# Patient Record
Sex: FEMALE | Race: BLACK OR AFRICAN AMERICAN | NOT HISPANIC OR LATINO | Employment: FULL TIME | URBAN - METROPOLITAN AREA
[De-identification: names, ages, dates, MRNs, and addresses within clinical notes are randomized per-mention and may not be internally consistent; named-entity substitution may affect disease eponyms.]

---

## 2017-08-24 ENCOUNTER — LAB VISIT (OUTPATIENT)
Dept: LAB | Facility: HOSPITAL | Age: 32
End: 2017-08-24
Attending: FAMILY MEDICINE
Payer: COMMERCIAL

## 2017-08-24 ENCOUNTER — OFFICE VISIT (OUTPATIENT)
Dept: INTERNAL MEDICINE | Facility: CLINIC | Age: 32
End: 2017-08-24
Payer: COMMERCIAL

## 2017-08-24 VITALS
OXYGEN SATURATION: 98 % | WEIGHT: 233.69 LBS | HEART RATE: 80 BPM | DIASTOLIC BLOOD PRESSURE: 68 MMHG | SYSTOLIC BLOOD PRESSURE: 110 MMHG | TEMPERATURE: 98 F | BODY MASS INDEX: 36.68 KG/M2 | HEIGHT: 67 IN

## 2017-08-24 DIAGNOSIS — M32.9 SYSTEMIC LUPUS ERYTHEMATOSUS, UNSPECIFIED SLE TYPE, UNSPECIFIED ORGAN INVOLVEMENT STATUS: ICD-10-CM

## 2017-08-24 DIAGNOSIS — Z00.00 ANNUAL PHYSICAL EXAM: ICD-10-CM

## 2017-08-24 DIAGNOSIS — Z00.00 ANNUAL PHYSICAL EXAM: Primary | ICD-10-CM

## 2017-08-24 LAB
ALBUMIN SERPL BCP-MCNC: 3.3 G/DL
ALP SERPL-CCNC: 52 U/L
ALT SERPL W/O P-5'-P-CCNC: 16 U/L
ANION GAP SERPL CALC-SCNC: 5 MMOL/L
AST SERPL-CCNC: 24 U/L
BASOPHILS # BLD AUTO: 0.04 K/UL
BASOPHILS NFR BLD: 0.6 %
BILIRUB SERPL-MCNC: 0.2 MG/DL
BILIRUB UR QL STRIP: NEGATIVE
BUN SERPL-MCNC: 10 MG/DL
CALCIUM SERPL-MCNC: 9.1 MG/DL
CHLORIDE SERPL-SCNC: 103 MMOL/L
CHOLEST/HDLC SERPL: 4.6 {RATIO}
CLARITY UR REFRACT.AUTO: CLEAR
CO2 SERPL-SCNC: 31 MMOL/L
COLOR UR AUTO: YELLOW
CREAT SERPL-MCNC: 0.8 MG/DL
DIFFERENTIAL METHOD: ABNORMAL
EOSINOPHIL # BLD AUTO: 0.5 K/UL
EOSINOPHIL NFR BLD: 7.2 %
ERYTHROCYTE [DISTWIDTH] IN BLOOD BY AUTOMATED COUNT: 12.6 %
ERYTHROCYTE [SEDIMENTATION RATE] IN BLOOD BY WESTERGREN METHOD: 22 MM/HR
EST. GFR  (AFRICAN AMERICAN): >60 ML/MIN/1.73 M^2
EST. GFR  (NON AFRICAN AMERICAN): >60 ML/MIN/1.73 M^2
GLUCOSE SERPL-MCNC: 71 MG/DL
GLUCOSE UR QL STRIP: NEGATIVE
HCT VFR BLD AUTO: 33.6 %
HDL/CHOLESTEROL RATIO: 21.9 %
HDLC SERPL-MCNC: 169 MG/DL
HDLC SERPL-MCNC: 37 MG/DL
HGB BLD-MCNC: 11.1 G/DL
HGB UR QL STRIP: NEGATIVE
KETONES UR QL STRIP: NEGATIVE
LDLC SERPL CALC-MCNC: 86.4 MG/DL
LEUKOCYTE ESTERASE UR QL STRIP: NEGATIVE
LYMPHOCYTES # BLD AUTO: 1.7 K/UL
LYMPHOCYTES NFR BLD: 25 %
MCH RBC QN AUTO: 30.2 PG
MCHC RBC AUTO-ENTMCNC: 33 G/DL
MCV RBC AUTO: 91 FL
MONOCYTES # BLD AUTO: 0.5 K/UL
MONOCYTES NFR BLD: 6.6 %
NEUTROPHILS # BLD AUTO: 4.1 K/UL
NEUTROPHILS NFR BLD: 60.5 %
NITRITE UR QL STRIP: NEGATIVE
NONHDLC SERPL-MCNC: 132 MG/DL
PH UR STRIP: 7 [PH] (ref 5–8)
PLATELET # BLD AUTO: 483 K/UL
PMV BLD AUTO: 9 FL
POTASSIUM SERPL-SCNC: 4.1 MMOL/L
PROT SERPL-MCNC: 7.6 G/DL
PROT UR QL STRIP: NEGATIVE
RBC # BLD AUTO: 3.68 M/UL
SODIUM SERPL-SCNC: 139 MMOL/L
SP GR UR STRIP: 1.02 (ref 1–1.03)
TRIGL SERPL-MCNC: 228 MG/DL
URN SPEC COLLECT METH UR: NORMAL
UROBILINOGEN UR STRIP-ACNC: 4 EU/DL
WBC # BLD AUTO: 6.77 K/UL

## 2017-08-24 PROCEDURE — 85025 COMPLETE CBC W/AUTO DIFF WBC: CPT

## 2017-08-24 PROCEDURE — 3008F BODY MASS INDEX DOCD: CPT | Mod: S$GLB,,, | Performed by: FAMILY MEDICINE

## 2017-08-24 PROCEDURE — 80061 LIPID PANEL: CPT

## 2017-08-24 PROCEDURE — 85651 RBC SED RATE NONAUTOMATED: CPT

## 2017-08-24 PROCEDURE — 80053 COMPREHEN METABOLIC PANEL: CPT

## 2017-08-24 PROCEDURE — 99385 PREV VISIT NEW AGE 18-39: CPT | Mod: S$GLB,,, | Performed by: FAMILY MEDICINE

## 2017-08-24 PROCEDURE — 99999 PR PBB SHADOW E&M-NEW PATIENT-LVL III: CPT | Mod: PBBFAC,,, | Performed by: FAMILY MEDICINE

## 2017-08-24 PROCEDURE — 81003 URINALYSIS AUTO W/O SCOPE: CPT

## 2017-08-24 PROCEDURE — 36415 COLL VENOUS BLD VENIPUNCTURE: CPT

## 2017-08-24 RX ORDER — HYDROXYCHLOROQUINE SULFATE 200 MG/1
200 TABLET, FILM COATED ORAL 2 TIMES DAILY
COMMUNITY
End: 2019-10-02 | Stop reason: SDUPTHER

## 2017-08-24 NOTE — PROGRESS NOTES
Allison España  08/24/2017  [id number]    Sfoia Martinez MD  Patient Care Team:  Sofia Martinez MD as PCP - General (Family Medicine)  Has the patient seen any provider outside of the [facility] network since the last visit? (yes). If yes, HIPPA forms completed and records requested.        Visit Type:New Patient, [facility] Care, Chronic illness     Chief Complaint:  Chief Complaint   Patient presents with    [facility] Care       History of Present Illness:  31 year old here for new patient appt.    She reports she had been Dx with Lupus, last year. She reports she started with butterfly rash. She reports that she had some numbness in leg parethesia.  She also had joint aches in her hands. She now has facial numbness. She does report Raynauds in hands, with vasospasm at times.     She is currently on Plaquenil 200 mg twice a day.  She reports she is takes prednisone once a day. She is not aware of dose. This was given by Rheum in [place].     She reports she has not had a pap in years.   She reports family history with Aunt and PAternal grandma with breast cancer.    She is concerned with the facial/cutaneous sensations that she has.  She would like second opinion on treatment options for her lupus.    She does not report any organ issues with heart or kidneys that she knows of. Occasional palpitations, but no trigger.            History:  Past Medical History:   Diagnosis Date    Lupus (systemic lupus erythematosus)      History reviewed. No pertinent surgical history.  Family History   Problem Relation Age of Onset    Liver cancer Maternal Aunt     Breast cancer Maternal Aunt     Breast cancer Paternal Grandmother      Social History     Social History    Marital status:      Spouse name: N/A    Number of children: N/A    Years of education: N/A     Occupational History    [employer]      Social History Main Topics    Smoking status: Never Smoker    Smokeless tobacco: Never Used    Alcohol use No     Drug use: No    Sexual activity: Yes     Partners: Male     Other Topics Concern    Not on file     Social History Narrative    No narrative on file     Patient Active Problem List   Diagnosis    Lupus (systemic lupus erythematosus)     Review of patient's allergies indicates:  Allergies not on file    The following were reviewed at this visit: active problem list, medication list, allergies, family history, social history, and health maintenance.    Medications:  No current outpatient prescriptions on file prior to visit.     No current facility-administered medications on file prior to visit.        Medications have been reviewed and reconciled with patient at this visit.  Barriers to medications present (no)    Adverse reactions to current medications (no)    Over the counter medications reviewed (Yes ), and if needed added to active Medication list at this visit.     Exam:  Wt Readings from Last 3 Encounters:   08/24/17 106 kg (233 lb 11.2 oz)     Temp Readings from Last 3 Encounters:   08/24/17 98.2 °F (36.8 °C) (Tympanic)     BP Readings from Last 3 Encounters:   08/24/17 110/68     Pulse Readings from Last 3 Encounters:   08/24/17 80     Body mass index is 36.68 kg/m².      Review of Systems   Constitutional: Negative for chills, fever and weight loss.   HENT: Negative for congestion and sore throat.    Eyes: Negative for blurred vision, double vision, photophobia and pain.   Respiratory: Negative for cough and shortness of breath.    Cardiovascular: Positive for palpitations. Negative for chest pain and leg swelling.   Gastrointestinal: Negative for abdominal pain, constipation, diarrhea, heartburn and vomiting.   Genitourinary: Negative.    Musculoskeletal: Positive for joint pain. Negative for falls.   Skin: Positive for rash.   Neurological: Positive for tingling and sensory change. Negative for speech change, focal weakness, seizures and headaches.   Endo/Heme/Allergies: Negative for environmental  allergies. Does not bruise/bleed easily.   Psychiatric/Behavioral: Negative for depression and substance abuse.       Physical Exam   Constitutional: She is oriented to person, place, and time. She appears well-developed and well-nourished.   HENT:   Head: Normocephalic and atraumatic.   Mouth/Throat: Oropharynx is clear and moist. No oropharyngeal exudate.   Eyes: EOM are normal. Pupils are equal, round, and reactive to light.   Neck: Normal range of motion. Neck supple. No thyromegaly present.   Cardiovascular: Normal rate, regular rhythm, normal heart sounds and intact distal pulses.    No murmur heard.  Pulmonary/Chest: Effort normal and breath sounds normal. No respiratory distress. She has no wheezes.   Abdominal: Soft. Bowel sounds are normal. She exhibits no distension. There is no tenderness.   Musculoskeletal: Normal range of motion. She exhibits no deformity.   Lymphadenopathy:     She has no cervical adenopathy.   Neurological: She is alert and oriented to person, place, and time.   Skin: Rash noted.   Hypopigmented rash to face, bilateral.  Reports paresthesia feeling to mouth, cheeks.     Nursing note and vitals reviewed.      Laboratory Reviewed ({N/A)  No results found for: WBC, HGB, HCT, PLT, CHOL, TRIG, HDL, LDLDIRECT, ALT, AST, NA, K, CL, CREATININE, BUN, CO2, TSH, PSA, INR, GLUF, HGBA1C, MICROALBUR    Assessment:  The primary encounter diagnosis was Annual physical exam. A diagnosis of Systemic lupus erythematosus, unspecified SLE type, unspecified organ involvement status was also pertinent to this visit.     Plan     Annual physical exam  -     Comprehensive metabolic panel; Future; Expected date: 08/24/2017  -     Lipid panel; Future; Expected date: 08/24/2017    Systemic lupus erythematosus, unspecified SLE type, unspecified organ involvement status  -     URINALYSIS  -     CBC auto differential; Future; Expected date: 08/24/2017  -     Sedimentation rate, manual; Future; Expected date:  08/24/2017  -     Ambulatory Referral to Rheumatology    She will return for pap, and well woman exam.     I will forward notes to Rheum. I will ask to see if they can see her sooner for her symptoms.  Basic Labs ordered.      Care Plan/Goals: Reviewed  (N/A)  Goals     None          Follow up: No Follow-up on file.    After visit summary was printed and given to patient upon discharge today.  Patient goals and care plan are included in After Visit Summary.

## 2017-08-29 ENCOUNTER — TELEPHONE (OUTPATIENT)
Dept: RHEUMATOLOGY | Facility: CLINIC | Age: 32
End: 2017-08-29

## 2017-08-29 NOTE — TELEPHONE ENCOUNTER
----- Message from Prashanth Whalen MD sent at 8/28/2017  3:06 PM CDT -----  Bryan,   Please have this patient in waiting list and see whether any of our providers could see her .   Thanks      ----- Message -----  From: Sofia Martinez MD  Sent: 8/25/2017  12:08 PM  To: Prashanth Whalen MD

## 2017-08-30 ENCOUNTER — OFFICE VISIT (OUTPATIENT)
Dept: OPHTHALMOLOGY | Facility: CLINIC | Age: 32
End: 2017-08-30
Payer: COMMERCIAL

## 2017-08-30 ENCOUNTER — OFFICE VISIT (OUTPATIENT)
Dept: RHEUMATOLOGY | Facility: CLINIC | Age: 32
End: 2017-08-30
Payer: COMMERCIAL

## 2017-08-30 ENCOUNTER — TELEPHONE (OUTPATIENT)
Dept: RHEUMATOLOGY | Facility: CLINIC | Age: 32
End: 2017-08-30

## 2017-08-30 ENCOUNTER — LAB VISIT (OUTPATIENT)
Dept: LAB | Facility: HOSPITAL | Age: 32
End: 2017-08-30
Attending: INTERNAL MEDICINE
Payer: COMMERCIAL

## 2017-08-30 ENCOUNTER — OFFICE VISIT (OUTPATIENT)
Dept: INTERNAL MEDICINE | Facility: CLINIC | Age: 32
End: 2017-08-30
Payer: COMMERCIAL

## 2017-08-30 VITALS
BODY MASS INDEX: 36.68 KG/M2 | HEIGHT: 67 IN | OXYGEN SATURATION: 98 % | DIASTOLIC BLOOD PRESSURE: 76 MMHG | WEIGHT: 233.69 LBS | HEART RATE: 82 BPM | SYSTOLIC BLOOD PRESSURE: 110 MMHG | TEMPERATURE: 97 F

## 2017-08-30 VITALS
WEIGHT: 236.75 LBS | DIASTOLIC BLOOD PRESSURE: 73 MMHG | BODY MASS INDEX: 37.16 KG/M2 | SYSTOLIC BLOOD PRESSURE: 125 MMHG | HEART RATE: 92 BPM

## 2017-08-30 DIAGNOSIS — Z79.899 LONG-TERM USE OF PLAQUENIL: Primary | ICD-10-CM

## 2017-08-30 DIAGNOSIS — H52.03 HYPEROPIA, BILATERAL: ICD-10-CM

## 2017-08-30 DIAGNOSIS — M32.19 OTHER SYSTEMIC LUPUS ERYTHEMATOSUS WITH OTHER ORGAN INVOLVEMENT: ICD-10-CM

## 2017-08-30 DIAGNOSIS — Z01.419 WELL WOMAN EXAM WITH ROUTINE GYNECOLOGICAL EXAM: Primary | ICD-10-CM

## 2017-08-30 DIAGNOSIS — M32.19 OTHER SYSTEMIC LUPUS ERYTHEMATOSUS WITH OTHER ORGAN INVOLVEMENT: Primary | ICD-10-CM

## 2017-08-30 DIAGNOSIS — Z79.899 LONG-TERM USE OF PLAQUENIL: ICD-10-CM

## 2017-08-30 LAB
ALBUMIN SERPL BCP-MCNC: 3.2 G/DL
ALP SERPL-CCNC: 54 U/L
ALT SERPL W/O P-5'-P-CCNC: 13 U/L
ANION GAP SERPL CALC-SCNC: 10 MMOL/L
AST SERPL-CCNC: 23 U/L
BASOPHILS # BLD AUTO: 0.03 K/UL
BASOPHILS NFR BLD: 0.4 %
BILIRUB SERPL-MCNC: 0.2 MG/DL
BUN SERPL-MCNC: 9 MG/DL
C3 SERPL-MCNC: 134 MG/DL
C4 SERPL-MCNC: 28 MG/DL
CALCIUM SERPL-MCNC: 9.3 MG/DL
CHLORIDE SERPL-SCNC: 103 MMOL/L
CK SERPL-CCNC: 250 U/L
CO2 SERPL-SCNC: 24 MMOL/L
CREAT SERPL-MCNC: 0.7 MG/DL
CRP SERPL-MCNC: 11.5 MG/L
DIFFERENTIAL METHOD: ABNORMAL
EOSINOPHIL # BLD AUTO: 0.5 K/UL
EOSINOPHIL NFR BLD: 5.6 %
ERYTHROCYTE [DISTWIDTH] IN BLOOD BY AUTOMATED COUNT: 12.8 %
ERYTHROCYTE [SEDIMENTATION RATE] IN BLOOD BY WESTERGREN METHOD: 15 MM/HR
EST. GFR  (AFRICAN AMERICAN): >60 ML/MIN/1.73 M^2
EST. GFR  (NON AFRICAN AMERICAN): >60 ML/MIN/1.73 M^2
GLUCOSE SERPL-MCNC: 77 MG/DL
HCT VFR BLD AUTO: 33.3 %
HGB BLD-MCNC: 11.2 G/DL
LYMPHOCYTES # BLD AUTO: 1.4 K/UL
LYMPHOCYTES NFR BLD: 17.1 %
MCH RBC QN AUTO: 29.9 PG
MCHC RBC AUTO-ENTMCNC: 33.6 G/DL
MCV RBC AUTO: 89 FL
MONOCYTES # BLD AUTO: 0.6 K/UL
MONOCYTES NFR BLD: 7.1 %
NEUTROPHILS # BLD AUTO: 5.6 K/UL
NEUTROPHILS NFR BLD: 69.6 %
PLATELET # BLD AUTO: 495 K/UL
PMV BLD AUTO: 8.9 FL
POTASSIUM SERPL-SCNC: 3.7 MMOL/L
PROT SERPL-MCNC: 7.7 G/DL
RBC # BLD AUTO: 3.74 M/UL
SODIUM SERPL-SCNC: 137 MMOL/L
TSH SERPL DL<=0.005 MIU/L-ACNC: 1.24 UIU/ML
WBC # BLD AUTO: 8.07 K/UL

## 2017-08-30 PROCEDURE — 85651 RBC SED RATE NONAUTOMATED: CPT | Mod: PO

## 2017-08-30 PROCEDURE — 92015 DETERMINE REFRACTIVE STATE: CPT | Mod: S$GLB,,, | Performed by: OPTOMETRIST

## 2017-08-30 PROCEDURE — 86160 COMPLEMENT ANTIGEN: CPT

## 2017-08-30 PROCEDURE — 80053 COMPREHEN METABOLIC PANEL: CPT

## 2017-08-30 PROCEDURE — 85025 COMPLETE CBC W/AUTO DIFF WBC: CPT

## 2017-08-30 PROCEDURE — 86146 BETA-2 GLYCOPROTEIN ANTIBODY: CPT

## 2017-08-30 PROCEDURE — 84443 ASSAY THYROID STIM HORMONE: CPT

## 2017-08-30 PROCEDURE — 99395 PREV VISIT EST AGE 18-39: CPT | Mod: S$GLB,,, | Performed by: FAMILY MEDICINE

## 2017-08-30 PROCEDURE — 86038 ANTINUCLEAR ANTIBODIES: CPT

## 2017-08-30 PROCEDURE — 99205 OFFICE O/P NEW HI 60 MIN: CPT | Mod: S$GLB,,, | Performed by: INTERNAL MEDICINE

## 2017-08-30 PROCEDURE — 99999 PR PBB SHADOW E&M-EST. PATIENT-LVL IV: CPT | Mod: PBBFAC,,, | Performed by: FAMILY MEDICINE

## 2017-08-30 PROCEDURE — 82550 ASSAY OF CK (CPK): CPT

## 2017-08-30 PROCEDURE — 86160 COMPLEMENT ANTIGEN: CPT | Mod: 59

## 2017-08-30 PROCEDURE — 82085 ASSAY OF ALDOLASE: CPT

## 2017-08-30 PROCEDURE — 92004 COMPRE OPH EXAM NEW PT 1/>: CPT | Mod: S$GLB,,, | Performed by: OPTOMETRIST

## 2017-08-30 PROCEDURE — 85613 RUSSELL VIPER VENOM DILUTED: CPT

## 2017-08-30 PROCEDURE — 3008F BODY MASS INDEX DOCD: CPT | Mod: S$GLB,,, | Performed by: INTERNAL MEDICINE

## 2017-08-30 PROCEDURE — 99999 PR PBB SHADOW E&M-EST. PATIENT-LVL III: CPT | Mod: PBBFAC,,, | Performed by: INTERNAL MEDICINE

## 2017-08-30 PROCEDURE — 86140 C-REACTIVE PROTEIN: CPT

## 2017-08-30 PROCEDURE — 88175 CYTOPATH C/V AUTO FLUID REDO: CPT

## 2017-08-30 PROCEDURE — 86235 NUCLEAR ANTIGEN ANTIBODY: CPT | Mod: 59

## 2017-08-30 PROCEDURE — 87624 HPV HI-RISK TYP POOLED RSLT: CPT

## 2017-08-30 PROCEDURE — 36415 COLL VENOUS BLD VENIPUNCTURE: CPT | Mod: PO

## 2017-08-30 PROCEDURE — 86039 ANTINUCLEAR ANTIBODIES (ANA): CPT

## 2017-08-30 PROCEDURE — 86147 CARDIOLIPIN ANTIBODY EA IG: CPT | Mod: 59

## 2017-08-30 RX ORDER — NABUMETONE 500 MG/1
500 TABLET, FILM COATED ORAL 2 TIMES DAILY
COMMUNITY
End: 2019-06-06

## 2017-08-30 NOTE — LETTER
August 30, 2017      Prashanth Whalen MD  [address]  [place] [address]           [facility]  [address] [place] [address]  [place] [address]  Phone: [phone]  Fax: [phone]          Patient: Allison España   MR Number: [id number]   YOB: 1985   Date of Visit: 8/30/2017       Dear Dr. Prashanth Whalen:    Thank you for referring Allison España to me for evaluation. Attached you will find relevant portions of my assessment and plan of care.    If you have questions, please do not hesitate to call me. I look forward to following Allison España along with you.    Sincerely,    Clifton Carrasco, OD    Enclosure  CC:  No Recipients    If you would like to receive this communication electronically, please contact [email or web address] or [phone] to request more information on [facility] Link access.    For providers and/or their staff who would like to refer a patient to [facility], please contact us through our one-stop-shop provider referral line, Jordana Nair, at [phone].    If you feel you have received this communication in error or would no longer like to receive these types of communications, please e-mail [email or web address]

## 2017-08-30 NOTE — LETTER
August 30, 2017      Sofia Martinez MD  [address]  [place] [address]           [facility]  [address]  [place] [address]  Phone: [phone]  Fax: [phone]          Patient: Allison España   MR Number: [id number]   YOB: 1985   Date of Visit: 8/30/2017       Dear Dr. Sofia Martinez:    Thank you for referring Allison España to me for evaluation. Attached you will find relevant portions of my assessment and plan of care.    If you have questions, please do not hesitate to call me. I look forward to following Allison España along with you.    Sincerely,    Prashanth Whalen MD    Enclosure  CC:  No Recipients    If you would like to receive this communication electronically, please contact [email or web address] or [phone] to request more information on [employer] Link access.    For providers and/or their staff who would like to refer a patient to [facility], please contact us through our one-stop-shop provider referral line, [facility] , at [phone].    If you feel you have received this communication in error or would no longer like to receive these types of communications, please e-mail [email or web address]

## 2017-08-30 NOTE — PATIENT INSTRUCTIONS
The Range of Pap Test Results  When your Pap test is sent to the lab, the lab studies your cell samples and reports any abnormal cell changes. Your health care provider can discuss these changes with you. In some cases, an abnormal Pap test is due to an infection. More serious cell changes range from dysplasia to cancer. Talk to your health care provider about your Pap test.    Normal results  Cervical cells, even normal ones, are always changing. As they mature, normal squamous cells move from deeper layers within the cervix. Over time, these cells flatten and cover the surface of the cervix. Within the cervical canal, the cells are different. These glandular cells are taller and not as flat as the cells on the surface of the cervix. When a Pap test sample shows healthy cells of both types, the results are negative. Keep having Pap tests as often as directed.  Abnormal results  A positive Pap test result means some cells in the sample showed abnormal changes. These results are grouped by the type of cell change and the location, or extent, of the changes. Depending on the results, you may need further testing.  · Inflammation: Noncancerous changes are present. They may be due to normal cell repair. Or, they may be caused by an infection, such as HPV or yeast. Further testing may be needed. (Also called reactive cellular changes.)  · Atypical squamous cells: Test results are unclear. Cells on the surface of the cervix show changes, but their significance is not yet known. Testing for HPV and other sexually transmitted infections (STIs) may be needed. Treatment may be required. (Reported as ASC-US or ASC-H.)  · Atypical glandular cells: Cells lining the cervical canal show abnormal changes. Further testing is likely. You may also have treatment to destroy or remove problem cells. (Reported as AGC.)  · Mild dysplasia: Cells show distinct changes. More testing or HPV typing may be done. You may also have treatment to  destroy or remove problem cells. (Reported as low-grade MAURICE or JAMILA 1.)  · Moderate to severe dysplasia: Cells show precancerous changes. Or, noninvasive cancer (carcinoma in situ) may be present. Treatment to destroy or remove problem cells is likely. (Reported as high-grade MAURICE or JAMILA 2 or JAMILA 3.)  · Cancer: Different types of cancer may be detected by your Pap test. More tests to assess the cancer's extent are likely. The type of treatment will depend on the test results and other factors, such as age and health history. (Reported as squamous cell carcinoma, endocervical adenocarcinoma in situ, or adenocarcinoma.)  Date Last Reviewed: 5/12/2015  © 8921-4174 [employer]. [address] [place] [address], [place] [address]. All rights reserved. This information is not intended as a substitute for professional medical care. Always follow your healthcare professional's instructions.

## 2017-08-30 NOTE — PROGRESS NOTES
RHEUMATOLOGY CLINIC INITIAL VISIT    Reason for referral:-  Referred by Dr. Martinez for evaluation of lupus.     Chief complaints:-  To get treatment for lupus.    HPI:-  Allison Kimbrough a 31 y.o. pleasant female comes in for an initial visit with above chief complaints.  She was doing well until January 2018 when she started having sudden onset facial rash associated with diffuse pain, joint swelling, fatigue and malaise.  She sought medical care with different medical providers.  At last she was seen by a rheumatologist in [place] who diagnosed her with lupus after blood tests came back positive for lupus and started her on Plaquenil and prednisone.  She notices some improvement in her facial rash since the medications but denies any improvement in her other symptoms.  She reports Raynaud's phenomenon on exposure to cold weather.  She denies any gangrene of the fingertip ulcers.  She has pain over small joints of the hands associated with stiffness.  She also has morning stiffness all over her body.  She denies any history of blood clots or complications during pregnancy.  No sicca syndrome.  No history of seizures.  She has history of severe headaches without any associated aura.    Review of Systems   Constitutional: Positive for malaise/fatigue. Negative for chills and fever.   HENT: Negative for nosebleeds and sore throat.    Eyes: Negative for blurred vision, photophobia and redness.   Respiratory: Negative for cough, sputum production and shortness of breath.    Cardiovascular: Negative for chest pain and leg swelling.   Gastrointestinal: Negative for abdominal pain, constipation and diarrhea.   Genitourinary: Negative for dysuria, frequency and urgency.   Musculoskeletal: Positive for back pain, joint pain, myalgias and neck pain. Negative for falls.   Skin: Positive for itching and rash.   Neurological: Positive for tingling and sensory change. Negative  for dizziness, tremors, seizures, loss of consciousness, weakness and headaches.        Numbness over entire face.    Endo/Heme/Allergies: Negative for environmental allergies. Does not bruise/bleed easily.   Psychiatric/Behavioral: Negative for hallucinations and memory loss. The patient has insomnia.        Past Medical History:   Diagnosis Date    Lupus (systemic lupus erythematosus)        History reviewed. No pertinent surgical history.     Social History   Substance Use Topics    Smoking status: Never Smoker    Smokeless tobacco: Never Used    Alcohol use No       Family History   Problem Relation Age of Onset    Liver cancer Maternal Aunt     Breast cancer Maternal Aunt     Breast cancer Paternal Grandmother        Review of patient's allergies indicates:  No Known Allergies        Physical examination:-    Vitals:    08/30/17 1407   BP: 125/73   Pulse: 92   Weight: 107.4 kg (236 lb 12.4 oz)   PainSc:   6   PainLoc: Generalized       Physical Exam   Constitutional: She is oriented to person, place, and time and well-developed, well-nourished, and in no distress. No distress.   HENT:   Head: Normocephalic.   Mouth/Throat: Oropharynx is clear and moist.   Eyes: Conjunctivae and EOM are normal. Pupils are equal, round, and reactive to light.   Neck: Normal range of motion. Neck supple.   Cardiovascular: Normal rate and intact distal pulses.    Pulmonary/Chest: Effort normal. No respiratory distress.   Abdominal: Soft. There is no tenderness.   Musculoskeletal:   Mild synovitis present over bilateral second, third and fourth PIP's. Wrists tender. Few tender points, but no  effusion over large joints.   Neurological: She is alert and oriented to person, place, and time. No cranial nerve deficit.   Skin: Skin is warm. Rash noted. No erythema.   Depigmentation present over face extending above forehead involving entire face and malar area, sparing preauricular regions.    Psychiatric: Mood and affect normal.    Nursing note and vitals reviewed.      Labs:-  No autoimmune labs available.     Medication List with Changes/Refills   Current Medications    HYDROXYCHLOROQUINE (PLAQUENIL) 200 MG TABLET    Take 200 mg by mouth 2 (two) times daily.    NABUMETONE (RELAFEN) 500 MG TABLET    Take 500 mg by mouth 2 (two) times daily.    PREDNISONE ORAL    Take 5 mg by mouth once daily.        Assessment/Plans:-  Lupus (systemic lupus erythematosus)  SLE diagnosed early this year after she developed facial rash, numbness over face, arthritis, photosensitivity, fatigue . Rheumatologist in [place] started her on plaquenil and prednisone. Mild synovitis over PIP joints associated with tender points present. Check lupus serologies. Continue plaquenil and prednisone in the mean time. Consult with dermatologist for the rash. I will call her once lupus serologies return to make further therapy plans.   - C3 complement; Future  - MELVA; Future  - C4 complement; Future  - CBC auto differential; Future  - Comprehensive metabolic panel; Future  - Sedimentation rate, manual; Future  - C-reactive protein; Future  - DRVVT; Future  - Cardiolipin antibody; Future  - Beta-2 glycoprotein antibodies; Future  - Protein / creatinine ratio, urine; Future  - Urinalysis; Future  - Ambulatory Referral to Dermatology  - Aldolase; Future  - CK; Standing  - TSH; Future    Long-term use of Plaquenil  Referred to ophthalmologist for retinal examination.   - Ambulatory referral to Ophthalmology    # RTC in 3 months.     Thank you   for allowing me to participate in the care ofAllison España.    Time spent: 60 minutes in face to face discussion concerning diagnosis, prognosis, review of lab and test results, benefits of treatment as well as management of disease, counseling of patient and coordination of care between various health care providers . Greater than half the time spent was used for coordination of care and counseling of patient.    Disclaimer: This  note was prepared using voice recognition system and is likely to have sound alike errors and is not proof read.  Please call me with any questions.

## 2017-08-30 NOTE — TELEPHONE ENCOUNTER
She has appt with me today this am. I am going to confirm she can make it. I will let you know.  Thank you.

## 2017-08-30 NOTE — PROGRESS NOTES
HPI     Started Plaquenil and steroids in January 2017  Headaches started intermittently since diagnosed with Lupus  No vision problems    Last edited by Clifton Carrasco, OD on 8/30/2017  3:42 PM. (History)            Assessment /Plan     For exam results, see Encounter Report.    Long-term use of Plaquenil    Other systemic lupus erythematosus with other organ involvement    Hyperopia, bilateral      No ocular evidence of Plaquenil toxicity.    Will schedule mOCT and 10-2 during her next visit with Dr VILLELA @ [facility].    RTC 1 year

## 2017-08-30 NOTE — TELEPHONE ENCOUNTER
She has confirmed that she will make this appt. 200. We spoke with her on the phone. [facility].

## 2017-08-30 NOTE — PROGRESS NOTES
Allison España  08/30/2017  [id number]    Sofia Martinez MD  Patient Care Team:  Sofia Martinez MD as PCP - General (Family Medicine)  Has the patient seen any provider outside of the [facility] network since the last visit? (no). If yes, HIPPA forms completed and records requested.        Visit Type:a scheduled routine follow-up visit    Chief Complaint:  No chief complaint on file.      History of Present Illness:  31 year old here for annual well woman.  No history of abnl pap.  .  No GYn complaints.  She has breast cancer in her family with her maternal aunt.    Contacted Rheum. She is on Waiting list at [facility] for appt.  Lupus with cutaneous sx.  Plan to refer externally so she does not have to wait for appt.          History:  Past Medical History:   Diagnosis Date    Lupus (systemic lupus erythematosus)      No past surgical history on file.  Family History   Problem Relation Age of Onset    Liver cancer Maternal Aunt     Breast cancer Maternal Aunt     Breast cancer Paternal Grandmother      Social History     Social History    Marital status:      Spouse name: N/A    Number of children: N/A    Years of education: N/A     Occupational History    [employer]      Social History Main Topics    Smoking status: Never Smoker    Smokeless tobacco: Never Used    Alcohol use No    Drug use: No    Sexual activity: Yes     Partners: Male     Other Topics Concern    Not on file     Social History Narrative    No narrative on file     Patient Active Problem List   Diagnosis    Lupus (systemic lupus erythematosus)     Review of patient's allergies indicates:  No Known Allergies    The following were reviewed at this visit: active problem list, medication list, allergies, family history, social history, and health maintenance.    Medications:  Current Outpatient Prescriptions on File Prior to Visit   Medication Sig Dispense Refill    hydroxychloroquine (PLAQUENIL) 200 mg tablet Take 200 mg by  mouth 2 (two) times daily.      PREDNISONE ORAL Take by mouth once daily.       No current facility-administered medications on file prior to visit.        Medications have been reviewed and reconciled with patient at this visit.  Barriers to medications present (no)    Adverse reactions to current medications (no)    Over the counter medications reviewed (Yes ), and if needed added to active Medication list at this visit.     Exam:  Wt Readings from Last 3 Encounters:   08/24/17 106 kg (233 lb 11.2 oz)     Temp Readings from Last 3 Encounters:   08/24/17 98.2 °F (36.8 °C) (Tympanic)     BP Readings from Last 3 Encounters:   08/24/17 110/68     Pulse Readings from Last 3 Encounters:   08/24/17 80     There is no height or weight on file to calculate BMI.      Review of Systems   Constitutional: Negative.    HENT: Negative.    Eyes: Negative.    Respiratory: Negative.    Cardiovascular: Negative.    Genitourinary: Negative for dysuria and urgency.   Musculoskeletal: Positive for joint pain. Negative for myalgias.   Skin: Positive for rash.   Neurological: Negative.    Endo/Heme/Allergies: Negative for polydipsia. Does not bruise/bleed easily.   Psychiatric/Behavioral: Negative.  Negative for depression and substance abuse.       Physical Exam   Constitutional: She appears well-developed and well-nourished.   Eyes: EOM are normal. Pupils are equal, round, and reactive to light.   Neck: Normal range of motion. Neck supple. No thyromegaly present.   Pulmonary/Chest: Effort normal and breath sounds normal. No respiratory distress. She has no wheezes. Right breast exhibits no inverted nipple, no mass, no nipple discharge, no skin change and no tenderness. Left breast exhibits no inverted nipple, no mass, no nipple discharge, no skin change and no tenderness.   Abdominal: Soft. Bowel sounds are normal.   Genitourinary: Vagina normal and uterus normal. Cervix exhibits no motion tenderness. Right adnexum displays no mass  and no tenderness. Left adnexum displays no mass and no tenderness. No vaginal discharge found.   Lymphadenopathy:     She has no cervical adenopathy.   Nursing note and vitals reviewed.      Laboratory Reviewed ({N/A)  Lab Results   Component Value Date    WBC 6.77 08/24/2017    HGB 11.1 (L) 08/24/2017    HCT 33.6 (L) 08/24/2017     (H) 08/24/2017    CHOL 169 08/24/2017    TRIG 228 (H) 08/24/2017    HDL 37 (L) 08/24/2017    ALT 16 08/24/2017    AST 24 08/24/2017     08/24/2017    K 4.1 08/24/2017     08/24/2017    CREATININE 0.8 08/24/2017    BUN 10 08/24/2017    CO2 31 (H) 08/24/2017       Assessment:  The encounter diagnosis was Well woman exam with routine gynecological exam.     Plan     Well woman exam with routine gynecological exam  -     Liquid-based pap smear, screening  -     HPV High Risk Genotypes, PCR        Care Plan/Goals: Reviewed  (N/A)  Goals     None          Follow up: No Follow-up on file.    After visit summary was printed and given to patient upon discharge today.  Patient goals and care plan are included in After Visit Summary.

## 2017-08-30 NOTE — ASSESSMENT & PLAN NOTE
SLE diagnosed early this year after she developed facial rash, numbness over face, arthritis, photosensitivity, fatigue . Rheumatologist in [place] started her on plaquenil and prednisone. Mild synovitis over PIP joints associated with tender points present. Check lupus serologies. Continue plaquenil and prednisone in the mean time. Consult with dermatologist for the rash. I will call her once lupus serologies return to make further therapy plans.

## 2017-08-31 LAB
ANA SER QL IF: POSITIVE
ANA TITR SER IF: NORMAL {TITER}

## 2017-09-01 LAB
ALDOLASE SERPL-CCNC: 8.9 U/L
CARDIOLIPIN IGG SER IA-ACNC: <9.4 GPL
CARDIOLIPIN IGM SER IA-ACNC: <9.4 MPL
LA PPP-IMP: NEGATIVE

## 2017-09-02 LAB
ANTI SM ANTIBODY: 12.86 EU
ANTI SM/RNP ANTIBODY: 300.12 EU
ANTI-SM INTERPRETATION: NEGATIVE
ANTI-SM/RNP INTERPRETATION: POSITIVE
ANTI-SSA ANTIBODY: 1.63 EU
ANTI-SSA INTERPRETATION: NEGATIVE
ANTI-SSB ANTIBODY: 1.88 EU
ANTI-SSB INTERPRETATION: NEGATIVE
B2 GLYCOPROT1 IGA SER QL: <9 SAU
B2 GLYCOPROT1 IGG SER QL: <9 SGU
B2 GLYCOPROT1 IGM SER QL: <9 SMU
DSDNA AB SER-ACNC: ABNORMAL [IU]/ML

## 2017-09-07 LAB
HPV HR 12 DNA CVX QL NAA+PROBE: NEGATIVE
HPV16 DNA SPEC QL NAA+PROBE: NEGATIVE
HPV18 DNA SPEC QL NAA+PROBE: NEGATIVE

## 2017-09-08 ENCOUNTER — PATIENT MESSAGE (OUTPATIENT)
Dept: RHEUMATOLOGY | Facility: CLINIC | Age: 32
End: 2017-09-08

## 2017-09-08 ENCOUNTER — TELEPHONE (OUTPATIENT)
Dept: RHEUMATOLOGY | Facility: CLINIC | Age: 32
End: 2017-09-08

## 2017-09-08 DIAGNOSIS — M32.19 OTHER SYSTEMIC LUPUS ERYTHEMATOSUS WITH OTHER ORGAN INVOLVEMENT: Primary | ICD-10-CM

## 2017-09-08 RX ORDER — AZATHIOPRINE 50 MG/1
50 TABLET ORAL DAILY
Qty: 30 TABLET | Refills: 1 | Status: SHIPPED | OUTPATIENT
Start: 2017-09-08 | End: 2017-12-01 | Stop reason: SDUPTHER

## 2017-09-08 NOTE — TELEPHONE ENCOUNTER
Normal Complements like C3 and C4 along with normal ESR suggests that the lupus is not highly active. Elevated CRP suggests inflammation from obesity which will get better as she loses weight. Thanks.

## 2017-09-08 NOTE — TELEPHONE ENCOUNTER
Start Imuran since plaquenil is not controlling the symptoms. In 2 weeks, if she fails to start feeling better, will increase the dose after repeating labs. Thanks.

## 2017-09-08 NOTE — TELEPHONE ENCOUNTER
Spoke with pt and she would like a further explanation regarding her labs. States she saw the message stating no evidence of high lupus activity and would like to know which labs reflect that. Pt is off work today and available anytime. Please Advise.

## 2017-09-08 NOTE — TELEPHONE ENCOUNTER
Spoke with pt and advised her of below and to call in 2 weeks with how she is doing. Pt verbalized understanding.

## 2017-09-08 NOTE — TELEPHONE ENCOUNTER
----- Message from Gail Barrios sent at 9/8/2017 12:15 PM CDT -----  Pt is requesting a call from nurse in regards to lab results.        Please call back at [phone]

## 2017-09-11 ENCOUNTER — TELEPHONE (OUTPATIENT)
Dept: INTERNAL MEDICINE | Facility: CLINIC | Age: 32
End: 2017-09-11

## 2017-09-11 RX ORDER — TRAZODONE HYDROCHLORIDE 50 MG/1
50 TABLET ORAL NIGHTLY
Qty: 30 TABLET | Refills: 11 | Status: SHIPPED | OUTPATIENT
Start: 2017-09-11 | End: 2019-01-08 | Stop reason: SDUPTHER

## 2017-09-11 NOTE — TELEPHONE ENCOUNTER
Pt c/o having trouble falling asleep, and staying asleep, she says that she has been trying otc sleep aids with no relief. She says that when she takes them she wakes up and start snacking  [facility] ([place])

## 2017-09-11 NOTE — TELEPHONE ENCOUNTER
I spoke with patient on phone.  We can try Trazadone, see if this helps with sleep.  Other alternative is Ambien, but there is risk of habituation.

## 2017-09-17 ENCOUNTER — HOSPITAL ENCOUNTER (EMERGENCY)
Facility: HOSPITAL | Age: 32
Discharge: HOME | End: 2017-09-17
Attending: EMERGENCY MEDICINE
Payer: COMMERCIAL

## 2017-09-17 VITALS
SYSTOLIC BLOOD PRESSURE: 144 MMHG | WEIGHT: 239 LBS | OXYGEN SATURATION: 100 % | TEMPERATURE: 98 F | DIASTOLIC BLOOD PRESSURE: 95 MMHG | HEIGHT: 67 IN | RESPIRATION RATE: 20 BRPM | BODY MASS INDEX: 37.51 KG/M2 | HEART RATE: 90 BPM

## 2017-09-17 DIAGNOSIS — R52 BODY ACHES: ICD-10-CM

## 2017-09-17 DIAGNOSIS — M32.9 EXACERBATION OF SYSTEMIC LUPUS: ICD-10-CM

## 2017-09-17 DIAGNOSIS — R51.9 ACUTE NONINTRACTABLE HEADACHE, UNSPECIFIED HEADACHE TYPE: Primary | ICD-10-CM

## 2017-09-17 LAB
FLUAV AG SPEC QL IA: NEGATIVE
FLUBV AG SPEC QL IA: NEGATIVE
SPECIMEN SOURCE: NORMAL

## 2017-09-17 PROCEDURE — 63600175 PHARM REV CODE 636 W HCPCS: Performed by: PHYSICIAN ASSISTANT

## 2017-09-17 PROCEDURE — 87400 INFLUENZA A/B EACH AG IA: CPT

## 2017-09-17 PROCEDURE — 99283 EMERGENCY DEPT VISIT LOW MDM: CPT | Mod: 25

## 2017-09-17 PROCEDURE — 96372 THER/PROPH/DIAG INJ SC/IM: CPT

## 2017-09-17 RX ORDER — METOCLOPRAMIDE HYDROCHLORIDE 5 MG/ML
10 INJECTION INTRAMUSCULAR; INTRAVENOUS ONCE
Status: COMPLETED | OUTPATIENT
Start: 2017-09-17 | End: 2017-09-17

## 2017-09-17 RX ORDER — KETOROLAC TROMETHAMINE 30 MG/ML
15 INJECTION, SOLUTION INTRAMUSCULAR; INTRAVENOUS
Status: COMPLETED | OUTPATIENT
Start: 2017-09-17 | End: 2017-09-17

## 2017-09-17 RX ADMIN — METOCLOPRAMIDE 10 MG: 5 INJECTION, SOLUTION INTRAMUSCULAR; INTRAVENOUS at 06:09

## 2017-09-17 RX ADMIN — KETOROLAC TROMETHAMINE 15 MG: 30 INJECTION, SOLUTION INTRAMUSCULAR at 06:09

## 2017-09-17 NOTE — ED PROVIDER NOTES
"SCRIBE #1 NOTE: I, Karis Pompa, am scribing for, and in the presence of, LIDIA Reagan. I have scribed the entire note.      History      Chief Complaint   Patient presents with    Generalized Body Aches     Pt states, "I am hurting all over and my right cheek is red and swelling." PT also c/o headache.       Review of patient's allergies indicates:  No Known Allergies     HPI   HPI    9/17/2017, 6:32 PM   History obtained from the patient      History of Present Illness: Stefanie España is a 31 y.o. female patient with PMHx of Lupus who presents to the Emergency Department for HA and body aches which onset gradually yesterday. Symptoms are constant and moderate in severity. No mitigating or exacerbating factors reported. Associated sxs include generalized body aches, dental pain, and photophobia. Patient denies any N/V/D, abd pain, cough, rhinorrhea, congestion, weakness/numbness, CP, SOB, and all other sxs at this time. No prior Tx. No further complaints or concerns at this time.         Arrival mode: Personal vehicle    PCP: Sofia Martinez MD       Past Medical History:  Past Medical History:   Diagnosis Date    Lupus (systemic lupus erythematosus)        Past Surgical History:  Past Surgical History:   Procedure Laterality Date    TUBAL LIGATION  2005         Family History:  Family History   Problem Relation Age of Onset    Liver cancer Maternal Aunt     Breast cancer Maternal Aunt     Breast cancer Paternal Grandmother        Social History:  Social History     Social History Main Topics    Smoking status: Never Smoker    Smokeless tobacco: Never Used    Alcohol use No    Drug use: No    Sexual activity: Yes     Partners: Male       ROS   Review of Systems   Constitutional: Negative for fever.        (+) Generalized body aches   HENT: Positive for dental problem. Negative for congestion, rhinorrhea and sore throat.    Eyes: Positive for photophobia.   Respiratory: Negative for shortness " of breath.    Cardiovascular: Negative for chest pain.   Gastrointestinal: Negative for abdominal pain, diarrhea, nausea and vomiting.   Genitourinary: Negative for dysuria.   Musculoskeletal: Negative for back pain.   Skin: Negative for rash.   Neurological: Positive for headaches. Negative for dizziness, weakness and numbness.   Hematological: Does not bruise/bleed easily.   All other systems reviewed and are negative.    Physical Exam      Initial Vitals [09/17/17 1803]   BP Pulse Resp Temp SpO2   (!) 144/95 90 20 98.4 °F (36.9 °C) 100 %      MAP       111.33          Physical Exam  Nursing Notes and Vital Signs Reviewed.  Constitutional: Patient is in no acute distress. Well-developed and well-nourished.  Head: Atraumatic. Normocephalic.  Eyes: PERRL. EOM intact. Conjunctivae are not pale. No scleral icterus.  ENT: Mucous membranes are moist. Oropharynx is clear and symmetric.   Mouth/Throat: No evident facial swelling. Patient handles secretions normally. Negative for gingival edema. No palpable fluctuance. No evidence of periodontal or periapical abscess. No trismus.    Neck: Supple. Full ROM. No lymphadenopathy.  Cardiovascular: Regular rate. Regular rhythm. No murmurs, rubs, or gallops. Distal pulses are 2+ and symmetric.  Pulmonary/Chest: No respiratory distress. Clear to auscultation bilaterally. No wheezing, rales, or rhonchi.  Abdominal: Soft and non-distended.  There is no tenderness.  No rebound, guarding, or rigidity. Good bowel sounds.  Genitourinary: No CVA tenderness  Musculoskeletal: Moves all extremities. No obvious deformities. No edema. No calf tenderness.  Skin: Warm and dry. MALAR rash to bilateral cheeks.  Neurological:  Alert, awake, and appropriate.  Normal speech.  No acute focal neurological deficits are appreciated.  Psychiatric: Normal affect. Good eye contact. Appropriate in content.    ED Course    Procedures  ED Vital Signs:  Vitals:    09/17/17 1803   BP: (!) 144/95   Pulse: 90  "  Resp: 20   Temp: 98.4 °F (36.9 °C)   TempSrc: Oral   SpO2: 100%   Weight: 108.4 kg (239 lb)   Height: 5' 7" (1.702 m)       Abnormal Lab Results:  Labs Reviewed   INFLUENZA A AND B ANTIGEN        All Lab Results:  Results for orders placed or performed during the hospital encounter of 09/17/17   Influenza antigen Nasal Swab   Result Value Ref Range    Influenza A Ag, EIA Negative Negative    Influenza B Ag, EIA Negative Negative    Flu A & B Source Nasal Swab             The Emergency Provider reviewed the vital signs and test results, which are outlined above.    ED Discussion     7:32 PM: Reassessed pt at this time. Pt states she is still feeling body aches but sxs have improved. Discussed with pt all pertinent ED information and results. Discussed pt dx and plan of tx. Gave pt all f/u and return to the ED instructions. All questions and concerns were addressed at this time. Pt expresses understanding of information and instructions, and is comfortable with plan to discharge. Pt is stable for discharge.    Patient's headache is either consistent with previous headache and/or lacks features concerning for emergent or life threatening condition.  I do not suspect SAH, meningitis, increased IC pressure, infectious, toxic, vascular, CNS, or other EMC.  I have discussed this at length with patient and/or family/caretaker.      ED Medication(s):  Medications   metoclopramide HCl injection 10 mg (10 mg Intramuscular Given 9/17/17 1847)   ketorolac injection 15 mg (15 mg Intramuscular Given 9/17/17 1847)       New Prescriptions    No medications on file       Follow-up Information     Call  Sofia Martinez MD.    Specialty:  Family Medicine  Contact information:  [address] [facility] [address] [phone]                   Increase Prednisone dose double for 3 days.  Follow p with PCP    Medical Decision Making    Medical Decision Making:   Clinical Tests:   Lab Tests: Ordered and Reviewed       "     Scribe Attestation:   Scribe #1: I performed the above scribed service and the documentation accurately describes the services I performed. I attest to the accuracy of the note.    Attending:   Physician Attestation Statement for Scribe #1: I, LIDIA Reagan, personally performed the services described in this documentation, as scribed by Karis Pompa, in my presence, and it is both accurate and complete.          Clinical Impression       ICD-10-CM ICD-9-CM   1. Acute nonintractable headache, unspecified headache type R51 784.0   2. Body aches R52 780.96   3. Exacerbation of systemic lupus M32.9 710.0       Disposition:   Disposition: Discharged  Condition: Stable         Kemar Nuñez PA-C  09/17/17 1938

## 2017-09-18 NOTE — DISCHARGE INSTRUCTIONS
Naproxen 400-500 mg every 12 hours and/or Tylenol 650-1000 mg every 4-6 hours for fever and pain relief.  No more than 4000mg of Tylenol every 24 hours.  Do not take Ibuprofen and Naproxen at the same time      Take an extra dose of your steroids today and the next 2 days or until your Primary Care Recommends to stop

## 2017-10-09 ENCOUNTER — OFFICE VISIT (OUTPATIENT)
Dept: INTERNAL MEDICINE | Facility: CLINIC | Age: 32
End: 2017-10-09
Payer: COMMERCIAL

## 2017-10-09 VITALS
OXYGEN SATURATION: 99 % | WEIGHT: 235 LBS | DIASTOLIC BLOOD PRESSURE: 74 MMHG | BODY MASS INDEX: 36.88 KG/M2 | HEIGHT: 67 IN | HEART RATE: 76 BPM | TEMPERATURE: 98 F | SYSTOLIC BLOOD PRESSURE: 116 MMHG

## 2017-10-09 DIAGNOSIS — H65.02 ACUTE SEROUS OTITIS MEDIA OF LEFT EAR, RECURRENCE NOT SPECIFIED: ICD-10-CM

## 2017-10-09 DIAGNOSIS — H92.09 OTALGIA, UNSPECIFIED LATERALITY: Primary | ICD-10-CM

## 2017-10-09 PROCEDURE — 99999 PR PBB SHADOW E&M-EST. PATIENT-LVL III: CPT | Mod: PBBFAC,,, | Performed by: FAMILY MEDICINE

## 2017-10-09 PROCEDURE — 99213 OFFICE O/P EST LOW 20 MIN: CPT | Mod: S$GLB,,, | Performed by: FAMILY MEDICINE

## 2017-10-09 RX ORDER — MELOXICAM 15 MG/1
15 TABLET ORAL DAILY
COMMUNITY
End: 2018-06-20 | Stop reason: ALTCHOICE

## 2017-10-09 RX ORDER — PREDNISONE 10 MG/1
TABLET ORAL
COMMUNITY
Start: 2017-09-06 | End: 2017-12-08 | Stop reason: SDUPTHER

## 2017-10-09 RX ORDER — HYDROXYCHLOROQUINE SULFATE 200 MG/1
200 TABLET, FILM COATED ORAL
COMMUNITY
End: 2017-10-09 | Stop reason: SDUPTHER

## 2017-10-09 RX ORDER — CEFDINIR 300 MG/1
300 CAPSULE ORAL 2 TIMES DAILY
Qty: 20 CAPSULE | Refills: 0 | Status: SHIPPED | OUTPATIENT
Start: 2017-10-09 | End: 2017-10-19

## 2017-10-09 RX ORDER — PREDNISONE 50 MG/1
50 TABLET ORAL
COMMUNITY
End: 2017-10-09

## 2017-10-09 RX ORDER — BUTALBITAL, ACETAMINOPHEN AND CAFFEINE 300; 40; 50 MG/1; MG/1; MG/1
CAPSULE ORAL
COMMUNITY
Start: 2017-09-28 | End: 2018-01-25

## 2017-10-09 NOTE — PROGRESS NOTES
Stefanie España  10/09/2017  [id number]    Sofia Martinez MD  Patient Care Team:  Sofia Martinez MD as PCP - General (Family Medicine)  Has the patient seen any provider outside of the [facility] network since the last visit? (yes). If yes, HIPPA forms completed and records requested.        Visit Type:an urgent visit for a new problem    Chief Complaint:  Chief Complaint   Patient presents with    Cough     Mucus, Week And A Half    Otalgia     Left 3-4 Days, Took Excedrin       History of Present Illness:  31 year old here for acute ear pain. Left ear. Denies any drainage. She does have associated cough. She does have pain in the ear.  She reports cough is sometimes productive. She has taken execedrin OTC.     She has history of SLE, current followed by Rheumatology at [facility]. She is on Plaquenil, imuran. She is on low dose prednisone.  She has had her annual eye exam.    She is due for her seasonal flu vaccine.    Pap and Labs are up to date        History:  Past Medical History:   Diagnosis Date    Lupus (systemic lupus erythematosus)      Past Surgical History:   Procedure Laterality Date    TUBAL LIGATION  2005     Family History   Problem Relation Age of Onset    Liver cancer Maternal Aunt     Breast cancer Maternal Aunt     Breast cancer Paternal Grandmother      Social History     Social History    Marital status:      Spouse name: N/A    Number of children: N/A    Years of education: N/A     Occupational History    [employer]      Social History Main Topics    Smoking status: Never Smoker    Smokeless tobacco: Never Used    Alcohol use No    Drug use: No    Sexual activity: Yes     Partners: Male     Other Topics Concern    Not on file     Social History Narrative    No narrative on file     Patient Active Problem List   Diagnosis    Lupus (systemic lupus erythematosus)    Long-term use of Plaquenil     Review of patient's allergies indicates:  No Known Allergies    The  following were reviewed at this visit: active problem list, medication list, allergies, family history, social history, and health maintenance.    Medications:  Current Outpatient Prescriptions on File Prior to Visit   Medication Sig Dispense Refill    azathioprine (IMURAN) 50 mg Tab Take 1 tablet (50 mg total) by mouth once daily. 30 tablet 1    hydroxychloroquine (PLAQUENIL) 200 mg tablet Take 200 mg by mouth 2 (two) times daily.      trazodone (DESYREL) 50 MG tablet Take 1 tablet (50 mg total) by mouth every evening. 30 tablet 11    [DISCONTINUED] PREDNISONE ORAL Take 5 mg by mouth once daily.       nabumetone (RELAFEN) 500 MG tablet Take 500 mg by mouth 2 (two) times daily.       No current facility-administered medications on file prior to visit.        Medications have been reviewed and reconciled with patient at this visit.  Barriers to medications present (no)    Adverse reactions to current medications (no)    Over the counter medications reviewed (Yes ), and if needed added to active Medication list at this visit.     Exam:  Wt Readings from Last 3 Encounters:   10/09/17 106.6 kg (235 lb)   09/17/17 108.4 kg (239 lb)   08/30/17 107.4 kg (236 lb 12.4 oz)     Temp Readings from Last 3 Encounters:   10/09/17 97.8 °F (36.6 °C) (Tympanic)   09/17/17 98.4 °F (36.9 °C) (Oral)   08/30/17 96.8 °F (36 °C) (Tympanic)     BP Readings from Last 3 Encounters:   10/09/17 116/74   09/17/17 (!) 144/95   08/30/17 125/73     Pulse Readings from Last 3 Encounters:   10/09/17 76   09/17/17 90   08/30/17 92     Body mass index is 36.8 kg/m².      Review of Systems   Constitutional: Negative.  Negative for chills.   HENT: Positive for ear pain. Negative for congestion, sinus pain and sore throat.    Eyes: Negative for blurred vision and double vision.   Respiratory: Positive for cough and sputum production. Negative for shortness of breath and wheezing.    Cardiovascular: Negative for chest pain, palpitations and leg  swelling.   Gastrointestinal: Negative for abdominal pain, constipation, diarrhea, heartburn, nausea and vomiting.   Genitourinary: Negative.    Musculoskeletal: Positive for joint pain.   Skin: Positive for rash.   Neurological: Negative.    Endo/Heme/Allergies: Negative.  Negative for polydipsia. Does not bruise/bleed easily.   Psychiatric/Behavioral: Negative for depression and substance abuse.       Physical Exam   Constitutional: She is oriented to person, place, and time. She appears well-developed and well-nourished. No distress.   HENT:   Head: Normocephalic and atraumatic.   Nose: Nose normal.   Mouth/Throat: Oropharynx is clear and moist. No oropharyngeal exudate.   Cerumen left ear     Eyes: Conjunctivae and EOM are normal. Pupils are equal, round, and reactive to light. Right eye exhibits no discharge. Left eye exhibits no discharge.   Neck: Normal range of motion. Neck supple. No thyromegaly present.   Cardiovascular: Normal rate, regular rhythm, normal heart sounds and intact distal pulses.    No murmur heard.  Pulmonary/Chest: Effort normal and breath sounds normal. No respiratory distress. She has no wheezes.   Abdominal: Soft. Bowel sounds are normal. She exhibits no distension and no mass. There is no tenderness.   Musculoskeletal: Normal range of motion. She exhibits no edema.   Lymphadenopathy:     She has no cervical adenopathy.   Neurological: She is alert and oriented to person, place, and time. No cranial nerve deficit.   Skin: Capillary refill takes less than 2 seconds. Rash noted. She is not diaphoretic.   Psychiatric: She has a normal mood and affect. Her behavior is normal. Judgment and thought content normal.   Nursing note and vitals reviewed.      Laboratory Reviewed ({Yes)  Lab Results   Component Value Date    WBC 8.07 08/30/2017    HGB 11.2 (L) 08/30/2017    HCT 33.3 (L) 08/30/2017     (H) 08/30/2017    CHOL 169 08/24/2017    TRIG 228 (H) 08/24/2017    HDL 37 (L) 08/24/2017     ALT 13 08/30/2017    AST 23 08/30/2017     08/30/2017    K 3.7 08/30/2017     08/30/2017    CREATININE 0.7 08/30/2017    BUN 9 08/30/2017    CO2 24 08/30/2017    TSH 1.243 08/30/2017       Assessment:  The encounter diagnosis was Otalgia, unspecified laterality.     Plan       Otalgia, unspecified laterality      Care Plan/Goals: Reviewed  (Yes)  Goals     None          Follow up: No Follow-up on file.    After visit summary was printed and given to patient upon discharge today.  Patient goals and care plan are included in After Visit Summary.

## 2017-10-09 NOTE — PATIENT INSTRUCTIONS
Common Middle Ear Problems    Your middle ear may have been injured or infected recently. Over time, certain growths or bone disease can also harm the middle ear. Left untreated, middle ear problems often lead to lifelong hearing loss. There are two types of hearing loss: conductive and sensorineural. One or both kinds can occur. Injury, infection, certain growths, or bone disease can cause your symptoms. A ruptured eardrum or a long-lasting (chronic) ear infection may be painful and decrease hearing.  Symptoms  · Hearing loss in one or both ears  · Fluid, often smelly, draining from the ear  · Pain, pressure, or discomfort in the ear  · Ringing in the ear  Conductive and sensorineural hearing loss  Sound waves may be disrupted before they reach the inner ear. If this happens, conductive hearing loss may occur. The ear canal can be blocked by wax, infection, a tumor, or a foreign object. The eardrum can be injured or infected. Abnormal bone growth, infection, or tumors in the middle ear can block sound waves.  Sound waves may not be processed correctly in the inner ear. If this happens, sensorineural hearing loss may occur. Permanent hearing loss is most commonly associated with sensorineural problems.  The tests and evaluations used to diagnose what type of hearing problem you have will depend on your symptoms.   Date Last Reviewed: 10/1/2016  © 6855-0297 [employer]. [address] [place] [address], [place] [address]. All rights reserved. This information is not intended as a substitute for professional medical care. Always follow your healthcare professional's instructions.

## 2017-10-25 ENCOUNTER — PATIENT MESSAGE (OUTPATIENT)
Dept: DERMATOLOGY | Facility: CLINIC | Age: 32
End: 2017-10-25

## 2017-10-26 ENCOUNTER — INITIAL CONSULT (OUTPATIENT)
Dept: DERMATOLOGY | Facility: CLINIC | Age: 32
End: 2017-10-26
Payer: COMMERCIAL

## 2017-10-26 DIAGNOSIS — R21 MALAR RASH: Primary | ICD-10-CM

## 2017-10-26 DIAGNOSIS — L81.9 DYSCHROMIA: ICD-10-CM

## 2017-10-26 PROCEDURE — 99999 PR PBB SHADOW E&M-EST. PATIENT-LVL II: CPT | Mod: PBBFAC,,, | Performed by: DERMATOLOGY

## 2017-10-26 PROCEDURE — 99202 OFFICE O/P NEW SF 15 MIN: CPT | Mod: S$GLB,,, | Performed by: DERMATOLOGY

## 2017-10-26 RX ORDER — TRIAMCINOLONE ACETONIDE 0.25 MG/G
OINTMENT TOPICAL 2 TIMES DAILY
Qty: 80 G | Refills: 1 | Status: SHIPPED | OUTPATIENT
Start: 2017-10-26 | End: 2019-03-25 | Stop reason: SDUPTHER

## 2017-10-26 RX ORDER — TACROLIMUS 1 MG/G
OINTMENT TOPICAL 2 TIMES DAILY
Qty: 60 G | Refills: 3 | Status: SHIPPED | OUTPATIENT
Start: 2017-10-26 | End: 2021-01-15 | Stop reason: SDUPTHER

## 2017-10-26 NOTE — LETTER
October 26, 2017      Prashanth Whalen MD  [address] [place] [address]           [facility] Dermat[facility]  [address] [place] [address]  Phone: [phone]  Fax: [phone]          Patient: Stefanie España   MR Number: [id number]   YOB: 1985   Date of Visit: 10/26/2017       Dear Dr. Prashanth Whalen:    Thank you for referring Stefanie España to me for evaluation. Attached you will find relevant portions of my assessment and plan of care.    If you have questions, please do not hesitate to call me. I look forward to following Stefanie España along with you.    Sincerely,    Shawnee Stacy MD    Enclosure  CC:  No Recipients    If you would like to receive this communication electronically, please contact [email or web address] or [phone] to request more information on "[employer] Link access.    For providers and/or their staff who would like to refer a patient to [facility], please contact us through our one-stop-shop provider referral line, [facility], at [phone].    If you feel you have received this communication in error or would no longer like to receive these types of communications, please e-mail [email or web address]

## 2017-10-26 NOTE — PATIENT INSTRUCTIONS
Start broad spectrum sunscreen with SPF 50 and zinc oxide and/or titanium dioxide.  Use sunscreen daily.          Lupus (Systemic Lupus Erythematosus, SLE)  Lupus is a chronic (long-term) disease. It causes inflammation in the body. It mainly affects the joints, skin, and muscles. Lupus can affect almost any part of the body, and other common sites affected by lupus include the kidneys, blood cells, lungs, brain, nerves, intestines, eyes, mouth, and heart. Lupus is an autoimmune disease. This means that immune cells in the body begin attacking normal body cells. The cause of this is not known.  Common symptoms include:  · A butterfly-shaped rash across the bridge of the nose and cheeks or a disk-shaped rash on the face, neck, or chest  · Sun sensitivity (a short time in the sun may lead to severe sunburn or rash)  · Stiff, painful, or swollen joints (arthritis)  · Fatigue or depression  · Fever  Your healthcare provider may prescribe medicines such as oral steroids or medicines to suppress the immune system. Some people benefit from anti-malarial medicines as well. People with lupus are more likely to have heart disease. So, it is vital to manage other risk factors for heart disease. These include high blood pressure, smoking, and unhealthy cholesterol.   There is no cure for lupus. With good care, though, most people with the condition lead normal, active lives.   Home care  · If you were prescribed a medicine, take it as directed.  · Unless another pain medicine was prescribed, take an over-the-counter pain medicine such as acetaminophen or ibuprofen for pain. Do not take ibuprofen or other NSAID (non-steroidal anti-inflammatory) medicine if you were prescribed prednisone.  · Avoid sun exposure. Cover up with clothing. Wear sunglasses. Use sun screen (at least SPF 15).  · Get enough rest and reduce stress to help your immune system.  · Get some physical activity every day. This will help you feel your best.  · If  you have high blood pressure, consider buying an automatic blood pressure machine (available at most pharmacies). Use this to monitor your blood pressure and report to your doctor.  · Limit alcohol intake. Eat a healthy, balanced diet low in fat and cholesterol.  · If you smoke, quit. Smoking increases the risk of lupus-related complications.  Follow-up care  Follow up with your healthcare provider or as advised by our staff.  For more information contact the Lupus Foundation at [phone]  www.lupus.org  When to seek medical advice  Call your healthcare provider for any of the following:  · Increasing weakness, fainting  · Chest pain or shortness of breath or pain with breathing  · Severe headache with fever  · Seizures  · Leg swelling, redness or tenderness (sign of blood clot)  · Unusual bruising or bleeding anywhere on your body  · Blood in your stool (black or red color)  · Abdominal pain, repeated vomiting  · Blood or development of significant bubbles in the urine  · Swelling in the legs and arms  · Development of ulcers in the mouth  · New rash  · Rashes, discoloration or ulcerations on the finger tips or toes  · You become pregnant or are planning to become pregnant  Date Last Reviewed: 3/1/2017  © 7315-4172 [employer]. [address] [place] [address], [place] [address]. All rights reserved. This information is not intended as a substitute for professional medical care. Always follow your healthcare professional's instructions.

## 2017-10-26 NOTE — PROGRESS NOTES
Subjective:       Patient ID:  Stefanie España is a 31 y.o. female who presents for   Chief Complaint   Patient presents with    Rash     c/o rash throughout face x 10 months,,,painful,itchy and dry,,tx with vaseline and ming butter     Hx of SLe (currently on plaquenil, imuran, prednisone 5 mg).  She is followed by Dr. Whalen.     History of Present Illness: The patient presents with chief complaint of rash.  Location: face  Duration: 10 months  Signs/Symptoms: painful, burning, pruritus    Prior treatments: vaseline and coca butter      Denies hx of thrombosis, miscarriage, aphthous ulcers, + photosensitivity.  She does not currently wear sunscreen.     + Raynaud's        Review of Systems   Constitutional: Negative for fever and chills.   Gastrointestinal: Negative for nausea and vomiting.   Skin: Negative for daily sunscreen use, activity-related sunscreen use and recent sunburn.   Hematologic/Lymphatic: Does not bruise/bleed easily.        Objective:    Physical Exam   Constitutional: She appears well-developed and well-nourished. No distress.   Neurological: She is alert and oriented to person, place, and time. She is not disoriented.   Psychiatric: She has a normal mood and affect.   Skin:   Areas Examined (abnormalities noted in diagram):   Head / Face Inspection Performed  Neck Inspection Performed  Chest / Axilla Inspection Performed  Abdomen Inspection Performed  Back Inspection Performed  RUE Inspected  LUE Inspection Performed  Nails and Digits Inspection Performed                  Assessment / Plan:        Malar rash  Dyschromia  -     triamcinolone acetonide 0.025% (KENALOG) 0.025 % Oint; Apply topically 2 (two) times daily.  Dispense: 80 g; Refill: 1  -     tacrolimus (PROTOPIC) 0.1 % ointment; Apply topically 2 (two) times daily.  Dispense: 60 g; Refill: 3  -     Malar rash with post-inflammatory hypopigmentation.  Pt with recent intense sun exposure.  Discussed importance of daily sun  protection and sun avoidance.  Will start above meds.  Recommend pt also purchase broad spectrum sunscreen with spf 50 or greater. The patient acknowledged understanding.                Return in about 3 months (around 1/26/2018).

## 2017-12-01 ENCOUNTER — OFFICE VISIT (OUTPATIENT)
Dept: RHEUMATOLOGY | Facility: CLINIC | Age: 32
End: 2017-12-01
Payer: COMMERCIAL

## 2017-12-01 ENCOUNTER — LAB VISIT (OUTPATIENT)
Dept: LAB | Facility: HOSPITAL | Age: 32
End: 2017-12-01
Attending: INTERNAL MEDICINE
Payer: COMMERCIAL

## 2017-12-01 VITALS
HEART RATE: 83 BPM | DIASTOLIC BLOOD PRESSURE: 81 MMHG | WEIGHT: 233 LBS | BODY MASS INDEX: 36.49 KG/M2 | SYSTOLIC BLOOD PRESSURE: 124 MMHG

## 2017-12-01 DIAGNOSIS — M32.19 OTHER SYSTEMIC LUPUS ERYTHEMATOSUS WITH OTHER ORGAN INVOLVEMENT: ICD-10-CM

## 2017-12-01 DIAGNOSIS — Z79.899 LONG-TERM CURRENT USE OF HIGH RISK MEDICATION OTHER THAN ANTICOAGULANT: ICD-10-CM

## 2017-12-01 DIAGNOSIS — M32.19 OTHER SYSTEMIC LUPUS ERYTHEMATOSUS WITH OTHER ORGAN INVOLVEMENT: Primary | ICD-10-CM

## 2017-12-01 LAB — ERYTHROCYTE [SEDIMENTATION RATE] IN BLOOD BY WESTERGREN METHOD: 27 MM/HR

## 2017-12-01 PROCEDURE — 86160 COMPLEMENT ANTIGEN: CPT | Mod: 59

## 2017-12-01 PROCEDURE — 36415 COLL VENOUS BLD VENIPUNCTURE: CPT | Mod: PO

## 2017-12-01 PROCEDURE — 99214 OFFICE O/P EST MOD 30 MIN: CPT | Mod: S$GLB,,, | Performed by: INTERNAL MEDICINE

## 2017-12-01 PROCEDURE — 86235 NUCLEAR ANTIGEN ANTIBODY: CPT

## 2017-12-01 PROCEDURE — 99999 PR PBB SHADOW E&M-EST. PATIENT-LVL III: CPT | Mod: PBBFAC,,, | Performed by: INTERNAL MEDICINE

## 2017-12-01 PROCEDURE — 82550 ASSAY OF CK (CPK): CPT

## 2017-12-01 PROCEDURE — 86140 C-REACTIVE PROTEIN: CPT

## 2017-12-01 PROCEDURE — 85025 COMPLETE CBC W/AUTO DIFF WBC: CPT

## 2017-12-01 PROCEDURE — 86146 BETA-2 GLYCOPROTEIN ANTIBODY: CPT

## 2017-12-01 PROCEDURE — 86160 COMPLEMENT ANTIGEN: CPT

## 2017-12-01 PROCEDURE — 85613 RUSSELL VIPER VENOM DILUTED: CPT

## 2017-12-01 PROCEDURE — 86147 CARDIOLIPIN ANTIBODY EA IG: CPT | Mod: 59

## 2017-12-01 PROCEDURE — 85651 RBC SED RATE NONAUTOMATED: CPT | Mod: PO

## 2017-12-01 PROCEDURE — 80053 COMPREHEN METABOLIC PANEL: CPT

## 2017-12-01 RX ORDER — AZATHIOPRINE 50 MG/1
50 TABLET ORAL 2 TIMES DAILY
Qty: 60 TABLET | Refills: 3 | Status: SHIPPED | OUTPATIENT
Start: 2017-12-01 | End: 2019-10-02 | Stop reason: SDUPTHER

## 2017-12-01 NOTE — ASSESSMENT & PLAN NOTE
SLE with inflammatory arthritis , photosensitive malar rash, fatigue, positive MELVA, positive RNP antibody with normal complements, normal antiphospholipid antibodies . She is on plaquenil and Imuran with low dose prednisone. Mild active synovitis over small joints of hands today. Increase dose of Imuran, continue plaquenil and low dose prednisone. Consider Benlysta next visit.

## 2017-12-01 NOTE — PATIENT INSTRUCTIONS
Belimumab solution for injection  What is this medicine?  BELIMUMAB (be HATCH ue mab) is a medicine used to treat a certain type of systemic lupus erythematosus (SLE). This medicine is used with standard therapy for SLE. It is not a cure.  How should I use this medicine?  This medicine is for infusion into a vein. It is given by a health care professional in a hospital or clinic setting.  A special MedGuide will be given to you by the pharmacist with each prescription and refill. Be sure to read this information carefully each time.  Talk to your pediatrician regarding the use of this medicine in children. Special care may be needed.  What side effects may I notice from receiving this medicine?  Side effects that you should report to your doctor or health care professional as soon as possible:  · allergic reactions like skin rash, itching or hives, swelling of the face, lips, or tongue  · depressed mood  · signs of infection - fever or chills, cough, sore throat, pain or difficulty passing urine  · suicidal thoughts or other mood changes  · unusually slow heartbeat  Side effects that usually do not require medical attention (Report these to your doctor or health care professional if they continue or are bothersome.):  · diarrhea  · headache  · muscle aches  · nausea, vomiting  · trouble sleeping  What may interact with this medicine?  Do not take this medicine with any of the following medications:  · live virus vaccines  This medicine may also interact with the following medications:  · cyclophosphamide  · ofatumumab  · rituximab  What if I miss a dose?  It is important not to miss your dose. Call your doctor or health care professional if you are unable to keep an appointment.  Where should I keep my medicine?  This drug is given in a hospital or clinic and will not be stored at home.  What should I tell my health care provider before I take this medicine?  They need to know if you have any of these  conditions:  · heart disease  · immune system problems  · infection (especially a virus infection such as chickenpox, cold sores, or herpes)  · mental illness  · suicidal thoughts, plans, or attempt; a previous suicide attempt by you or a family member  · an unusual or allergic reaction to belimumab, other medicines, foods, dyes, or preservatives  · pregnant or trying to get pregnant  · breast-feeding  What should I watch for while using this medicine?  Tell your doctor or healthcare professional if your symptoms do not start to get better or if they get worse.  Your condition will be monitored carefully while you are receiving this medicine.  NOTE:This sheet is a summary. It may not cover all possible information. If you have questions about this medicine, talk to your doctor, pharmacist, or health care provider. Copyright© 2017 [employer]

## 2017-12-01 NOTE — PROGRESS NOTES
RHEUMATOLOGY CLINIC FOLLOW UP VISIT  Chief complaints:-  My hands hurt.     HPI:-  Stefanie Kimbrough a 31 y.o. pleasant female comes in for a follow up visit with above chief complaints.   Location- Bilateral hands and muscles.  Severity- Mild to moderate.  Timing-All day long.   Modifying factors- No worsening or relieving factors.   Quality- Sharp  Context- SLE  Associated signs & symptoms- Joint swelling, stiffness, skin rash, weakness, photosensitivity.      Review of Systems   Constitutional: Negative for chills and fever.   HENT: Negative for congestion and sore throat.    Eyes: Negative for blurred vision and redness.   Respiratory: Negative for cough and shortness of breath.    Cardiovascular: Negative for chest pain and leg swelling.   Gastrointestinal: Negative for abdominal pain.   Genitourinary: Negative for dysuria.   Musculoskeletal: Positive for back pain, joint pain, myalgias and neck pain. Negative for falls.   Skin: Positive for rash.   Neurological: Negative for headaches.   Endo/Heme/Allergies: Does not bruise/bleed easily.   Psychiatric/Behavioral: Negative for memory loss. The patient does not have insomnia.        Past Medical History:   Diagnosis Date    Lupus (systemic lupus erythematosus)        Past Surgical History:   Procedure Laterality Date    TUBAL LIGATION  2005        Social History   Substance Use Topics    Smoking status: Never Smoker    Smokeless tobacco: Never Used    Alcohol use No       Family History   Problem Relation Age of Onset    Liver cancer Maternal Aunt     Breast cancer Maternal Aunt     Breast cancer Paternal Grandmother        Review of patient's allergies indicates:   Allergen Reactions    Elavil [amitriptyline]            Physical examination:-    Vitals:    12/01/17 1540   BP: 124/81   Pulse: 83   Weight: 105.7 kg (233 lb 0.4 oz)   PainSc:   5   PainLoc: Generalized       Physical Exam    Constitutional: She is oriented to person, place, and time and well-developed, well-nourished, and in no distress. No distress.   HENT:   Head: Normocephalic.   Mouth/Throat: Oropharynx is clear and moist.   Eyes: Conjunctivae and EOM are normal. Pupils are equal, round, and reactive to light.   Neck: Normal range of motion. Neck supple.   Cardiovascular: Normal rate and intact distal pulses.    Pulmonary/Chest: Effort normal. No respiratory distress.   Abdominal: Soft. There is no tenderness.   Musculoskeletal:   Mild synovitis present over bilateral second, third and fourth PIP's. Wrists tender. Few tender points, but no  effusion over large joints.   Neurological: She is alert and oriented to person, place, and time. No cranial nerve deficit.   Skin: Skin is warm. Rash noted. No erythema.   Depigmentation present over face extending above forehead involving entire face and malar area, sparing preauricular regions- Significant improvement since last visit.    Psychiatric: Mood and affect normal.   Nursing note and vitals reviewed.      Labs:-  Results for MARILIA SALEEM (MRN [id number]) as of 12/1/2017 15:37   Ref. Range 8/30/2017 14:50   MELVA HEP-2 Titer Unknown Positive >=1:2560...   Anti-SSA Antibody Latest Ref Range: 0.00 - 19.99 EU 1.63   Anti-SSA Interpretation Latest Ref Range: Negative  Negative   Anti-SSB Antibody Latest Ref Range: 0.00 - 19.99 EU 1.88   Anti-SSB Interpretation Latest Ref Range: Negative  Negative   ds DNA Ab Latest Ref Range: Negative 1:10  Negative 1:10   Anti Sm Antibody Latest Ref Range: 0.00 - 19.99 EU 12.86   Anti-Sm Interpretation Latest Ref Range: Negative  Negative   Anti Sm/RNP Antibody Latest Ref Range: 0.00 - 19.99 .12 (H)   Anti-Sm/RNP Interpretation Latest Ref Range: Negative  Positive (A)   Complement (C-3) Latest Ref Range: 50 - 180 mg/dL 134   Complement (C-4) Latest Ref Range: 11 - 44 mg/dL 28       Medication List with Changes/Refills   Current Medications     BUTALBITAL-ACETAMINOPHEN-CAFF -40 MG CAP        HYDROXYCHLOROQUINE (PLAQUENIL) 200 MG TABLET    Take 200 mg by mouth 2 (two) times daily.    MELOXICAM (MOBIC) 15 MG TABLET    Take 15 mg by mouth.    NABUMETONE (RELAFEN) 500 MG TABLET    Take 500 mg by mouth 2 (two) times daily.    PREDNISONE (DELTASONE) 10 MG TABLET        TACROLIMUS (PROTOPIC) 0.1 % OINTMENT    Apply topically 2 (two) times daily.    TRAZODONE (DESYREL) 50 MG TABLET    Take 1 tablet (50 mg total) by mouth every evening.    TRIAMCINOLONE ACETONIDE 0.025% (KENALOG) 0.025 % OINT    Apply topically 2 (two) times daily.   Changed and/or Refilled Medications    Modified Medication Previous Medication    AZATHIOPRINE (IMURAN) 50 MG TAB azathioprine (IMURAN) 50 mg Tab       Take 1 tablet (50 mg total) by mouth 2 (two) times daily.    Take 1 tablet (50 mg total) by mouth once daily.       Assessment/Plans:-  # Lupus (systemic lupus erythematosus)  SLE with inflammatory arthritis , photosensitive malar rash, fatigue, positive MELVA, positive RNP antibody, elevated CK with normal complements, normal antiphospholipid antibodies . She is on plaquenil and Imuran with low dose prednisone. Mild active synovitis over small joints of hands today. Increase dose of Imuran, continue plaquenil and low dose prednisone. Consider Benlysta next visit.  - C3 complement; Future  - C4 complement; Future  - CBC auto differential; Future  - Comprehensive metabolic panel; Future  - Sedimentation rate, manual; Future  - C-reactive protein; Future  - DRVVT; Future  - Cardiolipin antibody; Future  - Beta-2 glycoprotein antibodies; Future  - Urinalysis; Future  - Protein / creatinine ratio, urine; Future  - MyoMarker Panel 3; Future  - CK; Future  - azaTHIOprine (IMURAN) 50 mg Tab; Take 1 tablet (50 mg total) by mouth 2 (two) times daily.  Dispense: 60 tablet; Refill: 3    # Long-term current use of high risk medication other than anticoagulant  Hold Imuran if any infection.  Check safety labs today.  - CBC auto differential; Future  - Comprehensive metabolic panel; Future  - azaTHIOprine (IMURAN) 50 mg Tab; Take 1 tablet (50 mg total) by mouth 2 (two) times daily.  Dispense: 60 tablet; Refill: 3     # Return in about 3 months (around 3/1/2018).    Disclaimer: This note was prepared using voice recognition system and is likely to have sound alike errors and is not proof read.  Please call me with any questions.

## 2017-12-02 LAB
ALBUMIN SERPL BCP-MCNC: 3.5 G/DL
ALP SERPL-CCNC: 56 U/L
ALT SERPL W/O P-5'-P-CCNC: 12 U/L
ANION GAP SERPL CALC-SCNC: 10 MMOL/L
AST SERPL-CCNC: 24 U/L
BASOPHILS # BLD AUTO: 0.06 K/UL
BASOPHILS NFR BLD: 0.9 %
BILIRUB SERPL-MCNC: 0.3 MG/DL
BUN SERPL-MCNC: 8 MG/DL
C3 SERPL-MCNC: 155 MG/DL
C4 SERPL-MCNC: 36 MG/DL
CALCIUM SERPL-MCNC: 9.8 MG/DL
CHLORIDE SERPL-SCNC: 101 MMOL/L
CK SERPL-CCNC: 185 U/L
CO2 SERPL-SCNC: 25 MMOL/L
CREAT SERPL-MCNC: 0.7 MG/DL
CRP SERPL-MCNC: 11.6 MG/L
DIFFERENTIAL METHOD: ABNORMAL
EOSINOPHIL # BLD AUTO: 0.6 K/UL
EOSINOPHIL NFR BLD: 8.5 %
ERYTHROCYTE [DISTWIDTH] IN BLOOD BY AUTOMATED COUNT: 11.7 %
EST. GFR  (AFRICAN AMERICAN): >60 ML/MIN/1.73 M^2
EST. GFR  (NON AFRICAN AMERICAN): >60 ML/MIN/1.73 M^2
GLUCOSE SERPL-MCNC: 57 MG/DL
HCT VFR BLD AUTO: 38.8 %
HGB BLD-MCNC: 12.5 G/DL
IMM GRANULOCYTES # BLD AUTO: 0.02 K/UL
IMM GRANULOCYTES NFR BLD AUTO: 0.3 %
LYMPHOCYTES # BLD AUTO: 1.6 K/UL
LYMPHOCYTES NFR BLD: 24.2 %
MCH RBC QN AUTO: 30 PG
MCHC RBC AUTO-ENTMCNC: 32.2 G/DL
MCV RBC AUTO: 93 FL
MONOCYTES # BLD AUTO: 0.5 K/UL
MONOCYTES NFR BLD: 7 %
NEUTROPHILS # BLD AUTO: 3.8 K/UL
NEUTROPHILS NFR BLD: 59.1 %
NRBC BLD-RTO: 0 /100 WBC
PLATELET # BLD AUTO: 573 K/UL
PMV BLD AUTO: 9.4 FL
POTASSIUM SERPL-SCNC: 4.6 MMOL/L
PROT SERPL-MCNC: 8.2 G/DL
RBC # BLD AUTO: 4.17 M/UL
SODIUM SERPL-SCNC: 136 MMOL/L
WBC # BLD AUTO: 6.44 K/UL

## 2017-12-05 LAB
B2 GLYCOPROT1 IGA SER QL: <9 SAU
B2 GLYCOPROT1 IGG SER QL: <9 SGU
B2 GLYCOPROT1 IGM SER QL: <9 SMU
CARDIOLIPIN IGG SER IA-ACNC: <9.4 GPL
CARDIOLIPIN IGM SER IA-ACNC: <9.4 MPL
LA PPP-IMP: NEGATIVE

## 2017-12-08 ENCOUNTER — PATIENT MESSAGE (OUTPATIENT)
Dept: RHEUMATOLOGY | Facility: CLINIC | Age: 32
End: 2017-12-08

## 2017-12-08 RX ORDER — PREDNISONE 10 MG/1
10 TABLET ORAL DAILY
Qty: 10 TABLET | Refills: 0 | Status: SHIPPED | OUTPATIENT
Start: 2017-12-08 | End: 2017-12-18

## 2017-12-11 ENCOUNTER — PATIENT MESSAGE (OUTPATIENT)
Dept: RHEUMATOLOGY | Facility: CLINIC | Age: 32
End: 2017-12-11

## 2017-12-11 ENCOUNTER — TELEPHONE (OUTPATIENT)
Dept: RHEUMATOLOGY | Facility: CLINIC | Age: 32
End: 2017-12-11

## 2017-12-11 NOTE — TELEPHONE ENCOUNTER
Thanks. Sent an epic message regarding the medication.     Jamie Ms. Watts,   If the pain is from your lupus, it should respond to prednisone and once inflammation is adequately treated , there shouldn't be any significant pain. If you still have pain I would recommend to take over the counter arthritis strength tylenol / Advil for a short term .   Regards

## 2017-12-11 NOTE — TELEPHONE ENCOUNTER
----- Message from Prashanth Whalen MD sent at 12/8/2017  2:02 PM CST -----  Regarding: RE:  Start taking prednisone sent to pharmacy and call if no improvement.   Dr. VILLELA  ----- Message -----  From: Daisy Pavon LPN  Sent: 12/8/2017   1:58 PM  To: Prashanth Whalen MD    [facility]ia [employer].....    My wrists are in pain i cant  anything i cant use my hands im in so pain should i go to the Er

## 2017-12-22 LAB
ANTI-PM/SCL AB: <20 UNITS
ANTI-SS-A 52 KD AB, IGG: <20 UNITS
EJ AB SER QL: NEGATIVE
ENA JO1 AB SER IA-ACNC: <20 UNITS
ENA SM+RNP AB SER IA-ACNC: 143 UNITS
FIBRILLARIN (U3 RNP): NEGATIVE
KU AB SER QL: NEGATIVE
MDA-5 (P140): <20 UNITS
MI2 AB SER QL: NEGATIVE
NXP-2 (P140): <20 UNITS
OJ AB SER QL: NEGATIVE
PL12 AB SER QL: NEGATIVE
PL7 AB SER QL: NEGATIVE
SRP AB SERPL QL: NEGATIVE
TIF1 GAMMA (P155/140): <20 UNITS
U2 SNRNP: ABNORMAL

## 2018-01-10 ENCOUNTER — OFFICE VISIT (OUTPATIENT)
Dept: INTERNAL MEDICINE | Facility: CLINIC | Age: 33
End: 2018-01-10
Payer: COMMERCIAL

## 2018-01-10 ENCOUNTER — LAB VISIT (OUTPATIENT)
Dept: LAB | Facility: HOSPITAL | Age: 33
End: 2018-01-10
Attending: FAMILY MEDICINE
Payer: COMMERCIAL

## 2018-01-10 VITALS
SYSTOLIC BLOOD PRESSURE: 118 MMHG | WEIGHT: 233.69 LBS | BODY MASS INDEX: 36.68 KG/M2 | OXYGEN SATURATION: 98 % | DIASTOLIC BLOOD PRESSURE: 78 MMHG | TEMPERATURE: 98 F | HEART RATE: 91 BPM | HEIGHT: 67 IN

## 2018-01-10 DIAGNOSIS — Z11.3 ROUTINE SCREENING FOR STI (SEXUALLY TRANSMITTED INFECTION): ICD-10-CM

## 2018-01-10 DIAGNOSIS — Z11.3 ROUTINE SCREENING FOR STI (SEXUALLY TRANSMITTED INFECTION): Primary | ICD-10-CM

## 2018-01-10 PROCEDURE — 87491 CHLMYD TRACH DNA AMP PROBE: CPT

## 2018-01-10 PROCEDURE — 87529 HSV DNA AMP PROBE: CPT | Mod: 59

## 2018-01-10 PROCEDURE — 99213 OFFICE O/P EST LOW 20 MIN: CPT | Mod: S$GLB,,, | Performed by: FAMILY MEDICINE

## 2018-01-10 PROCEDURE — 99999 PR PBB SHADOW E&M-EST. PATIENT-LVL III: CPT | Mod: PBBFAC,,, | Performed by: FAMILY MEDICINE

## 2018-01-10 PROCEDURE — 86703 HIV-1/HIV-2 1 RESULT ANTBDY: CPT

## 2018-01-10 PROCEDURE — 86592 SYPHILIS TEST NON-TREP QUAL: CPT

## 2018-01-10 RX ORDER — CLINDAMYCIN HYDROCHLORIDE 150 MG/1
450 CAPSULE ORAL
COMMUNITY
Start: 2018-01-04 | End: 2018-01-14

## 2018-01-10 RX ORDER — FLUCONAZOLE 150 MG/1
150 TABLET ORAL DAILY
Qty: 1 TABLET | Refills: 0 | Status: SHIPPED | OUTPATIENT
Start: 2018-01-10 | End: 2018-01-11

## 2018-01-10 RX ORDER — MUPIROCIN CALCIUM 20 MG/G
CREAM TOPICAL
COMMUNITY
Start: 2018-01-04 | End: 2018-01-14

## 2018-01-10 NOTE — PATIENT INSTRUCTIONS
Lupus (Systemic Lupus Erythematosus, SLE)  Lupus is a chronic (long-term) disease. It causes inflammation in the body. It mainly affects the joints, skin, and muscles. Lupus can affect almost any part of the body, and other common sites affected by lupus include the kidneys, blood cells, lungs, brain, nerves, intestines, eyes, mouth, and heart. Lupus is an autoimmune disease. This means that immune cells in the body begin attacking normal body cells. The cause of this is not known.  Common symptoms include:  · A butterfly-shaped rash across the bridge of the nose and cheeks or a disk-shaped rash on the face, neck, or chest  · Sun sensitivity (a short time in the sun may lead to severe sunburn or rash)  · Stiff, painful, or swollen joints (arthritis)  · Fatigue or depression  · Fever  Your healthcare provider may prescribe medicines such as oral steroids or medicines to suppress the immune system. Some people benefit from anti-malarial medicines as well. People with lupus are more likely to have heart disease. So, it is vital to manage other risk factors for heart disease. These include high blood pressure, smoking, and unhealthy cholesterol.   There is no cure for lupus. With good care, though, most people with the condition lead normal, active lives.   Home care  · If you were prescribed a medicine, take it as directed.  · Unless another pain medicine was prescribed, take an over-the-counter pain medicine such as acetaminophen or ibuprofen for pain. Do not take ibuprofen or other NSAID (non-steroidal anti-inflammatory) medicine if you were prescribed prednisone.  · Avoid sun exposure. Cover up with clothing. Wear sunglasses. Use sun screen (at least SPF 15).  · Get enough rest and reduce stress to help your immune system.  · Get some physical activity every day. This will help you feel your best.  · If you have high blood pressure, consider buying an automatic blood pressure machine (available at most  pharmacies). Use this to monitor your blood pressure and report to your doctor.  · Limit alcohol intake. Eat a healthy, balanced diet low in fat and cholesterol.  · If you smoke, quit. Smoking increases the risk of lupus-related complications.  Follow-up care  Follow up with your healthcare provider or as advised by our staff.  For more information contact the Lupus Foundation at [phone]  www.lupus.org  When to seek medical advice  Call your healthcare provider for any of the following:  · Increasing weakness, fainting  · Chest pain or shortness of breath or pain with breathing  · Severe headache with fever  · Seizures  · Leg swelling, redness or tenderness (sign of blood clot)  · Unusual bruising or bleeding anywhere on your body  · Blood in your stool (black or red color)  · Abdominal pain, repeated vomiting  · Blood or development of significant bubbles in the urine  · Swelling in the legs and arms  · Development of ulcers in the mouth  · New rash  · Rashes, discoloration or ulcerations on the finger tips or toes  · You become pregnant or are planning to become pregnant  Date Last Reviewed: 3/1/2017  © 4594-4677 [employer]. [address] [place] [address]. All rights reserved. This information is not intended as a substitute for professional medical care. Always follow your healthcare professional's instructions.

## 2018-01-10 NOTE — PROGRESS NOTES
Stefanie España  01/10/2018  [id number]    Sofia Martinez MD  Patient Care Team:  Sofia Martinez MD as PCP - General (Family Medicine)  Has the patient seen any provider outside of the [facility] network since the last visit? (no). If yes, HIPPA forms completed and records requested.        Visit Type:STI screen    Chief Complaint:  Chief Complaint   Patient presents with    STD Screen       History of Present Illness:  32 year old here for STI screen. Pap is up to date this past fall. Recently  from boyfriend.     She does not have flu vaccine on file    She has history of Lupus. Followed by Rheum. On Plaquenil and Imuran.     She is on clinda and bactrim from recent skin infection.  This was on her chin.      History:  Past Medical History:   Diagnosis Date    Lupus (systemic lupus erythematosus)      Past Surgical History:   Procedure Laterality Date    TUBAL LIGATION  2005     Family History   Problem Relation Age of Onset    Liver cancer Maternal Aunt     Breast cancer Maternal Aunt     Breast cancer Paternal Grandmother      Social History     Social History    Marital status:      Spouse name: N/A    Number of children: N/A    Years of education: N/A     Occupational History    [employer]      Social History Main Topics    Smoking status: Never Smoker    Smokeless tobacco: Never Used    Alcohol use No    Drug use: No    Sexual activity: Yes     Partners: Male     Other Topics Concern    Not on file     Social History Narrative    No narrative on file     Patient Active Problem List   Diagnosis    Lupus (systemic lupus erythematosus)    Long-term use of Plaquenil    Long-term current use of high risk medication other than anticoagulant     Review of patient's allergies indicates:   Allergen Reactions    Elavil [amitriptyline]        The following were reviewed at this visit: active problem list, medication list, allergies, family history, social history, and health  maintenance.    Medications:  Current Outpatient Prescriptions on File Prior to Visit   Medication Sig Dispense Refill    azaTHIOprine (IMURAN) 50 mg Tab Take 1 tablet (50 mg total) by mouth 2 (two) times daily. 60 tablet 3    hydroxychloroquine (PLAQUENIL) 200 mg tablet Take 200 mg by mouth 2 (two) times daily.      meloxicam (MOBIC) 15 MG tablet Take 15 mg by mouth once daily.       tacrolimus (PROTOPIC) 0.1 % ointment Apply topically 2 (two) times daily. 60 g 3    trazodone (DESYREL) 50 MG tablet Take 1 tablet (50 mg total) by mouth every evening. 30 tablet 11    butalbital-acetaminophen-caff -40 mg Cap       nabumetone (RELAFEN) 500 MG tablet Take 500 mg by mouth 2 (two) times daily.      triamcinolone acetonide 0.025% (KENALOG) 0.025 % Oint Apply topically 2 (two) times daily. 80 g 1     No current facility-administered medications on file prior to visit.        Medications have been reviewed and reconciled with patient at this visit.  Barriers to medications present (no)    Adverse reactions to current medications (no)    Over the counter medications reviewed (Yes ), and if needed added to active Medication list at this visit.     Exam:  Wt Readings from Last 3 Encounters:   01/10/18 106 kg (233 lb 11.2 oz)   12/01/17 105.7 kg (233 lb 0.4 oz)   10/09/17 106.6 kg (235 lb)     Temp Readings from Last 3 Encounters:   01/10/18 97.8 °F (36.6 °C) (Tympanic)   10/09/17 97.8 °F (36.6 °C) (Tympanic)   09/17/17 98.4 °F (36.9 °C) (Oral)     BP Readings from Last 3 Encounters:   01/10/18 118/78   12/01/17 124/81   10/09/17 116/74     Pulse Readings from Last 3 Encounters:   01/10/18 91   12/01/17 83   10/09/17 76     Body mass index is 36.59 kg/m².    Review of Systems   Constitutional: Negative.  Negative for chills and fever.   HENT: Negative.  Negative for congestion, sinus pain and sore throat.    Eyes: Negative for blurred vision and double vision.   Respiratory: Negative for cough, sputum production,  shortness of breath and wheezing.    Cardiovascular: Negative for chest pain, palpitations and leg swelling.   Gastrointestinal: Negative for abdominal pain, constipation, diarrhea, heartburn, nausea and vomiting.   Genitourinary: Negative.    Musculoskeletal: Negative.    Skin: Negative.  Negative for rash.   Neurological: Negative.    Endo/Heme/Allergies: Negative.  Negative for polydipsia. Does not bruise/bleed easily.   Psychiatric/Behavioral: Negative for depression and substance abuse.         Physical Exam   Constitutional: She is oriented to person, place, and time. She appears well-developed and well-nourished. No distress.   HENT:   Head: Normocephalic and atraumatic.   Right Ear: External ear normal.   Left Ear: External ear normal.   Nose: Nose normal.   Mouth/Throat: Oropharynx is clear and moist. No oropharyngeal exudate.   Eyes: Conjunctivae and EOM are normal. Pupils are equal, round, and reactive to light. Right eye exhibits no discharge. Left eye exhibits no discharge.   Neck: Normal range of motion. Neck supple. No thyromegaly present.   Cardiovascular: Normal rate, regular rhythm, normal heart sounds and intact distal pulses.    No murmur heard.  Pulmonary/Chest: Effort normal and breath sounds normal. No respiratory distress. She has no wheezes.   Abdominal: Soft. Bowel sounds are normal. She exhibits no distension and no mass. There is no tenderness.   Musculoskeletal: Normal range of motion. She exhibits no edema.   Lymphadenopathy:     She has no cervical adenopathy.   Neurological: She is alert and oriented to person, place, and time. No cranial nerve deficit.   Skin: Capillary refill takes less than 2 seconds. She is not diaphoretic.   Psychiatric: She has a normal mood and affect. Her behavior is normal. Judgment and thought content normal.   Nursing note and vitals reviewed.      Laboratory Reviewed ({Yes)  Lab Results   Component Value Date    WBC 6.44 12/01/2017    HGB 12.5 12/01/2017     HCT 38.8 12/01/2017     (H) 12/01/2017    CHOL 169 08/24/2017    TRIG 228 (H) 08/24/2017    HDL 37 (L) 08/24/2017    ALT 12 12/01/2017    AST 24 12/01/2017     12/01/2017    K 4.6 12/01/2017     12/01/2017    CREATININE 0.7 12/01/2017    BUN 8 12/01/2017    CO2 25 12/01/2017    TSH 1.243 08/30/2017       Assessment:  The encounter diagnosis was Routine screening for STI (sexually transmitted infection).     Plan     Routine screening for STI (sexually transmitted infection)  -     HIV-1 and HIV-2 antibodies; Future; Expected date: 01/10/2018  -     RPR; Future; Expected date: 01/10/2018  -     HERPES SIMPLEX VIRUS (HSV) TYPE 1 & 2 DNA BY PCR; Future; Expected date: 01/10/2018  -     C. trachomatis/N. gonorrhoeae by AMP DNA Urine    Diflucan 150 mg for yeast since on the CLindamycin.  Stop Clinda due to resolved infection.      Care Plan/Goals: Reviewed  (N/A)  Goals     None          Follow up: No Follow-up on file.    After visit summary was printed and given to patient upon discharge today.  Patient goals and care plan are included in After Visit Summary.

## 2018-01-11 ENCOUNTER — TELEPHONE (OUTPATIENT)
Dept: INTERNAL MEDICINE | Facility: CLINIC | Age: 33
End: 2018-01-11

## 2018-01-11 LAB
HIV 1+2 AB+HIV1 P24 AG SERPL QL IA: NEGATIVE
RPR SER QL: NORMAL

## 2018-01-11 NOTE — TELEPHONE ENCOUNTER
Spoke with pt, calling for results, pt says that she saw her HIV results, I notified pt that once all results are in I will contact her.  Pt voiced understanding

## 2018-01-11 NOTE — TELEPHONE ENCOUNTER
----- Message from Manuela Sanchez sent at 1/11/2018 10:07 AM CST -----  Contact: pt  The pt request a return call, no additional info given, pt can be reached at [phone]///[id number]

## 2018-01-12 LAB
C TRACH DNA SPEC QL NAA+PROBE: NOT DETECTED
N GONORRHOEA DNA SPEC QL NAA+PROBE: NOT DETECTED

## 2018-01-13 LAB
HSV-1 DNA BY PCR: NEGATIVE
HSV-2 DNA BY PCR: NEGATIVE

## 2018-01-15 ENCOUNTER — TELEPHONE (OUTPATIENT)
Dept: INTERNAL MEDICINE | Facility: CLINIC | Age: 33
End: 2018-01-15

## 2018-01-15 RX ORDER — METRONIDAZOLE 7.5 MG/G
1 GEL VAGINAL DAILY
Qty: 70 G | Refills: 0 | Status: SHIPPED | OUTPATIENT
Start: 2018-01-15 | End: 2018-01-20

## 2018-01-15 NOTE — TELEPHONE ENCOUNTER
Spoke with pt, results given for HSV, voiced understanding  Pt C/O vaginal odor, little vaginal discharge, white, no vaginal itching, x 1-2 weeks, took diflucan that was prescribed. Pt said that she goggled her sxs and thinks it maybe bacteria vaginosis. Pt did not try anything OTC  Please advise

## 2018-01-25 ENCOUNTER — OFFICE VISIT (OUTPATIENT)
Dept: DERMATOLOGY | Facility: CLINIC | Age: 33
End: 2018-01-25
Payer: COMMERCIAL

## 2018-01-25 DIAGNOSIS — L21.9 SEBORRHEIC DERMATITIS: Primary | ICD-10-CM

## 2018-01-25 DIAGNOSIS — L93.2 CUTANEOUS LUPUS ERYTHEMATOSUS: ICD-10-CM

## 2018-01-25 PROCEDURE — 99999 PR PBB SHADOW E&M-EST. PATIENT-LVL II: CPT | Mod: PBBFAC,,, | Performed by: DERMATOLOGY

## 2018-01-25 PROCEDURE — 99213 OFFICE O/P EST LOW 20 MIN: CPT | Mod: S$GLB,,, | Performed by: DERMATOLOGY

## 2018-01-25 RX ORDER — FLUOCINOLONE ACETONIDE 0.11 MG/ML
OIL TOPICAL
Qty: 1 BOTTLE | Refills: 5 | Status: SHIPPED | OUTPATIENT
Start: 2018-01-25 | End: 2022-08-23

## 2018-01-25 RX ORDER — PREDNISONE 10 MG/1
TABLET ORAL
COMMUNITY
Start: 2018-01-22 | End: 2018-06-20 | Stop reason: DRUGHIGH

## 2018-01-25 RX ORDER — KETOCONAZOLE 20 MG/ML
SHAMPOO, SUSPENSION TOPICAL
Qty: 120 ML | Refills: 5 | Status: SHIPPED | OUTPATIENT
Start: 2018-01-25 | End: 2022-02-14 | Stop reason: ALTCHOICE

## 2018-01-25 NOTE — PROGRESS NOTES
Subjective:       Patient ID:  Stefanie España is a 32 y.o. female who presents for   Chief Complaint   Patient presents with    Rash     Rash has improved, using meds as prescribed      Hx of SLE (currently on plaquenil 200 mg bid, imuran 50 mg qD, prednisone 5 mg), last seen on 10/26/17 for malar rash.  She is followed by Dr. Whalen.  She is currently on TAC 0.025% oint and protopic 0.1% ointment bid.  + improvement in skin.          Review of Systems   Constitutional: Negative for fever and chills.   Gastrointestinal: Negative for nausea and vomiting.   Skin: Negative for daily sunscreen use, activity-related sunscreen use and recent sunburn.   Hematologic/Lymphatic: Does not bruise/bleed easily.        Objective:    Physical Exam   Constitutional: She appears well-developed and well-nourished. No distress.   Neurological: She is alert and oriented to person, place, and time. She is not disoriented.   Psychiatric: She has a normal mood and affect.   Skin:   Areas Examined (abnormalities noted in diagram):   Head / Face Inspection Performed  Neck Inspection Performed  Chest / Axilla Inspection Performed  Abdomen Inspection Performed  Back Inspection Performed  RUE Inspected  LUE Inspection Performed  Nails and Digits Inspection Performed               Assessment / Plan:        Seborrheic dermatitis  -     fluocinolone (DERMA-SMOOTHE) 0.01 % external oil; Apply oil to scalp twice a day  Dispense: 1 Bottle; Refill: 5  -     ketoconazole (NIZORAL) 2 % shampoo; Wash hair with medicated shampoo at least 1x/week - let sit on scalp at least 5 minutes prior to rinsing  Dispense: 120 mL; Refill: 5  -     C/ possible alopecia related to lupus.  Will start above meds, if progress, consider ILK.     Cutaneous lupus erythematosus  -     fluocinolone (DERMA-SMOOTHE) 0.01 % external oil; Apply oil to scalp twice a day  Dispense: 1 Bottle; Refill: 5  -     ketoconazole (NIZORAL) 2 % shampoo; Wash hair with medicated  shampoo at least 1x/week - let sit on scalp at least 5 minutes prior to rinsing  Dispense: 120 mL; Refill: 5  -     Recommend d/c protopic and TAC ointment.  Recommend sun avoidance and broad spectrum sun protection daily. The patient acknowledged understanding.                Follow-up in about 6 months (around 7/25/2018).

## 2018-01-25 NOTE — PATIENT INSTRUCTIONS
[email or web address]    Start broad spectrum sunscreen with SPF 50 and zinc oxide and/or titanium dioxide.  Use sunscreen daily.

## 2018-02-12 ENCOUNTER — HOSPITAL ENCOUNTER (EMERGENCY)
Facility: HOSPITAL | Age: 33
Discharge: HOME | End: 2018-02-13
Attending: EMERGENCY MEDICINE
Payer: COMMERCIAL

## 2018-02-12 DIAGNOSIS — M32.9 SYSTEMIC LUPUS ERYTHEMATOSUS, UNSPECIFIED SLE TYPE, UNSPECIFIED ORGAN INVOLVEMENT STATUS: Primary | ICD-10-CM

## 2018-02-12 DIAGNOSIS — R03.0 ELEVATED BLOOD PRESSURE READING: ICD-10-CM

## 2018-02-12 DIAGNOSIS — R52 GENERALIZED PAIN: ICD-10-CM

## 2018-02-12 PROCEDURE — 99283 EMERGENCY DEPT VISIT LOW MDM: CPT | Mod: 25

## 2018-02-12 PROCEDURE — 96372 THER/PROPH/DIAG INJ SC/IM: CPT

## 2018-02-13 VITALS
HEART RATE: 69 BPM | TEMPERATURE: 98 F | DIASTOLIC BLOOD PRESSURE: 71 MMHG | HEIGHT: 68 IN | BODY MASS INDEX: 35.31 KG/M2 | WEIGHT: 233 LBS | OXYGEN SATURATION: 100 % | SYSTOLIC BLOOD PRESSURE: 132 MMHG | RESPIRATION RATE: 18 BRPM

## 2018-02-13 PROCEDURE — 63600175 PHARM REV CODE 636 W HCPCS: Performed by: NURSE PRACTITIONER

## 2018-02-13 RX ORDER — BETAMETHASONE SODIUM PHOSPHATE AND BETAMETHASONE ACETATE 3; 3 MG/ML; MG/ML
12 INJECTION, SUSPENSION INTRA-ARTICULAR; INTRALESIONAL; INTRAMUSCULAR; SOFT TISSUE
Status: COMPLETED | OUTPATIENT
Start: 2018-02-13 | End: 2018-02-13

## 2018-02-13 RX ADMIN — BETAMETHASONE SODIUM PHOSPHATE AND BETAMETHASONE ACETATE 12 MG: 3; 3 INJECTION, SUSPENSION INTRA-ARTICULAR; INTRALESIONAL; INTRAMUSCULAR at 12:02

## 2018-02-13 NOTE — ED PROVIDER NOTES
"     HISTORY     Chief Complaint   Patient presents with    Joint Pain     Pt states "I have lupus and I think I am having a flare up, all my joints seem inflamed and I think a steroid shot will help my pain until I can get in to see my doctor."     Review of patient's allergies indicates:   Allergen Reactions    Elavil [amitriptyline]         HPI   32 year old female that presents to the ER with generalized pain. Patient states this is a flare up her Lupus. Patient has no other complaints. Patient is on a low dose prednisone daily but sometimes needs a steroid shot.       The history is provided by the patient.        PCP: Sofia Martinez MD     Past Medical History:  Past Medical History:   Diagnosis Date    Lupus (systemic lupus erythematosus)         Past Surgical History:  Past Surgical History:   Procedure Laterality Date    TUBAL LIGATION  2005        Family History:  Family History   Problem Relation Age of Onset    Liver cancer Maternal Aunt     Breast cancer Maternal Aunt     Breast cancer Paternal Grandmother         Social History:  Social History     Social History Main Topics    Smoking status: Never Smoker    Smokeless tobacco: Never Used    Alcohol use No    Drug use: No    Sexual activity: Yes     Partners: Male         ROS   Review of Systems   Musculoskeletal: Positive for myalgias.       PHYSICAL EXAM     Initial Vitals [02/12/18 2322]   BP Pulse Resp Temp SpO2   138/75 72 18 98.2 °F (36.8 °C) 100 %      MAP       96           Physical Exam    Constitutional: She appears well-developed and well-nourished. No distress.   HENT:   Head: Normocephalic and atraumatic.   Eyes: Conjunctivae are normal. Pupils are equal, round, and reactive to light.   Neck: Normal range of motion. Neck supple.   Cardiovascular: Normal rate, regular rhythm and normal heart sounds.   Pulmonary/Chest: Breath sounds normal.   Abdominal: Soft. Bowel sounds are normal. She exhibits no distension. There is no " "tenderness. There is no rebound.   Musculoskeletal: Normal range of motion. She exhibits no edema.   Neurological: She is alert and oriented to person, place, and time. She has normal strength.   Skin: Skin is warm and dry.   Psychiatric: She has a normal mood and affect.          ED COURSE   Procedures  ED ONGOING VITALS:  Vitals:    02/12/18 2322   BP: 138/75   Pulse: 72   Resp: 18   Temp: 98.2 °F (36.8 °C)   TempSrc: Oral   SpO2: 100%   Weight: 105.7 kg (233 lb)   Height: 5' 8" (1.727 m)         ABNORMAL LAB VALUES:  Labs Reviewed - No data to display      ALL LAB VALUES:        RADIOLOGY STUDIES:  Imaging Results    None                   The above vital signs and test results have been reviewed by the emergency provider.     ED Medications:  Medications   betamethasone acetate-betamethasone sodium phosphate injection 12 mg (12 mg Intramuscular Given 2/13/18 0041)       Discharge Medications:  New Prescriptions    No medications on file      Follow-up Information     Sofia Martinez MD. Schedule an appointment as soon as possible for a visit in 2 days.    Specialty:  Family Medicine  Contact information:  [address] [facility] [address] [phone]             [facility]    Specialty:  Emergency Medicine  Why:  As needed, If symptoms worsen  Contact information:  [address] [facility] [address] [phone]                I discussed with patient and/or family/caretaker that evaluation in the ED does not suggest any emergent or life threatening medical conditions requiring immediate intervention beyond what was provided in the ED, and I believe patient is safe for discharge. Regardless, an unremarkable evaluation in the ED does not preclude the development or presence of a serious or life threatening condition. As such, patient was instructed to return immediately for any worsening or change in current symptoms.    Pre-hypertension/Hypertension: " The pt has been informed that they may have pre-hypertension or hypertension based on a blood pressure reading in the ED. I recommend that the pt call the PCP listed on their discharge instructions or a physician of their choice this week to arrange f/u for further evaluation of possible pre-hypertension or hypertension.       MEDICAL DECISION MAKING                 CLINICAL IMPRESSION       ICD-10-CM ICD-9-CM   1. Systemic lupus erythematosus, unspecified SLE type, unspecified organ involvement status M32.9 710.0   2. Generalized pain R52 780.96   3. Elevated blood pressure reading R03.0 796.2               Shorty Azevedo NP  02/13/18 0055

## 2018-03-19 ENCOUNTER — LAB VISIT (OUTPATIENT)
Dept: LAB | Facility: HOSPITAL | Age: 33
End: 2018-03-19
Attending: INTERNAL MEDICINE
Payer: COMMERCIAL

## 2018-03-19 ENCOUNTER — TELEPHONE (OUTPATIENT)
Dept: INTERNAL MEDICINE | Facility: CLINIC | Age: 33
End: 2018-03-19

## 2018-03-19 ENCOUNTER — OFFICE VISIT (OUTPATIENT)
Dept: RHEUMATOLOGY | Facility: CLINIC | Age: 33
End: 2018-03-19
Payer: COMMERCIAL

## 2018-03-19 ENCOUNTER — TELEPHONE (OUTPATIENT)
Dept: RHEUMATOLOGY | Facility: CLINIC | Age: 33
End: 2018-03-19

## 2018-03-19 VITALS
DIASTOLIC BLOOD PRESSURE: 69 MMHG | HEART RATE: 85 BPM | WEIGHT: 238.13 LBS | BODY MASS INDEX: 36.2 KG/M2 | SYSTOLIC BLOOD PRESSURE: 114 MMHG

## 2018-03-19 DIAGNOSIS — M32.9 SYSTEMIC LUPUS ERYTHEMATOSUS ARTHRITIS: ICD-10-CM

## 2018-03-19 DIAGNOSIS — Z79.899 LONG-TERM CURRENT USE OF HIGH RISK MEDICATION OTHER THAN ANTICOAGULANT: ICD-10-CM

## 2018-03-19 DIAGNOSIS — M32.9 SYSTEMIC LUPUS ERYTHEMATOSUS ARTHRITIS: Primary | ICD-10-CM

## 2018-03-19 DIAGNOSIS — M32.19 OTHER SYSTEMIC LUPUS ERYTHEMATOSUS WITH OTHER ORGAN INVOLVEMENT: ICD-10-CM

## 2018-03-19 LAB
ALBUMIN SERPL BCP-MCNC: 3.7 G/DL
ALP SERPL-CCNC: 52 U/L
ALT SERPL W/O P-5'-P-CCNC: 11 U/L
ANION GAP SERPL CALC-SCNC: 10 MMOL/L
AST SERPL-CCNC: 17 U/L
BASOPHILS # BLD AUTO: 0.03 K/UL
BASOPHILS NFR BLD: 0.5 %
BILIRUB SERPL-MCNC: 0.3 MG/DL
BUN SERPL-MCNC: 6 MG/DL
C3 SERPL-MCNC: 140 MG/DL
C4 SERPL-MCNC: 35 MG/DL
CALCIUM SERPL-MCNC: 9.6 MG/DL
CHLORIDE SERPL-SCNC: 101 MMOL/L
CO2 SERPL-SCNC: 26 MMOL/L
CREAT SERPL-MCNC: 0.7 MG/DL
CRP SERPL-MCNC: 8.7 MG/L
DIFFERENTIAL METHOD: ABNORMAL
EOSINOPHIL # BLD AUTO: 0.3 K/UL
EOSINOPHIL NFR BLD: 4.3 %
ERYTHROCYTE [DISTWIDTH] IN BLOOD BY AUTOMATED COUNT: 12.3 %
ERYTHROCYTE [SEDIMENTATION RATE] IN BLOOD BY WESTERGREN METHOD: 27 MM/HR
EST. GFR  (AFRICAN AMERICAN): >60 ML/MIN/1.73 M^2
EST. GFR  (NON AFRICAN AMERICAN): >60 ML/MIN/1.73 M^2
GLUCOSE SERPL-MCNC: 80 MG/DL
HCT VFR BLD AUTO: 36.1 %
HGB BLD-MCNC: 11.8 G/DL
LYMPHOCYTES # BLD AUTO: 1.7 K/UL
LYMPHOCYTES NFR BLD: 26 %
MCH RBC QN AUTO: 30.3 PG
MCHC RBC AUTO-ENTMCNC: 32.7 G/DL
MCV RBC AUTO: 93 FL
MONOCYTES # BLD AUTO: 0.4 K/UL
MONOCYTES NFR BLD: 5.7 %
NEUTROPHILS # BLD AUTO: 4.1 K/UL
NEUTROPHILS NFR BLD: 63.5 %
PLATELET # BLD AUTO: 435 K/UL
PMV BLD AUTO: 8.4 FL
POTASSIUM SERPL-SCNC: 3.9 MMOL/L
PROT SERPL-MCNC: 7.8 G/DL
RBC # BLD AUTO: 3.9 M/UL
SODIUM SERPL-SCNC: 137 MMOL/L
WBC # BLD AUTO: 6.51 K/UL

## 2018-03-19 PROCEDURE — 86225 DNA ANTIBODY NATIVE: CPT

## 2018-03-19 PROCEDURE — 85025 COMPLETE CBC W/AUTO DIFF WBC: CPT | Mod: PO

## 2018-03-19 PROCEDURE — 86160 COMPLEMENT ANTIGEN: CPT

## 2018-03-19 PROCEDURE — 86140 C-REACTIVE PROTEIN: CPT

## 2018-03-19 PROCEDURE — 99999 PR PBB SHADOW E&M-EST. PATIENT-LVL III: CPT | Mod: PBBFAC,,, | Performed by: INTERNAL MEDICINE

## 2018-03-19 PROCEDURE — 80053 COMPREHEN METABOLIC PANEL: CPT | Mod: PO

## 2018-03-19 PROCEDURE — 99214 OFFICE O/P EST MOD 30 MIN: CPT | Mod: S$GLB,,, | Performed by: INTERNAL MEDICINE

## 2018-03-19 PROCEDURE — 85651 RBC SED RATE NONAUTOMATED: CPT | Mod: PO

## 2018-03-19 PROCEDURE — 86160 COMPLEMENT ANTIGEN: CPT | Mod: 59

## 2018-03-19 PROCEDURE — 36415 COLL VENOUS BLD VENIPUNCTURE: CPT | Mod: PO

## 2018-03-19 RX ORDER — ACETAMINOPHEN 325 MG/1
650 TABLET ORAL
Status: CANCELLED | OUTPATIENT
Start: 2018-03-19

## 2018-03-19 RX ORDER — HEPARIN 100 UNIT/ML
500 SYRINGE INTRAVENOUS
Status: CANCELLED | OUTPATIENT
Start: 2018-03-19

## 2018-03-19 RX ORDER — SODIUM CHLORIDE 0.9 % (FLUSH) 0.9 %
10 SYRINGE (ML) INJECTION
Status: CANCELLED | OUTPATIENT
Start: 2018-03-19

## 2018-03-19 RX ORDER — FLUCONAZOLE 150 MG/1
150 TABLET ORAL DAILY
Qty: 1 TABLET | Refills: 0 | Status: SHIPPED | OUTPATIENT
Start: 2018-03-19 | End: 2018-03-20

## 2018-03-19 NOTE — PROGRESS NOTES
RHEUMATOLOGY CLINIC FOLLOW UP VISIT  Chief complaints:-  My hands hurt.     HPI:-  Stefanie Kimbrough a 32 y.o. pleasant female comes in for a follow up visit with above chief complaints.   Location- Bilateral hands and muscles.  Severity- Mild to moderate.  Timing-All day long.   Modifying factors- No worsening or relieving factors.   Quality- Sharp  Context- SLE  Associated signs & symptoms- Joint swelling, stiffness, skin rash, weakness, photosensitivity.      Review of Systems   Constitutional: Negative for chills and fever.   HENT: Negative for congestion and sore throat.    Eyes: Negative for blurred vision and redness.   Respiratory: Negative for cough and shortness of breath.    Cardiovascular: Negative for chest pain and leg swelling.   Gastrointestinal: Negative for abdominal pain.   Genitourinary: Negative for dysuria.   Musculoskeletal: Positive for back pain, joint pain, myalgias and neck pain. Negative for falls.   Skin: Positive for rash.   Neurological: Negative for headaches.   Endo/Heme/Allergies: Does not bruise/bleed easily.   Psychiatric/Behavioral: Negative for memory loss. The patient does not have insomnia.        Past Medical History:   Diagnosis Date    Lupus (systemic lupus erythematosus)        Past Surgical History:   Procedure Laterality Date    TUBAL LIGATION  2005        Social History   Substance Use Topics    Smoking status: Never Smoker    Smokeless tobacco: Never Used    Alcohol use No       Family History   Problem Relation Age of Onset    Liver cancer Maternal Aunt     Breast cancer Maternal Aunt     Breast cancer Paternal Grandmother        Review of patient's allergies indicates:   Allergen Reactions    Elavil [amitriptyline]            Physical examination:-    Vitals:    03/19/18 1502   BP: 114/69   Pulse: 85   Weight: 108 kg (238 lb 1.6 oz)   PainSc:   6   PainLoc: Hand       Physical Exam   Constitutional:  She is oriented to person, place, and time and well-developed, well-nourished, and in no distress. No distress.   HENT:   Head: Normocephalic.   Mouth/Throat: Oropharynx is clear and moist.   Eyes: Conjunctivae and EOM are normal. Pupils are equal, round, and reactive to light.   Neck: Normal range of motion. Neck supple.   Cardiovascular: Normal rate and intact distal pulses.    Pulmonary/Chest: Effort normal. No respiratory distress.   Abdominal: Soft. There is no tenderness.   Musculoskeletal:   Mild synovitis present over bilateral second, third and fourth PIP's. Wrists tender. Few tender points, but no  effusion over large joints.   Neurological: She is alert and oriented to person, place, and time. No cranial nerve deficit.   Skin: Skin is warm. Rash noted. No erythema.   Depigmentation present over face extending above forehead involving entire face and malar area, sparing preauricular regions- Significant improvement since last visit.    Psychiatric: Mood and affect normal.   Nursing note and vitals reviewed.      Labs:-  Results for MARILIA SALEEM (MRN [id number]) as of 12/1/2017 15:37   Ref. Range 8/30/2017 14:50   MELVA HEP-2 Titer Unknown Positive >=1:2560...   Anti-SSA Antibody Latest Ref Range: 0.00 - 19.99 EU 1.63   Anti-SSA Interpretation Latest Ref Range: Negative  Negative   Anti-SSB Antibody Latest Ref Range: 0.00 - 19.99 EU 1.88   Anti-SSB Interpretation Latest Ref Range: Negative  Negative   ds DNA Ab Latest Ref Range: Negative 1:10  Negative 1:10   Anti Sm Antibody Latest Ref Range: 0.00 - 19.99 EU 12.86   Anti-Sm Interpretation Latest Ref Range: Negative  Negative   Anti Sm/RNP Antibody Latest Ref Range: 0.00 - 19.99 .12 (H)   Anti-Sm/RNP Interpretation Latest Ref Range: Negative  Positive (A)   Complement (C-3) Latest Ref Range: 50 - 180 mg/dL 134   Complement (C-4) Latest Ref Range: 11 - 44 mg/dL 28       Medication List with Changes/Refills   New Medications    FLUCONAZOLE  (DIFLUCAN) 150 MG TAB    Take 1 tablet (150 mg total) by mouth once daily.   Current Medications    AZATHIOPRINE (IMURAN) 50 MG TAB    Take 1 tablet (50 mg total) by mouth 2 (two) times daily.    FLUOCINOLONE (DERMA-SMOOTHE) 0.01 % EXTERNAL OIL    Apply oil to scalp twice a day    HYDROXYCHLOROQUINE (PLAQUENIL) 200 MG TABLET    Take 200 mg by mouth 2 (two) times daily.    KETOCONAZOLE (NIZORAL) 2 % SHAMPOO    Wash hair with medicated shampoo at least 1x/week - let sit on scalp at least 5 minutes prior to rinsing    MELOXICAM (MOBIC) 15 MG TABLET    Take 15 mg by mouth once daily.     NABUMETONE (RELAFEN) 500 MG TABLET    Take 500 mg by mouth 2 (two) times daily.    PREDNISONE (DELTASONE) 10 MG TABLET    TAKE 1 TABLET BY MOUTH EVERY DAY    TACROLIMUS (PROTOPIC) 0.1 % OINTMENT    Apply topically 2 (two) times daily.    TRAZODONE (DESYREL) 50 MG TABLET    Take 1 tablet (50 mg total) by mouth every evening.    TRIAMCINOLONE ACETONIDE 0.025% (KENALOG) 0.025 % OINT    Apply topically 2 (two) times daily.       Assessment/Plans:-  # Lupus (systemic lupus erythematosus)  SLE with inflammatory arthritis , photosensitive malar rash, fatigue, positive MELVA, positive RNP antibody, elevated CK with normal complements, normal antiphospholipid antibodies . She is on plaquenil and Imuran with low dose prednisone. Mild active synovitis over small joints of hands today.  Start Benlysta for active lupus arthritis.Continue imuran , plaquenil and prednisone.   - C3 complement; Future  - C4 complement; Future  - CBC auto differential; Future  - Comprehensive metabolic panel; Future  - Sedimentation rate, manual; Future  - C-reactive protein; Future  - Urinalysis; Future  - Protein / creatinine ratio, urine; Future      # Long-term current use of high risk medication other than anticoagulant  Hold Imuran if any infection. Check safety labs today.  - CBC auto differential; Future  - Comprehensive metabolic panel; Future    INFUSION  PLANS:-  1. Therapy plans entered and signed for both initial and maintenance dosing.   2. Labs today.   3. No need for labs with every benlysta.  4. Switch tylenol and diphenhydramine to prn after initial dosing.        # Follow-up in about 3 months (around 6/19/2018).    Disclaimer: This note was prepared using voice recognition system and is likely to have sound alike errors and is not proof read.  Please call me with any questions.

## 2018-03-19 NOTE — TELEPHONE ENCOUNTER
----- Message from Girish Miller sent at 3/19/2018  2:07 PM CDT -----  Contact: Pt  Pt may be a few minutes late due to traffic, she's coming out of [place]. I called Ext[facility] no one answered.

## 2018-03-19 NOTE — TELEPHONE ENCOUNTER
----- Message from Juma Garcia sent at 3/19/2018  3:34 PM CDT -----  Contact: Stefanie  Call Stefanie at [phone] (home)   Pt did not state the reason for the call

## 2018-03-20 LAB — DSDNA AB SER-ACNC: NORMAL [IU]/ML

## 2018-03-27 ENCOUNTER — INFUSION (OUTPATIENT)
Dept: RHEUMATOLOGY | Facility: HOSPITAL | Age: 33
End: 2018-03-27
Attending: INTERNAL MEDICINE
Payer: COMMERCIAL

## 2018-03-27 VITALS
RESPIRATION RATE: 20 BRPM | BODY MASS INDEX: 35.53 KG/M2 | WEIGHT: 233.69 LBS | SYSTOLIC BLOOD PRESSURE: 94 MMHG | HEART RATE: 73 BPM | DIASTOLIC BLOOD PRESSURE: 64 MMHG | TEMPERATURE: 98 F

## 2018-03-27 DIAGNOSIS — M32.9 SYSTEMIC LUPUS ERYTHEMATOSUS ARTHRITIS: Primary | ICD-10-CM

## 2018-03-27 PROCEDURE — 96375 TX/PRO/DX INJ NEW DRUG ADDON: CPT | Mod: PO

## 2018-03-27 PROCEDURE — 96413 CHEMO IV INFUSION 1 HR: CPT | Mod: PO

## 2018-03-27 PROCEDURE — A4216 STERILE WATER/SALINE, 10 ML: HCPCS | Mod: PO | Performed by: INTERNAL MEDICINE

## 2018-03-27 PROCEDURE — 63600175 PHARM REV CODE 636 W HCPCS: Mod: PO | Performed by: INTERNAL MEDICINE

## 2018-03-27 PROCEDURE — 25000003 PHARM REV CODE 250: Mod: PO | Performed by: INTERNAL MEDICINE

## 2018-03-27 RX ORDER — ACETAMINOPHEN 325 MG/1
650 TABLET ORAL
Status: COMPLETED | OUTPATIENT
Start: 2018-03-27 | End: 2018-03-27

## 2018-03-27 RX ORDER — DIPHENHYDRAMINE HYDROCHLORIDE 50 MG/ML
25 INJECTION INTRAMUSCULAR; INTRAVENOUS
Status: COMPLETED | OUTPATIENT
Start: 2018-03-27 | End: 2018-03-27

## 2018-03-27 RX ORDER — SODIUM CHLORIDE 0.9 % (FLUSH) 0.9 %
10 SYRINGE (ML) INJECTION
Status: DISCONTINUED | OUTPATIENT
Start: 2018-03-27 | End: 2018-03-27 | Stop reason: HOSPADM

## 2018-03-27 RX ORDER — SODIUM CHLORIDE 0.9 % (FLUSH) 0.9 %
10 SYRINGE (ML) INJECTION
Status: CANCELLED | OUTPATIENT
Start: 2018-03-27

## 2018-03-27 RX ORDER — DIPHENHYDRAMINE HYDROCHLORIDE 50 MG/ML
25 INJECTION INTRAMUSCULAR; INTRAVENOUS
Status: CANCELLED
Start: 2018-03-27

## 2018-03-27 RX ORDER — HEPARIN 100 UNIT/ML
500 SYRINGE INTRAVENOUS
Status: CANCELLED | OUTPATIENT
Start: 2018-03-27

## 2018-03-27 RX ORDER — ACETAMINOPHEN 325 MG/1
650 TABLET ORAL
Status: CANCELLED | OUTPATIENT
Start: 2018-03-27

## 2018-03-27 RX ADMIN — DIPHENHYDRAMINE HYDROCHLORIDE 25 MG: 50 INJECTION INTRAMUSCULAR; INTRAVENOUS at 02:03

## 2018-03-27 RX ADMIN — BELIMUMAB 1060 MG: 400 INJECTION, POWDER, LYOPHILIZED, FOR SOLUTION INTRAVENOUS at 03:03

## 2018-03-27 RX ADMIN — ACETAMINOPHEN 650 MG: 325 TABLET ORAL at 02:03

## 2018-03-27 RX ADMIN — SODIUM CHLORIDE, PRESERVATIVE FREE 10 ML: 5 INJECTION INTRAVENOUS at 02:03

## 2018-03-27 RX ADMIN — METHYLPREDNISOLONE SODIUM SUCCINATE 100 MG: 125 INJECTION, POWDER, FOR SOLUTION INTRAMUSCULAR; INTRAVENOUS at 02:03

## 2018-03-27 NOTE — PROGRESS NOTES
Infusion# 1  S/S infection noted or voiced? None noted/denied  Recent labs? yes    Premeds? Tylenol 650 mg po, Benadryl 25 mg IVP over 3 min, Solumedrol 100 mg IVP over 2 min.    Benlysta 10 mg/Kg= 1060 mg administered IV at a 60 minute rate per orders; see MAR and vitals for more  details. Tolerated well without adverse events, discharged and ambulatory out of clinic.    Note: reinforced provider teaching to hold imuran if she has an infection. Pt states she held all her meds today because of benlysta infusion. Instructed pt to take her meds as directed even on the days she receives benlysta, Pt verbalized understanding.

## 2018-04-11 ENCOUNTER — INFUSION (OUTPATIENT)
Dept: RHEUMATOLOGY | Facility: HOSPITAL | Age: 33
End: 2018-04-11
Attending: INTERNAL MEDICINE
Payer: COMMERCIAL

## 2018-04-11 VITALS
TEMPERATURE: 98 F | SYSTOLIC BLOOD PRESSURE: 110 MMHG | WEIGHT: 233.69 LBS | BODY MASS INDEX: 35.53 KG/M2 | DIASTOLIC BLOOD PRESSURE: 76 MMHG | OXYGEN SATURATION: 99 % | RESPIRATION RATE: 18 BRPM | HEART RATE: 76 BPM

## 2018-04-11 DIAGNOSIS — M32.9 SYSTEMIC LUPUS ERYTHEMATOSUS ARTHRITIS: Primary | ICD-10-CM

## 2018-04-11 PROCEDURE — 96375 TX/PRO/DX INJ NEW DRUG ADDON: CPT | Mod: PO

## 2018-04-11 PROCEDURE — A4216 STERILE WATER/SALINE, 10 ML: HCPCS | Mod: PO | Performed by: INTERNAL MEDICINE

## 2018-04-11 PROCEDURE — 96413 CHEMO IV INFUSION 1 HR: CPT | Mod: PO

## 2018-04-11 PROCEDURE — 63600175 PHARM REV CODE 636 W HCPCS: Mod: PO | Performed by: INTERNAL MEDICINE

## 2018-04-11 PROCEDURE — 25000003 PHARM REV CODE 250: Mod: PO | Performed by: INTERNAL MEDICINE

## 2018-04-11 RX ORDER — SODIUM CHLORIDE 0.9 % (FLUSH) 0.9 %
10 SYRINGE (ML) INJECTION
Status: DISCONTINUED | OUTPATIENT
Start: 2018-04-11 | End: 2018-04-11 | Stop reason: HOSPADM

## 2018-04-11 RX ORDER — SODIUM CHLORIDE 0.9 % (FLUSH) 0.9 %
10 SYRINGE (ML) INJECTION
Status: CANCELLED | OUTPATIENT
Start: 2018-04-11

## 2018-04-11 RX ORDER — ACETAMINOPHEN 325 MG/1
650 TABLET ORAL
Status: COMPLETED | OUTPATIENT
Start: 2018-04-11 | End: 2018-04-11

## 2018-04-11 RX ORDER — DIPHENHYDRAMINE HYDROCHLORIDE 50 MG/ML
25 INJECTION INTRAMUSCULAR; INTRAVENOUS
Status: CANCELLED
Start: 2018-04-11

## 2018-04-11 RX ORDER — ACETAMINOPHEN 325 MG/1
650 TABLET ORAL
Status: CANCELLED | OUTPATIENT
Start: 2018-04-11

## 2018-04-11 RX ORDER — HEPARIN 100 UNIT/ML
500 SYRINGE INTRAVENOUS
Status: CANCELLED | OUTPATIENT
Start: 2018-04-11

## 2018-04-11 RX ORDER — DIPHENHYDRAMINE HYDROCHLORIDE 50 MG/ML
25 INJECTION INTRAMUSCULAR; INTRAVENOUS
Status: COMPLETED | OUTPATIENT
Start: 2018-04-11 | End: 2018-04-11

## 2018-04-11 RX ADMIN — SODIUM CHLORIDE, PRESERVATIVE FREE 10 ML: 5 INJECTION INTRAVENOUS at 02:04

## 2018-04-11 RX ADMIN — BELIMUMAB 1060 MG: 400 INJECTION, POWDER, LYOPHILIZED, FOR SOLUTION INTRAVENOUS at 02:04

## 2018-04-11 RX ADMIN — ACETAMINOPHEN 650 MG: 325 TABLET ORAL at 02:04

## 2018-04-11 RX ADMIN — METHYLPREDNISOLONE SODIUM SUCCINATE 100 MG: 125 INJECTION, POWDER, FOR SOLUTION INTRAMUSCULAR; INTRAVENOUS at 02:04

## 2018-04-11 RX ADMIN — DIPHENHYDRAMINE HYDROCHLORIDE 25 MG: 50 INJECTION INTRAMUSCULAR; INTRAVENOUS at 02:04

## 2018-04-11 NOTE — PLAN OF CARE
Problem: Infection, Risk/Actual (Adult)  Intervention: Prevent Infection/Maximize Resistance  Instructed pt on infection prevention measures. Encouraged frequent hand washing and use of antibacteria hand sanitizers as needed. Instructed to notify Rheumatology Provider/ Infusion Dept for any antibiotic use, illness, vaccinations or surgeries/invasive procedures before, on, or after infusion date. Verbalizes understanding.

## 2018-04-11 NOTE — PROGRESS NOTES
Infusion # 2 (Week 2 loading dose) - Benlysta 10 mg/kg q 4 weeks after loading period  Today's weight- 106.0 kg    Any:  -recent illness, infection, or antibiotic use in past week- denies  -open wounds or mouth sores- denies  -invasive procedures or surgeries in past 4 weeks or in upcoming 4 weeks- denies  -vaccinations in past week- denies  -chance you may be pregnant- denies      Recent labs? 3/19/18  Last Rheumatology provider visit- Seen by Dr. SHORE on 3/19/18     Premeds? 650 mg Tylenol PO, 100 mg Solumedrol IVP over 3 minutes, and 25 mg Benadryl IVP over 3 minutes     Benlysta 1,060 mg administered IV at a 60 minute rate per orders; see MAR and vitals for more  details. Tolerated well without adverse events, discharged and ambulatory out of clinic.

## 2018-04-11 NOTE — PATIENT INSTRUCTIONS
Benlysta (belimumab) solution for injection  What is this medicine?  BELIMUMAB (be HATCH ue mab) is a medicine used to treat a certain type of systemic lupus erythematosus (SLE). This medicine is used with standard therapy for SLE. It is not a cure.  How should I use this medicine?  This medicine is for infusion into a vein. It is given by a health care professional in a hospital or clinic setting.  A special MedGuide will be given to you by the pharmacist with each prescription and refill. Be sure to read this information carefully each time.  Talk to your pediatrician regarding the use of this medicine in children. Special care may be needed.  What side effects may I notice from receiving this medicine?  Side effects that you should report to your doctor or health care professional as soon as possible:  · allergic reactions like skin rash, itching or hives, swelling of the face, lips, or tongue  · depressed mood  · signs of infection - fever or chills, cough, sore throat, pain or difficulty passing urine  · suicidal thoughts or other mood changes  · unusually slow heartbeat  Side effects that usually do not require medical attention (Report these to your doctor or health care professional if they continue or are bothersome.):  · diarrhea  · headache  · muscle aches  · nausea, vomiting  · trouble sleeping  What may interact with this medicine?  Do not take this medicine with any of the following medications:  · live virus vaccines  This medicine may also interact with the following medications:  · cyclophosphamide  · ofatumumab  · rituximab  What if I miss a dose?  It is important not to miss your dose. Call your doctor or health care professional if you are unable to keep an appointment.  Where should I keep my medicine?  This drug is given in a hospital or clinic and will not be stored at home.  What should I tell my health care provider before I take this medicine?  They need to know if you have any of these  conditions:  · heart disease  · immune system problems  · infection (especially a virus infection such as chickenpox, cold sores, or herpes)  · mental illness  · suicidal thoughts, plans, or attempt; a previous suicide attempt by you or a family member  · an unusual or allergic reaction to belimumab, other medicines, foods, dyes, or preservatives  · pregnant or trying to get pregnant  · breast-feeding  What should I watch for while using this medicine?  Tell your doctor or healthcare professional if your symptoms do not start to get better or if they get worse.  Your condition will be monitored carefully while you are receiving this medicine.  NOTE:This sheet is a summary. It may not cover all possible information. If you have questions about this medicine, talk to your doctor, pharmacist, or health care provider. Copyright© 2017 [employer]

## 2018-04-25 ENCOUNTER — INFUSION (OUTPATIENT)
Dept: RHEUMATOLOGY | Facility: HOSPITAL | Age: 33
End: 2018-04-25
Attending: INTERNAL MEDICINE
Payer: COMMERCIAL

## 2018-04-25 VITALS
WEIGHT: 235.69 LBS | SYSTOLIC BLOOD PRESSURE: 106 MMHG | RESPIRATION RATE: 18 BRPM | BODY MASS INDEX: 35.83 KG/M2 | HEART RATE: 84 BPM | TEMPERATURE: 98 F | DIASTOLIC BLOOD PRESSURE: 72 MMHG

## 2018-04-25 DIAGNOSIS — M32.9 SYSTEMIC LUPUS ERYTHEMATOSUS ARTHRITIS: Primary | ICD-10-CM

## 2018-04-25 PROCEDURE — 25000003 PHARM REV CODE 250: Mod: PO | Performed by: INTERNAL MEDICINE

## 2018-04-25 PROCEDURE — A4216 STERILE WATER/SALINE, 10 ML: HCPCS | Mod: PO | Performed by: INTERNAL MEDICINE

## 2018-04-25 PROCEDURE — 63600175 PHARM REV CODE 636 W HCPCS: Mod: JG,PO | Performed by: INTERNAL MEDICINE

## 2018-04-25 PROCEDURE — 96413 CHEMO IV INFUSION 1 HR: CPT | Mod: PO

## 2018-04-25 PROCEDURE — 96375 TX/PRO/DX INJ NEW DRUG ADDON: CPT | Mod: PO

## 2018-04-25 RX ORDER — HEPARIN 100 UNIT/ML
500 SYRINGE INTRAVENOUS
Status: CANCELLED | OUTPATIENT
Start: 2018-04-25

## 2018-04-25 RX ORDER — ACETAMINOPHEN 325 MG/1
650 TABLET ORAL
Status: CANCELLED | OUTPATIENT
Start: 2018-04-25

## 2018-04-25 RX ORDER — ACETAMINOPHEN 325 MG/1
650 TABLET ORAL
Status: COMPLETED | OUTPATIENT
Start: 2018-04-25 | End: 2018-04-25

## 2018-04-25 RX ORDER — DIPHENHYDRAMINE HYDROCHLORIDE 50 MG/ML
25 INJECTION INTRAMUSCULAR; INTRAVENOUS
Status: CANCELLED
Start: 2018-04-25

## 2018-04-25 RX ORDER — SODIUM CHLORIDE 0.9 % (FLUSH) 0.9 %
10 SYRINGE (ML) INJECTION
Status: CANCELLED | OUTPATIENT
Start: 2018-04-25

## 2018-04-25 RX ORDER — SODIUM CHLORIDE 0.9 % (FLUSH) 0.9 %
10 SYRINGE (ML) INJECTION
Status: DISCONTINUED | OUTPATIENT
Start: 2018-04-25 | End: 2018-04-25 | Stop reason: HOSPADM

## 2018-04-25 RX ORDER — DIPHENHYDRAMINE HYDROCHLORIDE 50 MG/ML
25 INJECTION INTRAMUSCULAR; INTRAVENOUS
Status: COMPLETED | OUTPATIENT
Start: 2018-04-25 | End: 2018-04-25

## 2018-04-25 RX ADMIN — ACETAMINOPHEN 650 MG: 325 TABLET ORAL at 02:04

## 2018-04-25 RX ADMIN — SODIUM CHLORIDE, PRESERVATIVE FREE 10 ML: 5 INJECTION INTRAVENOUS at 02:04

## 2018-04-25 RX ADMIN — BELIMUMAB 1069 MG: 400 INJECTION, POWDER, LYOPHILIZED, FOR SOLUTION INTRAVENOUS at 02:04

## 2018-04-25 RX ADMIN — DIPHENHYDRAMINE HYDROCHLORIDE 25 MG: 50 INJECTION INTRAMUSCULAR; INTRAVENOUS at 02:04

## 2018-04-25 RX ADMIN — METHYLPREDNISOLONE SODIUM SUCCINATE 100 MG: 125 INJECTION, POWDER, FOR SOLUTION INTRAMUSCULAR; INTRAVENOUS at 02:04

## 2018-04-25 NOTE — PROGRESS NOTES
Infusion# 3  S/S infection noted or voiced? None noted/denied  Recent labs? yes  Recent vaccines? denied    Premeds? Tylenol 650 mg po, Benadryl 25 mg IVP over 2 min, Solumedrol 100 mg IVP over 2 min.    Benlysta 10 mg/Kg= 1069 mg administered IV at a 60 minute rate per orders; see MAR & Flow Sheet for more  details. Tolerated well without adverse events

## 2018-04-25 NOTE — PATIENT INSTRUCTIONS
Belimumab solution for injection  What is this medicine?  BELIMUMAB (be HATCH ue mab) is a medicine used to treat a certain type of systemic lupus erythematosus (SLE). This medicine is used with standard therapy for SLE. It is not a cure.  How should I use this medicine?  This medicine is for infusion into a vein. It is given by a health care professional in a hospital or clinic setting.  A special MedGuide will be given to you by the pharmacist with each prescription and refill. Be sure to read this information carefully each time.  Talk to your pediatrician regarding the use of this medicine in children. Special care may be needed.  What side effects may I notice from receiving this medicine?  Side effects that you should report to your doctor or health care professional as soon as possible:  · allergic reactions like skin rash, itching or hives, swelling of the face, lips, or tongue  · depressed mood  · signs of infection - fever or chills, cough, sore throat, pain or difficulty passing urine  · suicidal thoughts or other mood changes  · unusually slow heartbeat  Side effects that usually do not require medical attention (Report these to your doctor or health care professional if they continue or are bothersome.):  · diarrhea  · headache  · muscle aches  · nausea, vomiting  · trouble sleeping  What may interact with this medicine?  Do not take this medicine with any of the following medications:  · live virus vaccines  This medicine may also interact with the following medications:  · cyclophosphamide  · ofatumumab  · rituximab  What if I miss a dose?  It is important not to miss your dose. Call your doctor or health care professional if you are unable to keep an appointment.  Where should I keep my medicine?  This drug is given in a hospital or clinic and will not be stored at home.  What should I tell my health care provider before I take this medicine?  They need to know if you have any of these  conditions:  · heart disease  · immune system problems  · infection (especially a virus infection such as chickenpox, cold sores, or herpes)  · mental illness  · suicidal thoughts, plans, or attempt; a previous suicide attempt by you or a family member  · an unusual or allergic reaction to belimumab, other medicines, foods, dyes, or preservatives  · pregnant or trying to get pregnant  · breast-feeding  What should I watch for while using this medicine?  Tell your doctor or healthcare professional if your symptoms do not start to get better or if they get worse.  Your condition will be monitored carefully while you are receiving this medicine.  NOTE:This sheet is a summary. It may not cover all possible information. If you have questions about this medicine, talk to your doctor, pharmacist, or health care provider. Copyright© 2017 [employer]        Preventing the Spread of Infection  Certain infections can spread from person to person. This is why your friend or family member may be put in a special room while in the hospital. Restrictions may be placed on who can go in and out of that room and what protection must be worn. Read this sheet to better understand why this is done.     Practice good hand hygiene to help stop the spread of germs.     Practice good hand hygiene to help stop the spread of germs.   How infection spreads  Infection is caused by germs. An infected person carries germs that he or she can give to others. Even a person who doesnt feel sick can still carry and spread germs. Germs can cause infection by traveling through the air or through direct contact or when each of you touch the same surface in a room such as a doorknob, sink faucet, tabletop or chair.   How you can become infected  To infect you, germs first have to get inside your body. You can get infected by touching a contaminated person or object. You can also get infected by breathing contaminated air. This is why good hand hygiene  and other infection control recommendations, like wearing a mask, are so important. Hand hygiene refers to handwashing with soap and water or the use of alcohol-based gels or foams that do not need using water.  Preventing infection  To stop infection from spreading, healthcare workers may do one or more of the following:  · Place an infected patient in a private room, or in a room with others who have exactly the same infection. (This depends on what kind of infection the patient has.)  · Wear a mask, gloves, gown, or other items.  · Wear a respirator (air filter) for some infections.  What you can do  Heres how to help stop the spread of infection:  · You may be asked to wear a mask, gloves, gown, or respirator when you visit. Follow any instructions carefully.  · Practice good hand hygiene, especially after using the bathroom and before and after touching the patient or the patient surroundings.  · Keep your hands away from your face.  · Cough or sneeze only into a tissue.  · Do not use the patients bathroom.  · Do not visit a patient if you feel sick. Do not visit if you have been exposed to an illness such as the flu, chickenpox, or measles.  Date Last Reviewed: 12/1/2016  © 6510-1390 [employer]. [address] [place] [address], [place] [address]. All rights reserved. This information is not intended as a substitute for professional medical care. Always follow your healthcare professional's instructions.

## 2018-05-07 ENCOUNTER — OFFICE VISIT (OUTPATIENT)
Dept: INTERNAL MEDICINE | Facility: CLINIC | Age: 33
End: 2018-05-07
Payer: COMMERCIAL

## 2018-05-07 VITALS
HEART RATE: 90 BPM | WEIGHT: 236.31 LBS | TEMPERATURE: 97 F | SYSTOLIC BLOOD PRESSURE: 120 MMHG | OXYGEN SATURATION: 98 % | HEIGHT: 68 IN | DIASTOLIC BLOOD PRESSURE: 80 MMHG | BODY MASS INDEX: 35.81 KG/M2

## 2018-05-07 DIAGNOSIS — R21 RASH AND NONSPECIFIC SKIN ERUPTION: Primary | ICD-10-CM

## 2018-05-07 PROCEDURE — 3008F BODY MASS INDEX DOCD: CPT | Mod: CPTII,S$GLB,, | Performed by: FAMILY MEDICINE

## 2018-05-07 PROCEDURE — 99213 OFFICE O/P EST LOW 20 MIN: CPT | Mod: S$GLB,,, | Performed by: FAMILY MEDICINE

## 2018-05-07 PROCEDURE — 99999 PR PBB SHADOW E&M-EST. PATIENT-LVL III: CPT | Mod: PBBFAC,,, | Performed by: FAMILY MEDICINE

## 2018-05-07 RX ORDER — HYDROXYZINE HYDROCHLORIDE 25 MG/1
25 TABLET, FILM COATED ORAL 4 TIMES DAILY PRN
Qty: 30 TABLET | Refills: 0 | Status: SHIPPED | OUTPATIENT
Start: 2018-05-07 | End: 2018-10-18

## 2018-05-07 RX ORDER — PERMETHRIN 50 MG/G
CREAM TOPICAL ONCE
Qty: 60 G | Refills: 1 | Status: SHIPPED | OUTPATIENT
Start: 2018-05-07 | End: 2018-05-07

## 2018-05-07 NOTE — PATIENT INSTRUCTIONS
Lupus (Systemic Lupus Erythematosus, SLE)  Lupus is a chronic (long-term) disease. It causes inflammation in the body. It mainly affects the joints, skin, and muscles. Lupus can affect almost any part of the body, and other common sites affected by lupus include the kidneys, blood cells, lungs, brain, nerves, intestines, eyes, mouth, and heart. Lupus is an autoimmune disease. This means that immune cells in the body begin attacking normal body cells. The cause of this is not known.  Common symptoms include:  · A butterfly-shaped rash across the bridge of the nose and cheeks or a disk-shaped rash on the face, neck, or chest  · Sun sensitivity (a short time in the sun may lead to severe sunburn or rash)  · Stiff, painful, or swollen joints (arthritis)  · Fatigue or depression  · Fever  Your healthcare provider may prescribe medicines such as oral steroids or medicines to suppress the immune system. Some people benefit from anti-malarial medicines as well. People with lupus are more likely to have heart disease. So, it is vital to manage other risk factors for heart disease. These include high blood pressure, smoking, and unhealthy cholesterol.   There is no cure for lupus. With good care, though, most people with the condition lead normal, active lives.   Home care  · If you were prescribed a medicine, take it as directed.  · Unless another pain medicine was prescribed, take an over-the-counter pain medicine such as acetaminophen or ibuprofen for pain. Do not take ibuprofen or other NSAID (non-steroidal anti-inflammatory) medicine if you were prescribed prednisone.  · Avoid sun exposure. Cover up with clothing. Wear sunglasses. Use sun screen (at least SPF 15).  · Get enough rest and reduce stress to help your immune system.  · Get some physical activity every day. This will help you feel your best.  · If you have high blood pressure, consider buying an automatic blood pressure machine (available at most  pharmacies). Use this to monitor your blood pressure and report to your doctor.  · Limit alcohol intake. Eat a healthy, balanced diet low in fat and cholesterol.  · If you smoke, quit. Smoking increases the risk of lupus-related complications.  Follow-up care  Follow up with your healthcare provider or as advised by our staff.  For more information contact the Lupus Foundation at 122-087-2040  www.lupus.org  When to seek medical advice  Call your healthcare provider for any of the following:  · Increasing weakness, fainting  · Chest pain or shortness of breath or pain with breathing  · Severe headache with fever  · Seizures  · Leg swelling, redness or tenderness (sign of blood clot)  · Unusual bruising or bleeding anywhere on your body  · Blood in your stool (black or red color)  · Abdominal pain, repeated vomiting  · Blood or development of significant bubbles in the urine  · Swelling in the legs and arms  · Development of ulcers in the mouth  · New rash  · Rashes, discoloration or ulcerations on the finger tips or toes  · You become pregnant or are planning to become pregnant  Date Last Reviewed: 3/1/2017  © 8357-5933 [employer]. [address] [place] [address]. All rights reserved. This information is not intended as a substitute for professional medical care. Always follow your healthcare professional's instructions.

## 2018-05-07 NOTE — PROGRESS NOTES
Stefanie España  05/07/2018  [id number]    Sofia Martinez MD  Patient Care Team:  Sofia Martinez MD as PCP - General (Family Medicine)  Has the patient seen any provider outside of the [facility] network since the last visit? (no). If yes, HIPPA forms completed and records requested.        Visit Type:an urgent visit for a new problem    Chief Complaint:  Chief Complaint   Patient presents with    Rash     3 or 4 days ago       History of Present Illness:  HPI     Rash    Additional comments: 3 or 4 days ago       Last edited by Amina Sanchez MA on 5/7/2018  1:52 PM. (History)      Rash 3-4 days.   No new contacts. No new foods.   She is on her infusion for her Lupus. She is on steriods for her Lupus as well.   Rash is itchy, small bumps.       History:  Past Medical History:   Diagnosis Date    Lupus (systemic lupus erythematosus)      Past Surgical History:   Procedure Laterality Date    TUBAL LIGATION  2005     Family History   Problem Relation Age of Onset    Liver cancer Maternal Aunt     Breast cancer Maternal Aunt     Breast cancer Paternal Grandmother      Social History     Social History    Marital status:      Spouse name: N/A    Number of children: N/A    Years of education: N/A     Occupational History    [employer]      Social History Main Topics    Smoking status: Never Smoker    Smokeless tobacco: Never Used    Alcohol use No    Drug use: No    Sexual activity: Yes     Partners: Male     Other Topics Concern    Are You Pregnant Or Think You May Be? No    Breast-Feeding No     Social History Narrative    No narrative on file     Patient Active Problem List   Diagnosis    Lupus (systemic lupus erythematosus)    Long-term use of Plaquenil    Long-term current use of high risk medication other than anticoagulant    Systemic lupus erythematosus arthritis     Review of patient's allergies indicates:   Allergen Reactions    Elavil [amitriptyline]        The following  were reviewed at this visit: active problem list, medication list, allergies, family history, social history, and health maintenance.    Medications:  Current Outpatient Prescriptions on File Prior to Visit   Medication Sig Dispense Refill    azaTHIOprine (IMURAN) 50 mg Tab Take 1 tablet (50 mg total) by mouth 2 (two) times daily. 60 tablet 3    fluocinolone (DERMA-SMOOTHE) 0.01 % external oil Apply oil to scalp twice a day 1 Bottle 5    hydroxychloroquine (PLAQUENIL) 200 mg tablet Take 200 mg by mouth 2 (two) times daily.      ketoconazole (NIZORAL) 2 % shampoo Wash hair with medicated shampoo at least 1x/week - let sit on scalp at least 5 minutes prior to rinsing 120 mL 5    meloxicam (MOBIC) 15 MG tablet Take 15 mg by mouth once daily.       nabumetone (RELAFEN) 500 MG tablet Take 500 mg by mouth 2 (two) times daily.      predniSONE (DELTASONE) 10 MG tablet TAKE 1 TABLET BY MOUTH EVERY DAY      tacrolimus (PROTOPIC) 0.1 % ointment Apply topically 2 (two) times daily. 60 g 3    trazodone (DESYREL) 50 MG tablet Take 1 tablet (50 mg total) by mouth every evening. 30 tablet 11    triamcinolone acetonide 0.025% (KENALOG) 0.025 % Oint Apply topically 2 (two) times daily. 80 g 1     No current facility-administered medications on file prior to visit.        Medications have been reviewed and reconciled with patient at this visit.  Barriers to medications present (no)    Adverse reactions to current medications (no)    Over the counter medications reviewed (Yes ), and if needed added to active Medication list at this visit.     Exam:  Wt Readings from Last 3 Encounters:   05/07/18 107.2 kg (236 lb 5.3 oz)   04/25/18 106.9 kg (235 lb 10.8 oz)   04/11/18 106 kg (233 lb 11 oz)     Temp Readings from Last 3 Encounters:   05/07/18 97.1 °F (36.2 °C) (Tympanic)   04/25/18 97.5 °F (36.4 °C)   04/11/18 97.5 °F (36.4 °C)     BP Readings from Last 3 Encounters:   05/07/18 120/80   04/25/18 106/72   04/11/18 110/76      Pulse Readings from Last 3 Encounters:   05/07/18 90   04/25/18 84   04/11/18 76     Body mass index is 35.93 kg/m².    Review of Systems   Constitutional: Negative.  Negative for chills and fever.   HENT: Negative.  Negative for congestion, sinus pain and sore throat.    Eyes: Negative for blurred vision and double vision.   Respiratory: Negative for cough, sputum production, shortness of breath and wheezing.    Cardiovascular: Negative for chest pain, palpitations and leg swelling.   Gastrointestinal: Negative for abdominal pain, constipation, diarrhea, heartburn, nausea and vomiting.   Genitourinary: Negative.    Musculoskeletal: Negative.    Skin: Positive for rash.   Neurological: Negative.    Endo/Heme/Allergies: Negative.  Negative for polydipsia. Does not bruise/bleed easily.   Psychiatric/Behavioral: Negative for depression and substance abuse.         Physical Exam   Constitutional: She is oriented to person, place, and time. She appears well-developed and well-nourished. No distress.   HENT:   Head: Normocephalic and atraumatic.   Right Ear: External ear normal.   Left Ear: External ear normal.   Nose: Nose normal.   Mouth/Throat: Oropharynx is clear and moist. No oropharyngeal exudate.   Eyes: Conjunctivae and EOM are normal. Pupils are equal, round, and reactive to light. Right eye exhibits no discharge. Left eye exhibits no discharge.   Neck: Normal range of motion. Neck supple. No thyromegaly present.   Cardiovascular: Normal rate, regular rhythm, normal heart sounds and intact distal pulses.    No murmur heard.  Pulmonary/Chest: Effort normal and breath sounds normal. No respiratory distress. She has no wheezes.   Abdominal: Soft. Bowel sounds are normal. She exhibits no distension and no mass. There is no tenderness.   Musculoskeletal: Normal range of motion. She exhibits no edema.   Lymphadenopathy:     She has no cervical adenopathy.   Neurological: She is alert and oriented to person, place,  and time. No cranial nerve deficit.   Skin: Capillary refill takes less than 2 seconds. Rash noted. She is not diaphoretic.   Psychiatric: She has a normal mood and affect. Her behavior is normal. Judgment and thought content normal.   Nursing note and vitals reviewed.      Laboratory Reviewed ({Yes)  Lab Results   Component Value Date    WBC 6.51 03/19/2018    HGB 11.8 (L) 03/19/2018    HCT 36.1 (L) 03/19/2018     (H) 03/19/2018    CHOL 169 08/24/2017    TRIG 228 (H) 08/24/2017    HDL 37 (L) 08/24/2017    ALT 11 03/19/2018    AST 17 03/19/2018     03/19/2018    K 3.9 03/19/2018     03/19/2018    CREATININE 0.7 03/19/2018    BUN 6 03/19/2018    CO2 26 03/19/2018    TSH 1.243 08/30/2017       Assessment:  The encounter diagnosis was Rash and nonspecific skin eruption.     Plan     Rash and nonspecific skin eruption  -     permethrin (ELIMITE) 5 % cream; Apply topically once.  Dispense: 60 g; Refill: 1  -     hydrOXYzine HCl (ATARAX) 25 MG tablet; Take 1 tablet (25 mg total) by mouth 4 (four) times daily as needed for Itching.  Dispense: 30 tablet; Refill: 0        Care Plan/Goals: Reviewed  (N/A)  Goals     None          Follow up: No Follow-up on file.    After visit summary was printed and given to patient upon discharge today.  Patient goals and care plan are included in After Visit Summary.

## 2018-05-07 NOTE — LETTER
May 7, 2018      [facility]  [id number] [place] [id number]  Phone: [phone]  Fax: [phone]       Patient: Stefanie España   YOB: 1985  Date of Visit: 05/07/2018    To Whom It May Concern:    ISIDORO España  was at [facility] on 05/07/2018. She may return to work/school on 05/08/2018 with no restrictions. If you have any questions or concerns, or if I can be of further assistance, please do not hesitate to contact me.    Sincerely,    Sofia Martinez MD

## 2018-05-11 ENCOUNTER — PATIENT MESSAGE (OUTPATIENT)
Dept: DERMATOLOGY | Facility: CLINIC | Age: 33
End: 2018-05-11

## 2018-05-23 ENCOUNTER — INFUSION (OUTPATIENT)
Dept: RHEUMATOLOGY | Facility: HOSPITAL | Age: 33
End: 2018-05-23
Attending: INTERNAL MEDICINE
Payer: COMMERCIAL

## 2018-05-23 VITALS
TEMPERATURE: 98 F | HEART RATE: 80 BPM | SYSTOLIC BLOOD PRESSURE: 111 MMHG | WEIGHT: 238.13 LBS | BODY MASS INDEX: 36.2 KG/M2 | DIASTOLIC BLOOD PRESSURE: 75 MMHG

## 2018-05-23 DIAGNOSIS — M32.9 SYSTEMIC LUPUS ERYTHEMATOSUS ARTHRITIS: Primary | ICD-10-CM

## 2018-05-23 PROCEDURE — 63600175 PHARM REV CODE 636 W HCPCS: Mod: JW,JG,PO | Performed by: INTERNAL MEDICINE

## 2018-05-23 PROCEDURE — 25000003 PHARM REV CODE 250: Mod: PO | Performed by: INTERNAL MEDICINE

## 2018-05-23 PROCEDURE — 96375 TX/PRO/DX INJ NEW DRUG ADDON: CPT | Mod: PO

## 2018-05-23 PROCEDURE — 63600175 PHARM REV CODE 636 W HCPCS: Mod: PO | Performed by: INTERNAL MEDICINE

## 2018-05-23 PROCEDURE — 96413 CHEMO IV INFUSION 1 HR: CPT | Mod: PO

## 2018-05-23 PROCEDURE — A4216 STERILE WATER/SALINE, 10 ML: HCPCS | Mod: PO | Performed by: INTERNAL MEDICINE

## 2018-05-23 RX ORDER — SODIUM CHLORIDE 0.9 % (FLUSH) 0.9 %
10 SYRINGE (ML) INJECTION
Status: DISCONTINUED | OUTPATIENT
Start: 2018-05-23 | End: 2018-05-23 | Stop reason: HOSPADM

## 2018-05-23 RX ORDER — SODIUM CHLORIDE 0.9 % (FLUSH) 0.9 %
10 SYRINGE (ML) INJECTION
Status: CANCELLED | OUTPATIENT
Start: 2018-05-23

## 2018-05-23 RX ORDER — HEPARIN 100 UNIT/ML
500 SYRINGE INTRAVENOUS
Status: CANCELLED | OUTPATIENT
Start: 2018-05-23

## 2018-05-23 RX ORDER — ACETAMINOPHEN 325 MG/1
650 TABLET ORAL
Status: CANCELLED | OUTPATIENT
Start: 2018-05-23

## 2018-05-23 RX ADMIN — SODIUM CHLORIDE, PRESERVATIVE FREE 10 ML: 5 INJECTION INTRAVENOUS at 02:05

## 2018-05-23 RX ADMIN — METHYLPREDNISOLONE SODIUM SUCCINATE 100 MG: 125 INJECTION, POWDER, FOR SOLUTION INTRAMUSCULAR; INTRAVENOUS at 02:05

## 2018-05-23 RX ADMIN — BELIMUMAB 1080 MG: 400 INJECTION, POWDER, LYOPHILIZED, FOR SOLUTION INTRAVENOUS at 02:05

## 2018-05-23 NOTE — PROGRESS NOTES
Infusion# 4  S/S infection noted or voiced? None noted/denied  Recent labs? na  Recent vaccines? denied    Premeds? Solumedrol 100 mg IVP over 2 min.    Benlysta 10 mg/Kg= 1080 mg administered IV at a 60 minute rate per orders; see MAR & Flow Sheet for more  details. Tolerated well without adverse events

## 2018-06-19 NOTE — PROGRESS NOTES
Stefanie España  06/20/2018  [id number]    Sofia Martinez MD  Patient Care Team:  Sofia Martinez MD as PCP - General (Family Medicine)  Has the patient seen any provider outside of the [facility] network since the last visit? (no). If yes, HIPPA forms completed and records requested.        Visit Type:a scheduled routine follow-up visit    Chief Complaint:  Chief Complaint   Patient presents with    Follow-up     std screening       History of Present Illness:  32 year old here for desire to be screen for STI. She had this last done in Jan.   It was negative then. HSV negative.     She is followed by Rheum.  She has SLE with inflammatory arthritis, photosensitive malar rash, fatigue, positive MELVA, positive RNP antibody, elevated CK with normal complements, normal antiphospholipid antibodies . She is on plaquenil and Imuran with low dose prednisone. Mild active synovitis over small joints of hands today.  Started Benlysta for active lupus arthritis.Continued on imuran , plaquenil and prednisone.    She is concerned with her weight gain and inability to loose.  Rheum tx and prednisone has some contribution to this.   Glucose has been fine.   She reports the steroids make here hungry.      History:  Past Medical History:   Diagnosis Date    Lupus (systemic lupus erythematosus)      Past Surgical History:   Procedure Laterality Date    TUBAL LIGATION  2005     Family History   Problem Relation Age of Onset    Liver cancer Maternal Aunt     Breast cancer Maternal Aunt     Breast cancer Paternal Grandmother      Social History     Social History    Marital status:      Spouse name: N/A    Number of children: N/A    Years of education: N/A     Occupational History    [employer]      Social History Main Topics    Smoking status: Never Smoker    Smokeless tobacco: Never Used    Alcohol use No    Drug use: No    Sexual activity: Yes     Partners: Male     Other Topics Concern    Are You Pregnant Or  Think You May Be? No    Breast-Feeding No     Social History Narrative    No narrative on file     Patient Active Problem List   Diagnosis    Lupus (systemic lupus erythematosus)    Long-term use of Plaquenil    Long-term current use of high risk medication other than anticoagulant    Systemic lupus erythematosus arthritis     Review of patient's allergies indicates:   Allergen Reactions    Elavil [amitriptyline]        The following were reviewed at this visit: active problem list, medication list, allergies, family history, social history, and health maintenance.    Medications:  Current Outpatient Prescriptions on File Prior to Visit   Medication Sig Dispense Refill    azaTHIOprine (IMURAN) 50 mg Tab Take 1 tablet (50 mg total) by mouth 2 (two) times daily. 60 tablet 3    fluocinolone (DERMA-SMOOTHE) 0.01 % external oil Apply oil to scalp twice a day 1 Bottle 5    hydroxychloroquine (PLAQUENIL) 200 mg tablet Take 200 mg by mouth 2 (two) times daily.      hydrOXYzine HCl (ATARAX) 25 MG tablet Take 1 tablet (25 mg total) by mouth 4 (four) times daily as needed for Itching. 30 tablet 0    ketoconazole (NIZORAL) 2 % shampoo Wash hair with medicated shampoo at least 1x/week - let sit on scalp at least 5 minutes prior to rinsing 120 mL 5    nabumetone (RELAFEN) 500 MG tablet Take 500 mg by mouth 2 (two) times daily.      predniSONE (DELTASONE) 10 MG tablet TAKE 1 TABLET BY MOUTH EVERY DAY      tacrolimus (PROTOPIC) 0.1 % ointment Apply topically 2 (two) times daily. 60 g 3    trazodone (DESYREL) 50 MG tablet Take 1 tablet (50 mg total) by mouth every evening. 30 tablet 11    meloxicam (MOBIC) 15 MG tablet Take 15 mg by mouth once daily.       triamcinolone acetonide 0.025% (KENALOG) 0.025 % Oint Apply topically 2 (two) times daily. 80 g 1     No current facility-administered medications on file prior to visit.        Medications have been reviewed and reconciled with patient at this visit.  Barriers  to medications present (no)    Adverse reactions to current medications (no)    Over the counter medications reviewed (Yes ), and if needed added to active Medication list at this visit.     Exam:  Wt Readings from Last 3 Encounters:   06/20/18 109.2 kg (240 lb 11.9 oz)   05/23/18 108 kg (238 lb 1.6 oz)   05/07/18 107.2 kg (236 lb 5.3 oz)     Temp Readings from Last 3 Encounters:   06/20/18 96.7 °F (35.9 °C) (Tympanic)   05/23/18 98.2 °F (36.8 °C)   05/07/18 97.1 °F (36.2 °C) (Tympanic)     BP Readings from Last 3 Encounters:   06/20/18 122/80   05/23/18 111/75   05/07/18 120/80     Pulse Readings from Last 3 Encounters:   06/20/18 83   05/23/18 80   05/07/18 90     Body mass index is 36.6 kg/m².    Review of Systems   Constitutional: Negative.  Negative for chills and fever.   HENT: Negative.  Negative for congestion, sinus pain and sore throat.    Eyes: Negative for blurred vision and double vision.   Respiratory: Negative for cough, sputum production, shortness of breath and wheezing.    Cardiovascular: Negative for chest pain, palpitations and leg swelling.   Gastrointestinal: Negative for abdominal pain, constipation, diarrhea, heartburn, nausea and vomiting.   Genitourinary: Negative.    Musculoskeletal: Negative.    Skin: Positive for itching and rash.   Neurological: Negative.    Endo/Heme/Allergies: Negative.  Negative for polydipsia. Does not bruise/bleed easily.   Psychiatric/Behavioral: Negative for depression and substance abuse.         Physical Exam   Constitutional: She is oriented to person, place, and time. She appears well-developed and well-nourished. No distress.   HENT:   Head: Normocephalic and atraumatic.   Right Ear: External ear normal.   Left Ear: External ear normal.   Nose: Nose normal.   Mouth/Throat: Oropharynx is clear and moist. No oropharyngeal exudate.   Eyes: Conjunctivae and EOM are normal. Pupils are equal, round, and reactive to light. Right eye exhibits no discharge. Left eye  exhibits no discharge.   Neck: Normal range of motion. Neck supple. No thyromegaly present.   Cardiovascular: Normal rate, regular rhythm, normal heart sounds and intact distal pulses.    No murmur heard.  Pulmonary/Chest: Effort normal and breath sounds normal. No respiratory distress. She has no wheezes.   Abdominal: Soft. Bowel sounds are normal. She exhibits no distension and no mass. There is no tenderness.   Musculoskeletal: Normal range of motion. She exhibits no edema.   Lymphadenopathy:     She has no cervical adenopathy.   Neurological: She is alert and oriented to person, place, and time. No cranial nerve deficit.   Skin: Capillary refill takes less than 2 seconds. She is not diaphoretic.   Psychiatric: She has a normal mood and affect. Her behavior is normal. Judgment and thought content normal.   Nursing note and vitals reviewed.      Laboratory Reviewed ({Yes)  Lab Results   Component Value Date    WBC 6.51 03/19/2018    HGB 11.8 (L) 03/19/2018    HCT 36.1 (L) 03/19/2018     (H) 03/19/2018    CHOL 169 08/24/2017    TRIG 228 (H) 08/24/2017    HDL 37 (L) 08/24/2017    ALT 11 03/19/2018    AST 17 03/19/2018     03/19/2018    K 3.9 03/19/2018     03/19/2018    CREATININE 0.7 03/19/2018    BUN 6 03/19/2018    CO2 26 03/19/2018    TSH 1.243 08/30/2017       Assessment:  The primary encounter diagnosis was Routine screening for STI (sexually transmitted infection). Diagnoses of Weight gain, Other systemic lupus erythematosus with other organ involvement, Long-term current use of high risk medication other than anticoagulant, and Obesity (BMI 35.0-39.9 without comorbidity) were also pertinent to this visit.     Plan     Routine screening for STI (sexually transmitted infection)  -     C. trachomatis/N. gonorrhoeae by AMP DNA Urine  -     HIV-1 and HIV-2 antibodies; Future; Expected date: 06/20/2018    Weight gain  -     phentermine (ADIPEX-P) 37.5 mg tablet; Take 1 tablet (37.5 mg total) by  mouth before breakfast.  Dispense: 30 tablet; Refill: 0    Other systemic lupus erythematosus with other organ involvement   Seeing Rheum today    Long-term current use of high risk medication other than anticoagulant    Seeing Rheum today    Obesity (BMI 35.0-39.9 without comorbidity)  -     phentermine (ADIPEX-P) 37.5 mg tablet; Take 1 tablet (37.5 mg total) by mouth before breakfast.  Dispense: 30 tablet; Refill: 0        Care Plan/Goals: Reviewed  (Yes)  Goals     None          Follow up: Follow-up in about 3 months (around 9/20/2018).    After visit summary was printed and given to patient upon discharge today.  Patient goals and care plan are included in After Visit Summary.

## 2018-06-20 ENCOUNTER — OFFICE VISIT (OUTPATIENT)
Dept: INTERNAL MEDICINE | Facility: CLINIC | Age: 33
End: 2018-06-20
Payer: COMMERCIAL

## 2018-06-20 ENCOUNTER — OFFICE VISIT (OUTPATIENT)
Dept: RHEUMATOLOGY | Facility: CLINIC | Age: 33
End: 2018-06-20
Payer: COMMERCIAL

## 2018-06-20 ENCOUNTER — INFUSION (OUTPATIENT)
Dept: RHEUMATOLOGY | Facility: HOSPITAL | Age: 33
End: 2018-06-20
Attending: FAMILY MEDICINE
Payer: COMMERCIAL

## 2018-06-20 VITALS
HEART RATE: 83 BPM | DIASTOLIC BLOOD PRESSURE: 80 MMHG | WEIGHT: 240.75 LBS | BODY MASS INDEX: 36.49 KG/M2 | HEIGHT: 68 IN | SYSTOLIC BLOOD PRESSURE: 122 MMHG | OXYGEN SATURATION: 98 % | TEMPERATURE: 97 F

## 2018-06-20 VITALS
TEMPERATURE: 97 F | DIASTOLIC BLOOD PRESSURE: 91 MMHG | WEIGHT: 242.5 LBS | HEART RATE: 81 BPM | HEIGHT: 68 IN | BODY MASS INDEX: 36.87 KG/M2 | SYSTOLIC BLOOD PRESSURE: 104 MMHG | BODY MASS INDEX: 36.79 KG/M2 | SYSTOLIC BLOOD PRESSURE: 138 MMHG | DIASTOLIC BLOOD PRESSURE: 73 MMHG | WEIGHT: 242.75 LBS | HEART RATE: 78 BPM

## 2018-06-20 DIAGNOSIS — M32.9 SYSTEMIC LUPUS ERYTHEMATOSUS ARTHRITIS: Primary | ICD-10-CM

## 2018-06-20 DIAGNOSIS — Z79.899 LONG-TERM CURRENT USE OF HIGH RISK MEDICATION OTHER THAN ANTICOAGULANT: ICD-10-CM

## 2018-06-20 DIAGNOSIS — Z11.3 ROUTINE SCREENING FOR STI (SEXUALLY TRANSMITTED INFECTION): Primary | ICD-10-CM

## 2018-06-20 DIAGNOSIS — M32.19 OTHER SYSTEMIC LUPUS ERYTHEMATOSUS WITH OTHER ORGAN INVOLVEMENT: ICD-10-CM

## 2018-06-20 DIAGNOSIS — Z79.899 LONG-TERM USE OF PLAQUENIL: ICD-10-CM

## 2018-06-20 DIAGNOSIS — E66.9 OBESITY (BMI 35.0-39.9 WITHOUT COMORBIDITY): ICD-10-CM

## 2018-06-20 DIAGNOSIS — R63.5 WEIGHT GAIN: ICD-10-CM

## 2018-06-20 LAB
C TRACH DNA SPEC QL NAA+PROBE: NOT DETECTED
N GONORRHOEA DNA SPEC QL NAA+PROBE: NOT DETECTED

## 2018-06-20 PROCEDURE — 99999 PR PBB SHADOW E&M-EST. PATIENT-LVL III: CPT | Mod: PBBFAC,,, | Performed by: FAMILY MEDICINE

## 2018-06-20 PROCEDURE — 63600175 PHARM REV CODE 636 W HCPCS: Mod: JG,PO | Performed by: INTERNAL MEDICINE

## 2018-06-20 PROCEDURE — 25000003 PHARM REV CODE 250: Mod: PO | Performed by: INTERNAL MEDICINE

## 2018-06-20 PROCEDURE — 96413 CHEMO IV INFUSION 1 HR: CPT | Mod: PO

## 2018-06-20 PROCEDURE — A4216 STERILE WATER/SALINE, 10 ML: HCPCS | Mod: PO | Performed by: INTERNAL MEDICINE

## 2018-06-20 PROCEDURE — 3008F BODY MASS INDEX DOCD: CPT | Mod: CPTII,S$GLB,, | Performed by: INTERNAL MEDICINE

## 2018-06-20 PROCEDURE — 99214 OFFICE O/P EST MOD 30 MIN: CPT | Mod: S$GLB,,, | Performed by: FAMILY MEDICINE

## 2018-06-20 PROCEDURE — 3008F BODY MASS INDEX DOCD: CPT | Mod: CPTII,S$GLB,, | Performed by: FAMILY MEDICINE

## 2018-06-20 PROCEDURE — 63600175 PHARM REV CODE 636 W HCPCS: Mod: PO | Performed by: INTERNAL MEDICINE

## 2018-06-20 PROCEDURE — 87491 CHLMYD TRACH DNA AMP PROBE: CPT

## 2018-06-20 PROCEDURE — 99214 OFFICE O/P EST MOD 30 MIN: CPT | Mod: S$GLB,,, | Performed by: INTERNAL MEDICINE

## 2018-06-20 PROCEDURE — 96375 TX/PRO/DX INJ NEW DRUG ADDON: CPT | Mod: PO

## 2018-06-20 PROCEDURE — 99999 PR PBB SHADOW E&M-EST. PATIENT-LVL III: CPT | Mod: PBBFAC,,, | Performed by: INTERNAL MEDICINE

## 2018-06-20 RX ORDER — PREDNISONE 5 MG/1
5 TABLET ORAL DAILY
Qty: 30 TABLET | Refills: 0 | Status: SHIPPED | OUTPATIENT
Start: 2018-06-20 | End: 2019-06-06

## 2018-06-20 RX ORDER — SODIUM CHLORIDE 0.9 % (FLUSH) 0.9 %
10 SYRINGE (ML) INJECTION
Status: DISCONTINUED | OUTPATIENT
Start: 2018-06-20 | End: 2018-06-20 | Stop reason: HOSPADM

## 2018-06-20 RX ORDER — ACETAMINOPHEN 325 MG/1
650 TABLET ORAL
Status: CANCELLED | OUTPATIENT
Start: 2018-06-20

## 2018-06-20 RX ORDER — HEPARIN 100 UNIT/ML
500 SYRINGE INTRAVENOUS
Status: CANCELLED | OUTPATIENT
Start: 2018-06-20

## 2018-06-20 RX ORDER — PHENTERMINE HYDROCHLORIDE 37.5 MG/1
37.5 TABLET ORAL
Qty: 30 TABLET | Refills: 0 | Status: SHIPPED | OUTPATIENT
Start: 2018-06-20 | End: 2018-07-20

## 2018-06-20 RX ORDER — SODIUM CHLORIDE 0.9 % (FLUSH) 0.9 %
10 SYRINGE (ML) INJECTION
Status: CANCELLED | OUTPATIENT
Start: 2018-06-20

## 2018-06-20 RX ORDER — PREDNISONE 5 MG/1
5 TABLET ORAL DAILY
Qty: 30 TABLET | Refills: 0 | Status: SHIPPED | OUTPATIENT
Start: 2018-06-20 | End: 2018-06-20 | Stop reason: SDUPTHER

## 2018-06-20 RX ADMIN — METHYLPREDNISOLONE SODIUM SUCCINATE 100 MG: 125 INJECTION, POWDER, FOR SOLUTION INTRAMUSCULAR; INTRAVENOUS at 01:06

## 2018-06-20 RX ADMIN — BELIMUMAB 1100 MG: 400 INJECTION, POWDER, LYOPHILIZED, FOR SOLUTION INTRAVENOUS at 02:06

## 2018-06-20 RX ADMIN — SODIUM CHLORIDE, PRESERVATIVE FREE 10 ML: 5 INJECTION INTRAVENOUS at 02:06

## 2018-06-20 NOTE — PROGRESS NOTES
"                                                     RHEUMATOLOGY CLINIC FOLLOW UP VISIT  Chief complaints:-  My hands hurt , but improving since being on infusion. Rash resolved.     HPI:-  Stefanie Kimbrough a 32 y.o. pleasant female comes in for a follow up visit with above chief complaints. She was recently started on benlysta for her SLE arthritis which was active on plaquenil, prednisone and imuran.  She reports doing better but still her hands hurt.  The rash has resolved.  No adverse effects from the infusion.  Review of Systems   Constitutional: Negative for chills and fever.   HENT: Negative for congestion and sore throat.    Eyes: Negative for blurred vision and redness.   Respiratory: Negative for cough and shortness of breath.    Cardiovascular: Negative for chest pain and leg swelling.   Gastrointestinal: Negative for abdominal pain.   Genitourinary: Negative for dysuria.   Musculoskeletal: Positive for back pain, joint pain, myalgias and neck pain. Negative for falls.   Skin: Positive for rash.   Neurological: Negative for headaches.   Endo/Heme/Allergies: Does not bruise/bleed easily.   Psychiatric/Behavioral: Negative for memory loss. The patient does not have insomnia.        Past Medical History:   Diagnosis Date    Lupus (systemic lupus erythematosus)        Past Surgical History:   Procedure Laterality Date    TUBAL LIGATION  2005        Social History   Substance Use Topics    Smoking status: Never Smoker    Smokeless tobacco: Never Used    Alcohol use No       Family History   Problem Relation Age of Onset    Liver cancer Maternal Aunt     Breast cancer Maternal Aunt     Breast cancer Paternal Grandmother        Review of patient's allergies indicates:   Allergen Reactions    Elavil [amitriptyline]            Physical examination:-    Vitals:    06/20/18 1547   BP: (!) 138/91   Pulse: 81   Weight: 110.1 kg (242 lb 11.6 oz)   Height: 5' 8" (1.727 m)   PainSc:   6       Physical Exam "   Constitutional: She is oriented to person, place, and time and well-developed, well-nourished, and in no distress. No distress.   HENT:   Head: Normocephalic.   Mouth/Throat: Oropharynx is clear and moist.   Eyes: Conjunctivae and EOM are normal. Pupils are equal, round, and reactive to light.   Neck: Normal range of motion. Neck supple.   Cardiovascular: Normal rate and intact distal pulses.    Pulmonary/Chest: Effort normal. No respiratory distress.   Abdominal: Soft. There is no tenderness.   Musculoskeletal:   Mild synovitis present over bilateral second, third and fourth PIP's. Wrists tender. Few tender points, but no  effusion over large joints.   Neurological: She is alert and oriented to person, place, and time. No cranial nerve deficit.   Skin: Skin is warm. No rash noted. No erythema.   Depigmentation present over face extending above forehead involving entire face and malar area, sparing preauricular regions- Significant improvement since last visit.    Psychiatric: Mood and affect normal.   Nursing note and vitals reviewed.      Labs:-  Results for MARILIA SALEEM (MRN [id number]) as of 12/1/2017 15:37   Ref. Range 8/30/2017 14:50   MELVA HEP-2 Titer Unknown Positive >=1:2560...   Anti-SSA Antibody Latest Ref Range: 0.00 - 19.99 EU 1.63   Anti-SSA Interpretation Latest Ref Range: Negative  Negative   Anti-SSB Antibody Latest Ref Range: 0.00 - 19.99 EU 1.88   Anti-SSB Interpretation Latest Ref Range: Negative  Negative   ds DNA Ab Latest Ref Range: Negative 1:10  Negative 1:10   Anti Sm Antibody Latest Ref Range: 0.00 - 19.99 EU 12.86   Anti-Sm Interpretation Latest Ref Range: Negative  Negative   Anti Sm/RNP Antibody Latest Ref Range: 0.00 - 19.99 .12 (H)   Anti-Sm/RNP Interpretation Latest Ref Range: Negative  Positive (A)   Complement (C-3) Latest Ref Range: 50 - 180 mg/dL 134   Complement (C-4) Latest Ref Range: 11 - 44 mg/dL 28       Medication List with Changes/Refills   New Medications     PREDNISONE (DELTASONE) 5 MG TABLET    Take 1 tablet (5 mg total) by mouth once daily.   Current Medications    AZATHIOPRINE (IMURAN) 50 MG TAB    Take 1 tablet (50 mg total) by mouth 2 (two) times daily.    FLUOCINOLONE (DERMA-SMOOTHE) 0.01 % EXTERNAL OIL    Apply oil to scalp twice a day    HYDROXYCHLOROQUINE (PLAQUENIL) 200 MG TABLET    Take 200 mg by mouth 2 (two) times daily.    HYDROXYZINE HCL (ATARAX) 25 MG TABLET    Take 1 tablet (25 mg total) by mouth 4 (four) times daily as needed for Itching.    KETOCONAZOLE (NIZORAL) 2 % SHAMPOO    Wash hair with medicated shampoo at least 1x/week - let sit on scalp at least 5 minutes prior to rinsing    NABUMETONE (RELAFEN) 500 MG TABLET    Take 500 mg by mouth 2 (two) times daily.    PHENTERMINE (ADIPEX-P) 37.5 MG TABLET    Take 1 tablet (37.5 mg total) by mouth before breakfast.    TACROLIMUS (PROTOPIC) 0.1 % OINTMENT    Apply topically 2 (two) times daily.    TRAZODONE (DESYREL) 50 MG TABLET    Take 1 tablet (50 mg total) by mouth every evening.    TRIAMCINOLONE ACETONIDE 0.025% (KENALOG) 0.025 % OINT    Apply topically 2 (two) times daily.   Discontinued Medications    MELOXICAM (MOBIC) 15 MG TABLET    Take 15 mg by mouth once daily.     PREDNISONE (DELTASONE) 10 MG TABLET    TAKE 1 TABLET BY MOUTH EVERY DAY     Assessment/Plans:-  # Systemic lupus erythematosus arthritis  SLE with inflammatory arthritis , photosensitive malar rash, fatigue, positive MELVA, positive RNP antibody, elevated CK with normal complements, normal antiphospholipid antibodies . She is on Benlysta, plaquenil and Imuran with low dose prednisone.  Significant improvement in lupus activity since starting Benlysta.  Try to taper of prednisone.  Continue monthly Benlysta infusion along with Imuran and Plaquenil. Check lupus activity markers today.   - predniSONE (DELTASONE) 5 MG tablet; Take 1 tablet (5 mg total) by mouth once daily.  Dispense: 30 tablet; Refill: 0    # Lupus (systemic lupus  erythematosus)  SLE with inflammatory arthritis , photosensitive malar rash, fatigue, positive MELVA, positive RNP antibody, elevated CK with normal complements, normal antiphospholipid antibodies . She is on Benlysta, plaquenil and Imuran with low dose prednisone.  Significant improvement in lupus activity since starting Benlysta.  Try to taper of prednisone.  Continue monthly Benlysta infusion along with Imuran and Plaquenil. Check lupus activity markers today.     # Long-term current use of high risk medication -Imuran  Hold Imuran if any infection. Check safety labs today.    # Long-term use of Plaquenil  Continue yearly retinal exam.      # Follow-up in about 3 months (around 9/20/2018).    Disclaimer: This note was prepared using voice recognition system and is likely to have sound alike errors and is not proof read.  Please call me with any questions.

## 2018-06-20 NOTE — PATIENT INSTRUCTIONS
Healthy Snacking  A common myth about snacking is that its not good for you. This might be true if youre helping yourself to candy, chips, or other junk foods throughout the day. But healthy snacking is possible -- its what you eat and how much you eat that matters.    How to make snacks work for you  The key to healthy snacking is to make smart choices about what youre eating. Snacks should come from one or more of the following food groups: grains, fruits, vegetables, dairy, and protein. They should also be high in nutrients, and low in fat, sugar, and salt. When snacking, its also important to watch how much youre eating. A snack should be treated as a very small meal. The point is to eat just enough to take the edge off your hunger, not to eat until youre full.  Pack snacks ahead of time  The bodys fuel runs out within several hours after eating a meal. If you dont eat, youll feel your energy level drop. You may also notice that youre less focused and alert. Try packing snacks to bring with you to work, school, or wherever your busy schedule takes you. Otherwise, youll end up relying on vending machines or convenience stores. Many of the snack options there are high in fat and low in nutrients.  Pick your snacks wisely  Low-fat choices to choose:  · Whole-wheat bagel with a smear of low-fat cream cheese  · Fat-free or low-fat muffin  · Pretzels  · Rice cakes  · Low-fat granola bars  · Whole-grain crackers and manjit crackers  · Low-fat and unsalted microwave popcorn  High-fat choices to avoid:  · [place] and donuts  · Snack cakes, cupcakes  · Chips and crackers  · Chocolate bars  · Cream-filled cookies  · Ready-made popcorn  Snacks for anytime, anywhere  In addition to some of the snacks mentioned above, other good packable snacks include:  · Single-serving boxes of dry and unsweetened cereal  · Fresh fruit (oranges, pears, grapes, and apples all travel well)  · Dried fruit  · Vegetable sticks or  baby carrots  · Mini-boxes of raisins  · Unsweetened juice boxes  · Unsalted nuts and seeds  Snack supplies for home and work  · Raw vegetables  · Fat-free or low-fat yogurt  · Unsweetened applesauce or canned fruit  · Low-fat cheese slices or string cheese  · Fat-free or low-fat cottage cheese  · Whole-wheat tortillas  · Hard-boiled eggs  · Peanut butter  · Slices of lean turkey or chicken  Date Last Reviewed: 6/8/2015  © 8765-1684 [employer]. [address] [place] [id number]. All rights reserved. This information is not intended as a substitute for professional medical care. Always follow your healthcare professional's instructions.

## 2018-06-20 NOTE — ASSESSMENT & PLAN NOTE
SLE with inflammatory arthritis , photosensitive malar rash, fatigue, positive MELVA, positive RNP antibody, elevated CK with normal complements, normal antiphospholipid antibodies . She is on Benlysta, plaquenil and Imuran with low dose prednisone.  Significant improvement in lupus activity since starting Benlysta.  Try to taper of prednisone.  Continue monthly Benlysta infusion along with Imuran and Plaquenil. Check lupus activity markers today.

## 2018-06-20 NOTE — PROGRESS NOTES
Infusion# 5  S/S infection noted or voiced? None noted/denied  Recent labs? yes  Recent vaccines? denied    Premeds? Solumedrol 100 mg IVP over 2min.    Benlysta 10 mg/Kg= 1100 mg administered IV at a 60 minute rate per orders; see MAR & Flow Sheet for more  details. Tolerated well without adverse events

## 2018-06-28 ENCOUNTER — TELEPHONE (OUTPATIENT)
Dept: INTERNAL MEDICINE | Facility: CLINIC | Age: 33
End: 2018-06-28

## 2018-06-28 NOTE — TELEPHONE ENCOUNTER
----- Message from Manuela Sanchez sent at 6/28/2018  3:41 PM CDT -----  Contact: pt  The pt request a return call, no additional info given, can be reached at [phone]///[email or web address]

## 2018-07-18 ENCOUNTER — INFUSION (OUTPATIENT)
Dept: RHEUMATOLOGY | Facility: HOSPITAL | Age: 33
End: 2018-07-18
Attending: INTERNAL MEDICINE
Payer: COMMERCIAL

## 2018-07-18 VITALS
RESPIRATION RATE: 18 BRPM | OXYGEN SATURATION: 99 % | BODY MASS INDEX: 37.21 KG/M2 | DIASTOLIC BLOOD PRESSURE: 83 MMHG | WEIGHT: 244.69 LBS | SYSTOLIC BLOOD PRESSURE: 119 MMHG | TEMPERATURE: 96 F | HEART RATE: 84 BPM

## 2018-07-18 DIAGNOSIS — M32.9 SYSTEMIC LUPUS ERYTHEMATOSUS ARTHRITIS: Primary | ICD-10-CM

## 2018-07-18 PROCEDURE — 96375 TX/PRO/DX INJ NEW DRUG ADDON: CPT | Mod: PO

## 2018-07-18 PROCEDURE — 63600175 PHARM REV CODE 636 W HCPCS: Mod: PO | Performed by: INTERNAL MEDICINE

## 2018-07-18 PROCEDURE — 25000003 PHARM REV CODE 250: Mod: PO | Performed by: INTERNAL MEDICINE

## 2018-07-18 PROCEDURE — 96413 CHEMO IV INFUSION 1 HR: CPT | Mod: PO

## 2018-07-18 PROCEDURE — A4216 STERILE WATER/SALINE, 10 ML: HCPCS | Mod: PO | Performed by: INTERNAL MEDICINE

## 2018-07-18 PROCEDURE — 63600175 PHARM REV CODE 636 W HCPCS: Mod: JG,PO | Performed by: INTERNAL MEDICINE

## 2018-07-18 RX ORDER — SODIUM CHLORIDE 0.9 % (FLUSH) 0.9 %
10 SYRINGE (ML) INJECTION
Status: CANCELLED | OUTPATIENT
Start: 2018-07-18

## 2018-07-18 RX ORDER — SODIUM CHLORIDE 0.9 % (FLUSH) 0.9 %
10 SYRINGE (ML) INJECTION
Status: DISCONTINUED | OUTPATIENT
Start: 2018-07-18 | End: 2018-07-18 | Stop reason: HOSPADM

## 2018-07-18 RX ORDER — HEPARIN 100 UNIT/ML
500 SYRINGE INTRAVENOUS
Status: CANCELLED | OUTPATIENT
Start: 2018-07-18

## 2018-07-18 RX ORDER — ACETAMINOPHEN 325 MG/1
650 TABLET ORAL
Status: CANCELLED | OUTPATIENT
Start: 2018-07-18

## 2018-07-18 RX ADMIN — BELIMUMAB 1110 MG: 400 INJECTION, POWDER, LYOPHILIZED, FOR SOLUTION INTRAVENOUS at 02:07

## 2018-07-18 RX ADMIN — SODIUM CHLORIDE, PRESERVATIVE FREE 10 ML: 5 INJECTION INTRAVENOUS at 02:07

## 2018-07-18 RX ADMIN — METHYLPREDNISOLONE SODIUM SUCCINATE 100 MG: 125 INJECTION, POWDER, FOR SOLUTION INTRAMUSCULAR; INTRAVENOUS at 02:07

## 2018-07-18 NOTE — PROGRESS NOTES
Infusion # 6 - Benlysta 10 mg/mg q 4 weeks  Today's weight- 111.0 kg    Any:  -recent illness, infection, or antibiotic use in past week- denies  -open wounds or mouth sores- denies  -invasive procedures or surgeries in past 4 weeks or in upcoming 4 weeks- denies  -vaccinations in past week- denies  -chance you may be pregnant- denies      Recent labs? 6/20/18  Last Rheumatology provider visit- Seen by Dr. SHORE on 6/20/18     Premeds? 100 mg Solumedrol IVP over 3 minutes     Benlysta 1,110 mg administered IV at a 60 minute rate per orders; see MAR and vitals for more  details. Tolerated well without adverse events, discharged and ambulatory out of clinic.

## 2018-08-15 ENCOUNTER — INFUSION (OUTPATIENT)
Dept: RHEUMATOLOGY | Facility: HOSPITAL | Age: 33
End: 2018-08-15
Attending: INTERNAL MEDICINE
Payer: COMMERCIAL

## 2018-08-15 VITALS
WEIGHT: 245.56 LBS | SYSTOLIC BLOOD PRESSURE: 135 MMHG | HEART RATE: 82 BPM | DIASTOLIC BLOOD PRESSURE: 85 MMHG | TEMPERATURE: 97 F | BODY MASS INDEX: 37.34 KG/M2 | RESPIRATION RATE: 20 BRPM

## 2018-08-15 DIAGNOSIS — M32.9 SYSTEMIC LUPUS ERYTHEMATOSUS ARTHRITIS: Primary | ICD-10-CM

## 2018-08-15 PROCEDURE — 96375 TX/PRO/DX INJ NEW DRUG ADDON: CPT | Mod: PO

## 2018-08-15 PROCEDURE — 25000003 PHARM REV CODE 250: Mod: PO | Performed by: INTERNAL MEDICINE

## 2018-08-15 PROCEDURE — 96413 CHEMO IV INFUSION 1 HR: CPT | Mod: PO

## 2018-08-15 PROCEDURE — 63600175 PHARM REV CODE 636 W HCPCS: Mod: JG,PO | Performed by: INTERNAL MEDICINE

## 2018-08-15 PROCEDURE — A4216 STERILE WATER/SALINE, 10 ML: HCPCS | Mod: PO | Performed by: INTERNAL MEDICINE

## 2018-08-15 PROCEDURE — 63600175 PHARM REV CODE 636 W HCPCS: Mod: PO | Performed by: INTERNAL MEDICINE

## 2018-08-15 RX ORDER — HEPARIN 100 UNIT/ML
500 SYRINGE INTRAVENOUS
Status: CANCELLED | OUTPATIENT
Start: 2018-08-15

## 2018-08-15 RX ORDER — HYDROXYCHLOROQUINE SULFATE 200 MG/1
200 TABLET, FILM COATED ORAL
COMMUNITY
Start: 2017-04-10 | End: 2018-09-26 | Stop reason: ALTCHOICE

## 2018-08-15 RX ORDER — AZATHIOPRINE 50 MG/1
100 TABLET ORAL
COMMUNITY
Start: 2017-04-10 | End: 2018-09-26 | Stop reason: SDUPTHER

## 2018-08-15 RX ORDER — SODIUM CHLORIDE 0.9 % (FLUSH) 0.9 %
10 SYRINGE (ML) INJECTION
Status: DISCONTINUED | OUTPATIENT
Start: 2018-08-15 | End: 2018-08-15 | Stop reason: HOSPADM

## 2018-08-15 RX ORDER — ACETAMINOPHEN 500 MG
TABLET ORAL
COMMUNITY
Start: 2018-07-30 | End: 2018-10-18

## 2018-08-15 RX ORDER — SODIUM CHLORIDE 0.9 % (FLUSH) 0.9 %
10 SYRINGE (ML) INJECTION
Status: CANCELLED | OUTPATIENT
Start: 2018-08-15

## 2018-08-15 RX ORDER — ACETAMINOPHEN 325 MG/1
650 TABLET ORAL
Status: CANCELLED | OUTPATIENT
Start: 2018-08-15

## 2018-08-15 RX ORDER — PREDNISONE 20 MG/1
20 TABLET ORAL
COMMUNITY
Start: 2018-07-30 | End: 2018-09-26 | Stop reason: ALTCHOICE

## 2018-08-15 RX ADMIN — METHYLPREDNISOLONE SODIUM SUCCINATE 100 MG: 125 INJECTION, POWDER, FOR SOLUTION INTRAMUSCULAR; INTRAVENOUS at 02:08

## 2018-08-15 RX ADMIN — BELIMUMAB 1114 MG: 400 INJECTION, POWDER, LYOPHILIZED, FOR SOLUTION INTRAVENOUS at 02:08

## 2018-08-15 RX ADMIN — SODIUM CHLORIDE, PRESERVATIVE FREE 10 ML: 5 INJECTION INTRAVENOUS at 02:08

## 2018-08-15 NOTE — PROGRESS NOTES
Infusion# 6  S/S infection noted or voiced? None noted/denied  Recent labs? yes  Recent vaccines? denied    Premeds? Solumedrol 100 mg IVP over 2 min.    Benlysta 10 mg/Kg= 1114 mg administered IV at a 60 minute rate per orders; see MAR & Flow Sheet for more  details. Tolerated well without adverse events

## 2018-09-12 ENCOUNTER — INFUSION (OUTPATIENT)
Dept: RHEUMATOLOGY | Facility: HOSPITAL | Age: 33
End: 2018-09-12
Attending: INTERNAL MEDICINE
Payer: COMMERCIAL

## 2018-09-12 VITALS
RESPIRATION RATE: 18 BRPM | SYSTOLIC BLOOD PRESSURE: 136 MMHG | TEMPERATURE: 97 F | DIASTOLIC BLOOD PRESSURE: 90 MMHG | HEART RATE: 77 BPM | BODY MASS INDEX: 36.24 KG/M2 | WEIGHT: 238.31 LBS

## 2018-09-12 DIAGNOSIS — M32.9 SYSTEMIC LUPUS ERYTHEMATOSUS ARTHRITIS: Primary | ICD-10-CM

## 2018-09-12 PROCEDURE — 96375 TX/PRO/DX INJ NEW DRUG ADDON: CPT | Mod: PO

## 2018-09-12 PROCEDURE — 25000003 PHARM REV CODE 250: Mod: PO | Performed by: INTERNAL MEDICINE

## 2018-09-12 PROCEDURE — 96413 CHEMO IV INFUSION 1 HR: CPT | Mod: PO

## 2018-09-12 PROCEDURE — 63600175 PHARM REV CODE 636 W HCPCS: Mod: JG,PO | Performed by: INTERNAL MEDICINE

## 2018-09-12 PROCEDURE — A4216 STERILE WATER/SALINE, 10 ML: HCPCS | Mod: PO | Performed by: INTERNAL MEDICINE

## 2018-09-12 PROCEDURE — 63600175 PHARM REV CODE 636 W HCPCS: Mod: PO | Performed by: INTERNAL MEDICINE

## 2018-09-12 RX ORDER — ACETAMINOPHEN 325 MG/1
650 TABLET ORAL
Status: CANCELLED | OUTPATIENT
Start: 2018-09-12

## 2018-09-12 RX ORDER — HEPARIN 100 UNIT/ML
500 SYRINGE INTRAVENOUS
Status: CANCELLED | OUTPATIENT
Start: 2018-09-12

## 2018-09-12 RX ORDER — SODIUM CHLORIDE 0.9 % (FLUSH) 0.9 %
10 SYRINGE (ML) INJECTION
Status: CANCELLED | OUTPATIENT
Start: 2018-09-12

## 2018-09-12 RX ORDER — SODIUM CHLORIDE 0.9 % (FLUSH) 0.9 %
10 SYRINGE (ML) INJECTION
Status: DISCONTINUED | OUTPATIENT
Start: 2018-09-12 | End: 2018-09-12 | Stop reason: HOSPADM

## 2018-09-12 RX ADMIN — BELIMUMAB 1081 MG: 400 INJECTION, POWDER, LYOPHILIZED, FOR SOLUTION INTRAVENOUS at 02:09

## 2018-09-12 RX ADMIN — SODIUM CHLORIDE, PRESERVATIVE FREE 10 ML: 5 INJECTION INTRAVENOUS at 01:09

## 2018-09-12 RX ADMIN — METHYLPREDNISOLONE SODIUM SUCCINATE 100 MG: 125 INJECTION, POWDER, FOR SOLUTION INTRAMUSCULAR; INTRAVENOUS at 01:09

## 2018-09-12 NOTE — PATIENT INSTRUCTIONS
Belimumab solution for injection  What is this medicine?  BELIMUMAB (be HATCH ue mab) is a medicine used to treat a certain type of systemic lupus erythematosus (SLE). This medicine is used with standard therapy for SLE. It is not a cure.  How should I use this medicine?  This medicine is for infusion into a vein. It is given by a health care professional in a hospital or clinic setting.  A special MedGuide will be given to you by the pharmacist with each prescription and refill. Be sure to read this information carefully each time.  Talk to your pediatrician regarding the use of this medicine in children. Special care may be needed.  What side effects may I notice from receiving this medicine?  Side effects that you should report to your doctor or health care professional as soon as possible:  · allergic reactions like skin rash, itching or hives, swelling of the face, lips, or tongue  · depressed mood  · signs of infection - fever or chills, cough, sore throat, pain or difficulty passing urine  · suicidal thoughts or other mood changes  · unusually slow heartbeat  Side effects that usually do not require medical attention (Report these to your doctor or health care professional if they continue or are bothersome.):  · diarrhea  · headache  · muscle aches  · nausea, vomiting  · trouble sleeping  What may interact with this medicine?  Do not take this medicine with any of the following medications:  · live virus vaccines  This medicine may also interact with the following medications:  · cyclophosphamide  · ofatumumab  · rituximab  What if I miss a dose?  It is important not to miss your dose. Call your doctor or health care professional if you are unable to keep an appointment.  Where should I keep my medicine?  This drug is given in a hospital or clinic and will not be stored at home.  What should I tell my health care provider before I take this medicine?  They need to know if you have any of these  conditions:  · heart disease  · immune system problems  · infection (especially a virus infection such as chickenpox, cold sores, or herpes)  · mental illness  · suicidal thoughts, plans, or attempt; a previous suicide attempt by you or a family member  · an unusual or allergic reaction to belimumab, other medicines, foods, dyes, or preservatives  · pregnant or trying to get pregnant  · breast-feeding  What should I watch for while using this medicine?  Tell your doctor or healthcare professional if your symptoms do not start to get better or if they get worse.  Your condition will be monitored carefully while you are receiving this medicine.  NOTE:This sheet is a summary. It may not cover all possible information. If you have questions about this medicine, talk to your doctor, pharmacist, or health care provider. Copyright© 2017 [employer]      WAYS TO HELP PREVENT INFECTION         WASH YOUR HANDS OFTEN DURING THE DAY, ESPECIALLY BEFORE YOU EAT, AFTER USING THE BATHROOM, AND AFTER TOUCHING ANIMALS     STAY AWAY FROM PEOPLE WHO HAVE ILLNESSES YOU CAN CATCH; SUCH AS COLDS, FLU, CHICKEN POX     TRY TO AVOID CROWDS     STAY AWAY FROM CHILDREN WHO RECENTLY HAVE RECEIVED LIVE VIRUS VACCINES     MAINTAIN GOOD MOUTH CARE     DO NOT SQUEEZE OR SCRATCH PIMPLES     CLEAN CUTS & SCRAPES RIGHT AWAY AND DAILY UNTIL HEALED WITH WARM WATER, SOAP & AN ANTISEPTIC     AVOID CONTACT WITH LITTER BOXES, BIRD CAGES, & FISH TANKS     AVOID STANDING WATER, IE., BIRD BATHS, FLOWER POTS/VASES, OR HUMIDIFIERS     WEAR GLOVES WHEN GARDENING OR CLEANING UP AFTER OTHERS, ESPECIALLY BABIES & SMALL CHILDREN    DO NOT EAT RAW FISH, SEAFOOD, MEAT, OR EGGS

## 2018-09-12 NOTE — PROGRESS NOTES
Infusion# 8  S/S infection noted or voiced? denies  Recent labs? yes  Recent vaccines? denies    Premeds? Solumedrol 100 mg IVP over 2 minutes    Benlysta 10 mg/kg=  1081 mg administered IV at a 60 minute rate per orders; see MAR & Flow Sheet for more  details. Tolerated well without adverse events

## 2018-09-26 ENCOUNTER — OFFICE VISIT (OUTPATIENT)
Dept: RHEUMATOLOGY | Facility: CLINIC | Age: 33
End: 2018-09-26
Payer: COMMERCIAL

## 2018-09-26 VITALS
HEIGHT: 68 IN | HEART RATE: 88 BPM | BODY MASS INDEX: 36.12 KG/M2 | WEIGHT: 238.31 LBS | DIASTOLIC BLOOD PRESSURE: 68 MMHG | SYSTOLIC BLOOD PRESSURE: 108 MMHG

## 2018-09-26 DIAGNOSIS — Z79.899 LONG-TERM USE OF PLAQUENIL: ICD-10-CM

## 2018-09-26 DIAGNOSIS — Z79.899 LONG-TERM CURRENT USE OF HIGH RISK MEDICATION OTHER THAN ANTICOAGULANT: ICD-10-CM

## 2018-09-26 DIAGNOSIS — M32.9 SYSTEMIC LUPUS ERYTHEMATOSUS ARTHRITIS: Primary | ICD-10-CM

## 2018-09-26 DIAGNOSIS — M32.19 OTHER SYSTEMIC LUPUS ERYTHEMATOSUS WITH OTHER ORGAN INVOLVEMENT: ICD-10-CM

## 2018-09-26 DIAGNOSIS — D84.9 IMMUNOSUPPRESSED STATUS: ICD-10-CM

## 2018-09-26 PROCEDURE — 99214 OFFICE O/P EST MOD 30 MIN: CPT | Mod: S$GLB,,, | Performed by: INTERNAL MEDICINE

## 2018-09-26 PROCEDURE — 3008F BODY MASS INDEX DOCD: CPT | Mod: CPTII,S$GLB,, | Performed by: INTERNAL MEDICINE

## 2018-09-26 PROCEDURE — 99999 PR PBB SHADOW E&M-EST. PATIENT-LVL III: CPT | Mod: PBBFAC,,, | Performed by: INTERNAL MEDICINE

## 2018-09-26 NOTE — LETTER
September 26, 2018      [facility]  [address] [facility] [place] [address]  Phone: [phone]  Fax: [phone]       Patient: Stefanie España   YOB: 1985  Date of Visit: 09/26/2018    To Whom It May Concern:    Susan España  was at [facility] on 09/26/2018. She may return to work/school on 09/27/2018. If you have any questions or concerns, or if I can be of further assistance, please do not hesitate to contact me.    Sincerely,    Kenna Hitchcock MA

## 2018-09-26 NOTE — PROGRESS NOTES
RHEUMATOLOGY CLINIC FOLLOW UP VISIT  Chief complaints:-  My hands hurt , but improving since being on infusion. Rash resolved.     HPI:-  Stefanie Kimbrough a 32 y.o. pleasant female comes in for a follow up visit with above chief complaints. She is on imuran, benlysta , p[laquenil and prednisone 5 mg for her SLE . Her manifestations includes arthritis, myalgias, elevated aldolase, fatigue and puffy fingers bilateral.  She reports doing better but still her hands hurt.  The rash has resolved.  No adverse effects from the infusion.  Review of Systems   Constitutional: Negative for chills and fever.   HENT: Negative for congestion and sore throat.    Eyes: Negative for blurred vision and redness.   Respiratory: Negative for cough and shortness of breath.    Cardiovascular: Negative for chest pain and leg swelling.   Gastrointestinal: Negative for abdominal pain.   Genitourinary: Negative for dysuria.   Musculoskeletal: Positive for back pain, joint pain and neck pain. Negative for falls and myalgias.   Skin: Positive for rash.   Neurological: Negative for headaches.   Endo/Heme/Allergies: Does not bruise/bleed easily.   Psychiatric/Behavioral: Negative for memory loss. The patient does not have insomnia.        Past Medical History:   Diagnosis Date    Lupus (systemic lupus erythematosus)        Past Surgical History:   Procedure Laterality Date    TUBAL LIGATION  2005        Social History     Tobacco Use    Smoking status: Never Smoker    Smokeless tobacco: Never Used   Substance Use Topics    Alcohol use: No    Drug use: No       Family History   Problem Relation Age of Onset    Liver cancer Maternal Aunt     Breast cancer Maternal Aunt     Breast cancer Paternal Grandmother        Review of patient's allergies indicates:   Allergen Reactions    Elavil [amitriptyline]            Physical examination:-    Vitals:    09/26/18 1540   BP: 108/68   Pulse:  "88   Weight: 108.1 kg (238 lb 5.1 oz)   Height: 5' 8" (1.727 m)   PainSc:   6   PainLoc: Wrist       Physical Exam   Constitutional: She is oriented to person, place, and time and well-developed, well-nourished, and in no distress. No distress.   HENT:   Head: Normocephalic.   Mouth/Throat: Oropharynx is clear and moist.   Eyes: Conjunctivae and EOM are normal. Pupils are equal, round, and reactive to light.   Neck: Normal range of motion. Neck supple.   Cardiovascular: Normal rate and intact distal pulses.   Pulmonary/Chest: Effort normal. No respiratory distress.   Abdominal: Soft. There is no tenderness.   Musculoskeletal:   Mild synovitis present over bilateral second, third and fourth PIP's. Wrists tender. Few tender points, but no  effusion over large joints.   Neurological: She is alert and oriented to person, place, and time. No cranial nerve deficit.   Skin: Skin is warm. No rash noted. No erythema.   Depigmentation present over face extending above forehead involving entire face and malar area, sparing preauricular regions- Significant improvement since last visit.    Psychiatric: Mood and affect normal.   Nursing note and vitals reviewed.      Labs:-  Results for MARILIA SALEEM (MRN [id number]) as of 12/1/2017 15:37   Ref. Range 8/30/2017 14:50   MELVA HEP-2 Titer Unknown Positive >=1:2560...   Anti-SSA Antibody Latest Ref Range: 0.00 - 19.99 EU 1.63   Anti-SSA Interpretation Latest Ref Range: Negative  Negative   Anti-SSB Antibody Latest Ref Range: 0.00 - 19.99 EU 1.88   Anti-SSB Interpretation Latest Ref Range: Negative  Negative   ds DNA Ab Latest Ref Range: Negative 1:10  Negative 1:10   Anti Sm Antibody Latest Ref Range: 0.00 - 19.99 EU 12.86   Anti-Sm Interpretation Latest Ref Range: Negative  Negative   Anti Sm/RNP Antibody Latest Ref Range: 0.00 - 19.99 .12 (H)   Anti-Sm/RNP Interpretation Latest Ref Range: Negative  Positive (A)   Complement (C-3) Latest Ref Range: 50 - 180 mg/dL 134 "   Complement (C-4) Latest Ref Range: 11 - 44 mg/dL 28          Medication List           Accurate as of 9/26/18  4:41 PM. If you have any questions, ask your nurse or doctor.               CHANGE how you take these medications    PLAQUENIL 200 mg tablet  Generic drug:  hydroxychloroquine  What changed:  Another medication with the same name was removed. Continue taking this medication, and follow the directions you see here.  Changed by:  Prashanth Whalen MD     predniSONE 5 MG tablet  Commonly known as:  DELTASONE  Take 1 tablet (5 mg total) by mouth once daily.  What changed:  Another medication with the same name was removed. Continue taking this medication, and follow the directions you see here.  Changed by:  Prashanth Whalen MD        CONTINUE taking these medications    azaTHIOprine 50 mg Tab  Commonly known as:  IMURAN  Take 1 tablet (50 mg total) by mouth 2 (two) times daily.     fluocinolone 0.01 % external oil  Commonly known as:  DERMA-SMOOTHE  Apply oil to scalp twice a day     hydrOXYzine HCl 25 MG tablet  Commonly known as:  ATARAX  Take 1 tablet (25 mg total) by mouth 4 (four) times daily as needed for Itching.     ketoconazole 2 % shampoo  Commonly known as:  NIZORAL  Wash hair with medicated shampoo at least 1x/week - let sit on scalp at least 5 minutes prior to rinsing     nabumetone 500 MG tablet  Commonly known as:  RELAFEN     tacrolimus 0.1 % ointment  Commonly known as:  PROTOPIC  Apply topically 2 (two) times daily.     traZODone 50 MG tablet  Commonly known as:  DESYREL  Take 1 tablet (50 mg total) by mouth every evening.     triamcinolone acetonide 0.025% 0.025 % Oint  Commonly known as:  KENALOG  Apply topically 2 (two) times daily.     TYLENOL EXTRA STRENGTH 500 MG tablet  Generic drug:  acetaminophen          Assessment/Plans:-  # Systemic lupus erythematosus arthritis  SLE with inflammatory arthritis , photosensitive malar rash, fatigue, positive MELVA, positive RNP antibody,  elevated CK with normal complements, normal antiphospholipid antibodies . She is on Benlysta, plaquenil and Imuran with low dose prednisone.  Significant improvement in lupus activity since starting Benlysta.  Try to taper of prednisone.  Continue monthly Benlysta infusion along with Imuran and Plaquenil.     # Lupus (systemic lupus erythematosus)  SLE with inflammatory arthritis , photosensitive malar rash, fatigue, positive MELVA, positive RNP antibody, elevated CK with normal complements, normal antiphospholipid antibodies . She is on Benlysta, plaquenil and Imuran with low dose prednisone.  Significant improvement in lupus activity since starting Benlysta.  Try to taper of prednisone.  Continue monthly Benlysta infusion along with Imuran and Plaquenil. Check lupus activity markers today.     # Long-term current use of high risk medication -Imuran  Hold Imuran if any infection. Check safety labs today.    # Long-term use of Plaquenil  Continue yearly retinal exam.      # Follow-up in about 3 months (around 12/26/2018).    Disclaimer: This note was prepared using voice recognition system and is likely to have sound alike errors and is not proof read.  Please call me with any questions.

## 2018-10-09 ENCOUNTER — TELEPHONE (OUTPATIENT)
Dept: RHEUMATOLOGY | Facility: CLINIC | Age: 33
End: 2018-10-09

## 2018-10-09 NOTE — TELEPHONE ENCOUNTER
Called patient regarding appointment on 12.5.18 at  needing to be rescheduled due to Dr. Whalen being out of clinic that day. Rescheduled appointment to 12.10.18 at 4 pm. Pt verbalized understanding.

## 2018-10-10 ENCOUNTER — TELEPHONE (OUTPATIENT)
Dept: RHEUMATOLOGY | Facility: HOSPITAL | Age: 33
End: 2018-10-10

## 2018-10-10 NOTE — TELEPHONE ENCOUNTER
Received call from Ms. España stating that she thinks she is coming down with something and would like to reschedule appointment to next Thursday since she has off that day. Appointment rescheduled per request.

## 2018-10-18 ENCOUNTER — INFUSION (OUTPATIENT)
Dept: RHEUMATOLOGY | Facility: HOSPITAL | Age: 33
End: 2018-10-18
Attending: INTERNAL MEDICINE
Payer: COMMERCIAL

## 2018-10-18 VITALS
OXYGEN SATURATION: 100 % | RESPIRATION RATE: 18 BRPM | BODY MASS INDEX: 36.2 KG/M2 | WEIGHT: 238.13 LBS | TEMPERATURE: 98 F | DIASTOLIC BLOOD PRESSURE: 62 MMHG | SYSTOLIC BLOOD PRESSURE: 107 MMHG | HEART RATE: 84 BPM

## 2018-10-18 DIAGNOSIS — M32.9 SYSTEMIC LUPUS ERYTHEMATOSUS ARTHRITIS: Primary | ICD-10-CM

## 2018-10-18 PROCEDURE — 96413 CHEMO IV INFUSION 1 HR: CPT | Mod: PO

## 2018-10-18 PROCEDURE — 63600175 PHARM REV CODE 636 W HCPCS: Mod: JG,PO | Performed by: INTERNAL MEDICINE

## 2018-10-18 PROCEDURE — A4216 STERILE WATER/SALINE, 10 ML: HCPCS | Mod: PO | Performed by: INTERNAL MEDICINE

## 2018-10-18 PROCEDURE — 25000003 PHARM REV CODE 250: Mod: PO | Performed by: INTERNAL MEDICINE

## 2018-10-18 RX ORDER — HEPARIN 100 UNIT/ML
500 SYRINGE INTRAVENOUS
Status: CANCELLED | OUTPATIENT
Start: 2018-10-18

## 2018-10-18 RX ORDER — SODIUM CHLORIDE 0.9 % (FLUSH) 0.9 %
10 SYRINGE (ML) INJECTION
Status: CANCELLED | OUTPATIENT
Start: 2018-10-18

## 2018-10-18 RX ORDER — ACETAMINOPHEN 325 MG/1
650 TABLET ORAL
Status: CANCELLED | OUTPATIENT
Start: 2018-10-18

## 2018-10-18 RX ORDER — SODIUM CHLORIDE 0.9 % (FLUSH) 0.9 %
10 SYRINGE (ML) INJECTION
Status: DISCONTINUED | OUTPATIENT
Start: 2018-10-18 | End: 2018-10-18 | Stop reason: HOSPADM

## 2018-10-18 RX ORDER — HEPARIN 100 UNIT/ML
500 SYRINGE INTRAVENOUS
Status: DISCONTINUED | OUTPATIENT
Start: 2018-10-18 | End: 2018-10-18 | Stop reason: HOSPADM

## 2018-10-18 RX ADMIN — BELIMUMAB 1080 MG: 400 INJECTION, POWDER, LYOPHILIZED, FOR SOLUTION INTRAVENOUS at 02:10

## 2018-10-18 RX ADMIN — SODIUM CHLORIDE, PRESERVATIVE FREE 10 ML: 5 INJECTION INTRAVENOUS at 01:10

## 2018-10-18 NOTE — PLAN OF CARE
Problem: Infection, Risk/Actual (Adult)  Intervention: Manage Suspected/Actual Infection  Instructed pt on infection prevention measures. Encouraged frequent hand washing and use of antibacteria hand sanitizers as needed. Instructed to notify Rheumatology Provider/ Infusion Dept for any antibiotic use, illness, vaccinations or surgeries/invasive procedures before, on, or after infusion date. Verbalizes understanding.

## 2018-10-18 NOTE — PROGRESS NOTES
Infusion # 9 - Benlysta 10 mg/kg q 4 weeks  Today's weight-108.0 kg    Any:  -recent illness, infection, or antibiotic use in past week- denies  -open wounds or mouth sores- denies  -invasive procedures or surgeries in past 4 weeks or in upcoming 4 weeks- denies  -vaccinations in past week- denies  -chance you may be pregnant- denies      Recent labs? 9/12/18  Last Rheumatology provider visit- Seen by  on 9/26/18     Premeds? None      Benlysta 1,008 mg administered IV at a 60 minute rate per orders; see MAR and vitals for more  details. Tolerated well without adverse events, discharged and ambulatory out of clinic.

## 2018-10-23 ENCOUNTER — TELEPHONE (OUTPATIENT)
Dept: RHEUMATOLOGY | Facility: CLINIC | Age: 33
End: 2018-10-23

## 2018-10-23 DIAGNOSIS — Z23 NEED FOR INFLUENZA VACCINATION: ICD-10-CM

## 2018-10-23 DIAGNOSIS — M32.19 OTHER SYSTEMIC LUPUS ERYTHEMATOSUS WITH OTHER ORGAN INVOLVEMENT: Primary | ICD-10-CM

## 2018-10-23 DIAGNOSIS — D84.9 IMMUNOSUPPRESSED STATUS: ICD-10-CM

## 2018-10-23 NOTE — TELEPHONE ENCOUNTER
Patient is scheduled for flu shot tomorrow. She is on Imuran 50mg BID & Benlysta infusion. Should she get HD or LD flu shot?

## 2018-10-23 NOTE — TELEPHONE ENCOUNTER
Hold imuran starting tomorrow X 2 weeks  HD flu shot  Ok for benlysta as scheduled   Orders entered

## 2018-10-24 ENCOUNTER — LAB VISIT (OUTPATIENT)
Dept: LAB | Facility: HOSPITAL | Age: 33
End: 2018-10-24
Attending: FAMILY MEDICINE
Payer: COMMERCIAL

## 2018-10-24 ENCOUNTER — OFFICE VISIT (OUTPATIENT)
Dept: INTERNAL MEDICINE | Facility: CLINIC | Age: 33
End: 2018-10-24
Payer: COMMERCIAL

## 2018-10-24 VITALS
DIASTOLIC BLOOD PRESSURE: 80 MMHG | HEIGHT: 68 IN | TEMPERATURE: 97 F | SYSTOLIC BLOOD PRESSURE: 126 MMHG | OXYGEN SATURATION: 98 % | HEART RATE: 79 BPM | WEIGHT: 237.63 LBS | BODY MASS INDEX: 36.02 KG/M2

## 2018-10-24 DIAGNOSIS — L93.0 LUPUS ERYTHEMATOSUS, UNSPECIFIED FORM: ICD-10-CM

## 2018-10-24 DIAGNOSIS — Z11.3 ROUTINE SCREENING FOR STI (SEXUALLY TRANSMITTED INFECTION): ICD-10-CM

## 2018-10-24 DIAGNOSIS — Z23 NEED FOR INFLUENZA VACCINATION: ICD-10-CM

## 2018-10-24 DIAGNOSIS — Z11.3 ROUTINE SCREENING FOR STI (SEXUALLY TRANSMITTED INFECTION): Primary | ICD-10-CM

## 2018-10-24 PROCEDURE — 99999 PR PBB SHADOW E&M-EST. PATIENT-LVL III: CPT | Mod: PBBFAC,,, | Performed by: FAMILY MEDICINE

## 2018-10-24 PROCEDURE — 3008F BODY MASS INDEX DOCD: CPT | Mod: CPTII,S$GLB,, | Performed by: FAMILY MEDICINE

## 2018-10-24 PROCEDURE — 86703 HIV-1/HIV-2 1 RESULT ANTBDY: CPT

## 2018-10-24 PROCEDURE — 99213 OFFICE O/P EST LOW 20 MIN: CPT | Mod: S$GLB,,, | Performed by: FAMILY MEDICINE

## 2018-10-24 PROCEDURE — 36415 COLL VENOUS BLD VENIPUNCTURE: CPT

## 2018-10-24 PROCEDURE — 87491 CHLMYD TRACH DNA AMP PROBE: CPT

## 2018-10-24 NOTE — PROGRESS NOTES
Stefanie España  10/24/2018  [id number]    Sofia Martinez MD  Patient Care Team:  Sofia Martinez MD as PCP - General (Family Medicine)  Tangela Espinoza LPN as Care Coordinator (Internal Medicine)  Has the patient seen any provider outside of the [facility] network since the last visit? (no). If yes, HIPPA forms completed and records requested.        Visit Type:a scheduled routine follow-up visit    Chief Complaint:  Chief Complaint   Patient presents with    Follow-up     std check       History of Present Illness:  32 year old here for STI screen.    HM reviewed  PAP up to date  She is due for flu vaccine    She saw Rheum in Sept. She is on imuran, benlysta , p[laquenil and prednisone 5 mg for her SLE . Her manifestations includes arthritis, myalgias, elevated aldolase, fatigue and puffy fingers bilateral.  She reports doing better but still her hands hurt. The rash has resolved.  No adverse effects from the infusion of Benlysta.    Rheum asked her get the flu vaccine.    History:  Past Medical History:   Diagnosis Date    Lupus (systemic lupus erythematosus)      Past Surgical History:   Procedure Laterality Date    TUBAL LIGATION  2005     Family History   Problem Relation Age of Onset    Liver cancer Maternal Aunt     Breast cancer Maternal Aunt     Breast cancer Paternal Grandmother      Social History     Socioeconomic History    Marital status: Legally      Spouse name: Not on file    Number of children: Not on file    Years of education: Not on file    Highest education level: Not on file   Social Needs    Financial resource strain: Not on file    Food insecurity - worry: Not on file    Food insecurity - inability: Not on file    Transportation needs - medical: Not on file    Transportation needs - non-medical: Not on file   Occupational History    Occupation: [employer]   Tobacco Use    Smoking status: Never Smoker    Smokeless tobacco: Never Used   Substance and Sexual  Activity    Alcohol use: No    Drug use: No    Sexual activity: Yes     Partners: Male   Other Topics Concern    Are you pregnant or think you may be? No    Breast-feeding No   Social History Narrative    Not on file     Patient Active Problem List   Diagnosis    Lupus (systemic lupus erythematosus)    Long-term use of Plaquenil    Long-term current use of high risk medication -Imuran    Systemic lupus erythematosus arthritis    Immunosuppressed status     Review of patient's allergies indicates:   Allergen Reactions    Elavil [amitriptyline]        The following were reviewed at this visit: active problem list, medication list, allergies, family history, social history, and health maintenance.    Medications:  Current Outpatient Medications on File Prior to Visit   Medication Sig Dispense Refill    azaTHIOprine (IMURAN) 50 mg Tab Take 1 tablet (50 mg total) by mouth 2 (two) times daily. 60 tablet 3    fluocinolone (DERMA-SMOOTHE) 0.01 % external oil Apply oil to scalp twice a day 1 Bottle 5    hydroxychloroquine (PLAQUENIL) 200 mg tablet Take 200 mg by mouth 2 (two) times daily.      ketoconazole (NIZORAL) 2 % shampoo Wash hair with medicated shampoo at least 1x/week - let sit on scalp at least 5 minutes prior to rinsing 120 mL 5    nabumetone (RELAFEN) 500 MG tablet Take 500 mg by mouth 2 (two) times daily.      predniSONE (DELTASONE) 5 MG tablet Take 1 tablet (5 mg total) by mouth once daily. 30 tablet 0    tacrolimus (PROTOPIC) 0.1 % ointment Apply topically 2 (two) times daily. 60 g 3    trazodone (DESYREL) 50 MG tablet Take 1 tablet (50 mg total) by mouth every evening. 30 tablet 11    triamcinolone acetonide 0.025% (KENALOG) 0.025 % Oint Apply topically 2 (two) times daily. 80 g 1     No current facility-administered medications on file prior to visit.        Medications have been reviewed and reconciled with patient at this visit.  Barriers to medications present (no)    Adverse  reactions to current medications (no)    Over the counter medications reviewed (Yes ), and if needed added to active Medication list at this visit.     Exam:  Wt Readings from Last 3 Encounters:   10/24/18 107.8 kg (237 lb 10.5 oz)   10/18/18 108 kg (238 lb 1.6 oz)   09/26/18 108.1 kg (238 lb 5.1 oz)     Temp Readings from Last 3 Encounters:   10/24/18 97.2 °F (36.2 °C) (Tympanic)   10/18/18 97.6 °F (36.4 °C)   09/12/18 96.9 °F (36.1 °C)     BP Readings from Last 3 Encounters:   10/24/18 126/80   10/18/18 107/62   09/26/18 108/68     Pulse Readings from Last 3 Encounters:   10/24/18 79   10/18/18 84   09/26/18 88     Body mass index is 36.14 kg/m².      Review of Systems   Constitutional: Negative.  Negative for chills and fever.   HENT: Negative.  Negative for congestion, sinus pain and sore throat.    Eyes: Negative for blurred vision and double vision.   Respiratory: Negative for cough, sputum production, shortness of breath and wheezing.    Cardiovascular: Negative for chest pain, palpitations and leg swelling.   Gastrointestinal: Negative for abdominal pain, constipation, diarrhea, heartburn, nausea and vomiting.   Genitourinary: Negative.    Musculoskeletal: Negative.    Skin: Negative.  Negative for rash.   Neurological: Negative.    Endo/Heme/Allergies: Negative.  Negative for polydipsia. Does not bruise/bleed easily.   Psychiatric/Behavioral: Negative for depression and substance abuse.     Physical Exam   Constitutional: She is oriented to person, place, and time. She appears well-developed and well-nourished. No distress.   HENT:   Head: Normocephalic and atraumatic.   Right Ear: External ear normal.   Left Ear: External ear normal.   Nose: Nose normal.   Mouth/Throat: Oropharynx is clear and moist. No oropharyngeal exudate.   Eyes: Conjunctivae and EOM are normal. Pupils are equal, round, and reactive to light. Right eye exhibits no discharge. Left eye exhibits no discharge.   Neck: Normal range of  motion. Neck supple. No thyromegaly present.   Cardiovascular: Normal rate, regular rhythm, normal heart sounds and intact distal pulses.   No murmur heard.  Pulmonary/Chest: Effort normal and breath sounds normal. No respiratory distress. She has no wheezes.   Abdominal: Soft. Bowel sounds are normal. She exhibits no distension and no mass. There is no tenderness.   Musculoskeletal: Normal range of motion. She exhibits no edema.   Lymphadenopathy:     She has no cervical adenopathy.   Neurological: She is alert and oriented to person, place, and time. No cranial nerve deficit.   Skin: Capillary refill takes less than 2 seconds. She is not diaphoretic.   Psychiatric: She has a normal mood and affect. Her behavior is normal. Judgment and thought content normal.   Nursing note and vitals reviewed.      Laboratory Reviewed ({N/A)  Lab Results   Component Value Date    WBC 5.74 09/12/2018    HGB 12.2 09/12/2018    HCT 36.7 (L) 09/12/2018     (H) 09/12/2018    CHOL 169 08/24/2017    TRIG 228 (H) 08/24/2017    HDL 37 (L) 08/24/2017    ALT 11 09/12/2018    AST 16 09/12/2018     09/12/2018    K 3.6 09/12/2018     09/12/2018    CREATININE 0.7 09/12/2018    BUN 7 09/12/2018    CO2 28 09/12/2018    TSH 1.243 08/30/2017       Stefanie was seen today for follow-up.    Diagnoses and all orders for this visit:    Routine screening for STI (sexually transmitted infection)  -     HIV-1 and HIV-2 antibodies; Future  -     C. trachomatis/N. gonorrhoeae by AMP DNA    Need for influenza vaccination                Care Plan/Goals: Reviewed  (Yes)  Goals     None          Follow up: No Follow-up on file.    After visit summary was printed and given to patient upon discharge today.  Patient goals and care plan are included in After Visit Summary.

## 2018-10-25 LAB
C TRACH DNA SPEC QL NAA+PROBE: NOT DETECTED
HIV 1+2 AB+HIV1 P24 AG SERPL QL IA: NEGATIVE
N GONORRHOEA DNA SPEC QL NAA+PROBE: NOT DETECTED

## 2018-10-26 NOTE — PROGRESS NOTES
"We offered patient the flu vaccine in offce, she was schedule by Rheum to have it done.  She reports feeling bad after. "LUPUS Flare" is what ER notes at [place] reported. I wanted to forward to you.  "

## 2018-10-29 ENCOUNTER — TELEPHONE (OUTPATIENT)
Dept: RHEUMATOLOGY | Facility: CLINIC | Age: 33
End: 2018-10-29

## 2018-10-29 NOTE — TELEPHONE ENCOUNTER
"----- Message from Prashanth Whalen MD sent at 10/29/2018  3:56 PM CDT -----  Please check with patient. Thanks.   ----- Message -----  From: Esperanza Amaro PA-C  Sent: 10/29/2018   3:01 PM  To: MD felicita Galindo    See below from dr seamus Keenan   ----- Message -----  From: Sofia Martinez MD  Sent: 10/26/2018   2:13 PM  To: Esperanza Amaro PA-C    We offered patient the flu vaccine in [facility], she was schedule by Rheum to have it done.  She reports feeling bad after. "LUPUS Flare" is what ER notes at [place] reported.   I wanted to forward to you.        "

## 2018-10-29 NOTE — TELEPHONE ENCOUNTER
Called patient regarding below. Pt states that she had a reaction to the flu vaccine, her arm was very swollen and itchy and she went to the ED then call was dropped. Called patient back, no answer, left message to call clinic back.

## 2018-11-15 ENCOUNTER — INFUSION (OUTPATIENT)
Dept: RHEUMATOLOGY | Facility: HOSPITAL | Age: 33
End: 2018-11-15
Attending: INTERNAL MEDICINE
Payer: COMMERCIAL

## 2018-11-15 VITALS
DIASTOLIC BLOOD PRESSURE: 92 MMHG | BODY MASS INDEX: 36.07 KG/M2 | TEMPERATURE: 97 F | SYSTOLIC BLOOD PRESSURE: 137 MMHG | WEIGHT: 237.19 LBS | HEART RATE: 77 BPM | RESPIRATION RATE: 20 BRPM

## 2018-11-15 DIAGNOSIS — M32.9 SYSTEMIC LUPUS ERYTHEMATOSUS ARTHRITIS: Primary | ICD-10-CM

## 2018-11-15 PROCEDURE — 25000003 PHARM REV CODE 250: Mod: PO | Performed by: INTERNAL MEDICINE

## 2018-11-15 PROCEDURE — A4216 STERILE WATER/SALINE, 10 ML: HCPCS | Mod: PO | Performed by: INTERNAL MEDICINE

## 2018-11-15 PROCEDURE — 96413 CHEMO IV INFUSION 1 HR: CPT | Mod: PO

## 2018-11-15 PROCEDURE — 63600175 PHARM REV CODE 636 W HCPCS: Mod: JW,JG,PO | Performed by: INTERNAL MEDICINE

## 2018-11-15 RX ORDER — SODIUM CHLORIDE 0.9 % (FLUSH) 0.9 %
10 SYRINGE (ML) INJECTION
Status: CANCELLED | OUTPATIENT
Start: 2018-11-15

## 2018-11-15 RX ORDER — HEPARIN 100 UNIT/ML
500 SYRINGE INTRAVENOUS
Status: CANCELLED | OUTPATIENT
Start: 2018-11-15

## 2018-11-15 RX ORDER — ACETAMINOPHEN 325 MG/1
650 TABLET ORAL
Status: CANCELLED | OUTPATIENT
Start: 2018-11-15

## 2018-11-15 RX ORDER — SODIUM CHLORIDE 0.9 % (FLUSH) 0.9 %
10 SYRINGE (ML) INJECTION
Status: DISCONTINUED | OUTPATIENT
Start: 2018-11-15 | End: 2018-11-15 | Stop reason: HOSPADM

## 2018-11-15 RX ADMIN — SODIUM CHLORIDE, PRESERVATIVE FREE 10 ML: 5 INJECTION INTRAVENOUS at 10:11

## 2018-11-15 RX ADMIN — BELIMUMAB 1076 MG: 400 INJECTION, POWDER, LYOPHILIZED, FOR SOLUTION INTRAVENOUS at 10:11

## 2018-11-15 NOTE — PROGRESS NOTES
Infusion# 10  S/S infection noted or voiced? None noted/denied  Recent labs? yes  Recent vaccines? denied    Premeds? none    Benlysta 0 mg/Kg= 1076 mg administered IV at a 60 minute rate per orders; see MAR & Flow Sheet for more  details. Tolerated well without adverse events

## 2018-11-28 ENCOUNTER — PATIENT MESSAGE (OUTPATIENT)
Dept: INTERNAL MEDICINE | Facility: CLINIC | Age: 33
End: 2018-11-28

## 2018-11-28 RX ORDER — METRONIDAZOLE 500 MG/1
500 TABLET ORAL EVERY 12 HOURS
Qty: 14 TABLET | Refills: 0 | Status: SHIPPED | OUTPATIENT
Start: 2018-11-28 | End: 2018-12-05

## 2018-12-03 ENCOUNTER — TELEPHONE (OUTPATIENT)
Dept: RHEUMATOLOGY | Facility: CLINIC | Age: 33
End: 2018-12-03

## 2018-12-14 ENCOUNTER — INFUSION (OUTPATIENT)
Dept: RHEUMATOLOGY | Facility: HOSPITAL | Age: 33
End: 2018-12-14
Attending: INTERNAL MEDICINE
Payer: COMMERCIAL

## 2018-12-14 VITALS
BODY MASS INDEX: 36.14 KG/M2 | DIASTOLIC BLOOD PRESSURE: 70 MMHG | TEMPERATURE: 97 F | RESPIRATION RATE: 18 BRPM | WEIGHT: 237.63 LBS | OXYGEN SATURATION: 100 % | HEART RATE: 86 BPM | SYSTOLIC BLOOD PRESSURE: 113 MMHG

## 2018-12-14 DIAGNOSIS — M32.9 SYSTEMIC LUPUS ERYTHEMATOSUS ARTHRITIS: Primary | ICD-10-CM

## 2018-12-14 PROCEDURE — 63600175 PHARM REV CODE 636 W HCPCS: Mod: JG,PO | Performed by: INTERNAL MEDICINE

## 2018-12-14 PROCEDURE — A4216 STERILE WATER/SALINE, 10 ML: HCPCS | Mod: PO | Performed by: INTERNAL MEDICINE

## 2018-12-14 PROCEDURE — 25000003 PHARM REV CODE 250: Mod: PO | Performed by: INTERNAL MEDICINE

## 2018-12-14 PROCEDURE — 96413 CHEMO IV INFUSION 1 HR: CPT | Mod: PO

## 2018-12-14 RX ORDER — ACETAMINOPHEN 325 MG/1
650 TABLET ORAL
Status: CANCELLED | OUTPATIENT
Start: 2018-12-14

## 2018-12-14 RX ORDER — HEPARIN 100 UNIT/ML
500 SYRINGE INTRAVENOUS
Status: CANCELLED | OUTPATIENT
Start: 2018-12-14

## 2018-12-14 RX ORDER — SODIUM CHLORIDE 0.9 % (FLUSH) 0.9 %
10 SYRINGE (ML) INJECTION
Status: CANCELLED | OUTPATIENT
Start: 2018-12-14

## 2018-12-14 RX ORDER — SODIUM CHLORIDE 0.9 % (FLUSH) 0.9 %
10 SYRINGE (ML) INJECTION
Status: DISCONTINUED | OUTPATIENT
Start: 2018-12-14 | End: 2018-12-14 | Stop reason: HOSPADM

## 2018-12-14 RX ADMIN — BELIMUMAB 1078 MG: 400 INJECTION, POWDER, LYOPHILIZED, FOR SOLUTION INTRAVENOUS at 10:12

## 2018-12-14 RX ADMIN — Medication 10 ML: at 10:12

## 2018-12-14 NOTE — PROGRESS NOTES
Infusion # >10 - Benlysta 10 mgkg q 4 weeks   Today's weight-107.8 kg    Any:  -recent illness, infection, or antibiotic use in past week- denies  -open wounds or mouth sores- denies  -invasive procedures or surgeries in past 4 weeks or in upcoming 4 weeks- denies  -vaccinations in past week- denies  -chance you may be pregnant- denies      Recent labs? 12/14/18  Last Rheumatology provider visit- Seen by Dr. SHORE on 9/26/18     Premeds? none     Benlyta 1,078 mg administered IV at a 60 minute rate per orders; see MAR and vitals for more  details. Tolerated well without adverse events, discharged and ambulatory out of clinic.

## 2018-12-14 NOTE — PLAN OF CARE
Problem: Adult Inpatient Plan of Care  Goal: Absence of Hospital-Acquired Illness or Injury    Intervention: Prevent Infection  Instructed pt on infection prevention measures. Encouraged frequent hand washing and use of antibacteria hand sanitizers as needed. Instructed to notify Rheumatology Provider/ Infusion Dept for any antibiotic use, illness, vaccinations or surgeries/invasive procedures before, on, or after infusion date. Verbalizes understanding.

## 2019-01-08 ENCOUNTER — INFUSION (OUTPATIENT)
Dept: RHEUMATOLOGY | Facility: HOSPITAL | Age: 34
End: 2019-01-08
Attending: INTERNAL MEDICINE
Payer: COMMERCIAL

## 2019-01-08 ENCOUNTER — OFFICE VISIT (OUTPATIENT)
Dept: RHEUMATOLOGY | Facility: CLINIC | Age: 34
End: 2019-01-08
Payer: COMMERCIAL

## 2019-01-08 VITALS
DIASTOLIC BLOOD PRESSURE: 77 MMHG | BODY MASS INDEX: 35.93 KG/M2 | SYSTOLIC BLOOD PRESSURE: 126 MMHG | WEIGHT: 236.31 LBS | HEART RATE: 79 BPM

## 2019-01-08 VITALS
BODY MASS INDEX: 35.81 KG/M2 | HEART RATE: 88 BPM | HEIGHT: 68 IN | WEIGHT: 236.31 LBS | SYSTOLIC BLOOD PRESSURE: 137 MMHG | DIASTOLIC BLOOD PRESSURE: 90 MMHG

## 2019-01-08 DIAGNOSIS — D84.9 IMMUNOSUPPRESSED STATUS: ICD-10-CM

## 2019-01-08 DIAGNOSIS — M32.9 SYSTEMIC LUPUS ERYTHEMATOSUS ARTHRITIS: Primary | ICD-10-CM

## 2019-01-08 DIAGNOSIS — Z79.899 LONG-TERM USE OF PLAQUENIL: ICD-10-CM

## 2019-01-08 DIAGNOSIS — M32.19 OTHER SYSTEMIC LUPUS ERYTHEMATOSUS WITH OTHER ORGAN INVOLVEMENT: ICD-10-CM

## 2019-01-08 DIAGNOSIS — Z79.899 LONG-TERM CURRENT USE OF HIGH RISK MEDICATION OTHER THAN ANTICOAGULANT: ICD-10-CM

## 2019-01-08 PROCEDURE — 90670 PCV13 VACCINE IM: CPT | Mod: S$GLB,,, | Performed by: INTERNAL MEDICINE

## 2019-01-08 PROCEDURE — 3008F BODY MASS INDEX DOCD: CPT | Mod: CPTII,S$GLB,, | Performed by: INTERNAL MEDICINE

## 2019-01-08 PROCEDURE — 90471 IMMUNIZATION ADMIN: CPT | Mod: S$GLB,,, | Performed by: INTERNAL MEDICINE

## 2019-01-08 PROCEDURE — 90471 PNEUMOCOCCAL CONJUGATE VACCINE 13-VALENT LESS THAN 5YO & GREATER THAN: ICD-10-PCS | Mod: S$GLB,,, | Performed by: INTERNAL MEDICINE

## 2019-01-08 PROCEDURE — 25000003 PHARM REV CODE 250: Mod: PO | Performed by: INTERNAL MEDICINE

## 2019-01-08 PROCEDURE — 99215 OFFICE O/P EST HI 40 MIN: CPT | Mod: 25,S$GLB,, | Performed by: INTERNAL MEDICINE

## 2019-01-08 PROCEDURE — 96413 CHEMO IV INFUSION 1 HR: CPT | Mod: PO

## 2019-01-08 PROCEDURE — 3008F PR BODY MASS INDEX (BMI) DOCUMENTED: ICD-10-PCS | Mod: CPTII,S$GLB,, | Performed by: INTERNAL MEDICINE

## 2019-01-08 PROCEDURE — 99999 PR PBB SHADOW E&M-EST. PATIENT-LVL III: CPT | Mod: PBBFAC,,, | Performed by: INTERNAL MEDICINE

## 2019-01-08 PROCEDURE — A4216 STERILE WATER/SALINE, 10 ML: HCPCS | Mod: PO | Performed by: INTERNAL MEDICINE

## 2019-01-08 PROCEDURE — 99999 PR PBB SHADOW E&M-EST. PATIENT-LVL III: ICD-10-PCS | Mod: PBBFAC,,, | Performed by: INTERNAL MEDICINE

## 2019-01-08 PROCEDURE — 63600175 PHARM REV CODE 636 W HCPCS: Mod: JG,PO | Performed by: INTERNAL MEDICINE

## 2019-01-08 PROCEDURE — 90670 PNEUMOCOCCAL CONJUGATE VACCINE 13-VALENT LESS THAN 5YO & GREATER THAN: ICD-10-PCS | Mod: S$GLB,,, | Performed by: INTERNAL MEDICINE

## 2019-01-08 PROCEDURE — 99215 PR OFFICE/OUTPT VISIT, EST, LEVL V, 40-54 MIN: ICD-10-PCS | Mod: 25,S$GLB,, | Performed by: INTERNAL MEDICINE

## 2019-01-08 RX ORDER — SODIUM CHLORIDE 0.9 % (FLUSH) 0.9 %
10 SYRINGE (ML) INJECTION
Status: DISCONTINUED | OUTPATIENT
Start: 2019-01-08 | End: 2019-01-08 | Stop reason: HOSPADM

## 2019-01-08 RX ORDER — SODIUM CHLORIDE 0.9 % (FLUSH) 0.9 %
10 SYRINGE (ML) INJECTION
Status: CANCELLED | OUTPATIENT
Start: 2019-01-08

## 2019-01-08 RX ORDER — HEPARIN 100 UNIT/ML
500 SYRINGE INTRAVENOUS
Status: CANCELLED | OUTPATIENT
Start: 2019-01-08

## 2019-01-08 RX ORDER — ACETAMINOPHEN 325 MG/1
650 TABLET ORAL
Status: CANCELLED | OUTPATIENT
Start: 2019-01-08

## 2019-01-08 RX ORDER — TRAZODONE HYDROCHLORIDE 50 MG/1
50 TABLET ORAL NIGHTLY
Qty: 30 TABLET | Refills: 11 | Status: SHIPPED | OUTPATIENT
Start: 2019-01-08 | End: 2019-10-24 | Stop reason: SDUPTHER

## 2019-01-08 RX ADMIN — BELIMUMAB 1072 MG: 400 INJECTION, POWDER, LYOPHILIZED, FOR SOLUTION INTRAVENOUS at 03:01

## 2019-01-08 RX ADMIN — Medication 10 ML: at 03:01

## 2019-01-08 NOTE — PROGRESS NOTES
Administered 0.5 cc pneumococcal conjugate Vaccination to right Deltoid. Pt tolerated well. No acute reaction noted to site. Pt instructed on S/S to report. Advised patient to wait in lobby 15 minutes after receiving injection to monitor for any reactions.. Pt verbalized understanding.     Lot: [id number]  Exp: 07/20

## 2019-01-08 NOTE — PROGRESS NOTES
Infusion# >10  S/S infection noted or voiced? None noted/denied  Recent labs? yes  Recent vaccines? denied    Premeds? none    Benlysta 10 mg/Kg= 1072 mg administered IV at a 60 minute rate per orders; see MAR & Flow Sheet for more  details. Tolerated well without adverse events

## 2019-01-08 NOTE — PROGRESS NOTES
RHEUMATOLOGY CLINIC FOLLOW UP VISIT  Chief complaints:-  To follow-up for lupus.    HPI:-  Stefanie Kimbrough a 33 y.o. pleasant female comes in for a follow up visit with above chief complaints. She is on imuran, benlysta , p[laquenil and prednisone 5 mg for her SLE . Her manifestations includes arthritis, myalgias, elevated aldolase, fatigue and puffy fingers bilateral.  She reports doing better today.  The rash has resolved.  She still has intermittent hand pain with swelling for which she takes prednisone.  No adverse effects from the infusion.  Review of Systems   Constitutional: Negative for chills and fever.   HENT: Negative for congestion and sore throat.    Eyes: Negative for blurred vision and redness.   Respiratory: Negative for cough and shortness of breath.    Cardiovascular: Negative for chest pain and leg swelling.   Gastrointestinal: Negative for abdominal pain.   Genitourinary: Negative for dysuria.   Musculoskeletal: Positive for joint pain. Negative for back pain, falls, myalgias and neck pain.   Skin: Negative for itching and rash.   Neurological: Negative for headaches.   Endo/Heme/Allergies: Does not bruise/bleed easily.   Psychiatric/Behavioral: Negative for memory loss. The patient does not have insomnia.        Past Medical History:   Diagnosis Date    Lupus (systemic lupus erythematosus)        Past Surgical History:   Procedure Laterality Date    TUBAL LIGATION  2005        Social History     Tobacco Use    Smoking status: Never Smoker    Smokeless tobacco: Never Used   Substance Use Topics    Alcohol use: No    Drug use: No       Family History   Problem Relation Age of Onset    Liver cancer Maternal Aunt     Breast cancer Maternal Aunt     Breast cancer Paternal Grandmother        Review of patient's allergies indicates:   Allergen Reactions    Elavil [amitriptyline]            Physical examination:-    Vitals:    01/08/19  "1423   BP: (!) 137/90   Pulse: 88   Weight: 107.2 kg (236 lb 5.3 oz)   Height: 5' 8" (1.727 m)   PainSc:   4   PainLoc: Knee       Physical Exam   Constitutional: She is oriented to person, place, and time and well-developed, well-nourished, and in no distress. No distress.   HENT:   Head: Normocephalic.   Mouth/Throat: Oropharynx is clear and moist.   Eyes: Conjunctivae and EOM are normal. Pupils are equal, round, and reactive to light.   Neck: Normal range of motion. Neck supple.   Cardiovascular: Normal rate and intact distal pulses.   Pulmonary/Chest: Effort normal. No respiratory distress.   Abdominal: Soft. There is no tenderness.   Musculoskeletal:   Mild synovitis present over bilateral second, third and fourth PIP's. Wrists tender. Few tender points, but no  effusion over large joints.   Neurological: She is alert and oriented to person, place, and time. No cranial nerve deficit.   Skin: Skin is warm. No rash noted. No erythema.   Hypopigmentation present over face extending above forehead involving entire face and malar area, sparing preauricular regions- Significant improvement since last visit.    Psychiatric: Mood and affect normal.   Nursing note and vitals reviewed.      Labs:-  Results for MARILIA SALEEM (MRN [id number]) as of 12/1/2017 15:37   Ref. Range 8/30/2017 14:50   MELVA HEP-2 Titer Unknown Positive >=1:2560...   Anti-SSA Antibody Latest Ref Range: 0.00 - 19.99 EU 1.63   Anti-SSA Interpretation Latest Ref Range: Negative  Negative   Anti-SSB Antibody Latest Ref Range: 0.00 - 19.99 EU 1.88   Anti-SSB Interpretation Latest Ref Range: Negative  Negative   ds DNA Ab Latest Ref Range: Negative 1:10  Negative 1:10   Anti Sm Antibody Latest Ref Range: 0.00 - 19.99 EU 12.86   Anti-Sm Interpretation Latest Ref Range: Negative  Negative   Anti Sm/RNP Antibody Latest Ref Range: 0.00 - 19.99 .12 (H)   Anti-Sm/RNP Interpretation Latest Ref Range: Negative  Positive (A)   Complement (C-3) Latest " Ref Range: 50 - 180 mg/dL 134   Complement (C-4) Latest Ref Range: 11 - 44 mg/dL 28          Medication List           Accurate as of 1/8/19  3:57 PM. If you have any questions, ask your nurse or doctor.               CONTINUE taking these medications    azaTHIOprine 50 mg Tab  Commonly known as:  IMURAN  Take 1 tablet (50 mg total) by mouth 2 (two) times daily.     fluocinolone 0.01 % external oil  Commonly known as:  DERMA-SMOOTHE  Apply oil to scalp twice a day     ketoconazole 2 % shampoo  Commonly known as:  NIZORAL  Wash hair with medicated shampoo at least 1x/week - let sit on scalp at least 5 minutes prior to rinsing     nabumetone 500 MG tablet  Commonly known as:  RELAFEN     PLAQUENIL 200 mg tablet  Generic drug:  hydroxychloroquine     predniSONE 5 MG tablet  Commonly known as:  DELTASONE  Take 1 tablet (5 mg total) by mouth once daily.     tacrolimus 0.1 % ointment  Commonly known as:  PROTOPIC  Apply topically 2 (two) times daily.     traZODone 50 MG tablet  Commonly known as:  DESYREL  Take 1 tablet (50 mg total) by mouth every evening.     triamcinolone acetonide 0.025% 0.025 % Oint  Commonly known as:  KENALOG  Apply topically 2 (two) times daily.           Where to Get Your Medications      These medications were sent to [facility] [id number] [place] [address] AT [facility] OF [facility] [address] [address]    Phone:  [phone]   · traZODone 50 MG tablet       Assessment/Plans:-  # Systemic lupus erythematosus arthritis  SLE with inflammatory arthritis , photosensitive malar rash, fatigue, positive MELVA, positive RNP antibody, elevated CK with normal complements, normal antiphospholipid antibodies . She is on Benlysta, plaquenil and Imuran with low dose prednisone.  Significant improvement in lupus activity since starting Benlysta.  Try to taper of prednisone.  Continue monthly Benlysta infusion along with Imuran and Plaquenil.     # Lupus (systemic lupus  erythematosus)  SLE with inflammatory arthritis , photosensitive malar rash, fatigue, positive MELVA, positive RNP antibody, elevated CK with normal complements, normal antiphospholipid antibodies . She is on Benlysta, plaquenil and Imuran with low dose prednisone.  Significant improvement in lupus activity since starting Benlysta.  Try to taper of prednisone.  Continue monthly Benlysta infusion along with Imuran and Plaquenil. Check lupus activity markers today.     # Long-term current use of high risk medication -Imuran  Hold Imuran if any infection. Check safety labs today.    # Long-term use of Plaquenil  Continue yearly retinal exam.     # Immunosuppressed status:-  - (In Office Administered) Pneumococcal Conjugate Vaccine (13 Valent) (IM)    # Follow-up in about 3 months (around 4/8/2019).    Disclaimer: This note was prepared using voice recognition system and is likely to have sound alike errors and is not proof read.  Please call me with any questions.

## 2019-01-14 ENCOUNTER — PATIENT MESSAGE (OUTPATIENT)
Dept: INTERNAL MEDICINE | Facility: CLINIC | Age: 34
End: 2019-01-14

## 2019-01-14 RX ORDER — FLUCONAZOLE 150 MG/1
150 TABLET ORAL DAILY
Qty: 1 TABLET | Refills: 0 | Status: SHIPPED | OUTPATIENT
Start: 2019-01-14 | End: 2019-01-15

## 2019-02-08 ENCOUNTER — CLINICAL SUPPORT (OUTPATIENT)
Dept: CARDIOLOGY | Facility: CLINIC | Age: 34
End: 2019-02-08
Attending: INTERNAL MEDICINE
Payer: COMMERCIAL

## 2019-02-08 ENCOUNTER — HOSPITAL ENCOUNTER (OUTPATIENT)
Dept: RADIOLOGY | Facility: HOSPITAL | Age: 34
Discharge: HOME | End: 2019-02-08
Attending: INTERNAL MEDICINE
Payer: COMMERCIAL

## 2019-02-08 ENCOUNTER — INFUSION (OUTPATIENT)
Dept: RHEUMATOLOGY | Facility: HOSPITAL | Age: 34
End: 2019-02-08
Attending: INTERNAL MEDICINE
Payer: COMMERCIAL

## 2019-02-08 ENCOUNTER — PATIENT MESSAGE (OUTPATIENT)
Dept: RHEUMATOLOGY | Facility: CLINIC | Age: 34
End: 2019-02-08

## 2019-02-08 VITALS
DIASTOLIC BLOOD PRESSURE: 74 MMHG | WEIGHT: 237.63 LBS | SYSTOLIC BLOOD PRESSURE: 113 MMHG | OXYGEN SATURATION: 99 % | RESPIRATION RATE: 18 BRPM | HEART RATE: 73 BPM | BODY MASS INDEX: 36.14 KG/M2 | TEMPERATURE: 98 F

## 2019-02-08 DIAGNOSIS — M32.9 SYSTEMIC LUPUS ERYTHEMATOSUS ARTHRITIS: ICD-10-CM

## 2019-02-08 DIAGNOSIS — M32.9 SYSTEMIC LUPUS ERYTHEMATOSUS ARTHRITIS: Primary | ICD-10-CM

## 2019-02-08 LAB
DIASTOLIC DYSFUNCTION: NO
ESTIMATED PA SYSTOLIC PRESSURE: 26.23
RETIRED EF AND QEF - SEE NOTES: 60 (ref 55–65)

## 2019-02-08 PROCEDURE — 71250 CT THORAX DX C-: CPT | Mod: 26,,, | Performed by: RADIOLOGY

## 2019-02-08 PROCEDURE — 71250 CT CHEST WITHOUT CONTRAST: ICD-10-PCS | Mod: 26,,, | Performed by: RADIOLOGY

## 2019-02-08 PROCEDURE — 71250 CT THORAX DX C-: CPT | Mod: TC

## 2019-02-08 PROCEDURE — 93306 TTE W/DOPPLER COMPLETE: CPT | Mod: S$GLB,,, | Performed by: INTERNAL MEDICINE

## 2019-02-08 PROCEDURE — 63600175 PHARM REV CODE 636 W HCPCS: Mod: JG | Performed by: INTERNAL MEDICINE

## 2019-02-08 PROCEDURE — 93306 2D ECHO WITH COLOR FLOW DOPPLER: ICD-10-PCS | Mod: S$GLB,,, | Performed by: INTERNAL MEDICINE

## 2019-02-08 PROCEDURE — 96413 CHEMO IV INFUSION 1 HR: CPT

## 2019-02-08 PROCEDURE — 25000003 PHARM REV CODE 250: Performed by: INTERNAL MEDICINE

## 2019-02-08 PROCEDURE — A4216 STERILE WATER/SALINE, 10 ML: HCPCS | Performed by: INTERNAL MEDICINE

## 2019-02-08 RX ORDER — HEPARIN 100 UNIT/ML
500 SYRINGE INTRAVENOUS
Status: CANCELLED | OUTPATIENT
Start: 2019-02-08

## 2019-02-08 RX ORDER — SODIUM CHLORIDE 0.9 % (FLUSH) 0.9 %
10 SYRINGE (ML) INJECTION
Status: DISCONTINUED | OUTPATIENT
Start: 2019-02-08 | End: 2019-02-08 | Stop reason: HOSPADM

## 2019-02-08 RX ORDER — ACETAMINOPHEN 325 MG/1
650 TABLET ORAL
Status: CANCELLED | OUTPATIENT
Start: 2019-02-08

## 2019-02-08 RX ORDER — SODIUM CHLORIDE 0.9 % (FLUSH) 0.9 %
10 SYRINGE (ML) INJECTION
Status: CANCELLED | OUTPATIENT
Start: 2019-02-08

## 2019-02-08 RX ADMIN — BELIMUMAB 1078 MG: 400 INJECTION, POWDER, LYOPHILIZED, FOR SOLUTION INTRAVENOUS at 10:02

## 2019-02-08 RX ADMIN — Medication 10 ML: at 10:02

## 2019-02-08 NOTE — NURSING
Infusion # >10 - Benlysta 10 mg/kg q 4 weeks  Today's weight-107.8 kg    Any:  -recent illness, infection, or antibiotic use in past week- denies  -open wounds or mouth sores- denies  -invasive procedures or surgeries in past 4 weeks or in upcoming 4 weeks- denies  -vaccinations in past week- denies  -chance you may be pregnant- denies      Recent labs? 12/14/19  Last Rheumatology provider visit- Seen by Dr. SHORE on 1/8/19     Premeds? none     Benlysta 1,078 mg administered IV at a 60 minute rate per orders; see MAR and vitals for more  details. Tolerated well without adverse events, discharged and ambulatory out of clinic.

## 2019-02-12 ENCOUNTER — TELEPHONE (OUTPATIENT)
Dept: RHEUMATOLOGY | Facility: CLINIC | Age: 34
End: 2019-02-12

## 2019-02-12 NOTE — TELEPHONE ENCOUNTER
----- Message from Prashanth Whalen MD sent at 2/12/2019  7:34 AM CST -----  Regarding: Notification of Unviewed Test Results  Contact: [phone]  Results not seen by patient in MyChart.  Normal CT chest.

## 2019-03-08 ENCOUNTER — INFUSION (OUTPATIENT)
Dept: RHEUMATOLOGY | Facility: HOSPITAL | Age: 34
End: 2019-03-08
Attending: INTERNAL MEDICINE
Payer: COMMERCIAL

## 2019-03-08 VITALS
HEART RATE: 75 BPM | WEIGHT: 239.63 LBS | SYSTOLIC BLOOD PRESSURE: 103 MMHG | BODY MASS INDEX: 36.44 KG/M2 | RESPIRATION RATE: 18 BRPM | DIASTOLIC BLOOD PRESSURE: 72 MMHG | TEMPERATURE: 97 F

## 2019-03-08 DIAGNOSIS — M32.9 SYSTEMIC LUPUS ERYTHEMATOSUS ARTHRITIS: Primary | ICD-10-CM

## 2019-03-08 PROCEDURE — 25000003 PHARM REV CODE 250: Performed by: INTERNAL MEDICINE

## 2019-03-08 PROCEDURE — A4216 STERILE WATER/SALINE, 10 ML: HCPCS | Performed by: INTERNAL MEDICINE

## 2019-03-08 PROCEDURE — 96413 CHEMO IV INFUSION 1 HR: CPT

## 2019-03-08 PROCEDURE — 63600175 PHARM REV CODE 636 W HCPCS: Mod: JG | Performed by: INTERNAL MEDICINE

## 2019-03-08 PROCEDURE — 63600175 PHARM REV CODE 636 W HCPCS: Mod: JW,JG | Performed by: INTERNAL MEDICINE

## 2019-03-08 RX ORDER — ACETAMINOPHEN 325 MG/1
650 TABLET ORAL
Status: CANCELLED | OUTPATIENT
Start: 2019-03-08

## 2019-03-08 RX ORDER — SODIUM CHLORIDE 0.9 % (FLUSH) 0.9 %
10 SYRINGE (ML) INJECTION
Status: CANCELLED | OUTPATIENT
Start: 2019-03-08

## 2019-03-08 RX ORDER — SODIUM CHLORIDE 0.9 % (FLUSH) 0.9 %
10 SYRINGE (ML) INJECTION
Status: DISCONTINUED | OUTPATIENT
Start: 2019-03-08 | End: 2019-03-08 | Stop reason: HOSPADM

## 2019-03-08 RX ORDER — HEPARIN 100 UNIT/ML
500 SYRINGE INTRAVENOUS
Status: CANCELLED | OUTPATIENT
Start: 2019-03-08

## 2019-03-08 RX ADMIN — BELIMUMAB 1087 MG: 400 INJECTION, POWDER, LYOPHILIZED, FOR SOLUTION INTRAVENOUS at 09:03

## 2019-03-08 RX ADMIN — Medication 10 ML: at 09:03

## 2019-03-08 NOTE — NURSING
Infusion # >10 - Benlysta 10 mg/kg q 4 weeks  Today's weight-108.7 kg    Any:  -recent illness, infection, or antibiotic use in past week- denies  -open wounds or mouth sores- denies  -invasive procedures or surgeries in past 4 weeks or in upcoming 4 weeks- denies  -vaccinations in past week- denies  -chance you may be pregnant- denies      Recent labs? 12/14/19  Last Rheumatology provider visit- Seen by Dr. SHORE on 1/8/19     Premeds? none     Benlysta 1,087 mg administered IV at a 60 minute rate per orders; see MAR and vitals for more  details. Tolerated well without adverse events, discharged and ambulatory out of clinic.

## 2019-03-12 RX ORDER — METRONIDAZOLE 7.5 MG/G
GEL VAGINAL
Qty: 70 G | Refills: 0 | Status: SHIPPED | OUTPATIENT
Start: 2019-03-12 | End: 2019-06-06

## 2019-03-25 ENCOUNTER — OFFICE VISIT (OUTPATIENT)
Dept: INTERNAL MEDICINE | Facility: CLINIC | Age: 34
End: 2019-03-25
Payer: COMMERCIAL

## 2019-03-25 ENCOUNTER — LAB VISIT (OUTPATIENT)
Dept: LAB | Facility: HOSPITAL | Age: 34
End: 2019-03-25
Attending: FAMILY MEDICINE
Payer: COMMERCIAL

## 2019-03-25 VITALS
HEIGHT: 68 IN | DIASTOLIC BLOOD PRESSURE: 82 MMHG | TEMPERATURE: 97 F | SYSTOLIC BLOOD PRESSURE: 137 MMHG | OXYGEN SATURATION: 98 % | WEIGHT: 241.88 LBS | BODY MASS INDEX: 36.66 KG/M2 | HEART RATE: 101 BPM

## 2019-03-25 DIAGNOSIS — M32.9 SYSTEMIC LUPUS ERYTHEMATOSUS ARTHRITIS: ICD-10-CM

## 2019-03-25 DIAGNOSIS — D84.9 IMMUNOSUPPRESSED STATUS: ICD-10-CM

## 2019-03-25 DIAGNOSIS — L81.9 DYSCHROMIA: ICD-10-CM

## 2019-03-25 DIAGNOSIS — Z11.3 ROUTINE SCREENING FOR STI (SEXUALLY TRANSMITTED INFECTION): ICD-10-CM

## 2019-03-25 DIAGNOSIS — R21 RASH: ICD-10-CM

## 2019-03-25 DIAGNOSIS — Z11.3 ROUTINE SCREENING FOR STI (SEXUALLY TRANSMITTED INFECTION): Primary | ICD-10-CM

## 2019-03-25 PROCEDURE — 86703 HIV-1/HIV-2 1 RESULT ANTBDY: CPT

## 2019-03-25 PROCEDURE — 99999 PR PBB SHADOW E&M-EST. PATIENT-LVL III: CPT | Mod: PBBFAC,,, | Performed by: FAMILY MEDICINE

## 2019-03-25 PROCEDURE — 99214 PR OFFICE/OUTPT VISIT, EST, LEVL IV, 30-39 MIN: ICD-10-PCS | Mod: S$GLB,,, | Performed by: FAMILY MEDICINE

## 2019-03-25 PROCEDURE — 86694 HERPES SIMPLEX NES ANTBDY: CPT

## 2019-03-25 PROCEDURE — 3008F PR BODY MASS INDEX (BMI) DOCUMENTED: ICD-10-PCS | Mod: CPTII,S$GLB,, | Performed by: FAMILY MEDICINE

## 2019-03-25 PROCEDURE — 99999 PR PBB SHADOW E&M-EST. PATIENT-LVL III: ICD-10-PCS | Mod: PBBFAC,,, | Performed by: FAMILY MEDICINE

## 2019-03-25 PROCEDURE — 87491 CHLMYD TRACH DNA AMP PROBE: CPT

## 2019-03-25 PROCEDURE — 3008F BODY MASS INDEX DOCD: CPT | Mod: CPTII,S$GLB,, | Performed by: FAMILY MEDICINE

## 2019-03-25 PROCEDURE — 86592 SYPHILIS TEST NON-TREP QUAL: CPT

## 2019-03-25 PROCEDURE — 99214 OFFICE O/P EST MOD 30 MIN: CPT | Mod: S$GLB,,, | Performed by: FAMILY MEDICINE

## 2019-03-25 RX ORDER — TRIAMCINOLONE ACETONIDE 0.25 MG/G
OINTMENT TOPICAL 2 TIMES DAILY
Qty: 80 G | Refills: 1 | Status: SHIPPED | OUTPATIENT
Start: 2019-03-25 | End: 2019-04-04

## 2019-03-25 NOTE — PROGRESS NOTES
Stefanie España  03/25/2019  [id number]    Sofia Martinez MD  Patient Care Team:  Sofia Martinez MD as PCP - General (Family Medicine)  Tangela Espinoza LPN as Care Coordinator (Internal Medicine)  Has the patient seen any provider outside of the [facility] network since the last visit? (no). If yes, HIPPA forms completed and records requested.        Visit Type:an urgent visit for a new problem    Chief Complaint:  No chief complaint on file.      History of Present Illness:    Patient here for STI check.  She likes to have this done 2-3 times a year.  Sexually active. No vaginal discharge or complaints.    She has SLE- followed by [facility] Rheum.  Recent visit with labs.  No change in meds. Labs reviewed again with patient.  She does have pain in hands with swelling that comes and goes.  Discussed non narcotic use for pain. Rheum advised NSAID.  I discussed risks of using any controlled substances for her pain. She agreed to stick with the NSAID. Continue follow up with rheum for disease management and surveillance.    She has dry skin and itching, on abdomen.  Not sure what is cause.      History:  Past Medical History:   Diagnosis Date    Lupus (systemic lupus erythematosus)      Past Surgical History:   Procedure Laterality Date    TUBAL LIGATION  2005     Family History   Problem Relation Age of Onset    Liver cancer Maternal Aunt     Breast cancer Maternal Aunt     Breast cancer Paternal Grandmother      Social History     Socioeconomic History    Marital status: Legally      Spouse name: Not on file    Number of children: Not on file    Years of education: Not on file    Highest education level: Not on file   Occupational History    Occupation: [employer]   Social Needs    Financial resource strain: Not on file    Food insecurity:     Worry: Not on file     Inability: Not on file    Transportation needs:     Medical: Not on file     Non-medical: Not on file   Tobacco Use    Smoking  status: Never Smoker    Smokeless tobacco: Never Used   Substance and Sexual Activity    Alcohol use: No    Drug use: No    Sexual activity: Yes     Partners: Male   Lifestyle    Physical activity:     Days per week: Not on file     Minutes per session: Not on file    Stress: Not on file   Relationships    Social connections:     Talks on phone: Not on file     Gets together: Not on file     Attends Voodoo service: Not on file     Active member of club or organization: Not on file     Attends meetings of clubs or organizations: Not on file     Relationship status: Not on file    Intimate partner violence:     Fear of current or ex partner: Not on file     Emotionally abused: Not on file     Physically abused: Not on file     Forced sexual activity: Not on file   Other Topics Concern    Are you pregnant or think you may be? No    Breast-feeding No   Social History Narrative    Not on file     Patient Active Problem List   Diagnosis    Long-term use of Plaquenil    Long-term current use of high risk medication -Imuran    Systemic lupus erythematosus arthritis    Immunosuppressed status    Rash     Review of patient's allergies indicates:   Allergen Reactions    Elavil [amitriptyline]        The following were reviewed at this visit: active problem list, medication list, allergies, family history, social history, and health maintenance.    Medications:  Current Outpatient Medications on File Prior to Visit   Medication Sig Dispense Refill    hydroxychloroquine (PLAQUENIL) 200 mg tablet Take 200 mg by mouth 2 (two) times daily.      predniSONE (DELTASONE) 5 MG tablet Take 1 tablet (5 mg total) by mouth once daily. 30 tablet 0    azaTHIOprine (IMURAN) 50 mg Tab Take 1 tablet (50 mg total) by mouth 2 (two) times daily. 60 tablet 3    fluocinolone (DERMA-SMOOTHE) 0.01 % external oil Apply oil to scalp twice a day 1 Bottle 5    ketoconazole (NIZORAL) 2 % shampoo Wash hair with medicated shampoo at  least 1x/week - let sit on scalp at least 5 minutes prior to rinsing 120 mL 5    metroNIDAZOLE (METROGEL) 0.75 % vaginal gel INSERT 1 APPLICATORFUL IN THE VAGINA ONCE DAILY 70 g 0    nabumetone (RELAFEN) 500 MG tablet Take 500 mg by mouth 2 (two) times daily.      tacrolimus (PROTOPIC) 0.1 % ointment Apply topically 2 (two) times daily. 60 g 3    traZODone (DESYREL) 50 MG tablet Take 1 tablet (50 mg total) by mouth every evening. 30 tablet 11    [DISCONTINUED] triamcinolone acetonide 0.025% (KENALOG) 0.025 % Oint Apply topically 2 (two) times daily. 80 g 1     No current facility-administered medications on file prior to visit.        Medications have been reviewed and reconciled with patient at this visit.  Barriers to medications present (no)    Adverse reactions to current medications (no)    Over the counter medications reviewed (Yes ), and if needed added to active Medication list at this visit.     Exam:  Wt Readings from Last 3 Encounters:   03/25/19 109.7 kg (241 lb 13.5 oz)   03/08/19 108.7 kg (239 lb 10.2 oz)   02/08/19 107.8 kg (237 lb 10.5 oz)     Temp Readings from Last 3 Encounters:   03/25/19 97.3 °F (36.3 °C) (Tympanic)   03/08/19 97.2 °F (36.2 °C)   02/08/19 97.7 °F (36.5 °C)     BP Readings from Last 3 Encounters:   03/25/19 137/82   03/08/19 103/72   02/08/19 113/74     Pulse Readings from Last 3 Encounters:   03/25/19 101   03/08/19 75   02/08/19 73     Body mass index is 36.77 kg/m².      Review of Systems   Constitutional: Negative.  Negative for chills and fever.   HENT: Negative.  Negative for congestion, sinus pain and sore throat.    Eyes: Negative for blurred vision and double vision.   Respiratory: Negative for cough, sputum production, shortness of breath and wheezing.    Cardiovascular: Negative for chest pain, palpitations and leg swelling.   Gastrointestinal: Negative for abdominal pain, constipation, diarrhea, heartburn, nausea and vomiting.   Genitourinary: Negative.     Musculoskeletal: Positive for joint pain.   Skin: Positive for itching. Negative for rash.   Neurological: Negative.    Endo/Heme/Allergies: Negative.  Negative for polydipsia. Does not bruise/bleed easily.   Psychiatric/Behavioral: Negative for depression and substance abuse.     Physical Exam   Constitutional: She is oriented to person, place, and time. She appears well-developed and well-nourished. No distress.   HENT:   Head: Normocephalic and atraumatic.   Right Ear: External ear normal.   Left Ear: External ear normal.   Nose: Nose normal.   Mouth/Throat: Oropharynx is clear and moist. No oropharyngeal exudate.   Eyes: Pupils are equal, round, and reactive to light. Conjunctivae and EOM are normal. Right eye exhibits no discharge. Left eye exhibits no discharge.   Neck: Normal range of motion. Neck supple. No thyromegaly present.   Cardiovascular: Normal rate, regular rhythm, normal heart sounds and intact distal pulses.   No murmur heard.  Pulmonary/Chest: Effort normal and breath sounds normal. No respiratory distress. She has no wheezes.   Abdominal: Soft. Bowel sounds are normal. She exhibits no distension and no mass. There is no tenderness.   Musculoskeletal: Normal range of motion. She exhibits tenderness. She exhibits no edema.   Lymphadenopathy:     She has no cervical adenopathy.   Neurological: She is alert and oriented to person, place, and time. No cranial nerve deficit.   Skin: Capillary refill takes less than 2 seconds. She is not diaphoretic.   Psychiatric: She has a normal mood and affect. Her behavior is normal. Judgment and thought content normal.   Nursing note and vitals reviewed.      Laboratory Reviewed ({N/A)  Lab Results   Component Value Date    WBC 5.18 12/14/2018    HGB 12.5 12/14/2018    HCT 38.2 12/14/2018     (H) 12/14/2018    CHOL 169 08/24/2017    TRIG 228 (H) 08/24/2017    HDL 37 (L) 08/24/2017    ALT 11 12/14/2018    AST 15 12/14/2018     12/14/2018    K 3.8  12/14/2018     12/14/2018    CREATININE 0.7 12/14/2018    BUN 8 12/14/2018    CO2 27 12/14/2018    TSH 1.243 08/30/2017       Diagnoses and all orders for this visit:    Routine screening for STI (sexually transmitted infection)  -     HIV 1/2 Ag/Ab (4th Gen); Future  -     RPR; Future  -     C. trachomatis/N. gonorrhoeae by AMP DNA  -     HERPES SIMPLEX 1 & 2 IGM; Future    Dyschromia  -     triamcinolone acetonide 0.025% (KENALOG) 0.025 % Oint; Apply topically 2 (two) times daily. for 10 days    Immunosuppressed status  Systemic lupus erythematosus arthritis   Follow up with Rheum    Rash  -     triamcinolone acetonide 0.025% (KENALOG) 0.025 % Oint; Apply topically 2 (two) times daily. for 10 days                Care Plan/Goals: Reviewed  (N/A)  Goals     None          Follow up: No follow-ups on file.    After visit summary was printed and given to patient upon discharge today.  Patient goals and care plan are included in After Visit Summary.

## 2019-03-26 LAB
C TRACH DNA SPEC QL NAA+PROBE: NOT DETECTED
HIV 1+2 AB+HIV1 P24 AG SERPL QL IA: NEGATIVE
N GONORRHOEA DNA SPEC QL NAA+PROBE: NOT DETECTED
RPR SER QL: NORMAL

## 2019-04-11 ENCOUNTER — LAB VISIT (OUTPATIENT)
Dept: LAB | Facility: HOSPITAL | Age: 34
End: 2019-04-11
Attending: INTERNAL MEDICINE
Payer: COMMERCIAL

## 2019-04-11 ENCOUNTER — OFFICE VISIT (OUTPATIENT)
Dept: RHEUMATOLOGY | Facility: CLINIC | Age: 34
End: 2019-04-11
Payer: COMMERCIAL

## 2019-04-11 ENCOUNTER — INITIAL CONSULT (OUTPATIENT)
Dept: DERMATOLOGY | Facility: CLINIC | Age: 34
End: 2019-04-11
Payer: COMMERCIAL

## 2019-04-11 ENCOUNTER — INFUSION (OUTPATIENT)
Dept: RHEUMATOLOGY | Facility: HOSPITAL | Age: 34
End: 2019-04-11
Attending: INTERNAL MEDICINE
Payer: COMMERCIAL

## 2019-04-11 VITALS
RESPIRATION RATE: 16 BRPM | SYSTOLIC BLOOD PRESSURE: 117 MMHG | OXYGEN SATURATION: 98 % | TEMPERATURE: 97 F | HEART RATE: 81 BPM | DIASTOLIC BLOOD PRESSURE: 76 MMHG

## 2019-04-11 VITALS
WEIGHT: 241.19 LBS | BODY MASS INDEX: 36.55 KG/M2 | SYSTOLIC BLOOD PRESSURE: 141 MMHG | HEIGHT: 68 IN | HEART RATE: 82 BPM | DIASTOLIC BLOOD PRESSURE: 91 MMHG

## 2019-04-11 DIAGNOSIS — L21.9 SEBORRHEIC DERMATITIS: Primary | ICD-10-CM

## 2019-04-11 DIAGNOSIS — Z79.899 LONG-TERM CURRENT USE OF HIGH RISK MEDICATION OTHER THAN ANTICOAGULANT: ICD-10-CM

## 2019-04-11 DIAGNOSIS — R21 RASH: ICD-10-CM

## 2019-04-11 DIAGNOSIS — M32.9 SYSTEMIC LUPUS ERYTHEMATOSUS ARTHRITIS: ICD-10-CM

## 2019-04-11 DIAGNOSIS — D84.9 IMMUNOSUPPRESSED STATUS: ICD-10-CM

## 2019-04-11 DIAGNOSIS — M32.19 OTHER SYSTEMIC LUPUS ERYTHEMATOSUS WITH OTHER ORGAN INVOLVEMENT: ICD-10-CM

## 2019-04-11 DIAGNOSIS — Z79.899 LONG-TERM USE OF PLAQUENIL: ICD-10-CM

## 2019-04-11 DIAGNOSIS — E66.9 OBESITY, CLASS II, BMI 35-39.9: ICD-10-CM

## 2019-04-11 DIAGNOSIS — L30.9 DERMATITIS: ICD-10-CM

## 2019-04-11 DIAGNOSIS — M32.9 SYSTEMIC LUPUS ERYTHEMATOSUS ARTHRITIS: Primary | ICD-10-CM

## 2019-04-11 PROBLEM — E66.812 OBESITY, CLASS II, BMI 35-39.9: Status: ACTIVE | Noted: 2019-04-11

## 2019-04-11 LAB
ALBUMIN SERPL BCP-MCNC: 3.6 G/DL (ref 3.5–5.2)
ALP SERPL-CCNC: 56 U/L (ref 55–135)
ALT SERPL W/O P-5'-P-CCNC: 16 U/L (ref 10–44)
ANION GAP SERPL CALC-SCNC: 8 MMOL/L (ref 8–16)
AST SERPL-CCNC: 17 U/L (ref 10–40)
BASOPHILS # BLD AUTO: 0.03 K/UL (ref 0–0.2)
BASOPHILS NFR BLD: 0.6 % (ref 0–1.9)
BILIRUB SERPL-MCNC: 0.4 MG/DL (ref 0.1–1)
BUN SERPL-MCNC: 9 MG/DL (ref 6–20)
C3 SERPL-MCNC: 148 MG/DL (ref 50–180)
C4 SERPL-MCNC: 36 MG/DL (ref 11–44)
CALCIUM SERPL-MCNC: 9.7 MG/DL (ref 8.7–10.5)
CHLORIDE SERPL-SCNC: 105 MMOL/L (ref 95–110)
CO2 SERPL-SCNC: 25 MMOL/L (ref 23–29)
CREAT SERPL-MCNC: 0.7 MG/DL (ref 0.5–1.4)
CRP SERPL-MCNC: 13.1 MG/L (ref 0–8.2)
DIFFERENTIAL METHOD: ABNORMAL
EOSINOPHIL # BLD AUTO: 0.5 K/UL (ref 0–0.5)
EOSINOPHIL NFR BLD: 9.1 % (ref 0–8)
ERYTHROCYTE [DISTWIDTH] IN BLOOD BY AUTOMATED COUNT: 11.9 % (ref 11.5–14.5)
ERYTHROCYTE [SEDIMENTATION RATE] IN BLOOD BY WESTERGREN METHOD: 33 MM/HR (ref 0–36)
EST. GFR  (AFRICAN AMERICAN): >60 ML/MIN/1.73 M^2
EST. GFR  (NON AFRICAN AMERICAN): >60 ML/MIN/1.73 M^2
GLUCOSE SERPL-MCNC: 84 MG/DL (ref 70–110)
HCT VFR BLD AUTO: 38.2 % (ref 37–48.5)
HGB BLD-MCNC: 12.4 G/DL (ref 12–16)
IMM GRANULOCYTES # BLD AUTO: 0.01 K/UL (ref 0–0.04)
IMM GRANULOCYTES NFR BLD AUTO: 0.2 % (ref 0–0.5)
LYMPHOCYTES # BLD AUTO: 1 K/UL (ref 1–4.8)
LYMPHOCYTES NFR BLD: 18.6 % (ref 18–48)
MCH RBC QN AUTO: 29.5 PG (ref 27–31)
MCHC RBC AUTO-ENTMCNC: 32.5 G/DL (ref 32–36)
MCV RBC AUTO: 91 FL (ref 82–98)
MONOCYTES # BLD AUTO: 0.6 K/UL (ref 0.3–1)
MONOCYTES NFR BLD: 10.6 % (ref 4–15)
NEUTROPHILS # BLD AUTO: 3.3 K/UL (ref 1.8–7.7)
NEUTROPHILS NFR BLD: 61.1 % (ref 38–73)
NRBC BLD-RTO: 0 /100 WBC
PLATELET # BLD AUTO: 516 K/UL (ref 150–350)
PMV BLD AUTO: 8.4 FL (ref 9.2–12.9)
POTASSIUM SERPL-SCNC: 4.2 MMOL/L (ref 3.5–5.1)
PROT SERPL-MCNC: 7.9 G/DL (ref 6–8.4)
RBC # BLD AUTO: 4.2 M/UL (ref 4–5.4)
SODIUM SERPL-SCNC: 138 MMOL/L (ref 136–145)
WBC # BLD AUTO: 5.39 K/UL (ref 3.9–12.7)

## 2019-04-11 PROCEDURE — 3008F PR BODY MASS INDEX (BMI) DOCUMENTED: ICD-10-PCS | Mod: CPTII,S$GLB,, | Performed by: INTERNAL MEDICINE

## 2019-04-11 PROCEDURE — 86140 C-REACTIVE PROTEIN: CPT

## 2019-04-11 PROCEDURE — 99215 OFFICE O/P EST HI 40 MIN: CPT | Mod: S$GLB,,, | Performed by: INTERNAL MEDICINE

## 2019-04-11 PROCEDURE — 86225 DNA ANTIBODY NATIVE: CPT

## 2019-04-11 PROCEDURE — 99213 PR OFFICE/OUTPT VISIT, EST, LEVL III, 20-29 MIN: ICD-10-PCS | Mod: S$GLB,,, | Performed by: STUDENT IN AN ORGANIZED HEALTH CARE EDUCATION/TRAINING PROGRAM

## 2019-04-11 PROCEDURE — 80053 COMPREHEN METABOLIC PANEL: CPT

## 2019-04-11 PROCEDURE — 99999 PR PBB SHADOW E&M-EST. PATIENT-LVL III: CPT | Mod: PBBFAC,,, | Performed by: INTERNAL MEDICINE

## 2019-04-11 PROCEDURE — 85652 RBC SED RATE AUTOMATED: CPT

## 2019-04-11 PROCEDURE — 63600175 PHARM REV CODE 636 W HCPCS: Mod: JG | Performed by: INTERNAL MEDICINE

## 2019-04-11 PROCEDURE — 86160 COMPLEMENT ANTIGEN: CPT

## 2019-04-11 PROCEDURE — 99215 PR OFFICE/OUTPT VISIT, EST, LEVL V, 40-54 MIN: ICD-10-PCS | Mod: S$GLB,,, | Performed by: INTERNAL MEDICINE

## 2019-04-11 PROCEDURE — 36415 COLL VENOUS BLD VENIPUNCTURE: CPT

## 2019-04-11 PROCEDURE — 85025 COMPLETE CBC W/AUTO DIFF WBC: CPT

## 2019-04-11 PROCEDURE — 99213 OFFICE O/P EST LOW 20 MIN: CPT | Mod: S$GLB,,, | Performed by: STUDENT IN AN ORGANIZED HEALTH CARE EDUCATION/TRAINING PROGRAM

## 2019-04-11 PROCEDURE — 99999 PR PBB SHADOW E&M-EST. PATIENT-LVL III: ICD-10-PCS | Mod: PBBFAC,,, | Performed by: INTERNAL MEDICINE

## 2019-04-11 PROCEDURE — 86160 COMPLEMENT ANTIGEN: CPT | Mod: 59

## 2019-04-11 PROCEDURE — 3008F BODY MASS INDEX DOCD: CPT | Mod: CPTII,S$GLB,, | Performed by: INTERNAL MEDICINE

## 2019-04-11 PROCEDURE — 25000003 PHARM REV CODE 250: Performed by: INTERNAL MEDICINE

## 2019-04-11 PROCEDURE — 99999 PR PBB SHADOW E&M-EST. PATIENT-LVL II: CPT | Mod: PBBFAC,,, | Performed by: STUDENT IN AN ORGANIZED HEALTH CARE EDUCATION/TRAINING PROGRAM

## 2019-04-11 PROCEDURE — 96413 CHEMO IV INFUSION 1 HR: CPT

## 2019-04-11 PROCEDURE — 99999 PR PBB SHADOW E&M-EST. PATIENT-LVL II: ICD-10-PCS | Mod: PBBFAC,,, | Performed by: STUDENT IN AN ORGANIZED HEALTH CARE EDUCATION/TRAINING PROGRAM

## 2019-04-11 RX ORDER — HEPARIN 100 UNIT/ML
500 SYRINGE INTRAVENOUS
Status: CANCELLED | OUTPATIENT
Start: 2019-04-25

## 2019-04-11 RX ORDER — TRIAMCINOLONE ACETONIDE 1 MG/G
OINTMENT TOPICAL
Qty: 454 G | Refills: 0 | Status: SHIPPED | OUTPATIENT
Start: 2019-04-11 | End: 2022-10-20

## 2019-04-11 RX ORDER — SODIUM CHLORIDE 0.9 % (FLUSH) 0.9 %
10 SYRINGE (ML) INJECTION
Status: DISCONTINUED | OUTPATIENT
Start: 2019-04-11 | End: 2019-04-11 | Stop reason: HOSPADM

## 2019-04-11 RX ORDER — ACETAMINOPHEN 325 MG/1
650 TABLET ORAL
Status: CANCELLED | OUTPATIENT
Start: 2019-04-25

## 2019-04-11 RX ORDER — KETOCONAZOLE 20 MG/ML
SHAMPOO, SUSPENSION TOPICAL
Qty: 120 ML | Refills: 2 | Status: SHIPPED | OUTPATIENT
Start: 2019-04-12 | End: 2019-09-04 | Stop reason: SDUPTHER

## 2019-04-11 RX ORDER — CLOBETASOL PROPIONATE 0.46 MG/ML
SOLUTION TOPICAL 2 TIMES DAILY
Qty: 50 ML | Refills: 1 | Status: SHIPPED | OUTPATIENT
Start: 2019-04-11 | End: 2022-12-12 | Stop reason: SDUPTHER

## 2019-04-11 RX ORDER — SODIUM CHLORIDE 0.9 % (FLUSH) 0.9 %
10 SYRINGE (ML) INJECTION
Status: CANCELLED | OUTPATIENT
Start: 2019-04-25

## 2019-04-11 RX ADMIN — BELIMUMAB 1094 MG: 400 INJECTION, POWDER, LYOPHILIZED, FOR SOLUTION INTRAVENOUS at 11:04

## 2019-04-11 NOTE — PROGRESS NOTES
Subjective:       Patient ID:  Stefanie España is a 33 y.o. female who presents for   Chief Complaint   Patient presents with    Dry Skin     c/o dry skin to face breast and legs x years tx otc lotion , steriod cream      History of Present Illness: The patient presents with chief complaint of dry skin and flaking of the scalp. She has a history of SLE currently on imuran, benlysta , plaquenil and prednisone 5 mg. She was last seen on 1/25/18 for seb derm of the scalp. She reports since then, having scattered dry, itchy patches of skin on the trunk as well as continued flaking and itching of the scalp and the face. Not currently using anything except OTC lotions and mild steroid cream (which does help with the itching). Denies any other concerning skin rash or skin lesions.               Review of Systems   Musculoskeletal: Positive for myalgias, joint swelling and arthralgias.   Skin: Positive for sun sensitivity.        Objective:    Physical Exam   Constitutional: She appears well-developed and well-nourished. No distress.   Neurological: She is alert and oriented to person, place, and time. She is not disoriented.   Psychiatric: She has a normal mood and affect.   Skin:   Areas Examined (abnormalities noted in diagram):   Scalp / Hair Palpated and Inspected  Head / Face Inspection Performed  Neck Inspection Performed  Chest / Axilla Inspection Performed  Abdomen Inspection Performed  Back Inspection Performed  RUE Inspected  LUE Inspection Performed                   Diagram Legend     Erythematous scaling macule/papule c/w actinic keratosis       Vascular papule c/w angioma      Pigmented verrucoid papule/plaque c/w seborrheic keratosis      Yellow umbilicated papule c/w sebaceous hyperplasia      Irregularly shaped tan macule c/w lentigo     1-2 mm smooth white papules consistent with Milia      Movable subcutaneous cyst with punctum c/w epidermal inclusion cyst      Subcutaneous movable cyst c/w pilar  cyst      Firm pink to brown papule c/w dermatofibroma      Pedunculated fleshy papule(s) c/w skin tag(s)      Evenly pigmented macule c/w junctional nevus     Mildly variegated pigmented, slightly irregular-bordered macule c/w mildly atypical nevus      Flesh colored to evenly pigmented papule c/w intradermal nevus       Pink pearly papule/plaque c/w basal cell carcinoma      Erythematous hyperkeratotic cursted plaque c/w SCC      Surgical scar with no sign of skin cancer recurrence      Open and closed comedones      Inflammatory papules and pustules      Verrucoid papule consistent consistent with wart     Erythematous eczematous patches and plaques     Dystrophic onycholytic nail with subungual debris c/w onychomycosis     Umbilicated papule    Erythematous-base heme-crusted tan verrucoid plaque consistent with inflamed seborrheic keratosis     Erythematous Silvery Scaling Plaque c/w Psoriasis     See annotation      Assessment / Plan:        Seborrheic dermatitis  -     ketoconazole (NIZORAL) 2 % shampoo; Apply topically 3 (three) times a week.  Dispense: 120 mL; Refill: 2  -     clobetasol (TEMOVATE) 0.05 % external solution; Apply topically 2 (two) times daily.  Dispense: 50 mL; Refill: 1    Dermatitis  -     triamcinolone acetonide 0.1% (KENALOG) 0.1 % ointment; AAA bid  Dispense: 454 g; Refill: 0  -      Counseled on gentle skin care regimen, need for sensitive soaps and frequent use of moisturizers.              Follow up in about 3 months (around 7/11/2019).

## 2019-04-11 NOTE — LETTER
April 11, 2019      Prashanth Whalen MD  [address] [place] [address]           [facility]  [address] [place] [address]  Phone: [phone]  Fax: [phone]          Patient: Stefanie España   MR Number: [id number]   YOB: 1985   Date of Visit: 4/11/2019       Dear Dr. Prashanth Whalen:    Thank you for referring Stefanie España to me for evaluation. Attached you will find relevant portions of my assessment and plan of care.    If you have questions, please do not hesitate to call me. I look forward to following Stefanie España along with you.    Sincerely,    Cristhian Kevin MD    Enclosure  CC:  No Recipients    If you would like to receive this communication electronically, please contact [email or web address] or [phone] to request more information on [employer] Link access.    For providers and/or their staff who would like to refer a patient to [facility], please contact us through our one-stop-shop provider referral line, [facility], at [phone].    If you feel you have received this communication in error or would no longer like to receive these types of communications, please e-mail [email or web address]

## 2019-04-11 NOTE — NURSING
Infusion# >10  S/S infection noted or voiced? None  Recent labs? 4/11/2019  Recent vaccines? Denies    Premeds? None    Benlysta 10 mg/kg = 1,094 mg administered IV at a 60 minute rate per orders; see MAR & Flow Sheet for more  details. Tolerated well without adverse events

## 2019-04-11 NOTE — PLAN OF CARE
Problem: Adult Inpatient Plan of Care  Goal: Plan of Care Review  Outcome: Ongoing (interventions implemented as appropriate)  Pt said she is tired, she came straight here after her sleep study.

## 2019-04-11 NOTE — PROGRESS NOTES
RHEUMATOLOGY CLINIC FOLLOW UP VISIT  Chief complaints:-  To follow-up for lupus.    HPI:-  Stefanie Kimbrough a 33 y.o. pleasant female comes in for a follow up visit with above chief complaints. She is on imuran, benlysta , p[laquenil and prednisone 5 mg for her SLE . Her manifestations includes arthritis, myalgias, elevated aldolase, fatigue and puffy fingers bilateral.  She reports doing better today.  The rash has resolved.  She still has intermittent hand pain with swelling for which she takes prednisone.  No adverse effects from the infusion.  She also complains of new rash over her breasts and in the skin folds for the past couple of weeks.  Review of Systems   Constitutional: Negative for chills and fever.   HENT: Negative for congestion and sore throat.    Eyes: Negative for blurred vision and redness.   Respiratory: Negative for cough and shortness of breath.    Cardiovascular: Negative for chest pain and leg swelling.   Gastrointestinal: Negative for abdominal pain.   Genitourinary: Negative for dysuria.   Musculoskeletal: Positive for joint pain. Negative for back pain, falls, myalgias and neck pain.   Skin: Positive for rash. Negative for itching.   Neurological: Negative for headaches.   Endo/Heme/Allergies: Does not bruise/bleed easily.   Psychiatric/Behavioral: Negative for memory loss. The patient does not have insomnia.        Past Medical History:   Diagnosis Date    Lupus (systemic lupus erythematosus)        Past Surgical History:   Procedure Laterality Date    TUBAL LIGATION  2005        Social History     Tobacco Use    Smoking status: Never Smoker    Smokeless tobacco: Never Used   Substance Use Topics    Alcohol use: No    Drug use: No       Family History   Problem Relation Age of Onset    Liver cancer Maternal Aunt     Breast cancer Maternal Aunt     Breast cancer Paternal Grandmother        Review of patient's allergies  "indicates:   Allergen Reactions    Elavil [amitriptyline]            Physical examination:-    Vitals:    04/11/19 0913   BP: (!) 141/91   Pulse: 82   Weight: 109.4 kg (241 lb 2.9 oz)   Height: 5' 8" (1.727 m)   PainSc:   6       Physical Exam   Constitutional: She is oriented to person, place, and time and well-developed, well-nourished, and in no distress. No distress.   HENT:   Head: Normocephalic.   Mouth/Throat: Oropharynx is clear and moist.   Eyes: Pupils are equal, round, and reactive to light. Conjunctivae and EOM are normal.   Neck: Normal range of motion. Neck supple.   Cardiovascular: Normal rate and intact distal pulses.   Pulmonary/Chest: Effort normal. No respiratory distress.   Abdominal: Soft. There is no tenderness.   Musculoskeletal:   Mild synovitis present over bilateral second, third and fourth PIP's. Wrists tender. Few tender points, but no  effusion over large joints.   Neurological: She is alert and oriented to person, place, and time. No cranial nerve deficit.   Skin: Skin is warm.   Hypopigmentation present over face extending above forehead involving entire face and malar area, sparing preauricular regions- Significant improvement since last visit.    Psychiatric: Mood and affect normal.   Nursing note and vitals reviewed.      Labs:-  Results for MARILIA SALEEM (MRN [id number]) as of 12/1/2017 15:37   Ref. Range 8/30/2017 14:50   MELVA HEP-2 Titer Unknown Positive >=1:2560...   Anti-SSA Antibody Latest Ref Range: 0.00 - 19.99 EU 1.63   Anti-SSA Interpretation Latest Ref Range: Negative  Negative   Anti-SSB Antibody Latest Ref Range: 0.00 - 19.99 EU 1.88   Anti-SSB Interpretation Latest Ref Range: Negative  Negative   ds DNA Ab Latest Ref Range: Negative 1:10  Negative 1:10   Anti Sm Antibody Latest Ref Range: 0.00 - 19.99 EU 12.86   Anti-Sm Interpretation Latest Ref Range: Negative  Negative   Anti Sm/RNP Antibody Latest Ref Range: 0.00 - 19.99 .12 (H)   Anti-Sm/RNP " Interpretation Latest Ref Range: Negative  Positive (A)   Complement (C-3) Latest Ref Range: 50 - 180 mg/dL 134   Complement (C-4) Latest Ref Range: 11 - 44 mg/dL 28       Medication List with Changes/Refills   New Medications    CLOBETASOL (TEMOVATE) 0.05 % EXTERNAL SOLUTION    Apply topically 2 (two) times daily.    KETOCONAZOLE (NIZORAL) 2 % SHAMPOO    Apply topically 3 (three) times a week.    TRIAMCINOLONE ACETONIDE 0.1% (KENALOG) 0.1 % OINTMENT    AAA bid   Current Medications    AZATHIOPRINE (IMURAN) 50 MG TAB    Take 1 tablet (50 mg total) by mouth 2 (two) times daily.    FLUOCINOLONE (DERMA-SMOOTHE) 0.01 % EXTERNAL OIL    Apply oil to scalp twice a day    HYDROXYCHLOROQUINE (PLAQUENIL) 200 MG TABLET    Take 200 mg by mouth 2 (two) times daily.    KETOCONAZOLE (NIZORAL) 2 % SHAMPOO    Wash hair with medicated shampoo at least 1x/week - let sit on scalp at least 5 minutes prior to rinsing    METRONIDAZOLE (METROGEL) 0.75 % VAGINAL GEL    INSERT 1 APPLICATORFUL IN THE VAGINA ONCE DAILY    NABUMETONE (RELAFEN) 500 MG TABLET    Take 500 mg by mouth 2 (two) times daily.    PREDNISONE (DELTASONE) 5 MG TABLET    Take 1 tablet (5 mg total) by mouth once daily.    TACROLIMUS (PROTOPIC) 0.1 % OINTMENT    Apply topically 2 (two) times daily.    TRAZODONE (DESYREL) 50 MG TABLET    Take 1 tablet (50 mg total) by mouth every evening.    TRIAMCINOLONE ACETONIDE 0.025% (KENALOG) 0.025 % OINT    Apply topically 2 (two) times daily. for 10 days     Assessment/Plans:-  # Systemic lupus erythematosus arthritis  SLE with inflammatory arthritis , photosensitive malar rash, fatigue, positive MELVA, positive RNP antibody, elevated CK with normal complements, normal antiphospholipid antibodies . She is on Benlysta, plaquenil and Imuran with low dose prednisone.  Significant improvement in lupus activity since starting Benlysta.  Try to taper of prednisone.  Continue monthly Benlysta infusion along with Imuran and Plaquenil.     #  Lupus (systemic lupus erythematosus)  SLE with inflammatory arthritis , photosensitive malar rash, fatigue, positive MELVA, positive RNP antibody, elevated CK with normal complements, normal antiphospholipid antibodies . She is on Benlysta, plaquenil and Imuran with low dose prednisone.  Significant improvement in lupus activity since starting Benlysta.  Try to taper of prednisone.  Continue monthly Benlysta infusion along with Imuran and Plaquenil. Check lupus activity markers today.     # Long-term current use of high risk medication -Imuran  Hold Imuran if any infection. Check safety labs today.    # Long-term use of Plaquenil  Continue yearly retinal exam.     # Immunosuppressed status:-  Compromised immune system secondary to autoimmune disease and use of immunosuppressive drugs. Monitor carefully for infections. Advised to get immediate medical care if any infection. Also advised strict adherence to age appropriate vaccinations and cancer screenings with PCP.     # Obesity, Class II, BMI 35-39.9  Advised low carbohydrate diet. Avoid sugar.     # Rash  Referred to dermatology.  - Ambulatory Referral to Dermatology     # Follow up in about 3 months (around 7/11/2019).    Disclaimer: This note was prepared using voice recognition system and is likely to have sound alike errors and is not proof read.  Please call me with any questions.

## 2019-04-12 LAB — DSDNA AB SER-ACNC: NORMAL [IU]/ML

## 2019-04-23 ENCOUNTER — PATIENT MESSAGE (OUTPATIENT)
Dept: INTERNAL MEDICINE | Facility: CLINIC | Age: 34
End: 2019-04-23

## 2019-04-24 LAB — HSV AB, IGM BY EIA: NEGATIVE

## 2019-05-10 ENCOUNTER — INFUSION (OUTPATIENT)
Dept: RHEUMATOLOGY | Facility: HOSPITAL | Age: 34
End: 2019-05-10
Attending: INTERNAL MEDICINE
Payer: COMMERCIAL

## 2019-05-10 VITALS
RESPIRATION RATE: 18 BRPM | WEIGHT: 239.63 LBS | SYSTOLIC BLOOD PRESSURE: 124 MMHG | BODY MASS INDEX: 36.44 KG/M2 | HEART RATE: 83 BPM | OXYGEN SATURATION: 100 % | TEMPERATURE: 98 F | DIASTOLIC BLOOD PRESSURE: 71 MMHG

## 2019-05-10 DIAGNOSIS — M32.9 SYSTEMIC LUPUS ERYTHEMATOSUS ARTHRITIS: Primary | ICD-10-CM

## 2019-05-10 PROCEDURE — A4216 STERILE WATER/SALINE, 10 ML: HCPCS | Performed by: INTERNAL MEDICINE

## 2019-05-10 PROCEDURE — 63600175 PHARM REV CODE 636 W HCPCS: Mod: JG | Performed by: INTERNAL MEDICINE

## 2019-05-10 PROCEDURE — 96413 CHEMO IV INFUSION 1 HR: CPT

## 2019-05-10 PROCEDURE — 25000003 PHARM REV CODE 250: Performed by: INTERNAL MEDICINE

## 2019-05-10 RX ORDER — ACETAMINOPHEN 325 MG/1
650 TABLET ORAL
Status: CANCELLED | OUTPATIENT
Start: 2019-05-24

## 2019-05-10 RX ORDER — HEPARIN 100 UNIT/ML
500 SYRINGE INTRAVENOUS
Status: CANCELLED | OUTPATIENT
Start: 2019-05-24

## 2019-05-10 RX ORDER — SODIUM CHLORIDE 0.9 % (FLUSH) 0.9 %
10 SYRINGE (ML) INJECTION
Status: CANCELLED | OUTPATIENT
Start: 2019-05-24

## 2019-05-10 RX ORDER — SODIUM CHLORIDE 0.9 % (FLUSH) 0.9 %
10 SYRINGE (ML) INJECTION
Status: DISCONTINUED | OUTPATIENT
Start: 2019-05-10 | End: 2019-05-10 | Stop reason: HOSPADM

## 2019-05-10 RX ADMIN — BELIMUMAB 1087 MG: 400 INJECTION, POWDER, LYOPHILIZED, FOR SOLUTION INTRAVENOUS at 09:05

## 2019-05-10 RX ADMIN — Medication 10 ML: at 09:05

## 2019-05-10 NOTE — NURSING
Infusion # >10 - Benlysta 10 mg/kg q 4 weeks  Today's weight-108.7 kg  Last dose- 4/11/19    Any:  -recent illness, infection, or antibiotic use in past week- denies  -open wounds or mouth sores- denies  -invasive procedures or surgeries in past 4 weeks or in upcoming 4 weeks- denies  -vaccinations in past week- denies  -any new symptoms/change in symptoms-yes; generalized body aches starting 2 days ago  -chance you may be pregnant- denies      Recent labs? 4/11/19  Last Rheumatology provider visit- Seen by Dr. SHORE on 4/11/19     Premeds? none     Benlysta 1,087 mg administered IV at a 60 minute rate per orders; see MAR and vitals for more  details. Tolerated well without adverse events, discharged and ambulatory out of clinic.

## 2019-06-04 ENCOUNTER — TELEPHONE (OUTPATIENT)
Dept: PAIN MEDICINE | Facility: CLINIC | Age: 34
End: 2019-06-04

## 2019-06-04 NOTE — TELEPHONE ENCOUNTER
Hello, this is stff I've received your request I'm returning your call from the  clinic at [facility], [place] by [facility]. I just wanted to let you know that I'm working on getting an answer for you by . ( Please add all other notes here if needed.)    Called to confirm appointment this week with Dr Gonsalves, no answer, left voicemail message

## 2019-06-06 ENCOUNTER — OFFICE VISIT (OUTPATIENT)
Dept: PAIN MEDICINE | Facility: CLINIC | Age: 34
End: 2019-06-06
Attending: ANESTHESIOLOGY
Payer: COMMERCIAL

## 2019-06-06 VITALS
HEART RATE: 72 BPM | HEIGHT: 68 IN | TEMPERATURE: 98 F | WEIGHT: 238 LBS | DIASTOLIC BLOOD PRESSURE: 69 MMHG | RESPIRATION RATE: 18 BRPM | BODY MASS INDEX: 36.07 KG/M2 | SYSTOLIC BLOOD PRESSURE: 107 MMHG

## 2019-06-06 DIAGNOSIS — M32.9 SYSTEMIC LUPUS ERYTHEMATOSUS ARTHRITIS: ICD-10-CM

## 2019-06-06 DIAGNOSIS — G89.4 CHRONIC PAIN DISORDER: Primary | ICD-10-CM

## 2019-06-06 DIAGNOSIS — M25.531 BILATERAL WRIST PAIN: ICD-10-CM

## 2019-06-06 DIAGNOSIS — M25.532 BILATERAL WRIST PAIN: ICD-10-CM

## 2019-06-06 DIAGNOSIS — M79.10 MYALGIA: ICD-10-CM

## 2019-06-06 DIAGNOSIS — M62.838 CERVICAL PARASPINAL MUSCLE SPASM: ICD-10-CM

## 2019-06-06 PROCEDURE — 99999 PR PBB SHADOW E&M-EST. PATIENT-LVL IV: ICD-10-PCS | Mod: PBBFAC,,, | Performed by: ANESTHESIOLOGY

## 2019-06-06 PROCEDURE — 99203 OFFICE O/P NEW LOW 30 MIN: CPT | Mod: S$GLB,,, | Performed by: ANESTHESIOLOGY

## 2019-06-06 PROCEDURE — 99999 PR PBB SHADOW E&M-EST. PATIENT-LVL IV: CPT | Mod: PBBFAC,,, | Performed by: ANESTHESIOLOGY

## 2019-06-06 PROCEDURE — 99203 PR OFFICE/OUTPT VISIT, NEW, LEVL III, 30-44 MIN: ICD-10-PCS | Mod: S$GLB,,, | Performed by: ANESTHESIOLOGY

## 2019-06-06 RX ORDER — CELECOXIB 200 MG/1
200 CAPSULE ORAL 2 TIMES DAILY PRN
Qty: 60 CAPSULE | Refills: 5 | Status: SHIPPED | OUTPATIENT
Start: 2019-06-06 | End: 2019-12-03

## 2019-06-06 RX ORDER — DULOXETIN HYDROCHLORIDE 30 MG/1
30 CAPSULE, DELAYED RELEASE ORAL DAILY
Qty: 30 CAPSULE | Refills: 5 | Status: SHIPPED | OUTPATIENT
Start: 2019-06-06 | End: 2019-10-02 | Stop reason: SDUPTHER

## 2019-06-06 NOTE — PATIENT INSTRUCTIONS
Recommend starting Celebrex 200 mg up to twice daily as needed for pain.  Start with 1 capsule daily for 1 week.  After that, you may increase this to 1 capsules twice daily if needed.  Stay at most comfortable dose.  Take medication with meals.  If you experience any upset stomach, nausea, or vomiting, stop taking this medication. Do not take this with any other medications in the NSAID family, such as ibuprofen, aspirin, and naproxen. If you are started on steroids, please hold this medication while taking the steroids.    Recommend using Tylenol 1000 mg every 8 hours as needed for pain.  Do not take this with any other medications containing acetaminophen.  Do not exceed 3000 mg of acetaminophen in 24 hours.    We are going to start Cymbalta for your joint pain.  This medication is once daily.  If it makes you sleepy, take this at night.  If it wakes you up, take this in the morning.  Side effects are generally mild and may include upset stomach, diarrhea, and dizziness. Serious side effects may include depression and suicidal thoughts.  If either of these occur, please stop this medication immediately and let me know.    I am referring you to our Orthopedic hand doctor for more options for your wrist pain.  We also discussed occupation therapy for your hands.  They will be able to order this for you with their hand therapists.

## 2019-06-06 NOTE — PROGRESS NOTES
Subjective:     Patient ID: Stefanie España is a 33 y.o. female.    Chief Complaint: Pain    Consulted by: self referral     Disclaimer: This note was generated using voice recognition software.  There may be a typographical errors that were missed during proofreading.      HPI:    Stefanie España is a 33 y.o. female who presents today with multiple arthralgias. The worst of her joint pains are her wrists, followed closely by knees and hands. Today we decided to focus mostly on the upper extremity pain. This pain is described in detail below.    She notes bilateral wrist and hand pain, with associated swelling. She has had this pain for about 4 years. Pain is described as an aching and sharp pain. She denies numbness and tingling in her hands.     Aggravating factors: activity and normal use make symptoms worse. But she sometimes has random pains that she has difficulty identifying things that worsen pain. Cold worsens.    Mitigating factors: heat (hot bath), medication    Previously seeing: none    Physical Therapy: none    Non-pharmacologic Treatment:     · Ice/Heat: heat  · TENS: never tried  · Massage: never tried  · Chiropractic care: never tried  · Acupuncture: never tried  · Other: Never tried Yoga. Dietary changes (gave up meat and seems to help somewhat)         Pain Medications:         · Currently taking: ibuprofen 800-1,000mg BID PRN, Tylenol 1500 mg    · Has tried in the past:    · Opioids: percocet, helpful  · NSAIDS: aleve, helpful  · Tylenol: partially effective  · Muscle relaxants: never tried  · TCAs: tried elavil for pain, made her pass out  · SNRIs: never tried  · Anticonvulsants: never tried  · topical creams: never tried  · Other: takes trazadone for sleep    Blood thinners: none    Interventional Therapies: none    Relevant Surgeries: none    Affecting sleep? Yes, takes trazadone occasionally    Affecting daily activities? Yes, makes it difficult to work    Depressive symptoms? When in  pain has depressed mood but denies otherwise          · SI/HI? No    Work status: works full time in [employer]    Prescription Monitoring Program database:  Not applicable    Last 3 PDI Scores 6/6/2019   Pain Disability Index (PDI) 44       Opioid Risk Score     None          GENERAL:  No weight loss, malaise or fevers.  HEENT:   No recent changes in vision or hearing  NECK:  Negative for lumps, no difficulty with swallowing.  RESPIRATORY:  Negative for cough, wheezing or shortness of breath, patient denies any recent URI.  CARDIOVASCULAR:  Negative for chest pain, leg swelling or palpitations.  GI:  Negative for abdominal discomfort, blood in stools or black stools or change in bowel habits.  MUSCULOSKELETAL:  See HPI.  SKIN:  Negative for lesions, rash, and itching.  PSYCH:  denies mood disorder or recent psychosocial stressors.    HEMATOLOGY/LYMPHOLOGY:  Negative for prolonged bleeding, bruising easily or swollen nodes.    ENDO: No history of diabetes or thyroid dysfunction  NEURO:  +chronic numbness tingling in her face and left leg. No history of headaches, syncope, paralysis, seizures or tremors.  All other reviewed and negative other than HPI.          Past Medical History:   Diagnosis Date    Lupus (systemic lupus erythematosus)        Past Surgical History:   Procedure Laterality Date    TUBAL LIGATION  2005       Review of patient's allergies indicates:   Allergen Reactions    Elavil [amitriptyline]        Current Outpatient Medications   Medication Sig Dispense Refill    azaTHIOprine (IMURAN) 50 mg Tab Take 1 tablet (50 mg total) by mouth 2 (two) times daily. 60 tablet 3    clobetasol (TEMOVATE) 0.05 % external solution Apply topically 2 (two) times daily. 50 mL 1    hydroxychloroquine (PLAQUENIL) 200 mg tablet Take 200 mg by mouth 2 (two) times daily.      ketoconazole (NIZORAL) 2 % shampoo Wash hair with medicated shampoo at least 1x/week - let sit on scalp at least 5 minutes prior to  rinsing 120 mL 5    ketoconazole (NIZORAL) 2 % shampoo Apply topically 3 (three) times a week. 120 mL 2    tacrolimus (PROTOPIC) 0.1 % ointment Apply topically 2 (two) times daily. 60 g 3    traZODone (DESYREL) 50 MG tablet Take 1 tablet (50 mg total) by mouth every evening. 30 tablet 11    triamcinolone acetonide 0.1% (KENALOG) 0.1 % ointment AAA bid 454 g 0    celecoxib (CELEBREX) 200 MG capsule Take 1 capsule (200 mg total) by mouth 2 (two) times daily as needed for Pain. 60 capsule 5    DULoxetine (CYMBALTA) 30 MG capsule Take 1 capsule (30 mg total) by mouth once daily. 30 capsule 5    fluocinolone (DERMA-SMOOTHE) 0.01 % external oil Apply oil to scalp twice a day 1 Bottle 5     No current facility-administered medications for this visit.        Family History   Problem Relation Age of Onset    Liver cancer Maternal Aunt     Breast cancer Maternal Aunt     Breast cancer Paternal Grandmother        Social History     Socioeconomic History    Marital status: Legally      Spouse name: Not on file    Number of children: Not on file    Years of education: Not on file    Highest education level: Not on file   Occupational History    Occupation: [employer]   Social Needs    Financial resource strain: Not on file    Food insecurity:     Worry: Not on file     Inability: Not on file    Transportation needs:     Medical: Not on file     Non-medical: Not on file   Tobacco Use    Smoking status: Never Smoker    Smokeless tobacco: Never Used   Substance and Sexual Activity    Alcohol use: No    Drug use: No    Sexual activity: Yes     Partners: Male   Lifestyle    Physical activity:     Days per week: Not on file     Minutes per session: Not on file    Stress: Not on file   Relationships    Social connections:     Talks on phone: Not on file     Gets together: Not on file     Attends Holiness service: Not on file     Active member of club or organization: Not on file     Attends meetings of  "clubs or organizations: Not on file     Relationship status: Not on file   Other Topics Concern    Are you pregnant or think you may be? No    Breast-feeding No   Social History Narrative    Not on file       Objective:     Vitals:    06/06/19 1308   BP: 107/69   Pulse: 72   Resp: 18   Temp: 97.7 °F (36.5 °C)   Weight: 108 kg (238 lb)   Height: 5' 8" (1.727 m)   PainSc:   7       GEN:  Well developed, well nourished.  No acute distress.  No pain behavior.  HEENT:  No trauma.  Mucous membranes moist.  Nares patent bilaterally.  PSYCH: Normal affect. Thought content appropriate.  CHEST:  Breathing symmetric.  No audible wheezing.  ABD: Soft, non-distended.  SKIN:  Warm, pink, dry.  No rash on exposed areas.    EXT:  No cyanosis, clubbing, or edema.  No color change or changes in nail or hair growth.  negative Tinel's over right carpal tunnel. negative Tinel's over right cubital tunnel.  negative Tinel's over left carpal tunnel. negative Tinel's over left cubital tunnel.  5/5 motor strength throughout upper extremities except for as limited by pain in wrist extension and she does seem to have some weakness in hand intrinsics bilaterally 4/5.   Sensory: Negative sensory deficit in the upper extremities.  NEURO/MUSCULOSKELETAL:  Fully alert, oriented, and appropriate. Speech normal rae. No cranial nerve deficits.   Gait: normal.     Reflexes: 2+ and symmetric throughout.  absent Neely's bilaterally.  C-Spine:  good ROM but with pain in all directions, worst in extension. + pain with axial/facet loading bilaterally.  Negative  Spurling's bilaterally for arm pain.    +diffuse widespread myofascial tenderness over bilateral lower cervical paraspinals, levator scap, upper trapezius, thoracic paraspinals, and supraspinatus.   WRISTS: Bilateral wrists with subtle dorsal swelling but no palpable ganglion cyst. There is tenderness to palpation at dorsal wrists. There is no tenderness along DeQuervain's tendons or volar " aspect of wrists. There is limited range of motion in extension secondary to pain. No evidence of definite wrist synovitis. No evidence of hand synovitis.      Imaging:      None to review    Assessment:     Encounter Diagnoses   Name Primary?    Chronic pain disorder Yes    Cervical paraspinal muscle spasm     Myalgia     Bilateral wrist pain     Systemic lupus erythematosus arthritis        Plan:     Stefanie was seen today for back pain, leg pain, hand pain and foot pain.    Diagnoses and all orders for this visit:    Chronic pain disorder  -     DULoxetine (CYMBALTA) 30 MG capsule; Take 1 capsule (30 mg total) by mouth once daily.  -     celecoxib (CELEBREX) 200 MG capsule; Take 1 capsule (200 mg total) by mouth 2 (two) times daily as needed for Pain.    Cervical paraspinal muscle spasm  -     DULoxetine (CYMBALTA) 30 MG capsule; Take 1 capsule (30 mg total) by mouth once daily.  -     celecoxib (CELEBREX) 200 MG capsule; Take 1 capsule (200 mg total) by mouth 2 (two) times daily as needed for Pain.    Myalgia  -     DULoxetine (CYMBALTA) 30 MG capsule; Take 1 capsule (30 mg total) by mouth once daily.  -     celecoxib (CELEBREX) 200 MG capsule; Take 1 capsule (200 mg total) by mouth 2 (two) times daily as needed for Pain.    Bilateral wrist pain  -     Ambulatory consult to Orthopedics  -     DULoxetine (CYMBALTA) 30 MG capsule; Take 1 capsule (30 mg total) by mouth once daily.  -     celecoxib (CELEBREX) 200 MG capsule; Take 1 capsule (200 mg total) by mouth 2 (two) times daily as needed for Pain.    Systemic lupus erythematosus arthritis  -     DULoxetine (CYMBALTA) 30 MG capsule; Take 1 capsule (30 mg total) by mouth once daily.  -     celecoxib (CELEBREX) 200 MG capsule; Take 1 capsule (200 mg total) by mouth 2 (two) times daily as needed for Pain.         Widespread joint pain and wrist pain is consistent with the above. Clinical features of chronic pain syndrome associated with lupus.     We discussed  the assessment and recommendations.  All available images were reviewed. We discussed the disease process, prognosis, treatment plan, and risks and benefits. The patient is aware of the risks and benefits of the medications being prescribed, common side effects, and proper usage. The following is the plan we agreed on:     1. Will trial Cymbalta 30mg for management of chronic pain and joint pains. Discussed all side effects and discussed increasing dosage in the future if tolerated well.   2. Will refer to Ortho hand for evaluation of bilateral wrist pain. At that time hand or occupational therapy can be considered. We will defer referral to therapy at this time.  3. We discussed safe NSAID dosage as she is currently taking supratherapeutic doses. Ibuprofen dosage max 800mg. No history of renal dysfunction. Given only partial effectiveness of ibuprofen, will discontinue and start Celebrex 200mg 1-2x/day. Recommended starting at 200mg once daily and moving to BID only if she finds once daily not effective.   4. In addition to celebrex above, discussed safe acetaminophen dosing to be 1,000mg TID PRN, max 3g daily. She can take this in addition to celebrex if needed.   5. If suboptimal improvement will consider neck imaging at followup.  6. In the future we can consider trigger point injections (likely without steroids given chronic steroid use for treatment of lupus).   7. RTC in 6 weeks or sooner if needed.    Thank you for allowing me to participate in the care of this patient.   Please do not hesitate to call me at [phone] with any questions or concerns.    CHICA Malcolm MD  [facility]    I have seen the patient with the resident physician.  I have performed my own history and physical exam and we have come up with the above plan.  The patient is in agreement with our plan.    Kelli Gonsalves MD  06/06/2019     The above plan and management options were discussed at length with patient. Patient is in  agreement with the above and verbalized understanding. It will be communicated with the referring physician via electronic record, fax, or mail.

## 2019-06-07 ENCOUNTER — TELEPHONE (OUTPATIENT)
Dept: RHEUMATOLOGY | Facility: HOSPITAL | Age: 34
End: 2019-06-07

## 2019-06-12 ENCOUNTER — INFUSION (OUTPATIENT)
Dept: RHEUMATOLOGY | Facility: HOSPITAL | Age: 34
End: 2019-06-12
Attending: INTERNAL MEDICINE
Payer: COMMERCIAL

## 2019-06-12 VITALS
WEIGHT: 239.19 LBS | TEMPERATURE: 97 F | HEART RATE: 77 BPM | BODY MASS INDEX: 36.37 KG/M2 | SYSTOLIC BLOOD PRESSURE: 128 MMHG | DIASTOLIC BLOOD PRESSURE: 86 MMHG | RESPIRATION RATE: 16 BRPM

## 2019-06-12 DIAGNOSIS — M32.9 SYSTEMIC LUPUS ERYTHEMATOSUS ARTHRITIS: Primary | ICD-10-CM

## 2019-06-12 PROCEDURE — 96413 CHEMO IV INFUSION 1 HR: CPT

## 2019-06-12 PROCEDURE — A4216 STERILE WATER/SALINE, 10 ML: HCPCS | Performed by: INTERNAL MEDICINE

## 2019-06-12 PROCEDURE — 63600175 PHARM REV CODE 636 W HCPCS: Mod: JG | Performed by: INTERNAL MEDICINE

## 2019-06-12 PROCEDURE — 25000003 PHARM REV CODE 250: Performed by: INTERNAL MEDICINE

## 2019-06-12 RX ORDER — ACETAMINOPHEN 325 MG/1
650 TABLET ORAL
Status: CANCELLED | OUTPATIENT
Start: 2019-06-19

## 2019-06-12 RX ORDER — HEPARIN 100 UNIT/ML
500 SYRINGE INTRAVENOUS
Status: CANCELLED | OUTPATIENT
Start: 2019-06-19

## 2019-06-12 RX ORDER — SODIUM CHLORIDE 0.9 % (FLUSH) 0.9 %
10 SYRINGE (ML) INJECTION
Status: CANCELLED | OUTPATIENT
Start: 2019-06-19

## 2019-06-12 RX ORDER — SODIUM CHLORIDE 0.9 % (FLUSH) 0.9 %
10 SYRINGE (ML) INJECTION
Status: DISCONTINUED | OUTPATIENT
Start: 2019-06-12 | End: 2019-06-12 | Stop reason: HOSPADM

## 2019-06-12 RX ADMIN — BELIMUMAB 1085 MG: 400 INJECTION, POWDER, LYOPHILIZED, FOR SOLUTION INTRAVENOUS at 11:06

## 2019-06-12 RX ADMIN — Medication 10 ML: at 11:06

## 2019-06-12 NOTE — PLAN OF CARE
Problem: Adult Inpatient Plan of Care  Goal: Plan of Care Review  Outcome: Ongoing (interventions implemented as appropriate)  Pt said she is feeling better than last week.

## 2019-06-26 ENCOUNTER — OFFICE VISIT (OUTPATIENT)
Dept: INTERNAL MEDICINE | Facility: CLINIC | Age: 34
End: 2019-06-26
Payer: COMMERCIAL

## 2019-06-26 ENCOUNTER — LAB VISIT (OUTPATIENT)
Dept: LAB | Facility: HOSPITAL | Age: 34
End: 2019-06-26
Attending: PHYSICIAN ASSISTANT
Payer: COMMERCIAL

## 2019-06-26 VITALS
SYSTOLIC BLOOD PRESSURE: 120 MMHG | WEIGHT: 239.63 LBS | HEART RATE: 76 BPM | BODY MASS INDEX: 36.32 KG/M2 | HEIGHT: 68 IN | TEMPERATURE: 98 F | DIASTOLIC BLOOD PRESSURE: 80 MMHG

## 2019-06-26 DIAGNOSIS — R10.2 PELVIC PAIN: Primary | ICD-10-CM

## 2019-06-26 DIAGNOSIS — Z20.2 POSSIBLE EXPOSURE TO STD: ICD-10-CM

## 2019-06-26 LAB
B-HCG UR QL: NEGATIVE
BACTERIA #/AREA URNS HPF: ABNORMAL /HPF
BILIRUB UR QL STRIP: NEGATIVE
CLARITY UR: CLEAR
COLOR UR: YELLOW
GLUCOSE UR QL STRIP: NEGATIVE
HGB UR QL STRIP: NEGATIVE
KETONES UR QL STRIP: NEGATIVE
LEUKOCYTE ESTERASE UR QL STRIP: ABNORMAL
MICROSCOPIC COMMENT: ABNORMAL
NITRITE UR QL STRIP: NEGATIVE
PH UR STRIP: 6 [PH] (ref 5–8)
PROT UR QL STRIP: NEGATIVE
RBC #/AREA URNS HPF: 1 /HPF (ref 0–4)
SP GR UR STRIP: 1.01 (ref 1–1.03)
SQUAMOUS #/AREA URNS HPF: 19 /HPF
URN SPEC COLLECT METH UR: ABNORMAL
WBC #/AREA URNS HPF: 6 /HPF (ref 0–5)

## 2019-06-26 PROCEDURE — 86592 SYPHILIS TEST NON-TREP QUAL: CPT

## 2019-06-26 PROCEDURE — 87086 URINE CULTURE/COLONY COUNT: CPT

## 2019-06-26 PROCEDURE — 3008F PR BODY MASS INDEX (BMI) DOCUMENTED: ICD-10-PCS | Mod: CPTII,S$GLB,, | Performed by: PHYSICIAN ASSISTANT

## 2019-06-26 PROCEDURE — 99999 PR PBB SHADOW E&M-EST. PATIENT-LVL III: CPT | Mod: PBBFAC,,, | Performed by: PHYSICIAN ASSISTANT

## 2019-06-26 PROCEDURE — 99214 OFFICE O/P EST MOD 30 MIN: CPT | Mod: S$GLB,,, | Performed by: PHYSICIAN ASSISTANT

## 2019-06-26 PROCEDURE — 99999 PR PBB SHADOW E&M-EST. PATIENT-LVL III: ICD-10-PCS | Mod: PBBFAC,,, | Performed by: PHYSICIAN ASSISTANT

## 2019-06-26 PROCEDURE — 87491 CHLMYD TRACH DNA AMP PROBE: CPT

## 2019-06-26 PROCEDURE — 36415 COLL VENOUS BLD VENIPUNCTURE: CPT

## 2019-06-26 PROCEDURE — 86703 HIV-1/HIV-2 1 RESULT ANTBDY: CPT

## 2019-06-26 PROCEDURE — 81000 URINALYSIS NONAUTO W/SCOPE: CPT

## 2019-06-26 PROCEDURE — 81025 URINE PREGNANCY TEST: CPT

## 2019-06-26 PROCEDURE — 80074 ACUTE HEPATITIS PANEL: CPT

## 2019-06-26 PROCEDURE — 3008F BODY MASS INDEX DOCD: CPT | Mod: CPTII,S$GLB,, | Performed by: PHYSICIAN ASSISTANT

## 2019-06-26 PROCEDURE — 99214 PR OFFICE/OUTPT VISIT, EST, LEVL IV, 30-39 MIN: ICD-10-PCS | Mod: S$GLB,,, | Performed by: PHYSICIAN ASSISTANT

## 2019-06-26 NOTE — PROGRESS NOTES
Subjective:       Patient ID: Stefanie España is a 33 y.o. female.    Chief Complaint: Abdominal Pain (ANGELA Martinez); Back Pain; and STD CHECK    Abdominal Pain   This is a new problem. The current episode started in the past 7 days. The onset quality is undetermined. The problem occurs constantly. The problem has been unchanged. The pain is located in the suprapubic region, periumbilical region, right flank and left flank. The pain is moderate. The quality of the pain is aching and burning. The abdominal pain does not radiate. Pertinent negatives include no dysuria, fever, frequency, melena, nausea or vomiting. Nothing aggravates the pain. The pain is relieved by nothing. She has tried nothing for the symptoms.     Past Medical History:   Diagnosis Date    Lupus (systemic lupus erythematosus)        Review of Systems   Constitutional: Negative for chills and fever.   Respiratory: Negative for chest tightness and shortness of breath.    Cardiovascular: Negative for chest pain.   Gastrointestinal: Positive for abdominal pain. Negative for melena, nausea and vomiting.   Genitourinary: Positive for pelvic pain and vaginal discharge. Negative for dysuria, frequency, vaginal bleeding and vaginal pain.       Objective:      Physical Exam   Constitutional: She appears well-developed and well-nourished. No distress.   HENT:   Head: Normocephalic and atraumatic.   Neck: Neck supple.   Cardiovascular: Normal rate and regular rhythm. Exam reveals no gallop and no friction rub.   No murmur heard.  Pulmonary/Chest: Effort normal and breath sounds normal. No stridor. No respiratory distress. She has no wheezes. She has no rales. She exhibits no tenderness.   Abdominal: Soft. Normal appearance and bowel sounds are normal. There is no hepatosplenomegaly. There is tenderness in the suprapubic area. There is CVA tenderness. No hernia.   Lymphadenopathy:     She has no cervical adenopathy.   Skin: She is not diaphoretic.   Nursing  note and vitals reviewed.      Assessment:       1. Pelvic pain    2. Possible exposure to STD        Plan:       Pelvic pain  -     C. trachomatis/N. gonorrhoeae by AMP DNA  -     Urinalysis  -     Urine culture  -     Pregnancy, urine rapid    Possible exposure to STD  -     HIV 1/2 Ag/Ab (4th Gen); Future; Expected date: 06/26/2019  -     Hepatitis panel, acute; Future; Expected date: 06/26/2019  -     RPR; Future; Expected date: 06/26/2019    Other orders  -     Urinalysis Microscopic

## 2019-06-27 ENCOUNTER — TELEPHONE (OUTPATIENT)
Dept: INTERNAL MEDICINE | Facility: CLINIC | Age: 34
End: 2019-06-27

## 2019-06-27 ENCOUNTER — PATIENT MESSAGE (OUTPATIENT)
Dept: INTERNAL MEDICINE | Facility: CLINIC | Age: 34
End: 2019-06-27

## 2019-06-27 LAB
BACTERIA UR CULT: NORMAL
BACTERIA UR CULT: NORMAL
C TRACH DNA SPEC QL NAA+PROBE: NOT DETECTED
HAV IGM SERPL QL IA: NEGATIVE
HBV CORE IGM SERPL QL IA: NEGATIVE
HBV SURFACE AG SERPL QL IA: NEGATIVE
HCV AB SERPL QL IA: NEGATIVE
HIV 1+2 AB+HIV1 P24 AG SERPL QL IA: NEGATIVE
N GONORRHOEA DNA SPEC QL NAA+PROBE: NOT DETECTED
RPR SER QL: NORMAL

## 2019-06-27 NOTE — TELEPHONE ENCOUNTER
----- Message from Girish Miller sent at 6/27/2019  3:09 PM CDT -----  Contact: Pt  Please give pt a call at .[phone] (home) she doesn't understand her lab results

## 2019-06-28 ENCOUNTER — TELEPHONE (OUTPATIENT)
Dept: INTERNAL MEDICINE | Facility: CLINIC | Age: 34
End: 2019-06-28

## 2019-06-28 RX ORDER — CIPROFLOXACIN 500 MG/1
500 TABLET ORAL 2 TIMES DAILY
Qty: 20 TABLET | Refills: 0 | Status: SHIPPED | OUTPATIENT
Start: 2019-06-28 | End: 2019-07-08

## 2019-07-11 ENCOUNTER — INFUSION (OUTPATIENT)
Dept: RHEUMATOLOGY | Facility: HOSPITAL | Age: 34
End: 2019-07-11
Attending: INTERNAL MEDICINE
Payer: COMMERCIAL

## 2019-07-11 ENCOUNTER — OFFICE VISIT (OUTPATIENT)
Dept: RHEUMATOLOGY | Facility: CLINIC | Age: 34
End: 2019-07-11
Payer: COMMERCIAL

## 2019-07-11 ENCOUNTER — LAB VISIT (OUTPATIENT)
Dept: LAB | Facility: HOSPITAL | Age: 34
End: 2019-07-11
Payer: COMMERCIAL

## 2019-07-11 ENCOUNTER — LAB VISIT (OUTPATIENT)
Dept: LAB | Facility: HOSPITAL | Age: 34
End: 2019-07-11
Attending: INTERNAL MEDICINE
Payer: COMMERCIAL

## 2019-07-11 VITALS
BODY MASS INDEX: 36.27 KG/M2 | DIASTOLIC BLOOD PRESSURE: 85 MMHG | HEART RATE: 73 BPM | SYSTOLIC BLOOD PRESSURE: 119 MMHG | WEIGHT: 238.56 LBS

## 2019-07-11 VITALS
DIASTOLIC BLOOD PRESSURE: 74 MMHG | BODY MASS INDEX: 36.16 KG/M2 | HEART RATE: 75 BPM | SYSTOLIC BLOOD PRESSURE: 113 MMHG | WEIGHT: 238.56 LBS | HEIGHT: 68 IN

## 2019-07-11 DIAGNOSIS — M32.19 OTHER SYSTEMIC LUPUS ERYTHEMATOSUS WITH OTHER ORGAN INVOLVEMENT: ICD-10-CM

## 2019-07-11 DIAGNOSIS — M32.9 SYSTEMIC LUPUS ERYTHEMATOSUS ARTHRITIS: ICD-10-CM

## 2019-07-11 DIAGNOSIS — E66.9 OBESITY, CLASS II, BMI 35-39.9: ICD-10-CM

## 2019-07-11 DIAGNOSIS — Z79.899 LONG-TERM CURRENT USE OF HIGH RISK MEDICATION OTHER THAN ANTICOAGULANT: Primary | ICD-10-CM

## 2019-07-11 DIAGNOSIS — Z79.899 LONG-TERM USE OF PLAQUENIL: ICD-10-CM

## 2019-07-11 DIAGNOSIS — Z79.899 LONG-TERM CURRENT USE OF HIGH RISK MEDICATION OTHER THAN ANTICOAGULANT: ICD-10-CM

## 2019-07-11 DIAGNOSIS — D84.9 IMMUNOSUPPRESSED STATUS: ICD-10-CM

## 2019-07-11 DIAGNOSIS — M35.1 MCTD (MIXED CONNECTIVE TISSUE DISEASE): ICD-10-CM

## 2019-07-11 DIAGNOSIS — M32.9 SYSTEMIC LUPUS ERYTHEMATOSUS ARTHRITIS: Primary | ICD-10-CM

## 2019-07-11 LAB
ALBUMIN SERPL BCP-MCNC: 3.1 G/DL (ref 3.5–5.2)
ALP SERPL-CCNC: 53 U/L (ref 55–135)
ALT SERPL W/O P-5'-P-CCNC: 11 U/L (ref 10–44)
ANION GAP SERPL CALC-SCNC: 7 MMOL/L (ref 8–16)
AST SERPL-CCNC: 13 U/L (ref 10–40)
BASOPHILS # BLD AUTO: 0.03 K/UL (ref 0–0.2)
BASOPHILS NFR BLD: 0.6 % (ref 0–1.9)
BILIRUB SERPL-MCNC: 0.3 MG/DL (ref 0.1–1)
BILIRUB UR QL STRIP: NEGATIVE
BUN SERPL-MCNC: 7 MG/DL (ref 6–20)
C3 SERPL-MCNC: 149 MG/DL (ref 50–180)
C4 SERPL-MCNC: 38 MG/DL (ref 11–44)
CALCIUM SERPL-MCNC: 9.3 MG/DL (ref 8.7–10.5)
CHLORIDE SERPL-SCNC: 104 MMOL/L (ref 95–110)
CK SERPL-CCNC: 72 U/L (ref 20–180)
CLARITY UR: CLEAR
CO2 SERPL-SCNC: 28 MMOL/L (ref 23–29)
COLOR UR: YELLOW
CREAT SERPL-MCNC: 0.7 MG/DL (ref 0.5–1.4)
CREAT UR-MCNC: 254 MG/DL (ref 15–325)
CRP SERPL-MCNC: 46.1 MG/L (ref 0–8.2)
DIFFERENTIAL METHOD: ABNORMAL
EOSINOPHIL # BLD AUTO: 0.5 K/UL (ref 0–0.5)
EOSINOPHIL NFR BLD: 10.5 % (ref 0–8)
ERYTHROCYTE [DISTWIDTH] IN BLOOD BY AUTOMATED COUNT: 11.7 % (ref 11.5–14.5)
ERYTHROCYTE [SEDIMENTATION RATE] IN BLOOD BY WESTERGREN METHOD: 42 MM/HR (ref 0–36)
EST. GFR  (AFRICAN AMERICAN): >60 ML/MIN/1.73 M^2
EST. GFR  (NON AFRICAN AMERICAN): >60 ML/MIN/1.73 M^2
GLUCOSE SERPL-MCNC: 82 MG/DL (ref 70–110)
GLUCOSE UR QL STRIP: NEGATIVE
HCT VFR BLD AUTO: 33.7 % (ref 37–48.5)
HGB BLD-MCNC: 11 G/DL (ref 12–16)
HGB UR QL STRIP: NEGATIVE
IMM GRANULOCYTES # BLD AUTO: 0.02 K/UL (ref 0–0.04)
IMM GRANULOCYTES NFR BLD AUTO: 0.4 % (ref 0–0.5)
KETONES UR QL STRIP: NEGATIVE
LEUKOCYTE ESTERASE UR QL STRIP: NEGATIVE
LYMPHOCYTES # BLD AUTO: 1 K/UL (ref 1–4.8)
LYMPHOCYTES NFR BLD: 19.7 % (ref 18–48)
MCH RBC QN AUTO: 29.3 PG (ref 27–31)
MCHC RBC AUTO-ENTMCNC: 32.6 G/DL (ref 32–36)
MCV RBC AUTO: 90 FL (ref 82–98)
MONOCYTES # BLD AUTO: 1 K/UL (ref 0.3–1)
MONOCYTES NFR BLD: 19.9 % (ref 4–15)
NEUTROPHILS # BLD AUTO: 2.5 K/UL (ref 1.8–7.7)
NEUTROPHILS NFR BLD: 49.3 % (ref 38–73)
NITRITE UR QL STRIP: NEGATIVE
NRBC BLD-RTO: 0 /100 WBC
PH UR STRIP: 6 [PH] (ref 5–8)
PLATELET # BLD AUTO: 504 K/UL (ref 150–350)
PMV BLD AUTO: 8.3 FL (ref 9.2–12.9)
POTASSIUM SERPL-SCNC: 3.9 MMOL/L (ref 3.5–5.1)
PROT SERPL-MCNC: 6.9 G/DL (ref 6–8.4)
PROT UR QL STRIP: NEGATIVE
PROT UR-MCNC: 15 MG/DL (ref 0–15)
PROT/CREAT UR: 0.06 MG/G{CREAT} (ref 0–0.2)
RBC # BLD AUTO: 3.75 M/UL (ref 4–5.4)
SODIUM SERPL-SCNC: 139 MMOL/L (ref 136–145)
SP GR UR STRIP: 1.02 (ref 1–1.03)
URN SPEC COLLECT METH UR: NORMAL
WBC # BLD AUTO: 5.03 K/UL (ref 3.9–12.7)

## 2019-07-11 PROCEDURE — 86160 COMPLEMENT ANTIGEN: CPT

## 2019-07-11 PROCEDURE — 3008F PR BODY MASS INDEX (BMI) DOCUMENTED: ICD-10-PCS | Mod: CPTII,S$GLB,, | Performed by: INTERNAL MEDICINE

## 2019-07-11 PROCEDURE — 25000003 PHARM REV CODE 250: Performed by: INTERNAL MEDICINE

## 2019-07-11 PROCEDURE — 85025 COMPLETE CBC W/AUTO DIFF WBC: CPT

## 2019-07-11 PROCEDURE — 81003 URINALYSIS AUTO W/O SCOPE: CPT

## 2019-07-11 PROCEDURE — 82550 ASSAY OF CK (CPK): CPT

## 2019-07-11 PROCEDURE — 85652 RBC SED RATE AUTOMATED: CPT

## 2019-07-11 PROCEDURE — 99215 PR OFFICE/OUTPT VISIT, EST, LEVL V, 40-54 MIN: ICD-10-PCS | Mod: S$GLB,,, | Performed by: INTERNAL MEDICINE

## 2019-07-11 PROCEDURE — 36415 COLL VENOUS BLD VENIPUNCTURE: CPT

## 2019-07-11 PROCEDURE — 96413 CHEMO IV INFUSION 1 HR: CPT

## 2019-07-11 PROCEDURE — 86225 DNA ANTIBODY NATIVE: CPT

## 2019-07-11 PROCEDURE — 99999 PR PBB SHADOW E&M-EST. PATIENT-LVL III: CPT | Mod: PBBFAC,,, | Performed by: INTERNAL MEDICINE

## 2019-07-11 PROCEDURE — A4216 STERILE WATER/SALINE, 10 ML: HCPCS | Performed by: INTERNAL MEDICINE

## 2019-07-11 PROCEDURE — 99999 PR PBB SHADOW E&M-EST. PATIENT-LVL III: ICD-10-PCS | Mod: PBBFAC,,, | Performed by: INTERNAL MEDICINE

## 2019-07-11 PROCEDURE — 3008F BODY MASS INDEX DOCD: CPT | Mod: CPTII,S$GLB,, | Performed by: INTERNAL MEDICINE

## 2019-07-11 PROCEDURE — 86140 C-REACTIVE PROTEIN: CPT

## 2019-07-11 PROCEDURE — 63600175 PHARM REV CODE 636 W HCPCS: Performed by: INTERNAL MEDICINE

## 2019-07-11 PROCEDURE — 86160 COMPLEMENT ANTIGEN: CPT | Mod: 59

## 2019-07-11 PROCEDURE — 96367 TX/PROPH/DG ADDL SEQ IV INF: CPT

## 2019-07-11 PROCEDURE — 80053 COMPREHEN METABOLIC PANEL: CPT

## 2019-07-11 PROCEDURE — 99215 OFFICE O/P EST HI 40 MIN: CPT | Mod: S$GLB,,, | Performed by: INTERNAL MEDICINE

## 2019-07-11 PROCEDURE — 84156 ASSAY OF PROTEIN URINE: CPT

## 2019-07-11 RX ORDER — HEPARIN 100 UNIT/ML
500 SYRINGE INTRAVENOUS
Status: CANCELLED | OUTPATIENT
Start: 2019-07-18

## 2019-07-11 RX ORDER — SODIUM CHLORIDE 0.9 % (FLUSH) 0.9 %
10 SYRINGE (ML) INJECTION
Status: CANCELLED | OUTPATIENT
Start: 2019-07-18

## 2019-07-11 RX ORDER — ACETAMINOPHEN 325 MG/1
650 TABLET ORAL
Status: CANCELLED | OUTPATIENT
Start: 2019-07-18

## 2019-07-11 RX ORDER — SODIUM CHLORIDE 0.9 % (FLUSH) 0.9 %
10 SYRINGE (ML) INJECTION
Status: DISCONTINUED | OUTPATIENT
Start: 2019-07-11 | End: 2019-07-11 | Stop reason: HOSPADM

## 2019-07-11 RX ADMIN — BELIMUMAB 1082 MG: 400 INJECTION, POWDER, LYOPHILIZED, FOR SOLUTION INTRAVENOUS at 10:07

## 2019-07-11 RX ADMIN — SODIUM CHLORIDE: 9 INJECTION, SOLUTION INTRAVENOUS at 10:07

## 2019-07-11 RX ADMIN — Medication 10 ML: at 09:07

## 2019-07-11 NOTE — ASSESSMENT & PLAN NOTE
Compromised immune system secondary to autoimmune disease and use of immunosuppressive drugs. Monitor carefully for infections. Advised to get immediate medical care if any infection. Also advised strict adherence to age appropriate vaccinations and cancer screenings with PCP.

## 2019-07-11 NOTE — Clinical Note
IV benlysta today. Iv solumedrol2 50 mg today. Add CK to her labs done today. Labs in 3 months - usual lupus with CK. F/U IN 3 MONTHS.

## 2019-07-11 NOTE — ASSESSMENT & PLAN NOTE
Mild flare-up of lupus arthritis and fatigued without any flare-up of rash.  Normal CK from today.  Will give Solu-Medrol along with Benlysta for active arthritis.  Looks like Benlysta is controlling her arthritis for 3 weeks and it flares the week before the infusion.  Consider switching to subcu Benlysta in the near future.

## 2019-07-11 NOTE — NURSING
Infusion# >10 Benlysta q 4 weeks    Recent labs? 7/11/19    S/S infection noted or voiced? Denied/none noted   Recent or planned surgeries/invasive procedures? Denied  Recent vaccines? Denied    Premeds? None    Benlysta 1082 mg administered IV at a 60 minute rate per orders; see MAR & Flow Sheet for more  details. Tolerated well without adverse events.    Dr. SHORE added Solumedrol 250mg IV.

## 2019-07-11 NOTE — PROGRESS NOTES
RHEUMATOLOGY CLINIC FOLLOW UP VISIT  Chief complaints:-  To follow up for lupus.    HPI:-  Stefanie Kimbrough a 33 y.o. pleasant female comes in for a follow up visit.  She reports feeling fine 3 weeks after Benlysta infusion.  In the last week her arthritis returns associated with severe fatigue.  She denies any recurrence of the rash.  No falls or fractures since last visit.  Takes medications regularly.  Have some stress at home.  Significant stress at work due to the recent public holiday-she works at [employer].    Review of Systems   Constitutional: Negative for chills and fever.   HENT: Negative for congestion and sore throat.    Eyes: Negative for blurred vision and redness.   Respiratory: Negative for cough and shortness of breath.    Cardiovascular: Negative for chest pain and leg swelling.   Gastrointestinal: Negative for abdominal pain.   Genitourinary: Negative for dysuria.   Musculoskeletal: Positive for joint pain. Negative for back pain, falls, myalgias and neck pain.   Skin: Negative for rash.   Neurological: Negative for headaches.   Endo/Heme/Allergies: Does not bruise/bleed easily.   Psychiatric/Behavioral: Negative for memory loss. The patient does not have insomnia.        Past Medical History:   Diagnosis Date    Lupus (systemic lupus erythematosus)        Past Surgical History:   Procedure Laterality Date    TUBAL LIGATION  2005        Social History     Tobacco Use    Smoking status: Never Smoker    Smokeless tobacco: Never Used   Substance Use Topics    Alcohol use: No    Drug use: No       Family History   Problem Relation Age of Onset    Liver cancer Maternal Aunt     Breast cancer Maternal Aunt     Breast cancer Paternal Grandmother        Review of patient's allergies indicates:   Allergen Reactions    Elavil [amitriptyline]        Vitals:    07/11/19 0917   BP: 113/74   Pulse: 75   Weight: 108.2 kg (238 lb 8.6 oz)  "  Height: 5' 8" (1.727 m)   PainSc:   6       Physical Exam   Constitutional: She is oriented to person, place, and time and well-developed, well-nourished, and in no distress. No distress.   HENT:   Head: Normocephalic.   Mouth/Throat: Oropharynx is clear and moist.   Eyes: Pupils are equal, round, and reactive to light. Conjunctivae and EOM are normal.   Neck: Normal range of motion. Neck supple.   Cardiovascular: Normal rate and intact distal pulses.   Pulmonary/Chest: Effort normal. No respiratory distress.   Abdominal: Soft. There is no tenderness.   Musculoskeletal:   Mild synovitis over bilateral 3rd finger both PIP and MCP joints No synovitis over other small joints of hands.  positive MTP squeeze test.  No effusion over large joints.   Neurological: She is alert and oriented to person, place, and time. No cranial nerve deficit.   Skin: Skin is warm. No rash noted. No erythema.   Psychiatric: Mood and affect normal.   Nursing note and vitals reviewed.      Medication List with Changes/Refills   Current Medications    AZATHIOPRINE (IMURAN) 50 MG TAB    Take 1 tablet (50 mg total) by mouth 2 (two) times daily.    CELECOXIB (CELEBREX) 200 MG CAPSULE    Take 1 capsule (200 mg total) by mouth 2 (two) times daily as needed for Pain.    CLOBETASOL (TEMOVATE) 0.05 % EXTERNAL SOLUTION    Apply topically 2 (two) times daily.    DULOXETINE (CYMBALTA) 30 MG CAPSULE    Take 1 capsule (30 mg total) by mouth once daily.    FLUOCINOLONE (DERMA-SMOOTHE) 0.01 % EXTERNAL OIL    Apply oil to scalp twice a day    HYDROXYCHLOROQUINE (PLAQUENIL) 200 MG TABLET    Take 200 mg by mouth 2 (two) times daily.    KETOCONAZOLE (NIZORAL) 2 % SHAMPOO    Wash hair with medicated shampoo at least 1x/week - let sit on scalp at least 5 minutes prior to rinsing    KETOCONAZOLE (NIZORAL) 2 % SHAMPOO    Apply topically 3 (three) times a week.    TACROLIMUS (PROTOPIC) 0.1 % OINTMENT    Apply topically 2 (two) times daily.    TRAZODONE (DESYREL) 50 " MG TABLET    Take 1 tablet (50 mg total) by mouth every evening.    TRIAMCINOLONE ACETONIDE 0.1% (KENALOG) 0.1 % OINTMENT    AAA bid       Assessment/Plans:-  1. Long-term current use of high risk medication -Imuran    2. Systemic lupus erythematosus arthritis    3. Immunosuppressed status    4. Long-term use of Plaquenil    5. MCTD (mixed connective tissue disease)    6. Obesity, Class II, BMI 35-39.9      Problem List Items Addressed This Visit        Immunology/Multi System    Systemic lupus erythematosus arthritis    Current Assessment & Plan     Mild flare-up of lupus arthritis and fatigued without any flare-up of rash.  Normal CK from today.  Will give Solu-Medrol along with Benlysta for active arthritis.  Looks like Benlysta is controlling her arthritis for 3 weeks and it flares the week before the infusion.  Consider switching to subcu Benlysta in the near future.         Relevant Orders    CK    Immunosuppressed status    Current Assessment & Plan     Compromised immune system secondary to autoimmune disease and use of immunosuppressive drugs. Monitor carefully for infections. Advised to get immediate medical care if any infection. Also advised strict adherence to age appropriate vaccinations and cancer screenings with PCP.          MCTD (mixed connective tissue disease)    Current Assessment & Plan     RNP antibody positivity with arthritis and myositis.  Myositis well controlled.  Arthritis intermittently active.  Normal CT scan of the lungs.  Normal echo.  Monitor.            Endocrine    Obesity, Class II, BMI 35-39.9    Current Assessment & Plan     Following low sugar diet.  Advised to follow a low carb and low sugar diet.            Other    Long-term use of Plaquenil    Current Assessment & Plan     Continue Plaquenil ophthalmology retinal screen.         Long-term current use of high risk medication -Imuran - Primary    Current Assessment & Plan     Hold Imuran if any infection.  Safety labs from  today shows normal results.             # Follow up in about 3 months (around 10/11/2019).      Disclaimer: This note was prepared using voice recognition system and is likely to have sound alike errors and is not proof read.  Please call me with any questions.

## 2019-07-11 NOTE — ASSESSMENT & PLAN NOTE
RNP antibody positivity with arthritis and myositis.  Myositis well controlled.  Arthritis intermittently active.  Normal CT scan of the lungs.  Normal echo.  Monitor.

## 2019-07-12 LAB — DSDNA AB SER-ACNC: NORMAL [IU]/ML

## 2019-08-03 ENCOUNTER — PATIENT MESSAGE (OUTPATIENT)
Dept: RHEUMATOLOGY | Facility: CLINIC | Age: 34
End: 2019-08-03

## 2019-08-05 ENCOUNTER — PATIENT MESSAGE (OUTPATIENT)
Dept: RHEUMATOLOGY | Facility: CLINIC | Age: 34
End: 2019-08-05

## 2019-08-07 ENCOUNTER — INFUSION (OUTPATIENT)
Dept: RHEUMATOLOGY | Facility: HOSPITAL | Age: 34
End: 2019-08-07
Attending: INTERNAL MEDICINE
Payer: COMMERCIAL

## 2019-08-07 VITALS
SYSTOLIC BLOOD PRESSURE: 133 MMHG | RESPIRATION RATE: 16 BRPM | OXYGEN SATURATION: 100 % | HEART RATE: 88 BPM | DIASTOLIC BLOOD PRESSURE: 91 MMHG | WEIGHT: 238.31 LBS | TEMPERATURE: 97 F | BODY MASS INDEX: 36.24 KG/M2

## 2019-08-07 DIAGNOSIS — M32.9 SYSTEMIC LUPUS ERYTHEMATOSUS ARTHRITIS: Primary | ICD-10-CM

## 2019-08-07 PROCEDURE — 25000003 PHARM REV CODE 250: Performed by: INTERNAL MEDICINE

## 2019-08-07 PROCEDURE — 96413 CHEMO IV INFUSION 1 HR: CPT

## 2019-08-07 PROCEDURE — 63600175 PHARM REV CODE 636 W HCPCS: Performed by: INTERNAL MEDICINE

## 2019-08-07 PROCEDURE — A4216 STERILE WATER/SALINE, 10 ML: HCPCS | Performed by: INTERNAL MEDICINE

## 2019-08-07 PROCEDURE — 96375 TX/PRO/DX INJ NEW DRUG ADDON: CPT

## 2019-08-07 RX ORDER — ACETAMINOPHEN 325 MG/1
650 TABLET ORAL
Status: CANCELLED | OUTPATIENT
Start: 2019-08-14

## 2019-08-07 RX ORDER — SODIUM CHLORIDE 0.9 % (FLUSH) 0.9 %
10 SYRINGE (ML) INJECTION
Status: DISCONTINUED | OUTPATIENT
Start: 2019-08-07 | End: 2019-08-07 | Stop reason: HOSPADM

## 2019-08-07 RX ORDER — METHYLPREDNISOLONE SOD SUCC 125 MG
100 VIAL (EA) INJECTION
Status: COMPLETED | OUTPATIENT
Start: 2019-08-07 | End: 2019-08-07

## 2019-08-07 RX ORDER — SODIUM CHLORIDE 0.9 % (FLUSH) 0.9 %
10 SYRINGE (ML) INJECTION
Status: CANCELLED | OUTPATIENT
Start: 2019-08-14

## 2019-08-07 RX ORDER — HEPARIN 100 UNIT/ML
500 SYRINGE INTRAVENOUS
Status: CANCELLED | OUTPATIENT
Start: 2019-08-14

## 2019-08-07 RX ADMIN — BELIMUMAB 1081 MG: 400 INJECTION, POWDER, LYOPHILIZED, FOR SOLUTION INTRAVENOUS at 10:08

## 2019-08-07 RX ADMIN — METHYLPREDNISOLONE SODIUM SUCCINATE 100 MG: 125 INJECTION, POWDER, FOR SOLUTION INTRAMUSCULAR; INTRAVENOUS at 10:08

## 2019-08-07 RX ADMIN — Medication 10 ML: at 10:08

## 2019-08-07 NOTE — NURSING
Infusion# >10 Benlysta q 4 weeks    Recent labs? 7/11/19    S/S infection noted or voiced? Denied/none noted  Recent or planned surgeries/invasive procedures? Denied  Recent vaccines? Denied    Premeds? Solumedrol 100 mg IVP over 3 minutes    Benlysta 10mg/kg X 108.1kg = 1081 mg administered IV at a 60 minute rate per orders; see MAR & Flow Sheet for more  details. Tolerated well without adverse events.

## 2019-08-30 ENCOUNTER — PATIENT MESSAGE (OUTPATIENT)
Dept: INTERNAL MEDICINE | Facility: CLINIC | Age: 34
End: 2019-08-30

## 2019-08-30 RX ORDER — METRONIDAZOLE 500 MG/1
500 TABLET ORAL EVERY 12 HOURS
Qty: 14 TABLET | Refills: 0 | Status: SHIPPED | OUTPATIENT
Start: 2019-08-30 | End: 2019-09-06

## 2019-09-02 ENCOUNTER — PATIENT MESSAGE (OUTPATIENT)
Dept: INTERNAL MEDICINE | Facility: CLINIC | Age: 34
End: 2019-09-02

## 2019-09-03 ENCOUNTER — PATIENT MESSAGE (OUTPATIENT)
Dept: INTERNAL MEDICINE | Facility: CLINIC | Age: 34
End: 2019-09-03

## 2019-09-03 RX ORDER — CLINDAMYCIN PHOSPHATE 20 MG/G
CREAM VAGINAL NIGHTLY
Qty: 40 G | Refills: 1 | Status: SHIPPED | OUTPATIENT
Start: 2019-09-03 | End: 2019-10-02

## 2019-09-04 ENCOUNTER — INFUSION (OUTPATIENT)
Dept: RHEUMATOLOGY | Facility: HOSPITAL | Age: 34
End: 2019-09-04
Attending: INTERNAL MEDICINE
Payer: COMMERCIAL

## 2019-09-04 VITALS
HEART RATE: 76 BPM | SYSTOLIC BLOOD PRESSURE: 126 MMHG | BODY MASS INDEX: 35.53 KG/M2 | DIASTOLIC BLOOD PRESSURE: 85 MMHG | WEIGHT: 233.69 LBS

## 2019-09-04 DIAGNOSIS — M32.9 SYSTEMIC LUPUS ERYTHEMATOSUS ARTHRITIS: Primary | ICD-10-CM

## 2019-09-04 PROCEDURE — 63600175 PHARM REV CODE 636 W HCPCS: Mod: JG | Performed by: INTERNAL MEDICINE

## 2019-09-04 PROCEDURE — 25000003 PHARM REV CODE 250: Performed by: INTERNAL MEDICINE

## 2019-09-04 PROCEDURE — A4216 STERILE WATER/SALINE, 10 ML: HCPCS | Performed by: INTERNAL MEDICINE

## 2019-09-04 PROCEDURE — 96413 CHEMO IV INFUSION 1 HR: CPT

## 2019-09-04 RX ORDER — SODIUM CHLORIDE 0.9 % (FLUSH) 0.9 %
10 SYRINGE (ML) INJECTION
Status: DISCONTINUED | OUTPATIENT
Start: 2019-09-04 | End: 2019-09-04 | Stop reason: HOSPADM

## 2019-09-04 RX ORDER — PREDNISONE 20 MG/1
20 TABLET ORAL
COMMUNITY
Start: 2019-08-18 | End: 2019-10-02

## 2019-09-04 RX ORDER — HEPARIN 100 UNIT/ML
500 SYRINGE INTRAVENOUS
Status: CANCELLED | OUTPATIENT
Start: 2019-09-11

## 2019-09-04 RX ORDER — DIPHENHYDRAMINE HYDROCHLORIDE 25 MG/1
CAPSULE ORAL
COMMUNITY
Start: 2019-08-21 | End: 2019-10-02

## 2019-09-04 RX ORDER — SODIUM CHLORIDE 0.9 % (FLUSH) 0.9 %
10 SYRINGE (ML) INJECTION
Status: CANCELLED | OUTPATIENT
Start: 2019-09-11

## 2019-09-04 RX ORDER — OXYCODONE AND ACETAMINOPHEN 5; 325 MG/1; MG/1
TABLET ORAL
COMMUNITY
Start: 2019-07-03 | End: 2019-10-02

## 2019-09-04 RX ORDER — CETIRIZINE HYDROCHLORIDE 10 MG/1
10 TABLET ORAL
COMMUNITY
Start: 2019-08-18 | End: 2019-10-02

## 2019-09-04 RX ORDER — DIPHENHYDRAMINE HCL 25 MG
25 CAPSULE ORAL
COMMUNITY
Start: 2019-08-20 | End: 2020-06-02

## 2019-09-04 RX ORDER — METHYLPREDNISOLONE SOD SUCC 125 MG
100 VIAL (EA) INJECTION
Status: DISCONTINUED | OUTPATIENT
Start: 2019-09-04 | End: 2019-09-04 | Stop reason: HOSPADM

## 2019-09-04 RX ORDER — ACETAMINOPHEN 325 MG/1
650 TABLET ORAL
Status: CANCELLED | OUTPATIENT
Start: 2019-09-11

## 2019-09-04 RX ADMIN — Medication 10 ML: at 11:09

## 2019-09-04 RX ADMIN — BELIMUMAB 1060 MG: 400 INJECTION, POWDER, LYOPHILIZED, FOR SOLUTION INTRAVENOUS at 11:09

## 2019-09-04 NOTE — NURSING
Infusion# >10    Recent labs? reviewed    S/S infection noted or voiced? denied  Recent or planned surgeries/invasive procedures? denied  Recent vaccines? denied    Premeds? none    Benlysta 10 mg/Kg= 1060 mg administered IV at a 60 minute rate per orders; see MAR & Flow Sheet for more  details. Tolerated well without adverse events

## 2019-10-02 ENCOUNTER — TELEPHONE (OUTPATIENT)
Dept: PHARMACY | Facility: CLINIC | Age: 34
End: 2019-10-02

## 2019-10-02 ENCOUNTER — LAB VISIT (OUTPATIENT)
Dept: LAB | Facility: HOSPITAL | Age: 34
End: 2019-10-02
Attending: INTERNAL MEDICINE
Payer: COMMERCIAL

## 2019-10-02 ENCOUNTER — OFFICE VISIT (OUTPATIENT)
Dept: RHEUMATOLOGY | Facility: CLINIC | Age: 34
End: 2019-10-02
Payer: COMMERCIAL

## 2019-10-02 ENCOUNTER — INFUSION (OUTPATIENT)
Dept: RHEUMATOLOGY | Facility: HOSPITAL | Age: 34
End: 2019-10-02
Attending: INTERNAL MEDICINE
Payer: COMMERCIAL

## 2019-10-02 VITALS
WEIGHT: 233.94 LBS | BODY MASS INDEX: 35.57 KG/M2 | HEART RATE: 79 BPM | DIASTOLIC BLOOD PRESSURE: 77 MMHG | SYSTOLIC BLOOD PRESSURE: 119 MMHG

## 2019-10-02 VITALS
HEART RATE: 78 BPM | BODY MASS INDEX: 35.45 KG/M2 | SYSTOLIC BLOOD PRESSURE: 112 MMHG | DIASTOLIC BLOOD PRESSURE: 79 MMHG | WEIGHT: 233.94 LBS | HEIGHT: 68 IN

## 2019-10-02 DIAGNOSIS — R76.8 POSITIVE SM/RNP ANTIBODY: ICD-10-CM

## 2019-10-02 DIAGNOSIS — Z79.899 LONG-TERM CURRENT USE OF HIGH RISK MEDICATION OTHER THAN ANTICOAGULANT: ICD-10-CM

## 2019-10-02 DIAGNOSIS — M32.19 OTHER SYSTEMIC LUPUS ERYTHEMATOSUS WITH OTHER ORGAN INVOLVEMENT: ICD-10-CM

## 2019-10-02 DIAGNOSIS — M35.1 MCTD (MIXED CONNECTIVE TISSUE DISEASE): ICD-10-CM

## 2019-10-02 DIAGNOSIS — I73.00 SECONDARY RAYNAUD'S PHENOMENON: ICD-10-CM

## 2019-10-02 DIAGNOSIS — M32.9 EXACERBATION OF SYSTEMIC LUPUS: ICD-10-CM

## 2019-10-02 DIAGNOSIS — M32.9 SYSTEMIC LUPUS ERYTHEMATOSUS ARTHRITIS: ICD-10-CM

## 2019-10-02 DIAGNOSIS — M32.9 EXACERBATION OF SYSTEMIC LUPUS: Primary | ICD-10-CM

## 2019-10-02 DIAGNOSIS — F33.0 MILD EPISODE OF RECURRENT MAJOR DEPRESSIVE DISORDER: ICD-10-CM

## 2019-10-02 DIAGNOSIS — M32.9 SYSTEMIC LUPUS ERYTHEMATOSUS ARTHRITIS: Primary | ICD-10-CM

## 2019-10-02 LAB
ALBUMIN SERPL BCP-MCNC: 3.6 G/DL (ref 3.5–5.2)
ALP SERPL-CCNC: 57 U/L (ref 55–135)
ALT SERPL W/O P-5'-P-CCNC: 11 U/L (ref 10–44)
ANION GAP SERPL CALC-SCNC: 9 MMOL/L (ref 8–16)
AST SERPL-CCNC: 17 U/L (ref 10–40)
BASOPHILS # BLD AUTO: 0.03 K/UL (ref 0–0.2)
BASOPHILS NFR BLD: 0.6 % (ref 0–1.9)
BILIRUB SERPL-MCNC: 0.4 MG/DL (ref 0.1–1)
BNP SERPL-MCNC: 23 PG/ML (ref 0–99)
BUN SERPL-MCNC: 7 MG/DL (ref 6–20)
CALCIUM SERPL-MCNC: 9.7 MG/DL (ref 8.7–10.5)
CHLORIDE SERPL-SCNC: 104 MMOL/L (ref 95–110)
CK SERPL-CCNC: 62 U/L (ref 20–180)
CO2 SERPL-SCNC: 26 MMOL/L (ref 23–29)
CREAT SERPL-MCNC: 0.6 MG/DL (ref 0.5–1.4)
CRP SERPL-MCNC: 15.8 MG/L (ref 0–8.2)
DIFFERENTIAL METHOD: ABNORMAL
EOSINOPHIL # BLD AUTO: 0.4 K/UL (ref 0–0.5)
EOSINOPHIL NFR BLD: 8.6 % (ref 0–8)
ERYTHROCYTE [DISTWIDTH] IN BLOOD BY AUTOMATED COUNT: 12.1 % (ref 11.5–14.5)
ERYTHROCYTE [SEDIMENTATION RATE] IN BLOOD BY WESTERGREN METHOD: 58 MM/HR (ref 0–36)
EST. GFR  (AFRICAN AMERICAN): >60 ML/MIN/1.73 M^2
EST. GFR  (NON AFRICAN AMERICAN): >60 ML/MIN/1.73 M^2
GLUCOSE SERPL-MCNC: 80 MG/DL (ref 70–110)
HCT VFR BLD AUTO: 37.4 % (ref 37–48.5)
HGB BLD-MCNC: 12.4 G/DL (ref 12–16)
IMM GRANULOCYTES # BLD AUTO: 0.01 K/UL (ref 0–0.04)
IMM GRANULOCYTES NFR BLD AUTO: 0.2 % (ref 0–0.5)
LYMPHOCYTES # BLD AUTO: 0.8 K/UL (ref 1–4.8)
LYMPHOCYTES NFR BLD: 16.8 % (ref 18–48)
MCH RBC QN AUTO: 29.6 PG (ref 27–31)
MCHC RBC AUTO-ENTMCNC: 33.2 G/DL (ref 32–36)
MCV RBC AUTO: 89 FL (ref 82–98)
MONOCYTES # BLD AUTO: 0.4 K/UL (ref 0.3–1)
MONOCYTES NFR BLD: 8 % (ref 4–15)
NEUTROPHILS # BLD AUTO: 3.1 K/UL (ref 1.8–7.7)
NEUTROPHILS NFR BLD: 66 % (ref 38–73)
NRBC BLD-RTO: 0 /100 WBC
PLATELET # BLD AUTO: 560 K/UL (ref 150–350)
PMV BLD AUTO: 8.6 FL (ref 9.2–12.9)
POTASSIUM SERPL-SCNC: 4 MMOL/L (ref 3.5–5.1)
PROT SERPL-MCNC: 8.1 G/DL (ref 6–8.4)
RBC # BLD AUTO: 4.19 M/UL (ref 4–5.4)
SODIUM SERPL-SCNC: 139 MMOL/L (ref 136–145)
WBC # BLD AUTO: 4.76 K/UL (ref 3.9–12.7)

## 2019-10-02 PROCEDURE — 3008F PR BODY MASS INDEX (BMI) DOCUMENTED: ICD-10-PCS | Mod: CPTII,S$GLB,, | Performed by: INTERNAL MEDICINE

## 2019-10-02 PROCEDURE — 3008F BODY MASS INDEX DOCD: CPT | Mod: CPTII,S$GLB,, | Performed by: INTERNAL MEDICINE

## 2019-10-02 PROCEDURE — 63600175 PHARM REV CODE 636 W HCPCS: Performed by: INTERNAL MEDICINE

## 2019-10-02 PROCEDURE — 86160 COMPLEMENT ANTIGEN: CPT

## 2019-10-02 PROCEDURE — 82085 ASSAY OF ALDOLASE: CPT

## 2019-10-02 PROCEDURE — 25000003 PHARM REV CODE 250: Performed by: INTERNAL MEDICINE

## 2019-10-02 PROCEDURE — 99999 PR PBB SHADOW E&M-EST. PATIENT-LVL III: CPT | Mod: PBBFAC,,, | Performed by: INTERNAL MEDICINE

## 2019-10-02 PROCEDURE — 99215 OFFICE O/P EST HI 40 MIN: CPT | Mod: S$GLB,,, | Performed by: INTERNAL MEDICINE

## 2019-10-02 PROCEDURE — 96375 TX/PRO/DX INJ NEW DRUG ADDON: CPT

## 2019-10-02 PROCEDURE — 86140 C-REACTIVE PROTEIN: CPT

## 2019-10-02 PROCEDURE — 99215 PR OFFICE/OUTPT VISIT, EST, LEVL V, 40-54 MIN: ICD-10-PCS | Mod: S$GLB,,, | Performed by: INTERNAL MEDICINE

## 2019-10-02 PROCEDURE — A4216 STERILE WATER/SALINE, 10 ML: HCPCS | Performed by: INTERNAL MEDICINE

## 2019-10-02 PROCEDURE — 82550 ASSAY OF CK (CPK): CPT

## 2019-10-02 PROCEDURE — 36415 COLL VENOUS BLD VENIPUNCTURE: CPT

## 2019-10-02 PROCEDURE — 80053 COMPREHEN METABOLIC PANEL: CPT

## 2019-10-02 PROCEDURE — 83880 ASSAY OF NATRIURETIC PEPTIDE: CPT

## 2019-10-02 PROCEDURE — 86160 COMPLEMENT ANTIGEN: CPT | Mod: 59

## 2019-10-02 PROCEDURE — 0034U TPMT NUDT15 GENES: CPT

## 2019-10-02 PROCEDURE — 83520 IMMUNOASSAY QUANT NOS NONAB: CPT

## 2019-10-02 PROCEDURE — 86235 NUCLEAR ANTIGEN ANTIBODY: CPT

## 2019-10-02 PROCEDURE — 96413 CHEMO IV INFUSION 1 HR: CPT

## 2019-10-02 PROCEDURE — 99999 PR PBB SHADOW E&M-EST. PATIENT-LVL III: ICD-10-PCS | Mod: PBBFAC,,, | Performed by: INTERNAL MEDICINE

## 2019-10-02 PROCEDURE — 85025 COMPLETE CBC W/AUTO DIFF WBC: CPT

## 2019-10-02 PROCEDURE — 85652 RBC SED RATE AUTOMATED: CPT

## 2019-10-02 RX ORDER — HYDROXYCHLOROQUINE SULFATE 200 MG/1
400 TABLET, FILM COATED ORAL DAILY
Qty: 60 TABLET | Refills: 11 | Status: SHIPPED | OUTPATIENT
Start: 2019-10-02 | End: 2020-04-02 | Stop reason: SDUPTHER

## 2019-10-02 RX ORDER — SODIUM CHLORIDE 0.9 % (FLUSH) 0.9 %
10 SYRINGE (ML) INJECTION
Status: CANCELLED | OUTPATIENT
Start: 2019-10-09

## 2019-10-02 RX ORDER — METHYLPREDNISOLONE SOD SUCC 125 MG
100 VIAL (EA) INJECTION
Status: COMPLETED | OUTPATIENT
Start: 2019-10-02 | End: 2019-10-02

## 2019-10-02 RX ORDER — DULOXETIN HYDROCHLORIDE 30 MG/1
30 CAPSULE, DELAYED RELEASE ORAL DAILY
Qty: 30 CAPSULE | Refills: 12 | Status: SHIPPED | OUTPATIENT
Start: 2019-10-02 | End: 2020-01-30 | Stop reason: ALTCHOICE

## 2019-10-02 RX ORDER — AZATHIOPRINE 50 MG/1
50 TABLET ORAL 2 TIMES DAILY
Qty: 60 TABLET | Refills: 3 | Status: SHIPPED | OUTPATIENT
Start: 2019-10-02 | End: 2019-11-19

## 2019-10-02 RX ORDER — ACETAMINOPHEN 325 MG/1
650 TABLET ORAL
Status: CANCELLED | OUTPATIENT
Start: 2019-10-09

## 2019-10-02 RX ORDER — HEPARIN 100 UNIT/ML
500 SYRINGE INTRAVENOUS
Status: CANCELLED | OUTPATIENT
Start: 2019-10-09

## 2019-10-02 RX ORDER — SODIUM CHLORIDE 0.9 % (FLUSH) 0.9 %
10 SYRINGE (ML) INJECTION
Status: DISCONTINUED | OUTPATIENT
Start: 2019-10-02 | End: 2019-10-02 | Stop reason: HOSPADM

## 2019-10-02 RX ADMIN — METHYLPREDNISOLONE SODIUM SUCCINATE 100 MG: 125 INJECTION, POWDER, FOR SOLUTION INTRAMUSCULAR; INTRAVENOUS at 10:10

## 2019-10-02 RX ADMIN — BELIMUMAB 1061 MG: 400 INJECTION, POWDER, LYOPHILIZED, FOR SOLUTION INTRAVENOUS at 10:10

## 2019-10-02 RX ADMIN — Medication 10 ML: at 10:10

## 2019-10-02 NOTE — TELEPHONE ENCOUNTER
LVM for callback to inform patient that [facility] received prescription for Azathioprine and benefits investigation is required.  [facility] will be back in touch once insurance determination is received.

## 2019-10-02 NOTE — Clinical Note
Benlysta with 100 mg solumedrol today. CBC, CMP, ESR, CRP, C3, C4, UA, U P/C ratio AND Other labs ordered during the visit TODAY .

## 2019-10-02 NOTE — NURSING
Infusion# >10    Recent labs? 10/2/2019    S/S infection noted or voiced? Denies  Recent or planned surgeries/invasive procedures? Denies  Recent vaccines? Denies    Premeds? Solumedrol 100 mg IVP over 2 minutes today only    Benlysta 1061 mg administered IV at a 60 minute rate per orders; see MAR & Flow Sheet for more  details. Tolerated well without adverse events

## 2019-10-02 NOTE — PLAN OF CARE
Instructed pt on infection prevention measures. Encouraged frequent hand washing and use of antibacteria hand sanitizers as needed. Instructed to notify Rheumatology Provider/ Infusion Dept for any antibiotic use, illness, vaccinations or surgeries/invasive procedures before, on, or after infusion date. Pt verbalizes understanding.

## 2019-10-03 LAB
ALDOLASE SERPL-CCNC: 5.1 U/L (ref 1.2–7.6)
C3 SERPL-MCNC: 158 MG/DL (ref 50–180)
C4 SERPL-MCNC: 40 MG/DL (ref 11–44)

## 2019-10-03 NOTE — PROGRESS NOTES
RHEUMATOLOGY CLINIC FOLLOW UP VISIT  Chief complaints:-  To follow up for lupus.    HPI:-  Stefanie Kimbrough a 33 y.o. pleasant female comes in for a follow up visit.  She complains of multiple problems at home and at work due to which she has not been on any of her lupus medications for the past couple of months.  Her symptoms have flared in the last 4 weeks with severe swelling and stiffness of hands and feet and the skin rash over face and hands.  She has also been having worsening muscle weakness and fatigue.  Positive photosensitive malar rash.  No sicca syndrome.  Intermittent Raynaud's.  No ulcers.  No gangrene.  No chest pain, shortness of breath or headache.  No seizures.  No migraine.    Review of Systems   Constitutional: Positive for malaise/fatigue. Negative for chills and fever.   HENT: Negative for congestion and sore throat.    Eyes: Negative for blurred vision and redness.   Respiratory: Negative for cough and shortness of breath.    Cardiovascular: Negative for chest pain and leg swelling.   Gastrointestinal: Negative for abdominal pain.   Genitourinary: Negative for dysuria.   Musculoskeletal: Positive for back pain, joint pain, myalgias and neck pain. Negative for falls.   Skin: Negative for rash.   Neurological: Negative for headaches.   Endo/Heme/Allergies: Does not bruise/bleed easily.   Psychiatric/Behavioral: Negative for memory loss. The patient does not have insomnia.        Past Medical History:   Diagnosis Date    Lupus (systemic lupus erythematosus)        Past Surgical History:   Procedure Laterality Date    TUBAL LIGATION  2005        Social History     Tobacco Use    Smoking status: Never Smoker    Smokeless tobacco: Never Used   Substance Use Topics    Alcohol use: No    Drug use: No       Family History   Problem Relation Age of Onset    Liver cancer Maternal Aunt     Breast cancer Maternal Aunt     Breast cancer  "Paternal Grandmother        Review of patient's allergies indicates:   Allergen Reactions    Elavil [amitriptyline]        Vitals:    10/02/19 0829   BP: 112/79   Pulse: 78   Weight: 106.1 kg (233 lb 14.5 oz)   Height: 5' 8" (1.727 m)   PainSc:   8       Physical Exam   Constitutional: She is oriented to person, place, and time and well-developed, well-nourished, and in no distress. No distress.   HENT:   Head: Normocephalic.   Mouth/Throat: Oropharynx is clear and moist.   Eyes: Pupils are equal, round, and reactive to light. Conjunctivae and EOM are normal.   Neck: Normal range of motion. Neck supple.   Cardiovascular: Normal rate and intact distal pulses.   Pulmonary/Chest: Effort normal. No respiratory distress.   Abdominal: Soft. There is no tenderness.   Musculoskeletal:   Mild synovitis over multiple PIP and MCP joints.  Tenderness around ankles and wrists.  No fingertip scars or ulcers.  No telangiectasias.  No sclerodactyly but some minimal puffiness present.  Mechanics hand like deformity in the palmar side.  No Gottron's papules.  Muscle strength 4+ by 5 proximal and distal including neck flexors.   Neurological: She is alert and oriented to person, place, and time. No cranial nerve deficit.   Skin: Skin is warm. Rash noted. There is erythema.   Psychiatric: Mood and affect normal.   Nursing note and vitals reviewed.      Medication List with Changes/Refills   Current Medications    CELECOXIB (CELEBREX) 200 MG CAPSULE    Take 1 capsule (200 mg total) by mouth 2 (two) times daily as needed for Pain.    CLOBETASOL (TEMOVATE) 0.05 % EXTERNAL SOLUTION    Apply topically 2 (two) times daily.    DIPHENHYDRAMINE (BENADRYL) 25 MG CAPSULE    Take 25 mg by mouth.    FLUOCINOLONE (DERMA-SMOOTHE) 0.01 % EXTERNAL OIL    Apply oil to scalp twice a day    KETOCONAZOLE (NIZORAL) 2 % SHAMPOO    Wash hair with medicated shampoo at least 1x/week - let sit on scalp at least 5 minutes prior to rinsing    TACROLIMUS " (PROTOPIC) 0.1 % OINTMENT    Apply topically 2 (two) times daily.    TRAZODONE (DESYREL) 50 MG TABLET    Take 1 tablet (50 mg total) by mouth every evening.    TRIAMCINOLONE ACETONIDE 0.1% (KENALOG) 0.1 % OINTMENT    AAA bid   Changed and/or Refilled Medications    Modified Medication Previous Medication    AZATHIOPRINE (IMURAN) 50 MG TAB azaTHIOprine (IMURAN) 50 mg Tab       Take 1 tablet (50 mg total) by mouth 2 (two) times daily.    Take 1 tablet (50 mg total) by mouth 2 (two) times daily.    DULOXETINE (CYMBALTA) 30 MG CAPSULE DULoxetine (CYMBALTA) 30 MG capsule       Take 1 capsule (30 mg total) by mouth once daily.    Take 1 capsule (30 mg total) by mouth once daily.    HYDROXYCHLOROQUINE (PLAQUENIL) 200 MG TABLET hydroxychloroquine (PLAQUENIL) 200 mg tablet       Take 2 tablets (400 mg total) by mouth once daily.    Take 200 mg by mouth 2 (two) times daily.   Discontinued Medications    BANOPHEN 25 MG CAPSULE        CETIRIZINE (ZYRTEC) 10 MG TABLET    Take 10 mg by mouth.    CLINDAMYCIN (CLINDESSE) 2 % VAGINAL CREAM    Place vaginally every evening. For 3 days    OXYCODONE-ACETAMINOPHEN (PERCOCET) 5-325 MG PER TABLET    Take 1/2 to 1 q 4-6 hrs. Prn pain    PREDNISONE (DELTASONE) 20 MG TABLET    Take 20 mg by mouth.       Assessment/Plans:-  1. Exacerbation of systemic lupus    2. Other systemic lupus erythematosus with other organ involvement    3. Long-term current use of high risk medication other than anticoagulant    4. Positive sm/RNP antibody    5. Secondary Raynaud's phenomenon    6. MCTD (mixed connective tissue disease)    7. Systemic lupus erythematosus arthritis    8. Mild episode of recurrent major depressive disorder      Problem List Items Addressed This Visit        Psychiatric    Mild episode of recurrent major depressive disorder    Current Assessment & Plan     Advised to restart Cymbalta.  Referral sent to psychiatrist.         Relevant Medications    DULoxetine (CYMBALTA) 30 MG capsule        Cardiac/Vascular    Secondary Raynaud's phenomenon    Current Assessment & Plan     Active Raynaud's.  With some digital puffiness.  No ulcer or fingertips scars.  Consider calcium channel blocker in the next visit if no improvement with conservative measures.            Immunology/Multi System    Exacerbation of systemic lupus - Primary    Current Assessment & Plan     Severe exacerbation of systemic lupus erythematosus with active arthritis and malar rash.  Due to multiple personal recent she has been off medications both Imuran and Plaquenil for the past couple of months.  Restart medications.  Discussed in detail about the importance of the medication adherence for systemic lupus.  Voiced understanding.  Referral sent to psychiatrist to establish care and get treated further for depression.  She has also been off of her Cymbalta for the past couple of months.  Refill provided today.         Relevant Medications    hydroxychloroquine (PLAQUENIL) 200 mg tablet    azaTHIOprine (IMURAN) 50 mg Tab    Other Relevant Orders    CK    Aldolase (Completed)    Anti-scleroderma antibody    RNA polymerase III Ab, IgG    B-TYPE NATRIURETIC PEPTIDE (Completed)    Thiopurine Methyltrans (TPMT) Genotyping    Systemic lupus erythematosus arthritis    Current Assessment & Plan     Active polyarticular symmetric small joint inflammatory arthritis.  Mild synovitis.  Probably controlled because of Benlysta.  Continue Benlysta.  Restart Imuran and Plaquenil.         MCTD (mixed connective tissue disease)    Current Assessment & Plan     RNP antibody positivity with arthritis and myositis.  Flare-up due to medication on adherence.  Normal CT scan of lungs.  Normal echo.  Monitor.         Relevant Orders    CK    Aldolase (Completed)    Anti-scleroderma antibody    RNA polymerase III Ab, IgG    B-TYPE NATRIURETIC PEPTIDE (Completed)    Thiopurine Methyltrans (TPMT) Genotyping    Positive sm/RNP antibody    Current Assessment & Plan      No interstitial lung disease.  Myositis positive.  No pulmonary artery hypertension.            Other    Long-term current use of high risk medication -Imuran    Current Assessment & Plan     Check safety labs today.  Refill provided on Imuran.  Medication adherence and lab test adherence advised.         Relevant Medications    azaTHIOprine (IMURAN) 50 mg Tab        # Follow up in about 2 months (around 12/2/2019).      Disclaimer: This note was prepared using voice recognition system and is likely to have sound alike errors and is not proof read.  Please call me with any questions.

## 2019-10-03 NOTE — ASSESSMENT & PLAN NOTE
Check safety labs today.  Refill provided on Imuran.  Medication adherence and lab test adherence advised.

## 2019-10-03 NOTE — ASSESSMENT & PLAN NOTE
Active polyarticular symmetric small joint inflammatory arthritis.  Mild synovitis.  Probably controlled because of Benlysta.  Continue Benlysta.  Restart Imuran and Plaquenil.

## 2019-10-03 NOTE — ASSESSMENT & PLAN NOTE
Active Raynaud's.  With some digital puffiness.  No ulcer or fingertips scars.  Consider calcium channel blocker in the next visit if no improvement with conservative measures.

## 2019-10-03 NOTE — ASSESSMENT & PLAN NOTE
RNP antibody positivity with arthritis and myositis.  Flare-up due to medication on adherence.  Normal CT scan of lungs.  Normal echo.  Monitor.

## 2019-10-03 NOTE — ASSESSMENT & PLAN NOTE
Severe exacerbation of systemic lupus erythematosus with active arthritis and malar rash.  Due to multiple personal recent she has been off medications both Imuran and Plaquenil for the past couple of months.  Restart medications.  Discussed in detail about the importance of the medication adherence for systemic lupus.  Voiced understanding.  Referral sent to psychiatrist to establish care and get treated further for depression.  She has also been off of her Cymbalta for the past couple of months.  Refill provided today.

## 2019-10-04 LAB — ENA SCL70 AB SER-ACNC: 4 UNITS

## 2019-10-07 LAB
NUDT15 GENOTYPE: NORMAL
NUDT15 PHENOTYPE: NORMAL
TPMT ADDITIONAL INFORMATION: NORMAL
TPMT DISCLAIMER: NORMAL
TPMT GENOTYPE RESULT: NORMAL
TPMT INTERPRETATION: NORMAL
TPMT METHOD: NORMAL
TPMT PHENOTYPE: NORMAL
TPMT REVIEWED BY: NORMAL

## 2019-10-09 LAB — RNA POLYMERASE III ANTIBODIES, IGG, SERUM: 14.1 U

## 2019-10-11 NOTE — TELEPHONE ENCOUNTER
DOCUMENTATION ONLY:  Azathioprine 50 mg #60/30 does not require a prior authorization through the patient's insurance.    Co-pay: $0.00    Patient Assistance IS NOT required. Sending to clinical pharmacist for  and shipment. Brigid MARTINEZ

## 2019-10-16 ENCOUNTER — PATIENT MESSAGE (OUTPATIENT)
Dept: PHARMACY | Facility: CLINIC | Age: 34
End: 2019-10-16

## 2019-10-16 NOTE — TELEPHONE ENCOUNTER
MARGARETM and sent [employer] message for pt on 10/16 for Imuran Initial Consult. $0 copay in 004. Will continue to follow up.

## 2019-10-23 NOTE — TELEPHONE ENCOUNTER
Initial consult and shipment Imuran from [facility]. Patient declined full consult, she is well experienced to medication. Confirmed shipment from [facility] 10/24 for her to receive 10/25. Address verified. [facility] process for refills and follow ups reviewed. $0 copay (004). No questions or concerns today.

## 2019-10-24 DIAGNOSIS — M32.9 SYSTEMIC LUPUS ERYTHEMATOSUS ARTHRITIS: ICD-10-CM

## 2019-10-24 RX ORDER — TRAZODONE HYDROCHLORIDE 50 MG/1
50 TABLET ORAL NIGHTLY
Qty: 30 TABLET | Refills: 11 | Status: SHIPPED | OUTPATIENT
Start: 2019-10-24 | End: 2020-01-30 | Stop reason: SDUPTHER

## 2019-10-30 ENCOUNTER — INFUSION (OUTPATIENT)
Dept: RHEUMATOLOGY | Facility: HOSPITAL | Age: 34
End: 2019-10-30
Attending: INTERNAL MEDICINE
Payer: COMMERCIAL

## 2019-10-30 VITALS
DIASTOLIC BLOOD PRESSURE: 66 MMHG | OXYGEN SATURATION: 99 % | WEIGHT: 233.69 LBS | HEART RATE: 72 BPM | SYSTOLIC BLOOD PRESSURE: 99 MMHG | TEMPERATURE: 99 F | RESPIRATION RATE: 18 BRPM | BODY MASS INDEX: 35.53 KG/M2

## 2019-10-30 DIAGNOSIS — M32.9 SYSTEMIC LUPUS ERYTHEMATOSUS ARTHRITIS: Primary | ICD-10-CM

## 2019-10-30 DIAGNOSIS — R76.8 POSITIVE SM/RNP ANTIBODY: ICD-10-CM

## 2019-10-30 DIAGNOSIS — M32.9 EXACERBATION OF SYSTEMIC LUPUS: ICD-10-CM

## 2019-10-30 DIAGNOSIS — M35.1 MCTD (MIXED CONNECTIVE TISSUE DISEASE): ICD-10-CM

## 2019-10-30 DIAGNOSIS — J40 BRONCHITIS: ICD-10-CM

## 2019-10-30 PROCEDURE — 96375 TX/PRO/DX INJ NEW DRUG ADDON: CPT

## 2019-10-30 PROCEDURE — 63600175 PHARM REV CODE 636 W HCPCS: Performed by: INTERNAL MEDICINE

## 2019-10-30 PROCEDURE — 96413 CHEMO IV INFUSION 1 HR: CPT

## 2019-10-30 PROCEDURE — 25000003 PHARM REV CODE 250: Performed by: INTERNAL MEDICINE

## 2019-10-30 PROCEDURE — A4216 STERILE WATER/SALINE, 10 ML: HCPCS | Performed by: INTERNAL MEDICINE

## 2019-10-30 RX ORDER — ACETAMINOPHEN 325 MG/1
650 TABLET ORAL
Status: CANCELLED | OUTPATIENT
Start: 2019-11-06

## 2019-10-30 RX ORDER — HEPARIN 100 UNIT/ML
500 SYRINGE INTRAVENOUS
Status: CANCELLED | OUTPATIENT
Start: 2019-11-06

## 2019-10-30 RX ORDER — SODIUM CHLORIDE 0.9 % (FLUSH) 0.9 %
10 SYRINGE (ML) INJECTION
Status: DISCONTINUED | OUTPATIENT
Start: 2019-10-30 | End: 2019-10-30 | Stop reason: HOSPADM

## 2019-10-30 RX ORDER — AZITHROMYCIN 250 MG/1
TABLET, FILM COATED ORAL
Qty: 6 TABLET | Refills: 0 | Status: SHIPPED | OUTPATIENT
Start: 2019-10-30 | End: 2019-11-04

## 2019-10-30 RX ORDER — METHYLPREDNISOLONE SOD SUCC 125 MG
100 VIAL (EA) INJECTION
Status: COMPLETED | OUTPATIENT
Start: 2019-10-30 | End: 2019-10-30

## 2019-10-30 RX ORDER — SODIUM CHLORIDE 0.9 % (FLUSH) 0.9 %
10 SYRINGE (ML) INJECTION
Status: CANCELLED | OUTPATIENT
Start: 2019-11-06

## 2019-10-30 RX ADMIN — METHYLPREDNISOLONE SODIUM SUCCINATE 100 MG: 125 INJECTION, POWDER, FOR SOLUTION INTRAMUSCULAR; INTRAVENOUS at 11:10

## 2019-10-30 RX ADMIN — BELIMUMAB 1060 MG: 400 INJECTION, POWDER, LYOPHILIZED, FOR SOLUTION INTRAVENOUS at 11:10

## 2019-10-30 RX ADMIN — Medication 10 ML: at 11:10

## 2019-10-30 NOTE — NURSING
Infusion # >10 - Benlsyta 10 mg/kg q 4 weeks  Today's weight- 106.0 kg  Last dose- 10/2/19    Any:  -recent illness, infection, or antibiotic use in past week- Seen at [facility] on 10/29/19 for cough and sore throat and dx'ed with viral URI. Denies sore throat today. Dr. SHORE made aware and gave ok to infuse Ms. España if she was afebrile and if heart rate was in normal range. Ms. España was afebrile and heart rate was 87 bpm.  -open wounds or mouth sores- denies  -invasive procedures or surgeries in past 4 weeks or in upcoming 4 weeks- denies  -vaccinations in past week- denies  -any new symptoms/change in symptoms-denies  -chance you may be pregnant- denies      Recent labs? 10/2/19  Last Rheumatology provider visit- Seen by Dr. SHORE  on 10/2/19     Premeds-100 mg Solumedrol IVP over 3 mintues     Benlsyta 1,060 mg administered IV at a 60 minute rate per orders; see MAR and vitals for more  details. Tolerated well without adverse events, discharged and ambulatory out of clinic.

## 2019-11-14 ENCOUNTER — PATIENT MESSAGE (OUTPATIENT)
Dept: INTERNAL MEDICINE | Facility: CLINIC | Age: 34
End: 2019-11-14

## 2019-11-14 RX ORDER — FLUCONAZOLE 150 MG/1
150 TABLET ORAL DAILY
Qty: 1 TABLET | Refills: 1 | Status: SHIPPED | OUTPATIENT
Start: 2019-11-14 | End: 2019-11-15

## 2019-11-15 ENCOUNTER — TELEPHONE (OUTPATIENT)
Dept: PHARMACY | Facility: CLINIC | Age: 34
End: 2019-11-15

## 2019-11-19 ENCOUNTER — OFFICE VISIT (OUTPATIENT)
Dept: RHEUMATOLOGY | Facility: CLINIC | Age: 34
End: 2019-11-19
Payer: COMMERCIAL

## 2019-11-19 ENCOUNTER — LAB VISIT (OUTPATIENT)
Dept: LAB | Facility: HOSPITAL | Age: 34
End: 2019-11-19
Attending: INTERNAL MEDICINE
Payer: COMMERCIAL

## 2019-11-19 VITALS
HEART RATE: 84 BPM | HEIGHT: 68 IN | BODY MASS INDEX: 35.55 KG/M2 | SYSTOLIC BLOOD PRESSURE: 140 MMHG | WEIGHT: 234.56 LBS | DIASTOLIC BLOOD PRESSURE: 68 MMHG

## 2019-11-19 DIAGNOSIS — Z79.899 LONG-TERM USE OF PLAQUENIL: ICD-10-CM

## 2019-11-19 DIAGNOSIS — M32.9 SYSTEMIC LUPUS ERYTHEMATOSUS ARTHRITIS: ICD-10-CM

## 2019-11-19 DIAGNOSIS — M32.19 OTHER SYSTEMIC LUPUS ERYTHEMATOSUS WITH OTHER ORGAN INVOLVEMENT: ICD-10-CM

## 2019-11-19 DIAGNOSIS — M35.1 MCTD (MIXED CONNECTIVE TISSUE DISEASE): ICD-10-CM

## 2019-11-19 DIAGNOSIS — Z79.899 LONG-TERM CURRENT USE OF HIGH RISK MEDICATION OTHER THAN ANTICOAGULANT: ICD-10-CM

## 2019-11-19 DIAGNOSIS — E66.9 OBESITY, CLASS II, BMI 35-39.9: ICD-10-CM

## 2019-11-19 DIAGNOSIS — M32.9 SYSTEMIC LUPUS ERYTHEMATOSUS ARTHRITIS: Primary | ICD-10-CM

## 2019-11-19 DIAGNOSIS — I73.00 SECONDARY RAYNAUD'S PHENOMENON: ICD-10-CM

## 2019-11-19 DIAGNOSIS — R76.8 POSITIVE SM/RNP ANTIBODY: ICD-10-CM

## 2019-11-19 DIAGNOSIS — D84.9 IMMUNOSUPPRESSED STATUS: ICD-10-CM

## 2019-11-19 LAB
ALBUMIN SERPL BCP-MCNC: 3.7 G/DL (ref 3.5–5.2)
ALP SERPL-CCNC: 52 U/L (ref 55–135)
ALT SERPL W/O P-5'-P-CCNC: 13 U/L (ref 10–44)
ANION GAP SERPL CALC-SCNC: 11 MMOL/L (ref 8–16)
AST SERPL-CCNC: 15 U/L (ref 10–40)
BASOPHILS # BLD AUTO: 0.03 K/UL (ref 0–0.2)
BASOPHILS NFR BLD: 0.7 % (ref 0–1.9)
BILIRUB SERPL-MCNC: 0.3 MG/DL (ref 0.1–1)
BUN SERPL-MCNC: 9 MG/DL (ref 6–20)
C3 SERPL-MCNC: 163 MG/DL (ref 50–180)
C4 SERPL-MCNC: 40 MG/DL (ref 11–44)
CALCIUM SERPL-MCNC: 9.7 MG/DL (ref 8.7–10.5)
CHLORIDE SERPL-SCNC: 102 MMOL/L (ref 95–110)
CK SERPL-CCNC: 54 U/L (ref 20–180)
CO2 SERPL-SCNC: 24 MMOL/L (ref 23–29)
CREAT SERPL-MCNC: 0.8 MG/DL (ref 0.5–1.4)
CRP SERPL-MCNC: 16.1 MG/L (ref 0–8.2)
DIFFERENTIAL METHOD: ABNORMAL
EOSINOPHIL # BLD AUTO: 0.6 K/UL (ref 0–0.5)
EOSINOPHIL NFR BLD: 13.6 % (ref 0–8)
ERYTHROCYTE [DISTWIDTH] IN BLOOD BY AUTOMATED COUNT: 12.3 % (ref 11.5–14.5)
ERYTHROCYTE [SEDIMENTATION RATE] IN BLOOD BY WESTERGREN METHOD: 51 MM/HR (ref 0–36)
EST. GFR  (AFRICAN AMERICAN): >60 ML/MIN/1.73 M^2
EST. GFR  (NON AFRICAN AMERICAN): >60 ML/MIN/1.73 M^2
GLUCOSE SERPL-MCNC: 112 MG/DL (ref 70–110)
HCT VFR BLD AUTO: 39.7 % (ref 37–48.5)
HGB BLD-MCNC: 12.8 G/DL (ref 12–16)
IMM GRANULOCYTES # BLD AUTO: 0.02 K/UL (ref 0–0.04)
IMM GRANULOCYTES NFR BLD AUTO: 0.5 % (ref 0–0.5)
LYMPHOCYTES # BLD AUTO: 0.9 K/UL (ref 1–4.8)
LYMPHOCYTES NFR BLD: 21.6 % (ref 18–48)
MCH RBC QN AUTO: 29.6 PG (ref 27–31)
MCHC RBC AUTO-ENTMCNC: 32.2 G/DL (ref 32–36)
MCV RBC AUTO: 92 FL (ref 82–98)
MONOCYTES # BLD AUTO: 0.4 K/UL (ref 0.3–1)
MONOCYTES NFR BLD: 9.4 % (ref 4–15)
NEUTROPHILS # BLD AUTO: 2.4 K/UL (ref 1.8–7.7)
NEUTROPHILS NFR BLD: 54.7 % (ref 38–73)
NRBC BLD-RTO: 0 /100 WBC
PLATELET # BLD AUTO: 555 K/UL (ref 150–350)
PMV BLD AUTO: 8.4 FL (ref 9.2–12.9)
POTASSIUM SERPL-SCNC: 4.2 MMOL/L (ref 3.5–5.1)
PROT SERPL-MCNC: 7.9 G/DL (ref 6–8.4)
RBC # BLD AUTO: 4.32 M/UL (ref 4–5.4)
SODIUM SERPL-SCNC: 137 MMOL/L (ref 136–145)
WBC # BLD AUTO: 4.35 K/UL (ref 3.9–12.7)

## 2019-11-19 PROCEDURE — 99999 PR PBB SHADOW E&M-EST. PATIENT-LVL III: CPT | Mod: PBBFAC,,, | Performed by: INTERNAL MEDICINE

## 2019-11-19 PROCEDURE — 3008F PR BODY MASS INDEX (BMI) DOCUMENTED: ICD-10-PCS | Mod: CPTII,S$GLB,, | Performed by: INTERNAL MEDICINE

## 2019-11-19 PROCEDURE — 82550 ASSAY OF CK (CPK): CPT

## 2019-11-19 PROCEDURE — 36415 COLL VENOUS BLD VENIPUNCTURE: CPT

## 2019-11-19 PROCEDURE — 85652 RBC SED RATE AUTOMATED: CPT

## 2019-11-19 PROCEDURE — 3008F BODY MASS INDEX DOCD: CPT | Mod: CPTII,S$GLB,, | Performed by: INTERNAL MEDICINE

## 2019-11-19 PROCEDURE — 99215 OFFICE O/P EST HI 40 MIN: CPT | Mod: S$GLB,,, | Performed by: INTERNAL MEDICINE

## 2019-11-19 PROCEDURE — 99999 PR PBB SHADOW E&M-EST. PATIENT-LVL III: ICD-10-PCS | Mod: PBBFAC,,, | Performed by: INTERNAL MEDICINE

## 2019-11-19 PROCEDURE — 85025 COMPLETE CBC W/AUTO DIFF WBC: CPT

## 2019-11-19 PROCEDURE — 86160 COMPLEMENT ANTIGEN: CPT

## 2019-11-19 PROCEDURE — 99215 PR OFFICE/OUTPT VISIT, EST, LEVL V, 40-54 MIN: ICD-10-PCS | Mod: S$GLB,,, | Performed by: INTERNAL MEDICINE

## 2019-11-19 PROCEDURE — 86140 C-REACTIVE PROTEIN: CPT

## 2019-11-19 PROCEDURE — 86160 COMPLEMENT ANTIGEN: CPT | Mod: 59

## 2019-11-19 PROCEDURE — 86225 DNA ANTIBODY NATIVE: CPT

## 2019-11-19 PROCEDURE — 80053 COMPREHEN METABOLIC PANEL: CPT

## 2019-11-19 RX ORDER — FOLIC ACID 1 MG/1
1 TABLET ORAL DAILY
Qty: 90 TABLET | Refills: 0 | Status: SHIPPED | OUTPATIENT
Start: 2019-11-19 | End: 2020-02-20

## 2019-11-19 RX ORDER — PREDNISONE 5 MG/1
5 TABLET ORAL DAILY PRN
Qty: 90 TABLET | Refills: 0 | Status: SHIPPED | OUTPATIENT
Start: 2019-11-19 | End: 2020-01-30 | Stop reason: SDUPTHER

## 2019-11-19 RX ORDER — METHOTREXATE 2.5 MG/1
TABLET ORAL
Qty: 32 TABLET | Refills: 1 | Status: SHIPPED | OUTPATIENT
Start: 2019-11-19 | End: 2020-01-30 | Stop reason: SDUPTHER

## 2019-11-19 NOTE — PROGRESS NOTES
RHEUMATOLOGY CLINIC FOLLOW UP VISIT  Chief complaints:-  My hands hurt.  HPI:-  Stefanie Kimbrough a 33 y.o. pleasant female comes in for a follow up visit with above chief complaints. She is on imuran, benlysta , plaquenil  for her SLE . Her manifestations includes arthritis, myalgias, elevated aldolase, fatigue and puffy fingers bilateral.  She reports doing better in terms of her fatigue and weakness but her arthritis is still active.  She still has prolonged morning stiffness.  Her symptoms are intermittent but has been persistent in the last couple of weeks.  She does report significant improvement after the infusion.  She denies any adverse effects from Plaquenil or immune and other than mild gastric upset when she started taking it.  The rash never returned.  Review of Systems   Constitutional: Negative for chills and fever.   HENT: Negative for congestion and sore throat.    Eyes: Negative for blurred vision and redness.   Respiratory: Negative for cough and shortness of breath.    Cardiovascular: Negative for chest pain and leg swelling.   Gastrointestinal: Negative for abdominal pain.   Genitourinary: Negative for dysuria.   Musculoskeletal: Positive for back pain, joint pain and neck pain. Negative for falls and myalgias.   Skin: Positive for rash.   Neurological: Negative for headaches.   Endo/Heme/Allergies: Does not bruise/bleed easily.   Psychiatric/Behavioral: Negative for memory loss. The patient does not have insomnia.        Past Medical History:   Diagnosis Date    Lupus (systemic lupus erythematosus)        Past Surgical History:   Procedure Laterality Date    TUBAL LIGATION  2005        Social History     Tobacco Use    Smoking status: Never Smoker    Smokeless tobacco: Never Used   Substance Use Topics    Alcohol use: No    Drug use: No       Family History   Problem Relation Age of Onset    Liver cancer Maternal Aunt     Breast  "cancer Maternal Aunt     Breast cancer Paternal Grandmother        Review of patient's allergies indicates:   Allergen Reactions    Elavil [amitriptyline]            Physical examination:-    Vitals:    11/19/19 1222   BP: (!) 140/68   Pulse: 84   Weight: 106.4 kg (234 lb 9.1 oz)   Height: 5' 8" (1.727 m)   PainSc:   6       Physical Exam   Constitutional: She is oriented to person, place, and time and well-developed, well-nourished, and in no distress. No distress.   HENT:   Head: Normocephalic.   Mouth/Throat: Oropharynx is clear and moist.   Eyes: Pupils are equal, round, and reactive to light. Conjunctivae and EOM are normal.   Neck: Normal range of motion. Neck supple.   Cardiovascular: Normal rate and intact distal pulses.   Pulmonary/Chest: Effort normal. No respiratory distress.   Abdominal: Soft. There is no tenderness.   Musculoskeletal:   Mild synovitis present over bilateral second, third and fourth PIP's. Wrists tender. Few tender points, but no  effusion over large joints except significant tenderness of bilateral knee joints.   Neurological: She is alert and oriented to person, place, and time. No cranial nerve deficit.   Skin: Skin is warm. No rash noted. No erythema.   Depigmentation present over face extending above forehead involving entire face and malar area, sparing preauricular regions- Significant improvement since last visit.    Psychiatric: Mood and affect normal.   Nursing note and vitals reviewed.      Labs:-  Results for MARILIA SALEEM (MRN [id number]) as of 12/1/2017 15:37   Ref. Range 8/30/2017 14:50   MELVA HEP-2 Titer Unknown Positive >=1:2560...   Anti-SSA Antibody Latest Ref Range: 0.00 - 19.99 EU 1.63   Anti-SSA Interpretation Latest Ref Range: Negative  Negative   Anti-SSB Antibody Latest Ref Range: 0.00 - 19.99 EU 1.88   Anti-SSB Interpretation Latest Ref Range: Negative  Negative   ds DNA Ab Latest Ref Range: Negative 1:10  Negative 1:10   Anti Sm Antibody Latest Ref Range: " 0.00 - 19.99 EU 12.86   Anti-Sm Interpretation Latest Ref Range: Negative  Negative   Anti Sm/RNP Antibody Latest Ref Range: 0.00 - 19.99 .12 (H)   Anti-Sm/RNP Interpretation Latest Ref Range: Negative  Positive (A)   Complement (C-3) Latest Ref Range: 50 - 180 mg/dL 134   Complement (C-4) Latest Ref Range: 11 - 44 mg/dL 28       Medication List with Changes/Refills   New Medications    FOLIC ACID (FOLVITE) 1 MG TABLET    Take 1 tablet (1 mg total) by mouth once daily.    METHOTREXATE 2.5 MG TAB    Take 10 mg once a week first week, then 15 mg next week and then 20 mg weekly thereafter    PREDNISONE (DELTASONE) 5 MG TABLET    Take 1 tablet (5 mg total) by mouth daily as needed.   Current Medications    CELECOXIB (CELEBREX) 200 MG CAPSULE    Take 1 capsule (200 mg total) by mouth 2 (two) times daily as needed for Pain.    CLOBETASOL (TEMOVATE) 0.05 % EXTERNAL SOLUTION    Apply topically 2 (two) times daily.    DIPHENHYDRAMINE (BENADRYL) 25 MG CAPSULE    Take 25 mg by mouth.    DULOXETINE (CYMBALTA) 30 MG CAPSULE    Take 1 capsule (30 mg total) by mouth once daily.    FLUOCINOLONE (DERMA-SMOOTHE) 0.01 % EXTERNAL OIL    Apply oil to scalp twice a day    HYDROXYCHLOROQUINE (PLAQUENIL) 200 MG TABLET    Take 2 tablets (400 mg total) by mouth once daily.    KETOCONAZOLE (NIZORAL) 2 % SHAMPOO    Wash hair with medicated shampoo at least 1x/week - let sit on scalp at least 5 minutes prior to rinsing    TACROLIMUS (PROTOPIC) 0.1 % OINTMENT    Apply topically 2 (two) times daily.    TRAZODONE (DESYREL) 50 MG TABLET    Take 1 tablet (50 mg total) by mouth every evening.    TRIAMCINOLONE ACETONIDE 0.1% (KENALOG) 0.1 % OINTMENT    AAA bid   Discontinued Medications    AZATHIOPRINE (IMURAN) 50 MG TAB    Take 1 tablet (50 mg total) by mouth 2 (two) times daily.     Assessment/Plans:-  1. Systemic lupus erythematosus arthritis    2. MCTD (mixed connective tissue disease)    3. Positive sm/RNP antibody    4. Secondary Raynaud's  phenomenon    5. Long-term current use of high risk medication other than anticoagulant    6. Immunosuppressed status    7. Long-term use of Plaquenil    8. Obesity, Class II, BMI 35-39.9      Problem List Items Addressed This Visit        Cardiac/Vascular    Secondary Raynaud's phenomenon    Current Assessment & Plan     Well controlled.  No digital source or ulcers.  No old healed scars.  Consider calcium channel blocker in future if needed            Immunology/Multi System    Systemic lupus erythematosus arthritis - Primary    Current Assessment & Plan     Active systemic lupus erythematosus with polyarthritis but well controlled myositis.  Continue Plaquenil.  Switch Imuran to methotrexate.  Continue monthly Benlysta infusion.  Low-dose p.r.n. 5 mg daily prednisone okay to use.         Relevant Medications    predniSONE (DELTASONE) 5 MG tablet    methotrexate 2.5 MG Tab    folic acid (FOLVITE) 1 MG tablet    Immunosuppressed status    Current Assessment & Plan     Compromised immune system secondary to autoimmune disease and use of immunosuppressive drugs. Monitor carefully for infections. Advised to get immediate medical care if any infection. Also advised strict adherence to age appropriate vaccinations and cancer screenings with PCP.          MCTD (mixed connective tissue disease)    Current Assessment & Plan     RNP antibody positivity with polyarthritis and myositis.  Myositis well controlled on present therapy but arthritis is still active.  No interstitial lung disease.  Normal echo.  Switch Imuran to methotrexate.  Continue Benlysta for polyarthritis.  Monitor muscle enzymes.         Relevant Medications    predniSONE (DELTASONE) 5 MG tablet    methotrexate 2.5 MG Tab    folic acid (FOLVITE) 1 MG tablet    Positive sm/RNP antibody    Current Assessment & Plan     No interstitial lung disease.  No pulmonary hypertension.  Low-grade myositis well controlled.            Endocrine    Obesity, Class II, BMI  35-39.9    Current Assessment & Plan     Advised to continue low carb diet            Other    Long-term use of Plaquenil    Current Assessment & Plan     Continue follow-up with ophthalmologist for yearly Plaquenil screening         Long-term current use of high risk medication -methotrexate    Current Assessment & Plan     Hold methotrexate if any infection.  Safety labs in 4 weeks and then repeat every 12 weeks.               # Follow up in about 3 months (around 2/26/2020).  Disclaimer: This note was prepared using voice recognition system and is likely to have sound alike errors .  Please call me with any questions

## 2019-11-19 NOTE — ASSESSMENT & PLAN NOTE
Well controlled.  No digital source or ulcers.  No old healed scars.  Consider calcium channel blocker in future if needed

## 2019-11-19 NOTE — ASSESSMENT & PLAN NOTE
Active systemic lupus erythematosus with polyarthritis but well controlled myositis.  Continue Plaquenil.  Switch Imuran to methotrexate.  Continue monthly Benlysta infusion.  Low-dose p.r.n. 5 mg daily prednisone okay to use.

## 2019-11-19 NOTE — ASSESSMENT & PLAN NOTE
RNP antibody positivity with polyarthritis and myositis.  Myositis well controlled on present therapy but arthritis is still active.  No interstitial lung disease.  Normal echo.  Switch Imuran to methotrexate.  Continue Benlysta for polyarthritis.  Monitor muscle enzymes.

## 2019-11-20 LAB — DSDNA AB SER-ACNC: NORMAL [IU]/ML

## 2019-11-21 DIAGNOSIS — Z79.899 LONG-TERM CURRENT USE OF HIGH RISK MEDICATION OTHER THAN ANTICOAGULANT: ICD-10-CM

## 2019-11-21 DIAGNOSIS — M32.19 OTHER SYSTEMIC LUPUS ERYTHEMATOSUS WITH OTHER ORGAN INVOLVEMENT: ICD-10-CM

## 2019-11-21 RX ORDER — AZATHIOPRINE 50 MG/1
50 TABLET ORAL 2 TIMES DAILY
Qty: 60 TABLET | Refills: 3 | Status: SHIPPED | OUTPATIENT
Start: 2019-11-21 | End: 2019-11-21

## 2019-11-26 ENCOUNTER — INFUSION (OUTPATIENT)
Dept: RHEUMATOLOGY | Facility: HOSPITAL | Age: 34
End: 2019-11-26
Payer: COMMERCIAL

## 2019-11-26 VITALS
HEART RATE: 77 BPM | BODY MASS INDEX: 35.5 KG/M2 | RESPIRATION RATE: 18 BRPM | TEMPERATURE: 97 F | WEIGHT: 233.44 LBS | SYSTOLIC BLOOD PRESSURE: 114 MMHG | DIASTOLIC BLOOD PRESSURE: 76 MMHG | OXYGEN SATURATION: 98 %

## 2019-11-26 DIAGNOSIS — M32.9 SYSTEMIC LUPUS ERYTHEMATOSUS ARTHRITIS: Primary | ICD-10-CM

## 2019-11-26 DIAGNOSIS — R76.8 POSITIVE SM/RNP ANTIBODY: ICD-10-CM

## 2019-11-26 DIAGNOSIS — M35.1 MCTD (MIXED CONNECTIVE TISSUE DISEASE): ICD-10-CM

## 2019-11-26 DIAGNOSIS — M32.9 EXACERBATION OF SYSTEMIC LUPUS: ICD-10-CM

## 2019-11-26 PROCEDURE — 63600175 PHARM REV CODE 636 W HCPCS: Performed by: INTERNAL MEDICINE

## 2019-11-26 PROCEDURE — 25000003 PHARM REV CODE 250: Performed by: INTERNAL MEDICINE

## 2019-11-26 PROCEDURE — A4216 STERILE WATER/SALINE, 10 ML: HCPCS | Performed by: INTERNAL MEDICINE

## 2019-11-26 PROCEDURE — 96413 CHEMO IV INFUSION 1 HR: CPT

## 2019-11-26 RX ORDER — HEPARIN 100 UNIT/ML
500 SYRINGE INTRAVENOUS
Status: CANCELLED | OUTPATIENT
Start: 2019-12-03

## 2019-11-26 RX ORDER — SODIUM CHLORIDE 0.9 % (FLUSH) 0.9 %
10 SYRINGE (ML) INJECTION
Status: CANCELLED | OUTPATIENT
Start: 2019-12-03

## 2019-11-26 RX ORDER — SODIUM CHLORIDE 0.9 % (FLUSH) 0.9 %
10 SYRINGE (ML) INJECTION
Status: DISCONTINUED | OUTPATIENT
Start: 2019-11-26 | End: 2019-11-26 | Stop reason: HOSPADM

## 2019-11-26 RX ORDER — ACETAMINOPHEN 325 MG/1
650 TABLET ORAL
Status: CANCELLED | OUTPATIENT
Start: 2019-12-03

## 2019-11-26 RX ADMIN — Medication 10 ML: at 10:11

## 2019-11-26 RX ADMIN — BELIMUMAB 1059 MG: 400 INJECTION, POWDER, LYOPHILIZED, FOR SOLUTION INTRAVENOUS at 10:11

## 2019-11-26 NOTE — NURSING
Infusion # >10 - Benlysta 10 mg/kg q 4 weeks  Today's weight-105.9 kg  Last dose- 10/31/19    Any:  -recent illness, infection, or antibiotic use in past week- denies  -open wounds or mouth sores- denies  -invasive procedures or surgeries in past 4 weeks or in upcoming 4 weeks- denies  -vaccinations in past week- denies  -any new symptoms/change in symptoms-denies  -chance you may be pregnant- denies      Recent labs? 11/19/19  Last Rheumatology provider visit- Seen by Dr. SHORE on 11/19/19     Premeds-none     Benlsyta 1,059 mg administered IV at a 60 minute rate per orders; see MAR and vitals for more  details. Tolerated well without adverse events, discharged and ambulatory out of clinic.

## 2019-11-30 NOTE — TELEPHONE ENCOUNTER
Patient called to confirm if provider no longer wants her on Imuran.  No answer - lvm for call back.     Steffen Odonnell, Pharm.D.  Pharmacy Resident, PGY-1   [facility]

## 2019-12-27 ENCOUNTER — INFUSION (OUTPATIENT)
Dept: RHEUMATOLOGY | Facility: HOSPITAL | Age: 34
End: 2019-12-27
Attending: INTERNAL MEDICINE
Payer: COMMERCIAL

## 2019-12-27 VITALS
OXYGEN SATURATION: 100 % | RESPIRATION RATE: 18 BRPM | SYSTOLIC BLOOD PRESSURE: 120 MMHG | BODY MASS INDEX: 35.87 KG/M2 | HEART RATE: 75 BPM | WEIGHT: 235.88 LBS | DIASTOLIC BLOOD PRESSURE: 70 MMHG | TEMPERATURE: 97 F

## 2019-12-27 DIAGNOSIS — M32.9 SYSTEMIC LUPUS ERYTHEMATOSUS ARTHRITIS: Primary | ICD-10-CM

## 2019-12-27 DIAGNOSIS — M35.1 MCTD (MIXED CONNECTIVE TISSUE DISEASE): ICD-10-CM

## 2019-12-27 DIAGNOSIS — M32.9 EXACERBATION OF SYSTEMIC LUPUS: ICD-10-CM

## 2019-12-27 DIAGNOSIS — R76.8 POSITIVE SM/RNP ANTIBODY: ICD-10-CM

## 2019-12-27 PROCEDURE — 63600175 PHARM REV CODE 636 W HCPCS: Performed by: INTERNAL MEDICINE

## 2019-12-27 PROCEDURE — 96413 CHEMO IV INFUSION 1 HR: CPT

## 2019-12-27 RX ORDER — SODIUM CHLORIDE 0.9 % (FLUSH) 0.9 %
10 SYRINGE (ML) INJECTION
Status: DISCONTINUED | OUTPATIENT
Start: 2019-12-27 | End: 2019-12-27 | Stop reason: HOSPADM

## 2019-12-27 RX ORDER — SODIUM CHLORIDE 0.9 % (FLUSH) 0.9 %
10 SYRINGE (ML) INJECTION
Status: CANCELLED | OUTPATIENT
Start: 2020-01-03

## 2019-12-27 RX ORDER — ACETAMINOPHEN 325 MG/1
650 TABLET ORAL
Status: CANCELLED | OUTPATIENT
Start: 2020-01-03

## 2019-12-27 RX ORDER — HEPARIN 100 UNIT/ML
500 SYRINGE INTRAVENOUS
Status: CANCELLED | OUTPATIENT
Start: 2020-01-03

## 2019-12-27 RX ADMIN — BELIMUMAB 1070 MG: 120 INJECTION, POWDER, LYOPHILIZED, FOR SOLUTION INTRAVENOUS at 11:12

## 2019-12-27 NOTE — NURSING
Infusion # >10 - Benlysta 10 mg/kg q 4 weeks  Today's weight-107 kg  Last dose- 11/26/19   Any:  -recent illness, infection, or antibiotic use in past week- denies  -open wounds or mouth sores- denies  -invasive procedures or surgeries in past 4 weeks or in upcoming 4 weeks- denies  -vaccinations in past week- denies  -any new symptoms/change in symptoms-denies  -chance you may be pregnant- denies        Recent labs? 12/27/19  Last Rheumatology provider visit- Seen by Dr. SHORE on 11/19/19     Premeds-none     Benlsyta 1,070 mg administered IV at a 60 minute rate per orders; see MAR and vitals for more  details. Tolerated well without adverse events, discharged and ambulatory out of clinic.

## 2020-01-13 ENCOUNTER — TELEPHONE (OUTPATIENT)
Dept: RHEUMATOLOGY | Facility: CLINIC | Age: 35
End: 2020-01-13

## 2020-01-13 NOTE — TELEPHONE ENCOUNTER
----- Message from Tramaine Torres sent at 1/13/2020  1:23 PM CST -----  Contact: pt   .Type:  Patient Returning Call    Who Called: pt   Who Left Message for Patient: nurse   Does the patient know what this is regarding?: no   Would the patient rather a call back or a response via [employer]? Callback   Best Call Back Number: [phone]    Additional Information:

## 2020-01-30 ENCOUNTER — PATIENT MESSAGE (OUTPATIENT)
Dept: RHEUMATOLOGY | Facility: CLINIC | Age: 35
End: 2020-01-30

## 2020-01-30 ENCOUNTER — INFUSION (OUTPATIENT)
Dept: INFUSION THERAPY | Facility: HOSPITAL | Age: 35
End: 2020-01-30
Attending: FAMILY MEDICINE
Payer: COMMERCIAL

## 2020-01-30 ENCOUNTER — OFFICE VISIT (OUTPATIENT)
Dept: RHEUMATOLOGY | Facility: CLINIC | Age: 35
End: 2020-01-30
Payer: COMMERCIAL

## 2020-01-30 VITALS
WEIGHT: 240.75 LBS | HEIGHT: 68 IN | SYSTOLIC BLOOD PRESSURE: 129 MMHG | HEART RATE: 93 BPM | BODY MASS INDEX: 36.49 KG/M2 | DIASTOLIC BLOOD PRESSURE: 84 MMHG

## 2020-01-30 VITALS
BODY MASS INDEX: 36.6 KG/M2 | WEIGHT: 240.75 LBS | SYSTOLIC BLOOD PRESSURE: 111 MMHG | HEART RATE: 82 BPM | DIASTOLIC BLOOD PRESSURE: 74 MMHG

## 2020-01-30 DIAGNOSIS — M35.1 MCTD (MIXED CONNECTIVE TISSUE DISEASE): ICD-10-CM

## 2020-01-30 DIAGNOSIS — I73.00 SECONDARY RAYNAUD'S PHENOMENON: ICD-10-CM

## 2020-01-30 DIAGNOSIS — Z79.899 LONG-TERM USE OF PLAQUENIL: ICD-10-CM

## 2020-01-30 DIAGNOSIS — M32.9 SYSTEMIC LUPUS ERYTHEMATOSUS ARTHRITIS: Primary | ICD-10-CM

## 2020-01-30 DIAGNOSIS — R76.8 POSITIVE SM/RNP ANTIBODY: ICD-10-CM

## 2020-01-30 DIAGNOSIS — Z79.899 LONG-TERM CURRENT USE OF HIGH RISK MEDICATION OTHER THAN ANTICOAGULANT: ICD-10-CM

## 2020-01-30 DIAGNOSIS — M25.561 CHRONIC PAIN OF BOTH KNEES: ICD-10-CM

## 2020-01-30 DIAGNOSIS — G89.29 CHRONIC PAIN OF BOTH KNEES: ICD-10-CM

## 2020-01-30 DIAGNOSIS — D84.9 IMMUNOSUPPRESSED STATUS: ICD-10-CM

## 2020-01-30 DIAGNOSIS — M25.562 CHRONIC PAIN OF BOTH KNEES: ICD-10-CM

## 2020-01-30 DIAGNOSIS — M32.9 EXACERBATION OF SYSTEMIC LUPUS: ICD-10-CM

## 2020-01-30 DIAGNOSIS — M32.9 SYSTEMIC LUPUS ERYTHEMATOSUS ARTHRITIS: ICD-10-CM

## 2020-01-30 PROCEDURE — 25000003 PHARM REV CODE 250: Performed by: INTERNAL MEDICINE

## 2020-01-30 PROCEDURE — A4216 STERILE WATER/SALINE, 10 ML: HCPCS | Performed by: INTERNAL MEDICINE

## 2020-01-30 PROCEDURE — 63600175 PHARM REV CODE 636 W HCPCS: Performed by: INTERNAL MEDICINE

## 2020-01-30 PROCEDURE — 99999 PR PBB SHADOW E&M-EST. PATIENT-LVL III: CPT | Mod: PBBFAC,,, | Performed by: INTERNAL MEDICINE

## 2020-01-30 PROCEDURE — 99215 PR OFFICE/OUTPT VISIT, EST, LEVL V, 40-54 MIN: ICD-10-PCS | Mod: S$GLB,,, | Performed by: INTERNAL MEDICINE

## 2020-01-30 PROCEDURE — 99999 PR PBB SHADOW E&M-EST. PATIENT-LVL III: ICD-10-PCS | Mod: PBBFAC,,, | Performed by: INTERNAL MEDICINE

## 2020-01-30 PROCEDURE — 96413 CHEMO IV INFUSION 1 HR: CPT

## 2020-01-30 PROCEDURE — 3008F BODY MASS INDEX DOCD: CPT | Mod: CPTII,S$GLB,, | Performed by: INTERNAL MEDICINE

## 2020-01-30 PROCEDURE — 96375 TX/PRO/DX INJ NEW DRUG ADDON: CPT

## 2020-01-30 PROCEDURE — 99215 OFFICE O/P EST HI 40 MIN: CPT | Mod: S$GLB,,, | Performed by: INTERNAL MEDICINE

## 2020-01-30 PROCEDURE — 3008F PR BODY MASS INDEX (BMI) DOCUMENTED: ICD-10-PCS | Mod: CPTII,S$GLB,, | Performed by: INTERNAL MEDICINE

## 2020-01-30 RX ORDER — TRAZODONE HYDROCHLORIDE 50 MG/1
50 TABLET ORAL NIGHTLY
Qty: 30 TABLET | Refills: 11 | Status: SHIPPED | OUTPATIENT
Start: 2020-01-30 | End: 2020-12-02

## 2020-01-30 RX ORDER — PREDNISONE 10 MG/1
TABLET ORAL
Qty: 30 TABLET | Refills: 0 | Status: SHIPPED | OUTPATIENT
Start: 2020-01-30 | End: 2020-04-15 | Stop reason: SDUPTHER

## 2020-01-30 RX ORDER — SODIUM CHLORIDE 0.9 % (FLUSH) 0.9 %
10 SYRINGE (ML) INJECTION
Status: DISCONTINUED | OUTPATIENT
Start: 2020-01-30 | End: 2020-01-30 | Stop reason: HOSPADM

## 2020-01-30 RX ORDER — PREDNISONE 5 MG/1
5 TABLET ORAL DAILY PRN
Qty: 90 TABLET | Refills: 3 | Status: SHIPPED | OUTPATIENT
Start: 2020-01-30 | End: 2020-03-18 | Stop reason: SDUPTHER

## 2020-01-30 RX ORDER — METHYLPREDNISOLONE SOD SUCC 125 MG
100 VIAL (EA) INJECTION
Status: COMPLETED | OUTPATIENT
Start: 2020-01-30 | End: 2020-01-30

## 2020-01-30 RX ORDER — HEPARIN 100 UNIT/ML
500 SYRINGE INTRAVENOUS
Status: CANCELLED | OUTPATIENT
Start: 2020-02-20

## 2020-01-30 RX ORDER — METHOTREXATE 2.5 MG/1
TABLET ORAL
Qty: 32 TABLET | Refills: 1 | Status: SHIPPED | OUTPATIENT
Start: 2020-01-30 | End: 2020-03-03 | Stop reason: SDUPTHER

## 2020-01-30 RX ORDER — ACETAMINOPHEN 325 MG/1
650 TABLET ORAL
Status: CANCELLED | OUTPATIENT
Start: 2020-02-20

## 2020-01-30 RX ORDER — SODIUM CHLORIDE 0.9 % (FLUSH) 0.9 %
10 SYRINGE (ML) INJECTION
Status: CANCELLED | OUTPATIENT
Start: 2020-02-20

## 2020-01-30 RX ADMIN — METHYLPREDNISOLONE SODIUM SUCCINATE 100 MG: 125 INJECTION, POWDER, FOR SOLUTION INTRAMUSCULAR; INTRAVENOUS at 10:01

## 2020-01-30 RX ADMIN — SODIUM CHLORIDE: 9 INJECTION, SOLUTION INTRAVENOUS at 10:01

## 2020-01-30 RX ADMIN — BELIMUMAB 1092 MG: 400 INJECTION, POWDER, LYOPHILIZED, FOR SOLUTION INTRAVENOUS at 10:01

## 2020-01-30 RX ADMIN — Medication 10 ML: at 10:01

## 2020-01-30 ASSESSMENT — SYSTEMIC LUPUS ERYTHEMATOSUS DISEASE ACTIVITY INDEX (SLEDAI): TOTAL_SCORE: 4

## 2020-01-30 NOTE — ASSESSMENT & PLAN NOTE
RNP antibody positivity with polyarthritis and myositis.  Well controlled myositis with mild arthritis still present.  No interstitial lung disease.  Normal echo.  Continue methotrexate.  Continue Benlysta her polyarthritis.  Monitor muscle weakness.

## 2020-01-30 NOTE — NURSING
Infusion# >10 Benlysta 10 mg/kg q 4 weeks    Recent labs? 12/27/19    S/S infection noted or voiced? Denied  Recent or planned surgeries/invasive procedures? Denied  Recent vaccines? Denied    Premeds? Solumedrol 100 mg IVP over 3 minutes.     Benlysta 1092 mg administered IV at a 60 minute rate per orders; see MAR & Flow Sheet for more  details. Tolerated well without adverse event.    Noted:  Patient saw  today - Dr. SHORE ok with patient getting Solumedrol as a premed today.

## 2020-01-30 NOTE — PLAN OF CARE
Problem: Adult Inpatient Plan of Care  Goal: Plan of Care Review  Outcome: Ongoing, Progressing  Goal: Absence of Hospital-Acquired Illness or Injury  Outcome: Ongoing, Progressing  Goal: Optimal Comfort and Wellbeing  Outcome: Ongoing, Progressing     Problem: Infection  Goal: Infection Symptom Resolution  Outcome: Ongoing, Progressing   Patient reports, generalized pain 7/10 and feeling weak and tired.

## 2020-01-30 NOTE — ASSESSMENT & PLAN NOTE
Mild activity still present on Benlysta, methotrexate and intermittent prednisone.  Imuran and Plaquenil discontinued because of loss of efficacy.  Tolerating methotrexate well.  Continue Benlysta with methotrexate.

## 2020-01-30 NOTE — PROGRESS NOTES
RHEUMATOLOGY CLINIC FOLLOW UP VISIT  Chief complaints:-  To follow-up for lupus  HPI:-  Stefanie Kimbrough a 34 y.o. pleasant female comes in for a follow up visit with above chief complaints. She is on methotrexate, benlysta , plaquenil  for her SLE . Her manifestations includes arthritis, myalgias, elevated aldolase, fatigue and puffy fingers bilateral.  She reports doing better in terms of her fatigue and weakness but her arthritis is still intermittently active.  She still has mild to moderate morning stiffness.  She does report significant improvement after the infusion.  She denies any adverse effects methotrexate.  Plaquenil discontinued because of lack of efficacy .  Review of Systems   Constitutional: Negative for chills and fever.   HENT: Negative for congestion and sore throat.    Eyes: Negative for blurred vision and redness.   Respiratory: Negative for cough and shortness of breath.    Cardiovascular: Negative for chest pain and leg swelling.   Gastrointestinal: Negative for abdominal pain.   Genitourinary: Negative for dysuria.   Musculoskeletal: Positive for back pain, joint pain and neck pain. Negative for falls and myalgias.   Skin: Negative for rash.   Neurological: Negative for headaches.   Endo/Heme/Allergies: Does not bruise/bleed easily.   Psychiatric/Behavioral: Negative for memory loss. The patient does not have insomnia.        Past Medical History:   Diagnosis Date    Lupus (systemic lupus erythematosus)        Past Surgical History:   Procedure Laterality Date    TUBAL LIGATION  2005        Social History     Tobacco Use    Smoking status: Never Smoker    Smokeless tobacco: Never Used   Substance Use Topics    Alcohol use: No    Drug use: No       Family History   Problem Relation Age of Onset    Liver cancer Maternal Aunt     Breast cancer Maternal Aunt     Breast cancer Paternal Grandmother        Review of patient's  "allergies indicates:   Allergen Reactions    Elavil [amitriptyline]            Physical examination:-    Vitals:    01/30/20 0925   BP: 129/84   Pulse: 93   Weight: 109.2 kg (240 lb 11.9 oz)   Height: 5' 8" (1.727 m)   PainSc:   8   PainLoc: Generalized       Physical Exam   Constitutional: She is oriented to person, place, and time and well-developed, well-nourished, and in no distress. No distress.   HENT:   Head: Normocephalic.   Mouth/Throat: Oropharynx is clear and moist.   Eyes: Pupils are equal, round, and reactive to light. Conjunctivae and EOM are normal.   Neck: Normal range of motion. Neck supple.   Cardiovascular: Normal rate and intact distal pulses.   Pulmonary/Chest: Effort normal. No respiratory distress.   Abdominal: Soft. There is no tenderness.   Musculoskeletal:   Mild synovitis present over bilateral second, third and fourth PIP's. Wrists tender. Few tender points, but no  effusion over large joints except significant tenderness of bilateral knee joints.   Neurological: She is alert and oriented to person, place, and time. No cranial nerve deficit.   Skin: Skin is warm. No rash noted. No erythema.   Depigmentation present over face extending above forehead involving entire face and malar area, sparing preauricular regions- Significant improvement since last visit.    Psychiatric: Mood and affect normal.   Nursing note and vitals reviewed.      Labs:-  Results for MARILIA SALEEM (MRN [id number]) as of 12/1/2017 15:37   Ref. Range 8/30/2017 14:50   MELVA HEP-2 Titer Unknown Positive >=1:2560...   Anti-SSA Antibody Latest Ref Range: 0.00 - 19.99 EU 1.63   Anti-SSA Interpretation Latest Ref Range: Negative  Negative   Anti-SSB Antibody Latest Ref Range: 0.00 - 19.99 EU 1.88   Anti-SSB Interpretation Latest Ref Range: Negative  Negative   ds DNA Ab Latest Ref Range: Negative 1:10  Negative 1:10   Anti Sm Antibody Latest Ref Range: 0.00 - 19.99 EU 12.86   Anti-Sm Interpretation Latest Ref Range: " Negative  Negative   Anti Sm/RNP Antibody Latest Ref Range: 0.00 - 19.99 .12 (H)   Anti-Sm/RNP Interpretation Latest Ref Range: Negative  Positive (A)   Complement (C-3) Latest Ref Range: 50 - 180 mg/dL 134   Complement (C-4) Latest Ref Range: 11 - 44 mg/dL 28       Medication List with Changes/Refills   New Medications    PREDNISONE (DELTASONE) 10 MG TABLET    Take 10 mg along with 5 mg if any joint flares   Current Medications    CLOBETASOL (TEMOVATE) 0.05 % EXTERNAL SOLUTION    Apply topically 2 (two) times daily.    DIPHENHYDRAMINE (BENADRYL) 25 MG CAPSULE    Take 25 mg by mouth.    FLUOCINOLONE (DERMA-SMOOTHE) 0.01 % EXTERNAL OIL    Apply oil to scalp twice a day    FOLIC ACID (FOLVITE) 1 MG TABLET    Take 1 tablet (1 mg total) by mouth once daily.    HYDROXYCHLOROQUINE (PLAQUENIL) 200 MG TABLET    Take 2 tablets (400 mg total) by mouth once daily.    KETOCONAZOLE (NIZORAL) 2 % SHAMPOO    Wash hair with medicated shampoo at least 1x/week - let sit on scalp at least 5 minutes prior to rinsing    TACROLIMUS (PROTOPIC) 0.1 % OINTMENT    Apply topically 2 (two) times daily.    TRIAMCINOLONE ACETONIDE 0.1% (KENALOG) 0.1 % OINTMENT    AAA bid   Changed and/or Refilled Medications    Modified Medication Previous Medication    METHOTREXATE 2.5 MG TAB methotrexate 2.5 MG Tab       Take 10 mg once a week first week, then 15 mg next week and then 20 mg weekly thereafter    Take 10 mg once a week first week, then 15 mg next week and then 20 mg weekly thereafter    PREDNISONE (DELTASONE) 5 MG TABLET predniSONE (DELTASONE) 5 MG tablet       Take 1 tablet (5 mg total) by mouth daily as needed (pain).    Take 1 tablet (5 mg total) by mouth daily as needed.    TRAZODONE (DESYREL) 50 MG TABLET traZODone (DESYREL) 50 MG tablet       Take 1 tablet (50 mg total) by mouth every evening.    Take 1 tablet (50 mg total) by mouth every evening.   Discontinued Medications    DULOXETINE (CYMBALTA) 30 MG CAPSULE    Take 1 capsule (30  mg total) by mouth once daily.     Assessment/Plans:-  1. Systemic lupus erythematosus arthritis    2. MCTD (mixed connective tissue disease)    3. Secondary Raynaud's phenomenon    4. Long-term current use of high risk medication other than anticoagulant    5. Immunosuppressed status    6. Long-term use of Plaquenil    7. Chronic pain of both knees      Problem List Items Addressed This Visit        Cardiac/Vascular    Secondary Raynaud's phenomenon    Current Assessment & Plan     Well controlled Raynaud's on conservative therapy.  Consider calcium channel blocker if needed.            Immunology/Multi System    Systemic lupus erythematosus arthritis - Primary    Current Assessment & Plan     Mild activity still present on Benlysta, methotrexate and intermittent prednisone.  Imuran and Plaquenil discontinued because of loss of efficacy.  Tolerating methotrexate well.  Continue Benlysta with methotrexate.         Relevant Medications    predniSONE (DELTASONE) 5 MG tablet    predniSONE (DELTASONE) 10 MG tablet    Other Relevant Orders    Ambulatory Referral to Physical/Occupational Therapy    Immunosuppressed status    Current Assessment & Plan     Compromised immune system secondary to autoimmune disease and use of immunosuppressive drugs. Monitor carefully for infections. Advised to get immediate medical care if any infection. Also advised strict adherence to age appropriate vaccinations and cancer screenings with PCP.          MCTD (mixed connective tissue disease)    Current Assessment & Plan     RNP antibody positivity with polyarthritis and myositis.  Well controlled myositis with mild arthritis still present.  No interstitial lung disease.  Normal echo.  Continue methotrexate.  Continue Benlysta her polyarthritis.  Monitor muscle weakness.         Relevant Medications    predniSONE (DELTASONE) 5 MG tablet    predniSONE (DELTASONE) 10 MG tablet       Orthopedic    Chronic pain of both knees    Current  Assessment & Plan     Osteoarthritis due to obesity.  Referral sent to physical therapy for muscle strengthening         Relevant Orders    Ambulatory Referral to Physical/Occupational Therapy       Other    Long-term current use of high risk medication -methotrexate    Current Assessment & Plan     Hold methotrexate if any infection safety labs today.         RESOLVED: Long-term use of Plaquenil          # Follow up in about 3 months (around 4/30/2020).  Disclaimer: This note was prepared using voice recognition system and is likely to have sound alike errors .  Please call me with any questions

## 2020-02-05 NOTE — PROGRESS NOTES
[facility]  Physical Therapy Initial Evaluation    Name: Stefanie España  Clinic Number: [id number]    Therapy Diagnosis: No diagnosis found.  Physician: Prashanth Whalen,*    Physician Orders: PT Eval and Treat   Medical Diagnosis from Referral:   M32.9 (ICD-10-CM) - Systemic lupus erythematosus arthritis   M25.561,M25.562,G89.29 (ICD-10-CM) - Chronic pain of both knees       Evaluation Date: 2/6/2020  Authorization Period Expiration: 1/29/21  Plan of Care Expiration: 3/6/20  Visit # / Visits authorized: 1/1    Time In: 9:20  Time Out: 10:00  Total Billable Time: Eval only    Precautions: Standard    Subjective   Date of onset: 4 years ago  History of current condition - Stefanie reports: pain in knees along with diagnosis of lupus 3-4 years ago.  Pt. Reports that pain in constant.  Pt. Reports limited with squatting, getting onto floor, standing too long, walking a long time, bending.  Pt. Reports itching sensation when she stands too long.       Pain:  Current 6/10, worst 10/10, best 2/10   Location: bilateral knee   Description:  aching, popping  Aggravating Factors: ascending and descending steps  Easing Factors: hot bath      Prior Therapy: None  Social History:  lives with their family  Occupation: works at [employer]  Prior Level of Function: Independent  Current Level of Function: limited with cooking, cleaning    Imaging: none noted for knees    Medical History:   Past Medical History:   Diagnosis Date    Lupus (systemic lupus erythematosus)        Surgical History:   Stefanie España  has a past surgical history that includes Tubal ligation (2005).    Medications:   Stefanie has a current medication list which includes the following prescription(s): clobetasol, diphenhydramine, fluocinolone, folic acid, hydroxychloroquine, ketoconazole, methotrexate, prednisone, prednisone, tacrolimus, trazodone, and triamcinolone acetonide 0.1%.    Allergies:   Review of patient's  allergies indicates:   Allergen Reactions    Elavil [amitriptyline]         Pts goals: to not be in pain anymore, to be able to dance for her wedding in November    Objective       CMS Impairment/Limitation/Restriction for FOTO Endocrine, Metabolic, and Immunity Survey    Therapist reviewed FOTO scores for Stefanie España on 2/6/2020.   FOTO documents entered into EPIC - see Media section.    Limitation Score: 40%  Category: Mobility    Current : CK = at least 40% but < 60% impaired, limited or restricted  Goal: CJ = at least 20% but < 40% impaired, limited or restricted  Discharge:        Gait: WNL    Squat: Double leg: weakness in B LEs, crepitus in B knees   Single leg- NT    Balance:  B 30 sec.    Reflex/Sensation:  Decreased L L1 and L L2    Knee AROM:     (L) (R)     Flexion   123 116     Extension  0 0            L R  Strength:  Hip flexors  3/5 3/5  Quadriceps  5/5 4/5      Hamstrings  5/5 5/5     Anterior Tibialis 4/5 4/5     Peroneals  5/5 4/5     Gastrocnemius 3 reps  10 reps heel raises     Adductors  1+/5 1+/5     External rotators NT/5 NT/5     Gluteus Medius 4/5 4/5     Gluteus Dalton 1+/5 1+/5     Post. Tibialis  1+/5 1+/5    Joint Mobility:     Muscle Length:  Hamstrings: B HS 90/90 0 deg.     1/2 Kneeling Soleus: NT     Clifton Test: B hip flexor and B quad tightness    Tenderness to palpation: along lumbar spine and L sciatic nerve    Special Test:       Apley Compress +R        Girth Measurement:  NT      Lower Limb Tension Test:  + L sciatic nerve       TREATMENT   Treatment Time In: 9:20  Treatment Time Out: 10:00  Total Treatment time separate from Evaluation: 4 minutes    Stefanie received therapeutic exercises to develop ROM and flexibility for 4 minutes including: L sciatic nerve x 2 min., R femoral nerve glide x 2 min.        Home Exercises and Patient Education Provided    Education provided:   -Education on condition, HEP, and activity    Written Home Exercises Provided:  yes.  Exercises were reviewed and Stefanie was able to demonstrate them prior to the end of the session.  Stefanie demonstrated good  understanding of the education provided.         Assessment   Stefanie is a 34 y.o. female referred to outpatient Physical Therapy with a medical diagnosis of    M32.9 (ICD-10-CM) - Systemic lupus erythematosus arthritis   M25.561,M25.562,G89.29 (ICD-10-CM) - Chronic pain of both knees   Pt. presents with decreased B knee flexion, mild to significant weakness in B LEs, B quad tension, and B hip flexor tension, + R sciatic nerve bias.    Pt prognosis is Good.   Pt will benefit from skilled outpatient Physical Therapy to address the deficits stated above and in the chart below, provide pt/family education, and to maximize pt's level of independence.     Plan of care discussed with patient: Yes  Pt's spiritual, cultural and educational needs considered and patient is agreeable to the plan of care and goals as stated below:     Anticipated Barriers for therapy: none    Medical Necessity is demonstrated by the following  History  Co-morbidities and personal factors that may impact the plan of care Co-morbidities:   SLE    Personal Factors:   no deficits     low   Examination  Body Structures and Functions, activity limitations and participation restrictions that may impact the plan of care Body Regions:   back  lower extremities    Body Systems:    gross symmetry  ROM  strength  gross coordinated movement  balance  gait  transfers  transitions  motor control  motor learning    Participation Restrictions:   Standing, walking, bending, squatting    Activity limitations:   Learning and applying knowledge  no deficits    General Tasks and Commands  no deficits    Communication  no deficits    Mobility  walking    Self care  no deficits    Domestic Life  shopping  cooking  doing house work (cleaning house, washing dishes, laundry)  assisting others    Interactions/Relationships  no deficits    Life  Areas  no deficits    Community and Social Life  community life  recreation and leisure         moderate   Clinical Presentation stable and uncomplicated low   Decision Making/ Complexity Score: low     Goals:  Short Term Goals: In 4 weeks:  1.I with HEP  2.Patient to demo increased B quad flexibility to improve B knee flexion to 130 degrees or greater.  3.Patient to demo increase B hip strength to 3+/5 or greater grossly.  4.Patient to have decreased pain to less than 5/10 consistently.    Long Term Goals: In 8 weeks  1. Patient to perform daily activities including stairs without limitation.  2. Patient to demonstrate increase B quad and B hip flexor flexibility to achieve full knee extension to amb with  Normal amb pattern.  3. Patient to demonstrate increased LE strength to 5/5 to have good dynamic standing balance for community and social activities.  4. Patient to have decreased pain to less than 2/10 to perform squatting and bending for household chores.      Plan   Plan of care Certification: 2/6/2020 to 3/6/20.    Outpatient Physical Therapy 2 times weekly for 8 weeks to include the following interventions: Gait Training, Manual Therapy, Moist Heat/ Ice, Neuromuscular Re-ed, Patient Education, Self Care, Therapeutic Activites and Therapeutic Exercise, e-stim.    Drea Sanchez, PT    Thank you for this referral.    These services are reasonable and necessary for the conditions set forth above while under my care.

## 2020-02-06 ENCOUNTER — CLINICAL SUPPORT (OUTPATIENT)
Dept: REHABILITATION | Facility: HOSPITAL | Age: 35
End: 2020-02-06
Payer: COMMERCIAL

## 2020-02-06 DIAGNOSIS — R29.898 BILATERAL LEG WEAKNESS: ICD-10-CM

## 2020-02-06 PROCEDURE — 97161 PT EVAL LOW COMPLEX 20 MIN: CPT

## 2020-02-12 NOTE — PROGRESS NOTES
Physical Therapy Daily Treatment Note     Name: Stefanie Duarte Taco  Clinic Number: [id number]    Therapy Diagnosis:   Encounter Diagnosis   Name Primary?    Bilateral leg weakness Yes     Physician: Prashanth Whalen,*    Visit Date: 2/13/2020    Physician Orders: PT Eval and Treat  Medical Diagnosis:   M32.9 (ICD-10-CM) - Systemic lupus erythematosus arthritis   M25.561,M25.562,G89.29 (ICD-10-CM) - Chronic pain of both knees       Evaluation Date: 2/6/20  Authorization Period Expiration: 1/29/21  Plan of Care Certification Period: 3/6/20  Visit #/Visits authorized: 2    Time In: 5:00  Time Out: 6:00  Total Billable Time: 24 minutes    Precautions: Standard    Subjective     Pt reports: having pain continued in low back and radiating down R post. LE and Bilat ant. Legs.  She was compliant with home exercise program.  Response to previous treatment:none noted yet  Functional change: none noted yet    Pain: not reported today    Objective     Stefanie received therapeutic exercises to develop strength for 15 minutes including: recumbent bike x 5 min., bridging x 2 min., hip add x 2 min., B SLR x 4 min., lumbar flex with swiss ball x 2 min.       Stefanie received the following manual therapy techniques x 9 min.: Joint mobilizations including: B Facet gapping x 4 min., PA mob to L4 and L5 and B L3-5 facets x 5 min.      Stefanie received hot pack for 15 minutes to lumbar, B knees.        Home Exercises Provided and Patient Education Provided     Education provided:   - HEP, condition, activity    Written Home Exercises Provided: yes.  Exercises were reviewed and Stefanie was able to demonstrate them prior to the end of the session.  Stefanie demonstrated good  understanding of the education provided.     See EMR under Patient Instructions for exercises provided 2/13/2020.    Assessment     Pt. Has pain along lower lumbar spine and lower lumbar facets.  Pt. Educated on spinal mobility exercises to increase mobility of  vertebrae.  Pt. Has pain with R prox. Tibial fibula mob and R knee flexion.  Began quad, hip extensor, and hip adductor strengthening secondary weakness noted with eval and to improve B knee stability.  Pt. Had mild pain initially with bridging but resolved.  Pt. Able to feel tension in lumbar paraspinals with lumbar flexion exercise.  Cont. Skilled PT to increase core, lumbar and LE strength and increase lumbar and LE flexibility to decrease pain.    Stefanie is progressing well towards her goals.   Pt prognosis is Good.     Pt will continue to benefit from skilled outpatient physical therapy to address the deficits listed in the problem list box on initial evaluation, provide pt/family education and to maximize pt's level of independence in the home and community environment.     Pt's spiritual, cultural and educational needs considered and pt agreeable to plan of care and goals.     Anticipated barriers to physical therapy: none    Goals: Goals:  Short Term Goals: In 4 weeks:  1.I with HEP  2.Patient to demo increased B quad flexibility to improve B knee flexion to 130 degrees or greater.  3.Patient to demo increase B hip strength to 3+/5 or greater grossly.  4.Patient to have decreased pain to less than 5/10 consistently.     Long Term Goals: In 8 weeks  1. Patient to perform daily activities including stairs without limitation.  2. Patient to demonstrate increase B quad and B hip flexor flexibility to achieve full knee extension to amb with  Normal amb pattern.  3. Patient to demonstrate increased LE strength to 5/5 to have good dynamic standing balance for community and social activities.  4. Patient to have decreased pain to less than 2/10 to perform squatting and bending for household chores.       Plan     Plan of care Certification: 2/6/2020 to 3/6/20.     Outpatient Physical Therapy 2 times weekly for 8 weeks to include the following interventions: Gait Training, Manual Therapy, Moist Heat/ Ice,  Neuromuscular Re-ed, Patient Education, Self Care, Therapeutic Activites and Therapeutic Exercise, e-stim.     Drea Sanchez, PT     Thank you for this referral.     These services are reasonable and necessary for the conditions set forth above while under my care.    Drea Sanchez, PT

## 2020-02-13 ENCOUNTER — CLINICAL SUPPORT (OUTPATIENT)
Dept: REHABILITATION | Facility: HOSPITAL | Age: 35
End: 2020-02-13
Payer: COMMERCIAL

## 2020-02-13 DIAGNOSIS — R29.898 BILATERAL LEG WEAKNESS: Primary | ICD-10-CM

## 2020-02-13 PROCEDURE — 97110 THERAPEUTIC EXERCISES: CPT

## 2020-02-13 PROCEDURE — 97140 MANUAL THERAPY 1/> REGIONS: CPT

## 2020-02-20 ENCOUNTER — CLINICAL SUPPORT (OUTPATIENT)
Dept: REHABILITATION | Facility: HOSPITAL | Age: 35
End: 2020-02-20
Payer: COMMERCIAL

## 2020-02-20 DIAGNOSIS — M35.1 MCTD (MIXED CONNECTIVE TISSUE DISEASE): ICD-10-CM

## 2020-02-20 DIAGNOSIS — M32.9 SYSTEMIC LUPUS ERYTHEMATOSUS ARTHRITIS: ICD-10-CM

## 2020-02-20 DIAGNOSIS — R29.898 BILATERAL LEG WEAKNESS: Primary | ICD-10-CM

## 2020-02-20 PROCEDURE — 97110 THERAPEUTIC EXERCISES: CPT

## 2020-02-20 PROCEDURE — 97140 MANUAL THERAPY 1/> REGIONS: CPT

## 2020-02-20 RX ORDER — FOLIC ACID 1 MG/1
TABLET ORAL
Qty: 90 TABLET | Refills: 0 | Status: SHIPPED | OUTPATIENT
Start: 2020-02-20 | End: 2020-03-03 | Stop reason: SDUPTHER

## 2020-02-20 NOTE — PROGRESS NOTES
Physical Therapy Daily Treatment Note     Name: Stefanie España  Clinic Number: [id number]    Therapy Diagnosis:   Encounter Diagnosis   Name Primary?    Bilateral leg weakness Yes     Physician: Prashanth Whalen,*    Visit Date: 2/20/2020    Physician Orders: PT Eval and Treat  Medical Diagnosis:   M32.9 (ICD-10-CM) - Systemic lupus erythematosus arthritis   M25.561,M25.562,G89.29 (ICD-10-CM) - Chronic pain of both knees       Evaluation Date: 2/6/20  Authorization Period Expiration: 1/29/21  Plan of Care Certification Period: 3/6/20  Visit #/Visits authorized: 3    Time In: 8:30  Time Out:9:10  Total Billable Time:  minutes    Precautions: Standard    Subjective     Pt reports: soreness in back after last visit.  Pt. Reports some stiffness in knees after last visit as well.  Pt. Reports wanting to work on squatting.  She was compliant with home exercise program.  Response to previous treatment:none noted yet  Functional change: none noted yet    Pain: 5-6/10,nagging low back    Objective     Stefanie received therapeutic exercises to develop strength for 12 minutes including: recumbent bike x 5 min., PPT x 2 min., LTR x 2 min., shuttle x 3 min.     Stefanie received the following manual therapy techniques x 14 min.: Joint mobilizations including: B Facet gapping x 4 min., B tibiofemoral distraction mobilization x 4 min., B knee ext mob x 4 min., B tibia int. And ext. Rot x 2 min.    Stefanie received hot pack for 15 minutes to lumbar, B knees.        Home Exercises Provided and Patient Education Provided     Education provided:   - HEP, condition, activity    Written Home Exercises Provided: yes.  Exercises were reviewed and Stefanie was able to demonstrate them prior to the end of the session.  Setfanie demonstrated good  understanding of the education provided.     See EMR under Patient Instructions for exercises provided 2/13/2020.    Assessment   Pt. Has discomfort with R knee flexion and R prox. Tibia fibula   And pt. Had crepitus at end range of R knee extension. Pt. Had pain with L knee extension at end range.  Pt. Had tenderness throughout lumbar spine and B lumbar facets.  Pt. Had mild relief with facet gapping.  Pt. Educated on core exercises to begin to help increase lumbar stability.  Pt. Began shuttle for gravity eliminated LE strengthening to decrease pain.  Cont. POC.      Stefanie is progressing well towards her goals.   Pt prognosis is Good.     Pt will continue to benefit from skilled outpatient physical therapy to address the deficits listed in the problem list box on initial evaluation, provide pt/family education and to maximize pt's level of independence in the home and community environment.     Pt's spiritual, cultural and educational needs considered and pt agreeable to plan of care and goals.     Anticipated barriers to physical therapy: none    Goals: Goals:  Short Term Goals: In 4 weeks:  1.I with HEP  2.Patient to demo increased B quad flexibility to improve B knee flexion to 130 degrees or greater.  3.Patient to demo increase B hip strength to 3+/5 or greater grossly.  4.Patient to have decreased pain to less than 5/10 consistently.     Long Term Goals: In 8 weeks  1. Patient to perform daily activities including stairs without limitation.  2. Patient to demonstrate increase B quad and B hip flexor flexibility to achieve full knee extension to amb with  Normal amb pattern.  3. Patient to demonstrate increased LE strength to 5/5 to have good dynamic standing balance for community and social activities.  4. Patient to have decreased pain to less than 2/10 to perform squatting and bending for household chores.       Plan     Plan of care Certification: 2/6/2020 to 3/6/20.     Outpatient Physical Therapy 2 times weekly for 8 weeks to include the following interventions: Gait Training, Manual Therapy, Moist Heat/ Ice, Neuromuscular Re-ed, Patient Education, Self Care, Therapeutic Activites and  Therapeutic Exercise, e-stim.       Thank you for this referral.     These services are reasonable and necessary for the conditions set forth above while under my care.    Drea Sanchez, PT

## 2020-02-26 ENCOUNTER — LAB VISIT (OUTPATIENT)
Dept: LAB | Facility: HOSPITAL | Age: 35
End: 2020-02-26
Payer: COMMERCIAL

## 2020-02-26 ENCOUNTER — INFUSION (OUTPATIENT)
Dept: INFUSION THERAPY | Facility: HOSPITAL | Age: 35
End: 2020-02-26
Attending: INTERNAL MEDICINE
Payer: COMMERCIAL

## 2020-02-26 ENCOUNTER — LAB VISIT (OUTPATIENT)
Dept: LAB | Facility: HOSPITAL | Age: 35
End: 2020-02-26
Attending: INTERNAL MEDICINE
Payer: COMMERCIAL

## 2020-02-26 VITALS
DIASTOLIC BLOOD PRESSURE: 74 MMHG | WEIGHT: 242.75 LBS | SYSTOLIC BLOOD PRESSURE: 105 MMHG | TEMPERATURE: 98 F | HEART RATE: 80 BPM | RESPIRATION RATE: 16 BRPM | BODY MASS INDEX: 36.91 KG/M2 | OXYGEN SATURATION: 99 %

## 2020-02-26 DIAGNOSIS — M32.19 OTHER SYSTEMIC LUPUS ERYTHEMATOSUS WITH OTHER ORGAN INVOLVEMENT: ICD-10-CM

## 2020-02-26 DIAGNOSIS — M32.9 SYSTEMIC LUPUS ERYTHEMATOSUS ARTHRITIS: ICD-10-CM

## 2020-02-26 DIAGNOSIS — M35.1 MCTD (MIXED CONNECTIVE TISSUE DISEASE): ICD-10-CM

## 2020-02-26 DIAGNOSIS — Z79.899 LONG-TERM CURRENT USE OF HIGH RISK MEDICATION OTHER THAN ANTICOAGULANT: ICD-10-CM

## 2020-02-26 DIAGNOSIS — M32.9 EXACERBATION OF SYSTEMIC LUPUS: ICD-10-CM

## 2020-02-26 DIAGNOSIS — M32.9 SYSTEMIC LUPUS ERYTHEMATOSUS ARTHRITIS: Primary | ICD-10-CM

## 2020-02-26 DIAGNOSIS — R76.8 POSITIVE SM/RNP ANTIBODY: ICD-10-CM

## 2020-02-26 LAB
ALBUMIN SERPL BCP-MCNC: 3.4 G/DL (ref 3.5–5.2)
ALP SERPL-CCNC: 54 U/L (ref 55–135)
ALT SERPL W/O P-5'-P-CCNC: 17 U/L (ref 10–44)
ANION GAP SERPL CALC-SCNC: 9 MMOL/L (ref 8–16)
AST SERPL-CCNC: 19 U/L (ref 10–40)
BASOPHILS # BLD AUTO: 0.03 K/UL (ref 0–0.2)
BASOPHILS NFR BLD: 0.6 % (ref 0–1.9)
BILIRUB SERPL-MCNC: 0.5 MG/DL (ref 0.1–1)
BILIRUB UR QL STRIP: NEGATIVE
BUN SERPL-MCNC: 8 MG/DL (ref 6–20)
C3 SERPL-MCNC: 156 MG/DL (ref 50–180)
C4 SERPL-MCNC: 40 MG/DL (ref 11–44)
CALCIUM SERPL-MCNC: 9.2 MG/DL (ref 8.7–10.5)
CHLORIDE SERPL-SCNC: 104 MMOL/L (ref 95–110)
CK SERPL-CCNC: 52 U/L (ref 20–180)
CLARITY UR: ABNORMAL
CO2 SERPL-SCNC: 25 MMOL/L (ref 23–29)
COLOR UR: YELLOW
CREAT SERPL-MCNC: 0.7 MG/DL (ref 0.5–1.4)
CREAT UR-MCNC: 143 MG/DL (ref 15–325)
CRP SERPL-MCNC: 18.9 MG/L (ref 0–8.2)
DIFFERENTIAL METHOD: ABNORMAL
EOSINOPHIL # BLD AUTO: 0.5 K/UL (ref 0–0.5)
EOSINOPHIL NFR BLD: 10 % (ref 0–8)
ERYTHROCYTE [DISTWIDTH] IN BLOOD BY AUTOMATED COUNT: 11.6 % (ref 11.5–14.5)
ERYTHROCYTE [SEDIMENTATION RATE] IN BLOOD BY WESTERGREN METHOD: 50 MM/HR (ref 0–36)
EST. GFR  (AFRICAN AMERICAN): >60 ML/MIN/1.73 M^2
EST. GFR  (NON AFRICAN AMERICAN): >60 ML/MIN/1.73 M^2
GLUCOSE SERPL-MCNC: 106 MG/DL (ref 70–110)
GLUCOSE UR QL STRIP: NEGATIVE
HCT VFR BLD AUTO: 37.7 % (ref 37–48.5)
HGB BLD-MCNC: 12.2 G/DL (ref 12–16)
HGB UR QL STRIP: NEGATIVE
IMM GRANULOCYTES # BLD AUTO: 0.01 K/UL (ref 0–0.04)
IMM GRANULOCYTES NFR BLD AUTO: 0.2 % (ref 0–0.5)
KETONES UR QL STRIP: NEGATIVE
LEUKOCYTE ESTERASE UR QL STRIP: NEGATIVE
LYMPHOCYTES # BLD AUTO: 0.7 K/UL (ref 1–4.8)
LYMPHOCYTES NFR BLD: 15.5 % (ref 18–48)
MCH RBC QN AUTO: 29.8 PG (ref 27–31)
MCHC RBC AUTO-ENTMCNC: 32.4 G/DL (ref 32–36)
MCV RBC AUTO: 92 FL (ref 82–98)
MONOCYTES # BLD AUTO: 0.5 K/UL (ref 0.3–1)
MONOCYTES NFR BLD: 9.5 % (ref 4–15)
NEUTROPHILS # BLD AUTO: 3 K/UL (ref 1.8–7.7)
NEUTROPHILS NFR BLD: 64.4 % (ref 38–73)
NITRITE UR QL STRIP: NEGATIVE
NRBC BLD-RTO: 0 /100 WBC
PH UR STRIP: 6 [PH] (ref 5–8)
PLATELET # BLD AUTO: 515 K/UL (ref 150–350)
PMV BLD AUTO: 8.2 FL (ref 9.2–12.9)
POTASSIUM SERPL-SCNC: 3.8 MMOL/L (ref 3.5–5.1)
PROT SERPL-MCNC: 7.2 G/DL (ref 6–8.4)
PROT UR QL STRIP: NEGATIVE
PROT UR-MCNC: 9 MG/DL (ref 0–15)
PROT/CREAT UR: 0.06 MG/G{CREAT} (ref 0–0.2)
RBC # BLD AUTO: 4.09 M/UL (ref 4–5.4)
SODIUM SERPL-SCNC: 138 MMOL/L (ref 136–145)
SP GR UR STRIP: 1.02 (ref 1–1.03)
URN SPEC COLLECT METH UR: ABNORMAL
WBC # BLD AUTO: 4.72 K/UL (ref 3.9–12.7)

## 2020-02-26 PROCEDURE — 86160 COMPLEMENT ANTIGEN: CPT

## 2020-02-26 PROCEDURE — 36415 COLL VENOUS BLD VENIPUNCTURE: CPT

## 2020-02-26 PROCEDURE — A4216 STERILE WATER/SALINE, 10 ML: HCPCS | Performed by: INTERNAL MEDICINE

## 2020-02-26 PROCEDURE — 25000003 PHARM REV CODE 250: Performed by: INTERNAL MEDICINE

## 2020-02-26 PROCEDURE — 63600175 PHARM REV CODE 636 W HCPCS: Mod: JW,JG | Performed by: INTERNAL MEDICINE

## 2020-02-26 PROCEDURE — 85652 RBC SED RATE AUTOMATED: CPT

## 2020-02-26 PROCEDURE — 81003 URINALYSIS AUTO W/O SCOPE: CPT

## 2020-02-26 PROCEDURE — 86225 DNA ANTIBODY NATIVE: CPT

## 2020-02-26 PROCEDURE — 96413 CHEMO IV INFUSION 1 HR: CPT

## 2020-02-26 PROCEDURE — 86140 C-REACTIVE PROTEIN: CPT

## 2020-02-26 PROCEDURE — 80053 COMPREHEN METABOLIC PANEL: CPT

## 2020-02-26 PROCEDURE — 85025 COMPLETE CBC W/AUTO DIFF WBC: CPT

## 2020-02-26 PROCEDURE — 82550 ASSAY OF CK (CPK): CPT

## 2020-02-26 PROCEDURE — 86160 COMPLEMENT ANTIGEN: CPT | Mod: 59

## 2020-02-26 PROCEDURE — 84156 ASSAY OF PROTEIN URINE: CPT

## 2020-02-26 RX ORDER — SODIUM CHLORIDE 0.9 % (FLUSH) 0.9 %
10 SYRINGE (ML) INJECTION
Status: DISCONTINUED | OUTPATIENT
Start: 2020-02-26 | End: 2020-02-26 | Stop reason: HOSPADM

## 2020-02-26 RX ORDER — SODIUM CHLORIDE 0.9 % (FLUSH) 0.9 %
10 SYRINGE (ML) INJECTION
Status: CANCELLED | OUTPATIENT
Start: 2020-03-25

## 2020-02-26 RX ORDER — ACETAMINOPHEN 325 MG/1
650 TABLET ORAL
Status: DISCONTINUED | OUTPATIENT
Start: 2020-02-26 | End: 2020-02-26 | Stop reason: HOSPADM

## 2020-02-26 RX ORDER — HEPARIN 100 UNIT/ML
500 SYRINGE INTRAVENOUS
Status: CANCELLED | OUTPATIENT
Start: 2020-03-25

## 2020-02-26 RX ORDER — DIPHENHYDRAMINE HYDROCHLORIDE 50 MG/ML
25 INJECTION INTRAMUSCULAR; INTRAVENOUS
Status: DISCONTINUED | OUTPATIENT
Start: 2020-02-26 | End: 2020-02-26 | Stop reason: HOSPADM

## 2020-02-26 RX ORDER — DIPHENHYDRAMINE HYDROCHLORIDE 50 MG/ML
25 INJECTION INTRAMUSCULAR; INTRAVENOUS
Status: CANCELLED
Start: 2020-03-25

## 2020-02-26 RX ORDER — ACETAMINOPHEN 325 MG/1
650 TABLET ORAL
Status: CANCELLED | OUTPATIENT
Start: 2020-03-25

## 2020-02-26 RX ADMIN — BELIMUMAB 1101 MG: 400 INJECTION, POWDER, LYOPHILIZED, FOR SOLUTION INTRAVENOUS at 11:02

## 2020-02-26 RX ADMIN — Medication 10 ML: at 11:02

## 2020-02-26 NOTE — PLAN OF CARE
Problem: Adult Inpatient Plan of Care  Goal: Plan of Care Review  Outcome: Ongoing, Progressing  Goal: Absence of Hospital-Acquired Illness or Injury  Outcome: Ongoing, Progressing  Goal: Optimal Comfort and Wellbeing  Outcome: Ongoing, Progressing     Problem: Infection  Goal: Infection Symptom Resolution  Outcome: Ongoing, Progressing   Patient reports, being tired today.

## 2020-02-26 NOTE — DISCHARGE INSTRUCTIONS
[facility]  [address] [facility]  [address]  [phone] phone     Hours of Operation: Monday- Friday 8:00am- 5:00pm  After hours phone  [phone]    [address] Dr[address] [place]      Please call with any concerns regarding your appointment today.

## 2020-02-26 NOTE — NURSING
Infusion# >10 Benlysta 10 mg/kg q 4 weeks    Recent labs? Reviewed     S/S infection noted or voiced? Denied  Recent or planned surgeries/invasive procedures? Denied  Recent vaccines? Denied    Premeds? Patient refused the solumedrol today - she concerned about weight gain    Benlysta 10mg/kg X 110.1kg = 1,101 mg administered IV at a 60 minute rate per orders; see MAR & Flow Sheet for more  details. Tolerated well without adverse events

## 2020-02-27 ENCOUNTER — PATIENT MESSAGE (OUTPATIENT)
Dept: RHEUMATOLOGY | Facility: CLINIC | Age: 35
End: 2020-02-27

## 2020-02-28 LAB — DSDNA AB SER-ACNC: NORMAL [IU]/ML

## 2020-02-29 ENCOUNTER — PATIENT MESSAGE (OUTPATIENT)
Dept: RHEUMATOLOGY | Facility: CLINIC | Age: 35
End: 2020-02-29

## 2020-03-03 ENCOUNTER — TELEPHONE (OUTPATIENT)
Dept: REHABILITATION | Facility: HOSPITAL | Age: 35
End: 2020-03-03

## 2020-03-03 DIAGNOSIS — M35.1 MCTD (MIXED CONNECTIVE TISSUE DISEASE): ICD-10-CM

## 2020-03-03 DIAGNOSIS — M32.9 SYSTEMIC LUPUS ERYTHEMATOSUS ARTHRITIS: ICD-10-CM

## 2020-03-03 RX ORDER — FOLIC ACID 1 MG/1
1 TABLET ORAL DAILY
Qty: 90 TABLET | Refills: 0 | Status: SHIPPED | OUTPATIENT
Start: 2020-03-03 | End: 2020-10-14 | Stop reason: ALTCHOICE

## 2020-03-03 RX ORDER — METHOTREXATE 2.5 MG/1
20 TABLET ORAL
Qty: 32 TABLET | Refills: 3 | Status: SHIPPED | OUTPATIENT
Start: 2020-03-03 | End: 2020-06-02

## 2020-03-03 NOTE — TELEPHONE ENCOUNTER
Pt. Called and left message to remind her of appointment today.  Pt. Educated that she has missed a few appointments and if continues missing will be discharged.

## 2020-03-12 ENCOUNTER — PATIENT MESSAGE (OUTPATIENT)
Dept: RHEUMATOLOGY | Facility: CLINIC | Age: 35
End: 2020-03-12

## 2020-03-17 ENCOUNTER — PATIENT MESSAGE (OUTPATIENT)
Dept: INTERNAL MEDICINE | Facility: CLINIC | Age: 35
End: 2020-03-17

## 2020-03-17 DIAGNOSIS — N76.0 ACUTE VAGINITIS: Primary | ICD-10-CM

## 2020-03-17 RX ORDER — FLUCONAZOLE 150 MG/1
150 TABLET ORAL DAILY
Qty: 1 TABLET | Refills: 1 | Status: SHIPPED | OUTPATIENT
Start: 2020-03-17 | End: 2020-03-18

## 2020-03-18 DIAGNOSIS — M32.9 SYSTEMIC LUPUS ERYTHEMATOSUS ARTHRITIS: ICD-10-CM

## 2020-03-18 DIAGNOSIS — M35.1 MCTD (MIXED CONNECTIVE TISSUE DISEASE): ICD-10-CM

## 2020-03-18 RX ORDER — PREDNISONE 5 MG/1
5 TABLET ORAL DAILY PRN
Qty: 90 TABLET | Refills: 3 | Status: SHIPPED | OUTPATIENT
Start: 2020-03-18 | End: 2020-12-02

## 2020-03-25 ENCOUNTER — DOCUMENTATION ONLY (OUTPATIENT)
Dept: REHABILITATION | Facility: HOSPITAL | Age: 35
End: 2020-03-25

## 2020-03-25 NOTE — PROGRESS NOTES
Outpatient Therapy Discharge Summary     Name: Stefanie Duarte Taco  Clinic Number: [id number]    Therapy Diagnosis: B LE weakness  Physician: Prashanth Whalen,*  Physician Orders: PT Eval and Treat  Medical Diagnosis:   M32.9 (ICD-10-CM) - Systemic lupus erythematosus arthritis   M25.561,M25.562,G89.29 (ICD-10-CM) - Chronic pain of both knees       Evaluation Date: 2/6/20      Date of Last visit: 2/20/20  Total Visits Received: 3  Cancelled Visits: 7  No Show Visits: 1    Assessment    Goals:  Short Term Goals: In 4 weeks:  1.I with HEP  2.Patient to demo increased B quad flexibility to improve B knee flexion to 130 degrees or greater.  3.Patient to demo increase B hip strength to 3+/5 or greater grossly.  4.Patient to have decreased pain to less than 5/10 consistently.     Long Term Goals: In 8 weeks  1. Patient to perform daily activities including stairs without limitation.  2. Patient to demonstrate increase B quad and B hip flexor flexibility to achieve full knee extension to amb with  Normal amb pattern.  3. Patient to demonstrate increased LE strength to 5/5 to have good dynamic standing balance for community and social activities.  4. Patient to have decreased pain to less than 2/10 to perform squatting and bending for household chores.    Discharge reason: Patient has not attended therapy since 2/20/20.  Pt.commute from  [employer].    Plan   This patient is discharged from Physical Therapy

## 2020-03-30 ENCOUNTER — PATIENT MESSAGE (OUTPATIENT)
Dept: INTERNAL MEDICINE | Facility: CLINIC | Age: 35
End: 2020-03-30

## 2020-03-30 ENCOUNTER — OFFICE VISIT (OUTPATIENT)
Dept: INTERNAL MEDICINE | Facility: CLINIC | Age: 35
End: 2020-03-30
Payer: COMMERCIAL

## 2020-03-30 DIAGNOSIS — D84.9 IMMUNOSUPPRESSED STATUS: ICD-10-CM

## 2020-03-30 DIAGNOSIS — Z79.899 LONG-TERM CURRENT USE OF HIGH RISK MEDICATION OTHER THAN ANTICOAGULANT: Primary | ICD-10-CM

## 2020-03-30 DIAGNOSIS — R05.9 COUGH: ICD-10-CM

## 2020-03-30 PROCEDURE — 99213 OFFICE O/P EST LOW 20 MIN: CPT | Mod: 95,,, | Performed by: FAMILY MEDICINE

## 2020-03-30 PROCEDURE — 99213 PR OFFICE/OUTPT VISIT, EST, LEVL III, 20-29 MIN: ICD-10-PCS | Mod: 95,,, | Performed by: FAMILY MEDICINE

## 2020-03-30 RX ORDER — PROMETHAZINE HYDROCHLORIDE AND DEXTROMETHORPHAN HYDROBROMIDE 6.25; 15 MG/5ML; MG/5ML
5 SYRUP ORAL EVERY 8 HOURS PRN
Qty: 118 ML | Refills: 0 | Status: SHIPPED | OUTPATIENT
Start: 2020-03-30 | End: 2020-04-09

## 2020-03-30 NOTE — PROGRESS NOTES
Stefanie España  03/30/2020  [id number]    Sofia Martinez MD  Patient Care Team:  Sofia Martinez MD as PCP - General (Family Medicine)  Tangela Espinoza LPN as Care Coordinator (Internal Medicine)  Has the patient seen any provider outside of the [facility] network since the last visit? (no). If yes, HIPPA forms completed and records requested.    The patient location is: Car (not driving)  The chief complaint leading to consultation is: Cough  Visit type: Virtual visit with synchronous audio and video  Total time spent with patient: 3'00-3:07  Each patient to whom he or she provides medical services by telemedicine is:  (1) informed of the relationship between the physician and patient and the respective role of any other health care provider with respect to management of the patient; and (2) notified that he or she may decline to receive medical services by telemedicine and may withdraw from such care at any time.        Visit Type:an urgent visit for a new problem    Chief Complaint:  Cough      History of Present Illness:  Cough and Runny nose. 2 days. Has been out of work 5days for other reasons, but works at [employer] .  She denies fever.  No shortness breath.    Congestion is clear.  No shortness of breath.  She feels that its like a chest cold 2 days.    She has history of Lupus, she is on immunosuppressants.   She denies SOB or Fever.   She is oN methotrexate.      History:  Past Medical History:   Diagnosis Date    Lupus (systemic lupus erythematosus)      Past Surgical History:   Procedure Laterality Date    TUBAL LIGATION  2005     Family History   Problem Relation Age of Onset    Liver cancer Maternal Aunt     Breast cancer Maternal Aunt     Breast cancer Paternal Grandmother      Social History     Socioeconomic History    Marital status: Legally      Spouse name: Not on file    Number of children: Not on file    Years of education: Not on file    Highest education level: Not  on file   Occupational History    Occupation: [employer]   Social Needs    Financial resource strain: Not on file    Food insecurity:     Worry: Not on file     Inability: Not on file    Transportation needs:     Medical: Not on file     Non-medical: Not on file   Tobacco Use    Smoking status: Never Smoker    Smokeless tobacco: Never Used   Substance and Sexual Activity    Alcohol use: No    Drug use: No    Sexual activity: Yes     Partners: Male   Lifestyle    Physical activity:     Days per week: Not on file     Minutes per session: Not on file    Stress: Not on file   Relationships    Social connections:     Talks on phone: Not on file     Gets together: Not on file     Attends Hoahaoism service: Not on file     Active member of club or organization: Not on file     Attends meetings of clubs or organizations: Not on file     Relationship status: Not on file   Other Topics Concern    Are you pregnant or think you may be? No    Breast-feeding No   Social History Narrative    Not on file     Patient Active Problem List   Diagnosis    Exacerbation of systemic lupus    Long-term current use of high risk medication -methotrexate    Systemic lupus erythematosus arthritis    Immunosuppressed status    Rash    Obesity, Class II, BMI 35-39.9    MCTD (mixed connective tissue disease)    Positive sm/RNP antibody    Secondary Raynaud's phenomenon    Mild episode of recurrent major depressive disorder    Chronic pain of both knees    Bilateral leg weakness     Review of patient's allergies indicates:   Allergen Reactions    Elavil [amitriptyline]        The following were reviewed at this visit: active problem list, medication list, allergies, family history, social history, and health maintenance.    Medications:  Current Outpatient Medications on File Prior to Visit   Medication Sig Dispense Refill    clobetasol (TEMOVATE) 0.05 % external solution Apply topically 2 (two) times daily. 50 mL 1     diphenhydrAMINE (BENADRYL) 25 mg capsule Take 25 mg by mouth.      fluocinolone (DERMA-SMOOTHE) 0.01 % external oil Apply oil to scalp twice a day 1 Bottle 5    folic acid (FOLVITE) 1 MG tablet Take 1 tablet (1 mg total) by mouth once daily. 90 tablet 0    hydroxychloroquine (PLAQUENIL) 200 mg tablet Take 2 tablets (400 mg total) by mouth once daily. 60 tablet 11    ketoconazole (NIZORAL) 2 % shampoo Wash hair with medicated shampoo at least 1x/week - let sit on scalp at least 5 minutes prior to rinsing 120 mL 5    methotrexate 2.5 MG Tab Take 8 tablets (20 mg total) by mouth every 7 days. 32 tablet 3    methotrexate sodium (METHOTREXATE, ANTI-RHEUMATIC, ORAL)       predniSONE (DELTASONE) 10 MG tablet Take 10 mg along with 5 mg if any joint flares 30 tablet 0    predniSONE (DELTASONE) 5 MG tablet Take 1 tablet (5 mg total) by mouth daily as needed (pain). 90 tablet 3    tacrolimus (PROTOPIC) 0.1 % ointment Apply topically 2 (two) times daily. 60 g 3    traZODone (DESYREL) 50 MG tablet Take 1 tablet (50 mg total) by mouth every evening. 30 tablet 11    triamcinolone acetonide 0.1% (KENALOG) 0.1 % ointment AAA bid 454 g 0     No current facility-administered medications on file prior to visit.        Medications have been reviewed and reconciled with patient at this visit.  Barriers to medications present (no)    Adverse reactions to current medications (no)    Over the counter medications reviewed (No ), and if needed added to active Medication list at this visit.     Exam:  Wt Readings from Last 3 Encounters:   02/26/20 110.1 kg (242 lb 11.6 oz)   01/30/20 109.2 kg (240 lb 11.9 oz)   01/30/20 109.2 kg (240 lb 11.9 oz)     Temp Readings from Last 3 Encounters:   02/26/20 97.6 °F (36.4 °C)   12/27/19 97.4 °F (36.3 °C)   11/26/19 97 °F (36.1 °C)     BP Readings from Last 3 Encounters:   02/26/20 105/74   01/30/20 111/74   01/30/20 129/84     Pulse Readings from Last 3 Encounters:   02/26/20 80   01/30/20 82    01/30/20 93     There is no height or weight on file to calculate BMI.      Review of Systems   Constitutional: Negative for chills and fever.   HENT: Positive for congestion.    Respiratory: Positive for cough. Negative for shortness of breath.      Physical Exam   Constitutional: She is oriented to person, place, and time. She appears well-developed and well-nourished. No distress.   Pulmonary/Chest: Effort normal. No respiratory distress.   Neurological: She is alert and oriented to person, place, and time.   Psychiatric: She has a normal mood and affect. Her behavior is normal. Judgment and thought content normal.       Laboratory Reviewed ({N/A)  Lab Results   Component Value Date    WBC 4.72 02/26/2020    HGB 12.2 02/26/2020    HCT 37.7 02/26/2020     (H) 02/26/2020    CHOL 169 08/24/2017    TRIG 228 (H) 08/24/2017    HDL 37 (L) 08/24/2017    ALT 17 02/26/2020    AST 19 02/26/2020     02/26/2020    K 3.8 02/26/2020     02/26/2020    CREATININE 0.7 02/26/2020    BUN 8 02/26/2020    CO2 25 02/26/2020    TSH 1.243 08/30/2017       Diagnoses and all orders for this visit:    Long-term current use of high risk medication -methotrexate  -     COVID-19 Home Symptom Monitoring  - Duration (days): 14    Immunosuppressed status  -     COVID-19 Home Symptom Monitoring  - Duration (days): 14    Cough  -     COVID-19 Home Symptom Monitoring  - Duration (days): 14    She is not having fever.  Sx for 2 days.    I would like her to self quarantine. Cannot got back to work with cold and cold symptoms.  Monitor Temp twice a day.  Reports temp to me, >100.4  We will send for testing due to immunocompromised state if she has fever.    Denies SOb,  Will send cough meds to [facility].    Self quarantine.  I put her in COVID text to make monitor if symptoms not worsening.              Care Plan/Goals: Reviewed  (N/A)  Goals    None         Follow up: No follow-ups on file.    After visit summary was printed and  given to patient upon discharge today.  Patient goals and care plan are included in After Visit Summary.

## 2020-04-02 ENCOUNTER — PATIENT MESSAGE (OUTPATIENT)
Dept: INTERNAL MEDICINE | Facility: CLINIC | Age: 35
End: 2020-04-02

## 2020-04-02 ENCOUNTER — OFFICE VISIT (OUTPATIENT)
Dept: INTERNAL MEDICINE | Facility: CLINIC | Age: 35
End: 2020-04-02
Payer: COMMERCIAL

## 2020-04-02 ENCOUNTER — TELEPHONE (OUTPATIENT)
Dept: RHEUMATOLOGY | Facility: CLINIC | Age: 35
End: 2020-04-02

## 2020-04-02 ENCOUNTER — NURSE TRIAGE (OUTPATIENT)
Dept: PRIMARY CARE CLINIC | Facility: CLINIC | Age: 35
End: 2020-04-02

## 2020-04-02 DIAGNOSIS — Z79.899 LONG-TERM CURRENT USE OF HIGH RISK MEDICATION OTHER THAN ANTICOAGULANT: ICD-10-CM

## 2020-04-02 DIAGNOSIS — R05.9 COUGH: Primary | ICD-10-CM

## 2020-04-02 DIAGNOSIS — M35.1 MCTD (MIXED CONNECTIVE TISSUE DISEASE): ICD-10-CM

## 2020-04-02 DIAGNOSIS — M32.9 SYSTEMIC LUPUS ERYTHEMATOSUS ARTHRITIS: ICD-10-CM

## 2020-04-02 DIAGNOSIS — M32.19 OTHER SYSTEMIC LUPUS ERYTHEMATOSUS WITH OTHER ORGAN INVOLVEMENT: ICD-10-CM

## 2020-04-02 DIAGNOSIS — D84.9 IMMUNOSUPPRESSED STATUS: ICD-10-CM

## 2020-04-02 PROCEDURE — 99213 PR OFFICE/OUTPT VISIT, EST, LEVL III, 20-29 MIN: ICD-10-PCS | Mod: 95,,, | Performed by: FAMILY MEDICINE

## 2020-04-02 PROCEDURE — 99213 OFFICE O/P EST LOW 20 MIN: CPT | Mod: 95,,, | Performed by: FAMILY MEDICINE

## 2020-04-02 RX ORDER — HYDROXYCHLOROQUINE SULFATE 200 MG/1
400 TABLET, FILM COATED ORAL DAILY
Qty: 60 TABLET | Refills: 11 | Status: SHIPPED | OUTPATIENT
Start: 2020-04-02 | End: 2020-04-21 | Stop reason: SDUPTHER

## 2020-04-02 NOTE — PROGRESS NOTES
Stefanie España  04/02/2020  [id number]    Sofia Martinez MD  Patient Care Team:  Sofia Martinez MD as PCP - General (Family Medicine)  Tangela Espinoza LPN as Care Coordinator (Internal Medicine)  Has the patient seen any provider outside of the [facility] network since the last visit? (no). If yes, HIPPA forms completed and records requested.    The patient location is: Home  The chief complaint leading to consultation is: Cough  Visit type: Virtual visit with synchronous audio and video  Total time spent with patient: 1:10-1;27  Each patient to whom he or she provides medical services by telemedicine is:  (1) informed of the relationship between the physician and patient and the respective role of any other health care provider with respect to management of the patient; and (2) notified that he or she may decline to receive medical services by telemedicine and may withdraw from such care at any time.    Notes:    Visit Type:an urgent visit for an existing problem     Chief Complaint:  Cough    History of Present Illness:  34 year old, tele med visit.  Saw recently 2 days for cough.    She used cough meds. Slept. But cough worsening. Some mucus.  No shortness of breath at rest, but can get a little when exerting, like cleaning house.    Does not have thermometer, but felt hot last night per patient.  Took Theraflu.    She is immunocompromised. On       History:  Past Medical History:   Diagnosis Date    Lupus (systemic lupus erythematosus)      Past Surgical History:   Procedure Laterality Date    TUBAL LIGATION  2005     Family History   Problem Relation Age of Onset    Liver cancer Maternal Aunt     Breast cancer Maternal Aunt     Breast cancer Paternal Grandmother      Social History     Socioeconomic History    Marital status: Legally      Spouse name: Not on file    Number of children: Not on file    Years of education: Not on file    Highest education level: Not on file    Occupational History    Occupation: [employer]   Social Needs    Financial resource strain: Not on file    Food insecurity:     Worry: Not on file     Inability: Not on file    Transportation needs:     Medical: Not on file     Non-medical: Not on file   Tobacco Use    Smoking status: Never Smoker    Smokeless tobacco: Never Used   Substance and Sexual Activity    Alcohol use: No    Drug use: No    Sexual activity: Yes     Partners: Male   Lifestyle    Physical activity:     Days per week: Not on file     Minutes per session: Not on file    Stress: Not on file   Relationships    Social connections:     Talks on phone: Not on file     Gets together: Not on file     Attends Jew service: Not on file     Active member of club or organization: Not on file     Attends meetings of clubs or organizations: Not on file     Relationship status: Not on file   Other Topics Concern    Are you pregnant or think you may be? No    Breast-feeding No   Social History Narrative    Not on file     Patient Active Problem List   Diagnosis    Exacerbation of systemic lupus    Long-term current use of high risk medication -methotrexate    Systemic lupus erythematosus arthritis    Immunosuppressed status    Rash    Obesity, Class II, BMI 35-39.9    MCTD (mixed connective tissue disease)    Positive sm/RNP antibody    Secondary Raynaud's phenomenon    Mild episode of recurrent major depressive disorder    Chronic pain of both knees    Bilateral leg weakness     Review of patient's allergies indicates:   Allergen Reactions    Elavil [amitriptyline]        The following were reviewed at this visit: active problem list, medication list, allergies, family history, social history, and health maintenance.    Medications:  Current Outpatient Medications on File Prior to Visit   Medication Sig Dispense Refill    clobetasol (TEMOVATE) 0.05 % external solution Apply topically 2 (two) times daily. 50 mL 1     diphenhydrAMINE (BENADRYL) 25 mg capsule Take 25 mg by mouth.      fluocinolone (DERMA-SMOOTHE) 0.01 % external oil Apply oil to scalp twice a day 1 Bottle 5    folic acid (FOLVITE) 1 MG tablet Take 1 tablet (1 mg total) by mouth once daily. 90 tablet 0    hydroxychloroquine (PLAQUENIL) 200 mg tablet Take 2 tablets (400 mg total) by mouth once daily. 60 tablet 11    ketoconazole (NIZORAL) 2 % shampoo Wash hair with medicated shampoo at least 1x/week - let sit on scalp at least 5 minutes prior to rinsing 120 mL 5    methotrexate 2.5 MG Tab Take 8 tablets (20 mg total) by mouth every 7 days. 32 tablet 3    methotrexate sodium (METHOTREXATE, ANTI-RHEUMATIC, ORAL)       predniSONE (DELTASONE) 10 MG tablet Take 10 mg along with 5 mg if any joint flares 30 tablet 0    predniSONE (DELTASONE) 5 MG tablet Take 1 tablet (5 mg total) by mouth daily as needed (pain). 90 tablet 3    promethazine-dextromethorphan (PROMETHAZINE-DM) 6.25-15 mg/5 mL Syrp Take 5 mLs by mouth every 8 (eight) hours as needed. 118 mL 0    tacrolimus (PROTOPIC) 0.1 % ointment Apply topically 2 (two) times daily. 60 g 3    traZODone (DESYREL) 50 MG tablet Take 1 tablet (50 mg total) by mouth every evening. 30 tablet 11    triamcinolone acetonide 0.1% (KENALOG) 0.1 % ointment AAA bid 454 g 0     No current facility-administered medications on file prior to visit.        Medications have been reviewed and reconciled with patient at this visit.  Barriers to medications present (no)    Adverse reactions to current medications (no)    Over the counter medications reviewed (Yes ), and if needed added to active Medication list at this visit.     Exam:  Wt Readings from Last 3 Encounters:   02/26/20 110.1 kg (242 lb 11.6 oz)   01/30/20 109.2 kg (240 lb 11.9 oz)   01/30/20 109.2 kg (240 lb 11.9 oz)     Temp Readings from Last 3 Encounters:   02/26/20 97.6 °F (36.4 °C)   12/27/19 97.4 °F (36.3 °C)   11/26/19 97 °F (36.1 °C)     BP Readings from Last 3  Encounters:   02/26/20 105/74   01/30/20 111/74   01/30/20 129/84     Pulse Readings from Last 3 Encounters:   02/26/20 80   01/30/20 82   01/30/20 93     There is no height or weight on file to calculate BMI.      Review of Systems   Constitutional: Positive for fever. Negative for chills.   HENT: Positive for congestion. Negative for sinus pain and sore throat.    Eyes: Negative for blurred vision and double vision.   Respiratory: Positive for cough. Negative for sputum production, shortness of breath and wheezing.    Cardiovascular: Negative for chest pain, palpitations and leg swelling.   Gastrointestinal: Negative for abdominal pain, constipation, diarrhea, heartburn, nausea and vomiting.   Genitourinary: Negative.    Musculoskeletal: Negative.    Skin: Negative.  Negative for rash.   Neurological: Negative.    Endo/Heme/Allergies: Negative.  Negative for polydipsia. Does not bruise/bleed easily.   Psychiatric/Behavioral: Negative for depression and substance abuse.     Physical Exam   Constitutional: She is oriented to person, place, and time. She appears well-developed. No distress.   Pulmonary/Chest: Effort normal. No respiratory distress.   Coughing during visit  No tachypnea   Neurological: She is alert and oriented to person, place, and time.   Psychiatric: She has a normal mood and affect. Her behavior is normal. Judgment and thought content normal.       Laboratory Reviewed ({N/A)  Lab Results   Component Value Date    WBC 4.72 02/26/2020    HGB 12.2 02/26/2020    HCT 37.7 02/26/2020     (H) 02/26/2020    CHOL 169 08/24/2017    TRIG 228 (H) 08/24/2017    HDL 37 (L) 08/24/2017    ALT 17 02/26/2020    AST 19 02/26/2020     02/26/2020    K 3.8 02/26/2020     02/26/2020    CREATININE 0.7 02/26/2020    BUN 8 02/26/2020    CO2 25 02/26/2020    TSH 1.243 08/30/2017       Diagnoses and all orders for this visit:    Cough  -     SARS- CoV-2 (COVID-19) QUALITATIVE PCR; Future  -     COVID-19  Home Symptom Monitoring  - Duration (days): 14    Systemic lupus erythematosus arthritis  -     COVID-19 Home Symptom Monitoring  - Duration (days): 14    MCTD (mixed connective tissue disease)  -     COVID-19 Home Symptom Monitoring  - Duration (days): 14    Long-term current use of high risk medication -methotrexate  -     COVID-19 Home Symptom Monitoring  - Duration (days): 14    Immunosuppressed status  -     COVID-19 Home Symptom Monitoring  - Duration (days): 14    Shelter in place  Notify me of increase shortness in breath.    Swab tomorrow for COVID  No work until I see results and symptoms improve  Get a thermometer.  Conitnue cough meds            Care Plan/Goals: Reviewed  (N/A)  Goals    None         Follow up: Follow up in about 2 days (around 4/4/2020).    After visit summary was printed and given to patient upon discharge today.  Patient goals and care plan are included in After Visit Summary.

## 2020-04-02 NOTE — TELEPHONE ENCOUNTER
I spoke with Ms España, after she responded to a text for COVID 19 symptoms monitoring. By the time I reached her, she had already scheduled a video visit with her PCP for this afternoon.     She sounds well on the phone without audible breathing difficulty. She does endorse increased sweating, and feeling hot. She cannot find a thermometer. She is immunosuppressed due to medications to treat lupus.  She describes her cough as mild and the SOB occurs when she is trying to clean her house or walk around.     I recommended she keep her appointment and discuss and inhaler for home use.    Reason for Disposition   MILD difficulty breathing (e.g., minimal/no SOB at rest, SOB with walking, pulse <100)    Additional Information   Negative: SEVERE difficulty breathing (e.g., struggling for each breath, speaks in single words)   Negative: Difficult to awaken or acting confused (e.g., disoriented, slurred speech)   Negative: Bluish (or gray) lips or face now   Negative: Shock suspected (e.g., cold/pale/clammy skin, too weak to stand, low BP, rapid pulse)   Negative: Sounds like a life-threatening emergency to the triager   Negative: [1] COVID-19 suspected (e.g., cough, fever, shortness of breath) AND [2] public health department recommends testing   Negative: [1] COVID-19 exposure AND [2] no symptoms   Negative: COVID-19 and Breastfeeding, questions about   Negative: SEVERE or constant chest pain (Exception: mild central chest pain, present only when coughing)   Negative: MODERATE difficulty breathing (e.g., speaks in phrases, SOB even at rest, pulse 100-120)   Negative: Patient sounds very sick or weak to the triager    Protocols used: CORONAVIRUS (COVID-19) DIAGNOSED OR [id number]

## 2020-04-02 NOTE — Clinical Note
Can you help me with her? She have cough, subjective fever.  I need advice on what do with her RHeum meds? Should she hold? I am sending her for testing tomorrow.  No significant SOB, but she has seem to worsen over 24 hrs.

## 2020-04-03 ENCOUNTER — PATIENT MESSAGE (OUTPATIENT)
Dept: INTERNAL MEDICINE | Facility: CLINIC | Age: 35
End: 2020-04-03

## 2020-04-07 ENCOUNTER — TELEPHONE (OUTPATIENT)
Dept: INTERNAL MEDICINE | Facility: CLINIC | Age: 35
End: 2020-04-07

## 2020-04-07 NOTE — TELEPHONE ENCOUNTER
Phone:   [phone] (M)  Spoke with patient. She is feeling a little better today. She is still tired but not quiet as bad. The cough is better during the day. Coughs more at night. No fever. No sob. She said they did test her on Friday at [place].

## 2020-04-09 ENCOUNTER — PATIENT MESSAGE (OUTPATIENT)
Dept: INTERNAL MEDICINE | Facility: CLINIC | Age: 35
End: 2020-04-09

## 2020-04-10 ENCOUNTER — PATIENT MESSAGE (OUTPATIENT)
Dept: INTERNAL MEDICINE | Facility: CLINIC | Age: 35
End: 2020-04-10

## 2020-04-10 ENCOUNTER — PATIENT MESSAGE (OUTPATIENT)
Dept: RHEUMATOLOGY | Facility: CLINIC | Age: 35
End: 2020-04-10

## 2020-04-13 ENCOUNTER — PATIENT MESSAGE (OUTPATIENT)
Dept: INTERNAL MEDICINE | Facility: CLINIC | Age: 35
End: 2020-04-13

## 2020-04-13 DIAGNOSIS — R05.9 COUGH: Primary | ICD-10-CM

## 2020-04-14 ENCOUNTER — PATIENT MESSAGE (OUTPATIENT)
Dept: INTERNAL MEDICINE | Facility: CLINIC | Age: 35
End: 2020-04-14

## 2020-04-14 ENCOUNTER — TELEPHONE (OUTPATIENT)
Dept: RHEUMATOLOGY | Facility: CLINIC | Age: 35
End: 2020-04-14

## 2020-04-14 ENCOUNTER — PATIENT MESSAGE (OUTPATIENT)
Dept: RHEUMATOLOGY | Facility: CLINIC | Age: 35
End: 2020-04-14

## 2020-04-14 NOTE — TELEPHONE ENCOUNTER
Video visit scheduled on 4-22-20, prior to infusion at  on 4-23-20, address given to pt, Verbalized understanding.

## 2020-04-15 DIAGNOSIS — M32.9 SYSTEMIC LUPUS ERYTHEMATOSUS ARTHRITIS: ICD-10-CM

## 2020-04-15 DIAGNOSIS — M35.1 MCTD (MIXED CONNECTIVE TISSUE DISEASE): ICD-10-CM

## 2020-04-15 RX ORDER — PREDNISONE 10 MG/1
TABLET ORAL
Qty: 30 TABLET | Refills: 0 | Status: SHIPPED | OUTPATIENT
Start: 2020-04-15 | End: 2020-05-09 | Stop reason: SDUPTHER

## 2020-04-16 ENCOUNTER — TELEPHONE (OUTPATIENT)
Dept: HEMATOLOGY/ONCOLOGY | Facility: CLINIC | Age: 35
End: 2020-04-16

## 2020-04-16 DIAGNOSIS — Z03.818 ENCOUNTER FOR OBSERVATION FOR SUSPECTED EXPOSURE TO OTHER BIOLOGICAL AGENTS RULED OUT: ICD-10-CM

## 2020-04-16 DIAGNOSIS — M32.9 SYSTEMIC LUPUS ERYTHEMATOSUS ARTHRITIS: Primary | ICD-10-CM

## 2020-04-16 NOTE — TELEPHONE ENCOUNTER
04/16/2020    Phoned the patient.    Discussed the following with the patient:  In an effort to protect our immunocompromised patients from potential exposure to COVID-19, [facility] will now require all patients receiving an infusion, an injection, and/or radiation therapy to be tested for COVID-19 prior to their appointment.  All patients currently under treatment will be tested immediately and patients initiating new treatment cycles or with one-time appointments (injections, transfusions, etc.) must be tested within 72 hours of their appointment.    Symptom Patient's Response   Fever YES/NO: no   Cough YES/NO: no   Shortness of breath  YES/NO: no   Difficulty breathing YES/NO: no   GI symptoms such as diarrhea or nausea YES/NO: no   Loss of taste YES/NO: no   Loss of smell YES/NO: no   Other Screening Patient's Response   Has the patient previously undergone COVID-19 testing? YES/NO: no;

## 2020-04-20 ENCOUNTER — LAB VISIT (OUTPATIENT)
Dept: LAB | Facility: HOSPITAL | Age: 35
End: 2020-04-20
Attending: INTERNAL MEDICINE
Payer: COMMERCIAL

## 2020-04-20 ENCOUNTER — PATIENT MESSAGE (OUTPATIENT)
Dept: RHEUMATOLOGY | Facility: CLINIC | Age: 35
End: 2020-04-20

## 2020-04-20 DIAGNOSIS — M32.19 OTHER SYSTEMIC LUPUS ERYTHEMATOSUS WITH OTHER ORGAN INVOLVEMENT: ICD-10-CM

## 2020-04-20 DIAGNOSIS — M32.9 SYSTEMIC LUPUS ERYTHEMATOSUS ARTHRITIS: ICD-10-CM

## 2020-04-20 DIAGNOSIS — Z03.818 ENCOUNTER FOR OBSERVATION FOR SUSPECTED EXPOSURE TO OTHER BIOLOGICAL AGENTS RULED OUT: ICD-10-CM

## 2020-04-20 PROCEDURE — U0002 COVID-19 LAB TEST NON-CDC: HCPCS

## 2020-04-21 ENCOUNTER — PATIENT OUTREACH (OUTPATIENT)
Dept: ADMINISTRATIVE | Facility: OTHER | Age: 35
End: 2020-04-21

## 2020-04-21 LAB — SARS-COV-2 RNA RESP QL NAA+PROBE: NOT DETECTED

## 2020-04-21 RX ORDER — HYDROXYCHLOROQUINE SULFATE 200 MG/1
400 TABLET, FILM COATED ORAL DAILY
Qty: 60 TABLET | Refills: 11 | OUTPATIENT
Start: 2020-04-21

## 2020-04-21 RX ORDER — HYDROXYCHLOROQUINE SULFATE 200 MG/1
400 TABLET, FILM COATED ORAL DAILY
Qty: 60 TABLET | Refills: 11 | Status: SHIPPED | OUTPATIENT
Start: 2020-04-21 | End: 2020-04-29

## 2020-04-22 ENCOUNTER — PATIENT MESSAGE (OUTPATIENT)
Dept: RHEUMATOLOGY | Facility: CLINIC | Age: 35
End: 2020-04-22

## 2020-04-22 ENCOUNTER — OFFICE VISIT (OUTPATIENT)
Dept: RHEUMATOLOGY | Facility: CLINIC | Age: 35
End: 2020-04-22
Payer: COMMERCIAL

## 2020-04-22 DIAGNOSIS — M32.9 SYSTEMIC LUPUS ERYTHEMATOSUS ARTHRITIS: Primary | ICD-10-CM

## 2020-04-22 DIAGNOSIS — I73.00 SECONDARY RAYNAUD'S PHENOMENON: ICD-10-CM

## 2020-04-22 DIAGNOSIS — Z79.899 LONG-TERM CURRENT USE OF HIGH RISK MEDICATION OTHER THAN ANTICOAGULANT: ICD-10-CM

## 2020-04-22 DIAGNOSIS — D84.9 IMMUNOSUPPRESSED STATUS: ICD-10-CM

## 2020-04-22 PROCEDURE — 99214 OFFICE O/P EST MOD 30 MIN: CPT | Mod: 95,,, | Performed by: INTERNAL MEDICINE

## 2020-04-22 PROCEDURE — 99214 PR OFFICE/OUTPT VISIT, EST, LEVL IV, 30-39 MIN: ICD-10-PCS | Mod: 95,,, | Performed by: INTERNAL MEDICINE

## 2020-04-22 NOTE — ASSESSMENT & PLAN NOTE
Methotrexate on hold because of the recent cough. Continue to stay off methotrexate until cough resolves.

## 2020-04-22 NOTE — PROGRESS NOTES
RHEUMATOLOGY FOLLOW UP - TELE VISIT     The patient location is: [place]  The chief complaint leading to consultation is: Lupus  Visit type: Virtual visit with synchronous audio and video  Total time spent with patient: 15 minutes  Each patient to whom he or she provides medical services by telemedicine is:  (1) informed of the relationship between the physician and patient and the respective role of any other health care provider with respect to management of the patient; and (2) notified that he or she may decline to receive medical services by telemedicine and may withdraw from such care at any time.    HPI:-  Stefanie Felicia Kimbrough a 34 y.o. pleasant female seen today through My chart video visit for follow up.  She complains of flare-up in terms of her lupus especially around MCP joints and her feet with associated swelling and prolonged morning stiffness.  She missed last month Benlysta which she attributes the cause behind lupus flare.  Benlysta was on hold because of her fever and cough.  No fever.  Mild persistent cough.  Testing negative for COVID-19.  No myalgias.    Review of Systems   Constitutional: Negative for chills and fever.   HENT: Negative for congestion and sore throat.    Eyes: Negative for blurred vision and redness.   Respiratory: Positive for cough. Negative for shortness of breath.    Cardiovascular: Negative for chest pain and leg swelling.   Gastrointestinal: Negative for abdominal pain.   Genitourinary: Negative for dysuria.   Musculoskeletal: Positive for back pain, joint pain, myalgias and neck pain. Negative for falls.   Skin: Negative for rash.   Neurological: Negative for headaches.   Endo/Heme/Allergies: Does not bruise/bleed easily.   Psychiatric/Behavioral: Negative for memory loss. The patient does not have insomnia.        Past Medical History:   Diagnosis Date    Lupus (systemic lupus erythematosus)        Past Surgical History:   Procedure Laterality Date    TUBAL LIGATION  2005         Social History     Tobacco Use    Smoking status: Never Smoker    Smokeless tobacco: Never Used   Substance Use Topics    Alcohol use: No    Drug use: No       Family History   Problem Relation Age of Onset    Liver cancer Maternal Aunt     Breast cancer Maternal Aunt     Breast cancer Paternal Grandmother        Review of patient's allergies indicates:   Allergen Reactions    Elavil [amitriptyline]        Physical exam:-    GEN: awake, alert, non-diaphoretic, no psychomotor agitation, no acute distress    HEENT :Head: atraumatic, normocephalic, no rashes noted, no lesions noted;    Eyes: NO redness, discharge, swelling, or lesions    Nose: NO redness, swelling, discharge, deformity, or impetigo/crusting    Skin: no lesions, wounds, erythema, or cyanosis noted on face or hands    Cardiopulmonary: no increased respiratory effort, speaking in clear sentences    MSK: normal ROM in the neck, Upper extremities, Lower extremities  Good ROM of hands, fist formation 90% with some mild synovitis over MCP's  Good ambulation in front of the camera    Neuro: cranial nerves grossly normal, speech normal rate and rhythm, orientation arrived to appointment on time with no prompting, moved both upper extremities equally    Pysch:  appearance, behavior, and attitude- well groomed, pleasant, cooperative    Medication List with Changes/Refills   Current Medications    CLOBETASOL (TEMOVATE) 0.05 % EXTERNAL SOLUTION    Apply topically 2 (two) times daily.    DIPHENHYDRAMINE (BENADRYL) 25 MG CAPSULE    Take 25 mg by mouth.    FLUOCINOLONE (DERMA-SMOOTHE) 0.01 % EXTERNAL OIL    Apply oil to scalp twice a day    FOLIC ACID (FOLVITE) 1 MG TABLET    Take 1 tablet (1 mg total) by mouth once daily.    HYDROXYCHLOROQUINE (PLAQUENIL) 200 MG TABLET    Take 2 tablets (400 mg total) by mouth once daily.    KETOCONAZOLE (NIZORAL) 2 % SHAMPOO    Wash hair with medicated shampoo at least 1x/week - let sit on scalp at least 5 minutes  prior to rinsing    METHOTREXATE 2.5 MG TAB    Take 8 tablets (20 mg total) by mouth every 7 days.    METHOTREXATE SODIUM (METHOTREXATE, ANTI-RHEUMATIC, ORAL)        PREDNISONE (DELTASONE) 10 MG TABLET    Take 10 mg along with 5 mg if any joint flares    PREDNISONE (DELTASONE) 5 MG TABLET    Take 1 tablet (5 mg total) by mouth daily as needed (pain).    TACROLIMUS (PROTOPIC) 0.1 % OINTMENT    Apply topically 2 (two) times daily.    TRAZODONE (DESYREL) 50 MG TABLET    Take 1 tablet (50 mg total) by mouth every evening.    TRIAMCINOLONE ACETONIDE 0.1% (KENALOG) 0.1 % OINTMENT    AAA bid       Assessment/Plans:-  1. Systemic lupus erythematosus arthritis    2. Secondary Raynaud's phenomenon    3. Long-term current use of high risk medication other than anticoagulant    4. Immunosuppressed status      Problem List Items Addressed This Visit        Cardiac/Vascular    Secondary Raynaud's phenomenon    Current Assessment & Plan     Well controlled Raynaud's on conservative therapy.  Consider calcium channel blocker if needed.            Immunology/Multi System    Systemic lupus erythematosus arthritis - Primary    Current Assessment & Plan     Mild flare up of arthritis since missing benlysta in March due to fever/cough- COVID negative. Restart benlysta. Continue plaquenil. Intermittent prednisone 10 mg, advice to reduce use.          Immunosuppressed status    Current Assessment & Plan     Compromised immune system secondary to autoimmune disease and use of immunosuppressive drugs. Monitor carefully for infections. Advised to get immediate medical care if any infection. Also advised strict adherence to age appropriate vaccinations and cancer screenings with PCP.               Other    Long-term current use of high risk medication -methotrexate    Current Assessment & Plan     Methotrexate on hold because of the recent cough. Continue to stay off methotrexate until cough resolves.                Follow up in about 3  months (around 7/22/2020).    Disclaimer: This note was prepared using voice recognition system and is likely to have sound alike errors and is not proof read.  Please call me with any questions.

## 2020-04-22 NOTE — ASSESSMENT & PLAN NOTE
Mild flare up of arthritis since missing benlysta in March due to fever/cough- COVID negative. Restart benlysta. Continue plaquenil. Intermittent prednisone 10 mg, advice to reduce use.

## 2020-04-22 NOTE — Clinical Note
Ok to go ahead with benlysta tomorrow with labs already scheduled. Follow up with me in 3 months. Continue monthly benlysta. No need for labs with every benlysta after tomorrow. Lupus labs only with follow up visits.

## 2020-04-23 ENCOUNTER — INFUSION (OUTPATIENT)
Dept: INFUSION THERAPY | Facility: HOSPITAL | Age: 35
End: 2020-04-23
Attending: INTERNAL MEDICINE
Payer: COMMERCIAL

## 2020-04-23 ENCOUNTER — LAB VISIT (OUTPATIENT)
Dept: LAB | Facility: HOSPITAL | Age: 35
End: 2020-04-23
Attending: INTERNAL MEDICINE
Payer: COMMERCIAL

## 2020-04-23 VITALS
BODY MASS INDEX: 37.41 KG/M2 | WEIGHT: 246.06 LBS | OXYGEN SATURATION: 99 % | RESPIRATION RATE: 16 BRPM | DIASTOLIC BLOOD PRESSURE: 82 MMHG | TEMPERATURE: 97 F | SYSTOLIC BLOOD PRESSURE: 125 MMHG | HEART RATE: 86 BPM

## 2020-04-23 DIAGNOSIS — R76.8 POSITIVE SM/RNP ANTIBODY: ICD-10-CM

## 2020-04-23 DIAGNOSIS — Z03.818 ENCOUNTER FOR OBSERVATION FOR SUSPECTED EXPOSURE TO OTHER BIOLOGICAL AGENTS RULED OUT: ICD-10-CM

## 2020-04-23 DIAGNOSIS — M35.1 MCTD (MIXED CONNECTIVE TISSUE DISEASE): ICD-10-CM

## 2020-04-23 DIAGNOSIS — Z79.899 LONG-TERM CURRENT USE OF HIGH RISK MEDICATION OTHER THAN ANTICOAGULANT: ICD-10-CM

## 2020-04-23 DIAGNOSIS — M32.19 OTHER SYSTEMIC LUPUS ERYTHEMATOSUS WITH OTHER ORGAN INVOLVEMENT: ICD-10-CM

## 2020-04-23 DIAGNOSIS — M32.9 EXACERBATION OF SYSTEMIC LUPUS: ICD-10-CM

## 2020-04-23 DIAGNOSIS — M32.9 SYSTEMIC LUPUS ERYTHEMATOSUS ARTHRITIS: Primary | ICD-10-CM

## 2020-04-23 DIAGNOSIS — M32.9 SYSTEMIC LUPUS ERYTHEMATOSUS ARTHRITIS: ICD-10-CM

## 2020-04-23 LAB
BILIRUB UR QL STRIP: NEGATIVE
CLARITY UR: CLEAR
COLOR UR: YELLOW
CREAT UR-MCNC: 123 MG/DL (ref 15–325)
GLUCOSE UR QL STRIP: NEGATIVE
HGB UR QL STRIP: NEGATIVE
KETONES UR QL STRIP: NEGATIVE
LEUKOCYTE ESTERASE UR QL STRIP: NEGATIVE
NITRITE UR QL STRIP: NEGATIVE
PH UR STRIP: 6 [PH] (ref 5–8)
PROT UR QL STRIP: NEGATIVE
PROT UR-MCNC: 8 MG/DL (ref 0–15)
PROT/CREAT UR: 0.07 MG/G{CREAT} (ref 0–0.2)
SP GR UR STRIP: 1.02 (ref 1–1.03)
URN SPEC COLLECT METH UR: NORMAL
UROBILINOGEN UR STRIP-ACNC: NEGATIVE EU/DL

## 2020-04-23 PROCEDURE — 96413 CHEMO IV INFUSION 1 HR: CPT

## 2020-04-23 PROCEDURE — 63600175 PHARM REV CODE 636 W HCPCS: Mod: JW,JG | Performed by: INTERNAL MEDICINE

## 2020-04-23 PROCEDURE — 84156 ASSAY OF PROTEIN URINE: CPT

## 2020-04-23 PROCEDURE — 25000003 PHARM REV CODE 250: Performed by: INTERNAL MEDICINE

## 2020-04-23 PROCEDURE — 82570 ASSAY OF URINE CREATININE: CPT

## 2020-04-23 PROCEDURE — 81003 URINALYSIS AUTO W/O SCOPE: CPT

## 2020-04-23 RX ORDER — SODIUM CHLORIDE 0.9 % (FLUSH) 0.9 %
10 SYRINGE (ML) INJECTION
Status: CANCELLED | OUTPATIENT
Start: 2020-05-21

## 2020-04-23 RX ORDER — HEPARIN 100 UNIT/ML
500 SYRINGE INTRAVENOUS
Status: CANCELLED | OUTPATIENT
Start: 2020-05-21

## 2020-04-23 RX ORDER — DIPHENHYDRAMINE HYDROCHLORIDE 50 MG/ML
25 INJECTION INTRAMUSCULAR; INTRAVENOUS
Status: CANCELLED
Start: 2020-05-21

## 2020-04-23 RX ORDER — SODIUM CHLORIDE 0.9 % (FLUSH) 0.9 %
10 SYRINGE (ML) INJECTION
Status: DISCONTINUED | OUTPATIENT
Start: 2020-04-23 | End: 2020-04-23 | Stop reason: HOSPADM

## 2020-04-23 RX ORDER — ACETAMINOPHEN 325 MG/1
650 TABLET ORAL
Status: CANCELLED | OUTPATIENT
Start: 2020-05-21

## 2020-04-23 RX ADMIN — BELIMUMAB 1116 MG: 400 INJECTION, POWDER, LYOPHILIZED, FOR SOLUTION INTRAVENOUS at 11:04

## 2020-04-23 NOTE — NURSING
Infusion# >10    Recent labs? 4/23/20    S/S infection noted or voiced? None noted/denies  Recent or planned surgeries/invasive procedures? denies  Recent vaccines? denies    Premeds? None    Benlysta 10 mg/kg = 1,116 mg administered IV at a 60 minute rate per orders; see MAR & Flow Sheet for more  details. Tolerated well without adverse events

## 2020-04-23 NOTE — DISCHARGE INSTRUCTIONS
Coronavirus Disease 2019 or COVID-19 is a new strand of coronavirus to infect humans. It was first detected in [place] and has now spread around the world, including the [place].       Currently outbreak levels are on the rise in the  [place], especially [place]. The information on COVID-19 is constantly changing. As the [facility] continues to update information, here are five basics facts you should know about COVID-19.      What are the symptoms of COVID-19??  COVID-19 is a respiratory disease. Some symptoms may include a runny nose, cough, sore throat, headaches and possibly fever. For people who have a weakened immune system, such as the elderly, the very young or immunocompromised patients, symptoms can become severe quickly and can cause serious respiratory tract illnesses, such as pneumonia or bronchitis.?    Should I buy a mask to protect myself from homar COVID-19??   If you are healthy, you only need to wear a mask if you are taking care of a person with suspected 2019-nCoV infection.   Wear a mask if you are coughing or sneezing.   Masks are effective only when used in combination with frequent hand-cleaning with alcohol-based hand rub or soap and water.   If you wear a mask, then you must know how to use it and dispose of it properly.   Before putting on a mask, clean hands with alcohol-based hand rub or soap and water.   Cover mouth and nose with mask and make sure there are no gaps between your face and the mask.   Avoid touching the mask while using it; if you do, clean your hands with alcohol-based hand rub or soap and water.   Replace the mask with a new one as soon as it is damp and do not re-use single-use masks.   To remove the mask: remove it from behind (do not touch the front of mask); discard immediately in a closed bin; clean hands with alcohol-based hand rub or soap and water.   Please copy the link below; education  and instructional videos on proper use and disposal  of masks  o https://www.who.int/emergencies/diseases/novel-coronavirus-2019/advice-for-public/when-and-how-to-use-masks      Handwashing is Best    best thing to do is to wash your hands thoroughly. For best handwashing techniques, follow the?CDCs guide.   Below is video link to [facility] proper handwashing procedure  o https://www.cdc.gov/handwashing/videos.html    What preventions and treatments are available??  Currently, there is no vaccine to prevent COVID-19. The best way to prevent contraction is to avoid being exposed to the virus. There is no specific treatment for COVID-19. People who contract the virus should receive supportive care from their healthcare team to help alleviate symptoms. People with a severe case will need care that involves support for vital organs. Some everyday preventions the?CDC recommends?are:    -Stay home, you do not have to be confined to your home; its ok to go outside just make sure you remain 6 ft away from others if you walk down your street or talk to neighbors  -Absolutely Avoid close contact with people who are sick  -Avoid touching your eyes, nose and mouth  -Stay home when you are sick  -Cover your cough or sneezes with a tissue and immediately throw it away in the trash.  -Disinfect objects that you frequently use, such as your phone, computer, purse, remotes, chargers, kids toys, water bottles, etc.  -Wash your hands frequently with soap and water for at least 20 seconds, especially after going to the bathroom, before eating or after you cough or sneeze.    Can I still travel??  I would advises against travel currently both domestic and international. If travel is an an emergency take extreme caution and if trying to return to the [place], can expect restrictions on entry and quarantines upon return. The status of COVID-19 is changing every day, so continue to monitor the?CDCs list of travel notices before traveling.?    What should I do if I think I have  COVID-19??  If you are an [facility] patient and think you contracted COVID-19, reach out to [facility], our free 24/7 nurse care line. [facility], is always available when you have health concerns or need advice on care options. Our specially trained registered nurses are available to discuss your health care concerns, recommend self-care techniques and help you decide if your symptoms require a visit to urgent or emergency care.     The service is free and available by calling [phone]?or?[phone].   Or see a provider from home with an [facility] virtual visit.    Patients with questions can also call the [facility] Covid-19 Line at [phone].          Rheumatology Patients:   If you are on immunosuppressive medications   Do Not Stop your medications, continue to take as prescribed   Stopping medications may cause a flare up of your underlying conditions which would increase your risk of infection so the best advice is to continue your medications as prescribed    If you develop any signs or symptoms consistent w/Covid-19 (dry cough, shortness of breath, fever>100, chest congestion, headache, chills) at that time Please stop taking all immunosuppressive medications immediately   o Do Not stop taking prednisone as abrupt discontinuation can cause issues.   o Do Not stop taking hydroxychloroquine as it has some anti-viral properties and is being used as treatment for coronavirus immune response   Immunosuppressive meds are not limited to but include medications like:  o Humira, Enbrel, Cimzia, Remicade, Simponi  o Orencia  o Rituxan, Cytoxan  o Benlysta  o Xeljanz, Olumiant, Rinvoq  o Kineret, Ilaris  o Cosentyx, Taltz  o Stelara, Tremfya  o Methotrexate, mycophenolate (cellcept), azathioprine (imuran), leflunomide (arava), tacrolimus (prograf), cyclosporin, dapsone  o If you are taking Actemra or Kevzara  Please contact your provider   for patient specific instructions on  interrupting your dose while you go through the infection course     If you have any specific rheumatology questions please reach out to your provider via phone call [phone] or via patient portal message  o Also available resources is [facility] [phone]?or?[phone].   o [facility] virtual visit   o You can also be seen by your rheumatology provider from home with my chart video visit for face time visit     If you test positive for Covid-19 and/or Develop symptoms consistent with Covid-19 Coronavirus Infection    Please immediately notify your Rheumatology Provider Immediately    Main Clinic number : [phone] or [phone] as for [facility].    One of our staff members will call you back to discuss your issues   You can also reach us via My Chart Patient Portal Message      its important to know that we do not want your to resume your Dmard or biologic medications until you are free of symptoms for 7 full days and 14 days after onset of symptoms you have repeat covid-19 testing which are negative        More info below:copy the link into your web browser    [email or web address]-[email or web address]        [place] [facility]  Preventing the Spread of Coronavirus Disease 2019 in Homes and Residential Communities    Prevention steps for people with confirmed or suspected COVID-19 (including persons under investigation) who do not need to be hospitalized and people with confirmed COVID-19 who were hospitalized and determined to be medically stable to go home    Your healthcare provider and public health staff will evaluate whether you can be cared for at home.   Stay home except to get medical care.   Separate yourself from other people and animals in your home   Call ahead before visiting your doctor.   Wear a facemask.   Cover your coughs and sneezes.   Clean your hands  often.   Avoid sharing personal household items.   Clean all high-touch surfaces everyday.   Monitor your symptoms.     Seek prompt medical attention if your illness is worsening (e.g.,difficulty breathing). Before seeking care, call your healthcare provider. If you have a medical emergency and need to call 911, notify the dispatch personnel that you have, or are being evaluated for COVID-19. If possible, put on a facemask before emergency medical services arrive.      Discontinuing home isolation. Call your provider about guidance to discontinue home isolation.    Recommended precautions for household members, intimate partners, and caregivers in a non healthcare setting of a patient with symptomatic laboratory-confirmed COVID-19 or a patient under investigation    Household members, intimate partners, and caregivers in a nonhealthcare setting may have close contact with a person with symptomatic, laboratory-confirmed COVID-19 or a person under investigation. Close contacts should monitor their health; they should call their healthcare provider right away if they develop symptoms suggestive of COVID-19 (e.g., fever, cough, shortness of breath).    Close contacts should also follow these recommendations:    Make sure that you understand and can help the patient follow their healthcare providers instructions for medication(s) and care. You should help the patient with basic needs in the home and provide support for getting groceries, prescriptions, and other personal needs.    Monitor the patients symptoms. If the patient is getting sicker, call his or her healthcare provider and tell them that the patient has laboratory-confirmed COVID-19. This will help the healthcare providers office take steps to keep other people in the office or waiting room from getting infected. Ask the healthcare provider to call the local or state health department for additional guidance. If the patient has a medical emergency  and you need to call 911, notify the dispatch personnel that the patient has, or is being evaluated for COVID-19.    Household members should stay in another room or be  from the patient as much as possible. Household members should use a separate bedroom and bathroom, if available.   Prohibit visitors who do not have an essential need to be in the home.    Household members should not care for any pets in the home. Do not handle pets or other animals while sick.   Make sure that shared spaces in the home have good air flow, such as by an air conditioner or an opened window, weather permitting.   Perform hand hygiene frequently. Wash your hands often with soap and water for at least 20 seconds or use an alcohol-based hand  that contains 60 to 95% alcohol, covering all surfaces of your hands and rubbing them together until they feel dry. Soap and water should be used preferentially if hands are visibly dirty.   Avoid touching your eyes, nose, and mouth with unwashed hands.   The patient should wear a facemask when you are around other people. If the patient is not able to wear a facemask (for example, because it causes trouble breathing), you, as the caregiver, should wear a mask when you are in the same room as the patient.   Wear a disposable facemask and gloves when you touch or have contact with the patients blood, stool, or body fluids, such as saliva, sputum, nasal mucus, vomit, urine.   Throw out disposable facemasks and gloves after using them. Do not reuse.   When removing personal protective equipment, first remove and dispose of gloves. Then, immediately clean your hands with soap and water or alcohol-based hand . Next,remove and dispose of facemask, and immediately clean your hands again with soap and water or alcohol-based hand .    Avoid sharing household items with the patient. You should not share dishes, drinking glasses, cups, eating utensils, towels,  bedding, or other items. After the patient uses these items, you should wash them thoroughly (see below Wash laundry thoroughly).   Clean all high-touch surfaces, such as counters, tabletops, doorknobs, bathroom fixtures, toilets, phones, keyboards, tablets, and bedside tables, every day. Also, clean any surfaces that may have blood, stool, or body fluids on them.   Use a household cleaning spray or wipe, according to the label instructions. Labels contain instructions for safe and effective use of the cleaning product including precautions you should take when applying the product, such as wearing gloves and making sure you have good ventilation during use of the product.   Wash laundry thoroughly.    Immediately remove and wash clothes or bedding that have blood, stool, or body fluids on them.   Wear disposable gloves while handling soiled items and keep soiled items away from your body. Clean your hands (with soap and water or an alcohol-based hand ) immediately after removing your gloves.      Read and follow directions on labels of laundry or clothing items and detergent.   o In general, using a normal laundry detergent according to washing machine instructions and dry thoroughly using the warmest temperatures recommended on the clothing label.    Place all used disposable gloves, facemasks, and other contaminated items in a lined container before disposing of them with other household waste.    Clean your hands (with soap and water or an alcohol-based hand ) immediately after handling these items.   o Soap and water should be used preferentially if hands are visibly dirty.   Discuss any additional questions with your state or local health department or healthcare provider.   Check available hours when contacting your local health department. [facility]

## 2020-04-29 ENCOUNTER — PATIENT MESSAGE (OUTPATIENT)
Dept: RHEUMATOLOGY | Facility: CLINIC | Age: 35
End: 2020-04-29

## 2020-04-29 DIAGNOSIS — M32.9 SYSTEMIC LUPUS ERYTHEMATOSUS ARTHRITIS: ICD-10-CM

## 2020-04-29 DIAGNOSIS — M32.19 OTHER SYSTEMIC LUPUS ERYTHEMATOSUS WITH OTHER ORGAN INVOLVEMENT: ICD-10-CM

## 2020-04-29 DIAGNOSIS — M32.9 SYSTEMIC LUPUS ERYTHEMATOSUS ARTHRITIS: Primary | ICD-10-CM

## 2020-04-29 RX ORDER — HYDROXYCHLOROQUINE SULFATE 200 MG/1
400 TABLET, FILM COATED ORAL DAILY
Qty: 60 TABLET | Refills: 11 | Status: SHIPPED | OUTPATIENT
Start: 2020-04-29 | End: 2020-04-29 | Stop reason: SDUPTHER

## 2020-04-29 RX ORDER — HYDROXYCHLOROQUINE SULFATE 200 MG/1
400 TABLET, FILM COATED ORAL DAILY
Qty: 60 TABLET | Refills: 11 | Status: SHIPPED | OUTPATIENT
Start: 2020-04-29 | End: 2020-05-28 | Stop reason: SDUPTHER

## 2020-04-29 NOTE — TELEPHONE ENCOUNTER
Please advice the patient the explanation given by pharmacist. I have sent a new prescription to pharmacy to fill today. If they couldn't, advice to transfer it to [facility] or [facility]. Thanks.

## 2020-04-29 NOTE — TELEPHONE ENCOUNTER
Spoke with [facility] they state they filled 14 day limited supply at a time which she received on 4/4/20. The pharmacy states Dr. SHORE can call and they may refill additional tablets.  Per pt's insurance she may get more tablets per fill by using [facility] or [facility].

## 2020-04-30 ENCOUNTER — PATIENT MESSAGE (OUTPATIENT)
Dept: RHEUMATOLOGY | Facility: CLINIC | Age: 35
End: 2020-04-30

## 2020-05-18 ENCOUNTER — PATIENT MESSAGE (OUTPATIENT)
Dept: RHEUMATOLOGY | Facility: CLINIC | Age: 35
End: 2020-05-18

## 2020-05-18 ENCOUNTER — CLINICAL SUPPORT (OUTPATIENT)
Dept: HEMATOLOGY/ONCOLOGY | Facility: CLINIC | Age: 35
End: 2020-05-18
Payer: COMMERCIAL

## 2020-05-18 DIAGNOSIS — Z03.818 ENCOUNTER FOR OBSERVATION FOR SUSPECTED EXPOSURE TO OTHER BIOLOGICAL AGENTS RULED OUT: ICD-10-CM

## 2020-05-18 PROCEDURE — U0003 INFECTIOUS AGENT DETECTION BY NUCLEIC ACID (DNA OR RNA); SEVERE ACUTE RESPIRATORY SYNDROME CORONAVIRUS 2 (SARS-COV-2) (CORONAVIRUS DISEASE [COVID-19]), AMPLIFIED PROBE TECHNIQUE, MAKING USE OF HIGH THROUGHPUT TECHNOLOGIES AS DESCRIBED BY CMS-2020-01-R: HCPCS

## 2020-05-19 LAB — SARS-COV-2 RNA RESP QL NAA+PROBE: NOT DETECTED

## 2020-05-20 ENCOUNTER — PATIENT MESSAGE (OUTPATIENT)
Dept: RHEUMATOLOGY | Facility: CLINIC | Age: 35
End: 2020-05-20

## 2020-05-21 ENCOUNTER — INFUSION (OUTPATIENT)
Dept: INFUSION THERAPY | Facility: HOSPITAL | Age: 35
End: 2020-05-21
Attending: INTERNAL MEDICINE
Payer: COMMERCIAL

## 2020-05-21 ENCOUNTER — PATIENT MESSAGE (OUTPATIENT)
Dept: RHEUMATOLOGY | Facility: CLINIC | Age: 35
End: 2020-05-21

## 2020-05-21 VITALS
HEIGHT: 68 IN | HEART RATE: 87 BPM | BODY MASS INDEX: 37.59 KG/M2 | OXYGEN SATURATION: 99 % | DIASTOLIC BLOOD PRESSURE: 85 MMHG | RESPIRATION RATE: 18 BRPM | WEIGHT: 248 LBS | SYSTOLIC BLOOD PRESSURE: 128 MMHG | TEMPERATURE: 98 F

## 2020-05-21 DIAGNOSIS — Z03.818 ENCOUNTER FOR OBSERVATION FOR SUSPECTED EXPOSURE TO OTHER BIOLOGICAL AGENTS RULED OUT: Primary | ICD-10-CM

## 2020-05-21 DIAGNOSIS — M32.9 EXACERBATION OF SYSTEMIC LUPUS: ICD-10-CM

## 2020-05-21 DIAGNOSIS — M35.1 MCTD (MIXED CONNECTIVE TISSUE DISEASE): ICD-10-CM

## 2020-05-21 DIAGNOSIS — M32.9 SYSTEMIC LUPUS ERYTHEMATOSUS ARTHRITIS: ICD-10-CM

## 2020-05-21 DIAGNOSIS — R76.8 POSITIVE SM/RNP ANTIBODY: ICD-10-CM

## 2020-05-21 PROCEDURE — 96413 CHEMO IV INFUSION 1 HR: CPT

## 2020-05-21 PROCEDURE — 63600175 PHARM REV CODE 636 W HCPCS: Performed by: INTERNAL MEDICINE

## 2020-05-21 PROCEDURE — 25000003 PHARM REV CODE 250: Performed by: INTERNAL MEDICINE

## 2020-05-21 PROCEDURE — 96375 TX/PRO/DX INJ NEW DRUG ADDON: CPT

## 2020-05-21 RX ORDER — SODIUM CHLORIDE 0.9 % (FLUSH) 0.9 %
10 SYRINGE (ML) INJECTION
Status: DISCONTINUED | OUTPATIENT
Start: 2020-05-21 | End: 2020-05-21 | Stop reason: HOSPADM

## 2020-05-21 RX ORDER — DIPHENHYDRAMINE HYDROCHLORIDE 50 MG/ML
25 INJECTION INTRAMUSCULAR; INTRAVENOUS
Status: CANCELLED
Start: 2020-06-18

## 2020-05-21 RX ORDER — HEPARIN 100 UNIT/ML
500 SYRINGE INTRAVENOUS
Status: CANCELLED | OUTPATIENT
Start: 2020-06-18

## 2020-05-21 RX ORDER — SODIUM CHLORIDE 0.9 % (FLUSH) 0.9 %
10 SYRINGE (ML) INJECTION
Status: CANCELLED | OUTPATIENT
Start: 2020-06-18

## 2020-05-21 RX ORDER — ACETAMINOPHEN 325 MG/1
650 TABLET ORAL
Status: CANCELLED | OUTPATIENT
Start: 2020-06-18

## 2020-05-21 RX ORDER — METHYLPREDNISOLONE SOD SUCC 125 MG
80 VIAL (EA) INJECTION
Status: COMPLETED | OUTPATIENT
Start: 2020-05-21 | End: 2020-05-21

## 2020-05-21 RX ADMIN — METHYLPREDNISOLONE SODIUM SUCCINATE 80 MG: 125 INJECTION, POWDER, FOR SOLUTION INTRAMUSCULAR; INTRAVENOUS at 11:05

## 2020-05-21 RX ADMIN — BELIMUMAB 1125 MG: 400 INJECTION, POWDER, LYOPHILIZED, FOR SOLUTION INTRAVENOUS at 11:05

## 2020-05-21 NOTE — DISCHARGE INSTRUCTIONS
Coronavirus Disease 2019 or COVID-19 is a new strand of coronavirus to infect humans. It was first detected in [place] and has now spread around the world, including the [place].       Currently outbreak levels are on the rise in the  [place], especially [place]. The information on COVID-19 is constantly changing. As the [facility] continues to update information, here are five basics facts you should know about COVID-19.      What are the symptoms of COVID-19??  COVID-19 is a respiratory disease. Some symptoms may include a runny nose, cough, sore throat, headaches and possibly fever. For people who have a weakened immune system, such as the elderly, the very young or immunocompromised patients, symptoms can become severe quickly and can cause serious respiratory tract illnesses, such as pneumonia or bronchitis.?    Should I buy a mask to protect myself from homar COVID-19??   If you are healthy, you only need to wear a mask if you are taking care of a person with suspected 2019-nCoV infection.   Wear a mask if you are coughing or sneezing.   Masks are effective only when used in combination with frequent hand-cleaning with alcohol-based hand rub or soap and water.   If you wear a mask, then you must know how to use it and dispose of it properly.   Before putting on a mask, clean hands with alcohol-based hand rub or soap and water.   Cover mouth and nose with mask and make sure there are no gaps between your face and the mask.   Avoid touching the mask while using it; if you do, clean your hands with alcohol-based hand rub or soap and water.   Replace the mask with a new one as soon as it is damp and do not re-use single-use masks.   To remove the mask: remove it from behind (do not touch the front of mask); discard immediately in a closed bin; clean hands with alcohol-based hand rub or soap and water.   Please copy the link below; education  and instructional videos on proper use and disposal  of masks  o https://www.who.int/emergencies/diseases/novel-coronavirus-2019/advice-for-public/when-and-how-to-use-masks      Handwashing is Best    best thing to do is to wash your hands thoroughly. For best handwashing techniques, follow the?CDCs guide.   Below is video link to [facility] proper handwashing procedure  o https://www.cdc.gov/handwashing/videos.html    What preventions and treatments are available??  Currently, there is no vaccine to prevent COVID-19. The best way to prevent contraction is to avoid being exposed to the virus. There is no specific treatment for COVID-19. People who contract the virus should receive supportive care from their healthcare team to help alleviate symptoms. People with a severe case will need care that involves support for vital organs. Some everyday preventions the?CDC recommends?are:    -Stay home, you do not have to be confined to your home; its ok to go outside just make sure you remain 6 ft away from others if you walk down your street or talk to neighbors  -Absolutely Avoid close contact with people who are sick  -Avoid touching your eyes, nose and mouth  -Stay home when you are sick  -Cover your cough or sneezes with a tissue and immediately throw it away in the trash.  -Disinfect objects that you frequently use, such as your phone, computer, purse, remotes, chargers, kids toys, water bottles, etc.  -Wash your hands frequently with soap and water for at least 20 seconds, especially after going to the bathroom, before eating or after you cough or sneeze.    Can I still travel??  I would advises against travel currently both domestic and international. If travel is an an emergency take extreme caution and if trying to return to the [place], can expect restrictions on entry and quarantines upon return. The status of COVID-19 is changing every day, so continue to monitor the?CDCs list of travel notices before traveling.?    What should I do if I think I have  COVID-19??  If you are an [facility] patient and think you contracted COVID-19, reach out to [facility], our free 24/7 nurse care line. [facility], is always available when you have health concerns or need advice on care options. Our specially trained registered nurses are available to discuss your health care concerns, recommend self-care techniques and help you decide if your symptoms require a visit to urgent or emergency care.     The service is free and available by calling [phone]?or?[phone].   Or see a provider from home with an [facility] virtual visit.    Patients with questions can also call the [facility] Covid-19 Line at [phone].          Rheumatology Patients:   If you are on immunosuppressive medications   Do Not Stop your medications, continue to take as prescribed   Stopping medications may cause a flare up of your underlying conditions which would increase your risk of infection so the best advice is to continue your medications as prescribed    If you develop any signs or symptoms consistent w/Covid-19 (dry cough, shortness of breath, fever>100, chest congestion, headache, chills) at that time Please stop taking all immunosuppressive medications immediately   o Do Not stop taking prednisone as abrupt discontinuation can cause issues.   o Do Not stop taking hydroxychloroquine as it has some anti-viral properties and is being used as treatment for coronavirus immune response   Immunosuppressive meds are not limited to but include medications like:  o Humira, Enbrel, Cimzia, Remicade, Simponi  o Orencia  o Rituxan, Cytoxan  o Benlysta  o Xeljanz, Olumiant, Rinvoq  o Kineret, Ilaris  o Cosentyx, Taltz  o Stelara, Tremfya  o Methotrexate, mycophenolate (cellcept), azathioprine (imuran), leflunomide (arava), tacrolimus (prograf), cyclosporin, dapsone  o If you are taking Actemra or Kevzara  Please contact your provider   for patient specific instructions on  interrupting your dose while you go through the infection course     If you have any specific rheumatology questions please reach out to your provider via phone call [phone] or via patient portal message  o Also available resources is [facility] [phone]?or?[phone].   o [facility] virtual visit   o You can also be seen by your rheumatology provider from home with my chart video visit for face time visit     If you test positive for Covid-19 and/or Develop symptoms consistent with Covid-19 Coronavirus Infection    Please immediately notify your Rheumatology Provider Immediately    Main Clinic number : [phone] or [phone] as for [facility].    One of our staff members will call you back to discuss your issues   You can also reach us via My Chart Patient Portal Message      its important to know that we do not want your to resume your Dmard or biologic medications until you are free of symptoms for 7 full days and 14 days after onset of symptoms you have repeat covid-19 testing which are negative        More info below:copy the link into your web browser    https://op5.org/treatment/stopping-immunosuppressing-medication-during-infection/        [place] [facility]  Preventing the Spread of Coronavirus Disease 2019 in Homes and Residential Communities    Prevention steps for people with confirmed or suspected COVID-19 (including persons under investigation) who do not need to be hospitalized and people with confirmed COVID-19 who were hospitalized and determined to be medically stable to go home    Your healthcare provider and public health staff will evaluate whether you can be cared for at home.   Stay home except to get medical care.   Separate yourself from other people and animals in your home   Call ahead before visiting your doctor.   Wear a facemask.   Cover your coughs and sneezes.   Clean your hands  often.   Avoid sharing personal household items.   Clean all high-touch surfaces everyday.   Monitor your symptoms.     Seek prompt medical attention if your illness is worsening (e.g.,difficulty breathing). Before seeking care, call your healthcare provider. If you have a medical emergency and need to call 911, notify the dispatch personnel that you have, or are being evaluated for COVID-19. If possible, put on a facemask before emergency medical services arrive.      Discontinuing home isolation. Call your provider about guidance to discontinue home isolation.    Recommended precautions for household members, intimate partners, and caregivers in a non healthcare setting of a patient with symptomatic laboratory-confirmed COVID-19 or a patient under investigation    Household members, intimate partners, and caregivers in a nonhealthcare setting may have close contact with a person with symptomatic, laboratory-confirmed COVID-19 or a person under investigation. Close contacts should monitor their health; they should call their healthcare provider right away if they develop symptoms suggestive of COVID-19 (e.g., fever, cough, shortness of breath).    Close contacts should also follow these recommendations:    Make sure that you understand and can help the patient follow their healthcare providers instructions for medication(s) and care. You should help the patient with basic needs in the home and provide support for getting groceries, prescriptions, and other personal needs.    Monitor the patients symptoms. If the patient is getting sicker, call his or her healthcare provider and tell them that the patient has laboratory-confirmed COVID-19. This will help the healthcare providers office take steps to keep other people in the office or waiting room from getting infected. Ask the healthcare provider to call the local or state health department for additional guidance. If the patient has a medical emergency  and you need to call 911, notify the dispatch personnel that the patient has, or is being evaluated for COVID-19.    Household members should stay in another room or be  from the patient as much as possible. Household members should use a separate bedroom and bathroom, if available.   Prohibit visitors who do not have an essential need to be in the home.    Household members should not care for any pets in the home. Do not handle pets or other animals while sick.   Make sure that shared spaces in the home have good air flow, such as by an air conditioner or an opened window, weather permitting.   Perform hand hygiene frequently. Wash your hands often with soap and water for at least 20 seconds or use an alcohol-based hand  that contains 60 to 95% alcohol, covering all surfaces of your hands and rubbing them together until they feel dry. Soap and water should be used preferentially if hands are visibly dirty.   Avoid touching your eyes, nose, and mouth with unwashed hands.   The patient should wear a facemask when you are around other people. If the patient is not able to wear a facemask (for example, because it causes trouble breathing), you, as the caregiver, should wear a mask when you are in the same room as the patient.   Wear a disposable facemask and gloves when you touch or have contact with the patients blood, stool, or body fluids, such as saliva, sputum, nasal mucus, vomit, urine.   Throw out disposable facemasks and gloves after using them. Do not reuse.   When removing personal protective equipment, first remove and dispose of gloves. Then, immediately clean your hands with soap and water or alcohol-based hand . Next,remove and dispose of facemask, and immediately clean your hands again with soap and water or alcohol-based hand .    Avoid sharing household items with the patient. You should not share dishes, drinking glasses, cups, eating utensils, towels,  bedding, or other items. After the patient uses these items, you should wash them thoroughly (see below Wash laundry thoroughly).   Clean all high-touch surfaces, such as counters, tabletops, doorknobs, bathroom fixtures, toilets, phones, keyboards, tablets, and bedside tables, every day. Also, clean any surfaces that may have blood, stool, or body fluids on them.   Use a household cleaning spray or wipe, according to the label instructions. Labels contain instructions for safe and effective use of the cleaning product including precautions you should take when applying the product, such as wearing gloves and making sure you have good ventilation during use of the product.   Wash laundry thoroughly.    Immediately remove and wash clothes or bedding that have blood, stool, or body fluids on them.   Wear disposable gloves while handling soiled items and keep soiled items away from your body. Clean your hands (with soap and water or an alcohol-based hand ) immediately after removing your gloves.      Read and follow directions on labels of laundry or clothing items and detergent.   o In general, using a normal laundry detergent according to washing machine instructions and dry thoroughly using the warmest temperatures recommended on the clothing label.    Place all used disposable gloves, facemasks, and other contaminated items in a lined container before disposing of them with other household waste.    Clean your hands (with soap and water or an alcohol-based hand ) immediately after handling these items.   o Soap and water should be used preferentially if hands are visibly dirty.   Discuss any additional questions with your state or local health department or healthcare provider.   Check available hours when contacting your local health department. [facility]

## 2020-05-21 NOTE — NURSING
Infusion# >10    Recent labs? Reviewed    Premeds? Solumedrol 80 mg Slow IVP    S/S of current or recent infections in the past 14 days? None/Denies    Recent Surgery/invasive procedures? None/Denies     Any recent vaccines ? None/Denies    Benlysta 1125 mg administered IV at a 60 minute rate per orders; see MAR and vitals for more  Details.

## 2020-05-28 DIAGNOSIS — M32.9 SYSTEMIC LUPUS ERYTHEMATOSUS ARTHRITIS: ICD-10-CM

## 2020-05-28 DIAGNOSIS — M32.19 OTHER SYSTEMIC LUPUS ERYTHEMATOSUS WITH OTHER ORGAN INVOLVEMENT: ICD-10-CM

## 2020-05-29 RX ORDER — HYDROXYCHLOROQUINE SULFATE 200 MG/1
400 TABLET, FILM COATED ORAL DAILY
Qty: 60 TABLET | Refills: 11 | Status: SHIPPED | OUTPATIENT
Start: 2020-05-29 | End: 2020-06-04 | Stop reason: SDUPTHER

## 2020-06-01 ENCOUNTER — PATIENT MESSAGE (OUTPATIENT)
Dept: RHEUMATOLOGY | Facility: CLINIC | Age: 35
End: 2020-06-01

## 2020-06-01 NOTE — TELEPHONE ENCOUNTER
"  Spoke with regarding concerns have scheduled her to see Stephania Carlitos on 6/4/20 at 10:00am pt verbalized understanding.  "see below"        Prashanth Whalen MD 14 minutes ago (10:50 AM)         Please schedule a visit with any provider in clinic for differentiation whether she has ankle joint swelling or pitting pedal edema from volume overload. For joint swelling she might benefit from joint injection , but if she has volume overload, she might need work up of the underlying cause and diuretics if appropriate. Thanks.          Documentation        "

## 2020-06-01 NOTE — TELEPHONE ENCOUNTER
Please schedule a visit with any provider in clinic for differentiation whether she has ankle joint swelling or pitting pedal edema from volume overload. For joint swelling she might benefit from joint injection , but if she has volume overload, she might need work up of the underlying cause and diuretics if appropriate. Thanks.

## 2020-06-02 ENCOUNTER — TELEPHONE (OUTPATIENT)
Dept: RHEUMATOLOGY | Facility: CLINIC | Age: 35
End: 2020-06-02

## 2020-06-02 ENCOUNTER — OFFICE VISIT (OUTPATIENT)
Dept: INTERNAL MEDICINE | Facility: CLINIC | Age: 35
End: 2020-06-02
Payer: COMMERCIAL

## 2020-06-02 ENCOUNTER — TELEPHONE (OUTPATIENT)
Dept: INTERNAL MEDICINE | Facility: CLINIC | Age: 35
End: 2020-06-02

## 2020-06-02 DIAGNOSIS — M32.9 SYSTEMIC LUPUS ERYTHEMATOSUS ARTHRITIS: Primary | ICD-10-CM

## 2020-06-02 DIAGNOSIS — D84.9 IMMUNOSUPPRESSED STATUS: ICD-10-CM

## 2020-06-02 PROCEDURE — 99214 OFFICE O/P EST MOD 30 MIN: CPT | Mod: 95,,, | Performed by: FAMILY MEDICINE

## 2020-06-02 PROCEDURE — 99214 PR OFFICE/OUTPT VISIT, EST, LEVL IV, 30-39 MIN: ICD-10-PCS | Mod: 95,,, | Performed by: FAMILY MEDICINE

## 2020-06-02 NOTE — Clinical Note
She appears to be having a flare.She has call you yesterday. I looked at her foot via virtual visit.Doesn't have characteristics of fluid overload.  Its unilateral, no pitting, pain in forefoot. She hasn't had Plaquenil as issues getting filled. Did get her Benlysta infusion this month, but also using Steroids to help control pain.  Suggestions that this point?I was going to add NSAID< but I didn't think that would give a lot of relief for her at this point.

## 2020-06-02 NOTE — TELEPHONE ENCOUNTER
Spoke with pt and she was previously scheduled for 6.4.20 to see Stephania Urbina PA-C. Pt will keep appt.

## 2020-06-02 NOTE — TELEPHONE ENCOUNTER
Tell patient that Dr. SHORE thinks that she needs injection.  They want her to be seen with the Rheum department    You have an appt Thursday.

## 2020-06-02 NOTE — PROGRESS NOTES
"Stefanie España  06/02/2020  [id number]    Sofia Martinez MD  Patient Care Team:  Sofia Martinez MD as PCP - General (Family Medicine)  Tangela Espinoza LPN as Care Coordinator (Internal Medicine)  Has the patient seen any provider outside of the [facility] network since the last visit? (no). If yes, HIPPA forms completed and records requested.    The patient location is: Work  The chief complaint leading to consultation is: Foot pain    Visit type: audiovisual  Face to Face time with patient: 11:37-  23 minutes of total time spent on the encounter, which includes face to face time and non-face to face time preparing to see the patient (eg, review of tests), Obtaining and/or reviewing separately obtained history, Documenting clinical information in the electronic or other health record, Independently interpreting results (not separately reported) and communicating results to the patient/family/caregiver, or Care coordination (not separately reported).         Each patient to whom he or she provides medical services by telemedicine is:  (1) informed of the relationship between the physician and patient and the respective role of any other health care provider with respect to management of the patient; and (2) notified that he or she may decline to receive medical services by telemedicine and may withdraw from such care at any time.    Notes:       Visit Type:an urgent visit for an existing problem     Chief Complaint:Foot pain, swelling right foot    History of Present Illness:  She complains of flare-up in terms of her lupus especially around MCP joints and her feet with associated swelling and prolonged morning stiffness.  She has been complaining of foot sweeling for 4 days. She was requesting a NSAID to "make the swelling go down".  Prednisone wasn't helping.  She is tx with Benlysta. Infusion on 5/21  She is on Plaquenil. She has used intermittent prednisone 10, advised to reduce use per Rheum in past. "     The has been in contact with Rheum and had appt on 6.4 to see them.     She feels that hands swell and foot swell.  She reports swollen for 3 days.  She took the prednisone, but it didn't help.  She is gaining weight.    She did not have a rash, but sometime she can have a Lupus Flare.   She had overall fatigue.    She has been on Plaquenil due to issues of filling, due to COVID< she hasn't been on.   She Benlysta- has on May 21st.    She took 3 of the 10 mg prednisone for his when she really hurt 2 days ago. Felt a little better.  However this is making her miss work.      History:  Past Medical History:   Diagnosis Date    Lupus (systemic lupus erythematosus)      Past Surgical History:   Procedure Laterality Date    TUBAL LIGATION  2005     Family History   Problem Relation Age of Onset    Liver cancer Maternal Aunt     Breast cancer Maternal Aunt     Breast cancer Paternal Grandmother      Social History     Socioeconomic History    Marital status: Legally      Spouse name: Not on file    Number of children: Not on file    Years of education: Not on file    Highest education level: Not on file   Occupational History    Occupation: [employer]   Social Needs    Financial resource strain: Not on file    Food insecurity:     Worry: Not on file     Inability: Not on file    Transportation needs:     Medical: Not on file     Non-medical: Not on file   Tobacco Use    Smoking status: Never Smoker    Smokeless tobacco: Never Used   Substance and Sexual Activity    Alcohol use: No    Drug use: No    Sexual activity: Yes     Partners: Male   Lifestyle    Physical activity:     Days per week: Not on file     Minutes per session: Not on file    Stress: Not on file   Relationships    Social connections:     Talks on phone: Not on file     Gets together: Not on file     Attends [employer] service: Not on file     Active member of club or organization: Not on file     Attends meetings of clubs or  organizations: Not on file     Relationship status: Not on file   Other Topics Concern    Are you pregnant or think you may be? No    Breast-feeding No   Social History Narrative    Not on file     Patient Active Problem List   Diagnosis    Exacerbation of systemic lupus    Long-term current use of high risk medication -methotrexate    Systemic lupus erythematosus arthritis    Immunosuppressed status    Rash    Obesity, Class II, BMI 35-39.9    MCTD (mixed connective tissue disease)    Positive sm/RNP antibody    Secondary Raynaud's phenomenon    Mild episode of recurrent major depressive disorder    Chronic pain of both knees    Bilateral leg weakness     Review of patient's allergies indicates:   Allergen Reactions    Elavil [amitriptyline]        The following were reviewed at this visit: active problem list, medication list, allergies, family history, social history, and health maintenance.    Medications:  Current Outpatient Medications on File Prior to Visit   Medication Sig Dispense Refill    clobetasol (TEMOVATE) 0.05 % external solution Apply topically 2 (two) times daily. 50 mL 1    fluocinolone (DERMA-SMOOTHE) 0.01 % external oil Apply oil to scalp twice a day 1 Bottle 5    folic acid (FOLVITE) 1 MG tablet Take 1 tablet (1 mg total) by mouth once daily. 90 tablet 0    hydroxychloroquine (PLAQUENIL) 200 mg tablet Take 2 tablets (400 mg total) by mouth once daily. 60 tablet 11    ketoconazole (NIZORAL) 2 % shampoo Wash hair with medicated shampoo at least 1x/week - let sit on scalp at least 5 minutes prior to rinsing 120 mL 5    predniSONE (DELTASONE) 10 MG tablet Take 10 mg along with 5 mg if any joint flares 30 tablet 0    predniSONE (DELTASONE) 5 MG tablet Take 1 tablet (5 mg total) by mouth daily as needed (pain). 90 tablet 3    tacrolimus (PROTOPIC) 0.1 % ointment Apply topically 2 (two) times daily. 60 g 3    traZODone (DESYREL) 50 MG tablet Take 1 tablet (50 mg total) by  mouth every evening. 30 tablet 11    triamcinolone acetonide 0.1% (KENALOG) 0.1 % ointment AAA bid 454 g 0    [DISCONTINUED] diphenhydrAMINE (BENADRYL) 25 mg capsule Take 25 mg by mouth.      [DISCONTINUED] methotrexate 2.5 MG Tab Take 8 tablets (20 mg total) by mouth every 7 days. 32 tablet 3     No current facility-administered medications on file prior to visit.        Medications have been reviewed and reconciled with patient at this visit.  Barriers to medications present (no)    Adverse reactions to current medications (no)    Over the counter medications reviewed (Yes ), and if needed added to active Medication list at this visit.     Exam:  Wt Readings from Last 3 Encounters:   05/21/20 112.5 kg (248 lb 0.3 oz)   04/23/20 111.6 kg (246 lb 0.5 oz)   02/26/20 110.1 kg (242 lb 11.6 oz)     Temp Readings from Last 3 Encounters:   05/21/20 98.2 °F (36.8 °C)   04/23/20 97.3 °F (36.3 °C)   02/26/20 97.6 °F (36.4 °C)     BP Readings from Last 3 Encounters:   05/21/20 128/85   04/23/20 125/82   02/26/20 105/74     Pulse Readings from Last 3 Encounters:   05/21/20 87   04/23/20 86   02/26/20 80     There is no height or weight on file to calculate BMI.      Review of Systems   HENT: Negative for hearing loss.    Eyes: Negative for discharge.   Respiratory: Negative for wheezing.    Cardiovascular: Negative for chest pain and palpitations.   Gastrointestinal: Negative for blood in stool, constipation, diarrhea and vomiting.   Genitourinary: Negative for dysuria and hematuria.   Musculoskeletal: Positive for joint pain. Negative for neck pain.   Neurological: Positive for weakness. Negative for headaches.   Endo/Heme/Allergies: Negative for polydipsia.     Physical Exam   Constitutional: She is oriented to person, place, and time. She appears well-developed and well-nourished.   Pulmonary/Chest: Effort normal. No respiratory distress.   Musculoskeletal: She exhibits edema and tenderness.   Neurological: She is alert  and oriented to person, place, and time.   Psychiatric: She has a normal mood and affect. Her behavior is normal. Judgment and thought content normal.       Laboratory Reviewed ({N/A)  Lab Results   Component Value Date    WBC 4.95 04/23/2020    HGB 12.0 04/23/2020    HCT 37.8 04/23/2020     (H) 04/23/2020    CHOL 169 08/24/2017    TRIG 228 (H) 08/24/2017    HDL 37 (L) 08/24/2017    ALT 14 04/23/2020    AST 13 04/23/2020     04/23/2020    K 3.9 04/23/2020     04/23/2020    CREATININE 0.7 04/23/2020    BUN 8 04/23/2020    CO2 27 04/23/2020    TSH 1.243 08/30/2017       Diagnoses and all orders for this visit:    Systemic lupus erythematosus arthritis    Immunosuppressed status      I believe this is Lupus flare.  Unable to control sx with Benlysta- alone.  She is more using more steroids to try and control pain.  No plaquenil due to COVID and hard to get filled.    I will message Rheum for acute recommendations.   She has no NSAID currently. Unilateral swelling with joint pain, doesn't appear fluid overload, but she is gaining weight by use of steriods.              Care Plan/Goals: Reviewed  (Yes)  Goals    None         Follow up: No follow-ups on file.    After visit summary was printed and given to patient upon discharge today.  Patient goals and care plan are included in After Visit Summary.    Answers for HPI/ROS submitted by the patient on 6/1/2020   activity change: Yes  unexpected weight change: Yes  rhinorrhea: No  trouble swallowing: No  visual disturbance: No  chest tightness: No  polyuria: Yes  difficulty urinating: No  menstrual problem: No  joint swelling: Yes  arthralgias: Yes  confusion: No  dysphoric mood: No

## 2020-06-02 NOTE — TELEPHONE ENCOUNTER
----- Message from Prashanth Whalen MD sent at 6/2/2020 12:29 PM CDT -----  Thanks for the message . Since I am not in clinic this week, I requested her to come for an evaluation with one of our other providers and get the ankle injected.   I will ask my staff to reach out to her again.   [place].   ----- Message -----  From: Sofia Martinez MD  Sent: 6/2/2020  11:53 AM CDT  To: Prashanth Whalen MD    She appears to be having a flare.  She has call you yesterday. I looked at her foot via virtual visit.  Doesn't have characteristics of fluid overload.  Its unilateral, no pitting, pain in forefoot.   She hasn't had Plaquenil as issues getting filled. Did get her Benlysta infusion this month, but also using Steroids to help control pain.  Suggestions that this point?  I was going to add NSAID< but I didn't think that would give a lot of relief for her at this point.

## 2020-06-03 ENCOUNTER — TELEPHONE (OUTPATIENT)
Dept: RHEUMATOLOGY | Facility: CLINIC | Age: 35
End: 2020-06-03

## 2020-06-03 NOTE — PROGRESS NOTES
Subjective:       Patient ID: Stefanie España is a 34 y.o. female.    Chief Complaint: Lupus and Raynaud Disease (secondary)    Stefanie is a pt of Dr. Dewayne gonzalez h/o SLE, +RNP, h/o rash and arthritis.  She is on plaquenil, monthly Benlysta IV and mtx.  She has had increased joint complaints lately especially her elias hands, elias knees, and right foot.  Has taken oral steroids wtihout relief, she will take a day of 20-30mg prednisone then nothing for a few days.  Her right foot swelling has improved but she has left knee swelling and elias hand puffiness and pain.  She has been out of plaquenil for a week or so d/t pharmacy shortage.  She hasn't taken her mtx since before her last visit in April due to a cough at the time and didn't realize she was supposed to resume when her cough improved.  Today her pain is rated 7/10.     Past Medical History:   Diagnosis Date    Lupus (systemic lupus erythematosus)      Past Surgical History:   Procedure Laterality Date    TUBAL LIGATION  2005     Family History   Problem Relation Age of Onset    Liver cancer Maternal Aunt     Breast cancer Maternal Aunt     Breast cancer Paternal Grandmother      Social History     Socioeconomic History    Marital status: Legally      Spouse name: Not on file    Number of children: Not on file    Years of education: Not on file    Highest education level: Not on file   Occupational History    Occupation: [employer]   Social Needs    Financial resource strain: Not on file    Food insecurity:     Worry: Not on file     Inability: Not on file    Transportation needs:     Medical: Not on file     Non-medical: Not on file   Tobacco Use    Smoking status: Never Smoker    Smokeless tobacco: Never Used   Substance and Sexual Activity    Alcohol use: No    Drug use: No    Sexual activity: Yes     Partners: Male   Lifestyle    Physical activity:     Days per week: Not on file     Minutes per session: Not on file    Stress: Not on file  "  Relationships    Social connections:     Talks on phone: Not on file     Gets together: Not on file     Attends Jainism service: Not on file     Active member of club or organization: Not on file     Attends meetings of clubs or organizations: Not on file     Relationship status: Not on file   Other Topics Concern    Are you pregnant or think you may be? No    Breast-feeding No   Social History Narrative    Not on file     Review of patient's allergies indicates:   Allergen Reactions    Elavil [amitriptyline]      Review of Systems   Constitutional: Negative for chills, fatigue and fever.   HENT: Negative for mouth sores, rhinorrhea and sore throat.    Eyes: Negative for pain and redness.   Respiratory: Negative for cough and shortness of breath.    Cardiovascular: Negative for chest pain.   Gastrointestinal: Negative for abdominal pain, constipation, diarrhea, nausea and vomiting.   Genitourinary: Negative for dysuria and hematuria.   Musculoskeletal: Positive for arthralgias and joint swelling. Negative for myalgias.   Skin: Negative for rash.   Neurological: Negative for weakness, numbness and headaches.   Psychiatric/Behavioral: The patient is not nervous/anxious.          Objective:   BP (!) 133/91   Pulse 85   Ht 5' 8" (1.727 m)   Wt 113.4 kg (250 lb)   BMI 38.01 kg/m²   Immunization History   Administered Date(s) Administered    Influenza 10/24/2018    Pneumococcal Conjugate - 13 Valent 01/08/2019    Tdap 03/19/2017              Physical Exam   Constitutional: She is oriented to person, place, and time and well-developed, well-nourished, and in no distress.   HENT:   Head: Normocephalic and atraumatic.   Eyes: Pupils are equal, round, and reactive to light. Right eye exhibits no discharge.   Neck: Normal range of motion.   Cardiovascular: Normal rate, regular rhythm and normal heart sounds.  Exam reveals no friction rub.    Pulmonary/Chest: Effort normal and breath sounds normal. No " respiratory distress.   Abdominal: Soft. She exhibits no distension. There is no tenderness.   Lymphadenopathy:     She has no cervical adenopathy.   Neurological: She is alert and oriented to person, place, and time.   Skin: No rash noted. No erythema.     Psychiatric: Mood normal.   Musculoskeletal: Normal range of motion. She exhibits no edema or deformity.   Puffiness to elias hands with one tender mcp, no obvious synovitis   L knee with swelling compared to right, elias knees mild tenderness  Right ankle no effusion warmth or tenderness             No results found for this or any previous visit (from the past 336 hour(s)).     No results found for: TBGOLDPLUS   Lab Results   Component Value Date    HEPAIGM Negative 06/26/2019    HEPBIGM Negative 06/26/2019    HEPCAB Negative 06/26/2019      Results for MARILIA SALEEM (MRN [id number]) as of 6/4/2020 10:13   Ref. Range 8/30/2017 14:50   MELVA Screen Latest Ref Range: Negative <1:160  Positive (A)   MELVA HEP-2 Titer Unknown Positive >=1:2560 Speckled   ds DNA Ab Latest Ref Range: Negative 1:10  Negative 1:10   Anti-SSA Antibody Latest Ref Range: 0.00 - 19.99 EU 1.63   Anti-SSA Interpretation Latest Ref Range: Negative  Negative   Anti-SSB Antibody Latest Ref Range: 0.00 - 19.99 EU 1.88   Anti-SSB Interpretation Latest Ref Range: Negative  Negative   Anti Sm Antibody Latest Ref Range: 0.00 - 19.99 EU 12.86   Anti-Sm Interpretation Latest Ref Range: Negative  Negative   Anti Sm/RNP Antibody Latest Ref Range: 0.00 - 19.99 .12 (H)   Anti-Sm/RNP Interpretation Latest Ref Range: Negative  Positive (A)     Assessment:       1. Other systemic lupus erythematosus with other organ involvement    2. Systemic lupus erythematosus arthritis    3. Long-term current use of high risk medication other than anticoagulant    4. Positive sm/RNP antibody    5. Elevated sed rate    6. Elevated C-reactive protein (CRP)    7. Acute pain of both knees          Impression:   SLE: +RNP  currently on benlysta monthly infusion last done 5.21.20, mtx, hcq, prednisone prn --out of plaquenil and hasn't taken mtx since April   Currently taking prednisone on and off for prn joint issues   -increased joint pain/swelling     Chronically elevated sed rate/crp    High risk med use; no toxicity issues     elias knee pain: left knee swelling more than R, pain to both   Plan:       Cont benlysta monthly infusions   Get back on plaquenil bid, sent downstairs to our pharmacy start asap   Get back on mtx 20mg/week, we will try moving to rasuvo SC mtx to see if she can get any more benefit regarding her joint issues. Rx sent to [facility]  Folic acid daily   Inject elias knees as below     Sees dr SHORE in 6 wks, keep that visit     Procedure note: After verbal consent was obtained.  The left knee was prepared with sterile prep.  The skin was anesthetized with 1% ethyl chloride.  The knee joint was injected with 3 cc of 1% lidocaine to anesthetize the knee.  The joint was then injected with 40mg kenalog and 1ml of 1% lidocaine.  Hemostasis was obtained.  The patient tolerated procedure well with no complications.  discharge and icing instructions given to patient    Procedure note: After verbal consent was obtained.  The right knee was prepared with sterile prep.  The skin was anesthetized with 1% ethyl chloride.  The knee joint was injected with 3 cc of 1% lidocaine to anesthetize the knee.  The joint was then injected with 40mg kenalog and 1ml of 1% lidocaine.  Hemostasis was obtained.  The patient tolerated procedure well with no complications.  discharge and icing instructions given to patient

## 2020-06-04 ENCOUNTER — OFFICE VISIT (OUTPATIENT)
Dept: RHEUMATOLOGY | Facility: CLINIC | Age: 35
End: 2020-06-04
Payer: COMMERCIAL

## 2020-06-04 ENCOUNTER — TELEPHONE (OUTPATIENT)
Dept: PHARMACY | Facility: CLINIC | Age: 35
End: 2020-06-04

## 2020-06-04 VITALS
BODY MASS INDEX: 37.89 KG/M2 | WEIGHT: 250 LBS | HEART RATE: 85 BPM | SYSTOLIC BLOOD PRESSURE: 133 MMHG | DIASTOLIC BLOOD PRESSURE: 91 MMHG | HEIGHT: 68 IN

## 2020-06-04 DIAGNOSIS — R76.8 POSITIVE SM/RNP ANTIBODY: ICD-10-CM

## 2020-06-04 DIAGNOSIS — M25.561 ACUTE PAIN OF BOTH KNEES: ICD-10-CM

## 2020-06-04 DIAGNOSIS — M32.19 OTHER SYSTEMIC LUPUS ERYTHEMATOSUS WITH OTHER ORGAN INVOLVEMENT: Primary | ICD-10-CM

## 2020-06-04 DIAGNOSIS — M25.562 ACUTE PAIN OF BOTH KNEES: ICD-10-CM

## 2020-06-04 DIAGNOSIS — M32.9 SYSTEMIC LUPUS ERYTHEMATOSUS ARTHRITIS: ICD-10-CM

## 2020-06-04 DIAGNOSIS — R70.0 ELEVATED SED RATE: ICD-10-CM

## 2020-06-04 DIAGNOSIS — R79.82 ELEVATED C-REACTIVE PROTEIN (CRP): ICD-10-CM

## 2020-06-04 DIAGNOSIS — Z79.899 LONG-TERM CURRENT USE OF HIGH RISK MEDICATION OTHER THAN ANTICOAGULANT: ICD-10-CM

## 2020-06-04 PROCEDURE — 3008F BODY MASS INDEX DOCD: CPT | Mod: CPTII,S$GLB,, | Performed by: PHYSICIAN ASSISTANT

## 2020-06-04 PROCEDURE — 99214 PR OFFICE/OUTPT VISIT, EST, LEVL IV, 30-39 MIN: ICD-10-PCS | Mod: 25,S$GLB,, | Performed by: PHYSICIAN ASSISTANT

## 2020-06-04 PROCEDURE — 99214 OFFICE O/P EST MOD 30 MIN: CPT | Mod: 25,S$GLB,, | Performed by: PHYSICIAN ASSISTANT

## 2020-06-04 PROCEDURE — 20610 DRAIN/INJ JOINT/BURSA W/O US: CPT | Mod: 50,S$GLB,, | Performed by: PHYSICIAN ASSISTANT

## 2020-06-04 PROCEDURE — 99999 PR PBB SHADOW E&M-EST. PATIENT-LVL IV: ICD-10-PCS | Mod: PBBFAC,,, | Performed by: PHYSICIAN ASSISTANT

## 2020-06-04 PROCEDURE — 20610 PR DRAIN/INJECT LARGE JOINT/BURSA: ICD-10-PCS | Mod: 50,S$GLB,, | Performed by: PHYSICIAN ASSISTANT

## 2020-06-04 PROCEDURE — 3008F PR BODY MASS INDEX (BMI) DOCUMENTED: ICD-10-PCS | Mod: CPTII,S$GLB,, | Performed by: PHYSICIAN ASSISTANT

## 2020-06-04 PROCEDURE — 99999 PR PBB SHADOW E&M-EST. PATIENT-LVL IV: CPT | Mod: PBBFAC,,, | Performed by: PHYSICIAN ASSISTANT

## 2020-06-04 RX ORDER — HYDROXYCHLOROQUINE SULFATE 200 MG/1
400 TABLET, FILM COATED ORAL DAILY
Qty: 60 TABLET | Refills: 0 | Status: SHIPPED | OUTPATIENT
Start: 2020-06-04 | End: 2020-10-14 | Stop reason: SDUPTHER

## 2020-06-04 RX ORDER — TRIAMCINOLONE ACETONIDE 40 MG/ML
80 INJECTION, SUSPENSION INTRA-ARTICULAR; INTRAMUSCULAR
Status: COMPLETED | OUTPATIENT
Start: 2020-06-04 | End: 2020-06-04

## 2020-06-04 RX ORDER — METHOTREXATE 20 MG/.4ML
20 INJECTION, SOLUTION SUBCUTANEOUS
Qty: 4 SYRINGE | Refills: 5 | Status: SHIPPED | OUTPATIENT
Start: 2020-06-04 | End: 2020-06-09

## 2020-06-04 RX ADMIN — TRIAMCINOLONE ACETONIDE 80 MG: 40 INJECTION, SUSPENSION INTRA-ARTICULAR; INTRAMUSCULAR at 10:06

## 2020-06-04 NOTE — PATIENT INSTRUCTIONS
Inject both knees with steroid today     Change to injection methotrexate, called rasuvo, subcutaneous injection weekly     Plaquenil sent downstairs.

## 2020-06-04 NOTE — TELEPHONE ENCOUNTER
Informed Patient  that [facility] received prescription for RASUVO and prior authorization is required.  [facility] will be back in touch once insurance determination is received.

## 2020-06-09 ENCOUNTER — TELEPHONE (OUTPATIENT)
Dept: RHEUMATOLOGY | Facility: CLINIC | Age: 35
End: 2020-06-09

## 2020-06-09 RX ORDER — METHOTREXATE 20 MG/.4ML
20 INJECTION, SOLUTION SUBCUTANEOUS
Qty: 4 SYRINGE | Refills: 3 | Status: SHIPPED | OUTPATIENT
Start: 2020-06-09 | End: 2020-07-23

## 2020-06-09 NOTE — TELEPHONE ENCOUNTER
----- Message from Stephania Urbina PA-C sent at 6/9/2020 10:27 AM CDT -----  Regarding: RE: Rasuvo approved  Thank you, sent new rx  In     ----- Message -----  From: Kate Garcia  Sent: 6/9/2020   9:58 AM CDT  To: Stephania Urbina PA-C, [facility] Staff  Subject: Rasuvo approved                                  FYI:  Rasuvo prior authorization has been approved through 9/4/21. Patient's insurance requires the patient to fill through [facility]. Please send prescription to [facility] in [place], which is listed in the patient's EPIC profile. Patient has been notified.     Thanks,   Kate Garcia x [phone]  Patient Care Advocate  [facility]  [phone]

## 2020-06-09 NOTE — TELEPHONE ENCOUNTER
DOCUMENTATION ONLY:  Rasuvo prior authorization approved x 12 months.   Dates: 6/9/20 through 6/4/21  Case ID: [id number]  Estimated co pay: 25% coinsurance with $50 minimum.    Rasuvo co pay card:  BIN: [id number]  ID: [id number]  Group: [id number]    Patient must use [facility] or [facility].

## 2020-06-15 ENCOUNTER — TELEPHONE (OUTPATIENT)
Dept: INFUSION THERAPY | Facility: HOSPITAL | Age: 35
End: 2020-06-15

## 2020-06-15 NOTE — TELEPHONE ENCOUNTER
Left msg for pt re:  No longer requiring routine covid testing for infusion pts.  Left name and number for pt to call as to whether she would like to keep or cancel the covid test.

## 2020-06-18 ENCOUNTER — PATIENT MESSAGE (OUTPATIENT)
Dept: INTERNAL MEDICINE | Facility: CLINIC | Age: 35
End: 2020-06-18

## 2020-06-18 RX ORDER — FLUCONAZOLE 150 MG/1
150 TABLET ORAL DAILY
Qty: 1 TABLET | Refills: 1 | Status: SHIPPED | OUTPATIENT
Start: 2020-06-18 | End: 2020-06-19

## 2020-06-23 ENCOUNTER — INFUSION (OUTPATIENT)
Dept: INFUSION THERAPY | Facility: HOSPITAL | Age: 35
End: 2020-06-23
Attending: INTERNAL MEDICINE
Payer: COMMERCIAL

## 2020-06-23 VITALS
SYSTOLIC BLOOD PRESSURE: 99 MMHG | HEART RATE: 80 BPM | BODY MASS INDEX: 37.44 KG/M2 | RESPIRATION RATE: 18 BRPM | TEMPERATURE: 98 F | WEIGHT: 246.25 LBS | DIASTOLIC BLOOD PRESSURE: 64 MMHG | OXYGEN SATURATION: 100 %

## 2020-06-23 DIAGNOSIS — M32.9 EXACERBATION OF SYSTEMIC LUPUS: ICD-10-CM

## 2020-06-23 DIAGNOSIS — R76.8 POSITIVE SM/RNP ANTIBODY: ICD-10-CM

## 2020-06-23 DIAGNOSIS — M32.9 SYSTEMIC LUPUS ERYTHEMATOSUS ARTHRITIS: Primary | ICD-10-CM

## 2020-06-23 DIAGNOSIS — M35.1 MCTD (MIXED CONNECTIVE TISSUE DISEASE): ICD-10-CM

## 2020-06-23 PROCEDURE — 63600175 PHARM REV CODE 636 W HCPCS: Performed by: INTERNAL MEDICINE

## 2020-06-23 PROCEDURE — 96413 CHEMO IV INFUSION 1 HR: CPT

## 2020-06-23 PROCEDURE — 96375 TX/PRO/DX INJ NEW DRUG ADDON: CPT

## 2020-06-23 PROCEDURE — 25000003 PHARM REV CODE 250: Performed by: INTERNAL MEDICINE

## 2020-06-23 PROCEDURE — A4216 STERILE WATER/SALINE, 10 ML: HCPCS | Performed by: INTERNAL MEDICINE

## 2020-06-23 RX ORDER — SODIUM CHLORIDE 0.9 % (FLUSH) 0.9 %
10 SYRINGE (ML) INJECTION
Status: CANCELLED | OUTPATIENT
Start: 2020-07-14

## 2020-06-23 RX ORDER — HEPARIN 100 UNIT/ML
500 SYRINGE INTRAVENOUS
Status: CANCELLED | OUTPATIENT
Start: 2020-07-14

## 2020-06-23 RX ORDER — SODIUM CHLORIDE 0.9 % (FLUSH) 0.9 %
10 SYRINGE (ML) INJECTION
Status: DISCONTINUED | OUTPATIENT
Start: 2020-06-23 | End: 2020-06-23 | Stop reason: HOSPADM

## 2020-06-23 RX ORDER — METHYLPREDNISOLONE SOD SUCC 125 MG
80 VIAL (EA) INJECTION
Status: COMPLETED | OUTPATIENT
Start: 2020-06-23 | End: 2020-06-23

## 2020-06-23 RX ORDER — DIPHENHYDRAMINE HYDROCHLORIDE 50 MG/ML
25 INJECTION INTRAMUSCULAR; INTRAVENOUS
Status: CANCELLED
Start: 2020-07-14

## 2020-06-23 RX ORDER — ACETAMINOPHEN 325 MG/1
650 TABLET ORAL
Status: CANCELLED | OUTPATIENT
Start: 2020-07-14

## 2020-06-23 RX ADMIN — METHYLPREDNISOLONE SODIUM SUCCINATE 80 MG: 125 INJECTION, POWDER, FOR SOLUTION INTRAMUSCULAR; INTRAVENOUS at 11:06

## 2020-06-23 RX ADMIN — BELIMUMAB 1117 MG: 400 INJECTION, POWDER, LYOPHILIZED, FOR SOLUTION INTRAVENOUS at 11:06

## 2020-06-23 RX ADMIN — Medication 10 ML: at 11:06

## 2020-06-23 NOTE — DISCHARGE INSTRUCTIONS
Coronavirus Disease 2019 or COVID-19 is a new strand of coronavirus to infect humans. It was first detected in [place] and has now spread around the world, including the [place].       Currently outbreak levels are on the rise in the  [place], especially [place]. The information on COVID-19 is constantly changing. As the [facility] continues to update information, here are five basics facts you should know about COVID-19.      What are the symptoms of COVID-19??  COVID-19 is a respiratory disease. Some symptoms may include a runny nose, cough, sore throat, headaches and possibly fever. For people who have a weakened immune system, such as the elderly, the very young or immunocompromised patients, symptoms can become severe quickly and can cause serious respiratory tract illnesses, such as pneumonia or bronchitis.?    Should I buy a mask to protect myself from homar COVID-19??   If you are healthy, you only need to wear a mask if you are taking care of a person with suspected 2019-nCoV infection.   Wear a mask if you are coughing or sneezing.   Masks are effective only when used in combination with frequent hand-cleaning with alcohol-based hand rub or soap and water.   If you wear a mask, then you must know how to use it and dispose of it properly.   Before putting on a mask, clean hands with alcohol-based hand rub or soap and water.   Cover mouth and nose with mask and make sure there are no gaps between your face and the mask.   Avoid touching the mask while using it; if you do, clean your hands with alcohol-based hand rub or soap and water.   Replace the mask with a new one as soon as it is damp and do not re-use single-use masks.   To remove the mask: remove it from behind (do not touch the front of mask); discard immediately in a closed bin; clean hands with alcohol-based hand rub or soap and water.   Please copy the link below; education  and instructional videos on proper use and disposal  of masks  o https://www.who.int/emergencies/diseases/novel-coronavirus-2019/advice-for-public/when-and-how-to-use-masks      Handwashing is Best    best thing to do is to wash your hands thoroughly. For best handwashing techniques, follow the?CDCs guide.   Below is video link to [facility] proper handwashing procedure  o https://www.cdc.gov/handwashing/videos.html    What preventions and treatments are available??  Currently, there is no vaccine to prevent COVID-19. The best way to prevent contraction is to avoid being exposed to the virus. There is no specific treatment for COVID-19. People who contract the virus should receive supportive care from their healthcare team to help alleviate symptoms. People with a severe case will need care that involves support for vital organs. Some everyday preventions the?CDC recommends?are:    -Stay home, you do not have to be confined to your home; its ok to go outside just make sure you remain 6 ft away from others if you walk down your street or talk to neighbors  -Absolutely Avoid close contact with people who are sick  -Avoid touching your eyes, nose and mouth  -Stay home when you are sick  -Cover your cough or sneezes with a tissue and immediately throw it away in the trash.  -Disinfect objects that you frequently use, such as your phone, computer, purse, remotes, chargers, kids toys, water bottles, etc.  -Wash your hands frequently with soap and water for at least 20 seconds, especially after going to the bathroom, before eating or after you cough or sneeze.    Can I still travel??  I would advises against travel currently both domestic and international. If travel is an an emergency take extreme caution and if trying to return to the [place], can expect restrictions on entry and quarantines upon return. The status of COVID-19 is changing every day, so continue to monitor the?CDCs list of travel notices before traveling.?    What should I do if I think I have  COVID-19??  If you are an [facility] patient and think you contracted COVID-19, reach out to [facility], our free 24/7 nurse care line. [facility], is always available when you have health concerns or need advice on care options. Our specially trained registered nurses are available to discuss your health care concerns, recommend self-care techniques and help you decide if your symptoms require a visit to urgent or emergency care.     The service is free and available by calling [phone]?or?[phone].   Or see a provider from home with an [facility] virtual visit.    Patients with questions can also call the [facility] Covid-[facility] Line at [phone].          Rheumatology Patients:   If you are on immunosuppressive medications   Do Not Stop your medications, continue to take as prescribed   Stopping medications may cause a flare up of your underlying conditions which would increase your risk of infection so the best advice is to continue your medications as prescribed    If you develop any signs or symptoms consistent w/Covid-19 (dry cough, shortness of breath, fever>100, chest congestion, headache, chills) at that time Please stop taking all immunosuppressive medications immediately   o Do Not stop taking prednisone as abrupt discontinuation can cause issues.   o Do Not stop taking hydroxychloroquine as it has some anti-viral properties and is being used as treatment for coronavirus immune response   Immunosuppressive meds are not limited to but include medications like:  o Humira, Enbrel, Cimzia, Remicade, Simponi  o Orencia  o Rituxan, Cytoxan  o Benlysta  o Xeljanz, Olumiant, Rinvoq  o Kineret, Ilaris  o Cosentyx, Taltz  o Stelara, Tremfya  o Methotrexate, mycophenolate (cellcept), azathioprine (imuran), leflunomide (arava), tacrolimus (prograf), cyclosporin, dapsone  o If you are taking Actemra or Kevzara  Please contact your provider   for patient specific instructions on  interrupting your dose while you go through the infection course     If you have any specific rheumatology questions please reach out to your provider via phone call [phone] or via patient portal message  o Also available resources is [facility] [phone]?or?[phone].   o [facility] virtual visit   o You can also be seen by your rheumatology provider from home with my chart video visit for face time visit     If you test positive for Covid-19 and/or Develop symptoms consistent with Covid-19 Coronavirus Infection    Please immediately notify your Rheumatology Provider Immediately    Main Clinic number : [phone] or [phone] as for [facility].    One of our staff members will call you back to discuss your issues   You can also reach us via My Chart Patient Portal Message      its important to know that we do not want your to resume your Dmard or biologic medications until you are free of symptoms for 7 full days and 14 days after onset of symptoms you have repeat covid-19 testing which are negative        More info below:copy the link into your web browser    [email or web address]        [place] [facility]  Preventing the Spread of Coronavirus Disease 2019 in Homes and Residential Communities    Prevention steps for people with confirmed or suspected COVID-19 (including persons under investigation) who do not need to be hospitalized and people with confirmed COVID-19 who were hospitalized and determined to be medically stable to go home    Your healthcare provider and public health staff will evaluate whether you can be cared for at home.   Stay home except to get medical care.   Separate yourself from other people and animals in your home   Call ahead before visiting your doctor.   Wear a facemask.   Cover your coughs and sneezes.   Clean your hands  often.   Avoid sharing personal household items.   Clean all high-touch surfaces everyday.   Monitor your symptoms.     Seek prompt medical attention if your illness is worsening (e.g.,difficulty breathing). Before seeking care, call your healthcare provider. If you have a medical emergency and need to call 911, notify the dispatch personnel that you have, or are being evaluated for COVID-19. If possible, put on a facemask before emergency medical services arrive.      Discontinuing home isolation. Call your provider about guidance to discontinue home isolation.    Recommended precautions for household members, intimate partners, and caregivers in a non healthcare setting of a patient with symptomatic laboratory-confirmed COVID-19 or a patient under investigation    Household members, intimate partners, and caregivers in a nonhealthcare setting may have close contact with a person with symptomatic, laboratory-confirmed COVID-19 or a person under investigation. Close contacts should monitor their health; they should call their healthcare provider right away if they develop symptoms suggestive of COVID-19 (e.g., fever, cough, shortness of breath).    Close contacts should also follow these recommendations:    Make sure that you understand and can help the patient follow their healthcare providers instructions for medication(s) and care. You should help the patient with basic needs in the home and provide support for getting groceries, prescriptions, and other personal needs.    Monitor the patients symptoms. If the patient is getting sicker, call his or her healthcare provider and tell them that the patient has laboratory-confirmed COVID-19. This will help the healthcare providers office take steps to keep other people in the office or waiting room from getting infected. Ask the healthcare provider to call the local or state health department for additional guidance. If the patient has a medical emergency  and you need to call 911, notify the dispatch personnel that the patient has, or is being evaluated for COVID-19.    Household members should stay in another room or be  from the patient as much as possible. Household members should use a separate bedroom and bathroom, if available.   Prohibit visitors who do not have an essential need to be in the home.    Household members should not care for any pets in the home. Do not handle pets or other animals while sick.   Make sure that shared spaces in the home have good air flow, such as by an air conditioner or an opened window, weather permitting.   Perform hand hygiene frequently. Wash your hands often with soap and water for at least 20 seconds or use an alcohol-based hand  that contains 60 to 95% alcohol, covering all surfaces of your hands and rubbing them together until they feel dry. Soap and water should be used preferentially if hands are visibly dirty.   Avoid touching your eyes, nose, and mouth with unwashed hands.   The patient should wear a facemask when you are around other people. If the patient is not able to wear a facemask (for example, because it causes trouble breathing), you, as the caregiver, should wear a mask when you are in the same room as the patient.   Wear a disposable facemask and gloves when you touch or have contact with the patients blood, stool, or body fluids, such as saliva, sputum, nasal mucus, vomit, urine.   Throw out disposable facemasks and gloves after using them. Do not reuse.   When removing personal protective equipment, first remove and dispose of gloves. Then, immediately clean your hands with soap and water or alcohol-based hand . Next,remove and dispose of facemask, and immediately clean your hands again with soap and water or alcohol-based hand .    Avoid sharing household items with the patient. You should not share dishes, drinking glasses, cups, eating utensils, towels,  bedding, or other items. After the patient uses these items, you should wash them thoroughly (see below Wash laundry thoroughly).   Clean all high-touch surfaces, such as counters, tabletops, doorknobs, bathroom fixtures, toilets, phones, keyboards, tablets, and bedside tables, every day. Also, clean any surfaces that may have blood, stool, or body fluids on them.   Use a household cleaning spray or wipe, according to the label instructions. Labels contain instructions for safe and effective use of the cleaning product including precautions you should take when applying the product, such as wearing gloves and making sure you have good ventilation during use of the product.   Wash laundry thoroughly.    Immediately remove and wash clothes or bedding that have blood, stool, or body fluids on them.   Wear disposable gloves while handling soiled items and keep soiled items away from your body. Clean your hands (with soap and water or an alcohol-based hand ) immediately after removing your gloves.      Read and follow directions on labels of laundry or clothing items and detergent.   o In general, using a normal laundry detergent according to washing machine instructions and dry thoroughly using the warmest temperatures recommended on the clothing label.    Place all used disposable gloves, facemasks, and other contaminated items in a lined container before disposing of them with other household waste.    Clean your hands (with soap and water or an alcohol-based hand ) immediately after handling these items.   o Soap and water should be used preferentially if hands are visibly dirty.   Discuss any additional questions with your state or local health department or healthcare provider.   Check available hours when contacting your local health department. [facility]

## 2020-06-23 NOTE — PLAN OF CARE
Pt said that she stopped drinking sodas recently and is only drinking water. She was drinking up to four cherry sodas a day.

## 2020-06-23 NOTE — NURSING
Infusion# >10    Recent labs? 4/23/20    S/S infection noted or voiced? None noted/ denies  Recent or planned surgeries/invasive procedures? Denies  Recent vaccines? Denies    Premeds? Solumedrol 80 mg IVP over 2 mins    Benlysta 1117 mg administered IV at a 60 minute rate per orders; see MAR & Flow Sheet for more  details. Tolerated well without adverse events

## 2020-07-14 ENCOUNTER — PATIENT MESSAGE (OUTPATIENT)
Dept: INTERNAL MEDICINE | Facility: CLINIC | Age: 35
End: 2020-07-14

## 2020-07-14 DIAGNOSIS — R05.9 COUGH: ICD-10-CM

## 2020-07-15 ENCOUNTER — PATIENT MESSAGE (OUTPATIENT)
Dept: INTERNAL MEDICINE | Facility: CLINIC | Age: 35
End: 2020-07-15

## 2020-07-15 DIAGNOSIS — R05.9 COUGH: Primary | ICD-10-CM

## 2020-07-17 ENCOUNTER — LAB VISIT (OUTPATIENT)
Dept: INTERNAL MEDICINE | Facility: CLINIC | Age: 35
End: 2020-07-17
Payer: COMMERCIAL

## 2020-07-17 DIAGNOSIS — R05.9 COUGH: ICD-10-CM

## 2020-07-17 PROCEDURE — U0003 INFECTIOUS AGENT DETECTION BY NUCLEIC ACID (DNA OR RNA); SEVERE ACUTE RESPIRATORY SYNDROME CORONAVIRUS 2 (SARS-COV-2) (CORONAVIRUS DISEASE [COVID-19]), AMPLIFIED PROBE TECHNIQUE, MAKING USE OF HIGH THROUGHPUT TECHNOLOGIES AS DESCRIBED BY CMS-2020-01-R: HCPCS

## 2020-07-20 LAB — SARS-COV-2 RNA RESP QL NAA+PROBE: NOT DETECTED

## 2020-07-21 ENCOUNTER — TELEPHONE (OUTPATIENT)
Dept: RHEUMATOLOGY | Facility: CLINIC | Age: 35
End: 2020-07-21

## 2020-07-22 ENCOUNTER — INFUSION (OUTPATIENT)
Dept: INFUSION THERAPY | Facility: HOSPITAL | Age: 35
End: 2020-07-22
Attending: INTERNAL MEDICINE
Payer: COMMERCIAL

## 2020-07-22 ENCOUNTER — OFFICE VISIT (OUTPATIENT)
Dept: RHEUMATOLOGY | Facility: CLINIC | Age: 35
End: 2020-07-22
Payer: COMMERCIAL

## 2020-07-22 ENCOUNTER — LAB VISIT (OUTPATIENT)
Dept: LAB | Facility: HOSPITAL | Age: 35
End: 2020-07-22
Payer: COMMERCIAL

## 2020-07-22 ENCOUNTER — HOSPITAL ENCOUNTER (OUTPATIENT)
Dept: RADIOLOGY | Facility: HOSPITAL | Age: 35
Discharge: HOME | End: 2020-07-22
Attending: INTERNAL MEDICINE
Payer: COMMERCIAL

## 2020-07-22 VITALS
TEMPERATURE: 99 F | HEIGHT: 68 IN | WEIGHT: 247.81 LBS | RESPIRATION RATE: 18 BRPM | OXYGEN SATURATION: 99 % | DIASTOLIC BLOOD PRESSURE: 79 MMHG | SYSTOLIC BLOOD PRESSURE: 122 MMHG | BODY MASS INDEX: 37.56 KG/M2 | HEART RATE: 81 BPM

## 2020-07-22 VITALS
WEIGHT: 247.81 LBS | HEIGHT: 68 IN | DIASTOLIC BLOOD PRESSURE: 76 MMHG | HEART RATE: 76 BPM | SYSTOLIC BLOOD PRESSURE: 112 MMHG | BODY MASS INDEX: 37.56 KG/M2

## 2020-07-22 DIAGNOSIS — M32.19 OTHER SYSTEMIC LUPUS ERYTHEMATOSUS WITH OTHER ORGAN INVOLVEMENT: ICD-10-CM

## 2020-07-22 DIAGNOSIS — M35.1 MCTD (MIXED CONNECTIVE TISSUE DISEASE): ICD-10-CM

## 2020-07-22 DIAGNOSIS — M32.9 SYSTEMIC LUPUS ERYTHEMATOSUS ARTHRITIS: ICD-10-CM

## 2020-07-22 DIAGNOSIS — G89.29 CHRONIC PAIN OF BOTH KNEES: ICD-10-CM

## 2020-07-22 DIAGNOSIS — M32.9 EXACERBATION OF SYSTEMIC LUPUS: ICD-10-CM

## 2020-07-22 DIAGNOSIS — R76.8 POSITIVE SM/RNP ANTIBODY: Primary | ICD-10-CM

## 2020-07-22 DIAGNOSIS — Z79.899 LONG-TERM CURRENT USE OF HIGH RISK MEDICATION OTHER THAN ANTICOAGULANT: ICD-10-CM

## 2020-07-22 DIAGNOSIS — M32.9 SYSTEMIC LUPUS ERYTHEMATOSUS ARTHRITIS: Primary | ICD-10-CM

## 2020-07-22 DIAGNOSIS — K21.9 GASTROESOPHAGEAL REFLUX DISEASE, ESOPHAGITIS PRESENCE NOT SPECIFIED: ICD-10-CM

## 2020-07-22 DIAGNOSIS — M25.562 CHRONIC PAIN OF BOTH KNEES: ICD-10-CM

## 2020-07-22 DIAGNOSIS — M25.561 CHRONIC PAIN OF BOTH KNEES: ICD-10-CM

## 2020-07-22 PROBLEM — M32.8 OTHER FORMS OF SYSTEMIC LUPUS ERYTHEMATOSUS: Status: ACTIVE | Noted: 2018-03-19

## 2020-07-22 LAB
BILIRUB UR QL STRIP: NEGATIVE
CLARITY UR: CLEAR
COLOR UR: YELLOW
GLUCOSE UR QL STRIP: NEGATIVE
HGB UR QL STRIP: NEGATIVE
KETONES UR QL STRIP: NEGATIVE
LEUKOCYTE ESTERASE UR QL STRIP: NEGATIVE
NITRITE UR QL STRIP: NEGATIVE
PH UR STRIP: 7 [PH] (ref 5–8)
PROT UR QL STRIP: NEGATIVE
SP GR UR STRIP: 1.02 (ref 1–1.03)
URN SPEC COLLECT METH UR: NORMAL

## 2020-07-22 PROCEDURE — 73630 X-RAY EXAM OF FOOT: CPT | Mod: TC,50

## 2020-07-22 PROCEDURE — 99214 OFFICE O/P EST MOD 30 MIN: CPT | Mod: S$GLB,,, | Performed by: INTERNAL MEDICINE

## 2020-07-22 PROCEDURE — A4216 STERILE WATER/SALINE, 10 ML: HCPCS | Performed by: INTERNAL MEDICINE

## 2020-07-22 PROCEDURE — 73630 XR FOOT COMPLETE 3 VIEW BILATERAL: ICD-10-PCS | Mod: 26,,, | Performed by: RADIOLOGY

## 2020-07-22 PROCEDURE — 99999 PR PBB SHADOW E&M-EST. PATIENT-LVL IV: CPT | Mod: PBBFAC,,, | Performed by: INTERNAL MEDICINE

## 2020-07-22 PROCEDURE — 73130 X-RAY EXAM OF HAND: CPT | Mod: TC,50

## 2020-07-22 PROCEDURE — 96413 CHEMO IV INFUSION 1 HR: CPT

## 2020-07-22 PROCEDURE — 73630 X-RAY EXAM OF FOOT: CPT | Mod: 26,,, | Performed by: RADIOLOGY

## 2020-07-22 PROCEDURE — 99999 PR PBB SHADOW E&M-EST. PATIENT-LVL IV: ICD-10-PCS | Mod: PBBFAC,,, | Performed by: INTERNAL MEDICINE

## 2020-07-22 PROCEDURE — 3008F BODY MASS INDEX DOCD: CPT | Mod: CPTII,S$GLB,, | Performed by: INTERNAL MEDICINE

## 2020-07-22 PROCEDURE — 99214 PR OFFICE/OUTPT VISIT, EST, LEVL IV, 30-39 MIN: ICD-10-PCS | Mod: S$GLB,,, | Performed by: INTERNAL MEDICINE

## 2020-07-22 PROCEDURE — 81003 URINALYSIS AUTO W/O SCOPE: CPT

## 2020-07-22 PROCEDURE — 73130 X-RAY EXAM OF HAND: CPT | Mod: 26,,, | Performed by: RADIOLOGY

## 2020-07-22 PROCEDURE — 25000003 PHARM REV CODE 250: Performed by: INTERNAL MEDICINE

## 2020-07-22 PROCEDURE — 63600175 PHARM REV CODE 636 W HCPCS: Performed by: INTERNAL MEDICINE

## 2020-07-22 PROCEDURE — 96375 TX/PRO/DX INJ NEW DRUG ADDON: CPT

## 2020-07-22 PROCEDURE — 3008F PR BODY MASS INDEX (BMI) DOCUMENTED: ICD-10-PCS | Mod: CPTII,S$GLB,, | Performed by: INTERNAL MEDICINE

## 2020-07-22 PROCEDURE — 73130 XR HAND COMPLETE 3 VIEWS BILATERAL: ICD-10-PCS | Mod: 26,,, | Performed by: RADIOLOGY

## 2020-07-22 PROCEDURE — 84156 ASSAY OF PROTEIN URINE: CPT

## 2020-07-22 RX ORDER — PANTOPRAZOLE SODIUM 40 MG/1
40 TABLET, DELAYED RELEASE ORAL DAILY
Qty: 30 TABLET | Refills: 11 | Status: SHIPPED | OUTPATIENT
Start: 2020-07-22 | End: 2020-12-02

## 2020-07-22 RX ORDER — HEPARIN 100 UNIT/ML
500 SYRINGE INTRAVENOUS
Status: CANCELLED | OUTPATIENT
Start: 2020-08-12

## 2020-07-22 RX ORDER — METHYLPREDNISOLONE SOD SUCC 125 MG
80 VIAL (EA) INJECTION
Status: COMPLETED | OUTPATIENT
Start: 2020-07-22 | End: 2020-07-22

## 2020-07-22 RX ORDER — DICLOFENAC SODIUM 10 MG/G
2 GEL TOPICAL 2 TIMES DAILY
Qty: 1 TUBE | Refills: 11 | Status: SHIPPED | OUTPATIENT
Start: 2020-07-22 | End: 2020-12-02

## 2020-07-22 RX ORDER — DIPHENHYDRAMINE HYDROCHLORIDE 50 MG/ML
25 INJECTION INTRAMUSCULAR; INTRAVENOUS
Status: CANCELLED
Start: 2020-08-12

## 2020-07-22 RX ORDER — SODIUM CHLORIDE 0.9 % (FLUSH) 0.9 %
10 SYRINGE (ML) INJECTION
Status: DISCONTINUED | OUTPATIENT
Start: 2020-07-22 | End: 2020-07-22 | Stop reason: HOSPADM

## 2020-07-22 RX ORDER — ACETAMINOPHEN 325 MG/1
650 TABLET ORAL
Status: CANCELLED | OUTPATIENT
Start: 2020-08-12

## 2020-07-22 RX ORDER — SODIUM CHLORIDE 0.9 % (FLUSH) 0.9 %
10 SYRINGE (ML) INJECTION
Status: CANCELLED | OUTPATIENT
Start: 2020-08-12

## 2020-07-22 RX ADMIN — Medication 10 ML: at 12:07

## 2020-07-22 RX ADMIN — METHYLPREDNISOLONE SODIUM SUCCINATE 80 MG: 125 INJECTION, POWDER, FOR SOLUTION INTRAMUSCULAR; INTRAVENOUS at 12:07

## 2020-07-22 RX ADMIN — BELIMUMAB 1124 MG: 400 INJECTION, POWDER, LYOPHILIZED, FOR SOLUTION INTRAVENOUS at 12:07

## 2020-07-22 NOTE — PLAN OF CARE
Provide comfort measures.  Provide refreshments and snack.   Give antiemetic if patient desires.   Explain POT.

## 2020-07-22 NOTE — PROGRESS NOTES
RHEUMATOLOGY CLINIC FOLLOW UP VISIT  Chief complaints:-  Lupus follow-up, knee pain, worsening GERD.    HPI:-  Stefanie Kimbrough a 34 y.o. pleasant female comes in for a follow up visit.  She follows up in the rheumatology clinic for systemic lupus with arthritis.  She reports doing well today other than mild pain over bilateral knee joints and worsening gastroesophageal reflux issues.  Her knee pain improved significantly after the her last corticosteroid injection until she had a mild fall few weeks ago which flared up her knee.  But no swelling or prolonged stiffness like before.  She has been noticing intermittent worsening GERD symptoms in the last month.  She had to take at least 1 or 2 times a day to help with the symptoms.  No nausea or vomiting.  No difficulty swallowing dry foods.    Review of Systems   Constitutional: Negative for chills and fever.   HENT: Negative for congestion and sore throat.    Eyes: Negative for blurred vision and redness.   Respiratory: Negative for cough and shortness of breath.    Cardiovascular: Negative for chest pain and leg swelling.   Gastrointestinal: Positive for heartburn. Negative for abdominal pain.   Genitourinary: Negative for dysuria.   Musculoskeletal: Positive for back pain and joint pain. Negative for falls, myalgias and neck pain.   Skin: Negative for rash.   Neurological: Negative for headaches.   Endo/Heme/Allergies: Does not bruise/bleed easily.   Psychiatric/Behavioral: Negative for memory loss. The patient does not have insomnia.        Past Medical History:   Diagnosis Date    Lupus (systemic lupus erythematosus)        Past Surgical History:   Procedure Laterality Date    TUBAL LIGATION  2005        Social History     Tobacco Use    Smoking status: Never Smoker    Smokeless tobacco: Never Used   Substance Use Topics    Alcohol use: No    Drug use: No       Family History   Problem Relation Age  "of Onset    Liver cancer Maternal Aunt     Breast cancer Maternal Aunt     Breast cancer Paternal Grandmother        Review of patient's allergies indicates:   Allergen Reactions    Elavil [amitriptyline]        Vitals:    07/22/20 1017   BP: 112/76   Pulse: 76   Weight: 112.4 kg (247 lb 12.8 oz)   Height: 5' 8" (1.727 m)   PainSc:   6   PainLoc: Knee       Physical Exam   Constitutional: She is oriented to person, place, and time and well-developed, well-nourished, and in no distress. No distress.   HENT:   Head: Normocephalic.   Mouth/Throat: Oropharynx is clear and moist.   Eyes: Pupils are equal, round, and reactive to light. Conjunctivae and EOM are normal.   Neck: Normal range of motion. Neck supple.   Cardiovascular: Normal rate and intact distal pulses.   Pulmonary/Chest: Effort normal. No respiratory distress.   Abdominal: Soft. There is no abdominal tenderness.   Musculoskeletal:      Comments: No synovitis over small joints of hands or feet.  No effusion over large joints.   Neurological: She is alert and oriented to person, place, and time. No cranial nerve deficit.   Skin: Skin is warm. No rash noted. No erythema.   Psychiatric: Mood and affect normal.   Nursing note and vitals reviewed.      Medication List with Changes/Refills   New Medications    DICLOFENAC SODIUM (VOLTAREN) 1 % GEL    Apply 2 g topically 2 (two) times daily.    PANTOPRAZOLE (PROTONIX) 40 MG TABLET    Take 1 tablet (40 mg total) by mouth once daily.   Current Medications    CLOBETASOL (TEMOVATE) 0.05 % EXTERNAL SOLUTION    Apply topically 2 (two) times daily.    FLUOCINOLONE (DERMA-SMOOTHE) 0.01 % EXTERNAL OIL    Apply oil to scalp twice a day    FOLIC ACID (FOLVITE) 1 MG TABLET    Take 1 tablet (1 mg total) by mouth once daily.    HYDROXYCHLOROQUINE (PLAQUENIL) 200 MG TABLET    Take 2 tablets (400 mg total) by mouth once daily.    KETOCONAZOLE (NIZORAL) 2 % SHAMPOO    Wash hair with medicated shampoo at least 1x/week - let sit " on scalp at least 5 minutes prior to rinsing    METHOTREXATE/PF (RASUVO, PF,) 20 MG/0.4 ML ATIN    Inject 0.4 mLs (20 mg total) into the skin every 7 days.    PREDNISONE (DELTASONE) 10 MG TABLET    Take 10 mg along with 5 mg if any joint flares    PREDNISONE (DELTASONE) 5 MG TABLET    Take 1 tablet (5 mg total) by mouth daily as needed (pain).    TACROLIMUS (PROTOPIC) 0.1 % OINTMENT    Apply topically 2 (two) times daily.    TRAZODONE (DESYREL) 50 MG TABLET    Take 1 tablet (50 mg total) by mouth every evening.    TRIAMCINOLONE ACETONIDE 0.1% (KENALOG) 0.1 % OINTMENT    AAA bid       Assessment/Plans:-  1. Systemic lupus erythematosus arthritis    2. Chronic pain of both knees    3. Other systemic lupus erythematosus with other organ involvement    4. Long-term current use of high risk medication other than anticoagulant    5. Gastroesophageal reflux disease, esophagitis presence not specified      · Well controlled lupus arthritis on Benlysta, Plaquenil and low-dose prednisone.  Continue the same.  · Chronic pain of bilateral knees secondary to mild osteoarthritis.  Significant improvement with Kenalog injection last visit.  Advised to try p.r.n. diclofenac gel.  · Stable systemic lupus.  Continue Benlysta, Plaquenil and low-dose prednisone.  · Hold Benlysta if any infection.  Safety labs normal.  · Intermittent mild to moderate gastroesophageal reflux disease.  Positive RNP antibody.  Try Protonix.  If no improvement, consider referral to Gastro for upper endoscopy.  ·   Problem List Items Addressed This Visit     Long-term current use of high risk medication -methotrexate    Other forms of systemic lupus erythematosus - Primary    Chronic pain of both knees    Relevant Medications    diclofenac sodium (VOLTAREN) 1 % Gel    Gastroesophageal reflux disease    Relevant Medications    pantoprazole (PROTONIX) 40 MG tablet        # Follow up in about 3 months (around 10/22/2020).      Disclaimer: This note was prepared  using voice recognition system and is likely to have sound alike errors and is not proof read.  Please call me with any questions.

## 2020-07-22 NOTE — NURSING
Infusion# >10  Last infusion was 6/23/20.      Recent labs? Reviewed    Premeds? Solumedrol 80 mg Slow IVP   Patient became nauseated and vomited moderate amount of lime green liquid material . Notified Dr SHORE before starting Benlysta. Patient stated she felt better after that. Given Ginger Ale to drink. Infusion proceeded without complication. Patient did state that this has happened for the past 2 appointments after the Solumedrol.     S/S of current or recent infections in the past 14 days? None/Denies    Recent Surgery/invasive procedures? None/Denies     Any recent vaccines ? None/Denies    Benlysta 1124 mg administered IV at a 60 minute rate per orders; see MAR and vitals for more  Details.

## 2020-07-22 NOTE — Clinical Note
Ok to get benlysta today  Xray today. CBC, CMP, ESR, CRP, C3, C4, UA U P/C ratio every 3 months with follow up visit.

## 2020-07-23 ENCOUNTER — PATIENT MESSAGE (OUTPATIENT)
Dept: RHEUMATOLOGY | Facility: CLINIC | Age: 35
End: 2020-07-23

## 2020-07-23 ENCOUNTER — TELEPHONE (OUTPATIENT)
Dept: RHEUMATOLOGY | Facility: CLINIC | Age: 35
End: 2020-07-23

## 2020-07-23 ENCOUNTER — TELEPHONE (OUTPATIENT)
Dept: PHARMACY | Facility: CLINIC | Age: 35
End: 2020-07-23

## 2020-07-23 DIAGNOSIS — M32.19 OTHER SYSTEMIC LUPUS ERYTHEMATOSUS WITH OTHER ORGAN INVOLVEMENT: Primary | ICD-10-CM

## 2020-07-23 DIAGNOSIS — M32.9 SYSTEMIC LUPUS ERYTHEMATOSUS ARTHRITIS: ICD-10-CM

## 2020-07-23 LAB
CREAT UR-MCNC: 121 MG/DL (ref 15–325)
PROT UR-MCNC: <7 MG/DL (ref 0–15)
PROT/CREAT UR: NORMAL MG/G{CREAT} (ref 0–0.2)

## 2020-07-23 RX ORDER — MYCOPHENOLATE MOFETIL 500 MG/1
500 TABLET ORAL 2 TIMES DAILY
Qty: 60 TABLET | Refills: 1 | Status: SHIPPED | OUTPATIENT
Start: 2020-07-23 | End: 2020-10-14 | Stop reason: SDUPTHER

## 2020-07-23 RX ORDER — MYCOPHENOLATE MOFETIL 500 MG/1
500 TABLET ORAL 2 TIMES DAILY
Qty: 60 TABLET | Refills: 1 | Status: SHIPPED | OUTPATIENT
Start: 2020-07-23 | End: 2020-07-23 | Stop reason: ALTCHOICE

## 2020-07-23 NOTE — TELEPHONE ENCOUNTER
"----- Message from Evelyn Sanchez sent at 7/23/2020  3:55 PM CDT -----  Dr. Prashanth Whalen, [employer] prior authorization is not required. Patients insurance requires the patient to fill through [facility]. Please send prescription to [facility] , which has been added to patient's Epic profile. Patient has been notified and provided with the necessary information to call and schedule a delivery. To complete this in Epic, the original order MUST be discontinued and re-typed as a new prescription with the updated pharmacy listed. Clicking "reorder" will continue to route the prescription to [facility] even if the pharmacy is changed. Please OPT the patient out of [facility] when the BPA is fired. Thanks, Evelyn Sanchez [phone] Evelyn Sanchez CPhT Patient Care Advocate [facility] [address] [place] [address] [phone] [email or web address]    "

## 2020-07-23 NOTE — TELEPHONE ENCOUNTER
"----- Message from Evelyn Sanchez sent at 7/23/2020  3:55 PM CDT -----  Dr. Prashanth Whalen, [employer] prior authorization is not required. Patients insurance requires the patient to fill through [facility]. Please send prescription to [facility] , which has been added to patient's Epic profile. Patient has been notified and provided with the necessary information to call and schedule a delivery. To complete this in Epic, the original order MUST be discontinued and re-typed as a new prescription with the updated pharmacy listed. Clicking "reorder" will continue to route the prescription to [facility] even if the pharmacy is changed. Please OPT the patient out of [facility] when the BPA is fired. Thanks, Evelyn Sanchez [phone] Evelyn Sanchez CPhT Patient Care Advocate [facility] [address] [place] [address] [phone] [email or web address]    "

## 2020-08-19 ENCOUNTER — INFUSION (OUTPATIENT)
Dept: INFUSION THERAPY | Facility: HOSPITAL | Age: 35
End: 2020-08-19
Attending: INTERNAL MEDICINE
Payer: COMMERCIAL

## 2020-08-19 VITALS
RESPIRATION RATE: 18 BRPM | DIASTOLIC BLOOD PRESSURE: 84 MMHG | HEIGHT: 68 IN | SYSTOLIC BLOOD PRESSURE: 123 MMHG | BODY MASS INDEX: 37.72 KG/M2 | OXYGEN SATURATION: 98 % | WEIGHT: 248.88 LBS | TEMPERATURE: 98 F | HEART RATE: 84 BPM

## 2020-08-19 DIAGNOSIS — M32.9 EXACERBATION OF SYSTEMIC LUPUS: ICD-10-CM

## 2020-08-19 DIAGNOSIS — M35.1 MCTD (MIXED CONNECTIVE TISSUE DISEASE): ICD-10-CM

## 2020-08-19 DIAGNOSIS — R76.8 POSITIVE SM/RNP ANTIBODY: Primary | ICD-10-CM

## 2020-08-19 DIAGNOSIS — M32.9 SYSTEMIC LUPUS ERYTHEMATOSUS ARTHRITIS: ICD-10-CM

## 2020-08-19 PROCEDURE — 63600175 PHARM REV CODE 636 W HCPCS: Mod: JW,JG | Performed by: INTERNAL MEDICINE

## 2020-08-19 PROCEDURE — A4216 STERILE WATER/SALINE, 10 ML: HCPCS | Performed by: INTERNAL MEDICINE

## 2020-08-19 PROCEDURE — 96413 CHEMO IV INFUSION 1 HR: CPT

## 2020-08-19 PROCEDURE — 25000003 PHARM REV CODE 250: Performed by: INTERNAL MEDICINE

## 2020-08-19 RX ORDER — HEPARIN 100 UNIT/ML
500 SYRINGE INTRAVENOUS
Status: CANCELLED | OUTPATIENT
Start: 2020-09-09

## 2020-08-19 RX ORDER — ACETAMINOPHEN 325 MG/1
650 TABLET ORAL
Status: CANCELLED | OUTPATIENT
Start: 2020-09-09

## 2020-08-19 RX ORDER — DIPHENHYDRAMINE HYDROCHLORIDE 50 MG/ML
25 INJECTION INTRAMUSCULAR; INTRAVENOUS
Status: CANCELLED
Start: 2020-09-09

## 2020-08-19 RX ORDER — SODIUM CHLORIDE 0.9 % (FLUSH) 0.9 %
10 SYRINGE (ML) INJECTION
Status: DISCONTINUED | OUTPATIENT
Start: 2020-08-19 | End: 2020-08-19 | Stop reason: HOSPADM

## 2020-08-19 RX ORDER — SODIUM CHLORIDE 0.9 % (FLUSH) 0.9 %
10 SYRINGE (ML) INJECTION
Status: CANCELLED | OUTPATIENT
Start: 2020-09-09

## 2020-08-19 RX ADMIN — Medication 10 ML: at 11:08

## 2020-08-19 RX ADMIN — BELIMUMAB 1129 MG: 400 INJECTION, POWDER, LYOPHILIZED, FOR SOLUTION INTRAVENOUS at 11:08

## 2020-08-19 NOTE — NURSING
Infusion# >10  Last infusion was 7/22/20.      Recent labs? Reviewed    Premeds? None  Patient refused the Solumedrol today. States that it makes her too nauseated . Offered to ask for anti-emetic from Dr SHORE ; however, patient still declined the Solumedrol.     S/S of current or recent infections in the past 14 days? None/Denies    Recent Surgery/invasive procedures? None/Denies     Any recent vaccines ? None/Denies    Benlysta 1129 mg administered IV at a 60 minute rate per orders; see MAR and vitals for more  Details.        
Bilateral eye surgery

## 2020-08-19 NOTE — PLAN OF CARE
Provide comfort measures.  Provide refreshments and snack.   Explain POT.  Allow patient choices when it comes to her treatment options.

## 2020-09-16 ENCOUNTER — INFUSION (OUTPATIENT)
Dept: INFUSION THERAPY | Facility: HOSPITAL | Age: 35
End: 2020-09-16
Attending: INTERNAL MEDICINE
Payer: COMMERCIAL

## 2020-09-16 VITALS
TEMPERATURE: 98 F | SYSTOLIC BLOOD PRESSURE: 117 MMHG | OXYGEN SATURATION: 98 % | HEART RATE: 76 BPM | RESPIRATION RATE: 16 BRPM | BODY MASS INDEX: 37.68 KG/M2 | WEIGHT: 247.81 LBS | DIASTOLIC BLOOD PRESSURE: 78 MMHG

## 2020-09-16 DIAGNOSIS — M32.9 SYSTEMIC LUPUS ERYTHEMATOSUS ARTHRITIS: ICD-10-CM

## 2020-09-16 DIAGNOSIS — M32.11 OTHER SYSTEMIC LUPUS ERYTHEMATOSUS WITH ENDOCARDITIS: ICD-10-CM

## 2020-09-16 DIAGNOSIS — R76.8 POSITIVE SM/RNP ANTIBODY: Primary | ICD-10-CM

## 2020-09-16 DIAGNOSIS — M35.1 MCTD (MIXED CONNECTIVE TISSUE DISEASE): ICD-10-CM

## 2020-09-16 DIAGNOSIS — M32.9 EXACERBATION OF SYSTEMIC LUPUS: ICD-10-CM

## 2020-09-16 PROCEDURE — 96375 TX/PRO/DX INJ NEW DRUG ADDON: CPT

## 2020-09-16 PROCEDURE — A4216 STERILE WATER/SALINE, 10 ML: HCPCS | Performed by: INTERNAL MEDICINE

## 2020-09-16 PROCEDURE — 63600175 PHARM REV CODE 636 W HCPCS: Performed by: INTERNAL MEDICINE

## 2020-09-16 PROCEDURE — 96413 CHEMO IV INFUSION 1 HR: CPT

## 2020-09-16 PROCEDURE — 25000003 PHARM REV CODE 250: Performed by: INTERNAL MEDICINE

## 2020-09-16 RX ORDER — ONDANSETRON 2 MG/ML
8 INJECTION INTRAMUSCULAR; INTRAVENOUS
Status: CANCELLED
Start: 2020-10-07

## 2020-09-16 RX ORDER — HEPARIN 100 UNIT/ML
500 SYRINGE INTRAVENOUS
Status: CANCELLED | OUTPATIENT
Start: 2020-10-07

## 2020-09-16 RX ORDER — ONDANSETRON 2 MG/ML
8 INJECTION INTRAMUSCULAR; INTRAVENOUS
Status: COMPLETED | OUTPATIENT
Start: 2020-09-16 | End: 2020-09-16

## 2020-09-16 RX ORDER — SODIUM CHLORIDE 0.9 % (FLUSH) 0.9 %
10 SYRINGE (ML) INJECTION
Status: CANCELLED | OUTPATIENT
Start: 2020-10-07

## 2020-09-16 RX ORDER — ACETAMINOPHEN 325 MG/1
650 TABLET ORAL
Status: CANCELLED | OUTPATIENT
Start: 2020-10-07

## 2020-09-16 RX ORDER — DIPHENHYDRAMINE HYDROCHLORIDE 50 MG/ML
25 INJECTION INTRAMUSCULAR; INTRAVENOUS
Status: CANCELLED
Start: 2020-10-07

## 2020-09-16 RX ORDER — SODIUM CHLORIDE 0.9 % (FLUSH) 0.9 %
10 SYRINGE (ML) INJECTION
Status: DISCONTINUED | OUTPATIENT
Start: 2020-09-16 | End: 2020-09-16 | Stop reason: HOSPADM

## 2020-09-16 RX ORDER — METHYLPREDNISOLONE SOD SUCC 125 MG
80 VIAL (EA) INJECTION
Status: COMPLETED | OUTPATIENT
Start: 2020-09-16 | End: 2020-09-16

## 2020-09-16 RX ADMIN — ONDANSETRON 8 MG: 2 INJECTION INTRAMUSCULAR; INTRAVENOUS at 10:09

## 2020-09-16 RX ADMIN — METHYLPREDNISOLONE SODIUM SUCCINATE 80 MG: 125 INJECTION, POWDER, FOR SOLUTION INTRAMUSCULAR; INTRAVENOUS at 11:09

## 2020-09-16 RX ADMIN — Medication 10 ML: at 11:09

## 2020-09-16 RX ADMIN — BELIMUMAB 1124 MG: 400 INJECTION, POWDER, LYOPHILIZED, FOR SOLUTION INTRAVENOUS at 11:09

## 2020-09-16 NOTE — NURSING
Infusion# >10    Recent labs? yes    S/S infection noted or voiced? None noted denies  Recent or planned surgeries/invasive procedures? Denies  Recent vaccines? Denies    Premeds? Zofran 8 mg IVP over 2 mins and Solumedrol 80 mg IVP over 3 mins    Benlysta 1124 mg administered IV at a 60 minute rate per orders; see MAR & Flow Sheet for more  details. Tolerated well without adverse events    Pt stated that she felt like she needed Solumedrol today so Dr. SHORE ordered Zofran 8 mg due to nausea/vomiting that solumedrol has caused in prior infusions. Pt tolerated solumedrol well with no side effects.

## 2020-09-16 NOTE — DISCHARGE INSTRUCTIONS
[facility]  [address] [address]  or  [address]  [phone] phone     Hours of Operation: Monday- Friday 8:00am- 5:00pm  After hours phone  [phone]    [address] Dr., Dr., Dr., [place]      Please call with any concerns regarding your appointment today. Coronavirus Disease 2019 or COVID-19 is a new strand of coronavirus to infect humans. It was first detected in [place] and has now spread around the world, including the [place].       Currently outbreak levels are on the rise in the  [place], especially [place]. The information on COVID-19 is constantly changing. As the CDC continues to update information, here are five basics facts you should know about COVID-19.      What are the symptoms of COVID-19??  COVID-19 is a respiratory disease. Some symptoms may include a runny nose, cough, sore throat, headaches and possibly fever. For people who have a weakened immune system, such as the elderly, the very young or immunocompromised patients, symptoms can become severe quickly and can cause serious respiratory tract illnesses, such as pneumonia or bronchitis.?    Should I buy a mask to protect myself from homar COVID-19??   If you are healthy, you only need to wear a mask if you are taking care of a person with suspected 2019-nCoV infection.   Wear a mask if you are coughing or sneezing.   Masks are effective only when used in combination with frequent hand-cleaning with alcohol-based hand rub or soap and water.   If you wear a mask, then you must know how to use it and dispose of it properly.   Before putting on a mask, clean hands with alcohol-based hand rub or soap and water.   Cover mouth and nose with mask and make sure there are no gaps between your face and the mask.   Avoid touching the mask while using it; if you do, clean your hands with alcohol-based hand rub or soap and  water.   Replace the mask with a new one as soon as it is damp and do not re-use single-use masks.   To remove the mask: remove it from behind (do not touch the front of mask); discard immediately in a closed bin; clean hands with alcohol-based hand rub or soap and water.   Please copy the link below; education  and instructional videos on proper use and disposal of masks  o https://www.who.int/emergencies/diseases/novel-coronavirus-2019/advice-for-public/when-and-how-to-use-masks      Handwashing is Best    best thing to do is to wash your hands thoroughly. For best handwashing techniques, follow the?CDCs guide.   Below is video link to CDC proper handwashing procedure  o https://www.cdc.gov/handwashing/videos.html    What preventions and treatments are available??  Currently, there is no vaccine to prevent COVID-19. The best way to prevent contraction is to avoid being exposed to the virus. There is no specific treatment for COVID-19. People who contract the virus should receive supportive care from their healthcare team to help alleviate symptoms. People with a severe case will need care that involves support for vital organs. Some everyday preventions the?CDC recommends?are:    -Stay home, you do not have to be confined to your home; its ok to go outside just make sure you remain 6 ft away from others if you walk down your street or talk to neighbors  -Absolutely Avoid close contact with people who are sick  -Avoid touching your eyes, nose and mouth  -Stay home when you are sick  -Cover your cough or sneezes with a tissue and immediately throw it away in the trash.  -Disinfect objects that you frequently use, such as your phone, computer, purse, remotes, chargers, kids toys, water bottles, etc.  -Wash your hands frequently with soap and water for at least 20 seconds, especially after going to the bathroom, before eating or after you cough or sneeze.    Can I still travel??  I would advises against travel  currently both domestic and international. If travel is an an emergency take extreme caution and if trying to return to the [place], can expect restrictions on entry and quarantines upon return. The status of COVID-19 is changing every day, so continue to monitor the?[facility]s list of travel notices before traveling.?    What should I do if I think I have COVID-19??  If you are an [facility] patient and think you contracted COVID-19, reach out to [facility], our free 24/7 nurse care line. [facility], is always available when you have health concerns or need advice on care options. Our specially trained registered nurses are available to discuss your health care concerns, recommend self-care techniques and help you decide if your symptoms require a visit to urgent or emergency care.     The service is free and available by calling [phone]?or?[phone].   Or see a provider from home with an [facility] virtual visit.    Patients with questions can also call the [facility] Covid-19 Line at [phone].          Rheumatology Patients:   If you are on immunosuppressive medications   Do Not Stop your medications, continue to take as prescribed   Stopping medications may cause a flare up of your underlying conditions which would increase your risk of infection so the best advice is to continue your medications as prescribed    If you develop any signs or symptoms consistent w/Covid-19 (dry cough, shortness of breath, fever>100, chest congestion, headache, chills) at that time Please stop taking all immunosuppressive medications immediately   o Do Not stop taking prednisone as abrupt discontinuation can cause issues.   o Do Not stop taking hydroxychloroquine as it has some anti-viral properties and is being used as treatment for coronavirus immune response   Immunosuppressive meds are not limited to but include medications like:  o Humira, Enbrel, Cimzia, Remicade,  Simponi  o Orencia  o Rituxan, Cytoxan  o Benlysta  o Xeljanz, Olumiant, Rinvoq  o Kineret, Ilaris  o Cosentyx, Taltz  o Stelara, Tremfya  o Methotrexate, mycophenolate (cellcept), azathioprine (imuran), leflunomide (arava), tacrolimus (prograf), cyclosporin, dapsone  o If you are taking Actemra or Kevzara  Please contact your provider   for patient specific instructions on interrupting your dose while you go through the infection course     If you have any specific rheumatology questions please reach out to your provider via phone call [phone] or via patient portal message  o Also available resources is [facility] [phone]?or?[phone].   o [facility] virtual visit   o You can also be seen by your rheumatology provider from home with my chart video visit for face time visit     If you test positive for Covid-19 and/or Develop symptoms consistent with Covid-19 Coronavirus Infection    Please immediately notify your Rheumatology Provider Immediately    Main Clinic number : [phone] or [phone] as for [place] [facility].    One of our staff members will call you back to discuss your issues   You can also reach us via My Chart Patient Portal Message      its important to know that we do not want your to resume your Dmard or biologic medications until you are free of symptoms for 7 full days and 14 days after onset of symptoms you have repeat covid-19 testing which are negative        More info below:copy the link into your web browser    [email or web address]        [place] [facility] and [facility]  Preventing the Spread of Coronavirus Disease 2019 in Homes and Residential Communities    Prevention steps for people with confirmed or suspected COVID-19 (including persons under investigation) who do not need to be hospitalized and people with confirmed COVID-19 who were hospitalized  and determined to be medically stable to go home    Your healthcare provider and public health staff will evaluate whether you can be cared for at home.   Stay home except to get medical care.   Separate yourself from other people and animals in your home   Call ahead before visiting your doctor.   Wear a facemask.   Cover your coughs and sneezes.   Clean your hands often.   Avoid sharing personal household items.   Clean all high-touch surfaces everyday.   Monitor your symptoms.     Seek prompt medical attention if your illness is worsening (e.g.,difficulty breathing). Before seeking care, call your healthcare provider. If you have a medical emergency and need to call 911, notify the dispatch personnel that you have, or are being evaluated for COVID-19. If possible, put on a facemask before emergency medical services arrive.      Discontinuing home isolation. Call your provider about guidance to discontinue home isolation.    Recommended precautions for household members, intimate partners, and caregivers in a non healthcare setting of a patient with symptomatic laboratory-confirmed COVID-19 or a patient under investigation    Household members, intimate partners, and caregivers in a nonhealthcare setting may have close contact with a person with symptomatic, laboratory-confirmed COVID-19 or a person under investigation. Close contacts should monitor their health; they should call their healthcare provider right away if they develop symptoms suggestive of COVID-19 (e.g., fever, cough, shortness of breath).    Close contacts should also follow these recommendations:    Make sure that you understand and can help the patient follow their healthcare providers instructions for medication(s) and care. You should help the patient with basic needs in the home and provide support for getting groceries, prescriptions, and other personal needs.    Monitor the patients symptoms. If the patient is getting  zack, call his or her healthcare provider and tell them that the patient has laboratory-confirmed COVID-19. This will help the healthcare providers office take steps to keep other people in the office or waiting room from getting infected. Ask the healthcare provider to call the local or [facility] health department for additional guidance. If the patient has a medical emergency and you need to call 911, notify the dispatch personnel that the patient has, or is being evaluated for COVID-19.    Household members should stay in another room or be  from the patient as much as possible. Household members should use a separate bedroom and bathroom, if available.   Prohibit visitors who do not have an essential need to be in the home.    Household members should not care for any pets in the home. Do not handle pets or other animals while sick.   Make sure that shared spaces in the home have good air flow, such as by an air conditioner or an opened window, weather permitting.   Perform hand hygiene frequently. Wash your hands often with soap and water for at least 20 seconds or use an alcohol-based hand  that contains 60 to 95% alcohol, covering all surfaces of your hands and rubbing them together until they feel dry. Soap and water should be used preferentially if hands are visibly dirty.   Avoid touching your eyes, nose, and mouth with unwashed hands.   The patient should wear a facemask when you are around other people. If the patient is not able to wear a facemask (for example, because it causes trouble breathing), you, as the caregiver, should wear a mask when you are in the same room as the patient.   Wear a disposable facemask and gloves when you touch or have contact with the patients blood, stool, or body fluids, such as saliva, sputum, nasal mucus, vomit, urine.   Throw out disposable facemasks and gloves after using them. Do not reuse.   When removing personal protective equipment,  first remove and dispose of gloves. Then, immediately clean your hands with soap and water or alcohol-based hand . Next,remove and dispose of facemask, and immediately clean your hands again with soap and water or alcohol-based hand .    Avoid sharing household items with the patient. You should not share dishes, drinking glasses, cups, eating utensils, towels, bedding, or other items. After the patient uses these items, you should wash them thoroughly (see below Wash laundry thoroughly).   Clean all high-touch surfaces, such as counters, tabletops, doorknobs, bathroom fixtures, toilets, phones, keyboards, tablets, and bedside tables, every day. Also, clean any surfaces that may have blood, stool, or body fluids on them.   Use a household cleaning spray or wipe, according to the label instructions. Labels contain instructions for safe and effective use of the cleaning product including precautions you should take when applying the product, such as wearing gloves and making sure you have good ventilation during use of the product.   Wash laundry thoroughly.    Immediately remove and wash clothes or bedding that have blood, stool, or body fluids on them.   Wear disposable gloves while handling soiled items and keep soiled items away from your body. Clean your hands (with soap and water or an alcohol-based hand ) immediately after removing your gloves.      Read and follow directions on labels of laundry or clothing items and detergent.   o In general, using a normal laundry detergent according to washing machine instructions and dry thoroughly using the warmest temperatures recommended on the clothing label.    Place all used disposable gloves, facemasks, and other contaminated items in a lined container before disposing of them with other household waste.    Clean your hands (with soap and water or an alcohol-based hand ) immediately after handling these items.    o Soap and water should be used preferentially if hands are visibly dirty.   Discuss any additional questions with your state or local health department or healthcare provider.   Check available hours when contacting your local health department. [facility] and [facility]

## 2020-10-10 ENCOUNTER — PATIENT MESSAGE (OUTPATIENT)
Dept: RHEUMATOLOGY | Facility: CLINIC | Age: 35
End: 2020-10-10

## 2020-10-13 ENCOUNTER — PATIENT OUTREACH (OUTPATIENT)
Dept: ADMINISTRATIVE | Facility: OTHER | Age: 35
End: 2020-10-13

## 2020-10-14 ENCOUNTER — INFUSION (OUTPATIENT)
Dept: INFUSION THERAPY | Facility: HOSPITAL | Age: 35
End: 2020-10-14
Attending: INTERNAL MEDICINE
Payer: COMMERCIAL

## 2020-10-14 ENCOUNTER — LAB VISIT (OUTPATIENT)
Dept: LAB | Facility: HOSPITAL | Age: 35
End: 2020-10-14
Attending: PEDIATRICS
Payer: COMMERCIAL

## 2020-10-14 ENCOUNTER — OFFICE VISIT (OUTPATIENT)
Dept: RHEUMATOLOGY | Facility: CLINIC | Age: 35
End: 2020-10-14
Payer: COMMERCIAL

## 2020-10-14 VITALS
SYSTOLIC BLOOD PRESSURE: 120 MMHG | DIASTOLIC BLOOD PRESSURE: 76 MMHG | HEART RATE: 80 BPM | OXYGEN SATURATION: 99 % | TEMPERATURE: 98 F | RESPIRATION RATE: 16 BRPM

## 2020-10-14 VITALS
SYSTOLIC BLOOD PRESSURE: 119 MMHG | HEART RATE: 85 BPM | HEIGHT: 68 IN | DIASTOLIC BLOOD PRESSURE: 80 MMHG | BODY MASS INDEX: 37.56 KG/M2 | WEIGHT: 247.81 LBS

## 2020-10-14 DIAGNOSIS — M32.19 OTHER SYSTEMIC LUPUS ERYTHEMATOSUS WITH OTHER ORGAN INVOLVEMENT: ICD-10-CM

## 2020-10-14 DIAGNOSIS — M35.1 MCTD (MIXED CONNECTIVE TISSUE DISEASE): ICD-10-CM

## 2020-10-14 DIAGNOSIS — M25.562 CHRONIC PAIN OF BOTH KNEES: Primary | ICD-10-CM

## 2020-10-14 DIAGNOSIS — I73.00 SECONDARY RAYNAUD'S PHENOMENON: ICD-10-CM

## 2020-10-14 DIAGNOSIS — R76.8 POSITIVE SM/RNP ANTIBODY: ICD-10-CM

## 2020-10-14 DIAGNOSIS — D84.9 IMMUNOSUPPRESSED STATUS: ICD-10-CM

## 2020-10-14 DIAGNOSIS — M25.561 CHRONIC PAIN OF BOTH KNEES: Primary | ICD-10-CM

## 2020-10-14 DIAGNOSIS — M32.9 SYSTEMIC LUPUS ERYTHEMATOSUS ARTHRITIS: Primary | ICD-10-CM

## 2020-10-14 DIAGNOSIS — Z79.899 LONG-TERM CURRENT USE OF HIGH RISK MEDICATION OTHER THAN ANTICOAGULANT: ICD-10-CM

## 2020-10-14 DIAGNOSIS — G89.29 CHRONIC PAIN OF BOTH KNEES: Primary | ICD-10-CM

## 2020-10-14 DIAGNOSIS — M32.9 EXACERBATION OF SYSTEMIC LUPUS: ICD-10-CM

## 2020-10-14 DIAGNOSIS — M32.9 SYSTEMIC LUPUS ERYTHEMATOSUS ARTHRITIS: ICD-10-CM

## 2020-10-14 LAB
BILIRUB UR QL STRIP: NEGATIVE
CLARITY UR: CLEAR
COLOR UR: YELLOW
CREAT UR-MCNC: 53 MG/DL (ref 15–325)
GLUCOSE UR QL STRIP: NEGATIVE
HGB UR QL STRIP: NEGATIVE
KETONES UR QL STRIP: NEGATIVE
LEUKOCYTE ESTERASE UR QL STRIP: NEGATIVE
NITRITE UR QL STRIP: NEGATIVE
PH UR STRIP: 7 [PH] (ref 5–8)
PROT UR QL STRIP: NEGATIVE
PROT UR-MCNC: <7 MG/DL (ref 0–15)
PROT/CREAT UR: NORMAL MG/G{CREAT} (ref 0–0.2)
SP GR UR STRIP: 1.01 (ref 1–1.03)
URN SPEC COLLECT METH UR: NORMAL

## 2020-10-14 PROCEDURE — 84156 ASSAY OF PROTEIN URINE: CPT

## 2020-10-14 PROCEDURE — 20610 LARGE JOINT ASPIRATION/INJECTION: BILATERAL KNEE: ICD-10-PCS | Mod: 50,S$GLB,, | Performed by: INTERNAL MEDICINE

## 2020-10-14 PROCEDURE — 99214 OFFICE O/P EST MOD 30 MIN: CPT | Mod: 25,S$GLB,, | Performed by: INTERNAL MEDICINE

## 2020-10-14 PROCEDURE — 99214 PR OFFICE/OUTPT VISIT, EST, LEVL IV, 30-39 MIN: ICD-10-PCS | Mod: 25,S$GLB,, | Performed by: INTERNAL MEDICINE

## 2020-10-14 PROCEDURE — 81003 URINALYSIS AUTO W/O SCOPE: CPT

## 2020-10-14 PROCEDURE — 3008F PR BODY MASS INDEX (BMI) DOCUMENTED: ICD-10-PCS | Mod: CPTII,S$GLB,, | Performed by: INTERNAL MEDICINE

## 2020-10-14 PROCEDURE — 25000003 PHARM REV CODE 250: Performed by: INTERNAL MEDICINE

## 2020-10-14 PROCEDURE — 96413 CHEMO IV INFUSION 1 HR: CPT

## 2020-10-14 PROCEDURE — 99999 PR PBB SHADOW E&M-EST. PATIENT-LVL III: ICD-10-PCS | Mod: PBBFAC,,, | Performed by: INTERNAL MEDICINE

## 2020-10-14 PROCEDURE — A4216 STERILE WATER/SALINE, 10 ML: HCPCS | Performed by: INTERNAL MEDICINE

## 2020-10-14 PROCEDURE — 20610 DRAIN/INJ JOINT/BURSA W/O US: CPT | Mod: 50,S$GLB,, | Performed by: INTERNAL MEDICINE

## 2020-10-14 PROCEDURE — 99999 PR PBB SHADOW E&M-EST. PATIENT-LVL III: CPT | Mod: PBBFAC,,, | Performed by: INTERNAL MEDICINE

## 2020-10-14 PROCEDURE — 63600175 PHARM REV CODE 636 W HCPCS: Mod: JG | Performed by: INTERNAL MEDICINE

## 2020-10-14 PROCEDURE — 3008F BODY MASS INDEX DOCD: CPT | Mod: CPTII,S$GLB,, | Performed by: INTERNAL MEDICINE

## 2020-10-14 RX ORDER — MYCOPHENOLATE MOFETIL 500 MG/1
500 TABLET ORAL 2 TIMES DAILY
Qty: 60 TABLET | Refills: 1 | Status: SHIPPED | OUTPATIENT
Start: 2020-10-14 | End: 2020-10-14

## 2020-10-14 RX ORDER — MYCOPHENOLATE MOFETIL 500 MG/1
1000 TABLET ORAL 2 TIMES DAILY
Qty: 120 TABLET | Refills: 2 | Status: SHIPPED | OUTPATIENT
Start: 2020-10-14 | End: 2020-10-14 | Stop reason: SDUPTHER

## 2020-10-14 RX ORDER — HEPARIN 100 UNIT/ML
500 SYRINGE INTRAVENOUS
Status: CANCELLED | OUTPATIENT
Start: 2020-11-04

## 2020-10-14 RX ORDER — SODIUM CHLORIDE 0.9 % (FLUSH) 0.9 %
10 SYRINGE (ML) INJECTION
Status: DISCONTINUED | OUTPATIENT
Start: 2020-10-14 | End: 2020-10-14 | Stop reason: HOSPADM

## 2020-10-14 RX ORDER — TRIAMCINOLONE ACETONIDE 40 MG/ML
40 INJECTION, SUSPENSION INTRA-ARTICULAR; INTRAMUSCULAR
Status: DISCONTINUED | OUTPATIENT
Start: 2020-10-14 | End: 2020-10-14 | Stop reason: HOSPADM

## 2020-10-14 RX ORDER — ACETAMINOPHEN 325 MG/1
650 TABLET ORAL
Status: CANCELLED | OUTPATIENT
Start: 2020-11-04

## 2020-10-14 RX ORDER — HYDROXYCHLOROQUINE SULFATE 200 MG/1
400 TABLET, FILM COATED ORAL DAILY
Qty: 60 TABLET | Refills: 11 | Status: SHIPPED | OUTPATIENT
Start: 2020-10-14 | End: 2020-11-19 | Stop reason: SDUPTHER

## 2020-10-14 RX ORDER — DIPHENHYDRAMINE HYDROCHLORIDE 50 MG/ML
25 INJECTION INTRAMUSCULAR; INTRAVENOUS
Status: CANCELLED
Start: 2020-11-04

## 2020-10-14 RX ORDER — SODIUM CHLORIDE 0.9 % (FLUSH) 0.9 %
10 SYRINGE (ML) INJECTION
Status: CANCELLED | OUTPATIENT
Start: 2020-11-04

## 2020-10-14 RX ORDER — MYCOPHENOLATE MOFETIL 500 MG/1
1000 TABLET ORAL 2 TIMES DAILY
Qty: 360 TABLET | Refills: 0 | Status: SHIPPED | OUTPATIENT
Start: 2020-10-14 | End: 2020-11-19 | Stop reason: SDUPTHER

## 2020-10-14 RX ADMIN — Medication 10 ML: at 11:10

## 2020-10-14 RX ADMIN — BELIMUMAB 1124 MG: 400 INJECTION, POWDER, LYOPHILIZED, FOR SOLUTION INTRAVENOUS at 11:10

## 2020-10-14 RX ADMIN — TRIAMCINOLONE ACETONIDE 40 MG: 40 INJECTION, SUSPENSION INTRA-ARTICULAR; INTRAMUSCULAR at 10:10

## 2020-10-14 NOTE — PROGRESS NOTES
RHEUMATOLOGY CLINIC FOLLOW UP VISIT  Chief complaints:-  Worsening knee pain    HPI:-  Stefanie Kimbrough a 34 y.o. pleasant female comes in for a follow up visit. She follows up in the rheumatology clinic for systemic lupus with arthritis.  She reports doing well today other than mild pain over bilateral knee joints and worsening gastroesophageal reflux issues.  Her knee pain improved significantly after the her last corticosteroid injection until she had a mild fall few weeks ago which flared up her knee.  But no swelling or prolonged stiffness like before. No nausea or vomiting.  No difficulty swallowing dry foods.  Mild stiffness in hands and feet associated with swelling.  No skin rash.  Mild muscle weakness and muscle pain present.  No      Review of Systems   Constitutional: Negative for chills and fever.   HENT: Negative for congestion and sore throat.    Eyes: Negative for blurred vision and redness.   Respiratory: Negative for cough and shortness of breath.    Cardiovascular: Negative for chest pain and leg swelling.   Gastrointestinal: Negative for abdominal pain.   Genitourinary: Negative for dysuria.   Musculoskeletal: Positive for back pain, joint pain, myalgias and neck pain. Negative for falls.   Skin: Negative for rash.   Neurological: Negative for headaches.   Endo/Heme/Allergies: Does not bruise/bleed easily.   Psychiatric/Behavioral: Negative for memory loss. The patient does not have insomnia.        Past Medical History:   Diagnosis Date    Lupus (systemic lupus erythematosus)        Past Surgical History:   Procedure Laterality Date    TUBAL LIGATION  2005        Social History     Tobacco Use    Smoking status: Never Smoker    Smokeless tobacco: Never Used   Substance Use Topics    Alcohol use: No    Drug use: No       Family History   Problem Relation Age of Onset    Liver cancer Maternal Aunt     Breast cancer Maternal Aunt   "   Breast cancer Paternal Grandmother        Review of patient's allergies indicates:   Allergen Reactions    Elavil [amitriptyline]        Vitals:    10/14/20 1039   BP: 119/80   Pulse: 85   Weight: 112.4 kg (247 lb 12.8 oz)   Height: 5' 8" (1.727 m)   PainSc:   6   PainLoc: Knee       Physical Exam   Constitutional: She is oriented to person, place, and time and well-developed, well-nourished, and in no distress. No distress.   HENT:   Head: Normocephalic.   Mouth/Throat: Oropharynx is clear and moist.   Eyes: Pupils are equal, round, and reactive to light. Conjunctivae and EOM are normal.   Neck: Normal range of motion. Neck supple.   Cardiovascular: Normal rate and intact distal pulses.   Pulmonary/Chest: Effort normal. No respiratory distress.   Abdominal: Soft. There is no abdominal tenderness.   Musculoskeletal:      Comments: Mild synovitis over bilateral second , third and fourth MCP's with some tenderness over PIP's, Wrists, ankles.    Neurological: She is alert and oriented to person, place, and time. No cranial nerve deficit.   Skin: Skin is warm. No rash noted. No erythema.   Psychiatric: Mood and affect normal.   Nursing note and vitals reviewed.      Medication List with Changes/Refills   Current Medications    CLOBETASOL (TEMOVATE) 0.05 % EXTERNAL SOLUTION    Apply topically 2 (two) times daily.    DICLOFENAC SODIUM (VOLTAREN) 1 % GEL    Apply 2 g topically 2 (two) times daily.    FLUOCINOLONE (DERMA-SMOOTHE) 0.01 % EXTERNAL OIL    Apply oil to scalp twice a day    KETOCONAZOLE (NIZORAL) 2 % SHAMPOO    Wash hair with medicated shampoo at least 1x/week - let sit on scalp at least 5 minutes prior to rinsing    PANTOPRAZOLE (PROTONIX) 40 MG TABLET    Take 1 tablet (40 mg total) by mouth once daily.    PREDNISONE (DELTASONE) 5 MG TABLET    Take 1 tablet (5 mg total) by mouth daily as needed (pain).    TACROLIMUS (PROTOPIC) 0.1 % OINTMENT    Apply topically 2 (two) times daily.    TRAZODONE (DESYREL) 50 " MG TABLET    Take 1 tablet (50 mg total) by mouth every evening.    TRIAMCINOLONE ACETONIDE 0.1% (KENALOG) 0.1 % OINTMENT    AAA bid   Changed and/or Refilled Medications    Modified Medication Previous Medication    HYDROXYCHLOROQUINE (PLAQUENIL) 200 MG TABLET hydroxychloroquine (PLAQUENIL) 200 mg tablet       Take 2 tablets (400 mg total) by mouth once daily.    Take 2 tablets (400 mg total) by mouth once daily.    MYCOPHENOLATE (CELLCEPT) 500 MG TAB mycophenolate (CELLCEPT) 500 mg Tab       Take 2 tablets (1,000 mg total) by mouth 2 (two) times daily.    Take 1 tablet (500 mg total) by mouth 2 (two) times daily.   Discontinued Medications    FOLIC ACID (FOLVITE) 1 MG TABLET    Take 1 tablet (1 mg total) by mouth once daily.    PREDNISONE (DELTASONE) 10 MG TABLET    TAKE 1 TABLET(10 MG) BY MOUTH ALONG WITH 5 MG IF ANY JOINT FLARES       Assessment/Plans:-  1. Chronic pain of both knees    2. Systemic lupus erythematosus arthritis    3. Other systemic lupus erythematosus with other organ involvement    4. Secondary Raynaud's phenomenon    5. Immunosuppressed status    · Active lupus arthritis, increase dose of cellcept to 1000mg bid. Continue plaquenil, benlysta and prn 5 mg prednisone.   · Knee effusion secondary to OA . Inject kenalog. Advised weight loss.   · Compromised immune system secondary to autoimmune disease and use of immunosuppressive drugs. Monitor carefully for infections. Advised to get immediate medical care if any infection. Also advised strict adherence to age appropriate vaccinations and cancer screenings with PCP.   · Stable Raynaud'S. Monitor.   ·   Problem List Items Addressed This Visit     Systemic lupus erythematosus arthritis    Relevant Medications    hydrOXYchloroQUINE (PLAQUENIL) 200 mg tablet    mycophenolate (CELLCEPT) 500 mg Tab    Immunosuppressed status    Secondary Raynaud's phenomenon    Chronic pain of both knees - Primary      Large Joint Aspiration/Injection: bilateral  knee    Date/Time: 10/14/2020 10:45 AM  Performed by: Prashanth Whalen MD  Authorized by: Prashanth Whalen MD     Consent Done?:  Yes (Verbal)  Indications:  Joint swelling and pain  Site marked: the procedure site was marked    Timeout: prior to procedure the correct patient, procedure, and site was verified    Prep: patient was prepped and draped in usual sterile fashion    Local anesthesia used?: No    Anesthesia:  Local infiltration  Local anesthetic:  Lidocaine 2% without epinephrine  Anesthetic total (ml):  2      Details:  Needle Size:  25 G  Ultrasonic Guidance for needle placement?: No    Approach:  Medial  Location:  Knee  Laterality:  Bilateral  Site:  Bilateral knee  Medications (Right):  40 mg triamcinolone acetonide 40 mg/mL  Medications (Left):  40 mg triamcinolone acetonide 40 mg/mL  Patient tolerance:  Patient tolerated the procedure well with no immediate complications        # Follow up in about 3 months (around 1/14/2021).      Disclaimer: This note was prepared using voice recognition system and is likely to have sound alike errors and is not proof read.  Please call me with any questions.

## 2020-10-14 NOTE — NURSING
Infusion# >10    Recent labs? yes    S/S infection noted or voiced? None  Recent or planned surgeries/invasive procedures? Denies  Recent vaccines? Denies    Premeds? None    Benlysta 1,124 mg administered IV at a 60 minute rate per orders; see MAR & Flow Sheet for more  details. Tolerated well without adverse events

## 2020-11-11 ENCOUNTER — INFUSION (OUTPATIENT)
Dept: INFUSION THERAPY | Facility: HOSPITAL | Age: 35
End: 2020-11-11
Attending: INTERNAL MEDICINE
Payer: COMMERCIAL

## 2020-11-11 VITALS
HEART RATE: 72 BPM | DIASTOLIC BLOOD PRESSURE: 85 MMHG | SYSTOLIC BLOOD PRESSURE: 132 MMHG | RESPIRATION RATE: 16 BRPM | OXYGEN SATURATION: 96 % | WEIGHT: 244.94 LBS | TEMPERATURE: 98 F | BODY MASS INDEX: 37.24 KG/M2

## 2020-11-11 DIAGNOSIS — M35.1 MCTD (MIXED CONNECTIVE TISSUE DISEASE): ICD-10-CM

## 2020-11-11 DIAGNOSIS — M32.9 EXACERBATION OF SYSTEMIC LUPUS: ICD-10-CM

## 2020-11-11 DIAGNOSIS — M32.9 SYSTEMIC LUPUS ERYTHEMATOSUS ARTHRITIS: Primary | ICD-10-CM

## 2020-11-11 DIAGNOSIS — R76.8 POSITIVE SM/RNP ANTIBODY: ICD-10-CM

## 2020-11-11 PROCEDURE — 63600175 PHARM REV CODE 636 W HCPCS: Mod: JG | Performed by: INTERNAL MEDICINE

## 2020-11-11 PROCEDURE — A4216 STERILE WATER/SALINE, 10 ML: HCPCS | Performed by: INTERNAL MEDICINE

## 2020-11-11 PROCEDURE — 96413 CHEMO IV INFUSION 1 HR: CPT

## 2020-11-11 PROCEDURE — 25000003 PHARM REV CODE 250: Performed by: INTERNAL MEDICINE

## 2020-11-11 RX ORDER — SODIUM CHLORIDE 0.9 % (FLUSH) 0.9 %
10 SYRINGE (ML) INJECTION
Status: DISCONTINUED | OUTPATIENT
Start: 2020-11-11 | End: 2020-11-11 | Stop reason: HOSPADM

## 2020-11-11 RX ORDER — DIPHENHYDRAMINE HYDROCHLORIDE 50 MG/ML
25 INJECTION INTRAMUSCULAR; INTRAVENOUS
Status: CANCELLED
Start: 2020-12-02

## 2020-11-11 RX ORDER — ACETAMINOPHEN 325 MG/1
650 TABLET ORAL
Status: CANCELLED | OUTPATIENT
Start: 2020-12-02

## 2020-11-11 RX ORDER — METHYLPREDNISOLONE SOD SUCC 125 MG
80 VIAL (EA) INJECTION
Status: DISCONTINUED | OUTPATIENT
Start: 2020-11-11 | End: 2020-11-11 | Stop reason: HOSPADM

## 2020-11-11 RX ORDER — HEPARIN 100 UNIT/ML
500 SYRINGE INTRAVENOUS
Status: CANCELLED | OUTPATIENT
Start: 2020-12-02

## 2020-11-11 RX ORDER — SODIUM CHLORIDE 0.9 % (FLUSH) 0.9 %
10 SYRINGE (ML) INJECTION
Status: CANCELLED | OUTPATIENT
Start: 2020-12-02

## 2020-11-11 RX ADMIN — BELIMUMAB 1111 MG: 400 INJECTION, POWDER, LYOPHILIZED, FOR SOLUTION INTRAVENOUS at 09:11

## 2020-11-11 RX ADMIN — Medication 10 ML: at 09:11

## 2020-11-11 NOTE — PLAN OF CARE
Problem: Adult Inpatient Plan of Care  Goal: Plan of Care Review  Outcome: Ongoing, Progressing  Flowsheets (Taken 11/11/2020 0903)  Plan of Care Reviewed With: patient  Goal: Patient-Specific Goal (Individualization)  Outcome: Ongoing, Progressing  Flowsheets (Taken 11/11/2020 0903)  Individualized Care Needs: Feet elevated, warm blanket and pillow.  Anxieties, Fears or Concerns: covid  Patient-Specific Goals (Include Timeframe): Pt will verbalize understanding of today's infusion POT  Goal: Optimal Comfort and Wellbeing  Outcome: Ongoing, Progressing  Intervention: Provide Person-Centered Care  Flowsheets (Taken 11/11/2020 0903)  Trust Relationship/Rapport:   care explained   questions encouraged   choices provided   reassurance provided   emotional support provided   thoughts/feelings acknowledged   empathic listening provided   questions answered

## 2020-11-11 NOTE — NURSING
"Infusion# >10    Recent labs? reviewed    S/S infection noted or voiced? None noted/denied  Recent or planned surgeries/invasive procedures? denied  Recent vaccines? denied    Premeds? None (refused solumedrol. "it makes me nauseated")    Benlysta 10 mg/Kg= 1,111 mg administered IV at a 60 minute rate per orders; see MAR & Flow Sheet for more  details. Tolerated well without adverse events  "

## 2020-11-20 ENCOUNTER — PATIENT MESSAGE (OUTPATIENT)
Dept: RHEUMATOLOGY | Facility: CLINIC | Age: 35
End: 2020-11-20

## 2020-11-30 ENCOUNTER — PATIENT MESSAGE (OUTPATIENT)
Dept: INTERNAL MEDICINE | Facility: CLINIC | Age: 35
End: 2020-11-30

## 2020-11-30 DIAGNOSIS — R19.00 PELVIC MASS: Primary | ICD-10-CM

## 2020-12-02 ENCOUNTER — OFFICE VISIT (OUTPATIENT)
Dept: OBSTETRICS AND GYNECOLOGY | Facility: CLINIC | Age: 35
End: 2020-12-02
Payer: COMMERCIAL

## 2020-12-02 ENCOUNTER — PATIENT OUTREACH (OUTPATIENT)
Dept: ADMINISTRATIVE | Facility: OTHER | Age: 35
End: 2020-12-02

## 2020-12-02 ENCOUNTER — PATIENT MESSAGE (OUTPATIENT)
Dept: INTERNAL MEDICINE | Facility: CLINIC | Age: 35
End: 2020-12-02

## 2020-12-02 ENCOUNTER — LAB VISIT (OUTPATIENT)
Dept: LAB | Facility: HOSPITAL | Age: 35
End: 2020-12-02
Attending: OBSTETRICS & GYNECOLOGY
Payer: COMMERCIAL

## 2020-12-02 VITALS
DIASTOLIC BLOOD PRESSURE: 78 MMHG | SYSTOLIC BLOOD PRESSURE: 112 MMHG | WEIGHT: 252.44 LBS | HEIGHT: 68 IN | BODY MASS INDEX: 38.26 KG/M2

## 2020-12-02 DIAGNOSIS — R10.11 RUQ ABDOMINAL PAIN: ICD-10-CM

## 2020-12-02 DIAGNOSIS — R19.00 PELVIC MASS: ICD-10-CM

## 2020-12-02 DIAGNOSIS — Z12.4 PAP SMEAR FOR CERVICAL CANCER SCREENING: ICD-10-CM

## 2020-12-02 DIAGNOSIS — R19.00 PELVIC MASS: Primary | ICD-10-CM

## 2020-12-02 LAB — CANCER AG125 SERPL-ACNC: 20 U/ML (ref 0–30)

## 2020-12-02 PROCEDURE — 99204 OFFICE O/P NEW MOD 45 MIN: CPT | Mod: S$GLB,,, | Performed by: OBSTETRICS & GYNECOLOGY

## 2020-12-02 PROCEDURE — 99999 PR PBB SHADOW E&M-EST. PATIENT-LVL IV: ICD-10-PCS | Mod: PBBFAC,,, | Performed by: OBSTETRICS & GYNECOLOGY

## 2020-12-02 PROCEDURE — 36415 COLL VENOUS BLD VENIPUNCTURE: CPT

## 2020-12-02 PROCEDURE — 3008F BODY MASS INDEX DOCD: CPT | Mod: CPTII,S$GLB,, | Performed by: OBSTETRICS & GYNECOLOGY

## 2020-12-02 PROCEDURE — 1125F AMNT PAIN NOTED PAIN PRSNT: CPT | Mod: S$GLB,,, | Performed by: OBSTETRICS & GYNECOLOGY

## 2020-12-02 PROCEDURE — 99999 PR PBB SHADOW E&M-EST. PATIENT-LVL IV: CPT | Mod: PBBFAC,,, | Performed by: OBSTETRICS & GYNECOLOGY

## 2020-12-02 PROCEDURE — 88175 CYTOPATH C/V AUTO FLUID REDO: CPT

## 2020-12-02 PROCEDURE — 87624 HPV HI-RISK TYP POOLED RSLT: CPT

## 2020-12-02 PROCEDURE — 1125F PR PAIN SEVERITY QUANTIFIED, PAIN PRESENT: ICD-10-PCS | Mod: S$GLB,,, | Performed by: OBSTETRICS & GYNECOLOGY

## 2020-12-02 PROCEDURE — 86304 IMMUNOASSAY TUMOR CA 125: CPT

## 2020-12-02 PROCEDURE — 99204 PR OFFICE/OUTPT VISIT, NEW, LEVL IV, 45-59 MIN: ICD-10-PCS | Mod: S$GLB,,, | Performed by: OBSTETRICS & GYNECOLOGY

## 2020-12-02 PROCEDURE — 3008F PR BODY MASS INDEX (BMI) DOCUMENTED: ICD-10-PCS | Mod: CPTII,S$GLB,, | Performed by: OBSTETRICS & GYNECOLOGY

## 2020-12-02 RX ORDER — TIZANIDINE 4 MG/1
TABLET ORAL
COMMUNITY
Start: 2020-11-30 | End: 2022-02-14

## 2020-12-02 RX ORDER — HYDROCODONE BITARTRATE AND ACETAMINOPHEN 5; 325 MG/1; MG/1
1 TABLET ORAL
Status: ON HOLD | COMMUNITY
Start: 2020-11-30 | End: 2020-12-11 | Stop reason: HOSPADM

## 2020-12-02 NOTE — PROGRESS NOTES
Health Maintenance Due   Topic Date Due    Pneumococcal Vaccine (Highest Risk) (2 of 3 - PPSV23) 03/05/2019    Influenza Vaccine (1) 08/01/2020    Cervical Cancer Screening  08/30/2020     Updates were requested from care everywhere.  Chart was reviewed for overdue Proactive [facility] Encounters ([facility]) topics (CRS, Breast Cancer Screening, Eye exam)  Health Maintenance has been updated.  [facility] immunization registry triggered.  Immunizations were reconciled.

## 2020-12-02 NOTE — LETTER
December 2, 2020      Sofia Martinez MD  [address] [place] [id number]           [facility]  [address] [place] [address]  Phone: [phone]  Fax: [phone]          Patient: Stefanie España   MR Number: [id number]   YOB: 1985   Date of Visit: 12/2/2020       Dear Dr. Sofia Martinez:    Thank you for referring Stefanie España to me for evaluation. Attached you will find relevant portions of my assessment and plan of care.    If you have questions, please do not hesitate to call me. I look forward to following Stefanie España along with you.    Sincerely,    SHERLEY Wakefield MD    Enclosure  CC:  No Recipients    If you would like to receive this communication electronically, please contact [email or web address] or [phone] to request more information on [facility] access.    For providers and/or their staff who would like to refer a patient to [facility], please contact us through our one-stop-shop provider referral line, [facility], at [phone].    If you feel you have received this communication in error or would no longer like to receive these types of communications, please e-mail [email or web address]

## 2020-12-03 ENCOUNTER — TELEPHONE (OUTPATIENT)
Dept: RADIOLOGY | Facility: HOSPITAL | Age: 35
End: 2020-12-03

## 2020-12-03 ENCOUNTER — PATIENT MESSAGE (OUTPATIENT)
Dept: INTERNAL MEDICINE | Facility: CLINIC | Age: 35
End: 2020-12-03

## 2020-12-03 ENCOUNTER — OFFICE VISIT (OUTPATIENT)
Dept: SURGERY | Facility: CLINIC | Age: 35
End: 2020-12-03
Payer: COMMERCIAL

## 2020-12-03 VITALS — TEMPERATURE: 97 F | HEIGHT: 68 IN | WEIGHT: 252 LBS | BODY MASS INDEX: 38.19 KG/M2

## 2020-12-03 DIAGNOSIS — K80.20 CALCULUS OF GALLBLADDER WITHOUT CHOLECYSTITIS WITHOUT OBSTRUCTION: Primary | ICD-10-CM

## 2020-12-03 DIAGNOSIS — Z01.818 PRE-OP EXAM: ICD-10-CM

## 2020-12-03 DIAGNOSIS — R10.11 RUQ ABDOMINAL PAIN: ICD-10-CM

## 2020-12-03 PROCEDURE — 1125F AMNT PAIN NOTED PAIN PRSNT: CPT | Mod: S$GLB,,, | Performed by: SURGERY

## 2020-12-03 PROCEDURE — 3008F PR BODY MASS INDEX (BMI) DOCUMENTED: ICD-10-PCS | Mod: CPTII,S$GLB,, | Performed by: SURGERY

## 2020-12-03 PROCEDURE — 99244 PR OFFICE CONSULTATION,LEVEL IV: ICD-10-PCS | Mod: S$GLB,,, | Performed by: SURGERY

## 2020-12-03 PROCEDURE — 99999 PR PBB SHADOW E&M-EST. PATIENT-LVL IV: ICD-10-PCS | Mod: PBBFAC,,, | Performed by: SURGERY

## 2020-12-03 PROCEDURE — 99999 PR PBB SHADOW E&M-EST. PATIENT-LVL IV: CPT | Mod: PBBFAC,,, | Performed by: SURGERY

## 2020-12-03 PROCEDURE — 99244 OFF/OP CNSLTJ NEW/EST MOD 40: CPT | Mod: S$GLB,,, | Performed by: SURGERY

## 2020-12-03 PROCEDURE — 1125F PR PAIN SEVERITY QUANTIFIED, PAIN PRESENT: ICD-10-PCS | Mod: S$GLB,,, | Performed by: SURGERY

## 2020-12-03 PROCEDURE — 3008F BODY MASS INDEX DOCD: CPT | Mod: CPTII,S$GLB,, | Performed by: SURGERY

## 2020-12-03 NOTE — LETTER
December 3, 2020      August Khan MD  [address] [facility]  4th Floor  [place] [address]           [facility]  [address] [facility] [address]  Phone: [phone]          Patient: Stefanie España   MR Number: [id number]   YOB: 1985   Date of Visit: 12/3/2020       Dear Dr. August Khan:    Thank you for referring Stefanie España to me for evaluation. Attached you will find relevant portions of my assessment and plan of care.    If you have questions, please do not hesitate to call me. I look forward to following Stefanie España along with you.    Sincerely,    Taco Zimmer MD    Enclosure  CC:  No Recipients    If you would like to receive this communication electronically, please contact [email or web address] or [phone] to request more information on [employer] [facility] access.    For providers and/or their staff who would like to refer a patient to [facility], please contact us through our one-stop-shop provider referral line, Jordana Nair, at [phone].    If you feel you have received this communication in error or would no longer like to receive these types of communications, please e-mail [email or web address]

## 2020-12-03 NOTE — H&P (VIEW-ONLY)
Patient ID: Stefanie España is a 34 y.o. female.    Left ovarian mass and symptomatic gallstones    Chief Complaint: Consult and Gall Bladder Problem      HPI:  Patient was sent for evaluation by gynecology.    She underwent a CT scan at [facility] for abdominal pain.  She was found to have gallstones and a left ovarian mass suspicious for teratoma.    Gynecology plans a excision of the teratoma.  There would like to coordinate with gallbladder surgery.    Patient states she gets right upper quadrant pain after eating.  There is occasional nausea but not often vomiting.  She also has back pain that seems to be separate from the gallbladder pain.    She is scheduled for a pelvic ultrasound tomorrow.  She would like to coordinate surgery.      Review of Systems   Constitutional: Positive for activity change and unexpected weight change.   HENT: Negative for hearing loss, rhinorrhea and trouble swallowing.    Eyes: Negative for discharge and visual disturbance.   Respiratory: Negative for chest tightness and wheezing.    Cardiovascular: Negative for chest pain and palpitations.   Gastrointestinal: Positive for constipation. Negative for blood in stool, diarrhea and vomiting.   Endocrine: Positive for polydipsia and polyuria.   Genitourinary: Positive for menstrual problem. Negative for difficulty urinating, dysuria and hematuria.   Musculoskeletal: Positive for arthralgias. Negative for joint swelling and neck pain.   Neurological: Positive for weakness. Negative for headaches.   Psychiatric/Behavioral: Negative for confusion and dysphoric mood.       Current Outpatient Medications   Medication Sig Dispense Refill    clobetasol (TEMOVATE) 0.05 % external solution Apply topically 2 (two) times daily. 50 mL 1    HYDROcodone-acetaminophen (NORCO) 5-325 mg per tablet Take 1 tablet by mouth.      hydrOXYchloroQUINE (PLAQUENIL) 200 mg tablet Take 2 tablets (400 mg total) by mouth once daily. 60 tablet 11     ketoconazole (NIZORAL) 2 % shampoo Wash hair with medicated shampoo at least 1x/week - let sit on scalp at least 5 minutes prior to rinsing 120 mL 5    mycophenolate (CELLCEPT) 500 mg Tab Take 2 tablets (1,000 mg total) by mouth 2 (two) times daily. 360 tablet 0    tacrolimus (PROTOPIC) 0.1 % ointment Apply topically 2 (two) times daily. 60 g 3    tiZANidine (ZANAFLEX) 4 MG tablet       triamcinolone acetonide 0.1% (KENALOG) 0.1 % ointment AAA bid 454 g 0    fluocinolone (DERMA-SMOOTHE) 0.01 % external oil Apply oil to scalp twice a day 1 Bottle 5     No current facility-administered medications for this visit.        Review of patient's allergies indicates:   Allergen Reactions    Elavil [amitriptyline]        Past Medical History:   Diagnosis Date    Abnormal Pap smear of cervix     repeat was normal    Lupus (systemic lupus erythematosus)        Past Surgical History:   Procedure Laterality Date    TUBAL LIGATION  2005       Family History   Problem Relation Age of Onset    Liver cancer Maternal Aunt     Breast cancer Maternal Aunt     Breast cancer Paternal Grandmother     Colon cancer Neg Hx     Ovarian cancer Neg Hx     Thrombosis Neg Hx        Social History     Socioeconomic History    Marital status:      Spouse name: Not on file    Number of children: Not on file    Years of education: Not on file    Highest education level: Not on file   Occupational History    Occupation: [employer]   Social Needs    Financial resource strain: Not on file    Food insecurity     Worry: Not on file     Inability: Not on file    Transportation needs     Medical: Not on file     Non-medical: Not on file   Tobacco Use    Smoking status: Never Smoker    Smokeless tobacco: Never Used   Substance and Sexual Activity    Alcohol use: No    Drug use: No    Sexual activity: Yes     Partners: Male     Birth control/protection: Surgical     Comment: BTL   Lifestyle    Physical activity     Days per  week: Not on file     Minutes per session: Not on file    Stress: Not on file   Relationships    Social connections     Talks on phone: Not on file     Gets together: Not on file     Attends Buddhism service: Not on file     Active member of club or organization: Not on file     Attends meetings of clubs or organizations: Not on file     Relationship status: Not on file   Other Topics Concern    Are you pregnant or think you may be? No    Breast-feeding No   Social History Narrative    Not on file       Vitals:    12/03/20 1456   Temp: 97.3 °F (36.3 °C)       Physical Exam  Constitutional:       Appearance: She is well-developed. She is obese.   HENT:      Head: Normocephalic.   Eyes:      Pupils: Pupils are equal, round, and reactive to light.   Neck:      Thyroid: No thyromegaly.      Vascular: No JVD.      Trachea: No tracheal deviation.   Cardiovascular:      Rate and Rhythm: Normal rate and regular rhythm.      Heart sounds: Normal heart sounds.   Pulmonary:      Breath sounds: Normal breath sounds. No wheezing.   Abdominal:      General: Bowel sounds are normal. There is no distension.      Palpations: Abdomen is soft. Abdomen is not rigid. There is no mass.      Tenderness: There is no abdominal tenderness (Right upper quadrant). There is no guarding or rebound.      Comments: Obese   Musculoskeletal: Normal range of motion.   Lymphadenopathy:      Cervical: No cervical adenopathy.   Skin:     General: Skin is warm and dry.      Findings: No erythema or rash.   Neurological:      Mental Status: She is alert and oriented to person, place, and time.       [facility] scan done at [facility] was reviewed.  It is in the Care everywhere section chart.  There were multiple gallstones.  Was also a left ovarian mass    Assessment & Plan:    Symptomatic cholelithiasis.  Can planned surgery in conjunction with GYN.    The need for robotic cholecystectomy was discussed with her.    Gallbladder ultrasound was  ordered.  CBC and CMP COVID testing reported.    Will allow the gynecology service to coordinate with us in general surgery for the date of surgery    The risks, benefits and complications of robotic laparoscopic cholecystectomy were discussed.  These included infection, bleeding, abscess and bile leak.  The need for open conversion was dicussed.  The rare possibility of a common bile duct injury and the need for additional procedures and/or surgery was reviewed.  All question were answered.  The patient has agreed to proceed.  Consent was obtained.

## 2020-12-03 NOTE — PATIENT INSTRUCTIONS
What are Gallstones?    The gallbladder stores bile, a fluid made by the liver. Bile helps digest fats in the foods you eat. Gallstones form when certain substances in the bile crystallize and become solid. In some cases, the stones dont cause any symptoms. In others, they irritate the walls of the gallbladder. More serious problems can occur if stones move into nearby ducts (such as the common bile duct) and cause blockages. This can block the flow of bile and lead to pain, nausea, and infection.  Common symptoms  Gallbladder problems can cause painful attacks, often after a meal. Some people have only one attack. Others have many. Common symptoms include:  · Severe, steady pain or aching in the upper right belly (abdomen), back, or right shoulder blade, lasting from 30 minutes to several hours  · A dull ache beneath the ribs or breastbone  · Nausea, upset stomach, or vomiting  · A buildup of too much bile in the blood (jaundice), which causes yellowing of the skin and eyes, dark urine, and itching. Call your healthcare provider right away if this occurs.  Risk factors for gallstones  You are more at risk for gallstones if you:  · Are a woman  · Are obese  · Have a history of very fast (rapid) weight loss  · Have certain intestinal diseases, such as Crohns disease  · Have certain blood diseases, such as sickle cell anemia  · Have a family background that includes Native Americans or Guamanian Americans  Diagnosis  Ultrasound is often used to look at the gallbladder and measure the size and exact location of gallstone.  Blood tests are used to measure liver enzymes and bilirubin. Both of these may be high if the gallstones are blocking bile flow or irritating the liver.  Treating gallstones  If your stones are not causing symptoms, you may choose to delay treatment. But if youve had one or more painful attacks, your healthcare provider will likely recommend removing your gallbladder. This prevents more stones  from forming and causing attacks. It also helps prevent problems, such as stones passing into the ducts and causing infection or pancreatitis. After the gallbladder is removed, your liver will still make bile to aid digestion.  If youre pregnant  Hormone changes during pregnancy can make bile more likely to form stones. If your gallbladder needs to be removed, your doctor will talk with you about the timing for surgery. In some cases, it can be delayed until after childbirth. In others, you may have surgery during pregnancy. This helps protect you and your babys health.   Date Last Reviewed: 12/1/2016 © 2000-2017 [employer]. [address] [place] [address], [place] [address]. All rights reserved. This information is not intended as a substitute for professional medical care. Always follow your healthcare professional's instructions.        Treating Gallstones    The gallbladder is an organ that stores bile. This is a substance that helps with digestion. Deposits in bile can clump together, creating hard, pebble-like stones. These gallstones may not cause any symptoms. In most cases, gallstones have no symptoms. In some cases, though, they irritate the walls of the gallbladder. Or they can block flow of bile out of the gallbladder. If they fall into the common bile duct, stones can block the flow of bile into the small bowel. This can lead to jaundice, pain, or serious infection. Stones are treated only if you have symptoms. If treatment is needed, your healthcare provider will discuss your choices with you. The most common treatments are listed below.  Medicine  Medicines to dissolve gallstones don't really work.   ERCP  ERCP (endoscopic retrograde cholangiopancreatography) is an outpatient procedure that needs heavy sedation to remove stones. It uses a thin tube with video and X-rays to locate stones blocking the flow of bile. The stones are then removed. ERCP may be done alone. Or it may be used before  surgery is done to remove the gallbladder.  Surgery  A cholecystectomy is an operation to remove the gallbladder and its contents (gallstones). Today, most cholecystectomies are done laparoscopically. This means using several very small abdominal incisions. Cholecystectomy may also be done with the traditional single, larger incision.   Prevent future symptoms  After treatment, you should follow a low-fat diet. This diet includes lean meats, lean poultry, and fish. Avoid full-fat dairy products. And limit vegetable oils. Read food labels to be sure theyre low in fat.   Date Last Reviewed: 5/1/2016  © 4155-2893 [employer]. [address] [place] [address]. All rights reserved. This information is not intended as a substitute for professional medical care. Always follow your healthcare professional's instructions.        Having Laparoscopic Cholecystectomy  Small incisions are made in your belly (abdomen). The scope is put through one of the incisions. Surgical tools are put through other incisions.  Small clips are used to close off the connection between the gallbladder and the bile duct. The gallbladder is then detached from the liver.  The gallbladder is removed through one of the incisions. Bile still flows from the liver to the small intestine.  When the surgery is done, all tools are removed. Incisions are closed with stitches (sutures) or staples. Sometimes, a laparoscopic surgery may need to be changed to an open surgery. This method uses one large incision. This change may happen because of scar tissue, unusual anatomy, or for some other reason.     Possible incision sites.   Youve had painful attacks caused by gallstones. Because of this, you are having surgery to remove your gallbladder. This is called cholecystectomy. A method called laparoscopy will be used. This allows surgery to be done through a few small cuts (incisions).  Before your surgery  · Tell your provider what  medicines you take. This includes prescription medicines, over-the-counter medicines, street drugs, herbs, vitamins, and other supplements. Be sure to mention if you take prescription blood thinners. This includes warfarin, clopidogrel, ibuprofen, and aspirin.  · If you drink alcohol, tell your provider how much you drink. This is very important if you are a heavy drinker or have had alcohol withdrawal symptoms in the past. Alcohol withdrawal can be dangerous. But the symptoms can be safely managed if your healthcare provider knows your alcohol history.  · Have any tests your provider asks for, such as blood tests.  · Dont eat or drink after midnight the night before your surgery. This includes water, coffee, and mints.  The day of surgery  · Your provider may have you take your normal medicine with a sip of water. Check with your provider.   When you arrive, you will prepare for surgery:  · An IV (intravenous) line will be put into a vein in your arm or hand. This gives you fluids and medicine.  · An anesthesiologist will talk with you about anesthesia. This is medicine used to prevent pain. You will receive general anesthesia. This puts you into a deep sleep-like state through the procedure.  During laparoscopic surgery  For this surgery, a thin tube with a tiny camera is used. This is called a laparoscope. The scope sends images from inside your body to a video screen. This lets the surgeon view and work on your gallbladder:  · Small incisions are made in your belly (abdomen). The scope is put through one of the incisions. Surgical tools are put through other incisions.  · Small clips are used to close off the connection between the gallbladder and the bile duct. The gallbladder is then detached from the liver.  · The gallbladder is removed through one of the incisions. Bile still flows from the liver to the small intestine.  · When the surgery is done, all tools are removed. Incisions are closed with stitches  (sutures) or staples. Sometimes, a laparoscopic surgery may need to be changed to an open surgery. This method uses one large incision. This change may happen because of scar tissue, unusual anatomy, or for some other reason.  After surgery  You will be sent to a room to wake up from the anesthesia. You will likely go home the same day. In some cases, an overnight stay is needed. When you are released to go home, have a family member or friend ready to drive you.  Risks and possible complications of gallbladder surgery  All surgeries have risks. The risks of gallbladder surgery include:  · Bleeding  · Infection  · Injury to the common bile duct or nearby organs  · Blood clots in the legs  · Bile leaks  · Hernia at incision site  · Pneumonia   Date Last Reviewed: 7/1/2016 © 2000-2017 [employer]. [address] [place] [address], [place] [address]. All rights reserved. This information is not intended as a substitute for professional medical care. Always follow your healthcare professional's instructions.        Gallstones with Biliary Colic    You have abdominal pain due to irritation and spasm of the gallbladder. This is called biliary colic. The gallbladder is a small sac under the liver, which stores and releases a fluid that aids in the digestion of fat. A collection of crystals may form stones inside the gallbladder (gallstones). Gallstones can cause the gallbladder to spasm. If they block the duct out of the gallbladder, they can cause pain and even an infection.   A number of factors increase the risk for having gallstones:  · Being female  · Being severely overweight (obese)  · Older age  · Losing or gaining weight quickly  · Eating a high-calorie diet  · Being pregnant  · Taking hormone therapy  · Having diabetes  Home care  · Rest in bed.  · Drink only clear liquids until you feel better.  · You may have been prescribed medicine for pain or nausea. Take these as directed.  · Fat in your diet makes the  gallbladder contract and may cause increased pain. Avoid foods that are high in fat (such as full-fat dairy, fried foods, and fatty meats) for at least two days.  · If you are overweight, talk to your healthcare provider about losing weight.  Follow-up care  Follow up with your healthcare provider or as advised. There is a chance that you will have another episode of pain from your gallstones at some point. Removal of the gallbladder is an option to prevent this. Talk with your healthcare provider about your treatment options.  When to seek medical advice  Call your healthcare provider if any of the following occur:  · Worsening pain or pain lasting for longer than 6 hours  · Pain moving to the right lower abdomen  · Repeated vomiting  · Swollen abdomen  · Fever of 100.4ºF (38ºC) or higher, or as directed by your healthcare provider  · Very dark urine, light colored stools, or yellow color of the skin or eyes  · Chest, arm, back, neck or jaw pain  Date Last Reviewed: 6/18/2015  © 2677-7844 [employer]. [address] [place] [address], [place] [address]. All rights reserved. This information is not intended as a substitute for professional medical care. Always follow your healthcare professional's instructions.

## 2020-12-03 NOTE — PROGRESS NOTES
Patient ID: Stefanie España is a 34 y.o. female.    Left ovarian mass and symptomatic gallstones    Chief Complaint: Consult and Gall Bladder Problem      HPI:  Patient was sent for evaluation by gynecology.    She underwent a CT scan at [facility] for abdominal pain.  She was found to have gallstones and a left ovarian mass suspicious for teratoma.    Gynecology plans a excision of the teratoma.  There would like to coordinate with gallbladder surgery.    Patient states she gets right upper quadrant pain after eating.  There is occasional nausea but not often vomiting.  She also has back pain that seems to be separate from the gallbladder pain.    She is scheduled for a pelvic ultrasound tomorrow.  She would like to coordinate surgery.      Review of Systems   Constitutional: Positive for activity change and unexpected weight change.   HENT: Negative for hearing loss, rhinorrhea and trouble swallowing.    Eyes: Negative for discharge and visual disturbance.   Respiratory: Negative for chest tightness and wheezing.    Cardiovascular: Negative for chest pain and palpitations.   Gastrointestinal: Positive for constipation. Negative for blood in stool, diarrhea and vomiting.   Endocrine: Positive for polydipsia and polyuria.   Genitourinary: Positive for menstrual problem. Negative for difficulty urinating, dysuria and hematuria.   Musculoskeletal: Positive for arthralgias. Negative for joint swelling and neck pain.   Neurological: Positive for weakness. Negative for headaches.   Psychiatric/Behavioral: Negative for confusion and dysphoric mood.       Current Outpatient Medications   Medication Sig Dispense Refill    clobetasol (TEMOVATE) 0.05 % external solution Apply topically 2 (two) times daily. 50 mL 1    HYDROcodone-acetaminophen (NORCO) 5-325 mg per tablet Take 1 tablet by mouth.      hydrOXYchloroQUINE (PLAQUENIL) 200 mg tablet Take 2 tablets (400 mg total) by mouth once daily. 60 tablet 11     ketoconazole (NIZORAL) 2 % shampoo Wash hair with medicated shampoo at least 1x/week - let sit on scalp at least 5 minutes prior to rinsing 120 mL 5    mycophenolate (CELLCEPT) 500 mg Tab Take 2 tablets (1,000 mg total) by mouth 2 (two) times daily. 360 tablet 0    tacrolimus (PROTOPIC) 0.1 % ointment Apply topically 2 (two) times daily. 60 g 3    tiZANidine (ZANAFLEX) 4 MG tablet       triamcinolone acetonide 0.1% (KENALOG) 0.1 % ointment AAA bid 454 g 0    fluocinolone (DERMA-SMOOTHE) 0.01 % external oil Apply oil to scalp twice a day 1 Bottle 5     No current facility-administered medications for this visit.        Review of patient's allergies indicates:   Allergen Reactions    Elavil [amitriptyline]        Past Medical History:   Diagnosis Date    Abnormal Pap smear of cervix     repeat was normal    Lupus (systemic lupus erythematosus)        Past Surgical History:   Procedure Laterality Date    TUBAL LIGATION  2005       Family History   Problem Relation Age of Onset    Liver cancer Maternal Aunt     Breast cancer Maternal Aunt     Breast cancer Paternal Grandmother     Colon cancer Neg Hx     Ovarian cancer Neg Hx     Thrombosis Neg Hx        Social History     Socioeconomic History    Marital status:      Spouse name: Not on file    Number of children: Not on file    Years of education: Not on file    Highest education level: Not on file   Occupational History    Occupation: [employer]   Social Needs    Financial resource strain: Not on file    Food insecurity     Worry: Not on file     Inability: Not on file    Transportation needs     Medical: Not on file     Non-medical: Not on file   Tobacco Use    Smoking status: Never Smoker    Smokeless tobacco: Never Used   Substance and Sexual Activity    Alcohol use: No    Drug use: No    Sexual activity: Yes     Partners: Male     Birth control/protection: Surgical     Comment: BTL   Lifestyle    Physical activity     Days per  week: Not on file     Minutes per session: Not on file    Stress: Not on file   Relationships    Social connections     Talks on phone: Not on file     Gets together: Not on file     Attends Sikhism service: Not on file     Active member of club or organization: Not on file     Attends meetings of clubs or organizations: Not on file     Relationship status: Not on file   Other Topics Concern    Are you pregnant or think you may be? No    Breast-feeding No   Social History Narrative    Not on file       Vitals:    12/03/20 1456   Temp: 97.3 °F (36.3 °C)       Physical Exam  Constitutional:       Appearance: She is well-developed. She is obese.   HENT:      Head: Normocephalic.   Eyes:      Pupils: Pupils are equal, round, and reactive to light.   Neck:      Thyroid: No thyromegaly.      Vascular: No JVD.      Trachea: No tracheal deviation.   Cardiovascular:      Rate and Rhythm: Normal rate and regular rhythm.      Heart sounds: Normal heart sounds.   Pulmonary:      Breath sounds: Normal breath sounds. No wheezing.   Abdominal:      General: Bowel sounds are normal. There is no distension.      Palpations: Abdomen is soft. Abdomen is not rigid. There is no mass.      Tenderness: There is no abdominal tenderness (Right upper quadrant). There is no guarding or rebound.      Comments: Obese   Musculoskeletal: Normal range of motion.   Lymphadenopathy:      Cervical: No cervical adenopathy.   Skin:     General: Skin is warm and dry.      Findings: No erythema or rash.   Neurological:      Mental Status: She is alert and oriented to person, place, and time.       CT scan done at [facility] was reviewed.  It is in the Care everywhere section chart.  There were multiple gallstones.  Was also a left ovarian mass    Assessment & Plan:    Symptomatic cholelithiasis.  Can planned surgery in conjunction with GYN.    The need for robotic cholecystectomy was discussed with her.    Gallbladder ultrasound was  ordered.  CBC and CMP COVID testing reported.    Will allow the gynecology service to coordinate with us in general surgery for the date of surgery    The risks, benefits and complications of robotic laparoscopic cholecystectomy were discussed.  These included infection, bleeding, abscess and bile leak.  The need for open conversion was dicussed.  The rare possibility of a common bile duct injury and the need for additional procedures and/or surgery was reviewed.  All question were answered.  The patient has agreed to proceed.  Consent was obtained.

## 2020-12-04 ENCOUNTER — HOSPITAL ENCOUNTER (OUTPATIENT)
Dept: RADIOLOGY | Facility: HOSPITAL | Age: 35
Discharge: HOME | End: 2020-12-04
Attending: SURGERY
Payer: COMMERCIAL

## 2020-12-04 ENCOUNTER — HOSPITAL ENCOUNTER (OUTPATIENT)
Dept: RADIOLOGY | Facility: HOSPITAL | Age: 35
Discharge: HOME | End: 2020-12-04
Attending: OBSTETRICS & GYNECOLOGY
Payer: COMMERCIAL

## 2020-12-04 ENCOUNTER — TELEPHONE (OUTPATIENT)
Dept: OBSTETRICS AND GYNECOLOGY | Facility: CLINIC | Age: 35
End: 2020-12-04

## 2020-12-04 ENCOUNTER — PATIENT MESSAGE (OUTPATIENT)
Dept: INTERNAL MEDICINE | Facility: CLINIC | Age: 35
End: 2020-12-04

## 2020-12-04 DIAGNOSIS — K80.20 CALCULUS OF GALLBLADDER WITHOUT CHOLECYSTITIS WITHOUT OBSTRUCTION: ICD-10-CM

## 2020-12-04 DIAGNOSIS — R19.00 PELVIC MASS: ICD-10-CM

## 2020-12-04 DIAGNOSIS — R19.00 PELVIC MASS: Primary | ICD-10-CM

## 2020-12-04 PROCEDURE — 76830 US PELVIS COMP WITH TRANSVAG NON-OB (XPD): ICD-10-PCS | Mod: 26,,, | Performed by: RADIOLOGY

## 2020-12-04 PROCEDURE — 76830 TRANSVAGINAL US NON-OB: CPT | Mod: TC

## 2020-12-04 PROCEDURE — 76830 TRANSVAGINAL US NON-OB: CPT | Mod: 26,,, | Performed by: RADIOLOGY

## 2020-12-04 PROCEDURE — 76856 US PELVIS COMP WITH TRANSVAG NON-OB (XPD): ICD-10-PCS | Mod: 26,,, | Performed by: RADIOLOGY

## 2020-12-04 PROCEDURE — 76856 US EXAM PELVIC COMPLETE: CPT | Mod: 26,,, | Performed by: RADIOLOGY

## 2020-12-05 ENCOUNTER — PATIENT MESSAGE (OUTPATIENT)
Dept: SURGERY | Facility: HOSPITAL | Age: 35
End: 2020-12-05

## 2020-12-07 ENCOUNTER — TELEPHONE (OUTPATIENT)
Dept: RADIOLOGY | Facility: HOSPITAL | Age: 35
End: 2020-12-07

## 2020-12-07 ENCOUNTER — TELEPHONE (OUTPATIENT)
Dept: PREADMISSION TESTING | Facility: HOSPITAL | Age: 35
End: 2020-12-07

## 2020-12-07 NOTE — TELEPHONE ENCOUNTER
Pre op instructions reviewed with patient per phone: Spoke with patient to discuss PAT. Verbalized understanding.    To confirm, Your surgeon has instructed you:  Surgery is scheduled        12/11/20                  .      Please report to [facility] at the [facility] lobby by       .   Pre admit office to call afternoon prior to surgery with final arrival time      INSTRUCTIONS IMPORTANT!!!  ¨ Do not eat, drink, or smoke after 12 midnight-including water. OK to brush teeth, no gum, candy or mints!    ¨ Take only these medicines with a small swallow of water-morning of surgery.  None  ____  Do not wear makeup, including mascara.  ____  No powder, lotions or creams to surgical area.  ____  Please remove all jewelry, including piercings and leave at home.  ____  No money or valuables needed. Please leave at home.  ____  Please bring identification and insurance information to hospital.  ____  If going home the same day, arrange for a ride home. You will not be able to   drive if Anesthesia was used.  ____  Children, under 12 years old, must remain in the waiting room with an adult.  They are not allowed in patient areas.  ____  Wear loose fitting clothing. Allow for dressings, bandages.  ____  Stop Aspirin, Ibuprofen, Motrin and Aleve at least 5-7 days before surgery, unless otherwise instructed by your doctor, or the nurse.   You MAY use Tylenol/acetaminophen until day of surgery.  ____  If you take diabetic medication, do not take am of surgery unless instructed by   Doctor.  ____ Stop taking any Fish Oil supplement or any Vitamins that contain Vitamin E at least 5 days prior to surgery.          Bathing Instructions-- The night before surgery and the morning prior to coming to the hospital:   -Do not shave the surgical area.   -Shower and wash your hair and body as usual with anti-bacterial  soap and shampoo.   -Rinse your hair and body completely.   -Use one packet of hibiclens to wash the surgical site (using your  hand) gently for 5 minutes.  Do not scrub you skin too hard.   -Do not use hibiclens on your head, face, or genitals.   -Do not wash with anti-bacterial soap after you use the hibiclens.   -Rinse your body thoroughly.   -Dry with clean, soft towel.  Do not use lotion, cream, deodorant, or powders on   the surgical site.    Use antibacterial soap in place of hibiclens if your surgery is on the head, face or genitals.         Surgical Site Infection    Prevention of surgical site infections:     -Keep incisions clean and dry.   -Do not soak/submerge incisions in water until completely healed.   -Do not apply lotions, powders, creams, or deodorants to site.   -Always make sure hands are cleaned with antibacterial soap/ alcohol-based   prior to touching the surgical site.  (This includes doctors, nurses, staff, and yourself.)    Signs and symptoms:   -Redness and pain around the area where you had surgery   -Drainage of cloudy fluid from your surgical wound   -Fever over 100.4  I have read or had read and explained to me, and understand the above information.

## 2020-12-08 ENCOUNTER — HOSPITAL ENCOUNTER (OUTPATIENT)
Dept: RADIOLOGY | Facility: HOSPITAL | Age: 35
Discharge: HOME | End: 2020-12-08
Attending: SURGERY
Payer: COMMERCIAL

## 2020-12-08 ENCOUNTER — LAB VISIT (OUTPATIENT)
Dept: OTOLARYNGOLOGY | Facility: CLINIC | Age: 35
End: 2020-12-08
Payer: COMMERCIAL

## 2020-12-08 DIAGNOSIS — Z01.818 PRE-OP EXAM: ICD-10-CM

## 2020-12-08 LAB
HPV HR 12 DNA SPEC QL NAA+PROBE: NEGATIVE
HPV16 AG SPEC QL: NEGATIVE
HPV18 DNA SPEC QL NAA+PROBE: NEGATIVE

## 2020-12-08 PROCEDURE — U0003 INFECTIOUS AGENT DETECTION BY NUCLEIC ACID (DNA OR RNA); SEVERE ACUTE RESPIRATORY SYNDROME CORONAVIRUS 2 (SARS-COV-2) (CORONAVIRUS DISEASE [COVID-19]), AMPLIFIED PROBE TECHNIQUE, MAKING USE OF HIGH THROUGHPUT TECHNOLOGIES AS DESCRIBED BY CMS-2020-01-R: HCPCS

## 2020-12-08 PROCEDURE — 76705 US ABDOMEN LIMITED: ICD-10-PCS | Mod: 26,,, | Performed by: RADIOLOGY

## 2020-12-08 PROCEDURE — 76705 ECHO EXAM OF ABDOMEN: CPT | Mod: TC

## 2020-12-08 PROCEDURE — 76705 ECHO EXAM OF ABDOMEN: CPT | Mod: 26,,, | Performed by: RADIOLOGY

## 2020-12-09 ENCOUNTER — INFUSION (OUTPATIENT)
Dept: INFUSION THERAPY | Facility: HOSPITAL | Age: 35
End: 2020-12-09
Attending: INTERNAL MEDICINE
Payer: COMMERCIAL

## 2020-12-09 VITALS
SYSTOLIC BLOOD PRESSURE: 110 MMHG | TEMPERATURE: 98 F | DIASTOLIC BLOOD PRESSURE: 69 MMHG | RESPIRATION RATE: 16 BRPM | HEART RATE: 85 BPM | OXYGEN SATURATION: 99 % | WEIGHT: 248 LBS | BODY MASS INDEX: 37.71 KG/M2

## 2020-12-09 DIAGNOSIS — M32.9 EXACERBATION OF SYSTEMIC LUPUS: ICD-10-CM

## 2020-12-09 DIAGNOSIS — M32.9 SYSTEMIC LUPUS ERYTHEMATOSUS ARTHRITIS: Primary | ICD-10-CM

## 2020-12-09 DIAGNOSIS — M35.1 MCTD (MIXED CONNECTIVE TISSUE DISEASE): ICD-10-CM

## 2020-12-09 DIAGNOSIS — R76.8 POSITIVE SM/RNP ANTIBODY: ICD-10-CM

## 2020-12-09 LAB — SARS-COV-2 RNA RESP QL NAA+PROBE: NOT DETECTED

## 2020-12-09 PROCEDURE — A4216 STERILE WATER/SALINE, 10 ML: HCPCS | Performed by: INTERNAL MEDICINE

## 2020-12-09 PROCEDURE — 63600175 PHARM REV CODE 636 W HCPCS: Mod: JG | Performed by: INTERNAL MEDICINE

## 2020-12-09 PROCEDURE — 96413 CHEMO IV INFUSION 1 HR: CPT

## 2020-12-09 PROCEDURE — 25000003 PHARM REV CODE 250: Performed by: INTERNAL MEDICINE

## 2020-12-09 RX ORDER — SODIUM CHLORIDE 0.9 % (FLUSH) 0.9 %
10 SYRINGE (ML) INJECTION
Status: CANCELLED | OUTPATIENT
Start: 2020-12-30

## 2020-12-09 RX ORDER — ACETAMINOPHEN 325 MG/1
650 TABLET ORAL
Status: CANCELLED | OUTPATIENT
Start: 2020-12-30

## 2020-12-09 RX ORDER — SODIUM CHLORIDE 0.9 % (FLUSH) 0.9 %
10 SYRINGE (ML) INJECTION
Status: DISCONTINUED | OUTPATIENT
Start: 2020-12-09 | End: 2020-12-09 | Stop reason: HOSPADM

## 2020-12-09 RX ORDER — HEPARIN 100 UNIT/ML
500 SYRINGE INTRAVENOUS
Status: CANCELLED | OUTPATIENT
Start: 2020-12-30

## 2020-12-09 RX ORDER — DIPHENHYDRAMINE HYDROCHLORIDE 50 MG/ML
25 INJECTION INTRAMUSCULAR; INTRAVENOUS
Status: CANCELLED
Start: 2020-12-30

## 2020-12-09 RX ADMIN — BELIMUMAB 1125 MG: 400 INJECTION, POWDER, LYOPHILIZED, FOR SOLUTION INTRAVENOUS at 11:12

## 2020-12-09 RX ADMIN — Medication 10 ML: at 11:12

## 2020-12-09 NOTE — NURSING
Infusion# >10    Recent labs? yes    S/S infection noted or voiced? None noted/ denies  Recent or planned surgeries/invasive procedures? Pt is having partial hysterectomy and gall bladder removal on Friday 12/11. Okay for infusion today per Dr. VILLELA   Recent vaccines? Denies    Premeds? None    Benlysta 1,125 mg administered IV at a 60 minute rate per orders; see MAR & Flow Sheet for more  details. Tolerated well without adverse events

## 2020-12-09 NOTE — DISCHARGE INSTRUCTIONS
FALL PREVENTION   Falls often occur due to slipping, tripping or losing your balance. Here are ways to reduce your risk of falling again.   Was there anything that caused your fall that can be fixed, removed or replaced?   Make your home safe by keeping walkways clear of objects you may trip over.   Use non-slip pads under rugs.   Do not walk in poorly lit areas.   Do not stand on chairs or wobbly ladders.   Use caution when reaching overhead or looking upward. This position can cause a loss of balance.   Be sure your shoes fit properly, have non-slip bottoms and are in good condition.   Be cautious when going up and down stairs, curbs, and when walking on uneven sidewalks.   If your balance is poor, consider using a cane or walker.   If your fall was related to alcohol use, stop or limit alcohol intake.   If your fall was related to use of sleeping medicines, talk to your doctor about this. You may need to reduce your dosage at bedtime if you awaken during the night to go to the bathroom.   To reduce the need for nighttime bathroom trips:   Avoid drinking fluids for several hours before going to bed   Empty your bladder before going to bed   Men can keep a urinal at the bedside   © 6976-6835 [facility], [address] [place] [address], [place] [id number]. All rights reserved. This information is not intended as a substitute for professional medical care. Always follow your healthcare professional's instructions.  WAYS TO HELP PREVENT INFECTION         WASH YOUR HANDS OFTEN DURING THE DAY, ESPECIALLY BEFORE YOU EAT, AFTER USING THE BATHROOM, AND AFTER TOUCHING ANIMALS     STAY AWAY FROM PEOPLE WHO HAVE ILLNESSES YOU CAN CATCH; SUCH AS COLDS, FLU, CHICKEN POX     TRY TO AVOID CROWDS     STAY AWAY FROM CHILDREN WHO RECENTLY HAVE RECEIVED LIVE VIRUS VACCINES     MAINTAIN GOOD MOUTH CARE     DO NOT SQUEEZE OR SCRATCH PIMPLES     CLEAN CUTS & SCRAPES RIGHT AWAY AND DAILY UNTIL HEALED WITH WARM WATER, SOAP & AN  ANTISEPTIC     AVOID CONTACT WITH LITTER BOXES, BIRD CAGES, & FISH TANKS     AVOID STANDING WATER, IE., BIRD BATHS, FLOWER POTS/VASES, OR HUMIDIFIERS     WEAR GLOVES WHEN GARDENING OR CLEANING UP AFTER OTHERS, ESPECIALLY BABIES & SMALL CHILDREN     DO NOT EAT RAW FISH, SEAFOOD, MEAT, OR EGGS

## 2020-12-10 ENCOUNTER — TELEPHONE (OUTPATIENT)
Dept: PREADMISSION TESTING | Facility: HOSPITAL | Age: 35
End: 2020-12-10

## 2020-12-10 ENCOUNTER — ANESTHESIA EVENT (OUTPATIENT)
Dept: SURGERY | Facility: HOSPITAL | Age: 35
End: 2020-12-10
Payer: COMMERCIAL

## 2020-12-10 PROBLEM — N83.292 COMPLEX CYST OF LEFT OVARY: Status: ACTIVE | Noted: 2020-12-10

## 2020-12-10 PROBLEM — K80.20 CALCULUS OF GALLBLADDER WITHOUT CHOLECYSTITIS WITHOUT OBSTRUCTION: Status: ACTIVE | Noted: 2020-12-10

## 2020-12-10 NOTE — HPI
Presented from the ED with CT results showing cholelithiasis and 6 cm probable left ovarian dermoid tumor.  Having mostly upper abdominal and mid back pain probably related to the cholelithiasis.  For surgical removal of the left ovary and gallbladder

## 2020-12-10 NOTE — SUBJECTIVE & OBJECTIVE
OB History    Para Term  AB Living   5 4 3 1 1 3   SAB TAB Ectopic Multiple Live Births   0 1 0 0 0      # Outcome Date GA Lbr Hossein/2nd Weight Sex Delivery Anes PTL Lv   5 Term            4 Term            3 Term            2          FD   1 TAB              Past Medical History:   Diagnosis Date    Abnormal Pap smear of cervix     repeat was normal    Lupus (systemic lupus erythematosus)      Past Surgical History:   Procedure Laterality Date    TUBAL LIGATION         No medications prior to admission.       Review of patient's allergies indicates:   Allergen Reactions    Elavil [amitriptyline]         Family History     Problem Relation (Age of Onset)    Breast cancer Maternal Aunt, Paternal Grandmother    Liver cancer Maternal Aunt        Tobacco Use    Smoking status: Never Smoker    Smokeless tobacco: Never Used   Substance and Sexual Activity    Alcohol use: No    Drug use: No    Sexual activity: Yes     Partners: Male     Birth control/protection: Surgical     Comment: BTL     Review of Systems   Constitutional: Negative for appetite change, chills, fatigue, fever and unexpected weight change.   HENT: Negative.    Eyes: Negative for visual disturbance.   Respiratory: Negative for shortness of breath and wheezing.    Cardiovascular: Negative for chest pain, palpitations and leg swelling.   Gastrointestinal: Positive for abdominal pain. Negative for bloating, blood in stool, constipation, diarrhea, nausea and vomiting.   Endocrine: Negative for hair loss, hot flashes and hypothyroidism.   Genitourinary: Negative for dysmenorrhea, dyspareunia, dysuria, flank pain, frequency, genital sores, hematuria, menorrhagia, menstrual problem, pelvic pain, urgency, vaginal bleeding, vaginal discharge, urinary incontinence and vaginal odor.   Musculoskeletal: Negative for back pain, joint swelling and myalgias.   Integumentary:  Negative for rash, hair changes, breast mass, nipple  discharge and breast skin changes.   Neurological: Negative for syncope and headaches.   Hematological: Negative for adenopathy. Does not bruise/bleed easily.   Psychiatric/Behavioral: Negative for depression and sleep disturbance. The patient is not nervous/anxious.    Breast: Negative for mass, mastodynia, nipple discharge and skin changes     Objective:     Vital Signs (Most Recent):    Vital Signs (24h Range):  BP: ()/()   Arterial Line BP: ()/()         There is no height or weight on file to calculate BMI.    No LMP recorded.    Physical Exam:   Constitutional: She is oriented to person, place, and time. She appears well-developed and well-nourished. No distress.    HENT:   Head: Normocephalic and atraumatic.   Right Ear: External ear normal.   Left Ear: External ear normal.   Nose: Nose normal.    Eyes: Pupils are equal, round, and reactive to light. Conjunctivae are normal.    Neck: Trachea normal, normal range of motion and full passive range of motion without pain. Neck supple. No JVD present. No thyroid mass and no thyromegaly present.    Cardiovascular: Normal rate and regular rhythm.     Pulmonary/Chest: Effort normal and breath sounds normal. No respiratory distress.        Abdominal: Soft. Normal appearance and bowel sounds are normal. She exhibits no distension and no mass. There is abdominal tenderness in the right upper quadrant. There is no rebound and no guarding. No hernia. Hernia confirmed negative in the ventral area, confirmed negative in the right inguinal area and confirmed negative in the left inguinal area.     Genitourinary:    Vagina, uterus and rectum normal.   Rectum:      No external hemorrhoid or abnormal anal tone.   Labial bartholins abnormal.There is no rash, tenderness or lesion on the right labia. There is no rash, tenderness or lesion on the left labia. Uterus is not deviated, not enlarged, not tender and not experiencing uterine prolapse. Cervix is normal. Right adnexum  displays no mass, no tenderness and no fullness. Left adnexum displays mass (tender mass posterior on the left, 6 to 8 cm \) and tenderness. Left adnexum displays no fullness. No tenderness, bleeding, rectocele, cystocele or unspecified prolapse of vaginal walls in the vagina.    No foreign body in the vagina.   Labial bartholins normal.Cervix exhibits no motion tenderness, no discharge and no friability. negative for vaginal discharge          Musculoskeletal: Normal range of motion.       Neurological: She is alert and oriented to person, place, and time. She has normal reflexes.    Skin: Skin is warm and dry. She is not diaphoretic.    Psychiatric: She has a normal mood and affect. Her speech is normal and behavior is normal. Thought content normal.       Laboratory:  BMP:   Recent Labs   Lab 12/08/20  1155   GLU 74      K 4.0      CO2 26   BUN 9   CREATININE 0.7   CALCIUM 9.6     CBC:   Recent Labs   Lab 12/08/20  1155   WBC 5.18   RBC 4.06   HGB 12.3   HCT 39.3   *   MCV 97   MCH 30.3   MCHC 31.3*

## 2020-12-10 NOTE — H&P
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  Having mostly upper abdominal and mid back pain probably related to the cholelithiasis.  For surgical removal of the left ovary and gallbladder         OB History    Para Term  AB Living   5 4 3 1 1 3   SAB TAB Ectopic Multiple Live Births   0 1 0 0 0      # Outcome Date GA Lbr Hossein/2nd Weight Sex Delivery Anes PTL Lv   5 Term            4 Term            3 Term            2          FD   1 TAB              Past Medical History:   Diagnosis Date    Abnormal Pap smear of cervix     repeat was normal    Lupus (systemic lupus erythematosus)      Past Surgical History:   Procedure Laterality Date    TUBAL LIGATION         No medications prior to admission.       Review of patient's allergies indicates:   Allergen Reactions    Elavil [amitriptyline]         Family History     Problem Relation (Age of Onset)    Breast cancer Maternal Aunt, Paternal Grandmother    Liver cancer Maternal Aunt        Tobacco Use    Smoking status: Never Smoker    Smokeless tobacco: Never Used   Substance and Sexual Activity    Alcohol use: No    Drug use: No    Sexual activity: Yes     Partners: Male     Birth control/protection: Surgical     Comment: BTL     Review of Systems   Constitutional: Negative for appetite change, chills, fatigue, fever and unexpected weight change.   HENT: Negative.    Eyes: Negative for visual disturbance.   Respiratory: Negative for shortness of breath and wheezing.    Cardiovascular: Negative for chest pain, palpitations and leg swelling.   Gastrointestinal: Positive  for abdominal pain. Negative for bloating, blood in stool, constipation, diarrhea, nausea and vomiting.   Endocrine: Negative for hair loss, hot flashes and hypothyroidism.   Genitourinary: Negative for dysmenorrhea, dyspareunia, dysuria, flank pain, frequency, genital sores, hematuria, menorrhagia, menstrual problem, pelvic pain, urgency, vaginal bleeding, vaginal discharge, urinary incontinence and vaginal odor.   Musculoskeletal: Negative for back pain, joint swelling and myalgias.   Integumentary:  Negative for rash, hair changes, breast mass, nipple discharge and breast skin changes.   Neurological: Negative for syncope and headaches.   Hematological: Negative for adenopathy. Does not bruise/bleed easily.   Psychiatric/Behavioral: Negative for depression and sleep disturbance. The patient is not nervous/anxious.    Breast: Negative for mass, mastodynia, nipple discharge and skin changes     Objective:     Vital Signs (Most Recent):    Vital Signs (24h Range):  BP: ()/()   Arterial Line BP: ()/()         There is no height or weight on file to calculate BMI.    No LMP recorded.    Physical Exam:   Constitutional: She is oriented to person, place, and time. She appears well-developed and well-nourished. No distress.    HENT:   Head: Normocephalic and atraumatic.   Right Ear: External ear normal.   Left Ear: External ear normal.   Nose: Nose normal.    Eyes: Pupils are equal, round, and reactive to light. Conjunctivae are normal.    Neck: Trachea normal, normal range of motion and full passive range of motion without pain. Neck supple. No JVD present. No thyroid mass and no thyromegaly present.    Cardiovascular: Normal rate and regular rhythm.     Pulmonary/Chest: Effort normal and breath sounds normal. No respiratory distress.        Abdominal: Soft. Normal appearance and bowel sounds are normal. She exhibits no distension and no mass. There is abdominal tenderness in the right upper quadrant. There is no  rebound and no guarding. No hernia. Hernia confirmed negative in the ventral area, confirmed negative in the right inguinal area and confirmed negative in the left inguinal area.     Genitourinary:    Vagina, uterus and rectum normal.   Rectum:      No external hemorrhoid or abnormal anal tone.   Labial bartholins abnormal.There is no rash, tenderness or lesion on the right labia. There is no rash, tenderness or lesion on the left labia. Uterus is not deviated, not enlarged, not tender and not experiencing uterine prolapse. Cervix is normal. Right adnexum displays no mass, no tenderness and no fullness. Left adnexum displays mass (tender mass posterior on the left, 6 to 8 cm \) and tenderness. Left adnexum displays no fullness. No tenderness, bleeding, rectocele, cystocele or unspecified prolapse of vaginal walls in the vagina.    No foreign body in the vagina.   Labial bartholins normal.Cervix exhibits no motion tenderness, no discharge and no friability. negative for vaginal discharge          Musculoskeletal: Normal range of motion.       Neurological: She is alert and oriented to person, place, and time. She has normal reflexes.    Skin: Skin is warm and dry. She is not diaphoretic.    Psychiatric: She has a normal mood and affect. Her speech is normal and behavior is normal. Thought content normal.       Laboratory:  BMP:   Recent Labs   Lab 12/08/20  1155   GLU 74      K 4.0      CO2 26   BUN 9   CREATININE 0.7   CALCIUM 9.6     CBC:   Recent Labs   Lab 12/08/20  1155   WBC 5.18   RBC 4.06   HGB 12.3   HCT 39.3   *   MCV 97   MCH 30.3   MCHC 31.3*           Assessment/Plan:     * Complex cyst of left ovary  Robotic LS&O     Calculus of gallbladder without cholecystitis without obstruction  Robotic cholecystectomy by General Surgery         SHALA Wakefield MD  Obstetrics & Gynecology  [facility]

## 2020-12-10 NOTE — ANESTHESIA PREPROCEDURE EVALUATION
12/10/2020  Stefanie España is a 34 y.o., female.    Anesthesia Evaluation    I have reviewed the Patient Summary Reports.    I have reviewed the Nursing Notes. I have reviewed the NPO Status.   I have reviewed the Medications.     Review of Systems  Anesthesia Hx:  No problems with previous Anesthesia  Denies Family Hx of Anesthesia complications.   Denies Personal Hx of Anesthesia complications.   Social:  No Alcohol Use, Non-Smoker    Hematology/Oncology:  Hematology Normal        Cardiovascular:  Cardiovascular Normal  ECG has been reviewed.    Pulmonary:  Pulmonary Normal    Renal/:  Renal/ Normal     Hepatic/GI:   GERD    Musculoskeletal:   Arthritis  SLE with arthritis.   Neurological:  Neurology Normal    Psych:   depression          Physical Exam  General:  Obesity    Airway/Jaw/Neck:  Airway Findings: Mouth Opening: Normal Tongue: Normal  General Airway Assessment: Adult  Mallampati: II  TM Distance: Normal, at least 6 cm  Jaw/Neck Findings:  Neck ROM: Normal ROM  Neck Findings:     Eyes/Ears/Nose:  Eyes/Ears/Nose Findings:    Dental:  Dental Findings: In tact   Chest/Lungs:  Chest/Lungs Findings: Clear to auscultation, Normal Respiratory Rate     Heart/Vascular:  Heart Findings: Rate: Normal  Rhythm: Regular Rhythm  Sounds: Normal  Heart murmur: negative Vascular Findings: Normal    Abdomen:  Abdomen Findings: Normal    Musculoskeletal:  Musculoskeletal Findings: Normal   Skin:  Skin Findings: Normal    Mental Status:  Mental Status Findings:  Alert and Oriented         Anesthesia Plan  Type of Anesthesia, risks & benefits discussed:  Anesthesia Type:  general  Patient's Preference:   Intra-op Monitoring Plan: standard ASA monitors  Intra-op Monitoring Plan Comments:   Post Op Pain Control Plan: per primary service following discharge from PACU and multimodal analgesia  Post Op Pain Control  Plan Comments:   Induction:   IV  Beta Blocker:  Patient is not currently on a Beta-Blocker (No further documentation required).       Informed Consent: Patient understands risks and agrees with Anesthesia plan.  Questions answered. Anesthesia consent signed with patient.  ASA Score: 2     Day of Surgery Review of History & Physical:    H&P update referred to the surgeon.         Ready For Surgery From Anesthesia Perspective.

## 2020-12-11 ENCOUNTER — ANESTHESIA (OUTPATIENT)
Dept: SURGERY | Facility: HOSPITAL | Age: 35
End: 2020-12-11
Payer: COMMERCIAL

## 2020-12-11 ENCOUNTER — HOSPITAL ENCOUNTER (OUTPATIENT)
Facility: HOSPITAL | Age: 35
Discharge: HOME | End: 2020-12-11
Attending: OBSTETRICS & GYNECOLOGY | Admitting: OBSTETRICS & GYNECOLOGY
Payer: COMMERCIAL

## 2020-12-11 VITALS
BODY MASS INDEX: 37.46 KG/M2 | OXYGEN SATURATION: 100 % | DIASTOLIC BLOOD PRESSURE: 86 MMHG | TEMPERATURE: 98 F | HEIGHT: 68 IN | RESPIRATION RATE: 16 BRPM | SYSTOLIC BLOOD PRESSURE: 140 MMHG | HEART RATE: 71 BPM | WEIGHT: 247.13 LBS

## 2020-12-11 DIAGNOSIS — N83.292 COMPLEX CYST OF LEFT OVARY: ICD-10-CM

## 2020-12-11 DIAGNOSIS — Z90.49 STATUS POST LAPAROSCOPIC CHOLECYSTECTOMY: Primary | ICD-10-CM

## 2020-12-11 PROBLEM — K80.20 CALCULUS OF GALLBLADDER WITHOUT CHOLECYSTITIS WITHOUT OBSTRUCTION: Status: RESOLVED | Noted: 2020-12-10 | Resolved: 2020-12-11

## 2020-12-11 LAB
B-HCG UR QL: NEGATIVE
CTP QC/QA: YES

## 2020-12-11 PROCEDURE — 71000015 HC POSTOP RECOV 1ST HR: Performed by: OBSTETRICS & GYNECOLOGY

## 2020-12-11 PROCEDURE — 58661 PR LAP,RMV  ADNEXAL STRUCTURE: ICD-10-PCS | Mod: LT,,, | Performed by: OBSTETRICS & GYNECOLOGY

## 2020-12-11 PROCEDURE — 47562 LAPAROSCOPIC CHOLECYSTECTOMY: CPT | Mod: ,,, | Performed by: SURGERY

## 2020-12-11 PROCEDURE — D9220A PRA ANESTHESIA: ICD-10-PCS | Mod: ,,, | Performed by: ANESTHESIOLOGY

## 2020-12-11 PROCEDURE — 88304 TISSUE EXAM BY PATHOLOGIST: CPT | Mod: 26,,, | Performed by: PATHOLOGY

## 2020-12-11 PROCEDURE — 36000710: Performed by: OBSTETRICS & GYNECOLOGY

## 2020-12-11 PROCEDURE — D9220A PRA ANESTHESIA: Mod: ,,, | Performed by: NURSE ANESTHETIST, CERTIFIED REGISTERED

## 2020-12-11 PROCEDURE — 88305 TISSUE EXAM BY PATHOLOGIST: CPT | Mod: 26,,, | Performed by: PATHOLOGY

## 2020-12-11 PROCEDURE — 81025 URINE PREGNANCY TEST: CPT | Performed by: OBSTETRICS & GYNECOLOGY

## 2020-12-11 PROCEDURE — 63600175 PHARM REV CODE 636 W HCPCS: Performed by: OBSTETRICS & GYNECOLOGY

## 2020-12-11 PROCEDURE — 88304 TISSUE EXAM BY PATHOLOGIST: CPT | Performed by: PATHOLOGY

## 2020-12-11 PROCEDURE — 63600175 PHARM REV CODE 636 W HCPCS: Performed by: ANESTHESIOLOGY

## 2020-12-11 PROCEDURE — 25000003 PHARM REV CODE 250: Performed by: OBSTETRICS & GYNECOLOGY

## 2020-12-11 PROCEDURE — 88305 TISSUE EXAM BY PATHOLOGIST: ICD-10-PCS | Mod: 26,,, | Performed by: PATHOLOGY

## 2020-12-11 PROCEDURE — 36000711: Performed by: OBSTETRICS & GYNECOLOGY

## 2020-12-11 PROCEDURE — 63600175 PHARM REV CODE 636 W HCPCS: Performed by: NURSE ANESTHETIST, CERTIFIED REGISTERED

## 2020-12-11 PROCEDURE — 25000003 PHARM REV CODE 250: Performed by: ANESTHESIOLOGY

## 2020-12-11 PROCEDURE — 37000008 HC ANESTHESIA 1ST 15 MINUTES: Performed by: OBSTETRICS & GYNECOLOGY

## 2020-12-11 PROCEDURE — 27201423 OPTIME MED/SURG SUP & DEVICES STERILE SUPPLY: Performed by: OBSTETRICS & GYNECOLOGY

## 2020-12-11 PROCEDURE — 88305 TISSUE EXAM BY PATHOLOGIST: CPT | Performed by: PATHOLOGY

## 2020-12-11 PROCEDURE — D9220A PRA ANESTHESIA: ICD-10-PCS | Mod: ,,, | Performed by: NURSE ANESTHETIST, CERTIFIED REGISTERED

## 2020-12-11 PROCEDURE — 58661 LAPAROSCOPY REMOVE ADNEXA: CPT | Mod: LT,,, | Performed by: OBSTETRICS & GYNECOLOGY

## 2020-12-11 PROCEDURE — 71000016 HC POSTOP RECOV ADDL HR: Performed by: OBSTETRICS & GYNECOLOGY

## 2020-12-11 PROCEDURE — 88304 PR  SURG PATH,LEVEL III: ICD-10-PCS | Mod: 26,,, | Performed by: PATHOLOGY

## 2020-12-11 PROCEDURE — 25000003 PHARM REV CODE 250: Performed by: NURSE ANESTHETIST, CERTIFIED REGISTERED

## 2020-12-11 PROCEDURE — D9220A PRA ANESTHESIA: Mod: ,,, | Performed by: ANESTHESIOLOGY

## 2020-12-11 PROCEDURE — 47562 PR LAP,CHOLECYSTECTOMY: ICD-10-PCS | Mod: ,,, | Performed by: SURGERY

## 2020-12-11 PROCEDURE — 25000003 PHARM REV CODE 250

## 2020-12-11 PROCEDURE — 71000033 HC RECOVERY, INTIAL HOUR: Performed by: OBSTETRICS & GYNECOLOGY

## 2020-12-11 PROCEDURE — 37000009 HC ANESTHESIA EA ADD 15 MINS: Performed by: OBSTETRICS & GYNECOLOGY

## 2020-12-11 RX ORDER — MIDAZOLAM HYDROCHLORIDE 1 MG/ML
INJECTION, SOLUTION INTRAMUSCULAR; INTRAVENOUS
Status: DISCONTINUED | OUTPATIENT
Start: 2020-12-11 | End: 2020-12-11

## 2020-12-11 RX ORDER — KETOROLAC TROMETHAMINE 30 MG/ML
INJECTION, SOLUTION INTRAMUSCULAR; INTRAVENOUS
Status: DISCONTINUED | OUTPATIENT
Start: 2020-12-11 | End: 2020-12-11

## 2020-12-11 RX ORDER — ONDANSETRON 4 MG/1
8 TABLET, ORALLY DISINTEGRATING ORAL EVERY 8 HOURS PRN
Status: CANCELLED | OUTPATIENT
Start: 2020-12-11

## 2020-12-11 RX ORDER — BUPIVACAINE HYDROCHLORIDE 2.5 MG/ML
INJECTION, SOLUTION EPIDURAL; INFILTRATION; INTRACAUDAL
Status: DISCONTINUED
Start: 2020-12-11 | End: 2020-12-11 | Stop reason: HOSPADM

## 2020-12-11 RX ORDER — INDOCYANINE GREEN AND WATER 25 MG
KIT INJECTION
Status: COMPLETED
Start: 2020-12-11 | End: 2020-12-11

## 2020-12-11 RX ORDER — SODIUM CHLORIDE, SODIUM LACTATE, POTASSIUM CHLORIDE, CALCIUM CHLORIDE 600; 310; 30; 20 MG/100ML; MG/100ML; MG/100ML; MG/100ML
INJECTION, SOLUTION INTRAVENOUS CONTINUOUS
Status: DISCONTINUED | OUTPATIENT
Start: 2020-12-11 | End: 2020-12-11

## 2020-12-11 RX ORDER — OXYCODONE AND ACETAMINOPHEN 5; 325 MG/1; MG/1
1 TABLET ORAL EVERY 4 HOURS PRN
Status: DISCONTINUED | OUTPATIENT
Start: 2020-12-11 | End: 2020-12-11 | Stop reason: HOSPADM

## 2020-12-11 RX ORDER — PROPOFOL 10 MG/ML
VIAL (ML) INTRAVENOUS
Status: DISCONTINUED | OUTPATIENT
Start: 2020-12-11 | End: 2020-12-11

## 2020-12-11 RX ORDER — FENTANYL CITRATE 50 UG/ML
25 INJECTION, SOLUTION INTRAMUSCULAR; INTRAVENOUS EVERY 5 MIN PRN
Status: DISCONTINUED | OUTPATIENT
Start: 2020-12-11 | End: 2020-12-11 | Stop reason: HOSPADM

## 2020-12-11 RX ORDER — ONDANSETRON 2 MG/ML
INJECTION INTRAMUSCULAR; INTRAVENOUS
Status: DISCONTINUED | OUTPATIENT
Start: 2020-12-11 | End: 2020-12-11

## 2020-12-11 RX ORDER — DIPHENHYDRAMINE HYDROCHLORIDE 50 MG/ML
25 INJECTION INTRAMUSCULAR; INTRAVENOUS EVERY 6 HOURS PRN
Status: DISCONTINUED | OUTPATIENT
Start: 2020-12-11 | End: 2020-12-11 | Stop reason: HOSPADM

## 2020-12-11 RX ORDER — HYDROMORPHONE HYDROCHLORIDE 2 MG/ML
1 INJECTION, SOLUTION INTRAMUSCULAR; INTRAVENOUS; SUBCUTANEOUS EVERY 4 HOURS PRN
Status: CANCELLED | OUTPATIENT
Start: 2020-12-11

## 2020-12-11 RX ORDER — LIDOCAINE HYDROCHLORIDE 20 MG/ML
INJECTION INTRAVENOUS
Status: DISCONTINUED | OUTPATIENT
Start: 2020-12-11 | End: 2020-12-11

## 2020-12-11 RX ORDER — ACETAMINOPHEN 500 MG
1000 TABLET ORAL
Status: COMPLETED | OUTPATIENT
Start: 2020-12-11 | End: 2020-12-11

## 2020-12-11 RX ORDER — INDOCYANINE GREEN AND WATER 25 MG
2.5 KIT INJECTION ONCE
Status: CANCELLED | OUTPATIENT
Start: 2020-12-11 | End: 2020-12-11

## 2020-12-11 RX ORDER — CEFAZOLIN SODIUM 2 G/50ML
2 SOLUTION INTRAVENOUS
Status: DISCONTINUED | OUTPATIENT
Start: 2020-12-11 | End: 2020-12-11 | Stop reason: HOSPADM

## 2020-12-11 RX ORDER — HYDROCODONE BITARTRATE AND ACETAMINOPHEN 5; 325 MG/1; MG/1
1 TABLET ORAL EVERY 6 HOURS PRN
Qty: 15 TABLET | Refills: 0 | Status: SHIPPED | OUTPATIENT
Start: 2020-12-11 | End: 2020-12-18

## 2020-12-11 RX ORDER — FENTANYL CITRATE 50 UG/ML
INJECTION, SOLUTION INTRAMUSCULAR; INTRAVENOUS
Status: DISCONTINUED | OUTPATIENT
Start: 2020-12-11 | End: 2020-12-11

## 2020-12-11 RX ORDER — ONDANSETRON 2 MG/ML
4 INJECTION INTRAMUSCULAR; INTRAVENOUS ONCE AS NEEDED
Status: DISCONTINUED | OUTPATIENT
Start: 2020-12-11 | End: 2020-12-11 | Stop reason: HOSPADM

## 2020-12-11 RX ORDER — DEXAMETHASONE SODIUM PHOSPHATE 4 MG/ML
INJECTION, SOLUTION INTRA-ARTICULAR; INTRALESIONAL; INTRAMUSCULAR; INTRAVENOUS; SOFT TISSUE
Status: DISCONTINUED | OUTPATIENT
Start: 2020-12-11 | End: 2020-12-11

## 2020-12-11 RX ORDER — MEPERIDINE HYDROCHLORIDE 25 MG/ML
12.5 INJECTION INTRAMUSCULAR; INTRAVENOUS; SUBCUTANEOUS ONCE
Status: DISCONTINUED | OUTPATIENT
Start: 2020-12-11 | End: 2020-12-11 | Stop reason: HOSPADM

## 2020-12-11 RX ORDER — PROMETHAZINE HYDROCHLORIDE 25 MG/1
25 TABLET ORAL EVERY 6 HOURS PRN
Status: CANCELLED | OUTPATIENT
Start: 2020-12-11

## 2020-12-11 RX ORDER — NEOSTIGMINE METHYLSULFATE 0.5 MG/ML
INJECTION, SOLUTION INTRAVENOUS
Status: DISCONTINUED | OUTPATIENT
Start: 2020-12-11 | End: 2020-12-11

## 2020-12-11 RX ORDER — KETOROLAC TROMETHAMINE 30 MG/ML
30 INJECTION, SOLUTION INTRAMUSCULAR; INTRAVENOUS EVERY 6 HOURS
Status: CANCELLED | OUTPATIENT
Start: 2020-12-11 | End: 2020-12-12

## 2020-12-11 RX ORDER — DEXTROSE MONOHYDRATE AND SODIUM CHLORIDE 5; .45 G/100ML; G/100ML
INJECTION, SOLUTION INTRAVENOUS CONTINUOUS
Status: CANCELLED | OUTPATIENT
Start: 2020-12-11

## 2020-12-11 RX ORDER — DIPHENHYDRAMINE HCL 25 MG
25 CAPSULE ORAL EVERY 4 HOURS PRN
Status: CANCELLED | OUTPATIENT
Start: 2020-12-11

## 2020-12-11 RX ORDER — HYDROCODONE BITARTRATE AND ACETAMINOPHEN 5; 325 MG/1; MG/1
1 TABLET ORAL
Status: DISCONTINUED | OUTPATIENT
Start: 2020-12-11 | End: 2020-12-11 | Stop reason: HOSPADM

## 2020-12-11 RX ORDER — PHENYLEPHRINE HYDROCHLORIDE 10 MG/ML
INJECTION INTRAVENOUS
Status: DISCONTINUED | OUTPATIENT
Start: 2020-12-11 | End: 2020-12-11

## 2020-12-11 RX ORDER — ACETAMINOPHEN 500 MG
1000 TABLET ORAL
Status: DISCONTINUED | OUTPATIENT
Start: 2020-12-11 | End: 2020-12-11 | Stop reason: HOSPADM

## 2020-12-11 RX ORDER — OXYCODONE AND ACETAMINOPHEN 5; 325 MG/1; MG/1
1 TABLET ORAL EVERY 4 HOURS PRN
Status: DISCONTINUED | OUTPATIENT
Start: 2020-12-11 | End: 2020-12-11

## 2020-12-11 RX ORDER — SODIUM CHLORIDE, SODIUM LACTATE, POTASSIUM CHLORIDE, CALCIUM CHLORIDE 600; 310; 30; 20 MG/100ML; MG/100ML; MG/100ML; MG/100ML
INJECTION, SOLUTION INTRAVENOUS CONTINUOUS
Status: DISCONTINUED | OUTPATIENT
Start: 2020-12-11 | End: 2020-12-11 | Stop reason: HOSPADM

## 2020-12-11 RX ORDER — HYDROCODONE BITARTRATE AND ACETAMINOPHEN 5; 325 MG/1; MG/1
1 TABLET ORAL EVERY 4 HOURS PRN
Status: CANCELLED | OUTPATIENT
Start: 2020-12-11

## 2020-12-11 RX ORDER — IBUPROFEN 800 MG/1
800 TABLET ORAL EVERY 8 HOURS
Status: CANCELLED | OUTPATIENT
Start: 2020-12-12

## 2020-12-11 RX ORDER — INDOCYANINE GREEN AND WATER 25 MG
KIT INJECTION
Status: DISCONTINUED | OUTPATIENT
Start: 2020-12-11 | End: 2020-12-11

## 2020-12-11 RX ORDER — ROCURONIUM BROMIDE 10 MG/ML
INJECTION, SOLUTION INTRAVENOUS
Status: DISCONTINUED | OUTPATIENT
Start: 2020-12-11 | End: 2020-12-11

## 2020-12-11 RX ORDER — SUCCINYLCHOLINE CHLORIDE 20 MG/ML
INJECTION INTRAMUSCULAR; INTRAVENOUS
Status: DISCONTINUED | OUTPATIENT
Start: 2020-12-11 | End: 2020-12-11

## 2020-12-11 RX ORDER — ALBUTEROL SULFATE 0.83 MG/ML
2.5 SOLUTION RESPIRATORY (INHALATION) EVERY 4 HOURS PRN
Status: DISCONTINUED | OUTPATIENT
Start: 2020-12-11 | End: 2020-12-11 | Stop reason: HOSPADM

## 2020-12-11 RX ADMIN — INDOCYANINE GREEN AND WATER 2.5 MG: KIT at 07:12

## 2020-12-11 RX ADMIN — PHENYLEPHRINE HYDROCHLORIDE 100 MCG: 10 INJECTION INTRAVENOUS at 07:12

## 2020-12-11 RX ADMIN — GLYCOPYRROLATE 0.2 MG: 0.2 INJECTION, SOLUTION INTRAMUSCULAR; INTRAVITREAL at 07:12

## 2020-12-11 RX ADMIN — ROCURONIUM BROMIDE 10 MG: 10 INJECTION, SOLUTION INTRAVENOUS at 08:12

## 2020-12-11 RX ADMIN — PROPOFOL 180 MG: 10 INJECTION, EMULSION INTRAVENOUS at 07:12

## 2020-12-11 RX ADMIN — SUCCINYLCHOLINE CHLORIDE 120 MG: 20 INJECTION, SOLUTION INTRAMUSCULAR; INTRAVENOUS at 07:12

## 2020-12-11 RX ADMIN — SODIUM CHLORIDE, SODIUM LACTATE, POTASSIUM CHLORIDE, AND CALCIUM CHLORIDE: 600; 310; 30; 20 INJECTION, SOLUTION INTRAVENOUS at 08:12

## 2020-12-11 RX ADMIN — FENTANYL CITRATE 50 MCG: 50 INJECTION, SOLUTION INTRAMUSCULAR; INTRAVENOUS at 07:12

## 2020-12-11 RX ADMIN — DEXAMETHASONE SODIUM PHOSPHATE 8 MG: 4 INJECTION, SOLUTION INTRA-ARTICULAR; INTRALESIONAL; INTRAMUSCULAR; INTRAVENOUS; SOFT TISSUE at 07:12

## 2020-12-11 RX ADMIN — PHENYLEPHRINE HYDROCHLORIDE 200 MCG: 10 INJECTION INTRAVENOUS at 08:12

## 2020-12-11 RX ADMIN — ONDANSETRON 4 MG: 2 INJECTION, SOLUTION INTRAMUSCULAR; INTRAVENOUS at 10:12

## 2020-12-11 RX ADMIN — PHENYLEPHRINE HYDROCHLORIDE 100 MCG: 10 INJECTION INTRAVENOUS at 08:12

## 2020-12-11 RX ADMIN — FENTANYL CITRATE 25 MCG: 50 INJECTION, SOLUTION INTRAMUSCULAR; INTRAVENOUS at 11:12

## 2020-12-11 RX ADMIN — OXYCODONE HYDROCHLORIDE AND ACETAMINOPHEN 1 TABLET: 5; 325 TABLET ORAL at 01:12

## 2020-12-11 RX ADMIN — SODIUM CHLORIDE, SODIUM LACTATE, POTASSIUM CHLORIDE, AND CALCIUM CHLORIDE: .6; .31; .03; .02 INJECTION, SOLUTION INTRAVENOUS at 06:12

## 2020-12-11 RX ADMIN — CEFAZOLIN SODIUM 2 G: 2 SOLUTION INTRAVENOUS at 07:12

## 2020-12-11 RX ADMIN — Medication 50 MG: at 07:12

## 2020-12-11 RX ADMIN — ACETAMINOPHEN 1000 MG: 500 TABLET ORAL at 06:12

## 2020-12-11 RX ADMIN — KETOROLAC TROMETHAMINE 30 MG: 30 INJECTION, SOLUTION INTRAMUSCULAR; INTRAVENOUS at 10:12

## 2020-12-11 RX ADMIN — NEOSTIGMINE METHYLSULFATE 5 MG: 1 INJECTION INTRAVENOUS at 10:12

## 2020-12-11 RX ADMIN — GLYCOPYRROLATE 0.8 MG: 0.2 INJECTION, SOLUTION INTRAMUSCULAR; INTRAVITREAL at 10:12

## 2020-12-11 RX ADMIN — SODIUM CHLORIDE, SODIUM LACTATE, POTASSIUM CHLORIDE, AND CALCIUM CHLORIDE: 600; 310; 30; 20 INJECTION, SOLUTION INTRAVENOUS at 06:12

## 2020-12-11 RX ADMIN — MIDAZOLAM 2 MG: 1 INJECTION INTRAMUSCULAR; INTRAVENOUS at 07:12

## 2020-12-11 RX ADMIN — FENTANYL CITRATE 25 MCG: 50 INJECTION, SOLUTION INTRAMUSCULAR; INTRAVENOUS at 10:12

## 2020-12-11 RX ADMIN — ROCURONIUM BROMIDE 5 MG: 10 INJECTION, SOLUTION INTRAVENOUS at 07:12

## 2020-12-11 RX ADMIN — INDOCYANINE GREEN: KIT INTRAVENOUS at 07:12

## 2020-12-11 RX ADMIN — FENTANYL CITRATE 50 MCG: 50 INJECTION, SOLUTION INTRAMUSCULAR; INTRAVENOUS at 09:12

## 2020-12-11 RX ADMIN — ROCURONIUM BROMIDE 35 MG: 10 INJECTION, SOLUTION INTRAVENOUS at 07:12

## 2020-12-11 NOTE — PLAN OF CARE
Assumed care of patient from AALIYAH Ye. Patient resting in bed, no complaints of pain at this time, and no signs of distress. VSS. Will continue to monitor.

## 2020-12-11 NOTE — OP NOTE
[facility]  Operative Note      Date of Procedure: 12/11/2020     Procedure: Procedure(s) (LRB):  XI ROBOTIC SALPINGO-OOPHORECTOMY (Left)  XI ROBOTIC CHOLECYSTECTOMY (N/A)  XI ROBOTIC LYSIS, ADHESIONS     Surgeon(s) and Role:  Panel 1:     * SHERLEY Wakefield MD - Primary  Panel 2:     * Taco Zimmer MD - Primary    Assisting Surgeon: None    Pre-Operative Diagnosis: Pelvic mass [R19.00]  Calculus of gallbladder without cholecystitis without obstruction [K80.20]    Post-Operative Diagnosis: Post-Op Diagnosis Codes:     * Pelvic mass [R19.00]     * Calculus of gallbladder without cholecystitis without obstruction [K80.20]    Anesthesia: General    Technical Procedures Used:     Robotic assisted cholecystectomy    Description of the Findings of the Procedure:     The patient was brought into the operating room and placed on the operating table in the supine position.  General endotracheal anesthesia was induced.  IV antibiotics were administered.  Pneumatic compression devices were placed in the lower extremities.  Arms were tucked at the side.  The abdomen was clipped, prepped and draped in the standard manner.   Indocyanine green was administered    A timeout was performed.    A small vertical incision was made just above the umbilicus.  The fascia was identified and elevated with Gus clamps.  A varies needle was inserted and its position confirmed by saline drop test.  Pneumoperitoneum was established to 15 mmHg.  A 8 mm robotic trocar was inserted.  The laparoscope was inserted.  There was no evidence of a vascular or enteric injury.    Additional trochars were placed as follows an 8 mm robotic trocar was placed in the midclavicular line in the left midabdomen.  An 8 mm trocar was placed in the midclavicular line of the right midabdomen.  An 8 mm trocar was placed in the anterior axillary line of the right midabdomen.  The patient was placed in reverse Trendelenburg position and rolled to  the left.  The patient was docked to the da Stevo robot.    The omentum was adherent to the gallbladder this was taken down to expose the fundus.    The fundus of the gallbladder was retracted cephalad.  We then took down additional adhesions.  There was a large stone in the infra tibial in but we were able to retract it laterally either with traction or by grasping it.      The infundibulum was retracted laterally.  The fire fly technology of the robot was activated in the course of the common bile duct was elucidated.    The cystic duct was not visualized and dye could be seen entering the gallbladder    Peritoneum over the neck of the gallbladder was taken down with the hook cautery and the cystic duct was dissected circumferentially.  The cystic artery was then dissected circumferentially and the neck of the gallbladder was then dissected off the gallbladder bed.  This cleared Javon triangle of all loose areolar tissue and the critical view was obtained.    The cystic duct was clipped with 2 clips proximally 1 clip distally and transected.  The cystic artery was controlled with electric cautery and then a proximal Ligaclip.      The robotic scissors was then used to remove the gallbladder from the gallbladder bed.  A posterior cystic artery was clipped proximally and divided with electrocautery distally.    The gallbladder was nearly excised from the gallbladder bed, the gallbladder bed was inspected and hemostasis was ensured using electrocautery.  Removal of the gallbladder was then completed.    The course of the common bile duct was visualized with the fire fly technology.  There was no evidence of a bile leak     The gallbladder was placed in a Endo catch bag and remained in the right upper quadrant.    Due to the fact that there were adhesions of the omentum to the left abdominal wall these needed to be taken down to facilitate the gynecologic procedure.  The omentum was easily taken down initially with  Endo Peg using electrocautery.  We did run into some bleeding which was controlled with electrocautery and clips however we ran into difficulty with some bleeding from the omentum and ultimately used the Harmonic scalpel to control the bleeding and take down the remainder of the adhesions.  Once all the adhesions of the omentum were taken down the omentum was inspected and no bleeding was noted.    Gynecologic procedure was then performed.    The left lateral trocar incision was enlarged.  The fascia was divided the muscles were split.  The peritoneum was opened and the bag with the ovary that was partially extracted.  The ovary was opened and fluid was suctioned out of it and then the ovary was extracted.    The abdomen was re-insufflated and a grasper was placed through the left-sided incision and the gallbladder bag was grafts and then easily extracted through the left-sided incision.    The close the left-sided incisions we identified the peritoneum and closed with 0 Vicryl in a running fashion.  Marcaine was infiltrated all incision sites  The fascia was then closed with 1.  PDS in a running fashion.  Marcaine was infiltrated.  Salima's fascia was closed with 2 0 Vicryl interrupted fashion.  The more superficial fatty tissue was closed with 2 0 Vicryl in a interrupted fashion.  The deep dermis was closed with 3 O Vicryl in a running fashion.  The skin was closed with 4 0 Monocryl in a subcuticular manner.     Steri-Strips and dry sterile dressings were placed      Significant Surgical Tasks Conducted by the Assistant(s), if Applicable:  Dr. Acosta treated completed the gynecological portion of this is in an include assisted with closing of the left-sided abdominal wall incision due to the patient's body mass index    Complications: No    Estimated Blood Loss (EBL):  10 mL  IV fluids approximately 1.5 L  Marcaine 0.25% 30 mL *           Implants: * No implants in log *    Specimens:   Specimen (12h ago, onward)     None                  Condition: Stable    Disposition: PACU - hemodynamically stable.    Attestation: I performed the procedure.    Discharge Note    OUTCOME: Patient tolerated treatment/procedure well without complication and is now ready for discharge.     Robotic assisted cholecystectomy and robotic assisted removal of a left ovarian dermoid    DISPOSITION: Home or Self Care    FINAL DIAGNOSIS:  Complex cyst of left ovary    FOLLOWUP: In clinic    DISCHARGE INSTRUCTIONS:  No discharge procedures on file.

## 2020-12-11 NOTE — DISCHARGE INSTRUCTIONS
Please call for any fever, increase in pain, nausea or vomiting or redness or drainage from incision(s).    No lifting more than 30 pounds for 2 weeks    Remove steri strips as they begin to fall off    If you become constipated from the pain medication you can use over the counter laxatives,  Miralax or Glycolax, or Magnesium Citrate for severe constipation.

## 2020-12-11 NOTE — PLAN OF CARE
Patient states she is unable to remove piercings from bilateral nipples. Patient is informed of risk of burns. Patient verbalized understanding.

## 2020-12-11 NOTE — PLAN OF CARE
Patient met criteria for discharge, Medicines delivered by pharmacy, MD updated on patients progress, MD to round on patient prior to discharge. Pt and spouse updated.

## 2020-12-11 NOTE — PLAN OF CARE
Patient met criteria for discharge, MD rounded on patient, no complaints of pain, discharge instructions reviewed and patient verbalizes understanding. Medicines delivered by pharmacy. Patient dressed and wheeled to car by RN.

## 2020-12-11 NOTE — ANESTHESIA POSTPROCEDURE EVALUATION
Anesthesia Post Evaluation    Patient: Stefanie España    Procedure(s) Performed: Procedure(s) (LRB):  XI ROBOTIC SALPINGO-OOPHORECTOMY (Left)  XI ROBOTIC CHOLECYSTECTOMY (N/A)  XI ROBOTIC LYSIS, ADHESIONS    Final Anesthesia Type: general    Patient location during evaluation: PACU  Patient participation: Yes- Able to Participate  Level of consciousness: awake and alert and oriented  Post-procedure vital signs: reviewed and stable  Pain management: adequate  Airway patency: patent    PONV status at discharge: No PONV  Anesthetic complications: no      Cardiovascular status: blood pressure returned to baseline, stable and hemodynamically stable  Respiratory status: unassisted  Hydration status: euvolemic  Follow-up not needed.          Vitals Value Taken Time   /91 12/11/20 1139   Temp 36.9 °C (98.4 °F) 12/11/20 1035   Pulse 86 12/11/20 1140   Resp 12 12/11/20 1138   SpO2 97 % 12/11/20 1140   Vitals shown include unvalidated device data.      Event Time   Out of Recovery 11:34:00         Pain/Guerda Score: Pain Rating Prior to Med Admin: 8 (12/11/2020 11:29 AM)  Guerda Score: 8 (12/11/2020 11:30 AM)

## 2020-12-11 NOTE — DISCHARGE SUMMARY
[facility]  Discharge Note  Short Stay    Procedure(s) (LRB):  XI ROBOTIC SALPINGO-OOPHORECTOMY (Left)  XI ROBOTIC CHOLECYSTECTOMY (N/A)  XI ROBOTIC LYSIS, ADHESIONS    OUTCOME: Patient tolerated treatment/procedure well without complication and is now ready for discharge.    DISPOSITION: Home or Self Care    FINAL DIAGNOSIS:  Complex cyst of left ovary    FOLLOWUP: In clinic    DISCHARGE INSTRUCTIONS:    Discharge Procedure Orders   Diet Adult Regular     No dressing needed     Notify your health care provider if you experience any of the following:  temperature >100.4     Notify your health care provider if you experience any of the following:  persistent nausea and vomiting or diarrhea     Notify your health care provider if you experience any of the following:  severe uncontrolled pain     Notify your health care provider if you experience any of the following:  redness, tenderness, or signs of infection (pain, swelling, redness, odor or green/yellow discharge around incision site)     Notify your health care provider if you experience any of the following:  difficulty breathing or increased cough     Notify your health care provider if you experience any of the following:  severe persistent headache     Notify your health care provider if you experience any of the following:  worsening rash     Notify your health care provider if you experience any of the following:  persistent dizziness, light-headedness, or visual disturbances     Notify your health care provider if you experience any of the following:  increased confusion or weakness     Activity as tolerated        Clinical Reference Documents Added to Patient Instructions       Document    CHOLECYSTECTOMY (LAPAROSCOPIC), DISCHARGE INSTRUCTIONS (ENGLISH)

## 2020-12-11 NOTE — ANESTHESIA PROCEDURE NOTES
Intubation  Performed by: Sarah Alvarado CRNA  Authorized by: Yaz Calderon MD     Intubation:     Induction:  Intravenous    Intubated:  Postinduction    Mask Ventilation:  Easy mask    Attempts:  1    Attempted By:  CRNA    Method of Intubation:  Direct    Blade:  Henderson 2    Laryngeal View Grade: Grade I - full view of chords      Difficult Airway Encountered?: No      Complications:  None    Airway Device:  Oral endotracheal tube    Airway Device Size:  7.5    Style/Cuff Inflation:  Cuffed (inflated to minimal occlusive pressure)    Inflation Amount (mL):  6    Tube secured:  22    Secured at:  The lips    Placement Verified By:  Capnometry    Complicating Factors:  Anterior larynx and large prominent central incisors (cords visible with cricoid pressure)    Findings Post-Intubation:  BS equal bilateral

## 2020-12-11 NOTE — OP NOTE
[facility]  Surgery Department  Operative Note    SUMMARY     Date of Procedure: 12/11/2020     Procedure: Procedure(s) (LRB):  XI ROBOTIC SALPINGO-OOPHORECTOMY (Left)  XI ROBOTIC CHOLECYSTECTOMY (N/A)  XI ROBOTIC LYSIS, ADHESIONS     Surgeon(s) and Role:  Panel 1:     * SHERLEY Wakefield MD - Primary  Panel 2:     * Taco Zimmer MD - Primary    Assisting Surgeon: Courtney PERES     Pre-Operative Diagnosis: Pelvic mass [R19.00]  Calculus of gallbladder without cholecystitis without obstruction [K80.20]    Post-Operative Diagnosis: Post-Op Diagnosis Codes:     * Pelvic mass [R19.00]     * Calculus of gallbladder without cholecystitis without obstruction [K80.20]      Left Ovarian Teratoma     Anesthesia: General    Technical Procedures Used: Live[employer] robotic system     Description of the Findings of the Procedure:     Findings:        Upper Abdomen:  No organomegaly, masses or significant adhesions      Bowel:  Reny appearance with no significant adhesions      Pelvis:  Uterus:  Normal size and shape                    Right Adnexa  Small simple ovarian cys, 2 cm otherwise normal ovary with ligated fallopian tube                    Left Adnexa   8 cm ovary beneath the uterus and broad ligament with no adhesions, previously ligated fallopian tube                    Cul de sac    Free                     Anterior peritoneum with no endometriosis and no adhesions       Ureters noted to be in normal anatomical location right and left side     Operative details         Zummi manipulator placed prior to start of Cholecystectomy           The Trochar placement and docking done by General surgery for cholecystectomy            Reversal of the docking done to allow for pelvic surgery            Using bipolar graspers and scissors, the IP vessels and Uteroovarian vessels taken down to allow for removal of the left tube and ovary.               Good hemostasis noted           Ovary and tube place into  tissue catchment bag and removed intact through the left upper quadrant trochar  Incision with extension          The ovary was placed intact in the catchment bag and drained in the bag with no spillage into the abdominal cavity          Wound closure by General surgery            Olmos removed, vaginal manipulator removed          Patient awakened and taken to recovery     Significant Surgical Tasks Conducted by the Assistant(s), if Applicable: Bedside assistance     Complications: No    Estimated Blood Loss (EBL): 25cc           Implants: * No implants in log *    Specimens:        Left fallopian tube with left ovary.  Dermoid cyst drained            Condition: Good    Disposition: PACU - hemodynamically stable.    Attestation: I was present and scrubbed for the entire procedure.

## 2020-12-11 NOTE — INTERVAL H&P NOTE
The patient has been examined and the H&P has been reviewed:    I concur with the findings and no changes have occurred since H&P was written.    Surgery risks, benefits and alternative options discussed and understood by patient/family.          Active Hospital Problems    Diagnosis  POA    *Complex cyst of left ovary [N83.292]  Yes    Calculus of gallbladder without cholecystitis without obstruction [K80.20]  Yes      Resolved Hospital Problems   No resolved problems to display.

## 2020-12-11 NOTE — TRANSFER OF CARE
"Anesthesia Transfer of Care Note    Patient: Stefanie España    Procedure(s) Performed: Procedure(s) (LRB):  XI ROBOTIC SALPINGO-OOPHORECTOMY (Left)  XI ROBOTIC CHOLECYSTECTOMY (N/A)  XI ROBOTIC LYSIS, ADHESIONS    Patient location: PACU    Anesthesia Type: general    Transport from OR: Transported from OR on room air with adequate spontaneous ventilation    Post pain: adequate analgesia    Post assessment: no apparent anesthetic complications and tolerated procedure well    Post vital signs: stable    Level of consciousness: awake    Nausea/Vomiting: no nausea/vomiting    Complications: none    Transfer of care protocol was followed      Last vitals:   Visit Vitals  /79 (BP Location: Right arm, Patient Position: Sitting)   Pulse 71   Temp 36.6 °C (97.9 °F) (Temporal)   Resp 18   Ht 5' 8" (1.727 m)   Wt 112.1 kg (247 lb 2.2 oz)   Breastfeeding No   BMI 37.58 kg/m²     "

## 2020-12-15 ENCOUNTER — PATIENT MESSAGE (OUTPATIENT)
Dept: OBSTETRICS AND GYNECOLOGY | Facility: CLINIC | Age: 35
End: 2020-12-15

## 2020-12-23 LAB
FINAL PATHOLOGIC DIAGNOSIS: NORMAL
GROSS: NORMAL
Lab: NORMAL

## 2021-01-01 ENCOUNTER — PATIENT MESSAGE (OUTPATIENT)
Dept: INTERNAL MEDICINE | Facility: CLINIC | Age: 36
End: 2021-01-01

## 2021-01-01 NOTE — PROGRESS NOTES
Subjective:       Patient ID: Stefanie España is a 34 y.o. female.    Chief Complaint:  Ovarian Cyst      History of Present Illness  HPI  Referral to Gyn clinic after visit to ED for mid back pain and discomfort after eating.   CT at time of ED visit shows gallstones, 7 cm left adnexal mass consistent with ovarian Teratoma.   Note that both findings present on past CT from 2017   Back and upper abdominal pain have not improved since the ED visit  No nausea or vomiting.  No fever or chills  Renal scan is normal and urinalysis was normal in the ED.     Regular menses.  Needs pap     Health Maintenance   Topic Date Due    Pneumococcal Vaccine (Highest Risk) (2 of 3 - PPSV23) 2019    TETANUS VACCINE  2027    Hepatitis C Screening  Completed    Lipid Panel  Completed     GYN & OB History  Patient's last menstrual period was 2020 (exact date).   Date of Last Pap: 2020    OB History    Para Term  AB Living   5 4 3 1 1 3   SAB TAB Ectopic Multiple Live Births     1            # Outcome Date GA Lbr Hossein/2nd Weight Sex Delivery Anes PTL Lv   5 Term            4 Term            3 Term            2          FD   1 TAB                Review of Systems  Review of Systems        Objective:   Physical Exam:             Abdominal: Soft. Bowel sounds are normal. She exhibits no distension, no mass and no abdominal incision. There is abdominal tenderness in the right upper quadrant. There is no rebound and no guarding. No hernia. Hernia confirmed negative in the right inguinal area and confirmed negative in the left inguinal area.     Genitourinary:    Vagina, uterus and rectum normal.   There is no rash, tenderness or lesion on the right labia. There is no rash, tenderness or lesion on the left labia. Uterus is not deviated, not enlarged, not tender, not hosting fibroids and not experiencing uterine prolapse. Cervix is normal. Right adnexum displays no mass, no tenderness and no  fullness. Left adnexum displays mass (posterior to the uterus on the left, 7 cm, tender to palpation ) and tenderness. Left adnexum displays no fullness. No tenderness, bleeding, rectocele, cystocele or unspecified prolapse of vaginal walls in the vagina.    No foreign body in the vagina.      No signs of injury in the vagina.   Cervix exhibits no motion tenderness, no discharge and no friability. Additional cervical findings: pap smear donenegative for vaginal discharge                     Assessment:        1. Pelvic mass    2. Pap smear for cervical cancer screening    3. RUQ abdominal pain           Suspect the cholelithiasis as etiology of current pain symptoms.       Plan:            Stefanie was seen today for ovarian cyst.    Diagnoses and all orders for this visit:    Pelvic mass  Comments:  Suspect dermoid cyst   Will need to address surgically.  Could be removed at time of Apryl if needed.   Orders:  -     Ambulatory referral/consult to Obstetrics / Gynecology  -     US Pelvis Complete Non OB; Future  -     ; Future    Pap smear for cervical cancer screening  -     Liquid-Based Pap Smear, Screening  -     HPV High Risk Genotypes, PCR    RUQ abdominal pain  Comments:  Suspect symptomatic cholelithiasis    needs General surgery evaluation   Orders:  -     Ambulatory referral/consult to General Surgery; Future         2343

## 2021-01-06 ENCOUNTER — PATIENT OUTREACH (OUTPATIENT)
Dept: ADMINISTRATIVE | Facility: OTHER | Age: 36
End: 2021-01-06

## 2021-01-06 ENCOUNTER — TELEPHONE (OUTPATIENT)
Dept: RHEUMATOLOGY | Facility: CLINIC | Age: 36
End: 2021-01-06

## 2021-01-07 ENCOUNTER — LAB VISIT (OUTPATIENT)
Dept: LAB | Facility: HOSPITAL | Age: 36
End: 2021-01-07
Attending: INTERNAL MEDICINE
Payer: COMMERCIAL

## 2021-01-07 ENCOUNTER — INFUSION (OUTPATIENT)
Dept: INFUSION THERAPY | Facility: HOSPITAL | Age: 36
End: 2021-01-07
Attending: INTERNAL MEDICINE
Payer: COMMERCIAL

## 2021-01-07 ENCOUNTER — LAB VISIT (OUTPATIENT)
Dept: LAB | Facility: HOSPITAL | Age: 36
End: 2021-01-07
Payer: COMMERCIAL

## 2021-01-07 ENCOUNTER — OFFICE VISIT (OUTPATIENT)
Dept: RHEUMATOLOGY | Facility: CLINIC | Age: 36
End: 2021-01-07
Payer: COMMERCIAL

## 2021-01-07 VITALS
TEMPERATURE: 98 F | SYSTOLIC BLOOD PRESSURE: 117 MMHG | RESPIRATION RATE: 18 BRPM | HEART RATE: 100 BPM | OXYGEN SATURATION: 100 % | DIASTOLIC BLOOD PRESSURE: 70 MMHG

## 2021-01-07 VITALS
HEART RATE: 90 BPM | DIASTOLIC BLOOD PRESSURE: 77 MMHG | HEIGHT: 68 IN | SYSTOLIC BLOOD PRESSURE: 121 MMHG | BODY MASS INDEX: 37.93 KG/M2 | WEIGHT: 250.25 LBS

## 2021-01-07 DIAGNOSIS — M32.19 OTHER SYSTEMIC LUPUS ERYTHEMATOSUS WITH OTHER ORGAN INVOLVEMENT: ICD-10-CM

## 2021-01-07 DIAGNOSIS — M32.9 SYSTEMIC LUPUS ERYTHEMATOSUS ARTHRITIS: Primary | ICD-10-CM

## 2021-01-07 DIAGNOSIS — M35.1 MCTD (MIXED CONNECTIVE TISSUE DISEASE): ICD-10-CM

## 2021-01-07 DIAGNOSIS — R76.8 POSITIVE SM/RNP ANTIBODY: ICD-10-CM

## 2021-01-07 DIAGNOSIS — E66.9 OBESITY, CLASS II, BMI 35-39.9: ICD-10-CM

## 2021-01-07 DIAGNOSIS — Z79.899 LONG-TERM CURRENT USE OF HIGH RISK MEDICATION OTHER THAN ANTICOAGULANT: ICD-10-CM

## 2021-01-07 DIAGNOSIS — M32.9 EXACERBATION OF SYSTEMIC LUPUS: ICD-10-CM

## 2021-01-07 DIAGNOSIS — M32.9 SYSTEMIC LUPUS ERYTHEMATOSUS ARTHRITIS: ICD-10-CM

## 2021-01-07 DIAGNOSIS — D84.9 IMMUNOSUPPRESSED STATUS: ICD-10-CM

## 2021-01-07 DIAGNOSIS — I73.00 SECONDARY RAYNAUD'S PHENOMENON: ICD-10-CM

## 2021-01-07 LAB
ALBUMIN SERPL BCP-MCNC: 3.8 G/DL (ref 3.5–5.2)
ALP SERPL-CCNC: 61 U/L (ref 55–135)
ALT SERPL W/O P-5'-P-CCNC: 19 U/L (ref 10–44)
ANION GAP SERPL CALC-SCNC: 10 MMOL/L (ref 8–16)
AST SERPL-CCNC: 16 U/L (ref 10–40)
BASOPHILS # BLD AUTO: 0.04 K/UL (ref 0–0.2)
BASOPHILS NFR BLD: 0.6 % (ref 0–1.9)
BILIRUB SERPL-MCNC: 0.4 MG/DL (ref 0.1–1)
BILIRUB UR QL STRIP: NEGATIVE
BUN SERPL-MCNC: 10 MG/DL (ref 6–20)
C3 SERPL-MCNC: 174 MG/DL (ref 50–180)
C4 SERPL-MCNC: 46 MG/DL (ref 11–44)
CALCIUM SERPL-MCNC: 9.2 MG/DL (ref 8.7–10.5)
CHLORIDE SERPL-SCNC: 101 MMOL/L (ref 95–110)
CLARITY UR: CLEAR
CO2 SERPL-SCNC: 26 MMOL/L (ref 23–29)
COLOR UR: YELLOW
CREAT SERPL-MCNC: 0.7 MG/DL (ref 0.5–1.4)
CREAT UR-MCNC: 154 MG/DL (ref 15–325)
CRP SERPL-MCNC: 12.6 MG/L (ref 0–8.2)
DIFFERENTIAL METHOD: ABNORMAL
EOSINOPHIL # BLD AUTO: 0.6 K/UL (ref 0–0.5)
EOSINOPHIL NFR BLD: 9.7 % (ref 0–8)
ERYTHROCYTE [DISTWIDTH] IN BLOOD BY AUTOMATED COUNT: 11.2 % (ref 11.5–14.5)
ERYTHROCYTE [SEDIMENTATION RATE] IN BLOOD BY WESTERGREN METHOD: 10 MM/HR (ref 0–36)
EST. GFR  (AFRICAN AMERICAN): >60 ML/MIN/1.73 M^2
EST. GFR  (NON AFRICAN AMERICAN): >60 ML/MIN/1.73 M^2
GLUCOSE SERPL-MCNC: 80 MG/DL (ref 70–110)
GLUCOSE UR QL STRIP: NEGATIVE
HCT VFR BLD AUTO: 37.5 % (ref 37–48.5)
HGB BLD-MCNC: 12.7 G/DL (ref 12–16)
HGB UR QL STRIP: NEGATIVE
IMM GRANULOCYTES # BLD AUTO: 0.01 K/UL (ref 0–0.04)
IMM GRANULOCYTES NFR BLD AUTO: 0.2 % (ref 0–0.5)
KETONES UR QL STRIP: NEGATIVE
LEUKOCYTE ESTERASE UR QL STRIP: NEGATIVE
LYMPHOCYTES # BLD AUTO: 1.1 K/UL (ref 1–4.8)
LYMPHOCYTES NFR BLD: 17.3 % (ref 18–48)
MCH RBC QN AUTO: 31.3 PG (ref 27–31)
MCHC RBC AUTO-ENTMCNC: 33.9 G/DL (ref 32–36)
MCV RBC AUTO: 92 FL (ref 82–98)
MONOCYTES # BLD AUTO: 0.5 K/UL (ref 0.3–1)
MONOCYTES NFR BLD: 8.4 % (ref 4–15)
NEUTROPHILS # BLD AUTO: 4 K/UL (ref 1.8–7.7)
NEUTROPHILS NFR BLD: 63.8 % (ref 38–73)
NITRITE UR QL STRIP: NEGATIVE
NRBC BLD-RTO: 0 /100 WBC
PH UR STRIP: 7 [PH] (ref 5–8)
PLATELET # BLD AUTO: 545 K/UL (ref 150–350)
PMV BLD AUTO: 8.2 FL (ref 9.2–12.9)
POTASSIUM SERPL-SCNC: 4 MMOL/L (ref 3.5–5.1)
PROT SERPL-MCNC: 8 G/DL (ref 6–8.4)
PROT UR QL STRIP: NEGATIVE
PROT UR-MCNC: <7 MG/DL (ref 0–15)
PROT/CREAT UR: NORMAL MG/G{CREAT} (ref 0–0.2)
RBC # BLD AUTO: 4.06 M/UL (ref 4–5.4)
SODIUM SERPL-SCNC: 137 MMOL/L (ref 136–145)
SP GR UR STRIP: 1.02 (ref 1–1.03)
URN SPEC COLLECT METH UR: NORMAL
WBC # BLD AUTO: 6.19 K/UL (ref 3.9–12.7)

## 2021-01-07 PROCEDURE — 86160 COMPLEMENT ANTIGEN: CPT

## 2021-01-07 PROCEDURE — 85025 COMPLETE CBC W/AUTO DIFF WBC: CPT

## 2021-01-07 PROCEDURE — 80053 COMPREHEN METABOLIC PANEL: CPT

## 2021-01-07 PROCEDURE — 99999 PR PBB SHADOW E&M-EST. PATIENT-LVL III: ICD-10-PCS | Mod: PBBFAC,,, | Performed by: INTERNAL MEDICINE

## 2021-01-07 PROCEDURE — 96413 CHEMO IV INFUSION 1 HR: CPT

## 2021-01-07 PROCEDURE — 96365 THER/PROPH/DIAG IV INF INIT: CPT

## 2021-01-07 PROCEDURE — 99999 PR PBB SHADOW E&M-EST. PATIENT-LVL III: CPT | Mod: PBBFAC,,, | Performed by: INTERNAL MEDICINE

## 2021-01-07 PROCEDURE — 25000003 PHARM REV CODE 250: Performed by: INTERNAL MEDICINE

## 2021-01-07 PROCEDURE — 82570 ASSAY OF URINE CREATININE: CPT

## 2021-01-07 PROCEDURE — 36415 COLL VENOUS BLD VENIPUNCTURE: CPT

## 2021-01-07 PROCEDURE — 99215 PR OFFICE/OUTPT VISIT, EST, LEVL V, 40-54 MIN: ICD-10-PCS | Mod: S$GLB,,, | Performed by: INTERNAL MEDICINE

## 2021-01-07 PROCEDURE — 96375 TX/PRO/DX INJ NEW DRUG ADDON: CPT

## 2021-01-07 PROCEDURE — 86225 DNA ANTIBODY NATIVE: CPT

## 2021-01-07 PROCEDURE — 86160 COMPLEMENT ANTIGEN: CPT | Mod: 59

## 2021-01-07 PROCEDURE — 86140 C-REACTIVE PROTEIN: CPT

## 2021-01-07 PROCEDURE — 99215 OFFICE O/P EST HI 40 MIN: CPT | Mod: S$GLB,,, | Performed by: INTERNAL MEDICINE

## 2021-01-07 PROCEDURE — 3008F PR BODY MASS INDEX (BMI) DOCUMENTED: ICD-10-PCS | Mod: CPTII,S$GLB,, | Performed by: INTERNAL MEDICINE

## 2021-01-07 PROCEDURE — 3008F BODY MASS INDEX DOCD: CPT | Mod: CPTII,S$GLB,, | Performed by: INTERNAL MEDICINE

## 2021-01-07 PROCEDURE — 85652 RBC SED RATE AUTOMATED: CPT

## 2021-01-07 PROCEDURE — 81003 URINALYSIS AUTO W/O SCOPE: CPT

## 2021-01-07 PROCEDURE — 63600175 PHARM REV CODE 636 W HCPCS: Mod: JG | Performed by: INTERNAL MEDICINE

## 2021-01-07 RX ORDER — SODIUM CHLORIDE 0.9 % (FLUSH) 0.9 %
10 SYRINGE (ML) INJECTION
Status: CANCELLED | OUTPATIENT
Start: 2021-01-28

## 2021-01-07 RX ORDER — DIPHENHYDRAMINE HYDROCHLORIDE 50 MG/ML
25 INJECTION INTRAMUSCULAR; INTRAVENOUS
Status: CANCELLED
Start: 2021-01-28

## 2021-01-07 RX ORDER — MYCOPHENOLATE MOFETIL 500 MG/1
1000 TABLET ORAL 2 TIMES DAILY
Qty: 360 TABLET | Refills: 1 | Status: SHIPPED | OUTPATIENT
Start: 2021-01-07 | End: 2021-10-14

## 2021-01-07 RX ORDER — SODIUM CHLORIDE 0.9 % (FLUSH) 0.9 %
10 SYRINGE (ML) INJECTION
Status: DISCONTINUED | OUTPATIENT
Start: 2021-01-07 | End: 2021-01-07 | Stop reason: HOSPADM

## 2021-01-07 RX ORDER — ACETAMINOPHEN 325 MG/1
650 TABLET ORAL
Status: CANCELLED | OUTPATIENT
Start: 2021-01-28

## 2021-01-07 RX ORDER — HYDROXYCHLOROQUINE SULFATE 200 MG/1
400 TABLET, FILM COATED ORAL DAILY
Qty: 60 TABLET | Refills: 11 | Status: SHIPPED | OUTPATIENT
Start: 2021-01-07 | End: 2021-05-10 | Stop reason: SDUPTHER

## 2021-01-07 RX ORDER — HEPARIN 100 UNIT/ML
500 SYRINGE INTRAVENOUS
Status: CANCELLED | OUTPATIENT
Start: 2021-01-28

## 2021-01-07 RX ORDER — METHYLPREDNISOLONE SOD SUCC 125 MG
40 VIAL (EA) INJECTION
Status: COMPLETED | OUTPATIENT
Start: 2021-01-07 | End: 2021-01-07

## 2021-01-07 RX ADMIN — SODIUM CHLORIDE: 9 INJECTION, SOLUTION INTRAVENOUS at 10:01

## 2021-01-07 RX ADMIN — BELIMUMAB 1135 MG: 400 INJECTION, POWDER, LYOPHILIZED, FOR SOLUTION INTRAVENOUS at 11:01

## 2021-01-07 RX ADMIN — METHYLPREDNISOLONE SODIUM SUCCINATE 40 MG: 125 INJECTION, POWDER, FOR SOLUTION INTRAMUSCULAR; INTRAVENOUS at 10:01

## 2021-01-08 LAB — DSDNA AB SER-ACNC: NORMAL [IU]/ML

## 2021-01-14 LAB
FINAL PATHOLOGIC DIAGNOSIS: NORMAL
Lab: NORMAL

## 2021-01-15 ENCOUNTER — PATIENT MESSAGE (OUTPATIENT)
Dept: INTERNAL MEDICINE | Facility: CLINIC | Age: 36
End: 2021-01-15

## 2021-01-18 ENCOUNTER — PATIENT MESSAGE (OUTPATIENT)
Dept: INTERNAL MEDICINE | Facility: CLINIC | Age: 36
End: 2021-01-18

## 2021-01-21 ENCOUNTER — OFFICE VISIT (OUTPATIENT)
Dept: SURGERY | Facility: CLINIC | Age: 36
End: 2021-01-21
Payer: COMMERCIAL

## 2021-01-21 VITALS
BODY MASS INDEX: 37.89 KG/M2 | HEART RATE: 91 BPM | WEIGHT: 250 LBS | DIASTOLIC BLOOD PRESSURE: 77 MMHG | SYSTOLIC BLOOD PRESSURE: 126 MMHG | HEIGHT: 68 IN | TEMPERATURE: 98 F

## 2021-01-21 DIAGNOSIS — Z90.49 STATUS POST CHOLECYSTECTOMY: Primary | ICD-10-CM

## 2021-01-21 PROCEDURE — 3008F BODY MASS INDEX DOCD: CPT | Mod: CPTII,S$GLB,, | Performed by: SURGERY

## 2021-01-21 PROCEDURE — 99999 PR PBB SHADOW E&M-EST. PATIENT-LVL III: ICD-10-PCS | Mod: PBBFAC,,, | Performed by: SURGERY

## 2021-01-21 PROCEDURE — 1126F PR PAIN SEVERITY QUANTIFIED, NO PAIN PRESENT: ICD-10-PCS | Mod: S$GLB,,, | Performed by: SURGERY

## 2021-01-21 PROCEDURE — 99024 PR POST-OP FOLLOW-UP VISIT: ICD-10-PCS | Mod: S$GLB,,, | Performed by: SURGERY

## 2021-01-21 PROCEDURE — 1126F AMNT PAIN NOTED NONE PRSNT: CPT | Mod: S$GLB,,, | Performed by: SURGERY

## 2021-01-21 PROCEDURE — 99999 PR PBB SHADOW E&M-EST. PATIENT-LVL III: CPT | Mod: PBBFAC,,, | Performed by: SURGERY

## 2021-01-21 PROCEDURE — 3008F PR BODY MASS INDEX (BMI) DOCUMENTED: ICD-10-PCS | Mod: CPTII,S$GLB,, | Performed by: SURGERY

## 2021-01-21 PROCEDURE — 99024 POSTOP FOLLOW-UP VISIT: CPT | Mod: S$GLB,,, | Performed by: SURGERY

## 2021-01-28 ENCOUNTER — OFFICE VISIT (OUTPATIENT)
Dept: DERMATOLOGY | Facility: CLINIC | Age: 36
End: 2021-01-28
Payer: COMMERCIAL

## 2021-01-28 ENCOUNTER — OFFICE VISIT (OUTPATIENT)
Dept: INTERNAL MEDICINE | Facility: CLINIC | Age: 36
End: 2021-01-28
Payer: COMMERCIAL

## 2021-01-28 DIAGNOSIS — L30.9 DERMATITIS: Primary | ICD-10-CM

## 2021-01-28 DIAGNOSIS — F51.01 PRIMARY INSOMNIA: Primary | ICD-10-CM

## 2021-01-28 PROCEDURE — 99213 OFFICE O/P EST LOW 20 MIN: CPT | Mod: 95,,, | Performed by: FAMILY MEDICINE

## 2021-01-28 PROCEDURE — 99213 OFFICE O/P EST LOW 20 MIN: CPT | Mod: 95,,, | Performed by: STUDENT IN AN ORGANIZED HEALTH CARE EDUCATION/TRAINING PROGRAM

## 2021-01-28 PROCEDURE — 99213 PR OFFICE/OUTPT VISIT, EST, LEVL III, 20-29 MIN: ICD-10-PCS | Mod: 95,,, | Performed by: STUDENT IN AN ORGANIZED HEALTH CARE EDUCATION/TRAINING PROGRAM

## 2021-01-28 PROCEDURE — 99213 PR OFFICE/OUTPT VISIT, EST, LEVL III, 20-29 MIN: ICD-10-PCS | Mod: 95,,, | Performed by: FAMILY MEDICINE

## 2021-01-28 RX ORDER — MOMETASONE FUROATE 1 MG/G
CREAM TOPICAL DAILY
Qty: 50 G | Refills: 1 | Status: SHIPPED | OUTPATIENT
Start: 2021-01-28

## 2021-01-28 RX ORDER — TRAZODONE HYDROCHLORIDE 50 MG/1
50 TABLET ORAL NIGHTLY
Qty: 30 TABLET | Refills: 11 | Status: SHIPPED | OUTPATIENT
Start: 2021-01-28 | End: 2021-03-05 | Stop reason: SDUPTHER

## 2021-02-05 ENCOUNTER — INFUSION (OUTPATIENT)
Dept: INFUSION THERAPY | Facility: HOSPITAL | Age: 36
End: 2021-02-05
Attending: FAMILY MEDICINE
Payer: COMMERCIAL

## 2021-02-05 VITALS
BODY MASS INDEX: 38.68 KG/M2 | OXYGEN SATURATION: 96 % | HEART RATE: 96 BPM | TEMPERATURE: 98 F | WEIGHT: 254.44 LBS | DIASTOLIC BLOOD PRESSURE: 65 MMHG | SYSTOLIC BLOOD PRESSURE: 122 MMHG

## 2021-02-05 DIAGNOSIS — R76.8 POSITIVE SM/RNP ANTIBODY: ICD-10-CM

## 2021-02-05 DIAGNOSIS — M32.9 EXACERBATION OF SYSTEMIC LUPUS: ICD-10-CM

## 2021-02-05 DIAGNOSIS — M32.9 SYSTEMIC LUPUS ERYTHEMATOSUS ARTHRITIS: Primary | ICD-10-CM

## 2021-02-05 DIAGNOSIS — M35.1 MCTD (MIXED CONNECTIVE TISSUE DISEASE): ICD-10-CM

## 2021-02-05 PROCEDURE — 96413 CHEMO IV INFUSION 1 HR: CPT

## 2021-02-05 PROCEDURE — A4216 STERILE WATER/SALINE, 10 ML: HCPCS | Performed by: INTERNAL MEDICINE

## 2021-02-05 PROCEDURE — 96375 TX/PRO/DX INJ NEW DRUG ADDON: CPT

## 2021-02-05 PROCEDURE — 63600175 PHARM REV CODE 636 W HCPCS: Mod: JG | Performed by: INTERNAL MEDICINE

## 2021-02-05 PROCEDURE — 25000003 PHARM REV CODE 250: Performed by: INTERNAL MEDICINE

## 2021-02-05 RX ORDER — ACETAMINOPHEN 325 MG/1
650 TABLET ORAL
Status: CANCELLED | OUTPATIENT
Start: 2021-02-26

## 2021-02-05 RX ORDER — METHYLPREDNISOLONE SOD SUCC 125 MG
40 VIAL (EA) INJECTION
Status: COMPLETED | OUTPATIENT
Start: 2021-02-05 | End: 2021-02-05

## 2021-02-05 RX ORDER — DIPHENHYDRAMINE HYDROCHLORIDE 50 MG/ML
25 INJECTION INTRAMUSCULAR; INTRAVENOUS
Status: CANCELLED
Start: 2021-02-26 | End: 2021-02-26

## 2021-02-05 RX ORDER — HEPARIN 100 UNIT/ML
500 SYRINGE INTRAVENOUS
Status: CANCELLED | OUTPATIENT
Start: 2021-02-26

## 2021-02-05 RX ORDER — SODIUM CHLORIDE 0.9 % (FLUSH) 0.9 %
10 SYRINGE (ML) INJECTION
Status: CANCELLED | OUTPATIENT
Start: 2021-02-26

## 2021-02-05 RX ORDER — SODIUM CHLORIDE 0.9 % (FLUSH) 0.9 %
10 SYRINGE (ML) INJECTION
Status: DISCONTINUED | OUTPATIENT
Start: 2021-02-05 | End: 2021-02-05 | Stop reason: HOSPADM

## 2021-02-05 RX ADMIN — Medication 10 ML: at 09:02

## 2021-02-05 RX ADMIN — METHYLPREDNISOLONE SODIUM SUCCINATE 40 MG: 125 INJECTION, POWDER, FOR SOLUTION INTRAMUSCULAR; INTRAVENOUS at 09:02

## 2021-02-05 RX ADMIN — BELIMUMAB 1154 MG: 400 INJECTION, POWDER, LYOPHILIZED, FOR SOLUTION INTRAVENOUS at 09:02

## 2021-03-04 ENCOUNTER — PATIENT MESSAGE (OUTPATIENT)
Dept: INTERNAL MEDICINE | Facility: CLINIC | Age: 36
End: 2021-03-04

## 2021-03-04 ENCOUNTER — INFUSION (OUTPATIENT)
Dept: INFUSION THERAPY | Facility: HOSPITAL | Age: 36
End: 2021-03-04
Attending: INTERNAL MEDICINE
Payer: COMMERCIAL

## 2021-03-04 VITALS
TEMPERATURE: 98 F | DIASTOLIC BLOOD PRESSURE: 72 MMHG | RESPIRATION RATE: 16 BRPM | WEIGHT: 258.38 LBS | OXYGEN SATURATION: 93 % | HEART RATE: 80 BPM | BODY MASS INDEX: 39.29 KG/M2 | SYSTOLIC BLOOD PRESSURE: 109 MMHG

## 2021-03-04 DIAGNOSIS — M35.1 MCTD (MIXED CONNECTIVE TISSUE DISEASE): ICD-10-CM

## 2021-03-04 DIAGNOSIS — M32.9 EXACERBATION OF SYSTEMIC LUPUS: ICD-10-CM

## 2021-03-04 DIAGNOSIS — M32.9 SYSTEMIC LUPUS ERYTHEMATOSUS ARTHRITIS: Primary | ICD-10-CM

## 2021-03-04 DIAGNOSIS — R76.8 POSITIVE SM/RNP ANTIBODY: ICD-10-CM

## 2021-03-04 PROCEDURE — 25000003 PHARM REV CODE 250: Performed by: INTERNAL MEDICINE

## 2021-03-04 PROCEDURE — 96375 TX/PRO/DX INJ NEW DRUG ADDON: CPT

## 2021-03-04 PROCEDURE — 96413 CHEMO IV INFUSION 1 HR: CPT

## 2021-03-04 PROCEDURE — 63600175 PHARM REV CODE 636 W HCPCS: Performed by: INTERNAL MEDICINE

## 2021-03-04 PROCEDURE — A4216 STERILE WATER/SALINE, 10 ML: HCPCS | Performed by: INTERNAL MEDICINE

## 2021-03-04 RX ORDER — HEPARIN 100 UNIT/ML
500 SYRINGE INTRAVENOUS
Status: CANCELLED | OUTPATIENT
Start: 2021-03-25

## 2021-03-04 RX ORDER — DIPHENHYDRAMINE HYDROCHLORIDE 50 MG/ML
25 INJECTION INTRAMUSCULAR; INTRAVENOUS
Status: CANCELLED
Start: 2021-03-25 | End: 2021-03-25

## 2021-03-04 RX ORDER — SODIUM CHLORIDE 0.9 % (FLUSH) 0.9 %
10 SYRINGE (ML) INJECTION
Status: CANCELLED | OUTPATIENT
Start: 2021-03-25

## 2021-03-04 RX ORDER — ACETAMINOPHEN 325 MG/1
650 TABLET ORAL
Status: CANCELLED | OUTPATIENT
Start: 2021-03-25

## 2021-03-04 RX ORDER — METHYLPREDNISOLONE SOD SUCC 125 MG
40 VIAL (EA) INJECTION
Status: COMPLETED | OUTPATIENT
Start: 2021-03-04 | End: 2021-03-04

## 2021-03-04 RX ORDER — SODIUM CHLORIDE 0.9 % (FLUSH) 0.9 %
10 SYRINGE (ML) INJECTION
Status: DISCONTINUED | OUTPATIENT
Start: 2021-03-04 | End: 2021-03-04 | Stop reason: HOSPADM

## 2021-03-04 RX ADMIN — Medication 10 ML: at 10:03

## 2021-03-04 RX ADMIN — BELIMUMAB 1172 MG: 400 INJECTION, POWDER, LYOPHILIZED, FOR SOLUTION INTRAVENOUS at 10:03

## 2021-03-04 RX ADMIN — METHYLPREDNISOLONE SODIUM SUCCINATE 40 MG: 125 INJECTION, POWDER, FOR SOLUTION INTRAMUSCULAR; INTRAVENOUS at 10:03

## 2021-03-05 ENCOUNTER — PATIENT MESSAGE (OUTPATIENT)
Dept: INTERNAL MEDICINE | Facility: CLINIC | Age: 36
End: 2021-03-05

## 2021-03-09 ENCOUNTER — PATIENT MESSAGE (OUTPATIENT)
Dept: INTERNAL MEDICINE | Facility: CLINIC | Age: 36
End: 2021-03-09

## 2021-03-30 ENCOUNTER — PATIENT OUTREACH (OUTPATIENT)
Dept: ADMINISTRATIVE | Facility: OTHER | Age: 36
End: 2021-03-30

## 2021-03-31 ENCOUNTER — TELEPHONE (OUTPATIENT)
Dept: RHEUMATOLOGY | Facility: CLINIC | Age: 36
End: 2021-03-31

## 2021-04-01 ENCOUNTER — INFUSION (OUTPATIENT)
Dept: INFUSION THERAPY | Facility: HOSPITAL | Age: 36
End: 2021-04-01
Attending: INTERNAL MEDICINE
Payer: COMMERCIAL

## 2021-04-01 ENCOUNTER — LAB VISIT (OUTPATIENT)
Dept: LAB | Facility: HOSPITAL | Age: 36
End: 2021-04-01
Attending: INTERNAL MEDICINE
Payer: COMMERCIAL

## 2021-04-01 ENCOUNTER — OFFICE VISIT (OUTPATIENT)
Dept: RHEUMATOLOGY | Facility: CLINIC | Age: 36
End: 2021-04-01
Payer: COMMERCIAL

## 2021-04-01 VITALS
HEART RATE: 74 BPM | RESPIRATION RATE: 16 BRPM | SYSTOLIC BLOOD PRESSURE: 105 MMHG | DIASTOLIC BLOOD PRESSURE: 73 MMHG | TEMPERATURE: 98 F

## 2021-04-01 VITALS
SYSTOLIC BLOOD PRESSURE: 106 MMHG | HEIGHT: 66 IN | DIASTOLIC BLOOD PRESSURE: 74 MMHG | HEART RATE: 77 BPM | BODY MASS INDEX: 40.92 KG/M2 | WEIGHT: 254.63 LBS

## 2021-04-01 DIAGNOSIS — M35.1 MCTD (MIXED CONNECTIVE TISSUE DISEASE): ICD-10-CM

## 2021-04-01 DIAGNOSIS — Z79.899 LONG-TERM CURRENT USE OF HIGH RISK MEDICATION OTHER THAN ANTICOAGULANT: ICD-10-CM

## 2021-04-01 DIAGNOSIS — Z51.81 ENCOUNTER FOR MEDICATION MONITORING: ICD-10-CM

## 2021-04-01 DIAGNOSIS — M79.7 FIBROMYALGIA: ICD-10-CM

## 2021-04-01 DIAGNOSIS — D84.9 IMMUNOSUPPRESSED STATUS: ICD-10-CM

## 2021-04-01 DIAGNOSIS — M32.9 SYSTEMIC LUPUS ERYTHEMATOSUS ARTHRITIS: Primary | ICD-10-CM

## 2021-04-01 DIAGNOSIS — M32.19 OTHER SYSTEMIC LUPUS ERYTHEMATOSUS WITH OTHER ORGAN INVOLVEMENT: ICD-10-CM

## 2021-04-01 DIAGNOSIS — M32.9 EXACERBATION OF SYSTEMIC LUPUS: ICD-10-CM

## 2021-04-01 DIAGNOSIS — M32.9 SYSTEMIC LUPUS ERYTHEMATOSUS ARTHRITIS: ICD-10-CM

## 2021-04-01 DIAGNOSIS — I73.00 SECONDARY RAYNAUD'S PHENOMENON: ICD-10-CM

## 2021-04-01 DIAGNOSIS — R76.8 POSITIVE SM/RNP ANTIBODY: ICD-10-CM

## 2021-04-01 LAB
ALBUMIN SERPL BCP-MCNC: 3.8 G/DL (ref 3.5–5.2)
ALP SERPL-CCNC: 57 U/L (ref 55–135)
ALT SERPL W/O P-5'-P-CCNC: 22 U/L (ref 10–44)
ANION GAP SERPL CALC-SCNC: 10 MMOL/L (ref 8–16)
AST SERPL-CCNC: 19 U/L (ref 10–40)
BASOPHILS # BLD AUTO: 0.04 K/UL (ref 0–0.2)
BASOPHILS NFR BLD: 0.9 % (ref 0–1.9)
BILIRUB SERPL-MCNC: 0.4 MG/DL (ref 0.1–1)
BILIRUB UR QL STRIP: NEGATIVE
BUN SERPL-MCNC: 8 MG/DL (ref 6–20)
C3 SERPL-MCNC: 156 MG/DL (ref 50–180)
C4 SERPL-MCNC: 42 MG/DL (ref 11–44)
CALCIUM SERPL-MCNC: 9.5 MG/DL (ref 8.7–10.5)
CHLORIDE SERPL-SCNC: 102 MMOL/L (ref 95–110)
CK SERPL-CCNC: 149 U/L (ref 20–180)
CLARITY UR: CLEAR
CO2 SERPL-SCNC: 28 MMOL/L (ref 23–29)
COLOR UR: YELLOW
CREAT SERPL-MCNC: 0.7 MG/DL (ref 0.5–1.4)
CRP SERPL-MCNC: 11.8 MG/L (ref 0–8.2)
DIFFERENTIAL METHOD: ABNORMAL
EOSINOPHIL # BLD AUTO: 0.4 K/UL (ref 0–0.5)
EOSINOPHIL NFR BLD: 8.9 % (ref 0–8)
ERYTHROCYTE [DISTWIDTH] IN BLOOD BY AUTOMATED COUNT: 11.2 % (ref 11.5–14.5)
ERYTHROCYTE [SEDIMENTATION RATE] IN BLOOD BY WESTERGREN METHOD: 32 MM/HR (ref 0–20)
EST. GFR  (AFRICAN AMERICAN): >60 ML/MIN/1.73 M^2
EST. GFR  (NON AFRICAN AMERICAN): >60 ML/MIN/1.73 M^2
GLUCOSE SERPL-MCNC: 82 MG/DL (ref 70–110)
GLUCOSE UR QL STRIP: NEGATIVE
HCT VFR BLD AUTO: 38.9 % (ref 37–48.5)
HGB BLD-MCNC: 12.8 G/DL (ref 12–16)
HGB UR QL STRIP: NEGATIVE
IMM GRANULOCYTES # BLD AUTO: 0 K/UL (ref 0–0.04)
IMM GRANULOCYTES NFR BLD AUTO: 0 % (ref 0–0.5)
KETONES UR QL STRIP: NEGATIVE
LEUKOCYTE ESTERASE UR QL STRIP: NEGATIVE
LYMPHOCYTES # BLD AUTO: 0.7 K/UL (ref 1–4.8)
LYMPHOCYTES NFR BLD: 14.1 % (ref 18–48)
MCH RBC QN AUTO: 30.3 PG (ref 27–31)
MCHC RBC AUTO-ENTMCNC: 32.9 G/DL (ref 32–36)
MCV RBC AUTO: 92 FL (ref 82–98)
MONOCYTES # BLD AUTO: 0.5 K/UL (ref 0.3–1)
MONOCYTES NFR BLD: 10 % (ref 4–15)
NEUTROPHILS # BLD AUTO: 3.1 K/UL (ref 1.8–7.7)
NEUTROPHILS NFR BLD: 66.1 % (ref 38–73)
NITRITE UR QL STRIP: NEGATIVE
NRBC BLD-RTO: 0 /100 WBC
PH UR STRIP: 6 [PH] (ref 5–8)
PLATELET # BLD AUTO: 548 K/UL (ref 150–450)
PMV BLD AUTO: 8.4 FL (ref 9.2–12.9)
POTASSIUM SERPL-SCNC: 4.1 MMOL/L (ref 3.5–5.1)
PROT SERPL-MCNC: 7.8 G/DL (ref 6–8.4)
PROT UR QL STRIP: NEGATIVE
RBC # BLD AUTO: 4.22 M/UL (ref 4–5.4)
SODIUM SERPL-SCNC: 140 MMOL/L (ref 136–145)
SP GR UR STRIP: 1.02 (ref 1–1.03)
URN SPEC COLLECT METH UR: NORMAL
WBC # BLD AUTO: 4.62 K/UL (ref 3.9–12.7)

## 2021-04-01 PROCEDURE — 3008F PR BODY MASS INDEX (BMI) DOCUMENTED: ICD-10-PCS | Mod: CPTII,S$GLB,, | Performed by: INTERNAL MEDICINE

## 2021-04-01 PROCEDURE — 25000003 PHARM REV CODE 250: Performed by: INTERNAL MEDICINE

## 2021-04-01 PROCEDURE — 86160 COMPLEMENT ANTIGEN: CPT | Performed by: INTERNAL MEDICINE

## 2021-04-01 PROCEDURE — 99215 PR OFFICE/OUTPT VISIT, EST, LEVL V, 40-54 MIN: ICD-10-PCS | Mod: S$GLB,,, | Performed by: INTERNAL MEDICINE

## 2021-04-01 PROCEDURE — 86140 C-REACTIVE PROTEIN: CPT | Performed by: INTERNAL MEDICINE

## 2021-04-01 PROCEDURE — 96413 CHEMO IV INFUSION 1 HR: CPT

## 2021-04-01 PROCEDURE — 1125F AMNT PAIN NOTED PAIN PRSNT: CPT | Mod: S$GLB,,, | Performed by: INTERNAL MEDICINE

## 2021-04-01 PROCEDURE — 85651 RBC SED RATE NONAUTOMATED: CPT | Performed by: INTERNAL MEDICINE

## 2021-04-01 PROCEDURE — 81003 URINALYSIS AUTO W/O SCOPE: CPT | Performed by: INTERNAL MEDICINE

## 2021-04-01 PROCEDURE — 99215 OFFICE O/P EST HI 40 MIN: CPT | Mod: S$GLB,,, | Performed by: INTERNAL MEDICINE

## 2021-04-01 PROCEDURE — 63600175 PHARM REV CODE 636 W HCPCS: Performed by: INTERNAL MEDICINE

## 2021-04-01 PROCEDURE — 82550 ASSAY OF CK (CPK): CPT | Performed by: INTERNAL MEDICINE

## 2021-04-01 PROCEDURE — 96375 TX/PRO/DX INJ NEW DRUG ADDON: CPT

## 2021-04-01 PROCEDURE — 86160 COMPLEMENT ANTIGEN: CPT | Mod: 59 | Performed by: INTERNAL MEDICINE

## 2021-04-01 PROCEDURE — 36415 COLL VENOUS BLD VENIPUNCTURE: CPT | Performed by: INTERNAL MEDICINE

## 2021-04-01 PROCEDURE — 3008F BODY MASS INDEX DOCD: CPT | Mod: CPTII,S$GLB,, | Performed by: INTERNAL MEDICINE

## 2021-04-01 PROCEDURE — 82570 ASSAY OF URINE CREATININE: CPT | Performed by: INTERNAL MEDICINE

## 2021-04-01 PROCEDURE — 86225 DNA ANTIBODY NATIVE: CPT | Performed by: INTERNAL MEDICINE

## 2021-04-01 PROCEDURE — 99999 PR PBB SHADOW E&M-EST. PATIENT-LVL III: CPT | Mod: PBBFAC,,, | Performed by: INTERNAL MEDICINE

## 2021-04-01 PROCEDURE — 85025 COMPLETE CBC W/AUTO DIFF WBC: CPT | Performed by: INTERNAL MEDICINE

## 2021-04-01 PROCEDURE — 80053 COMPREHEN METABOLIC PANEL: CPT | Performed by: INTERNAL MEDICINE

## 2021-04-01 PROCEDURE — 1125F PR PAIN SEVERITY QUANTIFIED, PAIN PRESENT: ICD-10-PCS | Mod: S$GLB,,, | Performed by: INTERNAL MEDICINE

## 2021-04-01 PROCEDURE — 99999 PR PBB SHADOW E&M-EST. PATIENT-LVL III: ICD-10-PCS | Mod: PBBFAC,,, | Performed by: INTERNAL MEDICINE

## 2021-04-01 RX ORDER — BELIMUMAB 200 MG/ML
200 SOLUTION SUBCUTANEOUS
Qty: 4 SYRINGE | Refills: 11 | Status: SHIPPED | OUTPATIENT
Start: 2021-04-01 | End: 2021-04-26 | Stop reason: SDUPTHER

## 2021-04-01 RX ORDER — DIPHENHYDRAMINE HYDROCHLORIDE 50 MG/ML
25 INJECTION INTRAMUSCULAR; INTRAVENOUS
Status: CANCELLED
Start: 2021-04-22 | End: 2021-04-22

## 2021-04-01 RX ORDER — SODIUM CHLORIDE 0.9 % (FLUSH) 0.9 %
10 SYRINGE (ML) INJECTION
Status: CANCELLED | OUTPATIENT
Start: 2021-04-22

## 2021-04-01 RX ORDER — SODIUM CHLORIDE 0.9 % (FLUSH) 0.9 %
10 SYRINGE (ML) INJECTION
Status: DISCONTINUED | OUTPATIENT
Start: 2021-04-01 | End: 2021-04-01 | Stop reason: HOSPADM

## 2021-04-01 RX ORDER — HEPARIN 100 UNIT/ML
500 SYRINGE INTRAVENOUS
Status: CANCELLED | OUTPATIENT
Start: 2021-04-22

## 2021-04-01 RX ORDER — METHYLPREDNISOLONE SOD SUCC 125 MG
40 VIAL (EA) INJECTION
Status: COMPLETED | OUTPATIENT
Start: 2021-04-01 | End: 2021-04-01

## 2021-04-01 RX ORDER — ACETAMINOPHEN 325 MG/1
650 TABLET ORAL
Status: CANCELLED | OUTPATIENT
Start: 2021-04-22

## 2021-04-01 RX ADMIN — METHYLPREDNISOLONE SODIUM SUCCINATE 40 MG: 125 INJECTION, POWDER, FOR SOLUTION INTRAMUSCULAR; INTRAVENOUS at 11:04

## 2021-04-01 RX ADMIN — BELIMUMAB 1155 MG: 400 INJECTION, POWDER, LYOPHILIZED, FOR SOLUTION INTRAVENOUS at 11:04

## 2021-04-02 LAB
CREAT UR-MCNC: 113 MG/DL (ref 15–325)
PROT UR-MCNC: <7 MG/DL (ref 0–15)
PROT/CREAT UR: NORMAL MG/G{CREAT} (ref 0–0.2)

## 2021-04-05 ENCOUNTER — TELEPHONE (OUTPATIENT)
Dept: RHEUMATOLOGY | Facility: CLINIC | Age: 36
End: 2021-04-05

## 2021-04-05 LAB — DSDNA AB SER-ACNC: NORMAL [IU]/ML

## 2021-04-23 ENCOUNTER — TELEPHONE (OUTPATIENT)
Dept: PHARMACY | Facility: CLINIC | Age: 36
End: 2021-04-23

## 2021-04-26 DIAGNOSIS — M32.9 SYSTEMIC LUPUS ERYTHEMATOSUS ARTHRITIS: ICD-10-CM

## 2021-04-26 RX ORDER — BELIMUMAB 200 MG/ML
200 SOLUTION SUBCUTANEOUS
Qty: 4 ML | Refills: 11 | Status: SHIPPED | OUTPATIENT
Start: 2021-04-26 | End: 2021-04-28 | Stop reason: SDUPTHER

## 2021-04-27 ENCOUNTER — PATIENT MESSAGE (OUTPATIENT)
Dept: INTERNAL MEDICINE | Facility: CLINIC | Age: 36
End: 2021-04-27

## 2021-04-28 DIAGNOSIS — M32.9 SYSTEMIC LUPUS ERYTHEMATOSUS ARTHRITIS: ICD-10-CM

## 2021-04-28 RX ORDER — BELIMUMAB 200 MG/ML
200 SOLUTION SUBCUTANEOUS
Qty: 4 ML | Refills: 11 | Status: SHIPPED | OUTPATIENT
Start: 2021-04-28 | End: 2021-04-30

## 2021-04-29 ENCOUNTER — TELEPHONE (OUTPATIENT)
Dept: PHARMACY | Facility: CLINIC | Age: 36
End: 2021-04-29

## 2021-04-29 ENCOUNTER — INFUSION (OUTPATIENT)
Dept: INFUSION THERAPY | Facility: HOSPITAL | Age: 36
End: 2021-04-29
Attending: INTERNAL MEDICINE
Payer: COMMERCIAL

## 2021-04-29 VITALS
SYSTOLIC BLOOD PRESSURE: 122 MMHG | BODY MASS INDEX: 40.99 KG/M2 | TEMPERATURE: 98 F | WEIGHT: 254 LBS | RESPIRATION RATE: 16 BRPM | HEART RATE: 76 BPM | DIASTOLIC BLOOD PRESSURE: 77 MMHG | OXYGEN SATURATION: 99 %

## 2021-04-29 DIAGNOSIS — R76.8 POSITIVE SM/RNP ANTIBODY: ICD-10-CM

## 2021-04-29 DIAGNOSIS — M32.9 EXACERBATION OF SYSTEMIC LUPUS: ICD-10-CM

## 2021-04-29 DIAGNOSIS — M35.1 MCTD (MIXED CONNECTIVE TISSUE DISEASE): ICD-10-CM

## 2021-04-29 DIAGNOSIS — M32.9 SYSTEMIC LUPUS ERYTHEMATOSUS ARTHRITIS: Primary | ICD-10-CM

## 2021-04-29 PROCEDURE — 96375 TX/PRO/DX INJ NEW DRUG ADDON: CPT

## 2021-04-29 PROCEDURE — 96413 CHEMO IV INFUSION 1 HR: CPT

## 2021-04-29 PROCEDURE — 25000003 PHARM REV CODE 250: Performed by: INTERNAL MEDICINE

## 2021-04-29 PROCEDURE — 63600175 PHARM REV CODE 636 W HCPCS: Mod: JW,JG | Performed by: INTERNAL MEDICINE

## 2021-04-29 RX ORDER — ACETAMINOPHEN 325 MG/1
650 TABLET ORAL
Status: CANCELLED | OUTPATIENT
Start: 2021-05-20

## 2021-04-29 RX ORDER — HEPARIN 100 UNIT/ML
500 SYRINGE INTRAVENOUS
Status: CANCELLED | OUTPATIENT
Start: 2021-05-20

## 2021-04-29 RX ORDER — DIPHENHYDRAMINE HYDROCHLORIDE 50 MG/ML
25 INJECTION INTRAMUSCULAR; INTRAVENOUS
Status: CANCELLED
Start: 2021-05-20 | End: 2021-05-20

## 2021-04-29 RX ORDER — SODIUM CHLORIDE 0.9 % (FLUSH) 0.9 %
10 SYRINGE (ML) INJECTION
Status: CANCELLED | OUTPATIENT
Start: 2021-05-20

## 2021-04-29 RX ORDER — METHYLPREDNISOLONE SOD SUCC 125 MG
40 VIAL (EA) INJECTION
Status: COMPLETED | OUTPATIENT
Start: 2021-04-29 | End: 2021-04-29

## 2021-04-29 RX ADMIN — METHYLPREDNISOLONE SODIUM SUCCINATE 40 MG: 125 INJECTION, POWDER, FOR SOLUTION INTRAMUSCULAR; INTRAVENOUS at 10:04

## 2021-04-29 RX ADMIN — BELIMUMAB 1152 MG: 400 INJECTION, POWDER, LYOPHILIZED, FOR SOLUTION INTRAVENOUS at 11:04

## 2021-04-30 DIAGNOSIS — M32.9 SYSTEMIC LUPUS ERYTHEMATOSUS ARTHRITIS: ICD-10-CM

## 2021-05-03 RX ORDER — BELIMUMAB 200 MG/ML
200 SOLUTION SUBCUTANEOUS
Qty: 4 ML | Refills: 11 | Status: SHIPPED | OUTPATIENT
Start: 2021-05-03 | End: 2021-05-10 | Stop reason: SDUPTHER

## 2021-05-10 ENCOUNTER — PATIENT MESSAGE (OUTPATIENT)
Dept: INTERNAL MEDICINE | Facility: CLINIC | Age: 36
End: 2021-05-10

## 2021-05-10 DIAGNOSIS — M32.9 SYSTEMIC LUPUS ERYTHEMATOSUS ARTHRITIS: ICD-10-CM

## 2021-05-10 DIAGNOSIS — F51.01 PRIMARY INSOMNIA: ICD-10-CM

## 2021-05-10 DIAGNOSIS — M32.19 OTHER SYSTEMIC LUPUS ERYTHEMATOSUS WITH OTHER ORGAN INVOLVEMENT: ICD-10-CM

## 2021-05-11 ENCOUNTER — PATIENT MESSAGE (OUTPATIENT)
Dept: INTERNAL MEDICINE | Facility: CLINIC | Age: 36
End: 2021-05-11

## 2021-05-11 RX ORDER — HYDROXYCHLOROQUINE SULFATE 200 MG/1
400 TABLET, FILM COATED ORAL DAILY
Qty: 60 TABLET | Refills: 11 | Status: SHIPPED | OUTPATIENT
Start: 2021-05-11 | End: 2021-11-23 | Stop reason: SDUPTHER

## 2021-05-11 RX ORDER — BELIMUMAB 200 MG/ML
200 SOLUTION SUBCUTANEOUS
Qty: 4 ML | Refills: 11 | Status: SHIPPED | OUTPATIENT
Start: 2021-05-11 | End: 2022-04-25 | Stop reason: ALTCHOICE

## 2021-05-11 RX ORDER — TRAZODONE HYDROCHLORIDE 50 MG/1
50 TABLET ORAL NIGHTLY
Qty: 30 TABLET | Refills: 11 | Status: SHIPPED | OUTPATIENT
Start: 2021-05-11 | End: 2023-08-07

## 2021-05-12 ENCOUNTER — PATIENT MESSAGE (OUTPATIENT)
Dept: RHEUMATOLOGY | Facility: CLINIC | Age: 36
End: 2021-05-12

## 2021-05-12 DIAGNOSIS — M32.9 SYSTEMIC LUPUS ERYTHEMATOSUS ARTHRITIS: Primary | ICD-10-CM

## 2021-05-12 RX ORDER — METHYLPREDNISOLONE 4 MG/1
TABLET ORAL
Qty: 1 PACKAGE | Refills: 0 | Status: SHIPPED | OUTPATIENT
Start: 2021-05-12 | End: 2022-02-14

## 2021-05-14 ENCOUNTER — PATIENT MESSAGE (OUTPATIENT)
Dept: INTERNAL MEDICINE | Facility: CLINIC | Age: 36
End: 2021-05-14

## 2021-05-27 ENCOUNTER — INFUSION (OUTPATIENT)
Dept: INFUSION THERAPY | Facility: HOSPITAL | Age: 36
End: 2021-05-27
Attending: INTERNAL MEDICINE
Payer: COMMERCIAL

## 2021-05-27 VITALS
TEMPERATURE: 99 F | HEART RATE: 82 BPM | SYSTOLIC BLOOD PRESSURE: 124 MMHG | RESPIRATION RATE: 16 BRPM | BODY MASS INDEX: 41.67 KG/M2 | WEIGHT: 258.19 LBS | OXYGEN SATURATION: 96 % | DIASTOLIC BLOOD PRESSURE: 82 MMHG

## 2021-05-27 DIAGNOSIS — M32.9 EXACERBATION OF SYSTEMIC LUPUS: ICD-10-CM

## 2021-05-27 DIAGNOSIS — M35.1 MCTD (MIXED CONNECTIVE TISSUE DISEASE): ICD-10-CM

## 2021-05-27 DIAGNOSIS — M32.9 SYSTEMIC LUPUS ERYTHEMATOSUS ARTHRITIS: Primary | ICD-10-CM

## 2021-05-27 DIAGNOSIS — R76.8 POSITIVE SM/RNP ANTIBODY: ICD-10-CM

## 2021-05-27 PROCEDURE — 63600175 PHARM REV CODE 636 W HCPCS: Mod: JG | Performed by: INTERNAL MEDICINE

## 2021-05-27 PROCEDURE — 96375 TX/PRO/DX INJ NEW DRUG ADDON: CPT

## 2021-05-27 PROCEDURE — 25000003 PHARM REV CODE 250: Performed by: INTERNAL MEDICINE

## 2021-05-27 PROCEDURE — 96413 CHEMO IV INFUSION 1 HR: CPT

## 2021-05-27 RX ORDER — SODIUM CHLORIDE 0.9 % (FLUSH) 0.9 %
10 SYRINGE (ML) INJECTION
Status: CANCELLED | OUTPATIENT
Start: 2021-06-17

## 2021-05-27 RX ORDER — DIPHENHYDRAMINE HYDROCHLORIDE 50 MG/ML
25 INJECTION INTRAMUSCULAR; INTRAVENOUS
Status: CANCELLED
Start: 2021-06-17 | End: 2021-06-17

## 2021-05-27 RX ORDER — ACETAMINOPHEN 325 MG/1
650 TABLET ORAL
Status: CANCELLED | OUTPATIENT
Start: 2021-06-17

## 2021-05-27 RX ORDER — HEPARIN 100 UNIT/ML
500 SYRINGE INTRAVENOUS
Status: CANCELLED | OUTPATIENT
Start: 2021-06-17

## 2021-05-27 RX ORDER — METHYLPREDNISOLONE SOD SUCC 125 MG
40 VIAL (EA) INJECTION
Status: COMPLETED | OUTPATIENT
Start: 2021-05-27 | End: 2021-05-27

## 2021-05-27 RX ADMIN — BELIMUMAB 1171 MG: 400 INJECTION, POWDER, LYOPHILIZED, FOR SOLUTION INTRAVENOUS at 10:05

## 2021-05-27 RX ADMIN — METHYLPREDNISOLONE SODIUM SUCCINATE 40 MG: 125 INJECTION, POWDER, FOR SOLUTION INTRAMUSCULAR; INTRAVENOUS at 10:05

## 2021-06-09 ENCOUNTER — PATIENT MESSAGE (OUTPATIENT)
Dept: RHEUMATOLOGY | Facility: CLINIC | Age: 36
End: 2021-06-09

## 2021-06-17 ENCOUNTER — PATIENT MESSAGE (OUTPATIENT)
Dept: RHEUMATOLOGY | Facility: CLINIC | Age: 36
End: 2021-06-17

## 2021-06-21 ENCOUNTER — TELEPHONE (OUTPATIENT)
Dept: RHEUMATOLOGY | Facility: CLINIC | Age: 36
End: 2021-06-21

## 2021-06-22 ENCOUNTER — OFFICE VISIT (OUTPATIENT)
Dept: RHEUMATOLOGY | Facility: CLINIC | Age: 36
End: 2021-06-22
Payer: COMMERCIAL

## 2021-06-22 ENCOUNTER — LAB VISIT (OUTPATIENT)
Dept: LAB | Facility: HOSPITAL | Age: 36
End: 2021-06-22
Attending: INTERNAL MEDICINE
Payer: COMMERCIAL

## 2021-06-22 ENCOUNTER — INFUSION (OUTPATIENT)
Dept: INFUSION THERAPY | Facility: HOSPITAL | Age: 36
End: 2021-06-22
Attending: INTERNAL MEDICINE
Payer: COMMERCIAL

## 2021-06-22 VITALS
HEIGHT: 66 IN | BODY MASS INDEX: 41.49 KG/M2 | OXYGEN SATURATION: 97 % | TEMPERATURE: 98 F | HEART RATE: 84 BPM | WEIGHT: 258.19 LBS | DIASTOLIC BLOOD PRESSURE: 74 MMHG | RESPIRATION RATE: 18 BRPM | SYSTOLIC BLOOD PRESSURE: 118 MMHG

## 2021-06-22 VITALS
SYSTOLIC BLOOD PRESSURE: 116 MMHG | HEART RATE: 81 BPM | DIASTOLIC BLOOD PRESSURE: 76 MMHG | WEIGHT: 258.19 LBS | HEIGHT: 66 IN | BODY MASS INDEX: 41.49 KG/M2

## 2021-06-22 DIAGNOSIS — M32.9 EXACERBATION OF SYSTEMIC LUPUS: ICD-10-CM

## 2021-06-22 DIAGNOSIS — M35.1 MCTD (MIXED CONNECTIVE TISSUE DISEASE): ICD-10-CM

## 2021-06-22 DIAGNOSIS — R76.8 POSITIVE SM/RNP ANTIBODY: ICD-10-CM

## 2021-06-22 DIAGNOSIS — G89.29 CHRONIC PAIN OF BOTH KNEES: ICD-10-CM

## 2021-06-22 DIAGNOSIS — M32.19 OTHER SYSTEMIC LUPUS ERYTHEMATOSUS WITH OTHER ORGAN INVOLVEMENT: ICD-10-CM

## 2021-06-22 DIAGNOSIS — M25.562 CHRONIC PAIN OF BOTH KNEES: ICD-10-CM

## 2021-06-22 DIAGNOSIS — Z79.899 LONG-TERM CURRENT USE OF HIGH RISK MEDICATION OTHER THAN ANTICOAGULANT: Primary | ICD-10-CM

## 2021-06-22 DIAGNOSIS — Z79.899 LONG-TERM CURRENT USE OF HIGH RISK MEDICATION OTHER THAN ANTICOAGULANT: ICD-10-CM

## 2021-06-22 DIAGNOSIS — M32.9 SYSTEMIC LUPUS ERYTHEMATOSUS ARTHRITIS: Primary | ICD-10-CM

## 2021-06-22 DIAGNOSIS — M25.561 CHRONIC PAIN OF BOTH KNEES: ICD-10-CM

## 2021-06-22 DIAGNOSIS — I73.00 SECONDARY RAYNAUD'S PHENOMENON: ICD-10-CM

## 2021-06-22 DIAGNOSIS — D84.9 IMMUNOSUPPRESSED STATUS: ICD-10-CM

## 2021-06-22 DIAGNOSIS — M32.9 SYSTEMIC LUPUS ERYTHEMATOSUS ARTHRITIS: ICD-10-CM

## 2021-06-22 LAB
ALBUMIN SERPL BCP-MCNC: 3.7 G/DL (ref 3.5–5.2)
ALP SERPL-CCNC: 52 U/L (ref 55–135)
ALT SERPL W/O P-5'-P-CCNC: 17 U/L (ref 10–44)
ANION GAP SERPL CALC-SCNC: 10 MMOL/L (ref 8–16)
AST SERPL-CCNC: 18 U/L (ref 10–40)
BASOPHILS # BLD AUTO: 0.03 K/UL (ref 0–0.2)
BASOPHILS NFR BLD: 0.7 % (ref 0–1.9)
BILIRUB SERPL-MCNC: 0.3 MG/DL (ref 0.1–1)
BUN SERPL-MCNC: 8 MG/DL (ref 6–20)
CALCIUM SERPL-MCNC: 9.3 MG/DL (ref 8.7–10.5)
CHLORIDE SERPL-SCNC: 105 MMOL/L (ref 95–110)
CK SERPL-CCNC: 85 U/L (ref 20–180)
CO2 SERPL-SCNC: 26 MMOL/L (ref 23–29)
CREAT SERPL-MCNC: 0.7 MG/DL (ref 0.5–1.4)
CRP SERPL-MCNC: 9.3 MG/L (ref 0–8.2)
DIFFERENTIAL METHOD: ABNORMAL
EOSINOPHIL # BLD AUTO: 0.5 K/UL (ref 0–0.5)
EOSINOPHIL NFR BLD: 11.3 % (ref 0–8)
ERYTHROCYTE [DISTWIDTH] IN BLOOD BY AUTOMATED COUNT: 11.9 % (ref 11.5–14.5)
EST. GFR  (AFRICAN AMERICAN): >60 ML/MIN/1.73 M^2
EST. GFR  (NON AFRICAN AMERICAN): >60 ML/MIN/1.73 M^2
GLUCOSE SERPL-MCNC: 86 MG/DL (ref 70–110)
HCT VFR BLD AUTO: 37.9 % (ref 37–48.5)
HGB BLD-MCNC: 12.6 G/DL (ref 12–16)
IMM GRANULOCYTES # BLD AUTO: 0.01 K/UL (ref 0–0.04)
IMM GRANULOCYTES NFR BLD AUTO: 0.2 % (ref 0–0.5)
LYMPHOCYTES # BLD AUTO: 0.9 K/UL (ref 1–4.8)
LYMPHOCYTES NFR BLD: 19.4 % (ref 18–48)
MCH RBC QN AUTO: 30.7 PG (ref 27–31)
MCHC RBC AUTO-ENTMCNC: 33.2 G/DL (ref 32–36)
MCV RBC AUTO: 92 FL (ref 82–98)
MONOCYTES # BLD AUTO: 0.4 K/UL (ref 0.3–1)
MONOCYTES NFR BLD: 8.6 % (ref 4–15)
NEUTROPHILS # BLD AUTO: 2.7 K/UL (ref 1.8–7.7)
NEUTROPHILS NFR BLD: 59.8 % (ref 38–73)
NRBC BLD-RTO: 0 /100 WBC
PLATELET # BLD AUTO: 584 K/UL (ref 150–450)
PMV BLD AUTO: 8.3 FL (ref 9.2–12.9)
POTASSIUM SERPL-SCNC: 3.7 MMOL/L (ref 3.5–5.1)
PROT SERPL-MCNC: 7.6 G/DL (ref 6–8.4)
RBC # BLD AUTO: 4.11 M/UL (ref 4–5.4)
SODIUM SERPL-SCNC: 141 MMOL/L (ref 136–145)
WBC # BLD AUTO: 4.43 K/UL (ref 3.9–12.7)

## 2021-06-22 PROCEDURE — 99999 PR PBB SHADOW E&M-EST. PATIENT-LVL III: ICD-10-PCS | Mod: PBBFAC,,, | Performed by: INTERNAL MEDICINE

## 2021-06-22 PROCEDURE — 99215 PR OFFICE/OUTPT VISIT, EST, LEVL V, 40-54 MIN: ICD-10-PCS | Mod: S$GLB,,, | Performed by: INTERNAL MEDICINE

## 2021-06-22 PROCEDURE — 96413 CHEMO IV INFUSION 1 HR: CPT

## 2021-06-22 PROCEDURE — 85025 COMPLETE CBC W/AUTO DIFF WBC: CPT | Performed by: INTERNAL MEDICINE

## 2021-06-22 PROCEDURE — 82550 ASSAY OF CK (CPK): CPT | Performed by: INTERNAL MEDICINE

## 2021-06-22 PROCEDURE — 99999 PR PBB SHADOW E&M-EST. PATIENT-LVL III: CPT | Mod: PBBFAC,,, | Performed by: INTERNAL MEDICINE

## 2021-06-22 PROCEDURE — 25000003 PHARM REV CODE 250: Performed by: INTERNAL MEDICINE

## 2021-06-22 PROCEDURE — 96375 TX/PRO/DX INJ NEW DRUG ADDON: CPT

## 2021-06-22 PROCEDURE — 3008F PR BODY MASS INDEX (BMI) DOCUMENTED: ICD-10-PCS | Mod: CPTII,S$GLB,, | Performed by: INTERNAL MEDICINE

## 2021-06-22 PROCEDURE — 85651 RBC SED RATE NONAUTOMATED: CPT | Performed by: INTERNAL MEDICINE

## 2021-06-22 PROCEDURE — 80053 COMPREHEN METABOLIC PANEL: CPT | Performed by: INTERNAL MEDICINE

## 2021-06-22 PROCEDURE — 1125F AMNT PAIN NOTED PAIN PRSNT: CPT | Mod: S$GLB,,, | Performed by: INTERNAL MEDICINE

## 2021-06-22 PROCEDURE — 99215 OFFICE O/P EST HI 40 MIN: CPT | Mod: S$GLB,,, | Performed by: INTERNAL MEDICINE

## 2021-06-22 PROCEDURE — 96365 THER/PROPH/DIAG IV INF INIT: CPT

## 2021-06-22 PROCEDURE — 36415 COLL VENOUS BLD VENIPUNCTURE: CPT | Performed by: INTERNAL MEDICINE

## 2021-06-22 PROCEDURE — 63600175 PHARM REV CODE 636 W HCPCS: Performed by: INTERNAL MEDICINE

## 2021-06-22 PROCEDURE — 1125F PR PAIN SEVERITY QUANTIFIED, PAIN PRESENT: ICD-10-PCS | Mod: S$GLB,,, | Performed by: INTERNAL MEDICINE

## 2021-06-22 PROCEDURE — 86140 C-REACTIVE PROTEIN: CPT | Performed by: INTERNAL MEDICINE

## 2021-06-22 PROCEDURE — A4216 STERILE WATER/SALINE, 10 ML: HCPCS | Performed by: INTERNAL MEDICINE

## 2021-06-22 PROCEDURE — 3008F BODY MASS INDEX DOCD: CPT | Mod: CPTII,S$GLB,, | Performed by: INTERNAL MEDICINE

## 2021-06-22 RX ORDER — SODIUM CHLORIDE 0.9 % (FLUSH) 0.9 %
10 SYRINGE (ML) INJECTION
Status: DISCONTINUED | OUTPATIENT
Start: 2021-06-22 | End: 2021-06-22 | Stop reason: HOSPADM

## 2021-06-22 RX ORDER — SODIUM CHLORIDE 0.9 % (FLUSH) 0.9 %
10 SYRINGE (ML) INJECTION
Status: CANCELLED | OUTPATIENT
Start: 2021-07-13

## 2021-06-22 RX ORDER — ACETAMINOPHEN 325 MG/1
650 TABLET ORAL
Status: CANCELLED | OUTPATIENT
Start: 2021-07-13

## 2021-06-22 RX ORDER — HEPARIN 100 UNIT/ML
500 SYRINGE INTRAVENOUS
Status: CANCELLED | OUTPATIENT
Start: 2021-07-13

## 2021-06-22 RX ORDER — METHYLPREDNISOLONE SOD SUCC 125 MG
40 VIAL (EA) INJECTION
Status: COMPLETED | OUTPATIENT
Start: 2021-06-22 | End: 2021-06-22

## 2021-06-22 RX ORDER — DIPHENHYDRAMINE HYDROCHLORIDE 50 MG/ML
25 INJECTION INTRAMUSCULAR; INTRAVENOUS
Status: CANCELLED
Start: 2021-07-13 | End: 2021-07-13

## 2021-06-22 RX ADMIN — METHYLPREDNISOLONE SODIUM SUCCINATE 40 MG: 125 INJECTION, POWDER, FOR SOLUTION INTRAMUSCULAR; INTRAVENOUS at 12:06

## 2021-06-22 RX ADMIN — BELIMUMAB 1171 MG: 400 INJECTION, POWDER, LYOPHILIZED, FOR SOLUTION INTRAVENOUS at 12:06

## 2021-06-22 RX ADMIN — Medication 10 ML: at 12:06

## 2021-06-23 ENCOUNTER — TELEPHONE (OUTPATIENT)
Dept: RHEUMATOLOGY | Facility: CLINIC | Age: 36
End: 2021-06-23

## 2021-07-20 ENCOUNTER — INFUSION (OUTPATIENT)
Dept: INFUSION THERAPY | Facility: HOSPITAL | Age: 36
End: 2021-07-20
Attending: INTERNAL MEDICINE
Payer: COMMERCIAL

## 2021-07-20 VITALS
DIASTOLIC BLOOD PRESSURE: 84 MMHG | RESPIRATION RATE: 18 BRPM | BODY MASS INDEX: 41.42 KG/M2 | WEIGHT: 256.63 LBS | TEMPERATURE: 98 F | HEART RATE: 70 BPM | SYSTOLIC BLOOD PRESSURE: 133 MMHG | OXYGEN SATURATION: 97 %

## 2021-07-20 DIAGNOSIS — M32.9 SYSTEMIC LUPUS ERYTHEMATOSUS ARTHRITIS: Primary | ICD-10-CM

## 2021-07-20 DIAGNOSIS — M35.1 MCTD (MIXED CONNECTIVE TISSUE DISEASE): ICD-10-CM

## 2021-07-20 DIAGNOSIS — M32.9 EXACERBATION OF SYSTEMIC LUPUS: ICD-10-CM

## 2021-07-20 DIAGNOSIS — R76.8 POSITIVE SM/RNP ANTIBODY: ICD-10-CM

## 2021-07-20 PROCEDURE — 63600175 PHARM REV CODE 636 W HCPCS: Mod: JA,JG | Performed by: INTERNAL MEDICINE

## 2021-07-20 PROCEDURE — A4216 STERILE WATER/SALINE, 10 ML: HCPCS | Performed by: INTERNAL MEDICINE

## 2021-07-20 PROCEDURE — 96413 CHEMO IV INFUSION 1 HR: CPT

## 2021-07-20 PROCEDURE — 25000003 PHARM REV CODE 250: Performed by: INTERNAL MEDICINE

## 2021-07-20 RX ORDER — SODIUM CHLORIDE 0.9 % (FLUSH) 0.9 %
10 SYRINGE (ML) INJECTION
Status: CANCELLED | OUTPATIENT
Start: 2021-08-10

## 2021-07-20 RX ORDER — ACETAMINOPHEN 325 MG/1
650 TABLET ORAL
Status: CANCELLED | OUTPATIENT
Start: 2021-08-10

## 2021-07-20 RX ORDER — DIPHENHYDRAMINE HYDROCHLORIDE 50 MG/ML
25 INJECTION INTRAMUSCULAR; INTRAVENOUS
Status: CANCELLED
Start: 2021-08-10 | End: 2021-08-10

## 2021-07-20 RX ORDER — HEPARIN 100 UNIT/ML
500 SYRINGE INTRAVENOUS
Status: CANCELLED | OUTPATIENT
Start: 2021-08-10

## 2021-07-20 RX ORDER — SODIUM CHLORIDE 0.9 % (FLUSH) 0.9 %
10 SYRINGE (ML) INJECTION
Status: DISCONTINUED | OUTPATIENT
Start: 2021-07-20 | End: 2021-07-20 | Stop reason: HOSPADM

## 2021-07-20 RX ADMIN — Medication 10 ML: at 10:07

## 2021-07-20 RX ADMIN — BELIMUMAB 1164 MG: 400 INJECTION, POWDER, LYOPHILIZED, FOR SOLUTION INTRAVENOUS at 11:07

## 2021-08-06 ENCOUNTER — PATIENT MESSAGE (OUTPATIENT)
Dept: RHEUMATOLOGY | Facility: CLINIC | Age: 36
End: 2021-08-06

## 2021-08-19 ENCOUNTER — INFUSION (OUTPATIENT)
Dept: INFUSION THERAPY | Facility: HOSPITAL | Age: 36
End: 2021-08-19
Attending: INTERNAL MEDICINE
Payer: COMMERCIAL

## 2021-08-19 VITALS
OXYGEN SATURATION: 97 % | RESPIRATION RATE: 18 BRPM | WEIGHT: 252.63 LBS | HEIGHT: 68 IN | TEMPERATURE: 98 F | SYSTOLIC BLOOD PRESSURE: 140 MMHG | BODY MASS INDEX: 38.29 KG/M2 | HEART RATE: 76 BPM | DIASTOLIC BLOOD PRESSURE: 70 MMHG

## 2021-08-19 DIAGNOSIS — R21 MALAR RASH: ICD-10-CM

## 2021-08-19 DIAGNOSIS — R76.8 POSITIVE SM/RNP ANTIBODY: ICD-10-CM

## 2021-08-19 DIAGNOSIS — M32.9 SYSTEMIC LUPUS ERYTHEMATOSUS ARTHRITIS: Primary | ICD-10-CM

## 2021-08-19 DIAGNOSIS — L81.9 DYSCHROMIA: ICD-10-CM

## 2021-08-19 DIAGNOSIS — L30.9 DERMATITIS: ICD-10-CM

## 2021-08-19 DIAGNOSIS — M32.9 EXACERBATION OF SYSTEMIC LUPUS: ICD-10-CM

## 2021-08-19 DIAGNOSIS — M35.1 MCTD (MIXED CONNECTIVE TISSUE DISEASE): ICD-10-CM

## 2021-08-19 PROCEDURE — 25000003 PHARM REV CODE 250: Performed by: INTERNAL MEDICINE

## 2021-08-19 PROCEDURE — 96375 TX/PRO/DX INJ NEW DRUG ADDON: CPT

## 2021-08-19 PROCEDURE — 63600175 PHARM REV CODE 636 W HCPCS: Performed by: INTERNAL MEDICINE

## 2021-08-19 PROCEDURE — 96413 CHEMO IV INFUSION 1 HR: CPT

## 2021-08-19 RX ORDER — SODIUM CHLORIDE 0.9 % (FLUSH) 0.9 %
10 SYRINGE (ML) INJECTION
Status: CANCELLED | OUTPATIENT
Start: 2021-09-09

## 2021-08-19 RX ORDER — SODIUM CHLORIDE 0.9 % (FLUSH) 0.9 %
10 SYRINGE (ML) INJECTION
Status: DISCONTINUED | OUTPATIENT
Start: 2021-08-19 | End: 2021-08-19 | Stop reason: HOSPADM

## 2021-08-19 RX ORDER — TACROLIMUS 1 MG/G
OINTMENT TOPICAL 2 TIMES DAILY
Qty: 60 G | Refills: 3 | OUTPATIENT
Start: 2021-08-19

## 2021-08-19 RX ORDER — DIPHENHYDRAMINE HYDROCHLORIDE 50 MG/ML
25 INJECTION INTRAMUSCULAR; INTRAVENOUS
Status: CANCELLED
Start: 2021-09-09 | End: 2021-09-09

## 2021-08-19 RX ORDER — ACETAMINOPHEN 325 MG/1
650 TABLET ORAL
Status: CANCELLED | OUTPATIENT
Start: 2021-09-09

## 2021-08-19 RX ORDER — METHYLPREDNISOLONE SOD SUCC 125 MG
40 VIAL (EA) INJECTION
Status: COMPLETED | OUTPATIENT
Start: 2021-08-19 | End: 2021-08-19

## 2021-08-19 RX ORDER — MOMETASONE FUROATE 1 MG/G
CREAM TOPICAL DAILY
Qty: 50 G | Refills: 1 | OUTPATIENT
Start: 2021-08-19

## 2021-08-19 RX ORDER — HEPARIN 100 UNIT/ML
500 SYRINGE INTRAVENOUS
Status: CANCELLED | OUTPATIENT
Start: 2021-09-09

## 2021-08-19 RX ADMIN — METHYLPREDNISOLONE SODIUM SUCCINATE 40 MG: 125 INJECTION, POWDER, FOR SOLUTION INTRAMUSCULAR; INTRAVENOUS at 11:08

## 2021-08-19 RX ADMIN — BELIMUMAB 1146 MG: 400 INJECTION, POWDER, LYOPHILIZED, FOR SOLUTION INTRAVENOUS at 11:08

## 2021-09-16 ENCOUNTER — INFUSION (OUTPATIENT)
Dept: INFUSION THERAPY | Facility: HOSPITAL | Age: 36
End: 2021-09-16
Attending: INTERNAL MEDICINE
Payer: COMMERCIAL

## 2021-09-16 VITALS
WEIGHT: 249.13 LBS | TEMPERATURE: 98 F | OXYGEN SATURATION: 97 % | HEART RATE: 71 BPM | SYSTOLIC BLOOD PRESSURE: 131 MMHG | DIASTOLIC BLOOD PRESSURE: 80 MMHG | BODY MASS INDEX: 37.88 KG/M2 | RESPIRATION RATE: 16 BRPM

## 2021-09-16 DIAGNOSIS — M32.9 EXACERBATION OF SYSTEMIC LUPUS: ICD-10-CM

## 2021-09-16 DIAGNOSIS — M35.1 MCTD (MIXED CONNECTIVE TISSUE DISEASE): ICD-10-CM

## 2021-09-16 DIAGNOSIS — R76.8 POSITIVE SM/RNP ANTIBODY: ICD-10-CM

## 2021-09-16 DIAGNOSIS — M32.9 SYSTEMIC LUPUS ERYTHEMATOSUS ARTHRITIS: Primary | ICD-10-CM

## 2021-09-16 PROCEDURE — A4216 STERILE WATER/SALINE, 10 ML: HCPCS | Performed by: INTERNAL MEDICINE

## 2021-09-16 PROCEDURE — 25000003 PHARM REV CODE 250: Performed by: INTERNAL MEDICINE

## 2021-09-16 PROCEDURE — 96375 TX/PRO/DX INJ NEW DRUG ADDON: CPT

## 2021-09-16 PROCEDURE — 96365 THER/PROPH/DIAG IV INF INIT: CPT

## 2021-09-16 PROCEDURE — 63600175 PHARM REV CODE 636 W HCPCS: Performed by: INTERNAL MEDICINE

## 2021-09-16 RX ORDER — ACETAMINOPHEN 325 MG/1
650 TABLET ORAL
Status: CANCELLED | OUTPATIENT
Start: 2021-10-07

## 2021-09-16 RX ORDER — DIPHENHYDRAMINE HYDROCHLORIDE 50 MG/ML
25 INJECTION INTRAMUSCULAR; INTRAVENOUS
Status: CANCELLED
Start: 2021-10-07 | End: 2021-10-07

## 2021-09-16 RX ORDER — SODIUM CHLORIDE 0.9 % (FLUSH) 0.9 %
10 SYRINGE (ML) INJECTION
Status: CANCELLED | OUTPATIENT
Start: 2021-10-07

## 2021-09-16 RX ORDER — SODIUM CHLORIDE 0.9 % (FLUSH) 0.9 %
10 SYRINGE (ML) INJECTION
Status: DISCONTINUED | OUTPATIENT
Start: 2021-09-16 | End: 2021-09-16 | Stop reason: HOSPADM

## 2021-09-16 RX ORDER — METHYLPREDNISOLONE SOD SUCC 125 MG
40 VIAL (EA) INJECTION
Status: COMPLETED | OUTPATIENT
Start: 2021-09-16 | End: 2021-09-16

## 2021-09-16 RX ORDER — HEPARIN 100 UNIT/ML
500 SYRINGE INTRAVENOUS
Status: CANCELLED | OUTPATIENT
Start: 2021-10-07

## 2021-09-16 RX ADMIN — BELIMUMAB 1130 MG: 400 INJECTION, POWDER, LYOPHILIZED, FOR SOLUTION INTRAVENOUS at 12:09

## 2021-09-16 RX ADMIN — METHYLPREDNISOLONE SODIUM SUCCINATE 40 MG: 125 INJECTION, POWDER, FOR SOLUTION INTRAMUSCULAR; INTRAVENOUS at 11:09

## 2021-09-16 RX ADMIN — Medication 10 ML: at 12:09

## 2021-10-13 ENCOUNTER — PATIENT OUTREACH (OUTPATIENT)
Dept: ADMINISTRATIVE | Facility: OTHER | Age: 36
End: 2021-10-13

## 2021-10-14 ENCOUNTER — OFFICE VISIT (OUTPATIENT)
Dept: RHEUMATOLOGY | Facility: CLINIC | Age: 36
End: 2021-10-14
Payer: COMMERCIAL

## 2021-10-14 ENCOUNTER — LAB VISIT (OUTPATIENT)
Dept: LAB | Facility: HOSPITAL | Age: 36
End: 2021-10-14
Attending: FAMILY MEDICINE
Payer: COMMERCIAL

## 2021-10-14 ENCOUNTER — INFUSION (OUTPATIENT)
Dept: INFUSION THERAPY | Facility: HOSPITAL | Age: 36
End: 2021-10-14
Attending: INTERNAL MEDICINE
Payer: COMMERCIAL

## 2021-10-14 VITALS
OXYGEN SATURATION: 97 % | BODY MASS INDEX: 38.06 KG/M2 | WEIGHT: 251.13 LBS | TEMPERATURE: 98 F | HEART RATE: 71 BPM | HEIGHT: 68 IN | DIASTOLIC BLOOD PRESSURE: 90 MMHG | SYSTOLIC BLOOD PRESSURE: 124 MMHG | RESPIRATION RATE: 18 BRPM

## 2021-10-14 VITALS
HEIGHT: 68 IN | BODY MASS INDEX: 38.06 KG/M2 | SYSTOLIC BLOOD PRESSURE: 140 MMHG | WEIGHT: 251.13 LBS | DIASTOLIC BLOOD PRESSURE: 90 MMHG | HEART RATE: 69 BPM

## 2021-10-14 DIAGNOSIS — Z79.899 LONG-TERM CURRENT USE OF HIGH RISK MEDICATION OTHER THAN ANTICOAGULANT: ICD-10-CM

## 2021-10-14 DIAGNOSIS — M32.9 EXACERBATION OF SYSTEMIC LUPUS: ICD-10-CM

## 2021-10-14 DIAGNOSIS — M32.9 SYSTEMIC LUPUS ERYTHEMATOSUS ARTHRITIS: Primary | ICD-10-CM

## 2021-10-14 DIAGNOSIS — R76.8 POSITIVE SM/RNP ANTIBODY: ICD-10-CM

## 2021-10-14 DIAGNOSIS — Z51.81 ENCOUNTER FOR MEDICATION MONITORING: ICD-10-CM

## 2021-10-14 DIAGNOSIS — I73.00 SECONDARY RAYNAUD'S PHENOMENON: ICD-10-CM

## 2021-10-14 DIAGNOSIS — M35.1 MCTD (MIXED CONNECTIVE TISSUE DISEASE): ICD-10-CM

## 2021-10-14 DIAGNOSIS — D84.9 IMMUNOSUPPRESSED STATUS: ICD-10-CM

## 2021-10-14 DIAGNOSIS — M32.19 OTHER SYSTEMIC LUPUS ERYTHEMATOSUS WITH OTHER ORGAN INVOLVEMENT: ICD-10-CM

## 2021-10-14 LAB
ALBUMIN SERPL BCP-MCNC: 3.5 G/DL (ref 3.5–5.2)
ALP SERPL-CCNC: 52 U/L (ref 55–135)
ALT SERPL W/O P-5'-P-CCNC: 25 U/L (ref 10–44)
ANION GAP SERPL CALC-SCNC: 7 MMOL/L (ref 8–16)
AST SERPL-CCNC: 21 U/L (ref 10–40)
BASOPHILS # BLD AUTO: 0.04 K/UL (ref 0–0.2)
BASOPHILS NFR BLD: 0.9 % (ref 0–1.9)
BILIRUB SERPL-MCNC: 0.2 MG/DL (ref 0.1–1)
BUN SERPL-MCNC: 7 MG/DL (ref 6–20)
CALCIUM SERPL-MCNC: 9.3 MG/DL (ref 8.7–10.5)
CHLORIDE SERPL-SCNC: 106 MMOL/L (ref 95–110)
CO2 SERPL-SCNC: 27 MMOL/L (ref 23–29)
CREAT SERPL-MCNC: 0.6 MG/DL (ref 0.5–1.4)
CRP SERPL-MCNC: 9.7 MG/L (ref 0–8.2)
DIFFERENTIAL METHOD: ABNORMAL
EOSINOPHIL # BLD AUTO: 0.5 K/UL (ref 0–0.5)
EOSINOPHIL NFR BLD: 9.9 % (ref 0–8)
ERYTHROCYTE [DISTWIDTH] IN BLOOD BY AUTOMATED COUNT: 11.9 % (ref 11.5–14.5)
ERYTHROCYTE [SEDIMENTATION RATE] IN BLOOD BY WESTERGREN METHOD: 30 MM/HR (ref 0–36)
EST. GFR  (AFRICAN AMERICAN): >60 ML/MIN/1.73 M^2
EST. GFR  (NON AFRICAN AMERICAN): >60 ML/MIN/1.73 M^2
GLUCOSE SERPL-MCNC: 80 MG/DL (ref 70–110)
HCT VFR BLD AUTO: 36 % (ref 37–48.5)
HGB BLD-MCNC: 11.9 G/DL (ref 12–16)
IMM GRANULOCYTES # BLD AUTO: 0.01 K/UL (ref 0–0.04)
IMM GRANULOCYTES NFR BLD AUTO: 0.2 % (ref 0–0.5)
LYMPHOCYTES # BLD AUTO: 0.7 K/UL (ref 1–4.8)
LYMPHOCYTES NFR BLD: 14 % (ref 18–48)
MCH RBC QN AUTO: 30.1 PG (ref 27–31)
MCHC RBC AUTO-ENTMCNC: 33.1 G/DL (ref 32–36)
MCV RBC AUTO: 91 FL (ref 82–98)
MONOCYTES # BLD AUTO: 0.5 K/UL (ref 0.3–1)
MONOCYTES NFR BLD: 11 % (ref 4–15)
NEUTROPHILS # BLD AUTO: 3 K/UL (ref 1.8–7.7)
NEUTROPHILS NFR BLD: 64 % (ref 38–73)
NRBC BLD-RTO: 0 /100 WBC
PLATELET # BLD AUTO: 493 K/UL (ref 150–450)
PMV BLD AUTO: 8.6 FL (ref 9.2–12.9)
POTASSIUM SERPL-SCNC: 4.4 MMOL/L (ref 3.5–5.1)
PROT SERPL-MCNC: 7.2 G/DL (ref 6–8.4)
RBC # BLD AUTO: 3.96 M/UL (ref 4–5.4)
SODIUM SERPL-SCNC: 140 MMOL/L (ref 136–145)
WBC # BLD AUTO: 4.63 K/UL (ref 3.9–12.7)

## 2021-10-14 PROCEDURE — 96365 THER/PROPH/DIAG IV INF INIT: CPT

## 2021-10-14 PROCEDURE — 85652 RBC SED RATE AUTOMATED: CPT | Performed by: INTERNAL MEDICINE

## 2021-10-14 PROCEDURE — 3077F PR MOST RECENT SYSTOLIC BLOOD PRESSURE >= 140 MM HG: ICD-10-PCS | Mod: CPTII,S$GLB,, | Performed by: INTERNAL MEDICINE

## 2021-10-14 PROCEDURE — 25000003 PHARM REV CODE 250: Performed by: INTERNAL MEDICINE

## 2021-10-14 PROCEDURE — 3080F PR MOST RECENT DIASTOLIC BLOOD PRESSURE >= 90 MM HG: ICD-10-PCS | Mod: CPTII,S$GLB,, | Performed by: INTERNAL MEDICINE

## 2021-10-14 PROCEDURE — 99999 PR PBB SHADOW E&M-EST. PATIENT-LVL III: CPT | Mod: PBBFAC,,, | Performed by: INTERNAL MEDICINE

## 2021-10-14 PROCEDURE — 96375 TX/PRO/DX INJ NEW DRUG ADDON: CPT

## 2021-10-14 PROCEDURE — 1159F MED LIST DOCD IN RCRD: CPT | Mod: CPTII,S$GLB,, | Performed by: INTERNAL MEDICINE

## 2021-10-14 PROCEDURE — 86140 C-REACTIVE PROTEIN: CPT | Performed by: INTERNAL MEDICINE

## 2021-10-14 PROCEDURE — 99999 PR PBB SHADOW E&M-EST. PATIENT-LVL III: ICD-10-PCS | Mod: PBBFAC,,, | Performed by: INTERNAL MEDICINE

## 2021-10-14 PROCEDURE — 1160F RVW MEDS BY RX/DR IN RCRD: CPT | Mod: CPTII,S$GLB,, | Performed by: INTERNAL MEDICINE

## 2021-10-14 PROCEDURE — 1159F PR MEDICATION LIST DOCUMENTED IN MEDICAL RECORD: ICD-10-PCS | Mod: CPTII,S$GLB,, | Performed by: INTERNAL MEDICINE

## 2021-10-14 PROCEDURE — 63600175 PHARM REV CODE 636 W HCPCS: Mod: JA,JG | Performed by: INTERNAL MEDICINE

## 2021-10-14 PROCEDURE — 85025 COMPLETE CBC W/AUTO DIFF WBC: CPT | Performed by: INTERNAL MEDICINE

## 2021-10-14 PROCEDURE — 3008F PR BODY MASS INDEX (BMI) DOCUMENTED: ICD-10-PCS | Mod: CPTII,S$GLB,, | Performed by: INTERNAL MEDICINE

## 2021-10-14 PROCEDURE — 3080F DIAST BP >= 90 MM HG: CPT | Mod: CPTII,S$GLB,, | Performed by: INTERNAL MEDICINE

## 2021-10-14 PROCEDURE — 80053 COMPREHEN METABOLIC PANEL: CPT | Performed by: INTERNAL MEDICINE

## 2021-10-14 PROCEDURE — 1160F PR REVIEW ALL MEDS BY PRESCRIBER/CLIN PHARMACIST DOCUMENTED: ICD-10-PCS | Mod: CPTII,S$GLB,, | Performed by: INTERNAL MEDICINE

## 2021-10-14 PROCEDURE — 99215 OFFICE O/P EST HI 40 MIN: CPT | Mod: S$GLB,,, | Performed by: INTERNAL MEDICINE

## 2021-10-14 PROCEDURE — 3077F SYST BP >= 140 MM HG: CPT | Mod: CPTII,S$GLB,, | Performed by: INTERNAL MEDICINE

## 2021-10-14 PROCEDURE — 3008F BODY MASS INDEX DOCD: CPT | Mod: CPTII,S$GLB,, | Performed by: INTERNAL MEDICINE

## 2021-10-14 PROCEDURE — 99215 PR OFFICE/OUTPT VISIT, EST, LEVL V, 40-54 MIN: ICD-10-PCS | Mod: S$GLB,,, | Performed by: INTERNAL MEDICINE

## 2021-10-14 RX ORDER — SODIUM CHLORIDE 0.9 % (FLUSH) 0.9 %
10 SYRINGE (ML) INJECTION
Status: DISCONTINUED | OUTPATIENT
Start: 2021-10-14 | End: 2021-10-14 | Stop reason: HOSPADM

## 2021-10-14 RX ORDER — MYCOPHENOLATE MOFETIL 500 MG/1
1500 TABLET ORAL 2 TIMES DAILY
Qty: 180 TABLET | Refills: 3 | Status: SHIPPED | OUTPATIENT
Start: 2021-10-14 | End: 2022-02-14

## 2021-10-14 RX ORDER — HEPARIN 100 UNIT/ML
500 SYRINGE INTRAVENOUS
Status: CANCELLED | OUTPATIENT
Start: 2021-11-04

## 2021-10-14 RX ORDER — ACETAMINOPHEN 325 MG/1
650 TABLET ORAL
Status: CANCELLED | OUTPATIENT
Start: 2021-11-04

## 2021-10-14 RX ORDER — METHYLPREDNISOLONE SOD SUCC 125 MG
40 VIAL (EA) INJECTION
Status: COMPLETED | OUTPATIENT
Start: 2021-10-14 | End: 2021-10-14

## 2021-10-14 RX ORDER — SODIUM CHLORIDE 0.9 % (FLUSH) 0.9 %
10 SYRINGE (ML) INJECTION
Status: CANCELLED | OUTPATIENT
Start: 2021-11-04

## 2021-10-14 RX ORDER — DIPHENHYDRAMINE HYDROCHLORIDE 50 MG/ML
25 INJECTION INTRAMUSCULAR; INTRAVENOUS
Status: CANCELLED
Start: 2021-11-04 | End: 2021-11-04

## 2021-10-14 RX ADMIN — METHYLPREDNISOLONE SODIUM SUCCINATE 40 MG: 125 INJECTION, POWDER, FOR SOLUTION INTRAMUSCULAR; INTRAVENOUS at 01:10

## 2021-10-14 RX ADMIN — BELIMUMAB 1139 MG: 400 INJECTION, POWDER, LYOPHILIZED, FOR SOLUTION INTRAVENOUS at 01:10

## 2021-11-11 ENCOUNTER — INFUSION (OUTPATIENT)
Dept: INFUSION THERAPY | Facility: HOSPITAL | Age: 36
End: 2021-11-11
Attending: INTERNAL MEDICINE
Payer: COMMERCIAL

## 2021-11-11 VITALS
HEIGHT: 68 IN | RESPIRATION RATE: 18 BRPM | TEMPERATURE: 98 F | WEIGHT: 252.63 LBS | BODY MASS INDEX: 38.29 KG/M2 | HEART RATE: 79 BPM | OXYGEN SATURATION: 97 % | SYSTOLIC BLOOD PRESSURE: 138 MMHG | DIASTOLIC BLOOD PRESSURE: 89 MMHG

## 2021-11-11 DIAGNOSIS — M35.1 MCTD (MIXED CONNECTIVE TISSUE DISEASE): ICD-10-CM

## 2021-11-11 DIAGNOSIS — M32.9 EXACERBATION OF SYSTEMIC LUPUS: ICD-10-CM

## 2021-11-11 DIAGNOSIS — M32.9 SYSTEMIC LUPUS ERYTHEMATOSUS ARTHRITIS: Primary | ICD-10-CM

## 2021-11-11 DIAGNOSIS — R76.8 POSITIVE SM/RNP ANTIBODY: ICD-10-CM

## 2021-11-11 PROCEDURE — 96365 THER/PROPH/DIAG IV INF INIT: CPT

## 2021-11-11 PROCEDURE — 25000003 PHARM REV CODE 250: Performed by: INTERNAL MEDICINE

## 2021-11-11 PROCEDURE — 63600175 PHARM REV CODE 636 W HCPCS: Mod: JA,JG | Performed by: INTERNAL MEDICINE

## 2021-11-11 PROCEDURE — 96375 TX/PRO/DX INJ NEW DRUG ADDON: CPT

## 2021-11-11 RX ORDER — SODIUM CHLORIDE 0.9 % (FLUSH) 0.9 %
10 SYRINGE (ML) INJECTION
Status: CANCELLED | OUTPATIENT
Start: 2021-12-02

## 2021-11-11 RX ORDER — SODIUM CHLORIDE 0.9 % (FLUSH) 0.9 %
10 SYRINGE (ML) INJECTION
Status: DISCONTINUED | OUTPATIENT
Start: 2021-11-11 | End: 2021-11-11 | Stop reason: HOSPADM

## 2021-11-11 RX ORDER — METHYLPREDNISOLONE SOD SUCC 125 MG
40 VIAL (EA) INJECTION
Status: COMPLETED | OUTPATIENT
Start: 2021-11-11 | End: 2021-11-11

## 2021-11-11 RX ORDER — DIPHENHYDRAMINE HYDROCHLORIDE 50 MG/ML
25 INJECTION INTRAMUSCULAR; INTRAVENOUS
Status: CANCELLED
Start: 2021-12-02 | End: 2021-12-02

## 2021-11-11 RX ORDER — HEPARIN 100 UNIT/ML
500 SYRINGE INTRAVENOUS
Status: CANCELLED | OUTPATIENT
Start: 2021-12-02

## 2021-11-11 RX ORDER — ACETAMINOPHEN 325 MG/1
650 TABLET ORAL
Status: CANCELLED | OUTPATIENT
Start: 2021-12-02

## 2021-11-11 RX ADMIN — BELIMUMAB 1146 MG: 400 INJECTION, POWDER, LYOPHILIZED, FOR SOLUTION INTRAVENOUS at 11:11

## 2021-11-11 RX ADMIN — METHYLPREDNISOLONE SODIUM SUCCINATE 40 MG: 125 INJECTION, POWDER, FOR SOLUTION INTRAMUSCULAR; INTRAVENOUS at 11:11

## 2021-11-23 ENCOUNTER — PATIENT MESSAGE (OUTPATIENT)
Dept: RHEUMATOLOGY | Facility: CLINIC | Age: 36
End: 2021-11-23
Payer: COMMERCIAL

## 2021-11-23 DIAGNOSIS — L81.9 DYSCHROMIA: ICD-10-CM

## 2021-11-23 DIAGNOSIS — R21 MALAR RASH: ICD-10-CM

## 2021-11-26 RX ORDER — TACROLIMUS 1 MG/G
OINTMENT TOPICAL 2 TIMES DAILY
Qty: 60 G | Refills: 3 | Status: SHIPPED | OUTPATIENT
Start: 2021-11-26 | End: 2022-09-15 | Stop reason: SDUPTHER

## 2021-11-26 RX ORDER — PREDNISONE 5 MG/1
15 TABLET ORAL DAILY
Qty: 20 TABLET | Refills: 0 | Status: SHIPPED | OUTPATIENT
Start: 2021-11-26 | End: 2021-11-30

## 2021-12-09 ENCOUNTER — INFUSION (OUTPATIENT)
Dept: INFUSION THERAPY | Facility: HOSPITAL | Age: 36
End: 2021-12-09
Attending: INTERNAL MEDICINE
Payer: COMMERCIAL

## 2021-12-09 VITALS
HEART RATE: 74 BPM | WEIGHT: 251.13 LBS | TEMPERATURE: 97 F | BODY MASS INDEX: 38.18 KG/M2 | OXYGEN SATURATION: 100 % | RESPIRATION RATE: 16 BRPM | SYSTOLIC BLOOD PRESSURE: 142 MMHG | DIASTOLIC BLOOD PRESSURE: 68 MMHG

## 2021-12-09 DIAGNOSIS — M35.1 MCTD (MIXED CONNECTIVE TISSUE DISEASE): ICD-10-CM

## 2021-12-09 DIAGNOSIS — M32.9 SYSTEMIC LUPUS ERYTHEMATOSUS ARTHRITIS: Primary | ICD-10-CM

## 2021-12-09 DIAGNOSIS — R76.8 POSITIVE SM/RNP ANTIBODY: ICD-10-CM

## 2021-12-09 DIAGNOSIS — M32.9 EXACERBATION OF SYSTEMIC LUPUS: ICD-10-CM

## 2021-12-09 PROCEDURE — 96365 THER/PROPH/DIAG IV INF INIT: CPT

## 2021-12-09 PROCEDURE — 25000003 PHARM REV CODE 250: Performed by: INTERNAL MEDICINE

## 2021-12-09 PROCEDURE — 63600175 PHARM REV CODE 636 W HCPCS: Mod: JA,JG | Performed by: INTERNAL MEDICINE

## 2021-12-09 PROCEDURE — 96375 TX/PRO/DX INJ NEW DRUG ADDON: CPT

## 2021-12-09 RX ORDER — DIPHENHYDRAMINE HYDROCHLORIDE 50 MG/ML
25 INJECTION INTRAMUSCULAR; INTRAVENOUS
Status: CANCELLED
Start: 2021-12-30 | End: 2021-12-30

## 2021-12-09 RX ORDER — ACETAMINOPHEN 325 MG/1
650 TABLET ORAL
Status: COMPLETED | OUTPATIENT
Start: 2021-12-09 | End: 2021-12-09

## 2021-12-09 RX ORDER — SODIUM CHLORIDE 0.9 % (FLUSH) 0.9 %
10 SYRINGE (ML) INJECTION
Status: DISCONTINUED | OUTPATIENT
Start: 2021-12-09 | End: 2021-12-09 | Stop reason: HOSPADM

## 2021-12-09 RX ORDER — ACETAMINOPHEN 325 MG/1
650 TABLET ORAL
Status: CANCELLED | OUTPATIENT
Start: 2021-12-30

## 2021-12-09 RX ORDER — HEPARIN 100 UNIT/ML
500 SYRINGE INTRAVENOUS
Status: CANCELLED | OUTPATIENT
Start: 2021-12-30

## 2021-12-09 RX ORDER — METHYLPREDNISOLONE SOD SUCC 125 MG
40 VIAL (EA) INJECTION
Status: COMPLETED | OUTPATIENT
Start: 2021-12-09 | End: 2021-12-09

## 2021-12-09 RX ORDER — SODIUM CHLORIDE 0.9 % (FLUSH) 0.9 %
10 SYRINGE (ML) INJECTION
Status: CANCELLED | OUTPATIENT
Start: 2021-12-30

## 2021-12-09 RX ORDER — DIPHENHYDRAMINE HYDROCHLORIDE 50 MG/ML
25 INJECTION INTRAMUSCULAR; INTRAVENOUS
Status: DISCONTINUED | OUTPATIENT
Start: 2021-12-09 | End: 2021-12-09 | Stop reason: HOSPADM

## 2021-12-09 RX ADMIN — BELIMUMAB 1139 MG: 400 INJECTION, POWDER, LYOPHILIZED, FOR SOLUTION INTRAVENOUS at 11:12

## 2021-12-09 RX ADMIN — ACETAMINOPHEN 650 MG: 325 TABLET ORAL at 11:12

## 2021-12-09 RX ADMIN — METHYLPREDNISOLONE SODIUM SUCCINATE 40 MG: 125 INJECTION, POWDER, FOR SOLUTION INTRAMUSCULAR; INTRAVENOUS at 11:12

## 2022-01-07 ENCOUNTER — PATIENT MESSAGE (OUTPATIENT)
Dept: INTERNAL MEDICINE | Facility: CLINIC | Age: 37
End: 2022-01-07
Payer: COMMERCIAL

## 2022-01-07 RX ORDER — PROMETHAZINE HYDROCHLORIDE AND DEXTROMETHORPHAN HYDROBROMIDE 6.25; 15 MG/5ML; MG/5ML
5 SYRUP ORAL EVERY 4 HOURS PRN
Qty: 118 ML | Refills: 0 | Status: SHIPPED | OUTPATIENT
Start: 2022-01-07 | End: 2022-01-17

## 2022-01-13 ENCOUNTER — LAB VISIT (OUTPATIENT)
Dept: LAB | Facility: HOSPITAL | Age: 37
End: 2022-01-13
Payer: COMMERCIAL

## 2022-01-13 ENCOUNTER — INFUSION (OUTPATIENT)
Dept: INFUSION THERAPY | Facility: HOSPITAL | Age: 37
End: 2022-01-13
Attending: INTERNAL MEDICINE
Payer: COMMERCIAL

## 2022-01-13 ENCOUNTER — PATIENT MESSAGE (OUTPATIENT)
Dept: RHEUMATOLOGY | Facility: CLINIC | Age: 37
End: 2022-01-13
Payer: COMMERCIAL

## 2022-01-13 ENCOUNTER — LAB VISIT (OUTPATIENT)
Dept: LAB | Facility: HOSPITAL | Age: 37
End: 2022-01-13
Attending: INTERNAL MEDICINE
Payer: COMMERCIAL

## 2022-01-13 VITALS
WEIGHT: 254.63 LBS | DIASTOLIC BLOOD PRESSURE: 77 MMHG | SYSTOLIC BLOOD PRESSURE: 120 MMHG | HEART RATE: 79 BPM | BODY MASS INDEX: 38.72 KG/M2 | RESPIRATION RATE: 16 BRPM | TEMPERATURE: 98 F

## 2022-01-13 DIAGNOSIS — M32.9 SYSTEMIC LUPUS ERYTHEMATOSUS ARTHRITIS: ICD-10-CM

## 2022-01-13 DIAGNOSIS — Z79.899 LONG-TERM CURRENT USE OF HIGH RISK MEDICATION OTHER THAN ANTICOAGULANT: ICD-10-CM

## 2022-01-13 DIAGNOSIS — M35.1 MCTD (MIXED CONNECTIVE TISSUE DISEASE): ICD-10-CM

## 2022-01-13 DIAGNOSIS — R76.8 POSITIVE SM/RNP ANTIBODY: ICD-10-CM

## 2022-01-13 DIAGNOSIS — M32.19 OTHER SYSTEMIC LUPUS ERYTHEMATOSUS WITH OTHER ORGAN INVOLVEMENT: ICD-10-CM

## 2022-01-13 DIAGNOSIS — M32.9 EXACERBATION OF SYSTEMIC LUPUS: ICD-10-CM

## 2022-01-13 DIAGNOSIS — M32.9 SYSTEMIC LUPUS ERYTHEMATOSUS ARTHRITIS: Primary | ICD-10-CM

## 2022-01-13 LAB
ALBUMIN SERPL BCP-MCNC: 3.4 G/DL (ref 3.5–5.2)
ALP SERPL-CCNC: 50 U/L (ref 55–135)
ALT SERPL W/O P-5'-P-CCNC: 14 U/L (ref 10–44)
ANION GAP SERPL CALC-SCNC: 8 MMOL/L (ref 8–16)
AST SERPL-CCNC: 17 U/L (ref 10–40)
BACTERIA #/AREA URNS HPF: ABNORMAL /HPF
BASOPHILS # BLD AUTO: 0.05 K/UL (ref 0–0.2)
BASOPHILS NFR BLD: 1.1 % (ref 0–1.9)
BILIRUB SERPL-MCNC: 0.3 MG/DL (ref 0.1–1)
BILIRUB UR QL STRIP: NEGATIVE
BUN SERPL-MCNC: 5 MG/DL (ref 6–20)
C3 SERPL-MCNC: 152 MG/DL (ref 50–180)
C4 SERPL-MCNC: 31 MG/DL (ref 11–44)
CALCIUM SERPL-MCNC: 9 MG/DL (ref 8.7–10.5)
CHLORIDE SERPL-SCNC: 105 MMOL/L (ref 95–110)
CK SERPL-CCNC: 59 U/L (ref 20–180)
CLARITY UR: ABNORMAL
CO2 SERPL-SCNC: 27 MMOL/L (ref 23–29)
COLOR UR: YELLOW
CREAT SERPL-MCNC: 0.6 MG/DL (ref 0.5–1.4)
CRP SERPL-MCNC: 12.3 MG/L (ref 0–8.2)
DIFFERENTIAL METHOD: ABNORMAL
EOSINOPHIL # BLD AUTO: 0.8 K/UL (ref 0–0.5)
EOSINOPHIL NFR BLD: 19 % (ref 0–8)
ERYTHROCYTE [DISTWIDTH] IN BLOOD BY AUTOMATED COUNT: 11.8 % (ref 11.5–14.5)
ERYTHROCYTE [SEDIMENTATION RATE] IN BLOOD BY WESTERGREN METHOD: 48 MM/HR (ref 0–36)
EST. GFR  (AFRICAN AMERICAN): >60 ML/MIN/1.73 M^2
EST. GFR  (NON AFRICAN AMERICAN): >60 ML/MIN/1.73 M^2
GLUCOSE SERPL-MCNC: 86 MG/DL (ref 70–110)
GLUCOSE UR QL STRIP: NEGATIVE
HCT VFR BLD AUTO: 36.8 % (ref 37–48.5)
HGB BLD-MCNC: 12.3 G/DL (ref 12–16)
HGB UR QL STRIP: NEGATIVE
IMM GRANULOCYTES # BLD AUTO: 0.01 K/UL (ref 0–0.04)
IMM GRANULOCYTES NFR BLD AUTO: 0.2 % (ref 0–0.5)
KETONES UR QL STRIP: NEGATIVE
LEUKOCYTE ESTERASE UR QL STRIP: ABNORMAL
LYMPHOCYTES # BLD AUTO: 0.8 K/UL (ref 1–4.8)
LYMPHOCYTES NFR BLD: 17 % (ref 18–48)
MCH RBC QN AUTO: 30.8 PG (ref 27–31)
MCHC RBC AUTO-ENTMCNC: 33.4 G/DL (ref 32–36)
MCV RBC AUTO: 92 FL (ref 82–98)
MICROSCOPIC COMMENT: ABNORMAL
MONOCYTES # BLD AUTO: 0.4 K/UL (ref 0.3–1)
MONOCYTES NFR BLD: 8.2 % (ref 4–15)
NEUTROPHILS # BLD AUTO: 2.4 K/UL (ref 1.8–7.7)
NEUTROPHILS NFR BLD: 54.5 % (ref 38–73)
NITRITE UR QL STRIP: NEGATIVE
NRBC BLD-RTO: 0 /100 WBC
PH UR STRIP: 6 [PH] (ref 5–8)
PLATELET # BLD AUTO: 488 K/UL (ref 150–450)
PMV BLD AUTO: 9.1 FL (ref 9.2–12.9)
POTASSIUM SERPL-SCNC: 4 MMOL/L (ref 3.5–5.1)
PROT SERPL-MCNC: 7.4 G/DL (ref 6–8.4)
PROT UR QL STRIP: NEGATIVE
RBC # BLD AUTO: 3.99 M/UL (ref 4–5.4)
SODIUM SERPL-SCNC: 140 MMOL/L (ref 136–145)
SP GR UR STRIP: 1.02 (ref 1–1.03)
SQUAMOUS #/AREA URNS HPF: 12 /HPF
URN SPEC COLLECT METH UR: ABNORMAL
WBC # BLD AUTO: 4.41 K/UL (ref 3.9–12.7)
WBC #/AREA URNS HPF: 8 /HPF (ref 0–5)

## 2022-01-13 PROCEDURE — 80053 COMPREHEN METABOLIC PANEL: CPT | Performed by: INTERNAL MEDICINE

## 2022-01-13 PROCEDURE — 25000003 PHARM REV CODE 250: Performed by: INTERNAL MEDICINE

## 2022-01-13 PROCEDURE — 85025 COMPLETE CBC W/AUTO DIFF WBC: CPT | Performed by: INTERNAL MEDICINE

## 2022-01-13 PROCEDURE — 63600175 PHARM REV CODE 636 W HCPCS: Mod: JA,JG | Performed by: INTERNAL MEDICINE

## 2022-01-13 PROCEDURE — 96375 TX/PRO/DX INJ NEW DRUG ADDON: CPT

## 2022-01-13 PROCEDURE — 82550 ASSAY OF CK (CPK): CPT | Performed by: INTERNAL MEDICINE

## 2022-01-13 PROCEDURE — 86225 DNA ANTIBODY NATIVE: CPT | Performed by: INTERNAL MEDICINE

## 2022-01-13 PROCEDURE — 85652 RBC SED RATE AUTOMATED: CPT | Performed by: INTERNAL MEDICINE

## 2022-01-13 PROCEDURE — 86140 C-REACTIVE PROTEIN: CPT | Performed by: INTERNAL MEDICINE

## 2022-01-13 PROCEDURE — 36415 COLL VENOUS BLD VENIPUNCTURE: CPT | Performed by: INTERNAL MEDICINE

## 2022-01-13 PROCEDURE — 96365 THER/PROPH/DIAG IV INF INIT: CPT

## 2022-01-13 PROCEDURE — 86160 COMPLEMENT ANTIGEN: CPT | Performed by: INTERNAL MEDICINE

## 2022-01-13 PROCEDURE — 81000 URINALYSIS NONAUTO W/SCOPE: CPT | Performed by: INTERNAL MEDICINE

## 2022-01-13 PROCEDURE — 86160 COMPLEMENT ANTIGEN: CPT | Mod: 59 | Performed by: INTERNAL MEDICINE

## 2022-01-13 RX ORDER — SODIUM CHLORIDE 0.9 % (FLUSH) 0.9 %
10 SYRINGE (ML) INJECTION
Status: DISCONTINUED | OUTPATIENT
Start: 2022-01-13 | End: 2022-01-13 | Stop reason: HOSPADM

## 2022-01-13 RX ORDER — DIPHENHYDRAMINE HYDROCHLORIDE 50 MG/ML
25 INJECTION INTRAMUSCULAR; INTRAVENOUS
Status: CANCELLED
Start: 2022-02-03 | End: 2022-02-03

## 2022-01-13 RX ORDER — METHYLPREDNISOLONE SOD SUCC 125 MG
40 VIAL (EA) INJECTION
Status: COMPLETED | OUTPATIENT
Start: 2022-01-13 | End: 2022-01-13

## 2022-01-13 RX ORDER — ACETAMINOPHEN 325 MG/1
650 TABLET ORAL
Status: CANCELLED | OUTPATIENT
Start: 2022-02-03

## 2022-01-13 RX ORDER — HEPARIN 100 UNIT/ML
500 SYRINGE INTRAVENOUS
Status: CANCELLED | OUTPATIENT
Start: 2022-02-03

## 2022-01-13 RX ORDER — SODIUM CHLORIDE 0.9 % (FLUSH) 0.9 %
10 SYRINGE (ML) INJECTION
Status: CANCELLED | OUTPATIENT
Start: 2022-02-03

## 2022-01-13 RX ADMIN — METHYLPREDNISOLONE SODIUM SUCCINATE 40 MG: 125 INJECTION, POWDER, FOR SOLUTION INTRAMUSCULAR; INTRAVENOUS at 11:01

## 2022-01-13 RX ADMIN — BELIMUMAB 1155 MG: 400 INJECTION, POWDER, LYOPHILIZED, FOR SOLUTION INTRAVENOUS at 11:01

## 2022-01-13 NOTE — DISCHARGE INSTRUCTIONS
[facility]  [address] [facility]  [address]  [phone] phone     [phone] fax  Hours of Operation: Monday- Friday 8:00am- 5:00pm  After hours phone  [phone]  Hematology / Oncology Physicians on call      Dr. Weston Eden, ZOYA More NP    Please call with any concerns regarding your appointment today.FALL PREVENTION   Falls often occur due to slipping, tripping or losing your balance. Here are ways to reduce your risk of falling again.   Was there anything that caused your fall that can be fixed, removed or replaced?   Make your home safe by keeping walkways clear of objects you may trip over.   Use non-slip pads under rugs.   Do not walk in poorly lit areas.   Do not stand on chairs or wobbly ladders.   Use caution when reaching overhead or looking upward. This position can cause a loss of balance.   Be sure your shoes fit properly, have non-slip bottoms and are in good condition.   Be cautious when going up and down stairs, curbs, and when walking on uneven sidewalks.   If your balance is poor, consider using a cane or walker.   If your fall was related to alcohol use, stop or limit alcohol intake.   If your fall was related to use of sleeping medicines, talk to your doctor about this. You may need to reduce your dosage at bedtime if you awaken during the night to go to the bathroom.   To reduce the need for nighttime bathroom trips:   Avoid drinking fluids for several hours before going to bed   Empty your bladder before going to bed   Men can keep a urinal at the bedside   © 3253-6412 [employer], [address] [place] [address], [place] [address]. All rights reserved. This information is not intended as a substitute for professional medical care. Always follow your healthcare professional's instructions.

## 2022-01-13 NOTE — PLAN OF CARE
Plan of care reviewed with pt. All needs and concerns addressed.   Problem: Adult Inpatient Plan of Care  Goal: Plan of Care Review  Outcome: Ongoing, Progressing  Flowsheets (Taken 1/13/2022 1302)  Plan of Care Reviewed With: patient  Goal: Patient-Specific Goal (Individualized)  Outcome: Ongoing, Progressing  Flowsheets (Taken 1/13/2022 1302)  Anxieties, Fears or Concerns: Pt recently recovered from covid  Individualized Care Needs: feet elevated in recliner with warm blanket,  at chairside  Patient-Specific Goals (Include Timeframe): Tolerate treatment today  Goal: Optimal Comfort and Wellbeing  Outcome: Ongoing, Progressing  Intervention: Provide Person-Centered Care  Flowsheets (Taken 1/13/2022 1302)  Trust Relationship/Rapport:   care explained   empathic listening provided   reassurance provided   choices provided   questions answered   thoughts/feelings acknowledged   emotional support provided   questions encouraged     Problem: Fall Injury Risk  Goal: Absence of Fall and Fall-Related Injury  Outcome: Ongoing, Progressing  Intervention: Promote Injury-Free Environment  Flowsheets (Taken 1/13/2022 1302)  Safety Promotion/Fall Prevention:   assistive device/personal item within reach   room near unit station   instructed to call staff for mobility   in recliner, wheels locked

## 2022-01-13 NOTE — NURSING
Infusion# >10    Recent labs? yes    S/S infection noted or voiced? None noted/ Denies  Recent or planned surgeries/invasive procedures? Denies  Recent vaccines?Denies    Premeds? Solumedrol 40 mg IVP    Benlysta 1155 mg administered IV at a 60 minute rate per orders; see MAR & Flow Sheet for more  details. Tolerated well without adverse events

## 2022-01-14 LAB — DSDNA AB SER-ACNC: NORMAL [IU]/ML

## 2022-02-02 ENCOUNTER — TELEPHONE (OUTPATIENT)
Dept: RHEUMATOLOGY | Facility: CLINIC | Age: 37
End: 2022-02-02
Payer: COMMERCIAL

## 2022-02-02 NOTE — TELEPHONE ENCOUNTER
"Received a call from a [facility] representative ( Ledy) stating she just had a conversation with Ms España about having her benlysta infusions at home with a home health RN. She is requesting documentation from provider to support the necessity of the pt having benlysta infusions at our out-pt hospital infusion setting. Said "this is not a denial of services" and  asked that we call her back with an update. [phone]      I called Ms. España. She is scheduled to have one more infusion at the [facility] on 2/10  then wants to talk to Dr SHORE about Benlysta SQ injections. Says she has dicussed this with him in the past and is ready to start.     [facility] notified pt will discuss her treatment with Dr Whalen.   "

## 2022-02-08 ENCOUNTER — TELEPHONE (OUTPATIENT)
Dept: RHEUMATOLOGY | Facility: CLINIC | Age: 37
End: 2022-02-08
Payer: COMMERCIAL

## 2022-02-09 ENCOUNTER — PATIENT MESSAGE (OUTPATIENT)
Dept: RHEUMATOLOGY | Facility: CLINIC | Age: 37
End: 2022-02-09
Payer: COMMERCIAL

## 2022-02-10 ENCOUNTER — INFUSION (OUTPATIENT)
Dept: INFUSION THERAPY | Facility: HOSPITAL | Age: 37
End: 2022-02-10
Attending: INTERNAL MEDICINE
Payer: COMMERCIAL

## 2022-02-10 VITALS
HEART RATE: 80 BPM | RESPIRATION RATE: 16 BRPM | WEIGHT: 255.06 LBS | BODY MASS INDEX: 38.78 KG/M2 | DIASTOLIC BLOOD PRESSURE: 76 MMHG | SYSTOLIC BLOOD PRESSURE: 116 MMHG | TEMPERATURE: 98 F | OXYGEN SATURATION: 100 %

## 2022-02-10 DIAGNOSIS — M35.1 MCTD (MIXED CONNECTIVE TISSUE DISEASE): ICD-10-CM

## 2022-02-10 DIAGNOSIS — R76.8 POSITIVE SM/RNP ANTIBODY: ICD-10-CM

## 2022-02-10 DIAGNOSIS — M32.9 SYSTEMIC LUPUS ERYTHEMATOSUS ARTHRITIS: Primary | ICD-10-CM

## 2022-02-10 DIAGNOSIS — M32.9 EXACERBATION OF SYSTEMIC LUPUS: ICD-10-CM

## 2022-02-10 PROCEDURE — 63600175 PHARM REV CODE 636 W HCPCS: Mod: JW,JG | Performed by: INTERNAL MEDICINE

## 2022-02-10 PROCEDURE — 96375 TX/PRO/DX INJ NEW DRUG ADDON: CPT

## 2022-02-10 PROCEDURE — 25000003 PHARM REV CODE 250: Performed by: INTERNAL MEDICINE

## 2022-02-10 PROCEDURE — 96365 THER/PROPH/DIAG IV INF INIT: CPT

## 2022-02-10 RX ORDER — METHYLPREDNISOLONE SOD SUCC 125 MG
40 VIAL (EA) INJECTION
Status: COMPLETED | OUTPATIENT
Start: 2022-02-10 | End: 2022-02-10

## 2022-02-10 RX ORDER — ACETAMINOPHEN 325 MG/1
650 TABLET ORAL
Status: CANCELLED | OUTPATIENT
Start: 2022-03-03

## 2022-02-10 RX ORDER — SODIUM CHLORIDE 0.9 % (FLUSH) 0.9 %
10 SYRINGE (ML) INJECTION
Status: CANCELLED | OUTPATIENT
Start: 2022-03-03

## 2022-02-10 RX ORDER — DIPHENHYDRAMINE HYDROCHLORIDE 50 MG/ML
25 INJECTION INTRAMUSCULAR; INTRAVENOUS
Status: CANCELLED
Start: 2022-03-03 | End: 2022-03-03

## 2022-02-10 RX ORDER — HEPARIN 100 UNIT/ML
500 SYRINGE INTRAVENOUS
Status: CANCELLED | OUTPATIENT
Start: 2022-03-03

## 2022-02-10 RX ADMIN — METHYLPREDNISOLONE SODIUM SUCCINATE 40 MG: 125 INJECTION, POWDER, FOR SOLUTION INTRAMUSCULAR; INTRAVENOUS at 11:02

## 2022-02-10 RX ADMIN — BELIMUMAB 1157 MG: 400 INJECTION, POWDER, LYOPHILIZED, FOR SOLUTION INTRAVENOUS at 11:02

## 2022-02-11 ENCOUNTER — TELEPHONE (OUTPATIENT)
Dept: RHEUMATOLOGY | Facility: CLINIC | Age: 37
End: 2022-02-11
Payer: COMMERCIAL

## 2022-02-14 ENCOUNTER — OFFICE VISIT (OUTPATIENT)
Dept: RHEUMATOLOGY | Facility: CLINIC | Age: 37
End: 2022-02-14
Payer: COMMERCIAL

## 2022-02-14 VITALS
BODY MASS INDEX: 39.7 KG/M2 | HEART RATE: 93 BPM | HEIGHT: 68 IN | WEIGHT: 261.94 LBS | DIASTOLIC BLOOD PRESSURE: 102 MMHG | SYSTOLIC BLOOD PRESSURE: 149 MMHG

## 2022-02-14 DIAGNOSIS — M25.562 CHRONIC PAIN OF BOTH KNEES: Primary | ICD-10-CM

## 2022-02-14 DIAGNOSIS — I73.00 SECONDARY RAYNAUD'S PHENOMENON: ICD-10-CM

## 2022-02-14 DIAGNOSIS — M32.9 SYSTEMIC LUPUS ERYTHEMATOSUS ARTHRITIS: ICD-10-CM

## 2022-02-14 DIAGNOSIS — M79.7 FIBROMYALGIA: ICD-10-CM

## 2022-02-14 DIAGNOSIS — M25.561 CHRONIC PAIN OF BOTH KNEES: Primary | ICD-10-CM

## 2022-02-14 DIAGNOSIS — G89.29 CHRONIC PAIN OF BOTH KNEES: Primary | ICD-10-CM

## 2022-02-14 DIAGNOSIS — M35.1 MCTD (MIXED CONNECTIVE TISSUE DISEASE): ICD-10-CM

## 2022-02-14 DIAGNOSIS — R76.8 POSITIVE SM/RNP ANTIBODY: ICD-10-CM

## 2022-02-14 PROCEDURE — 99999 PR PBB SHADOW E&M-EST. PATIENT-LVL IV: ICD-10-PCS | Mod: PBBFAC,,, | Performed by: INTERNAL MEDICINE

## 2022-02-14 PROCEDURE — 1160F RVW MEDS BY RX/DR IN RCRD: CPT | Mod: CPTII,S$GLB,, | Performed by: INTERNAL MEDICINE

## 2022-02-14 PROCEDURE — 1159F MED LIST DOCD IN RCRD: CPT | Mod: CPTII,S$GLB,, | Performed by: INTERNAL MEDICINE

## 2022-02-14 PROCEDURE — 1159F PR MEDICATION LIST DOCUMENTED IN MEDICAL RECORD: ICD-10-PCS | Mod: CPTII,S$GLB,, | Performed by: INTERNAL MEDICINE

## 2022-02-14 PROCEDURE — 3080F DIAST BP >= 90 MM HG: CPT | Mod: CPTII,S$GLB,, | Performed by: INTERNAL MEDICINE

## 2022-02-14 PROCEDURE — 3008F PR BODY MASS INDEX (BMI) DOCUMENTED: ICD-10-PCS | Mod: CPTII,S$GLB,, | Performed by: INTERNAL MEDICINE

## 2022-02-14 PROCEDURE — 20610 DRAIN/INJ JOINT/BURSA W/O US: CPT | Mod: 50,S$GLB,,

## 2022-02-14 PROCEDURE — 99214 OFFICE O/P EST MOD 30 MIN: CPT | Mod: 25,S$GLB,, | Performed by: INTERNAL MEDICINE

## 2022-02-14 PROCEDURE — 3077F PR MOST RECENT SYSTOLIC BLOOD PRESSURE >= 140 MM HG: ICD-10-PCS | Mod: CPTII,S$GLB,, | Performed by: INTERNAL MEDICINE

## 2022-02-14 PROCEDURE — 3008F BODY MASS INDEX DOCD: CPT | Mod: CPTII,S$GLB,, | Performed by: INTERNAL MEDICINE

## 2022-02-14 PROCEDURE — 1160F PR REVIEW ALL MEDS BY PRESCRIBER/CLIN PHARMACIST DOCUMENTED: ICD-10-PCS | Mod: CPTII,S$GLB,, | Performed by: INTERNAL MEDICINE

## 2022-02-14 PROCEDURE — 99214 PR OFFICE/OUTPT VISIT, EST, LEVL IV, 30-39 MIN: ICD-10-PCS | Mod: 25,S$GLB,, | Performed by: INTERNAL MEDICINE

## 2022-02-14 PROCEDURE — 99999 PR PBB SHADOW E&M-EST. PATIENT-LVL IV: CPT | Mod: PBBFAC,,, | Performed by: INTERNAL MEDICINE

## 2022-02-14 PROCEDURE — 3080F PR MOST RECENT DIASTOLIC BLOOD PRESSURE >= 90 MM HG: ICD-10-PCS | Mod: CPTII,S$GLB,, | Performed by: INTERNAL MEDICINE

## 2022-02-14 PROCEDURE — 20610 LARGE JOINT ASPIRATION/INJECTION: BILATERAL KNEE: ICD-10-PCS | Mod: 50,S$GLB,,

## 2022-02-14 PROCEDURE — 3077F SYST BP >= 140 MM HG: CPT | Mod: CPTII,S$GLB,, | Performed by: INTERNAL MEDICINE

## 2022-02-14 RX ORDER — TRIAMCINOLONE ACETONIDE 40 MG/ML
40 INJECTION, SUSPENSION INTRA-ARTICULAR; INTRAMUSCULAR
Status: DISCONTINUED | OUTPATIENT
Start: 2022-02-14 | End: 2022-02-14 | Stop reason: HOSPADM

## 2022-02-14 RX ORDER — GABAPENTIN 300 MG/1
300 CAPSULE ORAL 3 TIMES DAILY
Qty: 90 CAPSULE | Refills: 11 | Status: SHIPPED | OUTPATIENT
Start: 2022-02-14 | End: 2023-03-31 | Stop reason: SDUPTHER

## 2022-02-14 RX ORDER — TACROLIMUS 1 MG/1
CAPSULE ORAL
Qty: 268 CAPSULE | Refills: 0 | Status: SHIPPED | OUTPATIENT
Start: 2022-02-14 | End: 2022-03-10

## 2022-02-14 RX ADMIN — TRIAMCINOLONE ACETONIDE 40 MG: 40 INJECTION, SUSPENSION INTRA-ARTICULAR; INTRAMUSCULAR at 01:02

## 2022-02-14 NOTE — PROGRESS NOTES
RHEUMATOLOGY CLINIC FOLLOW UP VISIT  Chief complaints, HPI, ROS, EXAM, Assessment & Plans:-  Stefanie Kimbrough a 36 y.o. pleasant female comes in for follow-up visit.  She follows up in the Rheumatology Clinic for lupus arthritis, secondary Raynaud's phenomenon associated with mixed connective tissue disease with positive RNP antibody.  She comes in today with worsening bilateral knee pain-chronic achy progressive pain of bilateral knees associated with stiffness and swelling.  Also complains of worsening pain of multiple small and large joints not responding to CellCept or Benlysta infusion in the past month.  Rheumatological review of system negative otherwise.  Physical examination shows significant tenderness of bilateral knee with mild effusion.  Tenderness of multiple large joints present.  Mild synovitis of few MCP joints..    1. Chronic pain of both knees    2. Systemic lupus erythematosus arthritis    3. Secondary Raynaud's phenomenon    4. MCTD (mixed connective tissue disease)    5. Positive sm/RNP antibody    6. Fibromyalgia      Problem List Items Addressed This Visit     Systemic lupus erythematosus arthritis    Relevant Medications    tacrolimus (PROGRAF) 1 MG Cap    gabapentin (NEURONTIN) 300 MG capsule    Other Relevant Orders    CBC Auto Differential    Comprehensive Metabolic Panel    Sedimentation rate    C-Reactive Protein    MCTD (mixed connective tissue disease)    Relevant Medications    tacrolimus (PROGRAF) 1 MG Cap    Other Relevant Orders    CBC Auto Differential    Comprehensive Metabolic Panel    Sedimentation rate    C-Reactive Protein    Positive sm/RNP antibody    Secondary Raynaud's phenomenon    Relevant Orders    CBC Auto Differential    Comprehensive Metabolic Panel    Sedimentation rate    C-Reactive Protein    Chronic pain of both knees - Primary    Fibromyalgia    Relevant Medications    gabapentin (NEURONTIN) 300 MG  capsule           Inject bilateral knee with corticosteroid.   Active lupus arthritis.  Discontinue CellCept and try tacrolimus.   Try gabapentin for fibromyalgia.   Continue Benlysta.   Compromised immune system secondary to autoimmune disease and use of immunosuppressive drugs. Monitor carefully for infections. Advised to get immediate medical care if any infection. Also advised strict adherence to age appropriate vaccinations and cancer screenings with PCP.   Safety labs before next visit.      Large Joint Aspiration/Injection: bilateral knee    Date/Time: 2/14/2022 1:45 PM  Performed by: Joon Jackson MD  Authorized by: Prashanth Whalen MD     Consent Done?:  Yes (Verbal)  Indications:  Joint swelling and pain  Site marked: the procedure site was marked    Timeout: prior to procedure the correct patient, procedure, and site was verified    Prep: patient was prepped and draped in usual sterile fashion    Local anesthesia used?: No    Anesthesia:  Local infiltration  Local anesthetic:  Lidocaine 2% without epinephrine  Anesthetic total (ml):  2      Details:  Needle Size:  25 G  Ultrasonic Guidance for needle placement?: No    Approach:  Medial  Location:  Knee  Laterality:  Bilateral  Site:  Bilateral knee  Medications (Right):  40 mg triamcinolone acetonide 40 mg/mL  Medications (Left):  40 mg triamcinolone acetonide 40 mg/mL  Patient tolerance:  Patient tolerated the procedure well with no immediate complications            Disclaimer: This note was prepared using voice recognition system and is likely to have sound alike errors and is not proof read.  Please call me with any questions.

## 2022-03-09 ENCOUNTER — PATIENT MESSAGE (OUTPATIENT)
Dept: RHEUMATOLOGY | Facility: CLINIC | Age: 37
End: 2022-03-09
Payer: COMMERCIAL

## 2022-03-09 NOTE — TELEPHONE ENCOUNTER
S/W pt. Benlysta infusion scheduled for tomorrow is pending approval by her ins. Co. She reports generalized pain 7/10 exp in her arms, legs, & back. Wanting to know if something can be prescribed for her until she is approved for her infusion.

## 2022-03-10 ENCOUNTER — LAB VISIT (OUTPATIENT)
Dept: LAB | Facility: HOSPITAL | Age: 37
End: 2022-03-10
Attending: INTERNAL MEDICINE
Payer: COMMERCIAL

## 2022-03-10 ENCOUNTER — INFUSION (OUTPATIENT)
Dept: INFUSION THERAPY | Facility: HOSPITAL | Age: 37
End: 2022-03-10
Attending: INTERNAL MEDICINE
Payer: COMMERCIAL

## 2022-03-10 ENCOUNTER — PATIENT MESSAGE (OUTPATIENT)
Dept: RHEUMATOLOGY | Facility: CLINIC | Age: 37
End: 2022-03-10
Payer: COMMERCIAL

## 2022-03-10 VITALS
HEART RATE: 94 BPM | HEIGHT: 68 IN | BODY MASS INDEX: 39.22 KG/M2 | RESPIRATION RATE: 18 BRPM | SYSTOLIC BLOOD PRESSURE: 99 MMHG | TEMPERATURE: 98 F | DIASTOLIC BLOOD PRESSURE: 71 MMHG | WEIGHT: 258.81 LBS

## 2022-03-10 DIAGNOSIS — M32.9 SYSTEMIC LUPUS ERYTHEMATOSUS ARTHRITIS: ICD-10-CM

## 2022-03-10 DIAGNOSIS — M35.1 MCTD (MIXED CONNECTIVE TISSUE DISEASE): ICD-10-CM

## 2022-03-10 DIAGNOSIS — M32.9 EXACERBATION OF SYSTEMIC LUPUS: ICD-10-CM

## 2022-03-10 DIAGNOSIS — R76.8 POSITIVE SM/RNP ANTIBODY: ICD-10-CM

## 2022-03-10 DIAGNOSIS — Z79.899 LONG-TERM CURRENT USE OF HIGH RISK MEDICATION OTHER THAN ANTICOAGULANT: Primary | ICD-10-CM

## 2022-03-10 DIAGNOSIS — I73.00 SECONDARY RAYNAUD'S PHENOMENON: ICD-10-CM

## 2022-03-10 DIAGNOSIS — M32.9 SYSTEMIC LUPUS ERYTHEMATOSUS ARTHRITIS: Primary | ICD-10-CM

## 2022-03-10 LAB
ALBUMIN SERPL BCP-MCNC: 2.9 G/DL (ref 3.5–5.2)
ALP SERPL-CCNC: 45 U/L (ref 55–135)
ALT SERPL W/O P-5'-P-CCNC: 14 U/L (ref 10–44)
ANION GAP SERPL CALC-SCNC: 8 MMOL/L (ref 8–16)
AST SERPL-CCNC: 20 U/L (ref 10–40)
BASOPHILS # BLD AUTO: 0.06 K/UL (ref 0–0.2)
BASOPHILS NFR BLD: 0.6 % (ref 0–1.9)
BILIRUB SERPL-MCNC: 0.4 MG/DL (ref 0.1–1)
BUN SERPL-MCNC: 5 MG/DL (ref 6–20)
CALCIUM SERPL-MCNC: 8.9 MG/DL (ref 8.7–10.5)
CHLORIDE SERPL-SCNC: 101 MMOL/L (ref 95–110)
CO2 SERPL-SCNC: 28 MMOL/L (ref 23–29)
CREAT SERPL-MCNC: 0.6 MG/DL (ref 0.5–1.4)
CRP SERPL-MCNC: 54.2 MG/L (ref 0–8.2)
DIFFERENTIAL METHOD: ABNORMAL
EOSINOPHIL # BLD AUTO: 2.7 K/UL (ref 0–0.5)
EOSINOPHIL NFR BLD: 27.9 % (ref 0–8)
ERYTHROCYTE [DISTWIDTH] IN BLOOD BY AUTOMATED COUNT: 12 % (ref 11.5–14.5)
ERYTHROCYTE [SEDIMENTATION RATE] IN BLOOD BY WESTERGREN METHOD: 45 MM/HR (ref 0–36)
EST. GFR  (AFRICAN AMERICAN): >60 ML/MIN/1.73 M^2
EST. GFR  (NON AFRICAN AMERICAN): >60 ML/MIN/1.73 M^2
GLUCOSE SERPL-MCNC: 79 MG/DL (ref 70–110)
HCT VFR BLD AUTO: 34 % (ref 37–48.5)
HGB BLD-MCNC: 11 G/DL (ref 12–16)
IMM GRANULOCYTES # BLD AUTO: 0.03 K/UL (ref 0–0.04)
IMM GRANULOCYTES NFR BLD AUTO: 0.3 % (ref 0–0.5)
LYMPHOCYTES # BLD AUTO: 0.7 K/UL (ref 1–4.8)
LYMPHOCYTES NFR BLD: 7.4 % (ref 18–48)
MCH RBC QN AUTO: 30.4 PG (ref 27–31)
MCHC RBC AUTO-ENTMCNC: 32.4 G/DL (ref 32–36)
MCV RBC AUTO: 94 FL (ref 82–98)
MONOCYTES # BLD AUTO: 0.3 K/UL (ref 0.3–1)
MONOCYTES NFR BLD: 3.4 % (ref 4–15)
NEUTROPHILS # BLD AUTO: 5.8 K/UL (ref 1.8–7.7)
NEUTROPHILS NFR BLD: 60.4 % (ref 38–73)
NRBC BLD-RTO: 0 /100 WBC
PLATELET # BLD AUTO: 629 K/UL (ref 150–450)
PMV BLD AUTO: 8 FL (ref 9.2–12.9)
POTASSIUM SERPL-SCNC: 3.8 MMOL/L (ref 3.5–5.1)
PROT SERPL-MCNC: 6.8 G/DL (ref 6–8.4)
RBC # BLD AUTO: 3.62 M/UL (ref 4–5.4)
SODIUM SERPL-SCNC: 137 MMOL/L (ref 136–145)
WBC # BLD AUTO: 9.63 K/UL (ref 3.9–12.7)

## 2022-03-10 PROCEDURE — 63600175 PHARM REV CODE 636 W HCPCS: Performed by: INTERNAL MEDICINE

## 2022-03-10 PROCEDURE — 85025 COMPLETE CBC W/AUTO DIFF WBC: CPT | Performed by: INTERNAL MEDICINE

## 2022-03-10 PROCEDURE — 96365 THER/PROPH/DIAG IV INF INIT: CPT

## 2022-03-10 PROCEDURE — 36415 COLL VENOUS BLD VENIPUNCTURE: CPT | Performed by: INTERNAL MEDICINE

## 2022-03-10 PROCEDURE — 85652 RBC SED RATE AUTOMATED: CPT | Performed by: INTERNAL MEDICINE

## 2022-03-10 PROCEDURE — 96375 TX/PRO/DX INJ NEW DRUG ADDON: CPT

## 2022-03-10 PROCEDURE — 25000003 PHARM REV CODE 250: Performed by: INTERNAL MEDICINE

## 2022-03-10 PROCEDURE — 86140 C-REACTIVE PROTEIN: CPT | Performed by: INTERNAL MEDICINE

## 2022-03-10 PROCEDURE — 80053 COMPREHEN METABOLIC PANEL: CPT | Performed by: INTERNAL MEDICINE

## 2022-03-10 RX ORDER — ACETAMINOPHEN 325 MG/1
650 TABLET ORAL
Status: COMPLETED | OUTPATIENT
Start: 2022-03-10 | End: 2022-03-10

## 2022-03-10 RX ORDER — SODIUM CHLORIDE 0.9 % (FLUSH) 0.9 %
10 SYRINGE (ML) INJECTION
Status: CANCELLED | OUTPATIENT
Start: 2022-03-31

## 2022-03-10 RX ORDER — HEPARIN 100 UNIT/ML
500 SYRINGE INTRAVENOUS
Status: CANCELLED | OUTPATIENT
Start: 2022-03-31

## 2022-03-10 RX ORDER — SODIUM CHLORIDE 0.9 % (FLUSH) 0.9 %
10 SYRINGE (ML) INJECTION
Status: DISCONTINUED | OUTPATIENT
Start: 2022-03-10 | End: 2022-03-10 | Stop reason: HOSPADM

## 2022-03-10 RX ORDER — METHYLPREDNISOLONE SOD SUCC 125 MG
40 VIAL (EA) INJECTION
Status: COMPLETED | OUTPATIENT
Start: 2022-03-10 | End: 2022-03-10

## 2022-03-10 RX ORDER — ACETAMINOPHEN 325 MG/1
650 TABLET ORAL
Status: CANCELLED | OUTPATIENT
Start: 2022-03-31

## 2022-03-10 RX ORDER — TACROLIMUS 1 MG/1
3 CAPSULE ORAL EVERY 12 HOURS
Qty: 180 CAPSULE | Refills: 0 | Status: SHIPPED | OUTPATIENT
Start: 2022-03-10 | End: 2022-05-02

## 2022-03-10 RX ORDER — DIPHENHYDRAMINE HYDROCHLORIDE 50 MG/ML
25 INJECTION INTRAMUSCULAR; INTRAVENOUS
Status: CANCELLED
Start: 2022-03-31 | End: 2022-03-31

## 2022-03-10 RX ADMIN — BELIMUMAB 1174 MG: 400 INJECTION, POWDER, LYOPHILIZED, FOR SOLUTION INTRAVENOUS at 12:03

## 2022-03-10 RX ADMIN — METHYLPREDNISOLONE SODIUM SUCCINATE 40 MG: 125 INJECTION, POWDER, FOR SOLUTION INTRAMUSCULAR; INTRAVENOUS at 11:03

## 2022-03-10 RX ADMIN — ACETAMINOPHEN 650 MG: 325 TABLET ORAL at 11:03

## 2022-03-10 NOTE — DISCHARGE INSTRUCTIONS
.THANKS FOR ALLOWING ME TO CARE FOR YOU!!!! ~Gena          THANKS FOR CHOOSING [facility]!!!        [facility]  [address] [facility].  or  [id number] [place] [address] Drive  [phone] phone     [phone] fax  Hours of Operation: Monday- Friday 8:00am- 5:00pm  After hours phone  [phone]  Hematology / Oncology Physicians on call      Dr. Weston Eden, ZOYA More, ZOYA Rangel, ZOYA  Please call with any concerns regarding your appointment today.    .WAYS TO HELP PREVENT INFECTION        WASH YOUR HANDS OFTEN DURING THE DAY, ESPECIALLY BEFORE YOU EAT, AFTER USING THE BATHROOM, AND AFTER TOUCHING ANIMALS    STAY AWAY FROM PEOPLE WHO HAVE ILLNESSES YOU CAN CATCH; SUCH AS COLDS, FLU, CHICKEN POX    TRY TO AVOID CROWDS    STAY AWAY FROM CHILDREN WHO RECENTLY HAVE RECEIVED LIVE VIRUS VACCINES    MAINTAIN GOOD MOUTH CARE    DO NOT SQUEEZE OR SCRATCH PIMPLES    CLEAN CUTS & SCRAPES RIGHT AWAY AND DAILY UNTIL HEALED WITH WARM WATER, SOAP & AN ANTISEPTIC    AVOID CONTACT WITH LITTER BOXES, BIRD CAGES, & FISH TANKS    AVOID STANDING WATER, IE., BIRD BATHS, FLOWER POTS/VASES, OR HUMIDIFIERS    WEAR GLOVES WHEN GARDENING OR CLEANING UP AFTER OTHERS, ESPECIALLY BABIES & SMALL CHILDREN    DO NOT EAT RAW FISH, SEAFOOD, MEAT, OR EGGS      .FALL PREVENTION   Falls often occur due to slipping, tripping or losing your balance. Here are ways to reduce your risk of falling again.   Was there anything that caused your fall that can be fixed, removed or replaced?   Make your home safe by keeping walkways clear of objects you may trip over.   Use non-slip pads under rugs.   Do not walk in poorly lit areas.   Do not stand on chairs or wobbly ladders.   Use caution when reaching overhead or looking upward. This position can cause a loss of balance.   Be sure your shoes fit properly, have non-slip bottoms and are in good condition.   Be cautious when  going up and down stairs, curbs, and when walking on uneven sidewalks.   If your balance is poor, consider using a cane or walker.   If your fall was related to alcohol use, stop or limit alcohol intake.   If your fall was related to use of sleeping medicines, talk to your doctor about this. You may need to reduce your dosage at bedtime if you awaken during the night to go to the bathroom.   To reduce the need for nighttime bathroom trips:   Avoid drinking fluids for several hours before going to bed   Empty your bladder before going to bed   Men can keep a urinal at the bedside   © 7820-3505 [facility], [address] [place] [address], [place] [address]. All rights reserved. This information is not intended as a substitute for professional medical care. Always follow your healthcare professional's instructions.

## 2022-03-10 NOTE — PLAN OF CARE
Problem: Adult Inpatient Plan of Care  Goal: Plan of Care Review  Outcome: Ongoing, Progressing  Flowsheets (Taken 3/10/2022 1245)  Plan of Care Reviewed With: patient  Goal: Patient-Specific Goal (Individualized)  Outcome: Ongoing, Progressing  Flowsheets (Taken 3/10/2022 1245)  Anxieties, Fears or Concerns: none  Individualized Care Needs: feet elevated, warm blanket, pillow, snacks  Patient-Specific Goals (Include Timeframe): Patient will tolerate Benlysta without events  Goal: Optimal Comfort and Wellbeing  Intervention: Provide Person-Centered Care  Flowsheets (Taken 3/10/2022 1247)  Trust Relationship/Rapport:   care explained   choices provided   emotional support provided   empathic listening provided   questions answered   questions encouraged   reassurance provided   thoughts/feelings acknowledged     Problem: Adult Inpatient Plan of Care  Goal: Patient-Specific Goal (Individualized)  Outcome: Ongoing, Progressing  Flowsheets (Taken 3/10/2022 1245)  Anxieties, Fears or Concerns: none  Individualized Care Needs: feet elevated, warm blanket, pillow, snacks  Patient-Specific Goals (Include Timeframe): Patient will tolerate Benlysta without events     Problem: Adult Inpatient Plan of Care  Goal: Optimal Comfort and Wellbeing  Intervention: Provide Person-Centered Care  Flowsheets (Taken 3/10/2022 1247)  Trust Relationship/Rapport:   care explained   choices provided   emotional support provided   empathic listening provided   questions answered   questions encouraged   reassurance provided   thoughts/feelings acknowledged     Problem: Infection  Goal: Absence of Infection Signs and Symptoms  Outcome: Ongoing, Progressing  Intervention: Prevent or Manage Infection  Flowsheets (Taken 3/10/2022 1245)  Infection Management: aseptic technique maintained     Problem: Fall Injury Risk  Goal: Absence of Fall and Fall-Related Injury  Outcome: Ongoing, Progressing  Intervention: Promote Injury-Free  Environment  Flowsheets (Taken 3/10/2022 1245)  Safety Promotion/Fall Prevention:   high risk medications identified   assistive device/personal item within reach   in recliner, wheels locked

## 2022-03-10 NOTE — TELEPHONE ENCOUNTER
Please request patient to increase tacrolimus to 3 mg TWO TIMES DAILY and schedule CBC, CMP, ESR, CRP  And SERUM TACROLIMUS levels - 30 minutes before the morning dose in 4 WEEKS. Thanks.

## 2022-03-17 ENCOUNTER — PATIENT MESSAGE (OUTPATIENT)
Dept: RHEUMATOLOGY | Facility: CLINIC | Age: 37
End: 2022-03-17
Payer: COMMERCIAL

## 2022-03-17 ENCOUNTER — LAB VISIT (OUTPATIENT)
Dept: LAB | Facility: HOSPITAL | Age: 37
End: 2022-03-17
Attending: FAMILY MEDICINE
Payer: COMMERCIAL

## 2022-03-17 DIAGNOSIS — I73.00 SECONDARY RAYNAUD'S PHENOMENON: ICD-10-CM

## 2022-03-17 DIAGNOSIS — Z79.899 LONG-TERM CURRENT USE OF HIGH RISK MEDICATION OTHER THAN ANTICOAGULANT: ICD-10-CM

## 2022-03-17 DIAGNOSIS — M32.9 SYSTEMIC LUPUS ERYTHEMATOSUS ARTHRITIS: ICD-10-CM

## 2022-03-17 DIAGNOSIS — M06.09 RHEUMATOID ARTHRITIS OF MULTIPLE SITES WITH NEGATIVE RHEUMATOID FACTOR: ICD-10-CM

## 2022-03-17 DIAGNOSIS — M35.1 MCTD (MIXED CONNECTIVE TISSUE DISEASE): ICD-10-CM

## 2022-03-17 LAB
ALBUMIN SERPL BCP-MCNC: 2.9 G/DL (ref 3.5–5.2)
ALP SERPL-CCNC: 57 U/L (ref 55–135)
ALT SERPL W/O P-5'-P-CCNC: 18 U/L (ref 10–44)
ANION GAP SERPL CALC-SCNC: 10 MMOL/L (ref 8–16)
AST SERPL-CCNC: 20 U/L (ref 10–40)
BASOPHILS # BLD AUTO: 0.03 K/UL (ref 0–0.2)
BASOPHILS NFR BLD: 0.3 % (ref 0–1.9)
BILIRUB SERPL-MCNC: 0.2 MG/DL (ref 0.1–1)
BUN SERPL-MCNC: 6 MG/DL (ref 6–20)
CALCIUM SERPL-MCNC: 9 MG/DL (ref 8.7–10.5)
CHLORIDE SERPL-SCNC: 102 MMOL/L (ref 95–110)
CO2 SERPL-SCNC: 26 MMOL/L (ref 23–29)
CREAT SERPL-MCNC: 0.6 MG/DL (ref 0.5–1.4)
CRP SERPL-MCNC: 62.2 MG/L (ref 0–8.2)
DIFFERENTIAL METHOD: ABNORMAL
EOSINOPHIL # BLD AUTO: 2.5 K/UL (ref 0–0.5)
EOSINOPHIL NFR BLD: 22.6 % (ref 0–8)
ERYTHROCYTE [DISTWIDTH] IN BLOOD BY AUTOMATED COUNT: 11.9 % (ref 11.5–14.5)
ERYTHROCYTE [SEDIMENTATION RATE] IN BLOOD BY WESTERGREN METHOD: 51 MM/HR (ref 0–36)
EST. GFR  (AFRICAN AMERICAN): >60 ML/MIN/1.73 M^2
EST. GFR  (NON AFRICAN AMERICAN): >60 ML/MIN/1.73 M^2
GLUCOSE SERPL-MCNC: 94 MG/DL (ref 70–110)
HCT VFR BLD AUTO: 33.2 % (ref 37–48.5)
HGB BLD-MCNC: 10.9 G/DL (ref 12–16)
IMM GRANULOCYTES # BLD AUTO: 0.04 K/UL (ref 0–0.04)
IMM GRANULOCYTES NFR BLD AUTO: 0.4 % (ref 0–0.5)
LYMPHOCYTES # BLD AUTO: 0.7 K/UL (ref 1–4.8)
LYMPHOCYTES NFR BLD: 6.5 % (ref 18–48)
MCH RBC QN AUTO: 30.5 PG (ref 27–31)
MCHC RBC AUTO-ENTMCNC: 32.8 G/DL (ref 32–36)
MCV RBC AUTO: 93 FL (ref 82–98)
MONOCYTES # BLD AUTO: 0.3 K/UL (ref 0.3–1)
MONOCYTES NFR BLD: 3 % (ref 4–15)
NEUTROPHILS # BLD AUTO: 7.3 K/UL (ref 1.8–7.7)
NEUTROPHILS NFR BLD: 67.2 % (ref 38–73)
NRBC BLD-RTO: 0 /100 WBC
PLATELET # BLD AUTO: 661 K/UL (ref 150–450)
PLATELET BLD QL SMEAR: ABNORMAL
PMV BLD AUTO: 7.9 FL (ref 9.2–12.9)
POTASSIUM SERPL-SCNC: 4.2 MMOL/L (ref 3.5–5.1)
PROT SERPL-MCNC: 6.9 G/DL (ref 6–8.4)
RBC # BLD AUTO: 3.57 M/UL (ref 4–5.4)
SODIUM SERPL-SCNC: 138 MMOL/L (ref 136–145)
WBC # BLD AUTO: 10.85 K/UL (ref 3.9–12.7)

## 2022-03-17 PROCEDURE — 85652 RBC SED RATE AUTOMATED: CPT | Performed by: INTERNAL MEDICINE

## 2022-03-17 PROCEDURE — 86140 C-REACTIVE PROTEIN: CPT | Performed by: INTERNAL MEDICINE

## 2022-03-17 PROCEDURE — 85025 COMPLETE CBC W/AUTO DIFF WBC: CPT | Performed by: INTERNAL MEDICINE

## 2022-03-17 PROCEDURE — 80197 ASSAY OF TACROLIMUS: CPT | Performed by: INTERNAL MEDICINE

## 2022-03-17 PROCEDURE — 80053 COMPREHEN METABOLIC PANEL: CPT | Performed by: INTERNAL MEDICINE

## 2022-03-17 PROCEDURE — 36415 COLL VENOUS BLD VENIPUNCTURE: CPT | Performed by: INTERNAL MEDICINE

## 2022-03-18 LAB — TACROLIMUS BLD-MCNC: 2.5 NG/ML (ref 5–15)

## 2022-03-21 PROBLEM — M06.09 RHEUMATOID ARTHRITIS OF MULTIPLE SITES WITH NEGATIVE RHEUMATOID FACTOR: Status: ACTIVE | Noted: 2022-03-21

## 2022-03-21 RX ORDER — ACETAMINOPHEN 325 MG/1
650 TABLET ORAL
Status: CANCELLED | OUTPATIENT
Start: 2022-03-21

## 2022-03-21 RX ORDER — HEPARIN 100 UNIT/ML
500 SYRINGE INTRAVENOUS
Status: CANCELLED | OUTPATIENT
Start: 2022-03-21

## 2022-03-21 RX ORDER — DIPHENHYDRAMINE HYDROCHLORIDE 50 MG/ML
50 INJECTION INTRAMUSCULAR; INTRAVENOUS ONCE AS NEEDED
Status: CANCELLED | OUTPATIENT
Start: 2022-03-21

## 2022-03-21 RX ORDER — DIPHENHYDRAMINE HYDROCHLORIDE 50 MG/ML
50 INJECTION INTRAMUSCULAR; INTRAVENOUS
Status: CANCELLED | OUTPATIENT
Start: 2022-03-21

## 2022-03-21 RX ORDER — EPINEPHRINE 0.3 MG/.3ML
0.3 INJECTION SUBCUTANEOUS ONCE AS NEEDED
Status: CANCELLED | OUTPATIENT
Start: 2022-03-21

## 2022-03-21 RX ORDER — SODIUM CHLORIDE 0.9 % (FLUSH) 0.9 %
10 SYRINGE (ML) INJECTION
Status: CANCELLED | OUTPATIENT
Start: 2022-03-21

## 2022-03-21 NOTE — TELEPHONE ENCOUNTER
Active worsening MCTD with seronegative rheumatoid arthritis with worsening inflammatory markers. Start actemra. Called and advised patient.

## 2022-03-22 NOTE — TELEPHONE ENCOUNTER
Spoke with pt regarding new orders to discontinue Benlysta and initiate Actemra IV. Discussed indications, MD orders, infusion administration and schedule. First infusion is scheduled for 3/25/22 pending ins. Approval. Pre-service intake and Samra Gallo notified. Marisol Hart will sign pt up for co-pay assist. Pt verbalized understanding.

## 2022-03-25 ENCOUNTER — INFUSION (OUTPATIENT)
Dept: INFUSION THERAPY | Facility: HOSPITAL | Age: 37
End: 2022-03-25
Attending: INTERNAL MEDICINE
Payer: COMMERCIAL

## 2022-03-25 VITALS
SYSTOLIC BLOOD PRESSURE: 127 MMHG | HEART RATE: 101 BPM | TEMPERATURE: 97 F | WEIGHT: 265.19 LBS | OXYGEN SATURATION: 100 % | RESPIRATION RATE: 18 BRPM | DIASTOLIC BLOOD PRESSURE: 73 MMHG | BODY MASS INDEX: 40.33 KG/M2

## 2022-03-25 DIAGNOSIS — M06.09 RHEUMATOID ARTHRITIS OF MULTIPLE SITES WITH NEGATIVE RHEUMATOID FACTOR: Primary | ICD-10-CM

## 2022-03-25 PROCEDURE — 96365 THER/PROPH/DIAG IV INF INIT: CPT

## 2022-03-25 PROCEDURE — 63600175 PHARM REV CODE 636 W HCPCS: Mod: JG | Performed by: INTERNAL MEDICINE

## 2022-03-25 PROCEDURE — 25000003 PHARM REV CODE 250: Performed by: INTERNAL MEDICINE

## 2022-03-25 RX ORDER — HEPARIN 100 UNIT/ML
500 SYRINGE INTRAVENOUS
Status: CANCELLED | OUTPATIENT
Start: 2022-04-22

## 2022-03-25 RX ORDER — EPINEPHRINE 0.3 MG/.3ML
0.3 INJECTION SUBCUTANEOUS ONCE AS NEEDED
Status: CANCELLED | OUTPATIENT
Start: 2022-04-22

## 2022-03-25 RX ORDER — SODIUM CHLORIDE 0.9 % (FLUSH) 0.9 %
10 SYRINGE (ML) INJECTION
Status: CANCELLED | OUTPATIENT
Start: 2022-04-22

## 2022-03-25 RX ORDER — DIPHENHYDRAMINE HYDROCHLORIDE 50 MG/ML
50 INJECTION INTRAMUSCULAR; INTRAVENOUS ONCE AS NEEDED
Status: CANCELLED | OUTPATIENT
Start: 2022-04-22

## 2022-03-25 RX ORDER — ACETAMINOPHEN 325 MG/1
650 TABLET ORAL
Status: CANCELLED | OUTPATIENT
Start: 2022-04-22

## 2022-03-25 RX ORDER — DIPHENHYDRAMINE HYDROCHLORIDE 50 MG/ML
50 INJECTION INTRAMUSCULAR; INTRAVENOUS
Status: CANCELLED | OUTPATIENT
Start: 2022-04-22

## 2022-03-25 RX ADMIN — TOCILIZUMAB 482 MG: 20 INJECTION, SOLUTION, CONCENTRATE INTRAVENOUS at 02:03

## 2022-03-25 NOTE — PLAN OF CARE
Discussed plan of care with pt. Addressed any and ongoing concerns. Pt denies   Problem: Adult Inpatient Plan of Care  Goal: Plan of Care Review  Outcome: Ongoing, Progressing  Flowsheets (Taken 3/25/2022 1500)  Plan of Care Reviewed With: patient  Goal: Patient-Specific Goal (Individualized)  Outcome: Ongoing, Progressing  Goal: Absence of Hospital-Acquired Illness or Injury  Outcome: Ongoing, Progressing  Intervention: Identify and Manage Fall Risk  Flowsheets (Taken 3/25/2022 1500)  Safety Promotion/Fall Prevention: in recliner, wheels locked  Intervention: Prevent Infection  Flowsheets (Taken 3/25/2022 1500)  Infection Prevention:   hand hygiene promoted   equipment surfaces disinfected  Goal: Optimal Comfort and Wellbeing  Outcome: Ongoing, Progressing  Intervention: Provide Person-Centered Care  Flowsheets (Taken 3/25/2022 1500)  Trust Relationship/Rapport:   care explained   reassurance provided   thoughts/feelings acknowledged   choices provided   emotional support provided   empathic listening provided   questions answered   questions encouraged

## 2022-03-25 NOTE — NURSING
Infusion # 1      Any:  -recent illness, infection, or antibiotic use in past week- denies  -open wounds or mouth sores- denies  -invasive procedures or surgeries in past 4 weeks or in upcoming 4 weeks- denies  -vaccinations in past week- denies  -any new symptoms/change in symptoms-denies  -chance you may be pregnant- denies      Recent labs? 3/17/22  Last Rheumatology provider visit- Seen by Dr. SHORE on 2/14/22     Premeds-none     Actemra 482 mg administered IV at a 60 minute rate per orders; see MAR and vitals for more details. Tolerated well without adverse events, discharged and ambulatory out of clinic.

## 2022-03-28 ENCOUNTER — PATIENT MESSAGE (OUTPATIENT)
Dept: RHEUMATOLOGY | Facility: CLINIC | Age: 37
End: 2022-03-28
Payer: COMMERCIAL

## 2022-03-28 DIAGNOSIS — M06.09 RHEUMATOID ARTHRITIS OF MULTIPLE SITES WITH NEGATIVE RHEUMATOID FACTOR: ICD-10-CM

## 2022-03-28 DIAGNOSIS — R60.9 EDEMA, UNSPECIFIED TYPE: Primary | ICD-10-CM

## 2022-03-29 RX ORDER — BUMETANIDE 1 MG/1
1 TABLET ORAL DAILY
Qty: 10 TABLET | Refills: 0 | Status: SHIPPED | OUTPATIENT
Start: 2022-03-29

## 2022-03-29 RX ORDER — PREDNISONE 10 MG/1
10 TABLET ORAL DAILY
Qty: 30 TABLET | Refills: 0 | Status: SHIPPED | OUTPATIENT
Start: 2022-03-29 | End: 2022-04-25

## 2022-03-29 RX ORDER — BUMETANIDE 1 MG/1
1 TABLET ORAL DAILY
Qty: 30 TABLET | Refills: 1 | Status: SHIPPED | OUTPATIENT
Start: 2022-03-29 | End: 2022-03-29

## 2022-03-29 RX ORDER — PREDNISONE 10 MG/1
10 TABLET ORAL DAILY
Qty: 30 TABLET | Refills: 1 | Status: SHIPPED | OUTPATIENT
Start: 2022-03-29 | End: 2022-03-29

## 2022-04-04 ENCOUNTER — PATIENT MESSAGE (OUTPATIENT)
Dept: RHEUMATOLOGY | Facility: CLINIC | Age: 37
End: 2022-04-04
Payer: COMMERCIAL

## 2022-04-07 ENCOUNTER — SPECIALTY PHARMACY (OUTPATIENT)
Dept: PHARMACY | Facility: CLINIC | Age: 37
End: 2022-04-07
Payer: COMMERCIAL

## 2022-04-07 NOTE — TELEPHONE ENCOUNTER
Jamie, this is Milad Best with [facility].  We are working on your prescription that your doctor has sent us. We will be working with your insurance to get this approved for you. We will be calling you along the way with updates on your medication.  If you have any questions, you can reach us at [phone].    Welcome call outcome: Patient/caregiver reached     Patient would like to have filled at [facility]

## 2022-04-21 ENCOUNTER — INFUSION (OUTPATIENT)
Dept: INFUSION THERAPY | Facility: HOSPITAL | Age: 37
End: 2022-04-21
Attending: INTERNAL MEDICINE
Payer: COMMERCIAL

## 2022-04-21 ENCOUNTER — LAB VISIT (OUTPATIENT)
Dept: LAB | Facility: HOSPITAL | Age: 37
End: 2022-04-21
Attending: FAMILY MEDICINE
Payer: COMMERCIAL

## 2022-04-21 VITALS
HEART RATE: 92 BPM | DIASTOLIC BLOOD PRESSURE: 57 MMHG | WEIGHT: 256.63 LBS | SYSTOLIC BLOOD PRESSURE: 102 MMHG | RESPIRATION RATE: 18 BRPM | BODY MASS INDEX: 39.02 KG/M2 | TEMPERATURE: 98 F | OXYGEN SATURATION: 99 %

## 2022-04-21 DIAGNOSIS — M35.1 MCTD (MIXED CONNECTIVE TISSUE DISEASE): ICD-10-CM

## 2022-04-21 DIAGNOSIS — M32.9 SYSTEMIC LUPUS ERYTHEMATOSUS ARTHRITIS: ICD-10-CM

## 2022-04-21 DIAGNOSIS — I73.00 SECONDARY RAYNAUD'S PHENOMENON: ICD-10-CM

## 2022-04-21 DIAGNOSIS — M06.09 RHEUMATOID ARTHRITIS OF MULTIPLE SITES WITH NEGATIVE RHEUMATOID FACTOR: Primary | ICD-10-CM

## 2022-04-21 LAB
ALBUMIN SERPL BCP-MCNC: 2.6 G/DL (ref 3.5–5.2)
ALP SERPL-CCNC: 49 U/L (ref 55–135)
ALT SERPL W/O P-5'-P-CCNC: 19 U/L (ref 10–44)
ANION GAP SERPL CALC-SCNC: 7 MMOL/L (ref 8–16)
ANISOCYTOSIS BLD QL SMEAR: SLIGHT
AST SERPL-CCNC: 22 U/L (ref 10–40)
BASOPHILS # BLD AUTO: 0.07 K/UL (ref 0–0.2)
BASOPHILS NFR BLD: 0.6 % (ref 0–1.9)
BILIRUB SERPL-MCNC: 0.4 MG/DL (ref 0.1–1)
BUN SERPL-MCNC: 5 MG/DL (ref 6–20)
CALCIUM SERPL-MCNC: 8.6 MG/DL (ref 8.7–10.5)
CHLORIDE SERPL-SCNC: 103 MMOL/L (ref 95–110)
CO2 SERPL-SCNC: 27 MMOL/L (ref 23–29)
CREAT SERPL-MCNC: 0.6 MG/DL (ref 0.5–1.4)
CRP SERPL-MCNC: 75.1 MG/L (ref 0–8.2)
DIFFERENTIAL METHOD: ABNORMAL
EOSINOPHIL # BLD AUTO: 2.5 K/UL (ref 0–0.5)
EOSINOPHIL NFR BLD: 23 % (ref 0–8)
ERYTHROCYTE [DISTWIDTH] IN BLOOD BY AUTOMATED COUNT: 13 % (ref 11.5–14.5)
ERYTHROCYTE [SEDIMENTATION RATE] IN BLOOD BY WESTERGREN METHOD: 54 MM/HR (ref 0–36)
EST. GFR  (AFRICAN AMERICAN): >60 ML/MIN/1.73 M^2
EST. GFR  (NON AFRICAN AMERICAN): >60 ML/MIN/1.73 M^2
GLUCOSE SERPL-MCNC: 84 MG/DL (ref 70–110)
HCT VFR BLD AUTO: 32.9 % (ref 37–48.5)
HGB BLD-MCNC: 10.6 G/DL (ref 12–16)
IMM GRANULOCYTES # BLD AUTO: 0.06 K/UL (ref 0–0.04)
IMM GRANULOCYTES NFR BLD AUTO: 0.6 % (ref 0–0.5)
LYMPHOCYTES # BLD AUTO: 0.9 K/UL (ref 1–4.8)
LYMPHOCYTES NFR BLD: 8.5 % (ref 18–48)
MCH RBC QN AUTO: 29.6 PG (ref 27–31)
MCHC RBC AUTO-ENTMCNC: 32.2 G/DL (ref 32–36)
MCV RBC AUTO: 92 FL (ref 82–98)
MONOCYTES # BLD AUTO: 0.6 K/UL (ref 0.3–1)
MONOCYTES NFR BLD: 5.9 % (ref 4–15)
NEUTROPHILS # BLD AUTO: 6.7 K/UL (ref 1.8–7.7)
NEUTROPHILS NFR BLD: 61.4 % (ref 38–73)
NRBC BLD-RTO: 0 /100 WBC
PLATELET # BLD AUTO: 604 K/UL (ref 150–450)
PLATELET BLD QL SMEAR: ABNORMAL
PMV BLD AUTO: 7.8 FL (ref 9.2–12.9)
POIKILOCYTOSIS BLD QL SMEAR: SLIGHT
POLYCHROMASIA BLD QL SMEAR: ABNORMAL
POTASSIUM SERPL-SCNC: 3.7 MMOL/L (ref 3.5–5.1)
PROT SERPL-MCNC: 7 G/DL (ref 6–8.4)
RBC # BLD AUTO: 3.58 M/UL (ref 4–5.4)
SODIUM SERPL-SCNC: 137 MMOL/L (ref 136–145)
WBC # BLD AUTO: 10.85 K/UL (ref 3.9–12.7)

## 2022-04-21 PROCEDURE — 96365 THER/PROPH/DIAG IV INF INIT: CPT

## 2022-04-21 PROCEDURE — 25000003 PHARM REV CODE 250: Performed by: INTERNAL MEDICINE

## 2022-04-21 PROCEDURE — 85025 COMPLETE CBC W/AUTO DIFF WBC: CPT | Performed by: INTERNAL MEDICINE

## 2022-04-21 PROCEDURE — 36415 COLL VENOUS BLD VENIPUNCTURE: CPT | Performed by: INTERNAL MEDICINE

## 2022-04-21 PROCEDURE — 63600175 PHARM REV CODE 636 W HCPCS: Mod: JG | Performed by: INTERNAL MEDICINE

## 2022-04-21 PROCEDURE — 85652 RBC SED RATE AUTOMATED: CPT | Performed by: INTERNAL MEDICINE

## 2022-04-21 PROCEDURE — 86140 C-REACTIVE PROTEIN: CPT | Performed by: INTERNAL MEDICINE

## 2022-04-21 PROCEDURE — 80053 COMPREHEN METABOLIC PANEL: CPT | Performed by: INTERNAL MEDICINE

## 2022-04-21 RX ORDER — DIPHENHYDRAMINE HYDROCHLORIDE 50 MG/ML
50 INJECTION INTRAMUSCULAR; INTRAVENOUS ONCE AS NEEDED
Status: CANCELLED | OUTPATIENT
Start: 2022-05-19

## 2022-04-21 RX ORDER — EPINEPHRINE 0.3 MG/.3ML
0.3 INJECTION SUBCUTANEOUS ONCE AS NEEDED
Status: CANCELLED | OUTPATIENT
Start: 2022-05-19

## 2022-04-21 RX ORDER — DIPHENHYDRAMINE HYDROCHLORIDE 50 MG/ML
50 INJECTION INTRAMUSCULAR; INTRAVENOUS
Status: CANCELLED | OUTPATIENT
Start: 2022-05-19

## 2022-04-21 RX ORDER — HEPARIN 100 UNIT/ML
500 SYRINGE INTRAVENOUS
Status: CANCELLED | OUTPATIENT
Start: 2022-05-19

## 2022-04-21 RX ORDER — SODIUM CHLORIDE 0.9 % (FLUSH) 0.9 %
10 SYRINGE (ML) INJECTION
Status: CANCELLED | OUTPATIENT
Start: 2022-05-19

## 2022-04-21 RX ORDER — ACETAMINOPHEN 325 MG/1
650 TABLET ORAL
Status: CANCELLED | OUTPATIENT
Start: 2022-05-19

## 2022-04-21 RX ADMIN — TOCILIZUMAB 466 MG: 20 INJECTION, SOLUTION, CONCENTRATE INTRAVENOUS at 01:04

## 2022-04-21 NOTE — PLAN OF CARE
Discussed plan of care with pt. Addressed any and ongoing concerns. Pt denies    Problem: Adult Inpatient Plan of Care  Goal: Plan of Care Review  Outcome: Ongoing, Progressing  Flowsheets (Taken 4/21/2022 1434)  Plan of Care Reviewed With: patient  Goal: Patient-Specific Goal (Individualized)  Outcome: Ongoing, Progressing  Goal: Absence of Hospital-Acquired Illness or Injury  Outcome: Ongoing, Progressing  Intervention: Identify and Manage Fall Risk  Flowsheets (Taken 4/21/2022 1434)  Safety Promotion/Fall Prevention:   in recliner, wheels locked   room near unit station  Intervention: Prevent Infection  Flowsheets (Taken 4/21/2022 1434)  Infection Prevention:   hand hygiene promoted   equipment surfaces disinfected  Goal: Optimal Comfort and Wellbeing  Outcome: Ongoing, Progressing  Intervention: Monitor Pain and Promote Comfort  Flowsheets (Taken 4/21/2022 1434)  Pain Management Interventions:   quiet environment facilitated   warm blanket provided  Intervention: Provide Person-Centered Care  Flowsheets (Taken 4/21/2022 1434)  Trust Relationship/Rapport:   care explained   reassurance provided   emotional support provided   thoughts/feelings acknowledged   choices provided   empathic listening provided   questions answered   questions encouraged

## 2022-04-21 NOTE — NURSING
Infusion # 2  Last dose- 3/25/22    Any:  -recent illness, infection, or antibiotic use in past week- denies  -open wounds or mouth sores- denies  -invasive procedures or surgeries in past 4 weeks or in upcoming 4 weeks- denies  -vaccinations in past week- denies  -any new symptoms/change in symptoms-denies  -chance you may be pregnant- denies      Recent labs? 4/21/22  Last Rheumatology provider visit- Seen by Dr. SHORE on 2/14/22     Premeds-none     actemra 466 mg administered IV at a 60 minute rate per orders; see MAR and vitals for more details. Tolerated well without adverse events, discharged and ambulatory out of clinic.

## 2022-04-25 ENCOUNTER — PATIENT MESSAGE (OUTPATIENT)
Dept: RHEUMATOLOGY | Facility: CLINIC | Age: 37
End: 2022-04-25
Payer: COMMERCIAL

## 2022-04-25 DIAGNOSIS — M06.09 RHEUMATOID ARTHRITIS OF MULTIPLE SITES WITH NEGATIVE RHEUMATOID FACTOR: ICD-10-CM

## 2022-04-25 RX ORDER — PREDNISONE 20 MG/1
20 TABLET ORAL DAILY
Qty: 30 TABLET | Refills: 1 | Status: SHIPPED | OUTPATIENT
Start: 2022-04-25 | End: 2022-05-19

## 2022-04-27 ENCOUNTER — PATIENT MESSAGE (OUTPATIENT)
Dept: ADMINISTRATIVE | Facility: HOSPITAL | Age: 37
End: 2022-04-27
Payer: COMMERCIAL

## 2022-05-11 ENCOUNTER — TELEPHONE (OUTPATIENT)
Dept: RHEUMATOLOGY | Facility: CLINIC | Age: 37
End: 2022-05-11
Payer: COMMERCIAL

## 2022-05-13 ENCOUNTER — PATIENT MESSAGE (OUTPATIENT)
Dept: RHEUMATOLOGY | Facility: CLINIC | Age: 37
End: 2022-05-13
Payer: COMMERCIAL

## 2022-05-18 ENCOUNTER — PATIENT OUTREACH (OUTPATIENT)
Dept: ADMINISTRATIVE | Facility: OTHER | Age: 37
End: 2022-05-18
Payer: COMMERCIAL

## 2022-05-19 ENCOUNTER — PATIENT MESSAGE (OUTPATIENT)
Dept: RHEUMATOLOGY | Facility: CLINIC | Age: 37
End: 2022-05-19

## 2022-05-19 ENCOUNTER — OFFICE VISIT (OUTPATIENT)
Dept: RHEUMATOLOGY | Facility: CLINIC | Age: 37
End: 2022-05-19
Payer: COMMERCIAL

## 2022-05-19 ENCOUNTER — LAB VISIT (OUTPATIENT)
Dept: LAB | Facility: HOSPITAL | Age: 37
End: 2022-05-19
Attending: FAMILY MEDICINE
Payer: COMMERCIAL

## 2022-05-19 ENCOUNTER — INFUSION (OUTPATIENT)
Dept: INFUSION THERAPY | Facility: HOSPITAL | Age: 37
End: 2022-05-19
Attending: INTERNAL MEDICINE
Payer: COMMERCIAL

## 2022-05-19 VITALS
HEART RATE: 79 BPM | BODY MASS INDEX: 38.19 KG/M2 | WEIGHT: 252 LBS | DIASTOLIC BLOOD PRESSURE: 85 MMHG | HEIGHT: 68 IN | SYSTOLIC BLOOD PRESSURE: 128 MMHG

## 2022-05-19 VITALS
OXYGEN SATURATION: 98 % | HEART RATE: 90 BPM | RESPIRATION RATE: 16 BRPM | TEMPERATURE: 98 F | SYSTOLIC BLOOD PRESSURE: 121 MMHG | DIASTOLIC BLOOD PRESSURE: 80 MMHG

## 2022-05-19 DIAGNOSIS — M79.89 SWELLING OF RIGHT UPPER EXTREMITY: Primary | ICD-10-CM

## 2022-05-19 DIAGNOSIS — M06.09 RHEUMATOID ARTHRITIS OF MULTIPLE SITES WITH NEGATIVE RHEUMATOID FACTOR: Primary | ICD-10-CM

## 2022-05-19 DIAGNOSIS — R21 RASH: ICD-10-CM

## 2022-05-19 DIAGNOSIS — M32.19 OTHER SYSTEMIC LUPUS ERYTHEMATOSUS WITH OTHER ORGAN INVOLVEMENT: ICD-10-CM

## 2022-05-19 DIAGNOSIS — M32.9 SYSTEMIC LUPUS ERYTHEMATOSUS ARTHRITIS: ICD-10-CM

## 2022-05-19 DIAGNOSIS — M06.09 RHEUMATOID ARTHRITIS OF MULTIPLE SITES WITH NEGATIVE RHEUMATOID FACTOR: ICD-10-CM

## 2022-05-19 DIAGNOSIS — Z79.899 LONG-TERM CURRENT USE OF HIGH RISK MEDICATION OTHER THAN ANTICOAGULANT: ICD-10-CM

## 2022-05-19 DIAGNOSIS — Z51.81 ENCOUNTER FOR MEDICATION MONITORING: ICD-10-CM

## 2022-05-19 DIAGNOSIS — R05.9 COUGH: ICD-10-CM

## 2022-05-19 DIAGNOSIS — M35.1 MCTD (MIXED CONNECTIVE TISSUE DISEASE): ICD-10-CM

## 2022-05-19 LAB
ALBUMIN SERPL BCP-MCNC: 3 G/DL (ref 3.5–5.2)
ALP SERPL-CCNC: 45 U/L (ref 55–135)
ALT SERPL W/O P-5'-P-CCNC: 19 U/L (ref 10–44)
ANION GAP SERPL CALC-SCNC: 8 MMOL/L (ref 8–16)
AST SERPL-CCNC: 21 U/L (ref 10–40)
BASOPHILS # BLD AUTO: 0.04 K/UL (ref 0–0.2)
BASOPHILS NFR BLD: 0.6 % (ref 0–1.9)
BILIRUB SERPL-MCNC: 0.3 MG/DL (ref 0.1–1)
BILIRUB UR QL STRIP: NEGATIVE
BUN SERPL-MCNC: 5 MG/DL (ref 6–20)
C3 SERPL-MCNC: 124 MG/DL (ref 50–180)
C4 SERPL-MCNC: 7 MG/DL (ref 11–44)
CALCIUM SERPL-MCNC: 9.2 MG/DL (ref 8.7–10.5)
CHLORIDE SERPL-SCNC: 105 MMOL/L (ref 95–110)
CK SERPL-CCNC: 142 U/L (ref 20–180)
CLARITY UR: CLEAR
CO2 SERPL-SCNC: 26 MMOL/L (ref 23–29)
COLOR UR: YELLOW
CREAT SERPL-MCNC: 0.6 MG/DL (ref 0.5–1.4)
CRP SERPL-MCNC: 33 MG/L (ref 0–8.2)
DIFFERENTIAL METHOD: ABNORMAL
EOSINOPHIL # BLD AUTO: 0.6 K/UL (ref 0–0.5)
EOSINOPHIL NFR BLD: 8.8 % (ref 0–8)
ERYTHROCYTE [DISTWIDTH] IN BLOOD BY AUTOMATED COUNT: 13.7 % (ref 11.5–14.5)
ERYTHROCYTE [SEDIMENTATION RATE] IN BLOOD BY WESTERGREN METHOD: 33 MM/HR (ref 0–36)
EST. GFR  (AFRICAN AMERICAN): >60 ML/MIN/1.73 M^2
EST. GFR  (NON AFRICAN AMERICAN): >60 ML/MIN/1.73 M^2
GLUCOSE SERPL-MCNC: 79 MG/DL (ref 70–110)
GLUCOSE UR QL STRIP: NEGATIVE
HCT VFR BLD AUTO: 32.9 % (ref 37–48.5)
HGB BLD-MCNC: 10.4 G/DL (ref 12–16)
HGB UR QL STRIP: NEGATIVE
IMM GRANULOCYTES # BLD AUTO: 0.03 K/UL (ref 0–0.04)
IMM GRANULOCYTES NFR BLD AUTO: 0.5 % (ref 0–0.5)
KETONES UR QL STRIP: NEGATIVE
LEUKOCYTE ESTERASE UR QL STRIP: NEGATIVE
LYMPHOCYTES # BLD AUTO: 1.2 K/UL (ref 1–4.8)
LYMPHOCYTES NFR BLD: 18.3 % (ref 18–48)
MCH RBC QN AUTO: 29.3 PG (ref 27–31)
MCHC RBC AUTO-ENTMCNC: 31.6 G/DL (ref 32–36)
MCV RBC AUTO: 93 FL (ref 82–98)
MONOCYTES # BLD AUTO: 0.8 K/UL (ref 0.3–1)
MONOCYTES NFR BLD: 12.7 % (ref 4–15)
NEUTROPHILS # BLD AUTO: 3.8 K/UL (ref 1.8–7.7)
NEUTROPHILS NFR BLD: 59.1 % (ref 38–73)
NITRITE UR QL STRIP: NEGATIVE
NRBC BLD-RTO: 0 /100 WBC
PH UR STRIP: 6 [PH] (ref 5–8)
PLATELET # BLD AUTO: 597 K/UL (ref 150–450)
PMV BLD AUTO: 8.2 FL (ref 9.2–12.9)
POTASSIUM SERPL-SCNC: 3.8 MMOL/L (ref 3.5–5.1)
PROT SERPL-MCNC: 7.7 G/DL (ref 6–8.4)
PROT UR QL STRIP: NEGATIVE
RBC # BLD AUTO: 3.55 M/UL (ref 4–5.4)
SODIUM SERPL-SCNC: 139 MMOL/L (ref 136–145)
SP GR UR STRIP: 1.02 (ref 1–1.03)
URN SPEC COLLECT METH UR: NORMAL
WBC # BLD AUTO: 6.45 K/UL (ref 3.9–12.7)

## 2022-05-19 PROCEDURE — 86140 C-REACTIVE PROTEIN: CPT | Performed by: INTERNAL MEDICINE

## 2022-05-19 PROCEDURE — 99215 PR OFFICE/OUTPT VISIT, EST, LEVL V, 40-54 MIN: ICD-10-PCS | Mod: S$GLB,,, | Performed by: INTERNAL MEDICINE

## 2022-05-19 PROCEDURE — 80053 COMPREHEN METABOLIC PANEL: CPT | Performed by: INTERNAL MEDICINE

## 2022-05-19 PROCEDURE — 3079F DIAST BP 80-89 MM HG: CPT | Mod: CPTII,S$GLB,, | Performed by: INTERNAL MEDICINE

## 2022-05-19 PROCEDURE — 3074F PR MOST RECENT SYSTOLIC BLOOD PRESSURE < 130 MM HG: ICD-10-PCS | Mod: CPTII,S$GLB,, | Performed by: INTERNAL MEDICINE

## 2022-05-19 PROCEDURE — 99999 PR PBB SHADOW E&M-EST. PATIENT-LVL IV: CPT | Mod: PBBFAC,,, | Performed by: INTERNAL MEDICINE

## 2022-05-19 PROCEDURE — 85652 RBC SED RATE AUTOMATED: CPT | Performed by: INTERNAL MEDICINE

## 2022-05-19 PROCEDURE — 85025 COMPLETE CBC W/AUTO DIFF WBC: CPT | Performed by: INTERNAL MEDICINE

## 2022-05-19 PROCEDURE — 81003 URINALYSIS AUTO W/O SCOPE: CPT | Performed by: INTERNAL MEDICINE

## 2022-05-19 PROCEDURE — 96365 THER/PROPH/DIAG IV INF INIT: CPT

## 2022-05-19 PROCEDURE — 25000003 PHARM REV CODE 250: Performed by: INTERNAL MEDICINE

## 2022-05-19 PROCEDURE — 1160F PR REVIEW ALL MEDS BY PRESCRIBER/CLIN PHARMACIST DOCUMENTED: ICD-10-PCS | Mod: CPTII,S$GLB,, | Performed by: INTERNAL MEDICINE

## 2022-05-19 PROCEDURE — 3008F BODY MASS INDEX DOCD: CPT | Mod: CPTII,S$GLB,, | Performed by: INTERNAL MEDICINE

## 2022-05-19 PROCEDURE — 3079F PR MOST RECENT DIASTOLIC BLOOD PRESSURE 80-89 MM HG: ICD-10-PCS | Mod: CPTII,S$GLB,, | Performed by: INTERNAL MEDICINE

## 2022-05-19 PROCEDURE — 86225 DNA ANTIBODY NATIVE: CPT | Performed by: INTERNAL MEDICINE

## 2022-05-19 PROCEDURE — 99999 PR PBB SHADOW E&M-EST. PATIENT-LVL IV: ICD-10-PCS | Mod: PBBFAC,,, | Performed by: INTERNAL MEDICINE

## 2022-05-19 PROCEDURE — 82550 ASSAY OF CK (CPK): CPT | Performed by: INTERNAL MEDICINE

## 2022-05-19 PROCEDURE — 36415 COLL VENOUS BLD VENIPUNCTURE: CPT | Performed by: INTERNAL MEDICINE

## 2022-05-19 PROCEDURE — 1159F PR MEDICATION LIST DOCUMENTED IN MEDICAL RECORD: ICD-10-PCS | Mod: CPTII,S$GLB,, | Performed by: INTERNAL MEDICINE

## 2022-05-19 PROCEDURE — 3074F SYST BP LT 130 MM HG: CPT | Mod: CPTII,S$GLB,, | Performed by: INTERNAL MEDICINE

## 2022-05-19 PROCEDURE — 86160 COMPLEMENT ANTIGEN: CPT | Mod: 59 | Performed by: INTERNAL MEDICINE

## 2022-05-19 PROCEDURE — 3008F PR BODY MASS INDEX (BMI) DOCUMENTED: ICD-10-PCS | Mod: CPTII,S$GLB,, | Performed by: INTERNAL MEDICINE

## 2022-05-19 PROCEDURE — 63600175 PHARM REV CODE 636 W HCPCS: Mod: JG | Performed by: INTERNAL MEDICINE

## 2022-05-19 PROCEDURE — 1159F MED LIST DOCD IN RCRD: CPT | Mod: CPTII,S$GLB,, | Performed by: INTERNAL MEDICINE

## 2022-05-19 PROCEDURE — 1160F RVW MEDS BY RX/DR IN RCRD: CPT | Mod: CPTII,S$GLB,, | Performed by: INTERNAL MEDICINE

## 2022-05-19 PROCEDURE — 99215 OFFICE O/P EST HI 40 MIN: CPT | Mod: S$GLB,,, | Performed by: INTERNAL MEDICINE

## 2022-05-19 PROCEDURE — 86160 COMPLEMENT ANTIGEN: CPT | Performed by: INTERNAL MEDICINE

## 2022-05-19 RX ORDER — TRAMADOL HYDROCHLORIDE 50 MG/1
50 TABLET ORAL EVERY 12 HOURS PRN
Qty: 60 TABLET | Refills: 3 | Status: SHIPPED | OUTPATIENT
Start: 2022-05-19 | End: 2022-11-18

## 2022-05-19 RX ORDER — SODIUM CHLORIDE 0.9 % (FLUSH) 0.9 %
10 SYRINGE (ML) INJECTION
Status: CANCELLED | OUTPATIENT
Start: 2022-06-16

## 2022-05-19 RX ORDER — ACETAMINOPHEN 325 MG/1
650 TABLET ORAL
Status: CANCELLED | OUTPATIENT
Start: 2022-06-16

## 2022-05-19 RX ORDER — HEPARIN 100 UNIT/ML
500 SYRINGE INTRAVENOUS
Status: CANCELLED | OUTPATIENT
Start: 2022-06-16

## 2022-05-19 RX ORDER — DIPHENHYDRAMINE HYDROCHLORIDE 50 MG/ML
50 INJECTION INTRAMUSCULAR; INTRAVENOUS ONCE AS NEEDED
Status: CANCELLED | OUTPATIENT
Start: 2022-06-16

## 2022-05-19 RX ORDER — DIPHENHYDRAMINE HYDROCHLORIDE 50 MG/ML
50 INJECTION INTRAMUSCULAR; INTRAVENOUS
Status: CANCELLED | OUTPATIENT
Start: 2022-06-16

## 2022-05-19 RX ORDER — PREDNISONE 10 MG/1
10 TABLET ORAL DAILY
Qty: 30 TABLET | Refills: 1 | Status: SHIPPED | OUTPATIENT
Start: 2022-05-19 | End: 2023-02-21 | Stop reason: SDUPTHER

## 2022-05-19 RX ORDER — SODIUM CHLORIDE 0.9 % (FLUSH) 0.9 %
10 SYRINGE (ML) INJECTION
Status: DISCONTINUED | OUTPATIENT
Start: 2022-05-19 | End: 2022-05-19 | Stop reason: HOSPADM

## 2022-05-19 RX ORDER — EPINEPHRINE 0.3 MG/.3ML
0.3 INJECTION SUBCUTANEOUS ONCE AS NEEDED
Status: CANCELLED | OUTPATIENT
Start: 2022-06-16

## 2022-05-19 RX ADMIN — TOCILIZUMAB 458 MG: 20 INJECTION, SOLUTION, CONCENTRATE INTRAVENOUS at 01:05

## 2022-05-19 NOTE — PLAN OF CARE
Discussed plan of care with pt. Addressed any and ongoing concerns. Pt denies    Problem: Adult Inpatient Plan of Care  Goal: Plan of Care Review  Outcome: Ongoing, Progressing  Flowsheets (Taken 5/19/2022 1348)  Plan of Care Reviewed With: patient  Goal: Patient-Specific Goal (Individualized)  Outcome: Ongoing, Progressing  Goal: Absence of Hospital-Acquired Illness or Injury  Outcome: Ongoing, Progressing  Intervention: Identify and Manage Fall Risk  Flowsheets (Taken 5/19/2022 1348)  Safety Promotion/Fall Prevention:   in recliner, wheels locked   room near unit station  Intervention: Prevent Infection  Flowsheets (Taken 5/19/2022 1348)  Infection Prevention:   hand hygiene promoted   equipment surfaces disinfected  Goal: Optimal Comfort and Wellbeing  Outcome: Ongoing, Progressing  Intervention: Monitor Pain and Promote Comfort  Flowsheets (Taken 5/19/2022 1348)  Pain Management Interventions:   quiet environment facilitated   warm blanket provided  Intervention: Provide Person-Centered Care  Flowsheets (Taken 5/19/2022 1348)  Trust Relationship/Rapport:   care explained   reassurance provided   thoughts/feelings acknowledged   choices provided   emotional support provided   empathic listening provided   questions answered

## 2022-05-19 NOTE — NURSING
Last dose- 4/21/22    Any:  -recent illness, infection, or antibiotic use in past week- denies  -open wounds or mouth sores- denies  -invasive procedures or surgeries in past 4 weeks or in upcoming 4 weeks- denies  -vaccinations in past week- denies  -any new symptoms/change in symptoms-denies  -chance you may be pregnant- denies      Recent labs? 5/19/22  Last Rheumatology provider visit- Seen by Dr. SHORE on 5/19/22     Premeds-none     actemra 458 mg administered IV at a 60 minute rate per orders; see MAR and vitals for more details. Tolerated well without adverse events, discharged and ambulatory out of clinic.

## 2022-05-20 LAB — DSDNA AB SER-ACNC: NORMAL [IU]/ML

## 2022-05-26 ENCOUNTER — HOSPITAL ENCOUNTER (OUTPATIENT)
Dept: RADIOLOGY | Facility: HOSPITAL | Age: 37
Discharge: HOME | End: 2022-05-26
Attending: INTERNAL MEDICINE
Payer: COMMERCIAL

## 2022-05-26 DIAGNOSIS — M79.89 SWELLING OF RIGHT UPPER EXTREMITY: ICD-10-CM

## 2022-05-26 DIAGNOSIS — R21 MALAR RASH: ICD-10-CM

## 2022-05-26 DIAGNOSIS — L81.9 DYSCHROMIA: ICD-10-CM

## 2022-05-26 PROCEDURE — 93971 EXTREMITY STUDY: CPT | Mod: TC,RT

## 2022-05-26 PROCEDURE — 93971 EXTREMITY STUDY: CPT | Mod: 26,RT,, | Performed by: RADIOLOGY

## 2022-05-26 PROCEDURE — 93971 US UPPER EXTREMITY VEINS RIGHT: ICD-10-PCS | Mod: 26,RT,, | Performed by: RADIOLOGY

## 2022-05-27 RX ORDER — TACROLIMUS 1 MG/G
OINTMENT TOPICAL 2 TIMES DAILY
Qty: 60 G | Refills: 3 | OUTPATIENT
Start: 2022-05-27

## 2022-06-02 ENCOUNTER — HOSPITAL ENCOUNTER (OUTPATIENT)
Dept: RADIOLOGY | Facility: HOSPITAL | Age: 37
Discharge: HOME | End: 2022-06-02
Attending: INTERNAL MEDICINE
Payer: COMMERCIAL

## 2022-06-02 DIAGNOSIS — R05.9 COUGH: ICD-10-CM

## 2022-06-02 DIAGNOSIS — M35.1 MCTD (MIXED CONNECTIVE TISSUE DISEASE): ICD-10-CM

## 2022-06-02 PROCEDURE — 71250 CT THORAX DX C-: CPT | Mod: TC

## 2022-06-02 PROCEDURE — 71250 CT CHEST WITHOUT CONTRAST: ICD-10-PCS | Mod: 26,,, | Performed by: RADIOLOGY

## 2022-06-02 PROCEDURE — 71250 CT THORAX DX C-: CPT | Mod: 26,,, | Performed by: RADIOLOGY

## 2022-06-16 ENCOUNTER — LAB VISIT (OUTPATIENT)
Dept: LAB | Facility: HOSPITAL | Age: 37
End: 2022-06-16
Attending: INTERNAL MEDICINE
Payer: COMMERCIAL

## 2022-06-16 ENCOUNTER — OFFICE VISIT (OUTPATIENT)
Dept: RHEUMATOLOGY | Facility: CLINIC | Age: 37
End: 2022-06-16
Payer: COMMERCIAL

## 2022-06-16 VITALS
SYSTOLIC BLOOD PRESSURE: 137 MMHG | WEIGHT: 250.25 LBS | DIASTOLIC BLOOD PRESSURE: 84 MMHG | BODY MASS INDEX: 37.93 KG/M2 | HEART RATE: 88 BPM | HEIGHT: 68 IN

## 2022-06-16 DIAGNOSIS — Z51.81 ENCOUNTER FOR MEDICATION MONITORING: ICD-10-CM

## 2022-06-16 DIAGNOSIS — M32.9 SYSTEMIC LUPUS ERYTHEMATOSUS ARTHRITIS: ICD-10-CM

## 2022-06-16 DIAGNOSIS — D84.9 IMMUNOSUPPRESSED STATUS: ICD-10-CM

## 2022-06-16 DIAGNOSIS — M35.1 MCTD (MIXED CONNECTIVE TISSUE DISEASE): ICD-10-CM

## 2022-06-16 DIAGNOSIS — M06.09 RHEUMATOID ARTHRITIS OF MULTIPLE SITES WITH NEGATIVE RHEUMATOID FACTOR: ICD-10-CM

## 2022-06-16 DIAGNOSIS — Z79.899 LONG-TERM CURRENT USE OF HIGH RISK MEDICATION OTHER THAN ANTICOAGULANT: ICD-10-CM

## 2022-06-16 DIAGNOSIS — M06.09 RHEUMATOID ARTHRITIS OF MULTIPLE SITES WITH NEGATIVE RHEUMATOID FACTOR: Primary | ICD-10-CM

## 2022-06-16 DIAGNOSIS — R76.8 POSITIVE SM/RNP ANTIBODY: ICD-10-CM

## 2022-06-16 LAB
ALBUMIN SERPL BCP-MCNC: 3.1 G/DL (ref 3.5–5.2)
ALP SERPL-CCNC: 44 U/L (ref 55–135)
ALT SERPL W/O P-5'-P-CCNC: 17 U/L (ref 10–44)
ANION GAP SERPL CALC-SCNC: 9 MMOL/L (ref 8–16)
AST SERPL-CCNC: 19 U/L (ref 10–40)
BASOPHILS # BLD AUTO: 0.05 K/UL (ref 0–0.2)
BASOPHILS NFR BLD: 0.7 % (ref 0–1.9)
BILIRUB SERPL-MCNC: 0.3 MG/DL (ref 0.1–1)
BUN SERPL-MCNC: 6 MG/DL (ref 6–20)
C3 SERPL-MCNC: 122 MG/DL (ref 50–180)
C4 SERPL-MCNC: 7 MG/DL (ref 11–44)
CALCIUM SERPL-MCNC: 9.2 MG/DL (ref 8.7–10.5)
CHLORIDE SERPL-SCNC: 101 MMOL/L (ref 95–110)
CK SERPL-CCNC: 148 U/L (ref 20–180)
CO2 SERPL-SCNC: 29 MMOL/L (ref 23–29)
CREAT SERPL-MCNC: 0.6 MG/DL (ref 0.5–1.4)
CRP SERPL-MCNC: 39.3 MG/L (ref 0–8.2)
DIFFERENTIAL METHOD: ABNORMAL
EOSINOPHIL # BLD AUTO: 1 K/UL (ref 0–0.5)
EOSINOPHIL NFR BLD: 14.7 % (ref 0–8)
ERYTHROCYTE [DISTWIDTH] IN BLOOD BY AUTOMATED COUNT: 14.2 % (ref 11.5–14.5)
ERYTHROCYTE [SEDIMENTATION RATE] IN BLOOD BY WESTERGREN METHOD: 59 MM/HR (ref 0–36)
EST. GFR  (AFRICAN AMERICAN): >60 ML/MIN/1.73 M^2
EST. GFR  (NON AFRICAN AMERICAN): >60 ML/MIN/1.73 M^2
GLUCOSE SERPL-MCNC: 79 MG/DL (ref 70–110)
HCT VFR BLD AUTO: 33.5 % (ref 37–48.5)
HGB BLD-MCNC: 10.5 G/DL (ref 12–16)
IMM GRANULOCYTES # BLD AUTO: 0.04 K/UL (ref 0–0.04)
IMM GRANULOCYTES NFR BLD AUTO: 0.6 % (ref 0–0.5)
LYMPHOCYTES # BLD AUTO: 0.9 K/UL (ref 1–4.8)
LYMPHOCYTES NFR BLD: 12.9 % (ref 18–48)
MCH RBC QN AUTO: 28.5 PG (ref 27–31)
MCHC RBC AUTO-ENTMCNC: 31.3 G/DL (ref 32–36)
MCV RBC AUTO: 91 FL (ref 82–98)
MONOCYTES # BLD AUTO: 0.6 K/UL (ref 0.3–1)
MONOCYTES NFR BLD: 8.1 % (ref 4–15)
NEUTROPHILS # BLD AUTO: 4.3 K/UL (ref 1.8–7.7)
NEUTROPHILS NFR BLD: 63 % (ref 38–73)
NRBC BLD-RTO: 0 /100 WBC
PLATELET # BLD AUTO: 634 K/UL (ref 150–450)
PMV BLD AUTO: 8.1 FL (ref 9.2–12.9)
POTASSIUM SERPL-SCNC: 4.2 MMOL/L (ref 3.5–5.1)
PROT SERPL-MCNC: 7.7 G/DL (ref 6–8.4)
RBC # BLD AUTO: 3.68 M/UL (ref 4–5.4)
SODIUM SERPL-SCNC: 139 MMOL/L (ref 136–145)
WBC # BLD AUTO: 6.89 K/UL (ref 3.9–12.7)

## 2022-06-16 PROCEDURE — 86140 C-REACTIVE PROTEIN: CPT | Performed by: INTERNAL MEDICINE

## 2022-06-16 PROCEDURE — 1159F PR MEDICATION LIST DOCUMENTED IN MEDICAL RECORD: ICD-10-PCS | Mod: CPTII,S$GLB,, | Performed by: PHYSICIAN ASSISTANT

## 2022-06-16 PROCEDURE — 86039 ANTINUCLEAR ANTIBODIES (ANA): CPT | Performed by: INTERNAL MEDICINE

## 2022-06-16 PROCEDURE — 85025 COMPLETE CBC W/AUTO DIFF WBC: CPT | Performed by: INTERNAL MEDICINE

## 2022-06-16 PROCEDURE — 3079F DIAST BP 80-89 MM HG: CPT | Mod: CPTII,S$GLB,, | Performed by: PHYSICIAN ASSISTANT

## 2022-06-16 PROCEDURE — 36415 COLL VENOUS BLD VENIPUNCTURE: CPT | Performed by: INTERNAL MEDICINE

## 2022-06-16 PROCEDURE — 3079F PR MOST RECENT DIASTOLIC BLOOD PRESSURE 80-89 MM HG: ICD-10-PCS | Mod: CPTII,S$GLB,, | Performed by: PHYSICIAN ASSISTANT

## 2022-06-16 PROCEDURE — 3008F PR BODY MASS INDEX (BMI) DOCUMENTED: ICD-10-PCS | Mod: CPTII,S$GLB,, | Performed by: PHYSICIAN ASSISTANT

## 2022-06-16 PROCEDURE — 99999 PR PBB SHADOW E&M-EST. PATIENT-LVL III: CPT | Mod: PBBFAC,,, | Performed by: PHYSICIAN ASSISTANT

## 2022-06-16 PROCEDURE — 3075F PR MOST RECENT SYSTOLIC BLOOD PRESS GE 130-139MM HG: ICD-10-PCS | Mod: CPTII,S$GLB,, | Performed by: PHYSICIAN ASSISTANT

## 2022-06-16 PROCEDURE — 3008F BODY MASS INDEX DOCD: CPT | Mod: CPTII,S$GLB,, | Performed by: PHYSICIAN ASSISTANT

## 2022-06-16 PROCEDURE — 99999 PR PBB SHADOW E&M-EST. PATIENT-LVL III: ICD-10-PCS | Mod: PBBFAC,,, | Performed by: PHYSICIAN ASSISTANT

## 2022-06-16 PROCEDURE — 99215 PR OFFICE/OUTPT VISIT, EST, LEVL V, 40-54 MIN: ICD-10-PCS | Mod: S$GLB,,, | Performed by: PHYSICIAN ASSISTANT

## 2022-06-16 PROCEDURE — 85652 RBC SED RATE AUTOMATED: CPT | Performed by: INTERNAL MEDICINE

## 2022-06-16 PROCEDURE — 99215 OFFICE O/P EST HI 40 MIN: CPT | Mod: S$GLB,,, | Performed by: PHYSICIAN ASSISTANT

## 2022-06-16 PROCEDURE — 3075F SYST BP GE 130 - 139MM HG: CPT | Mod: CPTII,S$GLB,, | Performed by: PHYSICIAN ASSISTANT

## 2022-06-16 PROCEDURE — 86235 NUCLEAR ANTIGEN ANTIBODY: CPT | Mod: 59 | Performed by: INTERNAL MEDICINE

## 2022-06-16 PROCEDURE — 82550 ASSAY OF CK (CPK): CPT | Performed by: INTERNAL MEDICINE

## 2022-06-16 PROCEDURE — 86225 DNA ANTIBODY NATIVE: CPT | Performed by: INTERNAL MEDICINE

## 2022-06-16 PROCEDURE — 1159F MED LIST DOCD IN RCRD: CPT | Mod: CPTII,S$GLB,, | Performed by: PHYSICIAN ASSISTANT

## 2022-06-16 PROCEDURE — 80197 ASSAY OF TACROLIMUS: CPT | Performed by: INTERNAL MEDICINE

## 2022-06-16 PROCEDURE — 80053 COMPREHEN METABOLIC PANEL: CPT | Performed by: INTERNAL MEDICINE

## 2022-06-16 PROCEDURE — 86038 ANTINUCLEAR ANTIBODIES: CPT | Performed by: INTERNAL MEDICINE

## 2022-06-16 PROCEDURE — 82085 ASSAY OF ALDOLASE: CPT | Performed by: INTERNAL MEDICINE

## 2022-06-16 PROCEDURE — 86160 COMPLEMENT ANTIGEN: CPT | Performed by: INTERNAL MEDICINE

## 2022-06-16 PROCEDURE — 86160 COMPLEMENT ANTIGEN: CPT | Mod: 59 | Performed by: INTERNAL MEDICINE

## 2022-06-16 RX ORDER — UPADACITINIB 15 MG/1
15 TABLET, EXTENDED RELEASE ORAL DAILY
Qty: 30 TABLET | Refills: 11 | OUTPATIENT
Start: 2022-06-16 | End: 2022-06-20

## 2022-06-16 NOTE — PROGRESS NOTES
RHEUMATOLOGY OUTPATIENT CLINIC NOTE    6/16/2022    Attending Rheumatologist: Allyssa Garcia PA-C  Primary Care Provider: Sofia Martinez MD   Chief Complaint/Reason For Consultation:  Disease Management (4wk follow-up)      Subjective:        Stefanie España is a 36 y.o. female here today for routine 1 month follow up of complex presentation of connective tissue disease  with inflammatory arthritis, myositis, photosensitive rash and diffuse pain.  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     She does not feel like Actemra infusions are helping at all.  She has not been able to see Dermatology yet for a skin biopsy to rule out morphea.  Skin on the right hand does feel tight still in her fist formation does seem to be worse today.      Completed 3-4 infusions so far.  On prednisone 20 mg daily for severe disease at last visit-she is now on 10 mg daily.  Reports mild to moderate improvement of her swelling and stiffness on high-dose prednisone. Rheumatological review of system negative otherwise. Physical examination shows severe synovitis of bilateral wrists.  Skin tightness and diffuse swelling of bilateral hands right more than left.  More of a sclerodactyly/morphea like examination finding of her right forearm.      Serologies     Lab Results   Component Value Date    HEPAIGM Negative 06/26/2019    HEPBIGM Negative 06/26/2019    HEPCAB Negative 06/26/2019     RNP antibody positive    Current Rheum Medications:  · Actemra, Prograf, prednisone 10 mg daily  Previous Rheum Medications:   · Methotrexate, Benlysta, CellCept, TNF alpha inhibitors avoided due to lupus Plaquenil    Past Medical History:   Diagnosis Date    Abnormal Pap smear of cervix     repeat was normal    Lupus (systemic lupus erythematosus)      Social History     Socioeconomic History    Marital status:    Occupational  "History    Occupation: [employer]   Tobacco Use    Smoking status: Never Smoker    Smokeless tobacco: Never Used   Substance and Sexual Activity    Alcohol use: No    Drug use: No    Sexual activity: Yes     Partners: Male     Birth control/protection: Surgical     Comment: BTL   Other Topics Concern    Are you pregnant or think you may be? No    Breast-feeding No     Review of patient's allergies indicates:   Allergen Reactions    Elavil [amitriptyline]        Objective:   /84 (BP Location: Left arm, Patient Position: Sitting, BP Method: Medium (Automatic))   Pulse 88   Ht 5' 8" (1.727 m)   Wt 113.5 kg (250 lb 3.6 oz)   BMI 38.05 kg/m²     Immunization History   Administered Date(s) Administered    Influenza 10/24/2018    Pneumococcal Conjugate - 13 Valent 01/08/2019    Tdap 03/19/2017       Current Outpatient Medications:     bumetanide (BUMEX) 1 MG tablet, Take 1 tablet (1 mg total) by mouth once daily., Disp: 10 tablet, Rfl: 0    clobetasol (TEMOVATE) 0.05 % external solution, Apply topically 2 (two) times daily., Disp: 50 mL, Rfl: 1    fluocinolone (DERMA-SMOOTHE) 0.01 % external oil, Apply oil to scalp twice a day (Patient not taking: Reported on 1/7/2021), Disp: 1 Bottle, Rfl: 5    gabapentin (NEURONTIN) 300 MG capsule, Take 1 capsule (300 mg total) by mouth 3 (three) times daily., Disp: 90 capsule, Rfl: 11    mometasone 0.1% (ELOCON) 0.1 % cream, Apply topically once daily., Disp: 50 g, Rfl: 1    predniSONE (DELTASONE) 10 MG tablet, Take 1 tablet (10 mg total) by mouth once daily., Disp: 30 tablet, Rfl: 1    tacrolimus (PROGRAF) 1 MG Cap, TAKE 3 CAPSULES BY MOUTH EVERY 12 HOURS, Disp: 180 capsule, Rfl: 0    tacrolimus (PROTOPIC) 0.1 % ointment, Apply topically 2 (two) times daily., Disp: 60 g, Rfl: 3    traMADoL (ULTRAM) 50 mg tablet, Take 1 tablet (50 mg total) by mouth every 12 (twelve) hours as needed for Pain., Disp: 60 tablet, Rfl: 3    traZODone (DESYREL) 50 MG tablet, Take " 1 tablet (50 mg total) by mouth every evening., Disp: 30 tablet, Rfl: 11    triamcinolone acetonide 0.1% (KENALOG) 0.1 % ointment, AAA bid, Disp: 454 g, Rfl: 0    upadacitinib (RINVOQ) 15 mg 24 hr tablet, Take 1 tablet (15 mg total) by mouth once daily., Disp: 30 tablet, Rfl: 11       Recent Results (from the past 336 hour(s))   C3 Complement    Collection Time: 06/16/22 11:45 AM   Result Value Ref Range    Complement (C-3) 122 50 - 180 mg/dL   C4 Complement    Collection Time: 06/16/22 11:45 AM   Result Value Ref Range    Complement (C-4) 7 (L) 11 - 44 mg/dL   CBC Auto Differential    Collection Time: 06/16/22 11:45 AM   Result Value Ref Range    WBC 6.89 3.90 - 12.70 K/uL    RBC 3.68 (L) 4.00 - 5.40 M/uL    Hemoglobin 10.5 (L) 12.0 - 16.0 g/dL    Hematocrit 33.5 (L) 37.0 - 48.5 %    MCV 91 82 - 98 fL    MCH 28.5 27.0 - 31.0 pg    MCHC 31.3 (L) 32.0 - 36.0 g/dL    RDW 14.2 11.5 - 14.5 %    Platelets 634 (H) 150 - 450 K/uL    MPV 8.1 (L) 9.2 - 12.9 fL    Immature Granulocytes 0.6 (H) 0.0 - 0.5 %    Gran # (ANC) 4.3 1.8 - 7.7 K/uL    Immature Grans (Abs) 0.04 0.00 - 0.04 K/uL    Lymph # 0.9 (L) 1.0 - 4.8 K/uL    Mono # 0.6 0.3 - 1.0 K/uL    Eos # 1.0 (H) 0.0 - 0.5 K/uL    Baso # 0.05 0.00 - 0.20 K/uL    nRBC 0 0 /100 WBC    Gran % 63.0 38.0 - 73.0 %    Lymph % 12.9 (L) 18.0 - 48.0 %    Mono % 8.1 4.0 - 15.0 %    Eosinophil % 14.7 (H) 0.0 - 8.0 %    Basophil % 0.7 0.0 - 1.9 %    Differential Method Automated    Comprehensive Metabolic Panel    Collection Time: 06/16/22 11:45 AM   Result Value Ref Range    Sodium 139 136 - 145 mmol/L    Potassium 4.2 3.5 - 5.1 mmol/L    Chloride 101 95 - 110 mmol/L    CO2 29 23 - 29 mmol/L    Glucose 79 70 - 110 mg/dL    BUN 6 6 - 20 mg/dL    Creatinine 0.6 0.5 - 1.4 mg/dL    Calcium 9.2 8.7 - 10.5 mg/dL    Total Protein 7.7 6.0 - 8.4 g/dL    Albumin 3.1 (L) 3.5 - 5.2 g/dL    Total Bilirubin 0.3 0.1 - 1.0 mg/dL    Alkaline Phosphatase 44 (L) 55 - 135 U/L    AST 19 10 - 40 U/L    ALT  17 10 - 44 U/L    Anion Gap 9 8 - 16 mmol/L    eGFR if African American >60 >60 mL/min/1.73 m^2    eGFR if non African American >60 >60 mL/min/1.73 m^2   Sedimentation rate    Collection Time: 06/16/22 11:45 AM   Result Value Ref Range    Sed Rate 59 (H) 0 - 36 mm/Hr   C-Reactive Protein    Collection Time: 06/16/22 11:45 AM   Result Value Ref Range    CRP 39.3 (H) 0.0 - 8.2 mg/L   CK    Collection Time: 06/16/22 11:45 AM   Result Value Ref Range     20 - 180 U/L   Aldolase    Collection Time: 06/16/22 11:45 AM   Result Value Ref Range    Aldolase 11.9 (H) 1.2 - 7.6 U/L   Tacrolimus Level    Collection Time: 06/16/22 11:45 AM   Result Value Ref Range    Tacrolimus Lvl <2.0 (L) 5.0 - 15.0 ng/mL       Imaging:  All imaging reviewed and independently interpreted by me.    · CT chest without contrast 06/02/2022-no evidence of ILD    Assessment:     1. Rheumatoid arthritis of multiple sites with negative rheumatoid factor    2. MCTD (mixed connective tissue disease)    3. Encounter for medication monitoring    4. Immunosuppressed status    5. Positive sm/RNP antibody    6. Systemic lupus erythematosus arthritis          Plan:       · Actemra infusion for today canceled.  Patient to begin Rinvoq in its place.  · Will refer to Dermatology for biopsy of the right forearm skin.  Will add CellCept if morphea is present.  · Compromised immune system secondary to autoimmune disease and use of immunosuppressive drugs. Monitor carefully for infections and toxicities- Currently denies issues with recurrent infections. Advised to get immediate medical care if any infection.  · Recommend Yearly Skin Cancer Screening by Dermatology for all patients on biologic or Erick. advised strict adherence to age appropriate vaccinations (shingles, pneumonia, flu and covid) and cancer screenings with PCP   · Patient advised to hold DMARD and/or biologic therapy for signs of infection or for surgery. If you are unsure what to do please call  our office for instruction.[facility] [phone]  · no current medication related issues, no evidence of toxicity. I ordered labs for toxicity monitoring;  results reviewed and discussed findings with the patient   · Patient understands and contact clinic at any time regarding questions about today's office visit and any medication changes that were made  · Return to clinic:  4-6 weeks with reg4    The patient understands, chooses and consents to this plan and accepts all the risks which include but are not limited to the risks mentioned above.     Method of contact with patient concerns: [facility]    60 minutes of total time spent on the encounter, which includes face to face time and non-face to face time preparing to see the patient (eg, review of tests), Obtaining and/or reviewing separately obtained history, Documenting clinical information in the electronic or other health record, Independently interpreting results (not separately reported) and communicating results to the patient/family/caregiver, or Care coordination (not separately reported).          Disclaimer: This note was prepared using a voice recognition system and is likely to have sound alike errors within the text.       Allyssa Garcia PA-C  Rheumatology Department   [facility]

## 2022-06-16 NOTE — Clinical Note
Please schedule patient appointment for biopsy of the right forearm skin to rule out morphea.  Thank you.

## 2022-06-17 ENCOUNTER — TELEPHONE (OUTPATIENT)
Dept: DERMATOLOGY | Facility: CLINIC | Age: 37
End: 2022-06-17
Payer: COMMERCIAL

## 2022-06-17 LAB
ALDOLASE SERPL-CCNC: 11.9 U/L (ref 1.2–7.6)
ANA PATTERN 1: NORMAL
ANA SER QL IF: POSITIVE
ANA TITR SER IF: NORMAL {TITER}
TACROLIMUS BLD-MCNC: <2 NG/ML (ref 5–15)

## 2022-06-17 NOTE — TELEPHONE ENCOUNTER
Spoke to pt about scheduling biopsy. scheduled pt for next available ----- Message from Allyssa Garcia PA-C sent at 6/17/2022 11:18 AM CDT -----  Please schedule patient appointment for biopsy of the right forearm skin to rule out morphea.  Thank you.

## 2022-06-20 ENCOUNTER — SPECIALTY PHARMACY (OUTPATIENT)
Dept: PHARMACY | Facility: CLINIC | Age: 37
End: 2022-06-20
Payer: COMMERCIAL

## 2022-06-20 ENCOUNTER — PATIENT MESSAGE (OUTPATIENT)
Dept: RHEUMATOLOGY | Facility: CLINIC | Age: 37
End: 2022-06-20
Payer: COMMERCIAL

## 2022-06-20 ENCOUNTER — TELEPHONE (OUTPATIENT)
Dept: RHEUMATOLOGY | Facility: CLINIC | Age: 37
End: 2022-06-20
Payer: COMMERCIAL

## 2022-06-20 DIAGNOSIS — M06.09 RHEUMATOID ARTHRITIS OF MULTIPLE SITES WITH NEGATIVE RHEUMATOID FACTOR: Primary | ICD-10-CM

## 2022-06-20 RX ORDER — SODIUM CHLORIDE 0.9 % (FLUSH) 0.9 %
10 SYRINGE (ML) INJECTION
Status: CANCELLED | OUTPATIENT
Start: 2022-06-20

## 2022-06-20 RX ORDER — HEPARIN 100 UNIT/ML
500 SYRINGE INTRAVENOUS
Status: CANCELLED | OUTPATIENT
Start: 2022-06-20

## 2022-06-20 RX ORDER — ACETAMINOPHEN 325 MG/1
650 TABLET ORAL
Status: CANCELLED | OUTPATIENT
Start: 2022-06-20

## 2022-06-20 RX ORDER — FAMOTIDINE 10 MG/ML
20 INJECTION INTRAVENOUS
Status: CANCELLED | OUTPATIENT
Start: 2022-06-20

## 2022-06-20 NOTE — TELEPHONE ENCOUNTER
Rinvoq rx received   -PA required.  -MD stopping Rinvoq and starting Rituximab infusions.   -Message sent to provider  -Rinvoq discontinued.

## 2022-06-21 ENCOUNTER — PATIENT MESSAGE (OUTPATIENT)
Dept: RHEUMATOLOGY | Facility: CLINIC | Age: 37
End: 2022-06-21
Payer: COMMERCIAL

## 2022-06-21 LAB
ANTI SM ANTIBODY: 0.25 RATIO (ref 0–0.99)
ANTI SM/RNP ANTIBODY: 5.45 RATIO (ref 0–0.99)
ANTI-SM INTERPRETATION: NEGATIVE
ANTI-SM/RNP INTERPRETATION: POSITIVE
ANTI-SSA ANTIBODY: 0.07 RATIO (ref 0–0.99)
ANTI-SSA INTERPRETATION: NEGATIVE
ANTI-SSB ANTIBODY: 0.04 RATIO (ref 0–0.99)
ANTI-SSB INTERPRETATION: NEGATIVE
DSDNA AB SER-ACNC: ABNORMAL [IU]/ML

## 2022-06-27 DIAGNOSIS — D84.9 IMMUNOSUPPRESSED STATUS: Primary | ICD-10-CM

## 2022-06-28 ENCOUNTER — LAB VISIT (OUTPATIENT)
Dept: LAB | Facility: HOSPITAL | Age: 37
End: 2022-06-28
Attending: INTERNAL MEDICINE
Payer: COMMERCIAL

## 2022-06-28 DIAGNOSIS — D84.9 IMMUNOSUPPRESSED STATUS: ICD-10-CM

## 2022-06-28 DIAGNOSIS — M06.09 RHEUMATOID ARTHRITIS OF MULTIPLE SITES WITH NEGATIVE RHEUMATOID FACTOR: ICD-10-CM

## 2022-06-28 PROCEDURE — 86706 HEP B SURFACE ANTIBODY: CPT | Performed by: INTERNAL MEDICINE

## 2022-06-28 PROCEDURE — 86480 TB TEST CELL IMMUN MEASURE: CPT | Performed by: INTERNAL MEDICINE

## 2022-06-28 PROCEDURE — 86704 HEP B CORE ANTIBODY TOTAL: CPT | Performed by: INTERNAL MEDICINE

## 2022-06-28 PROCEDURE — 36415 COLL VENOUS BLD VENIPUNCTURE: CPT | Performed by: INTERNAL MEDICINE

## 2022-06-28 PROCEDURE — 87340 HEPATITIS B SURFACE AG IA: CPT | Performed by: INTERNAL MEDICINE

## 2022-06-29 LAB
GAMMA INTERFERON BACKGROUND BLD IA-ACNC: 0 IU/ML
HBV CORE AB SERPL QL IA: NEGATIVE
HBV SURFACE AB SER-ACNC: NEGATIVE M[IU]/ML
HBV SURFACE AG SERPL QL IA: NEGATIVE
M TB IFN-G CD4+ BCKGRND COR BLD-ACNC: 0 IU/ML
MITOGEN IGNF BCKGRD COR BLD-ACNC: 0.73 IU/ML
TB GOLD PLUS: NEGATIVE
TB2 - NIL: 0 IU/ML

## 2022-07-01 ENCOUNTER — INFUSION (OUTPATIENT)
Dept: INFUSION THERAPY | Facility: HOSPITAL | Age: 37
End: 2022-07-01
Attending: INTERNAL MEDICINE
Payer: COMMERCIAL

## 2022-07-01 VITALS
DIASTOLIC BLOOD PRESSURE: 81 MMHG | HEART RATE: 97 BPM | RESPIRATION RATE: 18 BRPM | SYSTOLIC BLOOD PRESSURE: 123 MMHG | OXYGEN SATURATION: 98 % | TEMPERATURE: 98 F

## 2022-07-01 DIAGNOSIS — M06.09 RHEUMATOID ARTHRITIS OF MULTIPLE SITES WITH NEGATIVE RHEUMATOID FACTOR: Primary | ICD-10-CM

## 2022-07-01 PROCEDURE — 96365 THER/PROPH/DIAG IV INF INIT: CPT

## 2022-07-01 PROCEDURE — 96415 CHEMO IV INFUSION ADDL HR: CPT

## 2022-07-01 PROCEDURE — 96375 TX/PRO/DX INJ NEW DRUG ADDON: CPT

## 2022-07-01 PROCEDURE — 63600175 PHARM REV CODE 636 W HCPCS: Performed by: INTERNAL MEDICINE

## 2022-07-01 PROCEDURE — 25000003 PHARM REV CODE 250: Performed by: INTERNAL MEDICINE

## 2022-07-01 PROCEDURE — 96413 CHEMO IV INFUSION 1 HR: CPT

## 2022-07-01 PROCEDURE — 96366 THER/PROPH/DIAG IV INF ADDON: CPT

## 2022-07-01 RX ORDER — FAMOTIDINE 10 MG/ML
20 INJECTION INTRAVENOUS
Status: CANCELLED | OUTPATIENT
Start: 2022-07-15

## 2022-07-01 RX ORDER — ACETAMINOPHEN 325 MG/1
650 TABLET ORAL
Status: CANCELLED | OUTPATIENT
Start: 2022-07-15

## 2022-07-01 RX ORDER — HEPARIN 100 UNIT/ML
500 SYRINGE INTRAVENOUS
Status: CANCELLED | OUTPATIENT
Start: 2022-07-15

## 2022-07-01 RX ORDER — METHYLPREDNISOLONE SOD SUCC 125 MG
100 VIAL (EA) INJECTION
Status: COMPLETED | OUTPATIENT
Start: 2022-07-01 | End: 2022-07-01

## 2022-07-01 RX ORDER — SODIUM CHLORIDE 0.9 % (FLUSH) 0.9 %
10 SYRINGE (ML) INJECTION
Status: CANCELLED | OUTPATIENT
Start: 2022-07-15

## 2022-07-01 RX ORDER — ACETAMINOPHEN 325 MG/1
650 TABLET ORAL
Status: COMPLETED | OUTPATIENT
Start: 2022-07-01 | End: 2022-07-01

## 2022-07-01 RX ORDER — DIPHENHYDRAMINE HYDROCHLORIDE 50 MG/ML
25 INJECTION INTRAMUSCULAR; INTRAVENOUS
Status: COMPLETED | OUTPATIENT
Start: 2022-07-01 | End: 2022-07-01

## 2022-07-01 RX ORDER — FAMOTIDINE 10 MG/ML
20 INJECTION INTRAVENOUS
Status: COMPLETED | OUTPATIENT
Start: 2022-07-01 | End: 2022-07-01

## 2022-07-01 RX ADMIN — RITUXIMAB 1000 MG: 10 INJECTION, SOLUTION INTRAVENOUS at 09:07

## 2022-07-01 RX ADMIN — ACETAMINOPHEN 650 MG: 325 TABLET ORAL at 08:07

## 2022-07-01 RX ADMIN — FAMOTIDINE 20 MG: 10 INJECTION INTRAVENOUS at 08:07

## 2022-07-01 RX ADMIN — METHYLPREDNISOLONE SODIUM SUCCINATE 100 MG: 125 INJECTION, POWDER, FOR SOLUTION INTRAMUSCULAR; INTRAVENOUS at 08:07

## 2022-07-01 RX ADMIN — DIPHENHYDRAMINE HYDROCHLORIDE 25 MG: 50 INJECTION, SOLUTION INTRAMUSCULAR; INTRAVENOUS at 08:07

## 2022-07-01 NOTE — PLAN OF CARE
Problem: Adult Inpatient Plan of Care  Goal: Plan of Care Review  Outcome: Ongoing, Progressing  Flowsheets (Taken 7/1/2022 0858)  Plan of Care Reviewed With: patient  Goal: Patient-Specific Goal (Individualized)  Outcome: Ongoing, Progressing  Flowsheets (Taken 7/1/2022 0858)  Anxieties, Fears or Concerns: None verbalized today  Individualized Care Needs: Feet elevated in reclined position, warm blankets, snack and drink, pillow under peripheral IV arm  Patient-Specific Goals (Include Timeframe): Tolerate first rituxan infusion  Goal: Optimal Comfort and Wellbeing  Outcome: Ongoing, Progressing  Intervention: Provide Person-Centered Care  Flowsheets (Taken 7/1/2022 0858)  Trust Relationship/Rapport:   care explained   reassurance provided   choices provided   thoughts/feelings acknowledged   emotional support provided   empathic listening provided   questions answered   questions encouraged     Problem: Neutropenia (Chemotherapy Effects)  Goal: Absence of Infection  Outcome: Ongoing, Progressing  Intervention: Prevent Infection and Maximize Resistance  Flowsheets (Taken 7/1/2022 0858)  Infection Prevention:   hand hygiene promoted   equipment surfaces disinfected   personal protective equipment utilized   rest/sleep promoted

## 2022-07-08 ENCOUNTER — PATIENT MESSAGE (OUTPATIENT)
Dept: RHEUMATOLOGY | Facility: CLINIC | Age: 37
End: 2022-07-08
Payer: COMMERCIAL

## 2022-07-14 ENCOUNTER — OFFICE VISIT (OUTPATIENT)
Dept: DERMATOLOGY | Facility: CLINIC | Age: 37
End: 2022-07-14
Payer: COMMERCIAL

## 2022-07-14 DIAGNOSIS — L30.9 DERMATITIS: Primary | ICD-10-CM

## 2022-07-14 PROCEDURE — 11104 PR PUNCH BIOPSY, SKIN, SINGLE LESION: ICD-10-PCS | Mod: S$GLB,,, | Performed by: STUDENT IN AN ORGANIZED HEALTH CARE EDUCATION/TRAINING PROGRAM

## 2022-07-14 PROCEDURE — 88305 TISSUE EXAM BY PATHOLOGIST: CPT | Mod: 26,,, | Performed by: PATHOLOGY

## 2022-07-14 PROCEDURE — 88305 TISSUE EXAM BY PATHOLOGIST: CPT | Performed by: PATHOLOGY

## 2022-07-14 PROCEDURE — 1159F PR MEDICATION LIST DOCUMENTED IN MEDICAL RECORD: ICD-10-PCS | Mod: CPTII,S$GLB,, | Performed by: STUDENT IN AN ORGANIZED HEALTH CARE EDUCATION/TRAINING PROGRAM

## 2022-07-14 PROCEDURE — 88305 TISSUE EXAM BY PATHOLOGIST: ICD-10-PCS | Mod: 26,,, | Performed by: PATHOLOGY

## 2022-07-14 PROCEDURE — 1160F PR REVIEW ALL MEDS BY PRESCRIBER/CLIN PHARMACIST DOCUMENTED: ICD-10-PCS | Mod: CPTII,S$GLB,, | Performed by: STUDENT IN AN ORGANIZED HEALTH CARE EDUCATION/TRAINING PROGRAM

## 2022-07-14 PROCEDURE — 1159F MED LIST DOCD IN RCRD: CPT | Mod: CPTII,S$GLB,, | Performed by: STUDENT IN AN ORGANIZED HEALTH CARE EDUCATION/TRAINING PROGRAM

## 2022-07-14 PROCEDURE — 99999 PR PBB SHADOW E&M-EST. PATIENT-LVL III: ICD-10-PCS | Mod: PBBFAC,,, | Performed by: STUDENT IN AN ORGANIZED HEALTH CARE EDUCATION/TRAINING PROGRAM

## 2022-07-14 PROCEDURE — 99999 PR PBB SHADOW E&M-EST. PATIENT-LVL III: CPT | Mod: PBBFAC,,, | Performed by: STUDENT IN AN ORGANIZED HEALTH CARE EDUCATION/TRAINING PROGRAM

## 2022-07-14 PROCEDURE — 99213 PR OFFICE/OUTPT VISIT, EST, LEVL III, 20-29 MIN: ICD-10-PCS | Mod: 25,S$GLB,, | Performed by: STUDENT IN AN ORGANIZED HEALTH CARE EDUCATION/TRAINING PROGRAM

## 2022-07-14 PROCEDURE — 11104 PUNCH BX SKIN SINGLE LESION: CPT | Mod: S$GLB,,, | Performed by: STUDENT IN AN ORGANIZED HEALTH CARE EDUCATION/TRAINING PROGRAM

## 2022-07-14 PROCEDURE — 1160F RVW MEDS BY RX/DR IN RCRD: CPT | Mod: CPTII,S$GLB,, | Performed by: STUDENT IN AN ORGANIZED HEALTH CARE EDUCATION/TRAINING PROGRAM

## 2022-07-14 PROCEDURE — 99213 OFFICE O/P EST LOW 20 MIN: CPT | Mod: 25,S$GLB,, | Performed by: STUDENT IN AN ORGANIZED HEALTH CARE EDUCATION/TRAINING PROGRAM

## 2022-07-14 NOTE — PROGRESS NOTES
Subjective:       Patient ID:  Stefanie España is a 36 y.o. female who presents for   Chief Complaint   Patient presents with    Rash     Pt has Lupus      History of Present Illness: The patient presents with chief complaint of progressive tightness and firm feeling to the skin on the right arm. Patient has a complex history of connective tissue disease, being managed by Rheumatology, currently on Actemra infusion. Reports over the past several months, noticing tightness of the skin, firm feeling to the skin, and darkened discoloration to the area on the right forearm and hand. Little invovlement on the left. Denies any itching or pain. Has not tried anything for symptoms.         Rash        Review of Systems   Constitutional: Negative for fever and chills.   Skin: Positive for rash.        Objective:    Physical Exam   Constitutional: She appears well-developed and well-nourished. No distress.   Neurological: She is alert and oriented to person, place, and time. She is not disoriented.   Psychiatric: She has a normal mood and affect.   Skin:   Areas Examined (abnormalities noted in diagram):   Head / Face Inspection Performed  Neck Inspection Performed  RUE Inspected  LUE Inspection Performed              Diagram Legend     Erythematous scaling macule/papule c/w actinic keratosis       Vascular papule c/w angioma      Pigmented verrucoid papule/plaque c/w seborrheic keratosis      Yellow umbilicated papule c/w sebaceous hyperplasia      Irregularly shaped tan macule c/w lentigo     1-2 mm smooth white papules consistent with Milia      Movable subcutaneous cyst with punctum c/w epidermal inclusion cyst      Subcutaneous movable cyst c/w pilar cyst      Firm pink to brown papule c/w dermatofibroma      Pedunculated fleshy papule(s) c/w skin tag(s)      Evenly pigmented macule c/w junctional nevus     Mildly variegated pigmented, slightly irregular-bordered macule c/w mildly atypical nevus      Flesh colored  "to evenly pigmented papule c/w intradermal nevus       Pink pearly papule/plaque c/w basal cell carcinoma      Erythematous hyperkeratotic cursted plaque c/w SCC      Surgical scar with no sign of skin cancer recurrence      Open and closed comedones      Inflammatory papules and pustules      Verrucoid papule consistent consistent with wart     Erythematous eczematous patches and plaques     Dystrophic onycholytic nail with subungual debris c/w onychomycosis     Umbilicated papule    Erythematous-base heme-crusted tan verrucoid plaque consistent with inflamed seborrheic keratosis     Erythematous Silvery Scaling Plaque c/w Psoriasis     See annotation          Assessment / Plan:      Pathology Orders:     Normal Orders This Visit    Specimen to Pathology, Dermatology     Questions:    Procedure Type: Dermatology and skin neoplasms    Number of Specimens: 1    ------------------------: -------------------------    Spec 1 Procedure: Biopsy    Spec 1 Clinical Impression: Patient with CTD, now with progressive "tightening" of the skin. r/o morphea    Spec 1 Source: right arm    Release to patient:         Dermatitis  -     Specimen to Pathology, Dermatology    Punch biopsy procedure note:  Punch biopsy performed after verbal consent obtained. Area marked and prepped with alcohol. Approximately 1cc of 1% lidocaine with epinephrine injected. 4 mm disposable punch used to remove lesion. Hemostasis obtained and biopsy site closed with 1 - 2 Prolene sutures. Wound care instructions reviewed with patient and handout given.    Further management pending biopsy results.          Follow up in about 3 months (around 10/14/2022).    "

## 2022-07-14 NOTE — PATIENT INSTRUCTIONS
"Punch Biopsy Wound Care    Your doctor has performed a punch biopsy today.  A band aid and antibiotic ointment has been placed over the site.  This should remain in place for 24 hours.  It is recommended that you keep the area dry for the first 24 hours.  After 24 hours, you may remove the band aid and wash the area with warm soap and water and apply Vaseline jelly.  Many patients prefer to use Neosporin or Bacitracin ointment.  This is acceptable; however know that you can develop an allergy to this medication even if you have used it safely for years.  It is important to keep the area moist.  Letting it dry out and get air slows healing time, will worsen the scar, and make it more difficult to remove the stitches if they were placed.  Band aid is optional after first 24 hours.      If you notice increasing redness, tenderness, pain, or yellow drainage at the biopsy or surgical site, please notify your doctor.  These are signs of an infection.    If your biopsy/surgical site is bleeding, apply firm pressure for 15 minutes straight.  Repeat for another 15 minutes, if it is still bleeding.   If the surgical site continues to bleed, then please contact your doctor.      For [facility] users:   You will receive a [facility] notification after the pathologist has finished reviewing your biopsy specimen. Pathology results, however, will not be released online so you will see a "no content" message. Once your dermatologist reviews and clinically correlates your biopsy results, you will either receive a letter in the mail with the results of a phone call from your doctor's office if further explanation or treatment is warranted.     "

## 2022-07-15 ENCOUNTER — INFUSION (OUTPATIENT)
Dept: INFUSION THERAPY | Facility: HOSPITAL | Age: 37
End: 2022-07-15
Attending: INTERNAL MEDICINE
Payer: COMMERCIAL

## 2022-07-15 ENCOUNTER — PATIENT MESSAGE (OUTPATIENT)
Dept: RHEUMATOLOGY | Facility: CLINIC | Age: 37
End: 2022-07-15
Payer: COMMERCIAL

## 2022-07-15 VITALS
BODY MASS INDEX: 37.82 KG/M2 | SYSTOLIC BLOOD PRESSURE: 123 MMHG | HEART RATE: 88 BPM | WEIGHT: 249.56 LBS | TEMPERATURE: 98 F | HEIGHT: 68 IN | OXYGEN SATURATION: 100 % | DIASTOLIC BLOOD PRESSURE: 80 MMHG | RESPIRATION RATE: 16 BRPM

## 2022-07-15 DIAGNOSIS — M06.09 RHEUMATOID ARTHRITIS OF MULTIPLE SITES WITH NEGATIVE RHEUMATOID FACTOR: Primary | ICD-10-CM

## 2022-07-15 PROCEDURE — 63600175 PHARM REV CODE 636 W HCPCS: Mod: JG | Performed by: INTERNAL MEDICINE

## 2022-07-15 PROCEDURE — 63600175 PHARM REV CODE 636 W HCPCS: Performed by: INTERNAL MEDICINE

## 2022-07-15 PROCEDURE — 96375 TX/PRO/DX INJ NEW DRUG ADDON: CPT

## 2022-07-15 PROCEDURE — 96413 CHEMO IV INFUSION 1 HR: CPT

## 2022-07-15 PROCEDURE — 96415 CHEMO IV INFUSION ADDL HR: CPT

## 2022-07-15 PROCEDURE — 25000003 PHARM REV CODE 250: Performed by: INTERNAL MEDICINE

## 2022-07-15 RX ORDER — SODIUM CHLORIDE 0.9 % (FLUSH) 0.9 %
10 SYRINGE (ML) INJECTION
Status: CANCELLED | OUTPATIENT
Start: 2022-07-29

## 2022-07-15 RX ORDER — DIPHENHYDRAMINE HYDROCHLORIDE 50 MG/ML
25 INJECTION INTRAMUSCULAR; INTRAVENOUS
Status: COMPLETED | OUTPATIENT
Start: 2022-07-15 | End: 2022-07-15

## 2022-07-15 RX ORDER — FAMOTIDINE 10 MG/ML
20 INJECTION INTRAVENOUS
Status: CANCELLED | OUTPATIENT
Start: 2022-07-29

## 2022-07-15 RX ORDER — HEPARIN 100 UNIT/ML
500 SYRINGE INTRAVENOUS
Status: CANCELLED | OUTPATIENT
Start: 2022-07-29

## 2022-07-15 RX ORDER — ACETAMINOPHEN 325 MG/1
650 TABLET ORAL
Status: COMPLETED | OUTPATIENT
Start: 2022-07-15 | End: 2022-07-15

## 2022-07-15 RX ORDER — ACETAMINOPHEN 325 MG/1
650 TABLET ORAL
Status: CANCELLED | OUTPATIENT
Start: 2022-07-29

## 2022-07-15 RX ORDER — FAMOTIDINE 10 MG/ML
20 INJECTION INTRAVENOUS
Status: COMPLETED | OUTPATIENT
Start: 2022-07-15 | End: 2022-07-15

## 2022-07-15 RX ADMIN — ACETAMINOPHEN 650 MG: 325 TABLET ORAL at 08:07

## 2022-07-15 RX ADMIN — METHYLPREDNISOLONE SODIUM SUCCINATE 100 MG: 125 INJECTION, POWDER, FOR SOLUTION INTRAMUSCULAR; INTRAVENOUS at 08:07

## 2022-07-15 RX ADMIN — SODIUM CHLORIDE: 0.9 INJECTION, SOLUTION INTRAVENOUS at 09:07

## 2022-07-15 RX ADMIN — FAMOTIDINE 20 MG: 10 INJECTION INTRAVENOUS at 08:07

## 2022-07-15 RX ADMIN — RITUXIMAB 1000 MG: 10 INJECTION, SOLUTION INTRAVENOUS at 09:07

## 2022-07-15 RX ADMIN — DIPHENHYDRAMINE HYDROCHLORIDE 25 MG: 50 INJECTION, SOLUTION INTRAMUSCULAR; INTRAVENOUS at 08:07

## 2022-07-15 NOTE — DISCHARGE INSTRUCTIONS
[facility]  [address] [address]  [address] [address]  [phone] phone     [phone] fax  Hours of Operation: Monday- Friday 8:00am- 5:00pm  After hours phone  [phone]  Hematology / Oncology Physicians on call      PAOLA Ortiz Dr[address] Dr., ZOYA Eden, ZOYA Rangel, CARLOS ENRIQUE Moreno    Please call with any concerns regarding your appointment today. FALL PREVENTION   Falls often occur due to slipping, tripping or losing your balance. Here are ways to reduce your risk of falling again.   Was there anything that caused your fall that can be fixed, removed or replaced?   Make your home safe by keeping walkways clear of objects you may trip over.   Use non-slip pads under rugs.   Do not walk in poorly lit areas.   Do not stand on chairs or wobbly ladders.   Use caution when reaching overhead or looking upward. This position can cause a loss of balance.   Be sure your shoes fit properly, have non-slip bottoms and are in good condition.   Be cautious when going up and down stairs, curbs, and when walking on uneven sidewalks.   If your balance is poor, consider using a cane or walker.   If your fall was related to alcohol use, stop or limit alcohol intake.   If your fall was related to use of sleeping medicines, talk to your doctor about this. You may need to reduce your dosage at bedtime if you awaken during the night to go to the bathroom.   To reduce the need for nighttime bathroom trips:   Avoid drinking fluids for several hours before going to bed   Empty your bladder before going to bed   Men can keep a urinal at the bedside   © 0342-5241 [employer], [address] [place] [address], [place] [address]. All rights reserved. This information is not intended as a substitute for professional medical care. Always follow your healthcare professional's instructions. WAYS TO HELP PREVENT  INFECTION        WASH YOUR HANDS OFTEN DURING THE DAY, ESPECIALLY BEFORE YOU EAT, AFTER USING THE BATHROOM, AND AFTER TOUCHING ANIMALS    STAY AWAY FROM PEOPLE WHO HAVE ILLNESSES YOU CAN CATCH; SUCH AS COLDS, FLU, CHICKEN POX    TRY TO AVOID CROWDS    STAY AWAY FROM CHILDREN WHO RECENTLY HAVE RECEIVED LIVE VIRUS VACCINES    MAINTAIN GOOD MOUTH CARE    DO NOT SQUEEZE OR SCRATCH PIMPLES    CLEAN CUTS & SCRAPES RIGHT AWAY AND DAILY UNTIL HEALED WITH WARM WATER, SOAP & AN ANTISEPTIC    AVOID CONTACT WITH LITTER BOXES, BIRD CAGES, & FISH TANKS    AVOID STANDING WATER, IE., BIRD BATHS, FLOWER POTS/VASES, OR HUMIDIFIERS    WEAR GLOVES WHEN GARDENING OR CLEANING UP AFTER OTHERS, ESPECIALLY BABIES & SMALL CHILDREN    DO NOT EAT RAW FISH, SEAFOOD, MEAT, OR EGGS

## 2022-07-25 LAB
FINAL PATHOLOGIC DIAGNOSIS: NORMAL
GROSS: NORMAL
Lab: NORMAL
MICROSCOPIC EXAM: NORMAL

## 2022-07-28 ENCOUNTER — CLINICAL SUPPORT (OUTPATIENT)
Dept: DERMATOLOGY | Facility: CLINIC | Age: 37
End: 2022-07-28
Payer: COMMERCIAL

## 2022-07-28 PROCEDURE — 99999 PR PBB SHADOW E&M-EST. PATIENT-LVL II: CPT | Mod: PBBFAC,,,

## 2022-07-28 PROCEDURE — 99999 PR PBB SHADOW E&M-EST. PATIENT-LVL II: ICD-10-PCS | Mod: PBBFAC,,,

## 2022-07-29 ENCOUNTER — PATIENT MESSAGE (OUTPATIENT)
Dept: DERMATOLOGY | Facility: CLINIC | Age: 37
End: 2022-07-29
Payer: COMMERCIAL

## 2022-07-29 RX ORDER — CLOBETASOL PROPIONATE 0.5 MG/G
CREAM TOPICAL 2 TIMES DAILY
Qty: 60 G | Refills: 1 | Status: SHIPPED | OUTPATIENT
Start: 2022-07-29 | End: 2022-09-15 | Stop reason: SDUPTHER

## 2022-08-19 ENCOUNTER — OFFICE VISIT (OUTPATIENT)
Dept: RHEUMATOLOGY | Facility: CLINIC | Age: 37
End: 2022-08-19
Payer: COMMERCIAL

## 2022-08-19 ENCOUNTER — PATIENT MESSAGE (OUTPATIENT)
Dept: RHEUMATOLOGY | Facility: CLINIC | Age: 37
End: 2022-08-19
Payer: COMMERCIAL

## 2022-08-19 ENCOUNTER — LAB VISIT (OUTPATIENT)
Dept: LAB | Facility: HOSPITAL | Age: 37
End: 2022-08-19
Attending: FAMILY MEDICINE
Payer: COMMERCIAL

## 2022-08-19 VITALS
DIASTOLIC BLOOD PRESSURE: 74 MMHG | HEIGHT: 68 IN | WEIGHT: 235.88 LBS | HEART RATE: 94 BPM | SYSTOLIC BLOOD PRESSURE: 126 MMHG | BODY MASS INDEX: 35.75 KG/M2

## 2022-08-19 DIAGNOSIS — Z51.81 ENCOUNTER FOR MEDICATION MONITORING: ICD-10-CM

## 2022-08-19 DIAGNOSIS — L94.0 MORPHEA: ICD-10-CM

## 2022-08-19 DIAGNOSIS — M32.9 SYSTEMIC LUPUS ERYTHEMATOSUS ARTHRITIS: ICD-10-CM

## 2022-08-19 DIAGNOSIS — M35.1 MCTD (MIXED CONNECTIVE TISSUE DISEASE): Primary | ICD-10-CM

## 2022-08-19 DIAGNOSIS — M06.09 RHEUMATOID ARTHRITIS OF MULTIPLE SITES WITH NEGATIVE RHEUMATOID FACTOR: ICD-10-CM

## 2022-08-19 DIAGNOSIS — Z79.899 LONG-TERM CURRENT USE OF HIGH RISK MEDICATION OTHER THAN ANTICOAGULANT: ICD-10-CM

## 2022-08-19 DIAGNOSIS — M79.7 FIBROMYALGIA: ICD-10-CM

## 2022-08-19 DIAGNOSIS — D84.9 IMMUNOSUPPRESSED STATUS: ICD-10-CM

## 2022-08-19 DIAGNOSIS — R76.8 POSITIVE SM/RNP ANTIBODY: ICD-10-CM

## 2022-08-19 LAB
ALBUMIN SERPL BCP-MCNC: 3.1 G/DL (ref 3.5–5.2)
ALP SERPL-CCNC: 46 U/L (ref 55–135)
ALT SERPL W/O P-5'-P-CCNC: 18 U/L (ref 10–44)
ANION GAP SERPL CALC-SCNC: 12 MMOL/L (ref 8–16)
AST SERPL-CCNC: 29 U/L (ref 10–40)
BASOPHILS # BLD AUTO: 0.05 K/UL (ref 0–0.2)
BASOPHILS NFR BLD: 0.7 % (ref 0–1.9)
BILIRUB SERPL-MCNC: 0.3 MG/DL (ref 0.1–1)
BUN SERPL-MCNC: 7 MG/DL (ref 6–20)
CALCIUM SERPL-MCNC: 9.7 MG/DL (ref 8.7–10.5)
CHLORIDE SERPL-SCNC: 100 MMOL/L (ref 95–110)
CO2 SERPL-SCNC: 25 MMOL/L (ref 23–29)
CREAT SERPL-MCNC: 0.7 MG/DL (ref 0.5–1.4)
CRP SERPL-MCNC: 41.6 MG/L (ref 0–8.2)
DIFFERENTIAL METHOD: ABNORMAL
EOSINOPHIL # BLD AUTO: 1 K/UL (ref 0–0.5)
EOSINOPHIL NFR BLD: 12.8 % (ref 0–8)
ERYTHROCYTE [DISTWIDTH] IN BLOOD BY AUTOMATED COUNT: 13.8 % (ref 11.5–14.5)
ERYTHROCYTE [SEDIMENTATION RATE] IN BLOOD BY PHOTOMETRIC METHOD: 75 MM/HR (ref 0–36)
EST. GFR  (NO RACE VARIABLE): >60 ML/MIN/1.73 M^2
GLUCOSE SERPL-MCNC: 78 MG/DL (ref 70–110)
HCT VFR BLD AUTO: 31.4 % (ref 37–48.5)
HGB BLD-MCNC: 9.9 G/DL (ref 12–16)
IMM GRANULOCYTES # BLD AUTO: 0.03 K/UL (ref 0–0.04)
IMM GRANULOCYTES NFR BLD AUTO: 0.4 % (ref 0–0.5)
LYMPHOCYTES # BLD AUTO: 0.9 K/UL (ref 1–4.8)
LYMPHOCYTES NFR BLD: 12.3 % (ref 18–48)
MCH RBC QN AUTO: 27.7 PG (ref 27–31)
MCHC RBC AUTO-ENTMCNC: 31.5 G/DL (ref 32–36)
MCV RBC AUTO: 88 FL (ref 82–98)
MONOCYTES # BLD AUTO: 0.6 K/UL (ref 0.3–1)
MONOCYTES NFR BLD: 7.2 % (ref 4–15)
NEUTROPHILS # BLD AUTO: 5.1 K/UL (ref 1.8–7.7)
NEUTROPHILS NFR BLD: 66.6 % (ref 38–73)
NRBC BLD-RTO: 0 /100 WBC
PLATELET # BLD AUTO: 632 K/UL (ref 150–450)
PMV BLD AUTO: 8 FL (ref 9.2–12.9)
POTASSIUM SERPL-SCNC: 4.2 MMOL/L (ref 3.5–5.1)
PROT SERPL-MCNC: 8.8 G/DL (ref 6–8.4)
RBC # BLD AUTO: 3.57 M/UL (ref 4–5.4)
SODIUM SERPL-SCNC: 137 MMOL/L (ref 136–145)
WBC # BLD AUTO: 7.67 K/UL (ref 3.9–12.7)

## 2022-08-19 PROCEDURE — 85025 COMPLETE CBC W/AUTO DIFF WBC: CPT | Performed by: INTERNAL MEDICINE

## 2022-08-19 PROCEDURE — 3008F PR BODY MASS INDEX (BMI) DOCUMENTED: ICD-10-PCS | Mod: CPTII,S$GLB,, | Performed by: PHYSICIAN ASSISTANT

## 2022-08-19 PROCEDURE — 99215 OFFICE O/P EST HI 40 MIN: CPT | Mod: S$GLB,,, | Performed by: PHYSICIAN ASSISTANT

## 2022-08-19 PROCEDURE — 1159F PR MEDICATION LIST DOCUMENTED IN MEDICAL RECORD: ICD-10-PCS | Mod: CPTII,S$GLB,, | Performed by: PHYSICIAN ASSISTANT

## 2022-08-19 PROCEDURE — 3078F DIAST BP <80 MM HG: CPT | Mod: CPTII,S$GLB,, | Performed by: PHYSICIAN ASSISTANT

## 2022-08-19 PROCEDURE — 99215 PR OFFICE/OUTPT VISIT, EST, LEVL V, 40-54 MIN: ICD-10-PCS | Mod: S$GLB,,, | Performed by: PHYSICIAN ASSISTANT

## 2022-08-19 PROCEDURE — 3008F BODY MASS INDEX DOCD: CPT | Mod: CPTII,S$GLB,, | Performed by: PHYSICIAN ASSISTANT

## 2022-08-19 PROCEDURE — 86140 C-REACTIVE PROTEIN: CPT | Performed by: INTERNAL MEDICINE

## 2022-08-19 PROCEDURE — 80053 COMPREHEN METABOLIC PANEL: CPT | Performed by: INTERNAL MEDICINE

## 2022-08-19 PROCEDURE — 99999 PR PBB SHADOW E&M-EST. PATIENT-LVL III: ICD-10-PCS | Mod: PBBFAC,,, | Performed by: PHYSICIAN ASSISTANT

## 2022-08-19 PROCEDURE — 3078F PR MOST RECENT DIASTOLIC BLOOD PRESSURE < 80 MM HG: ICD-10-PCS | Mod: CPTII,S$GLB,, | Performed by: PHYSICIAN ASSISTANT

## 2022-08-19 PROCEDURE — 1159F MED LIST DOCD IN RCRD: CPT | Mod: CPTII,S$GLB,, | Performed by: PHYSICIAN ASSISTANT

## 2022-08-19 PROCEDURE — 99999 PR PBB SHADOW E&M-EST. PATIENT-LVL III: CPT | Mod: PBBFAC,,, | Performed by: PHYSICIAN ASSISTANT

## 2022-08-19 PROCEDURE — 85652 RBC SED RATE AUTOMATED: CPT | Performed by: INTERNAL MEDICINE

## 2022-08-19 PROCEDURE — 3074F SYST BP LT 130 MM HG: CPT | Mod: CPTII,S$GLB,, | Performed by: PHYSICIAN ASSISTANT

## 2022-08-19 PROCEDURE — 36415 COLL VENOUS BLD VENIPUNCTURE: CPT | Performed by: INTERNAL MEDICINE

## 2022-08-19 PROCEDURE — 3074F PR MOST RECENT SYSTOLIC BLOOD PRESSURE < 130 MM HG: ICD-10-PCS | Mod: CPTII,S$GLB,, | Performed by: PHYSICIAN ASSISTANT

## 2022-08-19 ASSESSMENT — ROUTINE ASSESSMENT OF PATIENT INDEX DATA (RAPID3): MDHAQ FUNCTION SCORE: 0.9

## 2022-08-19 NOTE — PROGRESS NOTES
RHEUMATOLOGY OUTPATIENT CLINIC NOTE    8/19/2022    Attending Rheumatologist: Allyssa Garcia PA-C  Primary Care Provider: Sofia Martinez MD   Chief Complaint/Reason For Consultation:  MCTD      Subjective:        Stefanie España is a 36 y.o. female here today for routine follow up of complex presentation of connective tissue disease w/ inflammatory arthritis, myositis, photosensitive rash and diffuse pain.  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 Recent skin bx confirms dx of morphea.     rituxan induction 7/1/22 and 7/15/22    Lots of fatigue and malaise. Feels like her legs are very heavy and she has noticed skin change to legs that looks like morphea. See pictures below. Rheumatological review of system negative otherwise. Physical examination shows  Skin tightness and diffuse swelling of bilateral hands right more than left.  More of a sclerodactyly/morphea like examination finding of her right forearm and bilateral posterior LE.    Serologies     Lab Results   Component Value Date    HEPAIGM Negative 06/26/2019    HEPBIGM Negative 06/26/2019    HEPBCAB Negative 06/28/2022    HEPCAB Negative 06/26/2019     RNP antibody positive    Current Rheum Medications:  · prednisone 10 mg daily, rituxan  Previous Rheum Medications:   · Methotrexate, Benlysta, CellCept, TNF alpha inhibitors avoided due to lupus, Plaquenil, actemra, rinvoq    Past Medical History:   Diagnosis Date    Abnormal Pap smear of cervix     repeat was normal    Lupus (systemic lupus erythematosus)      Social History     Socioeconomic History    Marital status:    Occupational History    Occupation: [employer]   Tobacco Use    Smoking status: Never Smoker    Smokeless tobacco: Never Used   Substance and Sexual Activity    Alcohol use: No    Drug use: No    Sexual activity: Yes     Partners: Male     Birth  "control/protection: Surgical     Comment: BTL   Other Topics Concern    Are you pregnant or think you may be? No    Breast-feeding No     Review of patient's allergies indicates:   Allergen Reactions    Elavil [amitriptyline]        Objective:   /74   Pulse 94   Ht 5' 8" (1.727 m)   Wt 107 kg (235 lb 14.3 oz)   BMI 35.87 kg/m²     Immunization History   Administered Date(s) Administered    Influenza 10/24/2018    Pneumococcal Conjugate - 13 Valent 01/08/2019    Tdap 03/19/2017       Current Outpatient Medications:     bumetanide (BUMEX) 1 MG tablet, Take 1 tablet (1 mg total) by mouth once daily., Disp: 10 tablet, Rfl: 0    clobetasoL (TEMOVATE) 0.05 % cream, Apply topically 2 (two) times daily., Disp: 60 g, Rfl: 1    clobetasol (TEMOVATE) 0.05 % external solution, Apply topically 2 (two) times daily., Disp: 50 mL, Rfl: 1    gabapentin (NEURONTIN) 300 MG capsule, Take 1 capsule (300 mg total) by mouth 3 (three) times daily., Disp: 90 capsule, Rfl: 11    mometasone 0.1% (ELOCON) 0.1 % cream, Apply topically once daily., Disp: 50 g, Rfl: 1    predniSONE (DELTASONE) 10 MG tablet, Take 1 tablet (10 mg total) by mouth once daily., Disp: 30 tablet, Rfl: 1    tacrolimus (PROGRAF) 1 MG Cap, TAKE 3 CAPSULES BY MOUTH EVERY 12 HOURS, Disp: 180 capsule, Rfl: 0    tacrolimus (PROTOPIC) 0.1 % ointment, Apply topically 2 (two) times daily., Disp: 60 g, Rfl: 3    traMADoL (ULTRAM) 50 mg tablet, Take 1 tablet (50 mg total) by mouth every 12 (twelve) hours as needed for Pain., Disp: 60 tablet, Rfl: 3    triamcinolone acetonide 0.1% (KENALOG) 0.1 % ointment, AAA bid, Disp: 454 g, Rfl: 0    fluocinolone (DERMA-SMOOTHE) 0.01 % external oil, Apply oil to scalp twice a day (Patient not taking: Reported on 1/7/2021), Disp: 1 Bottle, Rfl: 5    traZODone (DESYREL) 50 MG tablet, Take 1 tablet (50 mg total) by mouth every evening., Disp: 30 tablet, Rfl: 11       Recent Results (from the past 336 hour(s))   CBC Auto " Differential    Collection Time: 08/19/22 10:00 AM   Result Value Ref Range    WBC 7.67 3.90 - 12.70 K/uL    RBC 3.57 (L) 4.00 - 5.40 M/uL    Hemoglobin 9.9 (L) 12.0 - 16.0 g/dL    Hematocrit 31.4 (L) 37.0 - 48.5 %    MCV 88 82 - 98 fL    MCH 27.7 27.0 - 31.0 pg    MCHC 31.5 (L) 32.0 - 36.0 g/dL    RDW 13.8 11.5 - 14.5 %    Platelets 632 (H) 150 - 450 K/uL    MPV 8.0 (L) 9.2 - 12.9 fL    Immature Granulocytes 0.4 0.0 - 0.5 %    Gran # (ANC) 5.1 1.8 - 7.7 K/uL    Immature Grans (Abs) 0.03 0.00 - 0.04 K/uL    Lymph # 0.9 (L) 1.0 - 4.8 K/uL    Mono # 0.6 0.3 - 1.0 K/uL    Eos # 1.0 (H) 0.0 - 0.5 K/uL    Baso # 0.05 0.00 - 0.20 K/uL    nRBC 0 0 /100 WBC    Gran % 66.6 38.0 - 73.0 %    Lymph % 12.3 (L) 18.0 - 48.0 %    Mono % 7.2 4.0 - 15.0 %    Eosinophil % 12.8 (H) 0.0 - 8.0 %    Basophil % 0.7 0.0 - 1.9 %    Differential Method Automated    Comprehensive Metabolic Panel    Collection Time: 08/19/22 10:00 AM   Result Value Ref Range    Sodium 137 136 - 145 mmol/L    Potassium 4.2 3.5 - 5.1 mmol/L    Chloride 100 95 - 110 mmol/L    CO2 25 23 - 29 mmol/L    Glucose 78 70 - 110 mg/dL    BUN 7 6 - 20 mg/dL    Creatinine 0.7 0.5 - 1.4 mg/dL    Calcium 9.7 8.7 - 10.5 mg/dL    Total Protein 8.8 (H) 6.0 - 8.4 g/dL    Albumin 3.1 (L) 3.5 - 5.2 g/dL    Total Bilirubin 0.3 0.1 - 1.0 mg/dL    Alkaline Phosphatase 46 (L) 55 - 135 U/L    AST 29 10 - 40 U/L    ALT 18 10 - 44 U/L    Anion Gap 12 8 - 16 mmol/L    eGFR >60 >60 mL/min/1.73 m^2   Sedimentation rate    Collection Time: 08/19/22 10:00 AM   Result Value Ref Range    Sed Rate 75 (H) 0 - 36 mm/Hr   C-Reactive Protein    Collection Time: 08/19/22 10:00 AM   Result Value Ref Range    CRP 41.6 (H) 0.0 - 8.2 mg/L       Imaging:  All imaging reviewed and independently interpreted by me.    · CT chest without contrast 06/02/2022-no evidence of ILD                            Assessment:     1. MCTD (mixed connective tissue disease)    2. Positive sm/RNP antibody    3.  Immunosuppressed status    4. Encounter for medication monitoring    5. Morphea    6. Fibromyalgia          Plan:       · rituxan induction started 7/1/22; morphea now appears to involve the bilateral LE. Will review with Dr. Whalen and contact with further recommendations.  · Compromised immune system secondary to autoimmune disease and use of immunosuppressive drugs. Monitor carefully for infections and toxicities- Currently denies issues with recurrent infections. Advised to get immediate medical care if any infection.  · Recommend Yearly Skin Cancer Screening by Dermatology for all patients on biologic or Erick. advised strict adherence to age appropriate vaccinations (shingles, pneumonia, flu and covid) and cancer screenings with PCP   · Patient advised to hold DMARD and/or biologic therapy for signs of infection or for surgery. If you are unsure what to do please call our office for instruction.[facility] [phone]  · no current medication related issues, no evidence of toxicity. I ordered labs for toxicity monitoring;  results reviewed and discussed findings with the patient   · Patient understands and contact clinic at any time regarding questions about today's office visit and any medication changes that were made  · Return to clinic:  4-6 weeks with reg4    The patient understands, chooses and consents to this plan and accepts all the risks which include but are not limited to the risks mentioned above.     Method of contact with patient concerns: [facility]    60 minutes of total time spent on the encounter, which includes face to face time and non-face to face time preparing to see the patient (eg, review of tests), Obtaining and/or reviewing separately obtained history, Documenting clinical information in the electronic or other health record, Independently interpreting results (not separately reported) and communicating results to the patient/family/caregiver, or  Care coordination (not separately reported).          Disclaimer: This note was prepared using a voice recognition system and is likely to have sound alike errors within the text.       Allyssa Garcia PA-C  Rheumatology Department   [facility]

## 2022-08-23 ENCOUNTER — TELEPHONE (OUTPATIENT)
Dept: RHEUMATOLOGY | Facility: CLINIC | Age: 37
End: 2022-08-23
Payer: COMMERCIAL

## 2022-08-23 DIAGNOSIS — D84.9 IMMUNOSUPPRESSED STATUS: Primary | ICD-10-CM

## 2022-08-23 NOTE — TELEPHONE ENCOUNTER
Spoke with pt to schedule visits below, pt on the other line with Allyssa currently. Advised patient we would call back.

## 2022-08-23 NOTE — TELEPHONE ENCOUNTER
Please schedule pt for these labs (early) and for an appt with me in 2 weeks. OK to overbook if needed.      ----- Message from Prashanth Whalen MD sent at 8/22/2022  6:01 AM CDT -----  Regarding: RE: MCTD tx  If she has worsening morphea on Rituximab, we should consider cytoxan. Repeat CBC, CMP, ESR, CRP along with Total quantitative immunoglobulins in 2 weeks. If inflammatory markers fails to improve, we will go ahead with cytoxan. Keep me posted. Reevaluate her in 2 weeks if you could.   Thanks

## 2022-08-23 NOTE — TELEPHONE ENCOUNTER
Spoke with pt and scheduled labs in [place] for 9.6.22 and visit with Allyssa on 9.8.22 at 1 pm.

## 2022-08-31 ENCOUNTER — PATIENT MESSAGE (OUTPATIENT)
Dept: RHEUMATOLOGY | Facility: CLINIC | Age: 37
End: 2022-08-31
Payer: COMMERCIAL

## 2022-09-06 ENCOUNTER — LAB VISIT (OUTPATIENT)
Dept: LAB | Facility: HOSPITAL | Age: 37
End: 2022-09-06
Attending: PHYSICIAN ASSISTANT
Payer: COMMERCIAL

## 2022-09-06 DIAGNOSIS — D84.9 IMMUNOSUPPRESSED STATUS: ICD-10-CM

## 2022-09-06 LAB
ALBUMIN SERPL BCP-MCNC: 3.3 G/DL (ref 3.5–5.2)
ALP SERPL-CCNC: 43 U/L (ref 55–135)
ALT SERPL W/O P-5'-P-CCNC: 20 U/L (ref 10–44)
ANION GAP SERPL CALC-SCNC: 8 MMOL/L (ref 8–16)
AST SERPL-CCNC: 32 U/L (ref 10–40)
BASOPHILS # BLD AUTO: 0.06 K/UL (ref 0–0.2)
BASOPHILS NFR BLD: 0.7 % (ref 0–1.9)
BILIRUB SERPL-MCNC: 0.2 MG/DL (ref 0.1–1)
BUN SERPL-MCNC: 10 MG/DL (ref 6–20)
CALCIUM SERPL-MCNC: 9.3 MG/DL (ref 8.7–10.5)
CHLORIDE SERPL-SCNC: 101 MMOL/L (ref 95–110)
CO2 SERPL-SCNC: 24 MMOL/L (ref 23–29)
CREAT SERPL-MCNC: 0.7 MG/DL (ref 0.5–1.4)
CRP SERPL-MCNC: 21.7 MG/L (ref 0–8.2)
DIFFERENTIAL METHOD: ABNORMAL
EOSINOPHIL # BLD AUTO: 1.1 K/UL (ref 0–0.5)
EOSINOPHIL NFR BLD: 11.8 % (ref 0–8)
ERYTHROCYTE [DISTWIDTH] IN BLOOD BY AUTOMATED COUNT: 13.7 % (ref 11.5–14.5)
ERYTHROCYTE [SEDIMENTATION RATE] IN BLOOD BY WESTERGREN METHOD: 85 MM/HR (ref 0–20)
EST. GFR  (NO RACE VARIABLE): >60 ML/MIN/1.73 M^2
GLUCOSE SERPL-MCNC: 70 MG/DL (ref 70–110)
HCT VFR BLD AUTO: 30.5 % (ref 37–48.5)
HGB BLD-MCNC: 9.7 G/DL (ref 12–16)
IGA SERPL-MCNC: 364 MG/DL (ref 40–350)
IGG SERPL-MCNC: 3311 MG/DL (ref 650–1600)
IGM SERPL-MCNC: 295 MG/DL (ref 50–300)
IMM GRANULOCYTES # BLD AUTO: 0.06 K/UL (ref 0–0.04)
IMM GRANULOCYTES NFR BLD AUTO: 0.7 % (ref 0–0.5)
LYMPHOCYTES # BLD AUTO: 1.2 K/UL (ref 1–4.8)
LYMPHOCYTES NFR BLD: 13.4 % (ref 18–48)
MCH RBC QN AUTO: 27.6 PG (ref 27–31)
MCHC RBC AUTO-ENTMCNC: 31.8 G/DL (ref 32–36)
MCV RBC AUTO: 87 FL (ref 82–98)
MONOCYTES # BLD AUTO: 0.7 K/UL (ref 0.3–1)
MONOCYTES NFR BLD: 7.7 % (ref 4–15)
NEUTROPHILS # BLD AUTO: 6.1 K/UL (ref 1.8–7.7)
NEUTROPHILS NFR BLD: 66.4 % (ref 38–73)
NRBC BLD-RTO: 0 /100 WBC
PLATELET # BLD AUTO: 577 K/UL (ref 150–450)
PMV BLD AUTO: 8 FL (ref 9.2–12.9)
POTASSIUM SERPL-SCNC: 3.8 MMOL/L (ref 3.5–5.1)
PROT SERPL-MCNC: 9 G/DL (ref 6–8.4)
RBC # BLD AUTO: 3.52 M/UL (ref 4–5.4)
SODIUM SERPL-SCNC: 133 MMOL/L (ref 136–145)
WBC # BLD AUTO: 9.19 K/UL (ref 3.9–12.7)

## 2022-09-06 PROCEDURE — 36415 COLL VENOUS BLD VENIPUNCTURE: CPT | Mod: PO | Performed by: PHYSICIAN ASSISTANT

## 2022-09-06 PROCEDURE — 80053 COMPREHEN METABOLIC PANEL: CPT | Performed by: PHYSICIAN ASSISTANT

## 2022-09-06 PROCEDURE — 86140 C-REACTIVE PROTEIN: CPT | Performed by: PHYSICIAN ASSISTANT

## 2022-09-06 PROCEDURE — 85651 RBC SED RATE NONAUTOMATED: CPT | Mod: PO | Performed by: PHYSICIAN ASSISTANT

## 2022-09-06 PROCEDURE — 85025 COMPLETE CBC W/AUTO DIFF WBC: CPT | Mod: PO | Performed by: PHYSICIAN ASSISTANT

## 2022-09-06 PROCEDURE — 82784 ASSAY IGA/IGD/IGG/IGM EACH: CPT | Performed by: PHYSICIAN ASSISTANT

## 2022-09-07 ENCOUNTER — TELEPHONE (OUTPATIENT)
Dept: RHEUMATOLOGY | Facility: CLINIC | Age: 37
End: 2022-09-07
Payer: COMMERCIAL

## 2022-09-07 DIAGNOSIS — M35.1 MCTD (MIXED CONNECTIVE TISSUE DISEASE): Primary | ICD-10-CM

## 2022-09-07 RX ORDER — FOLIC ACID 1 MG/1
1 TABLET ORAL DAILY
Qty: 30 TABLET | Refills: 11 | Status: SHIPPED | OUTPATIENT
Start: 2022-09-07 | End: 2022-09-09

## 2022-09-07 RX ORDER — METHOTREXATE 2.5 MG/1
20 TABLET ORAL
Qty: 32 TABLET | Refills: 3 | Status: SHIPPED | OUTPATIENT
Start: 2022-09-07 | End: 2022-09-09

## 2022-09-07 NOTE — TELEPHONE ENCOUNTER
Spoke with patient- notified to stop her prograf and start MTX 20 mg/wk + daily 1 mg FA x 4 weeks. Keep appt for tomorrow. Repeat labs in 4 weeks.

## 2022-09-08 ENCOUNTER — OFFICE VISIT (OUTPATIENT)
Dept: RHEUMATOLOGY | Facility: CLINIC | Age: 37
End: 2022-09-08
Payer: COMMERCIAL

## 2022-09-08 VITALS
HEIGHT: 68 IN | BODY MASS INDEX: 35.11 KG/M2 | DIASTOLIC BLOOD PRESSURE: 81 MMHG | HEART RATE: 91 BPM | SYSTOLIC BLOOD PRESSURE: 144 MMHG | WEIGHT: 231.69 LBS

## 2022-09-08 DIAGNOSIS — M79.7 FIBROMYALGIA: ICD-10-CM

## 2022-09-08 DIAGNOSIS — D84.9 IMMUNOSUPPRESSED STATUS: ICD-10-CM

## 2022-09-08 DIAGNOSIS — Z51.81 ENCOUNTER FOR MEDICATION MONITORING: ICD-10-CM

## 2022-09-08 DIAGNOSIS — L94.0 MORPHEA: ICD-10-CM

## 2022-09-08 DIAGNOSIS — M35.1 MCTD (MIXED CONNECTIVE TISSUE DISEASE): Primary | ICD-10-CM

## 2022-09-08 DIAGNOSIS — R76.8 POSITIVE SM/RNP ANTIBODY: ICD-10-CM

## 2022-09-08 PROCEDURE — 3079F PR MOST RECENT DIASTOLIC BLOOD PRESSURE 80-89 MM HG: ICD-10-PCS | Mod: CPTII,S$GLB,, | Performed by: PHYSICIAN ASSISTANT

## 2022-09-08 PROCEDURE — 1159F MED LIST DOCD IN RCRD: CPT | Mod: CPTII,S$GLB,, | Performed by: PHYSICIAN ASSISTANT

## 2022-09-08 PROCEDURE — 99215 OFFICE O/P EST HI 40 MIN: CPT | Mod: S$GLB,,, | Performed by: PHYSICIAN ASSISTANT

## 2022-09-08 PROCEDURE — 99215 PR OFFICE/OUTPT VISIT, EST, LEVL V, 40-54 MIN: ICD-10-PCS | Mod: S$GLB,,, | Performed by: PHYSICIAN ASSISTANT

## 2022-09-08 PROCEDURE — 3077F PR MOST RECENT SYSTOLIC BLOOD PRESSURE >= 140 MM HG: ICD-10-PCS | Mod: CPTII,S$GLB,, | Performed by: PHYSICIAN ASSISTANT

## 2022-09-08 PROCEDURE — 99999 PR PBB SHADOW E&M-EST. PATIENT-LVL IV: ICD-10-PCS | Mod: PBBFAC,,, | Performed by: PHYSICIAN ASSISTANT

## 2022-09-08 PROCEDURE — 3079F DIAST BP 80-89 MM HG: CPT | Mod: CPTII,S$GLB,, | Performed by: PHYSICIAN ASSISTANT

## 2022-09-08 PROCEDURE — 3008F BODY MASS INDEX DOCD: CPT | Mod: CPTII,S$GLB,, | Performed by: PHYSICIAN ASSISTANT

## 2022-09-08 PROCEDURE — 3077F SYST BP >= 140 MM HG: CPT | Mod: CPTII,S$GLB,, | Performed by: PHYSICIAN ASSISTANT

## 2022-09-08 PROCEDURE — 1159F PR MEDICATION LIST DOCUMENTED IN MEDICAL RECORD: ICD-10-PCS | Mod: CPTII,S$GLB,, | Performed by: PHYSICIAN ASSISTANT

## 2022-09-08 PROCEDURE — 3008F PR BODY MASS INDEX (BMI) DOCUMENTED: ICD-10-PCS | Mod: CPTII,S$GLB,, | Performed by: PHYSICIAN ASSISTANT

## 2022-09-08 PROCEDURE — 99999 PR PBB SHADOW E&M-EST. PATIENT-LVL IV: CPT | Mod: PBBFAC,,, | Performed by: PHYSICIAN ASSISTANT

## 2022-09-08 RX ORDER — ACETAMINOPHEN 325 MG/1
650 TABLET ORAL
Status: CANCELLED | OUTPATIENT
Start: 2022-09-08

## 2022-09-08 RX ORDER — ACETAMINOPHEN 325 MG/1
650 TABLET ORAL EVERY 4 HOURS PRN
Status: CANCELLED | OUTPATIENT
Start: 2022-09-08

## 2022-09-08 RX ORDER — HEPARIN 100 UNIT/ML
500 SYRINGE INTRAVENOUS
Status: CANCELLED | OUTPATIENT
Start: 2022-09-08

## 2022-09-08 RX ORDER — SODIUM CHLORIDE 0.9 % (FLUSH) 0.9 %
10 SYRINGE (ML) INJECTION
Status: CANCELLED | OUTPATIENT
Start: 2022-09-08

## 2022-09-08 RX ORDER — COLCHICINE 0.6 MG/1
0.6 TABLET ORAL 2 TIMES DAILY
Qty: 60 TABLET | Refills: 11 | Status: SHIPPED | OUTPATIENT
Start: 2022-09-08 | End: 2023-04-24

## 2022-09-08 NOTE — PROGRESS NOTES
RHEUMATOLOGY OUTPATIENT CLINIC NOTE    9/9/2022    Attending Rheumatologist: Allyssa Garcia PA-C  Primary Care Provider: Sofia Martinez MD   Chief Complaint/Reason For Consultation:  MCTD      Subjective:        Stefanie España is a 36 y.o. female here today for 2 week follow up worsening sed rate and crp with spreading morphea after rituxan tx. Repeat sed rate has increased slightly and CRP decreased from 41 to 21. She is having more pain in her lower legs assoc w/ the morphea. Morphea seems to have spread more to the anterior lower legs as well since her rituxan tx. Rheumatological review of system negative otherwise. Physical examination shows  Skin tightness and diffuse swelling of bilateral hands right more than left.  More of a sclerodactyly/morphea like examination finding of her right forearm and bilateral posterior LE.    seeb routinely for complex presentation of connective tissue disease w/ inflammatory arthritis, myositis, photosensitive rash and diffuse pain.  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 skin bx confirms dx of morphea.     rituxan induction 7/1/22 and 7/15/22    Serologies    Lab Results   Component Value Date    HEPAIGM Negative 06/26/2019    HEPBIGM Negative 06/26/2019    HEPBCAB Negative 06/28/2022    HEPCAB Negative 06/26/2019    RNP antibody positive    Current Rheum Medications:  prednisone 10 mg daily, rituxan  Previous Rheum Medications:   Methotrexate, Benlysta, CellCept, TNF alpha inhibitors avoided due to lupus, Plaquenil, actemra, rinvoq    Past Medical History:   Diagnosis Date    Abnormal Pap smear of cervix     repeat was normal    Lupus (systemic lupus erythematosus)      Social History     Socioeconomic History    Marital status:    Occupational History    Occupation: [employer]   Tobacco Use    Smoking status: Never    Smokeless tobacco: Never   Substance  "and Sexual Activity    Alcohol use: No    Drug use: No    Sexual activity: Yes     Partners: Male     Birth control/protection: Surgical     Comment: BTL   Other Topics Concern    Are you pregnant or think you may be? No    Breast-feeding No     Review of patient's allergies indicates:   Allergen Reactions    Elavil [amitriptyline]        Objective:   BP (!) 144/81   Pulse 91   Ht 5' 8" (1.727 m)   Wt 105.1 kg (231 lb 11.3 oz)   BMI 35.23 kg/m²     Immunization History   Administered Date(s) Administered    Influenza 10/24/2018    Pneumococcal Conjugate - 13 Valent 01/08/2019    Tdap 03/19/2017       Current Outpatient Medications:     bumetanide (BUMEX) 1 MG tablet, Take 1 tablet (1 mg total) by mouth once daily., Disp: 10 tablet, Rfl: 0    clobetasoL (TEMOVATE) 0.05 % cream, Apply topically 2 (two) times daily., Disp: 60 g, Rfl: 1    clobetasol (TEMOVATE) 0.05 % external solution, Apply topically 2 (two) times daily., Disp: 50 mL, Rfl: 1    folic acid (FOLVITE) 1 MG tablet, Take 1 tablet (1 mg total) by mouth once daily., Disp: 30 tablet, Rfl: 11    gabapentin (NEURONTIN) 300 MG capsule, Take 1 capsule (300 mg total) by mouth 3 (three) times daily., Disp: 90 capsule, Rfl: 11    methotrexate 2.5 MG Tab, Take 8 tablets (20 mg total) by mouth every 7 days., Disp: 32 tablet, Rfl: 3    mometasone 0.1% (ELOCON) 0.1 % cream, Apply topically once daily., Disp: 50 g, Rfl: 1    predniSONE (DELTASONE) 10 MG tablet, Take 1 tablet (10 mg total) by mouth once daily., Disp: 30 tablet, Rfl: 1    tacrolimus (PROTOPIC) 0.1 % ointment, Apply topically 2 (two) times daily., Disp: 60 g, Rfl: 3    traMADoL (ULTRAM) 50 mg tablet, Take 1 tablet (50 mg total) by mouth every 12 (twelve) hours as needed for Pain., Disp: 60 tablet, Rfl: 3    triamcinolone acetonide 0.1% (KENALOG) 0.1 % ointment, AAA bid, Disp: 454 g, Rfl: 0    colchicine (COLCRYS) 0.6 mg tablet, Take 1 tablet (0.6 mg total) by mouth 2 (two) times daily., Disp: 60 tablet, " Rfl: 11    traZODone (DESYREL) 50 MG tablet, Take 1 tablet (50 mg total) by mouth every evening., Disp: 30 tablet, Rfl: 11       Recent Results (from the past 336 hour(s))   IMMUNOGLOBULINS (IGG, IGA, IGM) QUANTITATIVE    Collection Time: 09/06/22  2:23 PM   Result Value Ref Range    IgG 3311 (H) 650 - 1600 mg/dL    IgA 364 (H) 40 - 350 mg/dL    IgM 295 50 - 300 mg/dL   CBC Auto Differential    Collection Time: 09/06/22  2:23 PM   Result Value Ref Range    WBC 9.19 3.90 - 12.70 K/uL    RBC 3.52 (L) 4.00 - 5.40 M/uL    Hemoglobin 9.7 (L) 12.0 - 16.0 g/dL    Hematocrit 30.5 (L) 37.0 - 48.5 %    MCV 87 82 - 98 fL    MCH 27.6 27.0 - 31.0 pg    MCHC 31.8 (L) 32.0 - 36.0 g/dL    RDW 13.7 11.5 - 14.5 %    Platelets 577 (H) 150 - 450 K/uL    MPV 8.0 (L) 9.2 - 12.9 fL    Immature Granulocytes 0.7 (H) 0.0 - 0.5 %    Gran # (ANC) 6.1 1.8 - 7.7 K/uL    Immature Grans (Abs) 0.06 (H) 0.00 - 0.04 K/uL    Lymph # 1.2 1.0 - 4.8 K/uL    Mono # 0.7 0.3 - 1.0 K/uL    Eos # 1.1 (H) 0.0 - 0.5 K/uL    Baso # 0.06 0.00 - 0.20 K/uL    nRBC 0 0 /100 WBC    Gran % 66.4 38.0 - 73.0 %    Lymph % 13.4 (L) 18.0 - 48.0 %    Mono % 7.7 4.0 - 15.0 %    Eosinophil % 11.8 (H) 0.0 - 8.0 %    Basophil % 0.7 0.0 - 1.9 %    Differential Method Automated    Comprehensive Metabolic Panel    Collection Time: 09/06/22  2:23 PM   Result Value Ref Range    Sodium 133 (L) 136 - 145 mmol/L    Potassium 3.8 3.5 - 5.1 mmol/L    Chloride 101 95 - 110 mmol/L    CO2 24 23 - 29 mmol/L    Glucose 70 70 - 110 mg/dL    BUN 10 6 - 20 mg/dL    Creatinine 0.7 0.5 - 1.4 mg/dL    Calcium 9.3 8.7 - 10.5 mg/dL    Total Protein 9.0 (H) 6.0 - 8.4 g/dL    Albumin 3.3 (L) 3.5 - 5.2 g/dL    Total Bilirubin 0.2 0.1 - 1.0 mg/dL    Alkaline Phosphatase 43 (L) 55 - 135 U/L    AST 32 10 - 40 U/L    ALT 20 10 - 44 U/L    Anion Gap 8 8 - 16 mmol/L    eGFR >60.0 >60 mL/min/1.73 m^2   Sedimentation rate    Collection Time: 09/06/22  2:23 PM   Result Value Ref Range    Sed Rate 85 (H) 0 - 20  mm/Hr   C-Reactive Protein    Collection Time: 09/06/22  2:23 PM   Result Value Ref Range    CRP 21.7 (H) 0.0 - 8.2 mg/L       Imaging:  All imaging reviewed and independently interpreted by me.    CT chest without contrast 06/02/2022-no evidence of ILD                    Assessment:     1. MCTD (mixed connective tissue disease)    2. Immunosuppressed status    3. Positive sm/RNP antibody    4. Encounter for medication monitoring    5. Morphea    6. Fibromyalgia          Plan:   Assessment and plan reviewed with Dr. Whalen. Patient also examined by Dr. Whalen today.    rituxan induction started 7/1/22; morphea has progressed further. Will start cytoxan every 4 weeks.  Continue prednisone 10 mg daily; trial colcrys for inflammation and pain assoc with morphea  Compromised immune system secondary to autoimmune disease and use of immunosuppressive drugs. Monitor carefully for infections and toxicities- Currently denies issues with recurrent infections. Advised to get immediate medical care if any infection.  Recommend Yearly Skin Cancer Screening by Dermatology for all patients on biologic or Erick. advised strict adherence to age appropriate vaccinations (shingles, pneumonia, flu and covid) and cancer screenings with PCP   Patient advised to hold DMARD and/or biologic therapy for signs of infection or for surgery. If you are unsure what to do please call our office for instruction.[facility] [phone]  no current medication related issues, no evidence of toxicity. I ordered labs for toxicity monitoring;  results reviewed and discussed findings with the patient   Patient understands and contact clinic at any time regarding questions about today's office visit and any medication changes that were made  Return to clinic:  4-6 weeks with reg4    The patient understands, chooses and consents to this plan and accepts all the risks which include but are not limited to the risks mentioned above.      Method of contact with patient concerns: [facility]    60 minutes of total time spent on the encounter, which includes face to face time and non-face to face time preparing to see the patient (eg, review of tests), Obtaining and/or reviewing separately obtained history, Documenting clinical information in the electronic or other health record, Independently interpreting results (not separately reported) and communicating results to the patient/family/caregiver, or Care coordination (not separately reported).          Disclaimer: This note was prepared using a voice recognition system and is likely to have sound alike errors within the text.       Allyssa Garcia PA-C  Rheumatology Department   [facility]

## 2022-09-12 ENCOUNTER — PATIENT MESSAGE (OUTPATIENT)
Dept: RHEUMATOLOGY | Facility: CLINIC | Age: 37
End: 2022-09-12
Payer: COMMERCIAL

## 2022-09-13 DIAGNOSIS — Z79.899 LONG-TERM CURRENT USE OF HIGH RISK MEDICATION OTHER THAN ANTICOAGULANT: Primary | ICD-10-CM

## 2022-09-13 DIAGNOSIS — M32.9 SYSTEMIC LUPUS ERYTHEMATOSUS ARTHRITIS: ICD-10-CM

## 2022-09-15 ENCOUNTER — OFFICE VISIT (OUTPATIENT)
Dept: DERMATOLOGY | Facility: CLINIC | Age: 37
End: 2022-09-15
Payer: COMMERCIAL

## 2022-09-15 DIAGNOSIS — R21 MALAR RASH: ICD-10-CM

## 2022-09-15 DIAGNOSIS — L81.9 DYSCHROMIA: ICD-10-CM

## 2022-09-15 DIAGNOSIS — L94.0 MORPHEA: Primary | ICD-10-CM

## 2022-09-15 PROCEDURE — 1159F PR MEDICATION LIST DOCUMENTED IN MEDICAL RECORD: ICD-10-PCS | Mod: CPTII,S$GLB,, | Performed by: STUDENT IN AN ORGANIZED HEALTH CARE EDUCATION/TRAINING PROGRAM

## 2022-09-15 PROCEDURE — 99999 PR PBB SHADOW E&M-EST. PATIENT-LVL III: ICD-10-PCS | Mod: PBBFAC,,, | Performed by: STUDENT IN AN ORGANIZED HEALTH CARE EDUCATION/TRAINING PROGRAM

## 2022-09-15 PROCEDURE — 1160F RVW MEDS BY RX/DR IN RCRD: CPT | Mod: CPTII,S$GLB,, | Performed by: STUDENT IN AN ORGANIZED HEALTH CARE EDUCATION/TRAINING PROGRAM

## 2022-09-15 PROCEDURE — 1160F PR REVIEW ALL MEDS BY PRESCRIBER/CLIN PHARMACIST DOCUMENTED: ICD-10-PCS | Mod: CPTII,S$GLB,, | Performed by: STUDENT IN AN ORGANIZED HEALTH CARE EDUCATION/TRAINING PROGRAM

## 2022-09-15 PROCEDURE — 99999 PR PBB SHADOW E&M-EST. PATIENT-LVL III: CPT | Mod: PBBFAC,,, | Performed by: STUDENT IN AN ORGANIZED HEALTH CARE EDUCATION/TRAINING PROGRAM

## 2022-09-15 PROCEDURE — 99213 PR OFFICE/OUTPT VISIT, EST, LEVL III, 20-29 MIN: ICD-10-PCS | Mod: S$GLB,,, | Performed by: STUDENT IN AN ORGANIZED HEALTH CARE EDUCATION/TRAINING PROGRAM

## 2022-09-15 PROCEDURE — 1159F MED LIST DOCD IN RCRD: CPT | Mod: CPTII,S$GLB,, | Performed by: STUDENT IN AN ORGANIZED HEALTH CARE EDUCATION/TRAINING PROGRAM

## 2022-09-15 PROCEDURE — 99213 OFFICE O/P EST LOW 20 MIN: CPT | Mod: S$GLB,,, | Performed by: STUDENT IN AN ORGANIZED HEALTH CARE EDUCATION/TRAINING PROGRAM

## 2022-09-15 RX ORDER — CLOBETASOL PROPIONATE 0.5 MG/G
CREAM TOPICAL 2 TIMES DAILY
Qty: 60 G | Refills: 2 | Status: SHIPPED | OUTPATIENT
Start: 2022-09-15

## 2022-09-15 RX ORDER — TACROLIMUS 1 MG/G
OINTMENT TOPICAL 2 TIMES DAILY
Qty: 100 G | Refills: 3 | Status: SHIPPED | OUTPATIENT
Start: 2022-09-15 | End: 2022-12-12 | Stop reason: SDUPTHER

## 2022-09-15 NOTE — PROGRESS NOTES
Subjective:       Patient ID:  Stefanie España is a 36 y.o. female who presents for   Chief Complaint   Patient presents with    Follow-up     Pt hx of lupus.  Pt reports it is now on her legs. Pt reports that the cream is now working.       History of Present Illness: The patient presents for follow up of connective tissue disease, with recent skin biopsy showing morphea. Last seen on ___. Since then, the morphea changes in the skin have spread affecting most of the forearms and lower legs. No joint mobility impairment noted. Patient denies any ulceration or skin breakdown. Is currently being managed by Rheumatology, with plans with them to start Cytoxan. Currently using topical tacrolimus and clobetasol to the skin with some improvement in texture of the skin. Patient denies any other major skin findings.     Follow-up      Review of Systems   Constitutional:  Negative for fever and chills.      Objective:    Physical Exam   Constitutional: She appears well-developed and well-nourished. No distress.   Neurological: She is alert and oriented to person, place, and time. She is not disoriented.   Psychiatric: She has a normal mood and affect.   Skin:   Areas Examined (abnormalities noted in diagram):   Head / Face Inspection Performed  Neck Inspection Performed  Chest / Axilla Inspection Performed  RUE Inspected  LUE Inspection Performed  RLE Inspected  LLE Inspection Performed            Diagram Legend     Erythematous scaling macule/papule c/w actinic keratosis       Vascular papule c/w angioma      Pigmented verrucoid papule/plaque c/w seborrheic keratosis      Yellow umbilicated papule c/w sebaceous hyperplasia      Irregularly shaped tan macule c/w lentigo     1-2 mm smooth white papules consistent with Milia      Movable subcutaneous cyst with punctum c/w epidermal inclusion cyst      Subcutaneous movable cyst c/w pilar cyst      Firm pink to brown papule c/w dermatofibroma      Pedunculated fleshy  papule(s) c/w skin tag(s)      Evenly pigmented macule c/w junctional nevus     Mildly variegated pigmented, slightly irregular-bordered macule c/w mildly atypical nevus      Flesh colored to evenly pigmented papule c/w intradermal nevus       Pink pearly papule/plaque c/w basal cell carcinoma      Erythematous hyperkeratotic cursted plaque c/w SCC      Surgical scar with no sign of skin cancer recurrence      Open and closed comedones      Inflammatory papules and pustules      Verrucoid papule consistent consistent with wart     Erythematous eczematous patches and plaques     Dystrophic onycholytic nail with subungual debris c/w onychomycosis     Umbilicated papule    Erythematous-base heme-crusted tan verrucoid plaque consistent with inflamed seborrheic keratosis     Erythematous Silvery Scaling Plaque c/w Psoriasis     See annotation      Assessment / Plan:        Morphea - patient currently being managed by Rheumatology, with plans to start Cytoxan treatments. From a dermatologic standpoint, can continue topical calcineurin inhibitors and steroids for now, in hopes that systemic treatment will alleviate and slow progression of disease.   -     tacrolimus (PROTOPIC) 0.1 % ointment; Apply topically 2 (two) times daily.  Dispense: 100 g; Refill: 3  -     clobetasoL (TEMOVATE) 0.05 % cream; Apply topically 2 (two) times daily.  Dispense: 60 g; Refill: 2             Follow up in about 4 months (around 1/15/2023).

## 2022-09-22 ENCOUNTER — PATIENT MESSAGE (OUTPATIENT)
Dept: INTERNAL MEDICINE | Facility: CLINIC | Age: 37
End: 2022-09-22

## 2022-09-22 ENCOUNTER — LAB VISIT (OUTPATIENT)
Dept: LAB | Facility: HOSPITAL | Age: 37
End: 2022-09-22
Attending: INTERNAL MEDICINE
Payer: COMMERCIAL

## 2022-09-22 ENCOUNTER — OFFICE VISIT (OUTPATIENT)
Dept: INTERNAL MEDICINE | Facility: CLINIC | Age: 37
End: 2022-09-22
Payer: COMMERCIAL

## 2022-09-22 VITALS
TEMPERATURE: 99 F | SYSTOLIC BLOOD PRESSURE: 114 MMHG | HEART RATE: 110 BPM | BODY MASS INDEX: 34.65 KG/M2 | DIASTOLIC BLOOD PRESSURE: 70 MMHG | OXYGEN SATURATION: 96 % | HEIGHT: 68 IN | WEIGHT: 228.63 LBS

## 2022-09-22 DIAGNOSIS — D84.9 IMMUNOSUPPRESSED STATUS: ICD-10-CM

## 2022-09-22 DIAGNOSIS — Z79.899 LONG-TERM CURRENT USE OF HIGH RISK MEDICATION OTHER THAN ANTICOAGULANT: ICD-10-CM

## 2022-09-22 DIAGNOSIS — D63.8 ANEMIA OF CHRONIC DISEASE: ICD-10-CM

## 2022-09-22 DIAGNOSIS — M32.19 OTHER SYSTEMIC LUPUS ERYTHEMATOSUS WITH OTHER ORGAN INVOLVEMENT: ICD-10-CM

## 2022-09-22 DIAGNOSIS — M35.1 MCTD (MIXED CONNECTIVE TISSUE DISEASE): Primary | ICD-10-CM

## 2022-09-22 DIAGNOSIS — M32.9 SYSTEMIC LUPUS ERYTHEMATOSUS ARTHRITIS: ICD-10-CM

## 2022-09-22 DIAGNOSIS — R21 RASH: ICD-10-CM

## 2022-09-22 LAB
ALBUMIN SERPL BCP-MCNC: 3.2 G/DL (ref 3.5–5.2)
ALP SERPL-CCNC: 45 U/L (ref 55–135)
ALT SERPL W/O P-5'-P-CCNC: 28 U/L (ref 10–44)
ANION GAP SERPL CALC-SCNC: 7 MMOL/L (ref 8–16)
AST SERPL-CCNC: 39 U/L (ref 10–40)
BACTERIA #/AREA URNS HPF: ABNORMAL /HPF
BASOPHILS # BLD AUTO: 0.03 K/UL (ref 0–0.2)
BASOPHILS NFR BLD: 0.5 % (ref 0–1.9)
BILIRUB SERPL-MCNC: 0.3 MG/DL (ref 0.1–1)
BILIRUB UR QL STRIP: NEGATIVE
BUN SERPL-MCNC: 5 MG/DL (ref 6–20)
CALCIUM SERPL-MCNC: 9.2 MG/DL (ref 8.7–10.5)
CHLORIDE SERPL-SCNC: 103 MMOL/L (ref 95–110)
CLARITY UR: CLEAR
CO2 SERPL-SCNC: 26 MMOL/L (ref 23–29)
COLOR UR: YELLOW
CREAT SERPL-MCNC: 0.7 MG/DL (ref 0.5–1.4)
CREAT UR-MCNC: 144 MG/DL (ref 15–325)
CRP SERPL-MCNC: 41 MG/L (ref 0–8.2)
DIFFERENTIAL METHOD: ABNORMAL
EOSINOPHIL # BLD AUTO: 1 K/UL (ref 0–0.5)
EOSINOPHIL NFR BLD: 14.8 % (ref 0–8)
ERYTHROCYTE [DISTWIDTH] IN BLOOD BY AUTOMATED COUNT: 13.8 % (ref 11.5–14.5)
ERYTHROCYTE [SEDIMENTATION RATE] IN BLOOD BY WESTERGREN METHOD: 105 MM/HR (ref 0–20)
EST. GFR  (NO RACE VARIABLE): >60 ML/MIN/1.73 M^2
GLUCOSE SERPL-MCNC: 85 MG/DL (ref 70–110)
GLUCOSE UR QL STRIP: NEGATIVE
HCT VFR BLD AUTO: 32.8 % (ref 37–48.5)
HGB BLD-MCNC: 10.6 G/DL (ref 12–16)
HGB UR QL STRIP: NEGATIVE
IMM GRANULOCYTES # BLD AUTO: 0.02 K/UL (ref 0–0.04)
IMM GRANULOCYTES NFR BLD AUTO: 0.3 % (ref 0–0.5)
IRON SERPL-MCNC: 39 UG/DL (ref 30–160)
KETONES UR QL STRIP: NEGATIVE
LEUKOCYTE ESTERASE UR QL STRIP: ABNORMAL
LYMPHOCYTES # BLD AUTO: 0.9 K/UL (ref 1–4.8)
LYMPHOCYTES NFR BLD: 13.7 % (ref 18–48)
MCH RBC QN AUTO: 28.2 PG (ref 27–31)
MCHC RBC AUTO-ENTMCNC: 32.3 G/DL (ref 32–36)
MCV RBC AUTO: 87 FL (ref 82–98)
MICROSCOPIC COMMENT: ABNORMAL
MONOCYTES # BLD AUTO: 0.2 K/UL (ref 0.3–1)
MONOCYTES NFR BLD: 3.7 % (ref 4–15)
NEUTROPHILS # BLD AUTO: 4.3 K/UL (ref 1.8–7.7)
NEUTROPHILS NFR BLD: 67 % (ref 38–73)
NITRITE UR QL STRIP: NEGATIVE
NRBC BLD-RTO: 0 /100 WBC
PH UR STRIP: 6 [PH] (ref 5–8)
PLATELET # BLD AUTO: 553 K/UL (ref 150–450)
PMV BLD AUTO: 7.7 FL (ref 9.2–12.9)
POTASSIUM SERPL-SCNC: 4.3 MMOL/L (ref 3.5–5.1)
PROT SERPL-MCNC: 8.7 G/DL (ref 6–8.4)
PROT UR QL STRIP: NEGATIVE
PROT UR-MCNC: 8 MG/DL (ref 0–15)
PROT/CREAT UR: 0.06 MG/G{CREAT} (ref 0–0.2)
RBC # BLD AUTO: 3.76 M/UL (ref 4–5.4)
SATURATED IRON: 13 % (ref 20–50)
SODIUM SERPL-SCNC: 136 MMOL/L (ref 136–145)
SP GR UR STRIP: 1.02 (ref 1–1.03)
SQUAMOUS #/AREA URNS HPF: 4 /HPF
TOTAL IRON BINDING CAPACITY: 309 UG/DL (ref 250–450)
TRANSFERRIN SERPL-MCNC: 209 MG/DL (ref 200–375)
URN SPEC COLLECT METH UR: ABNORMAL
UROBILINOGEN UR STRIP-ACNC: NEGATIVE EU/DL
WBC # BLD AUTO: 6.43 K/UL (ref 3.9–12.7)
WBC #/AREA URNS HPF: 8 /HPF (ref 0–5)

## 2022-09-22 PROCEDURE — 1159F PR MEDICATION LIST DOCUMENTED IN MEDICAL RECORD: ICD-10-PCS | Mod: CPTII,S$GLB,, | Performed by: FAMILY MEDICINE

## 2022-09-22 PROCEDURE — 3078F PR MOST RECENT DIASTOLIC BLOOD PRESSURE < 80 MM HG: ICD-10-PCS | Mod: CPTII,S$GLB,, | Performed by: FAMILY MEDICINE

## 2022-09-22 PROCEDURE — 99214 OFFICE O/P EST MOD 30 MIN: CPT | Mod: S$GLB,,, | Performed by: FAMILY MEDICINE

## 2022-09-22 PROCEDURE — 81000 URINALYSIS NONAUTO W/SCOPE: CPT | Performed by: INTERNAL MEDICINE

## 2022-09-22 PROCEDURE — 3074F PR MOST RECENT SYSTOLIC BLOOD PRESSURE < 130 MM HG: ICD-10-PCS | Mod: CPTII,S$GLB,, | Performed by: FAMILY MEDICINE

## 2022-09-22 PROCEDURE — 82570 ASSAY OF URINE CREATININE: CPT | Performed by: INTERNAL MEDICINE

## 2022-09-22 PROCEDURE — 1159F MED LIST DOCD IN RCRD: CPT | Mod: CPTII,S$GLB,, | Performed by: FAMILY MEDICINE

## 2022-09-22 PROCEDURE — 3078F DIAST BP <80 MM HG: CPT | Mod: CPTII,S$GLB,, | Performed by: FAMILY MEDICINE

## 2022-09-22 PROCEDURE — 85651 RBC SED RATE NONAUTOMATED: CPT | Performed by: PHYSICIAN ASSISTANT

## 2022-09-22 PROCEDURE — 99999 PR PBB SHADOW E&M-EST. PATIENT-LVL IV: ICD-10-PCS | Mod: PBBFAC,,, | Performed by: FAMILY MEDICINE

## 2022-09-22 PROCEDURE — 80053 COMPREHEN METABOLIC PANEL: CPT | Performed by: PHYSICIAN ASSISTANT

## 2022-09-22 PROCEDURE — 99999 PR PBB SHADOW E&M-EST. PATIENT-LVL IV: CPT | Mod: PBBFAC,,, | Performed by: FAMILY MEDICINE

## 2022-09-22 PROCEDURE — 99214 PR OFFICE/OUTPT VISIT, EST, LEVL IV, 30-39 MIN: ICD-10-PCS | Mod: S$GLB,,, | Performed by: FAMILY MEDICINE

## 2022-09-22 PROCEDURE — 3008F PR BODY MASS INDEX (BMI) DOCUMENTED: ICD-10-PCS | Mod: CPTII,S$GLB,, | Performed by: FAMILY MEDICINE

## 2022-09-22 PROCEDURE — 84466 ASSAY OF TRANSFERRIN: CPT | Performed by: FAMILY MEDICINE

## 2022-09-22 PROCEDURE — 85025 COMPLETE CBC W/AUTO DIFF WBC: CPT | Performed by: PHYSICIAN ASSISTANT

## 2022-09-22 PROCEDURE — 3008F BODY MASS INDEX DOCD: CPT | Mod: CPTII,S$GLB,, | Performed by: FAMILY MEDICINE

## 2022-09-22 PROCEDURE — 3074F SYST BP LT 130 MM HG: CPT | Mod: CPTII,S$GLB,, | Performed by: FAMILY MEDICINE

## 2022-09-22 PROCEDURE — 86140 C-REACTIVE PROTEIN: CPT | Performed by: PHYSICIAN ASSISTANT

## 2022-09-22 NOTE — Clinical Note
What should we do with Flu and Covid, and Pneumo 20, she start Cytoxan, would we need to wait on vaccines?

## 2022-09-22 NOTE — PROGRESS NOTES
Stefanie España  09/22/2022  [id number]    Sofia Martinez MD  Patient Care Team:  Sofia Martinez MD as PCP - General (Family Medicine)  Tangela Espinoza LPN as Care Coordinator (Internal Medicine)          Visit Type:an urgent visit for an existing problem     Chief Complaint:  Chief Complaint   Patient presents with    Annual Exam       History of Present Illness:  36 year old  Here to discuss surgery. Panniculectomy- she is concerned with the fold of her stomach, In summer she can get some yeast dermaitis.   She wanted to see if she was a candidate.     Compromised immune system secondary to autoimmune disease and use of immunosuppressive drugs. Monitor carefully for infections and toxicities- Currently denies issues with recurrent infections. Advised to get immediate medical care if any infection.    She has not seen me in a while. History of Lupus/MCTD/inflammatory arthritis. Tx with Rituxan, added cytoxan.  LAbs tomorrow. Rash is not better.   Return today   Current Rheum Medications:  prednisone 10 mg daily, rituxan  Previous Rheum Medications:   Methotrexate, Benlysta, CellCept, TNF alpha inhibitors avoided due to lupus, Plaquenil, actemra, rinvoq     She wants her check up labs          History:  Past Medical History:   Diagnosis Date    Abnormal Pap smear of cervix     repeat was normal    Lupus (systemic lupus erythematosus)      Past Surgical History:   Procedure Laterality Date    ROBOT-ASSISTED CHOLECYSTECTOMY USING DA CRISTOPHER XI N/A 12/11/2020    Procedure: XI ROBOTIC CHOLECYSTECTOMY;  Surgeon: Taco Zimmer MD;  Location: [facility] OR;  Service: General;  Laterality: N/A;    ROBOT-ASSISTED LAPAROSCOPIC LYSIS OF ADHESIONS USING DA CRISTOPHER XI  12/11/2020    Procedure: XI ROBOTIC LYSIS, ADHESIONS;  Surgeon: Taco Zimmer MD;  Location: [facility] OR;  Service: General;;    ROBOT-ASSISTED LAPAROSCOPIC SALPINGO-OOPHORECTOMY USING DA CRISTOPHER XI Left 12/11/2020    Procedure: XI ROBOTIC  SALPINGO-OOPHORECTOMY;  Surgeon: SHERLEY Wakefield MD;  Location: [facility];  Service: OB/GYN;  Laterality: Left;    TUBAL LIGATION  2005     Family History   Problem Relation Age of Onset    Liver cancer Maternal Aunt     Breast cancer Maternal Aunt     Breast cancer Paternal Grandmother     Colon cancer Neg Hx     Ovarian cancer Neg Hx     Thrombosis Neg Hx      Social History     Socioeconomic History    Marital status:    Occupational History    Occupation: [employer]   Tobacco Use    Smoking status: Never    Smokeless tobacco: Never   Substance and Sexual Activity    Alcohol use: No    Drug use: No    Sexual activity: Yes     Partners: Male     Birth control/protection: Surgical     Comment: BTL   Other Topics Concern    Are you pregnant or think you may be? No    Breast-feeding No     Patient Active Problem List   Diagnosis    Exacerbation of systemic lupus    Long-term current use of high risk medication -methotrexate    Systemic lupus erythematosus arthritis    Immunosuppressed status    Rash    Obesity, Class II, BMI 35-39.9    MCTD (mixed connective tissue disease)    Positive sm/RNP antibody    Secondary Raynaud's phenomenon    Mild episode of recurrent major depressive disorder    Chronic pain of both knees    Bilateral leg weakness    Gastroesophageal reflux disease    Pelvic mass    Fibromyalgia    Rheumatoid arthritis of multiple sites with negative rheumatoid factor    Swelling of right upper extremity     Review of patient's allergies indicates:   Allergen Reactions    Elavil [amitriptyline]        The following were reviewed at this visit: active problem list, medication list, allergies, family history, social history, and health maintenance.    Medications:  Current Outpatient Medications on File Prior to Visit   Medication Sig Dispense Refill    bumetanide (BUMEX) 1 MG tablet Take 1 tablet (1 mg total) by mouth once daily. 10 tablet 0    clobetasoL (TEMOVATE) 0.05 % cream Apply topically 2  (two) times daily. 60 g 2    clobetasol (TEMOVATE) 0.05 % external solution Apply topically 2 (two) times daily. 50 mL 1    colchicine (COLCRYS) 0.6 mg tablet Take 1 tablet (0.6 mg total) by mouth 2 (two) times daily. 60 tablet 11    gabapentin (NEURONTIN) 300 MG capsule Take 1 capsule (300 mg total) by mouth 3 (three) times daily. 90 capsule 11    mometasone 0.1% (ELOCON) 0.1 % cream Apply topically once daily. 50 g 1    predniSONE (DELTASONE) 10 MG tablet Take 1 tablet (10 mg total) by mouth once daily. 30 tablet 1    tacrolimus (PROTOPIC) 0.1 % ointment Apply topically 2 (two) times daily. 100 g 3    traMADoL (ULTRAM) 50 mg tablet Take 1 tablet (50 mg total) by mouth every 12 (twelve) hours as needed for Pain. 60 tablet 3    traZODone (DESYREL) 50 MG tablet Take 1 tablet (50 mg total) by mouth every evening. 30 tablet 11    triamcinolone acetonide 0.1% (KENALOG) 0.1 % ointment AAA bid (Patient not taking: No sig reported) 454 g 0     No current facility-administered medications on file prior to visit.       Medications have been reviewed and reconciled with patient at this visit.  Barriers to medications reviewed with patient.    Adverse reactions to current medications reviewed with patient..    Over the counter medications reviewed and reconciled with patient.    Exam:  Wt Readings from Last 3 Encounters:   09/22/22 103.7 kg (228 lb 9.9 oz)   09/08/22 105.1 kg (231 lb 11.3 oz)   08/19/22 107 kg (235 lb 14.3 oz)     Temp Readings from Last 3 Encounters:   09/22/22 98.6 °F (37 °C) (Temporal)   07/15/22 97.8 °F (36.6 °C)   07/01/22 97.8 °F (36.6 °C)     BP Readings from Last 3 Encounters:   09/22/22 114/70   09/08/22 (!) 144/81   08/19/22 126/74     Pulse Readings from Last 3 Encounters:   09/22/22 110   09/08/22 91   08/19/22 94     Body mass index is 34.76 kg/m².      Review of Systems   Constitutional: Negative.  Negative for chills and fever.   HENT: Negative.  Negative for congestion, sinus pain and sore  throat.    Eyes:  Negative for blurred vision and double vision.   Respiratory:  Negative for cough, sputum production, shortness of breath and wheezing.    Cardiovascular:  Negative for chest pain, palpitations and leg swelling.   Gastrointestinal:  Negative for abdominal pain, constipation, diarrhea, heartburn, nausea and vomiting.   Genitourinary: Negative.    Musculoskeletal:  Positive for joint pain.   Skin:  Positive for rash.   Neurological: Negative.    Endo/Heme/Allergies: Negative.  Negative for polydipsia. Does not bruise/bleed easily.   Psychiatric/Behavioral:  Negative for depression and substance abuse.    Physical Exam  Nursing note reviewed.   Cardiovascular:      Rate and Rhythm: Normal rate.   Pulmonary:      Effort: Pulmonary effort is normal. No respiratory distress.   Skin:         Neurological:      Mental Status: She is alert and oriented to person, place, and time.   Psychiatric:         Mood and Affect: Mood normal.         Behavior: Behavior normal.         Thought Content: Thought content normal.         Judgment: Judgment normal.       Laboratory Reviewed ({Yes)  Lab Results   Component Value Date    WBC 9.19 09/06/2022    HGB 9.7 (L) 09/06/2022    HCT 30.5 (L) 09/06/2022     (H) 09/06/2022    CHOL 169 08/24/2017    TRIG 228 (H) 08/24/2017    HDL 37 (L) 08/24/2017    ALT 20 09/06/2022    AST 32 09/06/2022     (L) 09/06/2022    K 3.8 09/06/2022     09/06/2022    CREATININE 0.7 09/06/2022    BUN 10 09/06/2022    CO2 24 09/06/2022    TSH 1.243 08/30/2017       Stefanie was seen today for annual exam.    Diagnoses and all orders for this visit:    MCTD (mixed connective tissue disease)  Rash  Immunosuppressed status  Long-term current use of high risk medication -methotrexate  She will start Cytoxan  Do not advise any cosmetic sx    Anemia of chronic disease  -     Iron and TIBC; Future  Will need to monitor CBC with tx  Can consider Iron infusion.    Follow up with  Rheum.            Care Plan/Goals: Reviewed    Goals    None         Follow up: No follow-ups on file.    After visit summary was printed and given to patient upon discharge today.  Patient goals and care plan are included in After Visit Summary.

## 2022-09-22 NOTE — PROGRESS NOTES
I called and spoke with patient. I gave her the instructions for the Flu shot. I also sent a message to her on the portal. She said she is going to arrange this in [place].

## 2022-09-22 NOTE — PROGRESS NOTES
Wanted you to see her lab- shes on prednisone 10 mg daily, colcrys. 1st dose cytoxan will be tomorrow.

## 2022-09-23 ENCOUNTER — INFUSION (OUTPATIENT)
Dept: INFUSION THERAPY | Facility: HOSPITAL | Age: 37
End: 2022-09-23
Attending: NURSE PRACTITIONER
Payer: COMMERCIAL

## 2022-09-23 ENCOUNTER — PATIENT MESSAGE (OUTPATIENT)
Dept: INTERNAL MEDICINE | Facility: CLINIC | Age: 37
End: 2022-09-23
Payer: COMMERCIAL

## 2022-09-23 VITALS
DIASTOLIC BLOOD PRESSURE: 70 MMHG | BODY MASS INDEX: 35.28 KG/M2 | RESPIRATION RATE: 16 BRPM | HEART RATE: 90 BPM | WEIGHT: 232.81 LBS | TEMPERATURE: 98 F | OXYGEN SATURATION: 100 % | HEIGHT: 68 IN | SYSTOLIC BLOOD PRESSURE: 114 MMHG

## 2022-09-23 DIAGNOSIS — M06.09 RHEUMATOID ARTHRITIS OF MULTIPLE SITES WITH NEGATIVE RHEUMATOID FACTOR: Primary | ICD-10-CM

## 2022-09-23 DIAGNOSIS — M35.1 MCTD (MIXED CONNECTIVE TISSUE DISEASE): ICD-10-CM

## 2022-09-23 PROCEDURE — 25000003 PHARM REV CODE 250: Performed by: INTERNAL MEDICINE

## 2022-09-23 PROCEDURE — 96413 CHEMO IV INFUSION 1 HR: CPT

## 2022-09-23 PROCEDURE — 96367 TX/PROPH/DG ADDL SEQ IV INF: CPT

## 2022-09-23 PROCEDURE — 96375 TX/PRO/DX INJ NEW DRUG ADDON: CPT

## 2022-09-23 PROCEDURE — 63600175 PHARM REV CODE 636 W HCPCS: Mod: JG | Performed by: INTERNAL MEDICINE

## 2022-09-23 PROCEDURE — 96366 THER/PROPH/DIAG IV INF ADDON: CPT

## 2022-09-23 PROCEDURE — 63600175 PHARM REV CODE 636 W HCPCS: Performed by: INTERNAL MEDICINE

## 2022-09-23 RX ORDER — ONDANSETRON 2 MG/ML
4 INJECTION INTRAMUSCULAR; INTRAVENOUS
Status: COMPLETED | OUTPATIENT
Start: 2022-09-23 | End: 2022-09-23

## 2022-09-23 RX ORDER — DIPHENHYDRAMINE HYDROCHLORIDE 50 MG/ML
25 INJECTION INTRAMUSCULAR; INTRAVENOUS
Status: COMPLETED | OUTPATIENT
Start: 2022-09-23 | End: 2022-09-23

## 2022-09-23 RX ORDER — SODIUM CHLORIDE 0.9 % (FLUSH) 0.9 %
10 SYRINGE (ML) INJECTION
Status: DISCONTINUED | OUTPATIENT
Start: 2022-09-23 | End: 2022-09-23 | Stop reason: HOSPADM

## 2022-09-23 RX ORDER — METHYLPREDNISOLONE SOD SUCC 125 MG
100 VIAL (EA) INJECTION
Status: COMPLETED | OUTPATIENT
Start: 2022-09-23 | End: 2022-09-23

## 2022-09-23 RX ORDER — ACETAMINOPHEN 325 MG/1
650 TABLET ORAL EVERY 4 HOURS PRN
Status: CANCELLED | OUTPATIENT
Start: 2022-10-21

## 2022-09-23 RX ORDER — SODIUM CHLORIDE 0.9 % (FLUSH) 0.9 %
10 SYRINGE (ML) INJECTION
Status: CANCELLED | OUTPATIENT
Start: 2022-10-21

## 2022-09-23 RX ORDER — ACETAMINOPHEN 325 MG/1
650 TABLET ORAL
Status: CANCELLED | OUTPATIENT
Start: 2022-10-21

## 2022-09-23 RX ORDER — SODIUM CHLORIDE 9 MG/ML
500 INJECTION, SOLUTION INTRAVENOUS
Status: COMPLETED | OUTPATIENT
Start: 2022-09-23 | End: 2022-09-23

## 2022-09-23 RX ORDER — ACETAMINOPHEN 325 MG/1
650 TABLET ORAL
Status: COMPLETED | OUTPATIENT
Start: 2022-09-23 | End: 2022-09-23

## 2022-09-23 RX ORDER — HEPARIN 100 UNIT/ML
500 SYRINGE INTRAVENOUS
Status: CANCELLED | OUTPATIENT
Start: 2022-10-21

## 2022-09-23 RX ADMIN — ACETAMINOPHEN 650 MG: 325 TABLET ORAL at 11:09

## 2022-09-23 RX ADMIN — SODIUM CHLORIDE 500 ML: 0.9 INJECTION, SOLUTION INTRAVENOUS at 12:09

## 2022-09-23 RX ADMIN — ONDANSETRON HYDROCHLORIDE 4 MG: 2 SOLUTION INTRAMUSCULAR; INTRAVENOUS at 09:09

## 2022-09-23 RX ADMIN — METHYLPREDNISOLONE SODIUM SUCCINATE 100 MG: 125 INJECTION, POWDER, FOR SOLUTION INTRAMUSCULAR; INTRAVENOUS at 09:09

## 2022-09-23 RX ADMIN — DIPHENHYDRAMINE HYDROCHLORIDE 25 MG: 50 INJECTION, SOLUTION INTRAMUSCULAR; INTRAVENOUS at 09:09

## 2022-09-23 RX ADMIN — SODIUM CHLORIDE 500 ML: 0.9 INJECTION, SOLUTION INTRAVENOUS at 09:09

## 2022-09-23 RX ADMIN — CYCLOPHOSPHAMIDE 1120 MG: 200 INJECTION, SOLUTION INTRAVENOUS at 11:09

## 2022-09-23 NOTE — PLAN OF CARE
Discussed plan of care with pt. Addressed any and ongoing concerns. Pt denies   Problem: Adult Inpatient Plan of Care  Goal: Plan of Care Review  Outcome: Ongoing, Progressing  Goal: Patient-Specific Goal (Individualized)  Outcome: Ongoing, Progressing  Flowsheets (Taken 9/23/2022 1110)  Anxieties, Fears or Concerns: none verbalized  Individualized Care Needs: feet elevated , pillow and blanket given, snack and drink of choice  Patient-Specific Goals (Include Timeframe): tolerate cytoxan #1 today  Goal: Absence of Hospital-Acquired Illness or Injury  Outcome: Ongoing, Progressing  Intervention: Identify and Manage Fall Risk  Flowsheets (Taken 9/23/2022 1110)  Safety Promotion/Fall Prevention: in recliner, wheels locked  Intervention: Prevent Infection  Flowsheets (Taken 9/23/2022 1110)  Infection Prevention:   equipment surfaces disinfected   hand hygiene promoted   personal protective equipment utilized  Goal: Optimal Comfort and Wellbeing  Outcome: Ongoing, Progressing  Intervention: Monitor Pain and Promote Comfort  Flowsheets (Taken 9/23/2022 1110)  Pain Management Interventions:   pillow support provided   warm blanket provided  Intervention: Provide Person-Centered Care  Flowsheets (Taken 9/23/2022 1110)  Trust Relationship/Rapport:   empathic listening provided   questions answered   care explained   choices provided   emotional support provided   questions encouraged   reassurance provided   thoughts/feelings acknowledged

## 2022-10-10 ENCOUNTER — PATIENT MESSAGE (OUTPATIENT)
Dept: RHEUMATOLOGY | Facility: CLINIC | Age: 37
End: 2022-10-10
Payer: COMMERCIAL

## 2022-10-20 ENCOUNTER — LAB VISIT (OUTPATIENT)
Dept: LAB | Facility: HOSPITAL | Age: 37
End: 2022-10-20
Attending: FAMILY MEDICINE
Payer: COMMERCIAL

## 2022-10-20 ENCOUNTER — PATIENT MESSAGE (OUTPATIENT)
Dept: INFUSION THERAPY | Facility: HOSPITAL | Age: 37
End: 2022-10-20
Payer: COMMERCIAL

## 2022-10-20 ENCOUNTER — TELEPHONE (OUTPATIENT)
Dept: RHEUMATOLOGY | Facility: CLINIC | Age: 37
End: 2022-10-20
Payer: COMMERCIAL

## 2022-10-20 ENCOUNTER — PATIENT MESSAGE (OUTPATIENT)
Dept: RHEUMATOLOGY | Facility: CLINIC | Age: 37
End: 2022-10-20
Payer: COMMERCIAL

## 2022-10-20 DIAGNOSIS — M32.9 SYSTEMIC LUPUS ERYTHEMATOSUS ARTHRITIS: ICD-10-CM

## 2022-10-20 DIAGNOSIS — Z79.899 LONG-TERM CURRENT USE OF HIGH RISK MEDICATION OTHER THAN ANTICOAGULANT: ICD-10-CM

## 2022-10-20 DIAGNOSIS — R82.81 PYURIA: ICD-10-CM

## 2022-10-20 DIAGNOSIS — M32.19 OTHER SYSTEMIC LUPUS ERYTHEMATOSUS WITH OTHER ORGAN INVOLVEMENT: ICD-10-CM

## 2022-10-20 DIAGNOSIS — D84.9 IMMUNOSUPPRESSED STATUS: ICD-10-CM

## 2022-10-20 DIAGNOSIS — R82.81 PYURIA: Primary | ICD-10-CM

## 2022-10-20 LAB
ALBUMIN SERPL BCP-MCNC: 3.1 G/DL (ref 3.5–5.2)
ALP SERPL-CCNC: 53 U/L (ref 55–135)
ALT SERPL W/O P-5'-P-CCNC: 14 U/L (ref 10–44)
ANION GAP SERPL CALC-SCNC: 6 MMOL/L (ref 8–16)
AST SERPL-CCNC: 22 U/L (ref 10–40)
BACTERIA #/AREA URNS HPF: ABNORMAL /HPF
BASOPHILS # BLD AUTO: 0.04 K/UL (ref 0–0.2)
BASOPHILS NFR BLD: 0.7 % (ref 0–1.9)
BILIRUB SERPL-MCNC: 0.2 MG/DL (ref 0.1–1)
BILIRUB UR QL STRIP: NEGATIVE
BUN SERPL-MCNC: 5 MG/DL (ref 6–20)
CALCIUM SERPL-MCNC: 9.4 MG/DL (ref 8.7–10.5)
CHLORIDE SERPL-SCNC: 102 MMOL/L (ref 95–110)
CLARITY UR: CLEAR
CO2 SERPL-SCNC: 27 MMOL/L (ref 23–29)
COLOR UR: YELLOW
CREAT SERPL-MCNC: 0.6 MG/DL (ref 0.5–1.4)
CREAT UR-MCNC: 72 MG/DL (ref 15–325)
CRP SERPL-MCNC: 37.4 MG/L (ref 0–8.2)
DIFFERENTIAL METHOD: ABNORMAL
EOSINOPHIL # BLD AUTO: 1 K/UL (ref 0–0.5)
EOSINOPHIL NFR BLD: 16.9 % (ref 0–8)
ERYTHROCYTE [DISTWIDTH] IN BLOOD BY AUTOMATED COUNT: 14.3 % (ref 11.5–14.5)
ERYTHROCYTE [SEDIMENTATION RATE] IN BLOOD BY WESTERGREN METHOD: 67 MM/HR (ref 0–20)
EST. GFR  (NO RACE VARIABLE): >60 ML/MIN/1.73 M^2
GLUCOSE SERPL-MCNC: 95 MG/DL (ref 70–110)
GLUCOSE UR QL STRIP: NEGATIVE
HCT VFR BLD AUTO: 33.6 % (ref 37–48.5)
HGB BLD-MCNC: 10.9 G/DL (ref 12–16)
HGB UR QL STRIP: NEGATIVE
IMM GRANULOCYTES # BLD AUTO: 0.02 K/UL (ref 0–0.04)
IMM GRANULOCYTES NFR BLD AUTO: 0.3 % (ref 0–0.5)
KETONES UR QL STRIP: NEGATIVE
LEUKOCYTE ESTERASE UR QL STRIP: ABNORMAL
LYMPHOCYTES # BLD AUTO: 0.7 K/UL (ref 1–4.8)
LYMPHOCYTES NFR BLD: 12.3 % (ref 18–48)
MCH RBC QN AUTO: 28.6 PG (ref 27–31)
MCHC RBC AUTO-ENTMCNC: 32.4 G/DL (ref 32–36)
MCV RBC AUTO: 88 FL (ref 82–98)
MICROSCOPIC COMMENT: ABNORMAL
MONOCYTES # BLD AUTO: 0.5 K/UL (ref 0.3–1)
MONOCYTES NFR BLD: 8.6 % (ref 4–15)
NEUTROPHILS # BLD AUTO: 3.7 K/UL (ref 1.8–7.7)
NEUTROPHILS NFR BLD: 61.5 % (ref 38–73)
NITRITE UR QL STRIP: NEGATIVE
NRBC BLD-RTO: 0 /100 WBC
PH UR STRIP: 6 [PH] (ref 5–8)
PLATELET # BLD AUTO: 485 K/UL (ref 150–450)
PMV BLD AUTO: 8.2 FL (ref 9.2–12.9)
POTASSIUM SERPL-SCNC: 3.8 MMOL/L (ref 3.5–5.1)
PROT SERPL-MCNC: 8.7 G/DL (ref 6–8.4)
PROT UR QL STRIP: NEGATIVE
PROT UR-MCNC: <7 MG/DL (ref 0–15)
PROT/CREAT UR: NORMAL MG/G{CREAT} (ref 0–0.2)
RBC # BLD AUTO: 3.81 M/UL (ref 4–5.4)
RBC #/AREA URNS HPF: 1 /HPF (ref 0–4)
SODIUM SERPL-SCNC: 135 MMOL/L (ref 136–145)
SP GR UR STRIP: 1.01 (ref 1–1.03)
SQUAMOUS #/AREA URNS HPF: 5 /HPF
URN SPEC COLLECT METH UR: ABNORMAL
WBC # BLD AUTO: 6.04 K/UL (ref 3.9–12.7)
WBC #/AREA URNS HPF: 30 /HPF (ref 0–5)
WBC CLUMPS URNS QL MICRO: ABNORMAL

## 2022-10-20 PROCEDURE — 81000 URINALYSIS NONAUTO W/SCOPE: CPT | Mod: PO | Performed by: PHYSICIAN ASSISTANT

## 2022-10-20 PROCEDURE — 84156 ASSAY OF PROTEIN URINE: CPT | Performed by: INTERNAL MEDICINE

## 2022-10-20 PROCEDURE — 86140 C-REACTIVE PROTEIN: CPT | Performed by: PHYSICIAN ASSISTANT

## 2022-10-20 PROCEDURE — 85025 COMPLETE CBC W/AUTO DIFF WBC: CPT | Mod: PO | Performed by: PHYSICIAN ASSISTANT

## 2022-10-20 PROCEDURE — 85651 RBC SED RATE NONAUTOMATED: CPT | Mod: PO | Performed by: PHYSICIAN ASSISTANT

## 2022-10-20 PROCEDURE — 80053 COMPREHEN METABOLIC PANEL: CPT | Performed by: PHYSICIAN ASSISTANT

## 2022-10-20 PROCEDURE — 36415 COLL VENOUS BLD VENIPUNCTURE: CPT | Mod: PO | Performed by: PHYSICIAN ASSISTANT

## 2022-10-20 PROCEDURE — 87086 URINE CULTURE/COLONY COUNT: CPT | Performed by: PHYSICIAN ASSISTANT

## 2022-10-20 RX ORDER — NITROFURANTOIN 25; 75 MG/1; MG/1
100 CAPSULE ORAL 2 TIMES DAILY
Qty: 14 CAPSULE | Refills: 0 | Status: SHIPPED | OUTPATIENT
Start: 2022-10-20 | End: 2023-03-31

## 2022-10-20 NOTE — TELEPHONE ENCOUNTER
Please let her know u/a looks like she may have a bladder infection- we need to culture urine and we will send in antibiotic. Needs to r/s her cytoxan scheduled for tomorrow

## 2022-10-22 LAB — BACTERIA UR CULT: NO GROWTH

## 2022-11-02 ENCOUNTER — LAB VISIT (OUTPATIENT)
Dept: LAB | Facility: HOSPITAL | Age: 37
End: 2022-11-02
Attending: INTERNAL MEDICINE
Payer: COMMERCIAL

## 2022-11-02 DIAGNOSIS — M32.19 OTHER SYSTEMIC LUPUS ERYTHEMATOSUS WITH OTHER ORGAN INVOLVEMENT: ICD-10-CM

## 2022-11-02 DIAGNOSIS — Z51.81 ENCOUNTER FOR MEDICATION MONITORING: ICD-10-CM

## 2022-11-02 DIAGNOSIS — M06.09 RHEUMATOID ARTHRITIS OF MULTIPLE SITES WITH NEGATIVE RHEUMATOID FACTOR: ICD-10-CM

## 2022-11-02 DIAGNOSIS — M32.9 SYSTEMIC LUPUS ERYTHEMATOSUS ARTHRITIS: ICD-10-CM

## 2022-11-02 DIAGNOSIS — Z79.899 LONG-TERM CURRENT USE OF HIGH RISK MEDICATION OTHER THAN ANTICOAGULANT: ICD-10-CM

## 2022-11-02 LAB
ALBUMIN SERPL BCP-MCNC: 3.1 G/DL (ref 3.5–5.2)
ALP SERPL-CCNC: 42 U/L (ref 55–135)
ALT SERPL W/O P-5'-P-CCNC: 24 U/L (ref 10–44)
ANION GAP SERPL CALC-SCNC: 9 MMOL/L (ref 8–16)
AST SERPL-CCNC: 34 U/L (ref 10–40)
BACTERIA #/AREA URNS HPF: NORMAL /HPF
BASOPHILS # BLD AUTO: 0.04 K/UL (ref 0–0.2)
BASOPHILS NFR BLD: 0.7 % (ref 0–1.9)
BILIRUB SERPL-MCNC: 0.3 MG/DL (ref 0.1–1)
BILIRUB UR QL STRIP: NEGATIVE
BUN SERPL-MCNC: 10 MG/DL (ref 6–20)
CALCIUM SERPL-MCNC: 8.8 MG/DL (ref 8.7–10.5)
CHLORIDE SERPL-SCNC: 102 MMOL/L (ref 95–110)
CLARITY UR: CLEAR
CO2 SERPL-SCNC: 22 MMOL/L (ref 23–29)
COLOR UR: YELLOW
CREAT SERPL-MCNC: 0.6 MG/DL (ref 0.5–1.4)
CRP SERPL-MCNC: 17.1 MG/L (ref 0–8.2)
DIFFERENTIAL METHOD: ABNORMAL
EOSINOPHIL # BLD AUTO: 1 K/UL (ref 0–0.5)
EOSINOPHIL NFR BLD: 16.4 % (ref 0–8)
ERYTHROCYTE [DISTWIDTH] IN BLOOD BY AUTOMATED COUNT: 14.5 % (ref 11.5–14.5)
ERYTHROCYTE [SEDIMENTATION RATE] IN BLOOD BY WESTERGREN METHOD: 71 MM/HR (ref 0–20)
EST. GFR  (NO RACE VARIABLE): >60 ML/MIN/1.73 M^2
GLUCOSE SERPL-MCNC: 72 MG/DL (ref 70–110)
GLUCOSE UR QL STRIP: NEGATIVE
HCT VFR BLD AUTO: 29.2 % (ref 37–48.5)
HGB BLD-MCNC: 9.4 G/DL (ref 12–16)
HGB UR QL STRIP: NEGATIVE
IMM GRANULOCYTES # BLD AUTO: 0.03 K/UL (ref 0–0.04)
IMM GRANULOCYTES NFR BLD AUTO: 0.5 % (ref 0–0.5)
KETONES UR QL STRIP: NEGATIVE
LEUKOCYTE ESTERASE UR QL STRIP: ABNORMAL
LYMPHOCYTES # BLD AUTO: 0.9 K/UL (ref 1–4.8)
LYMPHOCYTES NFR BLD: 14.7 % (ref 18–48)
MCH RBC QN AUTO: 28.7 PG (ref 27–31)
MCHC RBC AUTO-ENTMCNC: 32.2 G/DL (ref 32–36)
MCV RBC AUTO: 89 FL (ref 82–98)
MICROSCOPIC COMMENT: NORMAL
MONOCYTES # BLD AUTO: 0.5 K/UL (ref 0.3–1)
MONOCYTES NFR BLD: 8.5 % (ref 4–15)
NEUTROPHILS # BLD AUTO: 3.5 K/UL (ref 1.8–7.7)
NEUTROPHILS NFR BLD: 59.7 % (ref 38–73)
NITRITE UR QL STRIP: NEGATIVE
NRBC BLD-RTO: 0 /100 WBC
PH UR STRIP: 6 [PH] (ref 5–8)
PLATELET # BLD AUTO: 429 K/UL (ref 150–450)
PMV BLD AUTO: 8.3 FL (ref 9.2–12.9)
POTASSIUM SERPL-SCNC: 3.6 MMOL/L (ref 3.5–5.1)
PROT SERPL-MCNC: 8.6 G/DL (ref 6–8.4)
PROT UR QL STRIP: NEGATIVE
RBC # BLD AUTO: 3.27 M/UL (ref 4–5.4)
RBC #/AREA URNS HPF: 1 /HPF (ref 0–4)
SODIUM SERPL-SCNC: 133 MMOL/L (ref 136–145)
SP GR UR STRIP: 1.01 (ref 1–1.03)
SQUAMOUS #/AREA URNS HPF: 4 /HPF
TRI-PHOS CRY URNS QL MICRO: NORMAL
URN SPEC COLLECT METH UR: ABNORMAL
WBC # BLD AUTO: 5.9 K/UL (ref 3.9–12.7)
WBC #/AREA URNS HPF: 4 /HPF (ref 0–5)

## 2022-11-02 PROCEDURE — 36415 COLL VENOUS BLD VENIPUNCTURE: CPT | Mod: PO | Performed by: INTERNAL MEDICINE

## 2022-11-02 PROCEDURE — 81000 URINALYSIS NONAUTO W/SCOPE: CPT | Mod: PO | Performed by: INTERNAL MEDICINE

## 2022-11-02 PROCEDURE — 85651 RBC SED RATE NONAUTOMATED: CPT | Mod: PO | Performed by: INTERNAL MEDICINE

## 2022-11-02 PROCEDURE — 85025 COMPLETE CBC W/AUTO DIFF WBC: CPT | Mod: PO | Performed by: INTERNAL MEDICINE

## 2022-11-02 PROCEDURE — 86140 C-REACTIVE PROTEIN: CPT | Performed by: INTERNAL MEDICINE

## 2022-11-02 PROCEDURE — 80053 COMPREHEN METABOLIC PANEL: CPT | Performed by: INTERNAL MEDICINE

## 2022-11-03 ENCOUNTER — OFFICE VISIT (OUTPATIENT)
Dept: RHEUMATOLOGY | Facility: CLINIC | Age: 37
End: 2022-11-03
Payer: COMMERCIAL

## 2022-11-03 ENCOUNTER — INFUSION (OUTPATIENT)
Dept: INFUSION THERAPY | Facility: HOSPITAL | Age: 37
End: 2022-11-03
Attending: PHYSICIAN ASSISTANT
Payer: COMMERCIAL

## 2022-11-03 VITALS
SYSTOLIC BLOOD PRESSURE: 105 MMHG | HEIGHT: 68 IN | DIASTOLIC BLOOD PRESSURE: 71 MMHG | RESPIRATION RATE: 16 BRPM | WEIGHT: 226.88 LBS | HEART RATE: 90 BPM | OXYGEN SATURATION: 99 % | BODY MASS INDEX: 34.38 KG/M2 | TEMPERATURE: 98 F

## 2022-11-03 DIAGNOSIS — H10.9 BACTERIAL CONJUNCTIVITIS: Primary | ICD-10-CM

## 2022-11-03 DIAGNOSIS — M35.1 MCTD (MIXED CONNECTIVE TISSUE DISEASE): ICD-10-CM

## 2022-11-03 DIAGNOSIS — R60.0 PERIORBITAL EDEMA OF LEFT EYE: ICD-10-CM

## 2022-11-03 DIAGNOSIS — M06.09 RHEUMATOID ARTHRITIS OF MULTIPLE SITES WITH NEGATIVE RHEUMATOID FACTOR: Primary | ICD-10-CM

## 2022-11-03 DIAGNOSIS — L94.0 MORPHEA: ICD-10-CM

## 2022-11-03 DIAGNOSIS — D84.9 IMMUNOSUPPRESSED STATUS: ICD-10-CM

## 2022-11-03 DIAGNOSIS — R82.81 PYURIA: ICD-10-CM

## 2022-11-03 PROCEDURE — 63600175 PHARM REV CODE 636 W HCPCS: Performed by: INTERNAL MEDICINE

## 2022-11-03 PROCEDURE — 99999 PR PBB SHADOW E&M-EST. PATIENT-LVL I: ICD-10-PCS | Mod: PBBFAC,,, | Performed by: PHYSICIAN ASSISTANT

## 2022-11-03 PROCEDURE — 96375 TX/PRO/DX INJ NEW DRUG ADDON: CPT

## 2022-11-03 PROCEDURE — 99999 PR PBB SHADOW E&M-EST. PATIENT-LVL I: CPT | Mod: PBBFAC,,, | Performed by: PHYSICIAN ASSISTANT

## 2022-11-03 PROCEDURE — 99214 OFFICE O/P EST MOD 30 MIN: CPT | Mod: S$GLB,,, | Performed by: PHYSICIAN ASSISTANT

## 2022-11-03 PROCEDURE — 25000003 PHARM REV CODE 250: Performed by: INTERNAL MEDICINE

## 2022-11-03 PROCEDURE — 96413 CHEMO IV INFUSION 1 HR: CPT

## 2022-11-03 PROCEDURE — 99214 PR OFFICE/OUTPT VISIT, EST, LEVL IV, 30-39 MIN: ICD-10-PCS | Mod: S$GLB,,, | Performed by: PHYSICIAN ASSISTANT

## 2022-11-03 RX ORDER — SODIUM CHLORIDE 0.9 % (FLUSH) 0.9 %
10 SYRINGE (ML) INJECTION
Status: CANCELLED | OUTPATIENT
Start: 2022-11-17

## 2022-11-03 RX ORDER — ONDANSETRON 2 MG/ML
4 INJECTION INTRAMUSCULAR; INTRAVENOUS
Status: COMPLETED | OUTPATIENT
Start: 2022-11-03 | End: 2022-11-03

## 2022-11-03 RX ORDER — ACETAMINOPHEN 325 MG/1
650 TABLET ORAL
Status: CANCELLED | OUTPATIENT
Start: 2022-11-17

## 2022-11-03 RX ORDER — DIPHENHYDRAMINE HYDROCHLORIDE 50 MG/ML
25 INJECTION INTRAMUSCULAR; INTRAVENOUS
Status: COMPLETED | OUTPATIENT
Start: 2022-11-03 | End: 2022-11-03

## 2022-11-03 RX ORDER — ACETAMINOPHEN 325 MG/1
650 TABLET ORAL EVERY 4 HOURS PRN
Status: CANCELLED | OUTPATIENT
Start: 2022-11-17

## 2022-11-03 RX ORDER — DIPHENHYDRAMINE HYDROCHLORIDE 50 MG/ML
25 INJECTION INTRAMUSCULAR; INTRAVENOUS
Status: CANCELLED
Start: 2022-11-17

## 2022-11-03 RX ORDER — ACETAMINOPHEN 325 MG/1
650 TABLET ORAL
Status: COMPLETED | OUTPATIENT
Start: 2022-11-03 | End: 2022-11-03

## 2022-11-03 RX ORDER — OFLOXACIN 3 MG/ML
SOLUTION/ DROPS OPHTHALMIC
Qty: 5 ML | Refills: 0 | Status: SHIPPED | OUTPATIENT
Start: 2022-11-03 | End: 2022-11-10

## 2022-11-03 RX ORDER — METHYLPREDNISOLONE SOD SUCC 125 MG
100 VIAL (EA) INJECTION
Status: COMPLETED | OUTPATIENT
Start: 2022-11-03 | End: 2022-11-03

## 2022-11-03 RX ORDER — METHYLPREDNISOLONE SOD SUCC 125 MG
100 VIAL (EA) INJECTION
Status: CANCELLED
Start: 2022-11-17

## 2022-11-03 RX ORDER — HEPARIN 100 UNIT/ML
500 SYRINGE INTRAVENOUS
Status: CANCELLED | OUTPATIENT
Start: 2022-11-17

## 2022-11-03 RX ORDER — ONDANSETRON 2 MG/ML
4 INJECTION INTRAMUSCULAR; INTRAVENOUS
Status: CANCELLED
Start: 2022-11-17

## 2022-11-03 RX ADMIN — DIPHENHYDRAMINE HYDROCHLORIDE 25 MG: 50 INJECTION INTRAMUSCULAR; INTRAVENOUS at 10:11

## 2022-11-03 RX ADMIN — SODIUM CHLORIDE 500 ML: 0.9 INJECTION, SOLUTION INTRAVENOUS at 09:11

## 2022-11-03 RX ADMIN — ONDANSETRON 4 MG: 2 INJECTION INTRAMUSCULAR; INTRAVENOUS at 10:11

## 2022-11-03 RX ADMIN — SODIUM CHLORIDE 500 ML: 0.9 INJECTION, SOLUTION INTRAVENOUS at 01:11

## 2022-11-03 RX ADMIN — ACETAMINOPHEN 650 MG: 325 TABLET ORAL at 10:11

## 2022-11-03 RX ADMIN — CYCLOPHOSPHAMIDE 1120 MG: 200 INJECTION, SOLUTION INTRAVENOUS at 11:11

## 2022-11-03 RX ADMIN — METHYLPREDNISOLONE SODIUM SUCCINATE 100 MG: 125 INJECTION, POWDER, FOR SOLUTION INTRAMUSCULAR; INTRAVENOUS at 10:11

## 2022-11-03 NOTE — PROGRESS NOTES
RHEUMATOLOGY OUTPATIENT CLINIC NOTE    11/3/2022    Attending Rheumatologist: Allyssa Garcia PA-C  Primary Care Provider: Sofia Martinez MD   Chief Complaint/Reason For Consultation:  MCTD      Subjective:        Stefanie España is a 36 y.o. female seen today for an acute visit- here for rituxan today. Was recently r/s because she had a bladder infection. 1+ leukocytes on today's UA and pt is asymptomatic. She finished her macrobid. Yesterday she noticed some mild swelling around her left eye and today had cont swelling, tenderness and white drainage from her eye this morning. Rheumatological review of system negative otherwise. Physical examination shows improving morphea. Mild swelling around the left eye; pt it TTP around the left orbital rim    seen routinely for complex presentation of connective tissue disease w/ inflammatory arthritis, myositis, photosensitive rash and diffuse pain.  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 skin bx confirms dx of morphea.     rituxan induction 7/1/22 and 7/15/22    Serologies    Lab Results   Component Value Date    HEPAIGM Negative 06/26/2019    HEPBIGM Negative 06/26/2019    HEPBCAB Negative 06/28/2022    HEPCAB Negative 06/26/2019    RNP antibody positive    Current Rheum Medications:  prednisone 10 mg daily, rituxan  Previous Rheum Medications:   Methotrexate, Benlysta, CellCept, TNF alpha inhibitors avoided due to lupus, Plaquenil, actemra, rinvoq    Past Medical History:   Diagnosis Date    Abnormal Pap smear of cervix     repeat was normal    Lupus (systemic lupus erythematosus)      Social History     Socioeconomic History    Marital status:    Occupational History    Occupation: [employer]   Tobacco Use    Smoking status: Never    Smokeless tobacco: Never   Substance and Sexual Activity    Alcohol use: No    Drug use: No    Sexual activity: Yes      Partners: Male     Birth control/protection: Surgical     Comment: BTL   Other Topics Concern    Are you pregnant or think you may be? No    Breast-feeding No     Review of patient's allergies indicates:   Allergen Reactions    Elavil [amitriptyline]        Objective:   There were no vitals taken for this visit.    Immunization History   Administered Date(s) Administered    COVID-19 Vaccine 06/08/2021, 07/12/2021    Influenza 10/24/2018    Pneumococcal Conjugate - 13 Valent 01/08/2019    Tdap 03/19/2017       Current Outpatient Medications:     bumetanide (BUMEX) 1 MG tablet, Take 1 tablet (1 mg total) by mouth once daily., Disp: 10 tablet, Rfl: 0    clobetasoL (TEMOVATE) 0.05 % cream, Apply topically 2 (two) times daily., Disp: 60 g, Rfl: 2    clobetasol (TEMOVATE) 0.05 % external solution, Apply topically 2 (two) times daily., Disp: 50 mL, Rfl: 1    colchicine (COLCRYS) 0.6 mg tablet, Take 1 tablet (0.6 mg total) by mouth 2 (two) times daily., Disp: 60 tablet, Rfl: 11    gabapentin (NEURONTIN) 300 MG capsule, Take 1 capsule (300 mg total) by mouth 3 (three) times daily., Disp: 90 capsule, Rfl: 11    mometasone 0.1% (ELOCON) 0.1 % cream, Apply topically once daily., Disp: 50 g, Rfl: 1    nitrofurantoin, macrocrystal-monohydrate, (MACROBID) 100 MG capsule, Take 1 capsule (100 mg total) by mouth 2 (two) times daily., Disp: 14 capsule, Rfl: 0    predniSONE (DELTASONE) 10 MG tablet, Take 1 tablet (10 mg total) by mouth once daily., Disp: 30 tablet, Rfl: 1    tacrolimus (PROTOPIC) 0.1 % ointment, Apply topically 2 (two) times daily., Disp: 100 g, Rfl: 3    traMADoL (ULTRAM) 50 mg tablet, Take 1 tablet (50 mg total) by mouth every 12 (twelve) hours as needed for Pain., Disp: 60 tablet, Rfl: 3    traZODone (DESYREL) 50 MG tablet, Take 1 tablet (50 mg total) by mouth every evening., Disp: 30 tablet, Rfl: 11  No current facility-administered medications for this visit.    Facility-Administered Medications Ordered in Other  Visits:     acetaminophen tablet 650 mg, 650 mg, Oral, 1 time in Clinic/[facility], Prashanth Whalen MD    cyclophosphamide 500 mg/m2 = 1,120 mg in sodium chloride 0.9% 250 mL chemo infusion, 500 mg/m2, Intravenous, 1 time in [facility], Prashanth Whalen MD    diphenhydrAMINE injection 25 mg, 25 mg, Intravenous, 1 time in [facility], Prashanth Whalen MD    methylPREDNISolone sodium succinate injection 100 mg, 100 mg, Intravenous, 1 time in [facility], Prashanth Whalen MD    ondansetron injection 4 mg, 4 mg, Intravenous, 1 time in [facility], Prashanth Whalen MD    sodium chloride 0.9% bolus 500 mL, 500 mL, Intravenous, Continuous, Prashanth Whalen MD    sodium chloride 0.9% bolus 500 mL, 500 mL, Intravenous, 1 time in Clinic[facility], Prashanth Whalen MD       Recent Results (from the past 336 hour(s))   Protein / creatinine ratio, urine    Collection Time: 10/20/22  9:41 AM   Result Value Ref Range    Protein, Urine Random <7 0 - 15 mg/dL    Creatinine, Urine 72.0 15.0 - 325.0 mg/dL    Prot/Creat Ratio, Urine Unable to calculate 0.00 - 0.20   Urinalysis    Collection Time: 10/20/22  9:41 AM   Result Value Ref Range    Specimen UA Urine, Clean Catch     Color, UA Yellow Yellow, Straw, Elidia    Appearance, UA Clear Clear    pH, UA 6.0 5.0 - 8.0    Specific Gravity, UA 1.015 1.005 - 1.030    Protein, UA Negative Negative    Glucose, UA Negative Negative    Ketones, UA Negative Negative    Bilirubin (UA) Negative Negative    Occult Blood UA Negative Negative    Nitrite, UA Negative Negative    Leukocytes, UA 3+ (A) Negative   Urinalysis Microscopic    Collection Time: 10/20/22  9:41 AM   Result Value Ref Range    RBC, UA 1 0 - 4 /hpf    WBC, UA 30 (H) 0 - 5 /hpf    WBC Clumps, UA Occasional (A) None-Rare    Bacteria Few (A) None-Occ /hpf    Squam Epithel, UA 5 /hpf    Microscopic Comment SEE COMMENT    CBC Auto Differential    Collection Time: 10/20/22 10:08 AM   Result Value Ref Range     WBC 6.04 3.90 - 12.70 K/uL    RBC 3.81 (L) 4.00 - 5.40 M/uL    Hemoglobin 10.9 (L) 12.0 - 16.0 g/dL    Hematocrit 33.6 (L) 37.0 - 48.5 %    MCV 88 82 - 98 fL    MCH 28.6 27.0 - 31.0 pg    MCHC 32.4 32.0 - 36.0 g/dL    RDW 14.3 11.5 - 14.5 %    Platelets 485 (H) 150 - 450 K/uL    MPV 8.2 (L) 9.2 - 12.9 fL    Immature Granulocytes 0.3 0.0 - 0.5 %    Gran # (ANC) 3.7 1.8 - 7.7 K/uL    Immature Grans (Abs) 0.02 0.00 - 0.04 K/uL    Lymph # 0.7 (L) 1.0 - 4.8 K/uL    Mono # 0.5 0.3 - 1.0 K/uL    Eos # 1.0 (H) 0.0 - 0.5 K/uL    Baso # 0.04 0.00 - 0.20 K/uL    nRBC 0 0 /100 WBC    Gran % 61.5 38.0 - 73.0 %    Lymph % 12.3 (L) 18.0 - 48.0 %    Mono % 8.6 4.0 - 15.0 %    Eosinophil % 16.9 (H) 0.0 - 8.0 %    Basophil % 0.7 0.0 - 1.9 %    Differential Method Automated    Comprehensive Metabolic Panel    Collection Time: 10/20/22 10:08 AM   Result Value Ref Range    Sodium 135 (L) 136 - 145 mmol/L    Potassium 3.8 3.5 - 5.1 mmol/L    Chloride 102 95 - 110 mmol/L    CO2 27 23 - 29 mmol/L    Glucose 95 70 - 110 mg/dL    BUN 5 (L) 6 - 20 mg/dL    Creatinine 0.6 0.5 - 1.4 mg/dL    Calcium 9.4 8.7 - 10.5 mg/dL    Total Protein 8.7 (H) 6.0 - 8.4 g/dL    Albumin 3.1 (L) 3.5 - 5.2 g/dL    Total Bilirubin 0.2 0.1 - 1.0 mg/dL    Alkaline Phosphatase 53 (L) 55 - 135 U/L    AST 22 10 - 40 U/L    ALT 14 10 - 44 U/L    Anion Gap 6 (L) 8 - 16 mmol/L    eGFR >60.0 >60 mL/min/1.73 m^2   Sedimentation rate    Collection Time: 10/20/22 10:08 AM   Result Value Ref Range    Sed Rate 67 (H) 0 - 20 mm/Hr   C-Reactive Protein    Collection Time: 10/20/22 10:08 AM   Result Value Ref Range    CRP 37.4 (H) 0.0 - 8.2 mg/L   CULTURE, URINE    Collection Time: 10/20/22  2:40 PM    Specimen: Urine, Clean Catch   Result Value Ref Range    Urine Culture, Routine No growth    Urinalysis    Collection Time: 11/02/22  1:57 PM   Result Value Ref Range    Specimen UA Urine, Clean Catch     Color, UA Yellow Yellow, Straw, Elidia    Appearance, UA Clear Clear    pH, UA  6.0 5.0 - 8.0    Specific Gravity, UA 1.010 1.005 - 1.030    Protein, UA Negative Negative    Glucose, UA Negative Negative    Ketones, UA Negative Negative    Bilirubin (UA) Negative Negative    Occult Blood UA Negative Negative    Nitrite, UA Negative Negative    Leukocytes, UA 1+ (A) Negative   Urinalysis Microscopic    Collection Time: 11/02/22  1:57 PM   Result Value Ref Range    RBC, UA 1 0 - 4 /hpf    WBC, UA 4 0 - 5 /hpf    Bacteria Occasional None-Occ /hpf    Squam Epithel, UA 4 /hpf    Triplephos Jinny, UA Occasional None-Moderate    Microscopic Comment SEE COMMENT    CBC Auto Differential    Collection Time: 11/02/22  2:12 PM   Result Value Ref Range    WBC 5.90 3.90 - 12.70 K/uL    RBC 3.27 (L) 4.00 - 5.40 M/uL    Hemoglobin 9.4 (L) 12.0 - 16.0 g/dL    Hematocrit 29.2 (L) 37.0 - 48.5 %    MCV 89 82 - 98 fL    MCH 28.7 27.0 - 31.0 pg    MCHC 32.2 32.0 - 36.0 g/dL    RDW 14.5 11.5 - 14.5 %    Platelets 429 150 - 450 K/uL    MPV 8.3 (L) 9.2 - 12.9 fL    Immature Granulocytes 0.5 0.0 - 0.5 %    Gran # (ANC) 3.5 1.8 - 7.7 K/uL    Immature Grans (Abs) 0.03 0.00 - 0.04 K/uL    Lymph # 0.9 (L) 1.0 - 4.8 K/uL    Mono # 0.5 0.3 - 1.0 K/uL    Eos # 1.0 (H) 0.0 - 0.5 K/uL    Baso # 0.04 0.00 - 0.20 K/uL    nRBC 0 0 /100 WBC    Gran % 59.7 38.0 - 73.0 %    Lymph % 14.7 (L) 18.0 - 48.0 %    Mono % 8.5 4.0 - 15.0 %    Eosinophil % 16.4 (H) 0.0 - 8.0 %    Basophil % 0.7 0.0 - 1.9 %    Differential Method Automated    Comprehensive Metabolic Panel    Collection Time: 11/02/22  2:12 PM   Result Value Ref Range    Sodium 133 (L) 136 - 145 mmol/L    Potassium 3.6 3.5 - 5.1 mmol/L    Chloride 102 95 - 110 mmol/L    CO2 22 (L) 23 - 29 mmol/L    Glucose 72 70 - 110 mg/dL    BUN 10 6 - 20 mg/dL    Creatinine 0.6 0.5 - 1.4 mg/dL    Calcium 8.8 8.7 - 10.5 mg/dL    Total Protein 8.6 (H) 6.0 - 8.4 g/dL    Albumin 3.1 (L) 3.5 - 5.2 g/dL    Total Bilirubin 0.3 0.1 - 1.0 mg/dL    Alkaline Phosphatase 42 (L) 55 - 135 U/L    AST 34 10 -  40 U/L    ALT 24 10 - 44 U/L    Anion Gap 9 8 - 16 mmol/L    eGFR >60.0 >60 mL/min/1.73 m^2   Sedimentation rate    Collection Time: 11/02/22  2:12 PM   Result Value Ref Range    Sed Rate 71 (H) 0 - 20 mm/Hr   C-Reactive Protein    Collection Time: 11/02/22  2:12 PM   Result Value Ref Range    CRP 17.1 (H) 0.0 - 8.2 mg/L       Imaging:  All imaging reviewed and independently interpreted by me.    CT chest without contrast 06/02/2022-no evidence of ILD    Assessment:     1. Bacterial conjunctivitis    2. Pyuria    3. MCTD (mixed connective tissue disease)    4. Immunosuppressed status    5. Morphea    6. Periorbital edema of left eye            Plan:   Assessment and plan reviewed with Dr. Whalen. Patient also examined by Dr. Whalen today.    Pt to proceed with cytoxan infusion per Dr. Whalen  Ofloxacin eye drops sent to pharmacy. Reculture urine today to confirm resolution of UTI  Compromised immune system secondary to autoimmune disease and use of immunosuppressive drugs. Monitor carefully for infections and toxicities- Currently denies issues with recurrent infections. Advised to get immediate medical care if any infection.  Recommend Yearly Skin Cancer Screening by Dermatology for all patients on biologic or Erick. advised strict adherence to age appropriate vaccinations (shingles, pneumonia, flu and covid) and cancer screenings with PCP   Patient advised to hold DMARD and/or biologic therapy for signs of infection or for surgery. If you are unsure what to do please call our office for instruction.[facility] [phone]  no current medication related issues, no evidence of toxicity. I ordered labs for toxicity monitoring;  results reviewed and discussed findings with the patient   Patient understands and contact clinic at any time regarding questions about today's office visit and any medication changes that were made  Return to clinic:  as scheduled    The patient understands,  chooses and consents to this plan and accepts all the risks which include but are not limited to the risks mentioned above.     Method of contact with patient concerns: [facility]    30 minutes of total time spent on the encounter, which includes face to face time and non-face to face time preparing to see the patient (eg, review of tests), Obtaining and/or reviewing separately obtained history, Documenting clinical information in the electronic or other health record, Independently interpreting results (not separately reported) and communicating results to the patient/family/caregiver, or Care coordination (not separately reported).          Disclaimer: This note was prepared using a voice recognition system and is likely to have sound alike errors within the text.       Allyssa Garcia PA-C  Rheumatology Department   [facility]

## 2022-11-03 NOTE — DISCHARGE INSTRUCTIONS
HOME CARE AFTER CHEMOTHERAPY   Meals   Many patients feel sick and lose their appetites during treatment. Eat small meals several times a day. Choose bland foods with little taste or smell if you have problems with nausea. Be sure to cook all food thoroughly. This kills bacteria and helps you avoid intestinal infection. Soft foods are easier to swallow and digest.   Activity   Exercise keeps you strong and keeps your heart and lungs active. Talk to your doctor about an appropriate exercise program for you.   Skin Care   To prevent a skin infection, bathe or shower once a day. Use a moisturizing soap and wash with warm water. Avoid very hot or cold water. Chemotherapy can make your skin dry . Apply moisturizing lotion to help relieve dry skin. Some drugs used in high doses can cause slight burns to appear (like sunburn). Ask for a special cream to help relieve the burn and protect your skin.   Prevent Mouth Sores   During chemotherapy, many people get mouth sores. Do the following to help prevent mouth sores or to ease discomfort.   Brush your teeth with a soft-bristle toothbrush after every meal.  Don't use dental floss if your platelet count is below 50,000. Your doctor or nurse will tell you if this is the case.  Use an oral swab or special soft toothbrush if your gums bleed during regular brushing.  Use mouthwash as directed. If you can't tolerate commercial mouthwash, use salt and baking soda to clean your mouth. Mix 1 teaspoon of salt and 1 teaspoon of baking soda into a glass of water. Swish and spit.  Call your doctor or return to this facility if you develop any of the following:   Sore throat   White patches in the mouth or throat   Fever of 100.4ºF (38ºC) or higher, or as directed by your healthcare provider  © 5896-7858 [employer], [address] [place] [address], [place] [address]. All rights reserved. This information is not intended as a substitute for professional medical care. Always follow your  healthcare professional's  WAYS TO HELP PREVENT INFECTION        WASH YOUR HANDS OFTEN DURING THE DAY, ESPECIALLY BEFORE YOU EAT, AFTER USING THE BATHROOM, AND AFTER TOUCHING ANIMALS    STAY AWAY FROM PEOPLE WHO HAVE ILLNESSES YOU CAN CATCH; SUCH AS COLDS, FLU, CHICKEN POX    TRY TO AVOID CROWDS    STAY AWAY FROM CHILDREN WHO RECENTLY HAVE RECEIVED LIVE VIRUS VACCINES    MAINTAIN GOOD MOUTH CARE    DO NOT SQUEEZE OR SCRATCH PIMPLES    CLEAN CUTS & SCRAPES RIGHT AWAY AND DAILY UNTIL HEALED WITH WARM WATER, SOAP & AN ANTISEPTIC    AVOID CONTACT WITH LITTER BOXES, BIRD CAGES, & FISH TANKS    AVOID STANDING WATER, IE., BIRD BATHS, FLOWER POTS/VASES, OR HUMIDIFIERS    WEAR GLOVES WHEN GARDENING OR CLEANING UP AFTER OTHERS, ESPECIALLY BABIES & SMALL CHILDREN    DO NOT EAT RAW FISH, SEAFOOD, MEAT, OR EGGS

## 2022-11-03 NOTE — PLAN OF CARE
Plan of care reviewed with pt. All needs and concerns addressed.   Problem: Adult Inpatient Plan of Care  Goal: Plan of Care Review  Outcome: Ongoing, Progressing  Flowsheets (Taken 11/3/2022 1044)  Plan of Care Reviewed With: patient  Goal: Patient-Specific Goal (Individualized)  Outcome: Ongoing, Progressing  Flowsheets (Taken 11/3/2022 1044)  Anxieties, Fears or Concerns: Pt c/o of pain/swelling around left eye. Provider notified.  Individualized Care Needs: Feet elevated in recliner with warm blankets. Snack and juice provided.  Patient-Specific Goals (Include Timeframe): Tolerate cytoxan today.  Goal: Absence of Hospital-Acquired Illness or Injury  Outcome: Ongoing, Progressing  Intervention: Identify and Manage Fall Risk  Flowsheets (Taken 11/3/2022 1044)  Safety Promotion/Fall Prevention:   in recliner, wheels locked   room near unit station   instructed to call staff for mobility  Intervention: Prevent Infection  Flowsheets (Taken 11/3/2022 1044)  Infection Prevention:   hand hygiene promoted   equipment surfaces disinfected   visitors restricted/screened   environmental surveillance performed  Goal: Optimal Comfort and Wellbeing  Outcome: Ongoing, Progressing  Intervention: Provide Person-Centered Care  Flowsheets (Taken 11/3/2022 1044)  Trust Relationship/Rapport:   care explained   questions encouraged   reassurance provided   choices provided   emotional support provided   thoughts/feelings acknowledged   empathic listening provided   questions answered     Problem: Infection  Goal: Absence of Infection Signs and Symptoms  Outcome: Ongoing, Progressing  Intervention: Prevent or Manage Infection  Flowsheets (Taken 11/3/2022 1044)  Infection Management: aseptic technique maintained

## 2022-11-10 ENCOUNTER — OFFICE VISIT (OUTPATIENT)
Dept: RHEUMATOLOGY | Facility: CLINIC | Age: 37
End: 2022-11-10
Payer: COMMERCIAL

## 2022-11-10 ENCOUNTER — LAB VISIT (OUTPATIENT)
Dept: LAB | Facility: HOSPITAL | Age: 37
End: 2022-11-10
Attending: INTERNAL MEDICINE
Payer: COMMERCIAL

## 2022-11-10 VITALS
WEIGHT: 227.5 LBS | HEART RATE: 93 BPM | DIASTOLIC BLOOD PRESSURE: 81 MMHG | HEIGHT: 68 IN | BODY MASS INDEX: 34.48 KG/M2 | SYSTOLIC BLOOD PRESSURE: 131 MMHG

## 2022-11-10 DIAGNOSIS — L94.0 MORPHEA: Primary | ICD-10-CM

## 2022-11-10 DIAGNOSIS — D84.9 IMMUNOSUPPRESSED STATUS: ICD-10-CM

## 2022-11-10 DIAGNOSIS — R76.8 POSITIVE SM/RNP ANTIBODY: ICD-10-CM

## 2022-11-10 DIAGNOSIS — M79.7 FIBROMYALGIA: ICD-10-CM

## 2022-11-10 DIAGNOSIS — M35.1 MCTD (MIXED CONNECTIVE TISSUE DISEASE): ICD-10-CM

## 2022-11-10 DIAGNOSIS — R11.0 NAUSEA: ICD-10-CM

## 2022-11-10 LAB
ALBUMIN SERPL BCP-MCNC: 3.1 G/DL (ref 3.5–5.2)
ALP SERPL-CCNC: 47 U/L (ref 55–135)
ALT SERPL W/O P-5'-P-CCNC: 22 U/L (ref 10–44)
ANION GAP SERPL CALC-SCNC: 8 MMOL/L (ref 8–16)
AST SERPL-CCNC: 22 U/L (ref 10–40)
BASOPHILS # BLD AUTO: 0.04 K/UL (ref 0–0.2)
BASOPHILS NFR BLD: 0.9 % (ref 0–1.9)
BILIRUB SERPL-MCNC: 0.3 MG/DL (ref 0.1–1)
BUN SERPL-MCNC: 7 MG/DL (ref 6–20)
CALCIUM SERPL-MCNC: 8.8 MG/DL (ref 8.7–10.5)
CHLORIDE SERPL-SCNC: 104 MMOL/L (ref 95–110)
CO2 SERPL-SCNC: 25 MMOL/L (ref 23–29)
CREAT SERPL-MCNC: 0.7 MG/DL (ref 0.5–1.4)
CRP SERPL-MCNC: 16 MG/L (ref 0–8.2)
DIFFERENTIAL METHOD: ABNORMAL
EOSINOPHIL # BLD AUTO: 1 K/UL (ref 0–0.5)
EOSINOPHIL NFR BLD: 21.4 % (ref 0–8)
ERYTHROCYTE [DISTWIDTH] IN BLOOD BY AUTOMATED COUNT: 14.8 % (ref 11.5–14.5)
EST. GFR  (NO RACE VARIABLE): >60 ML/MIN/1.73 M^2
GLUCOSE SERPL-MCNC: 91 MG/DL (ref 70–110)
HCT VFR BLD AUTO: 32 % (ref 37–48.5)
HGB BLD-MCNC: 10.4 G/DL (ref 12–16)
IMM GRANULOCYTES # BLD AUTO: 0.02 K/UL (ref 0–0.04)
IMM GRANULOCYTES NFR BLD AUTO: 0.4 % (ref 0–0.5)
LYMPHOCYTES # BLD AUTO: 0.6 K/UL (ref 1–4.8)
LYMPHOCYTES NFR BLD: 13.6 % (ref 18–48)
MCH RBC QN AUTO: 29.4 PG (ref 27–31)
MCHC RBC AUTO-ENTMCNC: 32.5 G/DL (ref 32–36)
MCV RBC AUTO: 90 FL (ref 82–98)
MONOCYTES # BLD AUTO: 0.3 K/UL (ref 0.3–1)
MONOCYTES NFR BLD: 5.8 % (ref 4–15)
NEUTROPHILS # BLD AUTO: 2.7 K/UL (ref 1.8–7.7)
NEUTROPHILS NFR BLD: 57.9 % (ref 38–73)
NRBC BLD-RTO: 0 /100 WBC
PLATELET # BLD AUTO: 540 K/UL (ref 150–450)
PMV BLD AUTO: 8.1 FL (ref 9.2–12.9)
POTASSIUM SERPL-SCNC: 3.7 MMOL/L (ref 3.5–5.1)
PROT SERPL-MCNC: 8.8 G/DL (ref 6–8.4)
RBC # BLD AUTO: 3.54 M/UL (ref 4–5.4)
SODIUM SERPL-SCNC: 137 MMOL/L (ref 136–145)
WBC # BLD AUTO: 4.63 K/UL (ref 3.9–12.7)

## 2022-11-10 PROCEDURE — 3079F PR MOST RECENT DIASTOLIC BLOOD PRESSURE 80-89 MM HG: ICD-10-PCS | Mod: CPTII,S$GLB,, | Performed by: PHYSICIAN ASSISTANT

## 2022-11-10 PROCEDURE — 1159F PR MEDICATION LIST DOCUMENTED IN MEDICAL RECORD: ICD-10-PCS | Mod: CPTII,S$GLB,, | Performed by: PHYSICIAN ASSISTANT

## 2022-11-10 PROCEDURE — 3079F DIAST BP 80-89 MM HG: CPT | Mod: CPTII,S$GLB,, | Performed by: PHYSICIAN ASSISTANT

## 2022-11-10 PROCEDURE — 85652 RBC SED RATE AUTOMATED: CPT | Performed by: PHYSICIAN ASSISTANT

## 2022-11-10 PROCEDURE — 1159F MED LIST DOCD IN RCRD: CPT | Mod: CPTII,S$GLB,, | Performed by: PHYSICIAN ASSISTANT

## 2022-11-10 PROCEDURE — 80053 COMPREHEN METABOLIC PANEL: CPT | Performed by: PHYSICIAN ASSISTANT

## 2022-11-10 PROCEDURE — 99215 PR OFFICE/OUTPT VISIT, EST, LEVL V, 40-54 MIN: ICD-10-PCS | Mod: S$GLB,,, | Performed by: PHYSICIAN ASSISTANT

## 2022-11-10 PROCEDURE — 3075F SYST BP GE 130 - 139MM HG: CPT | Mod: CPTII,S$GLB,, | Performed by: PHYSICIAN ASSISTANT

## 2022-11-10 PROCEDURE — 99999 PR PBB SHADOW E&M-EST. PATIENT-LVL IV: ICD-10-PCS | Mod: PBBFAC,,, | Performed by: PHYSICIAN ASSISTANT

## 2022-11-10 PROCEDURE — 99999 PR PBB SHADOW E&M-EST. PATIENT-LVL IV: CPT | Mod: PBBFAC,,, | Performed by: PHYSICIAN ASSISTANT

## 2022-11-10 PROCEDURE — 85025 COMPLETE CBC W/AUTO DIFF WBC: CPT | Performed by: PHYSICIAN ASSISTANT

## 2022-11-10 PROCEDURE — 99215 OFFICE O/P EST HI 40 MIN: CPT | Mod: S$GLB,,, | Performed by: PHYSICIAN ASSISTANT

## 2022-11-10 PROCEDURE — 86140 C-REACTIVE PROTEIN: CPT | Performed by: PHYSICIAN ASSISTANT

## 2022-11-10 PROCEDURE — 36415 COLL VENOUS BLD VENIPUNCTURE: CPT | Performed by: PHYSICIAN ASSISTANT

## 2022-11-10 PROCEDURE — 3008F BODY MASS INDEX DOCD: CPT | Mod: CPTII,S$GLB,, | Performed by: PHYSICIAN ASSISTANT

## 2022-11-10 PROCEDURE — 3008F PR BODY MASS INDEX (BMI) DOCUMENTED: ICD-10-PCS | Mod: CPTII,S$GLB,, | Performed by: PHYSICIAN ASSISTANT

## 2022-11-10 PROCEDURE — 3075F PR MOST RECENT SYSTOLIC BLOOD PRESS GE 130-139MM HG: ICD-10-PCS | Mod: CPTII,S$GLB,, | Performed by: PHYSICIAN ASSISTANT

## 2022-11-10 RX ORDER — ONDANSETRON 4 MG/1
4 TABLET, ORALLY DISINTEGRATING ORAL EVERY 8 HOURS PRN
Qty: 30 TABLET | Refills: 0 | Status: SHIPPED | OUTPATIENT
Start: 2022-11-10 | End: 2023-02-23 | Stop reason: SDUPTHER

## 2022-11-10 NOTE — PROGRESS NOTES
RHEUMATOLOGY OUTPATIENT CLINIC NOTE    11/10/2022    Attending Rheumatologist: Allyssa Garcia PA-C  Primary Care Provider: Sofia Martinez MD   Chief Complaint/Reason For Consultation:  MCTD      Subjective:        Stefanie España is a 36 y.o. female here today for check up- seen routinely for complex presentation of connective tissue disease w/ inflammatory arthritis, myositis, photosensitive rash and diffuse pain.  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 skin bx confirms dx of morphea.     now on cytoxan for severely active MCTD w/ worsening morphea. C/o having nausea the day after cytoxan treatment. She does not have any medication at home for this. Was treated for bacterial conjunctivitis last week- this has resolved. She is having more pain in her lower legs assoc w/ the morphea. Seems to be aggravated by being on her feet more.    Rheumatological review of system negative otherwise. Physical examination shows improvement in morphea- see pictures/  More of a sclerodactyly/morphea like examination finding of her right forearm and bilateral posterior LE.    rituxan induction 7/1/22 and 7/15/22    Serologies    Lab Results   Component Value Date    HEPAIGM Negative 06/26/2019    HEPBIGM Negative 06/26/2019    HEPBCAB Negative 06/28/2022    HEPCAB Negative 06/26/2019    RNP antibody positive    Current Rheum Medications:  prednisone 10 mg daily, rituxan  Previous Rheum Medications:   Methotrexate, Benlysta, CellCept, TNF alpha inhibitors avoided due to lupus, Plaquenil, actemra, rinvoq    Past Medical History:   Diagnosis Date    Abnormal Pap smear of cervix     repeat was normal    Lupus (systemic lupus erythematosus)      Social History     Tobacco Use    Smoking status: Never    Smokeless tobacco: Never   Substance Use Topics    Alcohol use: No         Review of patient's allergies indicates:  "  Allergen Reactions    Elavil [amitriptyline]        Objective:   /81   Pulse 93   Ht 5' 8" (1.727 m)   Wt 103.2 kg (227 lb 8.2 oz)   BMI 34.59 kg/m²     Immunization History   Administered Date(s) Administered    COVID-19 Vaccine 06/08/2021, 07/12/2021    Influenza 10/24/2018    Pneumococcal Conjugate - 13 Valent 01/08/2019    Tdap 03/19/2017       Current Outpatient Medications:     bumetanide (BUMEX) 1 MG tablet, Take 1 tablet (1 mg total) by mouth once daily., Disp: 10 tablet, Rfl: 0    clobetasoL (TEMOVATE) 0.05 % cream, Apply topically 2 (two) times daily., Disp: 60 g, Rfl: 2    clobetasol (TEMOVATE) 0.05 % external solution, Apply topically 2 (two) times daily., Disp: 50 mL, Rfl: 1    colchicine (COLCRYS) 0.6 mg tablet, Take 1 tablet (0.6 mg total) by mouth 2 (two) times daily., Disp: 60 tablet, Rfl: 11    gabapentin (NEURONTIN) 300 MG capsule, Take 1 capsule (300 mg total) by mouth 3 (three) times daily., Disp: 90 capsule, Rfl: 11    mometasone 0.1% (ELOCON) 0.1 % cream, Apply topically once daily., Disp: 50 g, Rfl: 1    nitrofurantoin, macrocrystal-monohydrate, (MACROBID) 100 MG capsule, Take 1 capsule (100 mg total) by mouth 2 (two) times daily., Disp: 14 capsule, Rfl: 0    ofloxacin (OCUFLOX) 0.3 % ophthalmic solution, Place 1-2 drops into the left eye every 4 (four) hours for 2 days, THEN 1-2 drops 4 (four) times daily for 5 days., Disp: 5 mL, Rfl: 0    predniSONE (DELTASONE) 10 MG tablet, Take 1 tablet (10 mg total) by mouth once daily., Disp: 30 tablet, Rfl: 1    tacrolimus (PROTOPIC) 0.1 % ointment, Apply topically 2 (two) times daily., Disp: 100 g, Rfl: 3    traMADoL (ULTRAM) 50 mg tablet, Take 1 tablet (50 mg total) by mouth every 12 (twelve) hours as needed for Pain., Disp: 60 tablet, Rfl: 3    traZODone (DESYREL) 50 MG tablet, Take 1 tablet (50 mg total) by mouth every evening., Disp: 30 tablet, Rfl: 11       Recent Results (from the past 336 hour(s))   Urinalysis    Collection Time: " 11/02/22  1:57 PM   Result Value Ref Range    Specimen UA Urine, Clean Catch     Color, UA Yellow Yellow, Straw, Elidia    Appearance, UA Clear Clear    pH, UA 6.0 5.0 - 8.0    Specific Gravity, UA 1.010 1.005 - 1.030    Protein, UA Negative Negative    Glucose, UA Negative Negative    Ketones, UA Negative Negative    Bilirubin (UA) Negative Negative    Occult Blood UA Negative Negative    Nitrite, UA Negative Negative    Leukocytes, UA 1+ (A) Negative   Urinalysis Microscopic    Collection Time: 11/02/22  1:57 PM   Result Value Ref Range    RBC, UA 1 0 - 4 /hpf    WBC, UA 4 0 - 5 /hpf    Bacteria Occasional None-Occ /hpf    Squam Epithel, UA 4 /hpf    Triplephos Jinny, UA Occasional None-Moderate    Microscopic Comment SEE COMMENT    CBC Auto Differential    Collection Time: 11/02/22  2:12 PM   Result Value Ref Range    WBC 5.90 3.90 - 12.70 K/uL    RBC 3.27 (L) 4.00 - 5.40 M/uL    Hemoglobin 9.4 (L) 12.0 - 16.0 g/dL    Hematocrit 29.2 (L) 37.0 - 48.5 %    MCV 89 82 - 98 fL    MCH 28.7 27.0 - 31.0 pg    MCHC 32.2 32.0 - 36.0 g/dL    RDW 14.5 11.5 - 14.5 %    Platelets 429 150 - 450 K/uL    MPV 8.3 (L) 9.2 - 12.9 fL    Immature Granulocytes 0.5 0.0 - 0.5 %    Gran # (ANC) 3.5 1.8 - 7.7 K/uL    Immature Grans (Abs) 0.03 0.00 - 0.04 K/uL    Lymph # 0.9 (L) 1.0 - 4.8 K/uL    Mono # 0.5 0.3 - 1.0 K/uL    Eos # 1.0 (H) 0.0 - 0.5 K/uL    Baso # 0.04 0.00 - 0.20 K/uL    nRBC 0 0 /100 WBC    Gran % 59.7 38.0 - 73.0 %    Lymph % 14.7 (L) 18.0 - 48.0 %    Mono % 8.5 4.0 - 15.0 %    Eosinophil % 16.4 (H) 0.0 - 8.0 %    Basophil % 0.7 0.0 - 1.9 %    Differential Method Automated    Comprehensive Metabolic Panel    Collection Time: 11/02/22  2:12 PM   Result Value Ref Range    Sodium 133 (L) 136 - 145 mmol/L    Potassium 3.6 3.5 - 5.1 mmol/L    Chloride 102 95 - 110 mmol/L    CO2 22 (L) 23 - 29 mmol/L    Glucose 72 70 - 110 mg/dL    BUN 10 6 - 20 mg/dL    Creatinine 0.6 0.5 - 1.4 mg/dL    Calcium 8.8 8.7 - 10.5 mg/dL    Total  Protein 8.6 (H) 6.0 - 8.4 g/dL    Albumin 3.1 (L) 3.5 - 5.2 g/dL    Total Bilirubin 0.3 0.1 - 1.0 mg/dL    Alkaline Phosphatase 42 (L) 55 - 135 U/L    AST 34 10 - 40 U/L    ALT 24 10 - 44 U/L    Anion Gap 9 8 - 16 mmol/L    eGFR >60.0 >60 mL/min/1.73 m^2   Sedimentation rate    Collection Time: 11/02/22  2:12 PM   Result Value Ref Range    Sed Rate 71 (H) 0 - 20 mm/Hr   C-Reactive Protein    Collection Time: 11/02/22  2:12 PM   Result Value Ref Range    CRP 17.1 (H) 0.0 - 8.2 mg/L   CBC Auto Differential    Collection Time: 11/10/22 12:04 PM   Result Value Ref Range    WBC 4.63 3.90 - 12.70 K/uL    RBC 3.54 (L) 4.00 - 5.40 M/uL    Hemoglobin 10.4 (L) 12.0 - 16.0 g/dL    Hematocrit 32.0 (L) 37.0 - 48.5 %    MCV 90 82 - 98 fL    MCH 29.4 27.0 - 31.0 pg    MCHC 32.5 32.0 - 36.0 g/dL    RDW 14.8 (H) 11.5 - 14.5 %    Platelets 540 (H) 150 - 450 K/uL    MPV 8.1 (L) 9.2 - 12.9 fL    Immature Granulocytes 0.4 0.0 - 0.5 %    Gran # (ANC) 2.7 1.8 - 7.7 K/uL    Immature Grans (Abs) 0.02 0.00 - 0.04 K/uL    Lymph # 0.6 (L) 1.0 - 4.8 K/uL    Mono # 0.3 0.3 - 1.0 K/uL    Eos # 1.0 (H) 0.0 - 0.5 K/uL    Baso # 0.04 0.00 - 0.20 K/uL    nRBC 0 0 /100 WBC    Gran % 57.9 38.0 - 73.0 %    Lymph % 13.6 (L) 18.0 - 48.0 %    Mono % 5.8 4.0 - 15.0 %    Eosinophil % 21.4 (H) 0.0 - 8.0 %    Basophil % 0.9 0.0 - 1.9 %    Differential Method Automated        Imaging:  All imaging reviewed and independently interpreted by me.    CT chest without contrast 06/02/2022-no evidence of ILD                Assessment:     1. Morphea    2. Nausea    3. MCTD (mixed connective tissue disease)    4. Immunosuppressed status    5. Positive sm/RNP antibody    6. Fibromyalgia            Plan:     rituxan induction started 7/1/22; morphea progressed further. started cytoxan every 4 weeks.  Continue prednisone 10 mg daily; colcrys for inflammation and pain assoc with morphea  Rx for zofran sent in for nausea as needed. Will send note to work so she can sit when  needed to keep leg from flaring as much  Compromised immune system secondary to autoimmune disease and use of immunosuppressive drugs. Monitor carefully for infections and toxicities- Currently denies issues with recurrent infections. Advised to get immediate medical care if any infection.  Recommend Yearly Skin Cancer Screening by Dermatology for all patients on biologic or Erick. advised strict adherence to age appropriate vaccinations (shingles, pneumonia, flu and covid) and cancer screenings with PCP   Patient advised to hold DMARD and/or biologic therapy for signs of infection or for surgery. If you are unsure what to do please call our office for instruction.[facility] [phone]  no current medication related issues, no evidence of toxicity. I ordered labs for toxicity monitoring;  results reviewed and discussed findings with the patient   Patient understands and contact clinic at any time regarding questions about today's office visit and any medication changes that were made  Return to clinic:  4-6 weeks with reg4    The patient understands, chooses and consents to this plan and accepts all the risks which include but are not limited to the risks mentioned above.     Method of contact with patient concerns: Samantha attn Rheumatology    40 minutes of total time spent on the encounter, which includes face to face time and non-face to face time preparing to see the patient (eg, review of tests), Obtaining and/or reviewing separately obtained history, Documenting clinical information in the electronic or other health record, Independently interpreting results (not separately reported) and communicating results to the patient/family/caregiver, or Care coordination (not separately reported).          Disclaimer: This note was prepared using a voice recognition system and is likely to have sound alike errors within the text.       Allyssa Garcia PA-C  Rheumatology Department   [facility] [facility] [place]

## 2022-11-10 NOTE — LETTER
November 10, 2022    Stefanie España  [address] [place] [address]             [facility]  [address] [place] [address]  Phone: [phone]  Fax: [phone] To Whom It May Concern:      Stefanie España is an established patient at the [facility] where she is treated for chronic medical conditions. Please allow her access to break and sit as needed during shifts to prevent a flare of her condition. Please contact us with any questions or concerns.              Sincerely,      Allyssa Garcia PA-C

## 2022-11-11 LAB — ERYTHROCYTE [SEDIMENTATION RATE] IN BLOOD BY PHOTOMETRIC METHOD: 95 MM/HR (ref 0–36)

## 2022-11-29 ENCOUNTER — PATIENT MESSAGE (OUTPATIENT)
Dept: RHEUMATOLOGY | Facility: CLINIC | Age: 37
End: 2022-11-29
Payer: COMMERCIAL

## 2022-11-30 ENCOUNTER — LAB VISIT (OUTPATIENT)
Dept: LAB | Facility: HOSPITAL | Age: 37
End: 2022-11-30
Attending: INTERNAL MEDICINE
Payer: COMMERCIAL

## 2022-11-30 DIAGNOSIS — D84.9 IMMUNOSUPPRESSED STATUS: ICD-10-CM

## 2022-11-30 LAB
ALBUMIN SERPL BCP-MCNC: 3.4 G/DL (ref 3.5–5.2)
ALP SERPL-CCNC: 48 U/L (ref 55–135)
ALT SERPL W/O P-5'-P-CCNC: 22 U/L (ref 10–44)
ANION GAP SERPL CALC-SCNC: 11 MMOL/L (ref 8–16)
AST SERPL-CCNC: 32 U/L (ref 10–40)
BASOPHILS # BLD AUTO: 0.05 K/UL (ref 0–0.2)
BASOPHILS NFR BLD: 0.7 % (ref 0–1.9)
BILIRUB SERPL-MCNC: 0.2 MG/DL (ref 0.1–1)
BUN SERPL-MCNC: 6 MG/DL (ref 6–20)
CALCIUM SERPL-MCNC: 9.6 MG/DL (ref 8.7–10.5)
CHLORIDE SERPL-SCNC: 99 MMOL/L (ref 95–110)
CO2 SERPL-SCNC: 26 MMOL/L (ref 23–29)
CREAT SERPL-MCNC: 0.7 MG/DL (ref 0.5–1.4)
CRP SERPL-MCNC: 16.2 MG/L (ref 0–8.2)
DIFFERENTIAL METHOD: ABNORMAL
EOSINOPHIL # BLD AUTO: 1.3 K/UL (ref 0–0.5)
EOSINOPHIL NFR BLD: 19.9 % (ref 0–8)
ERYTHROCYTE [DISTWIDTH] IN BLOOD BY AUTOMATED COUNT: 14.2 % (ref 11.5–14.5)
ERYTHROCYTE [SEDIMENTATION RATE] IN BLOOD BY WESTERGREN METHOD: 100 MM/HR (ref 0–20)
EST. GFR  (NO RACE VARIABLE): >60 ML/MIN/1.73 M^2
GLUCOSE SERPL-MCNC: 58 MG/DL (ref 70–110)
HCT VFR BLD AUTO: 32.4 % (ref 37–48.5)
HGB BLD-MCNC: 10.2 G/DL (ref 12–16)
IMM GRANULOCYTES # BLD AUTO: 0.03 K/UL (ref 0–0.04)
IMM GRANULOCYTES NFR BLD AUTO: 0.4 % (ref 0–0.5)
LYMPHOCYTES # BLD AUTO: 1 K/UL (ref 1–4.8)
LYMPHOCYTES NFR BLD: 15.1 % (ref 18–48)
MCH RBC QN AUTO: 29.1 PG (ref 27–31)
MCHC RBC AUTO-ENTMCNC: 31.5 G/DL (ref 32–36)
MCV RBC AUTO: 92 FL (ref 82–98)
MONOCYTES # BLD AUTO: 0.6 K/UL (ref 0.3–1)
MONOCYTES NFR BLD: 9 % (ref 4–15)
NEUTROPHILS # BLD AUTO: 3.7 K/UL (ref 1.8–7.7)
NEUTROPHILS NFR BLD: 55.3 % (ref 38–73)
NRBC BLD-RTO: 0 /100 WBC
PLATELET # BLD AUTO: 553 K/UL (ref 150–450)
PMV BLD AUTO: 8.5 FL (ref 9.2–12.9)
POTASSIUM SERPL-SCNC: 3.5 MMOL/L (ref 3.5–5.1)
PROT SERPL-MCNC: 9.7 G/DL (ref 6–8.4)
RBC # BLD AUTO: 3.51 M/UL (ref 4–5.4)
SODIUM SERPL-SCNC: 136 MMOL/L (ref 136–145)
WBC # BLD AUTO: 6.67 K/UL (ref 3.9–12.7)

## 2022-11-30 PROCEDURE — 85651 RBC SED RATE NONAUTOMATED: CPT | Mod: PO | Performed by: PHYSICIAN ASSISTANT

## 2022-11-30 PROCEDURE — 85025 COMPLETE CBC W/AUTO DIFF WBC: CPT | Mod: PO | Performed by: PHYSICIAN ASSISTANT

## 2022-11-30 PROCEDURE — 86140 C-REACTIVE PROTEIN: CPT | Performed by: PHYSICIAN ASSISTANT

## 2022-11-30 PROCEDURE — 80053 COMPREHEN METABOLIC PANEL: CPT | Performed by: PHYSICIAN ASSISTANT

## 2022-11-30 PROCEDURE — 36415 COLL VENOUS BLD VENIPUNCTURE: CPT | Mod: PO | Performed by: PHYSICIAN ASSISTANT

## 2022-12-01 ENCOUNTER — INFUSION (OUTPATIENT)
Dept: INFUSION THERAPY | Facility: HOSPITAL | Age: 37
End: 2022-12-01
Attending: PHYSICIAN ASSISTANT
Payer: COMMERCIAL

## 2022-12-01 VITALS
OXYGEN SATURATION: 97 % | WEIGHT: 224.19 LBS | HEART RATE: 87 BPM | DIASTOLIC BLOOD PRESSURE: 78 MMHG | SYSTOLIC BLOOD PRESSURE: 117 MMHG | BODY MASS INDEX: 33.98 KG/M2 | TEMPERATURE: 98 F | RESPIRATION RATE: 18 BRPM | HEIGHT: 68 IN

## 2022-12-01 DIAGNOSIS — M06.09 RHEUMATOID ARTHRITIS OF MULTIPLE SITES WITH NEGATIVE RHEUMATOID FACTOR: Primary | ICD-10-CM

## 2022-12-01 DIAGNOSIS — M35.1 MCTD (MIXED CONNECTIVE TISSUE DISEASE): ICD-10-CM

## 2022-12-01 PROCEDURE — 25000003 PHARM REV CODE 250: Performed by: INTERNAL MEDICINE

## 2022-12-01 PROCEDURE — 96375 TX/PRO/DX INJ NEW DRUG ADDON: CPT

## 2022-12-01 PROCEDURE — 96413 CHEMO IV INFUSION 1 HR: CPT

## 2022-12-01 PROCEDURE — 63600175 PHARM REV CODE 636 W HCPCS: Performed by: INTERNAL MEDICINE

## 2022-12-01 RX ORDER — ACETAMINOPHEN 325 MG/1
650 TABLET ORAL
Status: COMPLETED | OUTPATIENT
Start: 2022-12-01 | End: 2022-12-01

## 2022-12-01 RX ORDER — HEPARIN 100 UNIT/ML
500 SYRINGE INTRAVENOUS
Status: CANCELLED | OUTPATIENT
Start: 2022-12-29

## 2022-12-01 RX ORDER — METHYLPREDNISOLONE SOD SUCC 125 MG
100 VIAL (EA) INJECTION
Status: CANCELLED
Start: 2022-12-29

## 2022-12-01 RX ORDER — DIPHENHYDRAMINE HYDROCHLORIDE 50 MG/ML
25 INJECTION INTRAMUSCULAR; INTRAVENOUS
Status: CANCELLED
Start: 2022-12-29

## 2022-12-01 RX ORDER — ACETAMINOPHEN 325 MG/1
650 TABLET ORAL
Status: CANCELLED | OUTPATIENT
Start: 2022-12-29

## 2022-12-01 RX ORDER — SODIUM CHLORIDE 0.9 % (FLUSH) 0.9 %
10 SYRINGE (ML) INJECTION
Status: DISCONTINUED | OUTPATIENT
Start: 2022-12-01 | End: 2022-12-01 | Stop reason: HOSPADM

## 2022-12-01 RX ORDER — SODIUM CHLORIDE 0.9 % (FLUSH) 0.9 %
10 SYRINGE (ML) INJECTION
Status: CANCELLED | OUTPATIENT
Start: 2022-12-29

## 2022-12-01 RX ORDER — DIPHENHYDRAMINE HYDROCHLORIDE 50 MG/ML
25 INJECTION INTRAMUSCULAR; INTRAVENOUS
Status: COMPLETED | OUTPATIENT
Start: 2022-12-01 | End: 2022-12-01

## 2022-12-01 RX ORDER — METHYLPREDNISOLONE SOD SUCC 125 MG
100 VIAL (EA) INJECTION
Status: COMPLETED | OUTPATIENT
Start: 2022-12-01 | End: 2022-12-01

## 2022-12-01 RX ORDER — ONDANSETRON 2 MG/ML
4 INJECTION INTRAMUSCULAR; INTRAVENOUS
Status: COMPLETED | OUTPATIENT
Start: 2022-12-01 | End: 2022-12-01

## 2022-12-01 RX ORDER — ONDANSETRON 2 MG/ML
4 INJECTION INTRAMUSCULAR; INTRAVENOUS
Status: CANCELLED
Start: 2022-12-29

## 2022-12-01 RX ORDER — ACETAMINOPHEN 325 MG/1
650 TABLET ORAL EVERY 4 HOURS PRN
Status: CANCELLED | OUTPATIENT
Start: 2022-12-29

## 2022-12-01 RX ADMIN — DIPHENHYDRAMINE HYDROCHLORIDE 25 MG: 50 INJECTION INTRAMUSCULAR; INTRAVENOUS at 08:12

## 2022-12-01 RX ADMIN — SODIUM CHLORIDE 500 ML: 0.9 INJECTION, SOLUTION INTRAVENOUS at 12:12

## 2022-12-01 RX ADMIN — ONDANSETRON 4 MG: 2 INJECTION INTRAMUSCULAR; INTRAVENOUS at 08:12

## 2022-12-01 RX ADMIN — CYCLOPHOSPHAMIDE 1100 MG: 200 INJECTION, SOLUTION INTRAVENOUS at 11:12

## 2022-12-01 RX ADMIN — ACETAMINOPHEN 650 MG: 325 TABLET ORAL at 09:12

## 2022-12-01 RX ADMIN — METHYLPREDNISOLONE SODIUM SUCCINATE 100 MG: 125 INJECTION, POWDER, FOR SOLUTION INTRAMUSCULAR; INTRAVENOUS at 08:12

## 2022-12-01 RX ADMIN — SODIUM CHLORIDE 500 ML: 0.9 INJECTION, SOLUTION INTRAVENOUS at 08:12

## 2022-12-01 NOTE — NURSING
Infusion Cytoxan  Next infusion is scheduled for 12/29/22.    Recent labs? Reviewed    Premeds? Tylenol 650 mg PO  Zofran 4 mg IVP  Solumedrol 100 mg IVP  Benedryl 25 mg IVP    S/S of current or recent infections in the past 14 days? None/Denies    Recent Surgery/invasive procedures? None/Denies     Any recent vaccines ? None/Denies    Pre-Hydration IVF's  NS 0.9% 500cc IV over 2 hous given.    Cytoxan  1100 mg administered IV at a 60 minute rate per orders; see MAR and vitals for more  Details.    Post-Hydration IVF's NS 0.9% 500cc IV over 2 hours.

## 2022-12-12 ENCOUNTER — PATIENT MESSAGE (OUTPATIENT)
Dept: DERMATOLOGY | Facility: CLINIC | Age: 37
End: 2022-12-12
Payer: COMMERCIAL

## 2022-12-12 ENCOUNTER — PATIENT MESSAGE (OUTPATIENT)
Dept: RHEUMATOLOGY | Facility: CLINIC | Age: 37
End: 2022-12-12

## 2022-12-12 ENCOUNTER — OFFICE VISIT (OUTPATIENT)
Dept: RHEUMATOLOGY | Facility: CLINIC | Age: 37
End: 2022-12-12
Payer: COMMERCIAL

## 2022-12-12 DIAGNOSIS — D84.9 IMMUNOSUPPRESSED STATUS: ICD-10-CM

## 2022-12-12 DIAGNOSIS — L94.0 MORPHEA: ICD-10-CM

## 2022-12-12 DIAGNOSIS — M32.9 SYSTEMIC LUPUS ERYTHEMATOSUS ARTHRITIS: ICD-10-CM

## 2022-12-12 DIAGNOSIS — M06.09 RHEUMATOID ARTHRITIS OF MULTIPLE SITES WITH NEGATIVE RHEUMATOID FACTOR: ICD-10-CM

## 2022-12-12 DIAGNOSIS — R77.8 LOW SERUM COMPLEMENT C4: ICD-10-CM

## 2022-12-12 DIAGNOSIS — L21.9 SEBORRHEIC DERMATITIS: ICD-10-CM

## 2022-12-12 DIAGNOSIS — L81.9 DYSCHROMIA: ICD-10-CM

## 2022-12-12 DIAGNOSIS — R21 MALAR RASH: ICD-10-CM

## 2022-12-12 DIAGNOSIS — Z09 CHEMOTHERAPY FOLLOW-UP EXAMINATION: ICD-10-CM

## 2022-12-12 DIAGNOSIS — M35.1 MCTD (MIXED CONNECTIVE TISSUE DISEASE): ICD-10-CM

## 2022-12-12 DIAGNOSIS — M34.9 DIFFUSE SCLERODERMA: Primary | ICD-10-CM

## 2022-12-12 PROCEDURE — 1159F PR MEDICATION LIST DOCUMENTED IN MEDICAL RECORD: ICD-10-PCS | Mod: CPTII,95,, | Performed by: INTERNAL MEDICINE

## 2022-12-12 PROCEDURE — 99214 PR OFFICE/OUTPT VISIT, EST, LEVL IV, 30-39 MIN: ICD-10-PCS | Mod: 95,,, | Performed by: INTERNAL MEDICINE

## 2022-12-12 PROCEDURE — 1160F PR REVIEW ALL MEDS BY PRESCRIBER/CLIN PHARMACIST DOCUMENTED: ICD-10-PCS | Mod: CPTII,95,, | Performed by: INTERNAL MEDICINE

## 2022-12-12 PROCEDURE — 1160F RVW MEDS BY RX/DR IN RCRD: CPT | Mod: CPTII,95,, | Performed by: INTERNAL MEDICINE

## 2022-12-12 PROCEDURE — 99214 OFFICE O/P EST MOD 30 MIN: CPT | Mod: 95,,, | Performed by: INTERNAL MEDICINE

## 2022-12-12 PROCEDURE — 1159F MED LIST DOCD IN RCRD: CPT | Mod: CPTII,95,, | Performed by: INTERNAL MEDICINE

## 2022-12-12 RX ORDER — TRAMADOL HYDROCHLORIDE 50 MG/1
50 TABLET ORAL EVERY 12 HOURS PRN
Qty: 60 TABLET | Refills: 3 | Status: SHIPPED | OUTPATIENT
Start: 2022-12-12 | End: 2023-08-05

## 2022-12-12 NOTE — PROGRESS NOTES
RHEUMATOLOGY FOLLOW UP - TELE VISIT     The patient location is: [place]  The chief complaint leading to consultation is:  Worsening scleroderma, worsening scaly scalp rash, leg rash  Visit type: Virtual visit with synchronous audio and video  Total time spent with patient:  15 minutes  Each patient to whom he or she provides medical services by telemedicine is:  (1) informed of the relationship between the physician and patient and the respective role of any other health care provider with respect to management of the patient; and (2) notified that he or she may decline to receive medical services by telemedicine and may withdraw from such care at any time.    Chief complaints, HPI, ROS, EXAM, Assessment & Plans:-  Stefanie Kimbrough a 36 y.o. pleasant female seen today for worsening rash.  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  Recently started on Cytoxan.  Completed 3 infusions so far.  New onset worsening rash right lower extremity with open wound.  No symptoms of infection.  But in her upper extremities she reports noticing mild reduction in the level of inflammation and skin thickening.  No adverse effects from Cytoxan.  No blood in urine.  No infections.  Worsening seborrheic dermatitis over scalp.  On colchicine for pleuritis-lupus pleuritis.  Takes tramadol p.r.n. for pain.     1. Diffuse scleroderma    2. Morphea    3. MCTD (mixed connective tissue disease)    4. Immunosuppressed status    5. Chemotherapy follow-up examination    6. Rheumatoid arthritis of multiple sites with negative rheumatoid factor    7. Systemic lupus erythematosus arthritis      Problem List Items Addressed This Visit       Systemic lupus erythematosus arthritis    Relevant Medications    traMADoL (ULTRAM) 50 mg tablet    Immunosuppressed status    MCTD (mixed connective tissue  disease)    Relevant Orders    Protein Electrophoresis, Serum    Immunofixation Electrophoresis    C4 Complement    C3 Complement    Immunoglobulins (IgG, IgA, IgM) Quantitative    Rheumatoid arthritis of multiple sites with negative rheumatoid factor    Relevant Medications    traMADoL (ULTRAM) 50 mg tablet    Chemotherapy follow-up examination    Diffuse scleroderma - Primary    Relevant Orders    Protein Electrophoresis, Serum    Immunofixation Electrophoresis    C4 Complement    C3 Complement    Immunoglobulins (IgG, IgA, IgM) Quantitative    Morphea    Relevant Orders    Protein Electrophoresis, Serum    Immunofixation Electrophoresis    C4 Complement    C3 Complement    Immunoglobulins (IgG, IgA, IgM) Quantitative      Latest Reference Range & Units Most Recent   Sed Rate 0 - 20 mm/Hr 100 (H)  11/30/22 14:44   (H): Data is abnormally high       Latest Reference Range & Units 08/30/17 14:50 12/01/17 16:30 03/19/18 15:48 06/20/18 16:16 09/12/18 13:12 12/14/18 09:15 04/11/19 09:41 07/11/19 09:03 10/02/19 09:52 11/19/19 12:05 12/27/19 10:15 02/26/20 09:50 04/23/20 09:40 07/22/20 09:58 10/14/20 10:22 01/07/21 09:10 04/01/21 09:40 06/22/21 11:29 10/14/21 11:27 01/13/22 10:45 03/10/22 10:42 03/17/22 11:50 04/21/22 13:23 05/19/22 11:22 06/16/22 11:45 08/19/22 10:00 09/06/22 14:23 09/22/22 09:59 10/20/22 10:08 11/02/22 14:12 11/10/22 12:04 11/30/22 14:44   CRP 0.0 - 8.2 mg/L 11.5 (H) 11.6 (H) 8.7 (H) 9.0 (H) 10.0 (H) 7.6 13.1 (H) 46.1 (H) 15.8 (H) 16.1 (H) 10.9 (H) 18.9 (H) 12.8 (H) 10.6 (H) 10.4 (H) 12.6 (H) 11.8 (H) 9.3 (H) 9.7 (H) 12.3 (H) 54.2 (H) 62.2 (H) 75.1 (H) 33.0 (H) 39.3 (H) 41.6 (H) 21.7 (H) 41.0 (H) 37.4 (H) 17.1 (H) 16.0 (H) 16.2 (H)   (H): Data is abnormally high     Latest Reference Range & Units Most Recent   MELVA Screen Negative <1:80  Positive !  6/16/22 11:45   MELVA HEP-2 Titer  Positive >=1:2560 Speckled  8/30/17 14:50   MELVA Titer 1  >=1:2560  6/16/22 11:45   MELVA PATTERN 1  Speckled  6/16/22 11:45    ds DNA Ab Negative 1:10  Negative 1:10  6/16/22 11:45   Anti-SSA Antibody 0.00 - 0.99 Ratio 0.07  6/16/22 11:45   Anti-SSA Interpretation Negative  Negative  6/16/22 11:45   Anti-SSB Antibody 0.00 - 0.99 Ratio 0.04  6/16/22 11:45   Anti-SSB Interpretation Negative  Negative  6/16/22 11:45   Anti Sm Antibody 0.00 - 0.99 Ratio 0.25  6/16/22 11:45   Anti-Sm Interpretation Negative  Negative  6/16/22 11:45   Anti Sm/RNP Antibody 0.00 - 0.99 Ratio 5.45 (H)  6/16/22 11:45   Anti-Sm/RNP Interpretation Negative  Positive !  6/16/22 11:45   Scleroderma SCL- <20 UNITS 4  10/2/19 09:52   Complement (C-3) 50 - 180 mg/dL 122  6/16/22 11:45   Complement (C-4) 11 - 44 mg/dL 7 (L)  6/16/22 11:45   IgG 650 - 1600 mg/dL 3311 (H)  9/6/22 14:23   IgM 50 - 300 mg/dL 295  9/6/22 14:23   IgA 40 - 350 mg/dL 364 (H)  9/6/22 14:23   !: Data is abnormal  (H): Data is abnormally high  (L): Data is abnormally low       Latest Reference Range & Units Most Recent   Anti-Dilcia-1 Antibody <20 Units <20  12/1/17 16:30   Anti-PM/Scl Ab <20 Units <20  12/1/17 16:30   Anti-SS-A 52 kD Ab, IgG <20 Units <20  12/1/17 16:30   Anti-U1-RNP  Ab <20 Units 143 (H)  12/1/17 16:30   EJ Negative  Negative  12/1/17 16:30   Fibrillarin (U3 RNP) Negative  Negative  12/1/17 16:30   Ku Negative  Negative  12/1/17 16:30   MDA-5 (P140) (CADM-140) <20 Units <20  12/1/17 16:30   MI-2 Negative  Negative  12/1/17 16:30   NXP-2 (P140) <20 Units <20  12/1/17 16:30   OJ Negative  Negative  12/1/17 16:30   PL-12 Negative  Negative  12/1/17 16:30   PL-7 Negative  Negative  12/1/17 16:30   SRP Negative  Negative  12/1/17 16:30   TIF1 GAMMA (P155/140) <20 Units <20  12/1/17 16:30   U2 snRNP Negative  Moderate Positive !  12/1/17 16:30   (H): Data is abnormally high  !: Data is abnormal      Severe case of mixed connective tissue disease with scleroderma on chemotherapy-Cytoxan infusion.  Worsening rashes in her legs but stable rashes in her arms.  Reports mild reduction in the level  of inflammatory pain and rash.  Check LABS ORDERED in this encounter- Added to her next scheduled lab visit.   Continue cytoxan to complete six infusions.   Consult dermatology for worsening scalp rash.   Compromised immune system secondary to autoimmune disease and use of immunosuppressive drugs. Monitor carefully for infections. Advised to get immediate medical care if any infection. Also advised strict adherence to age appropriate vaccinations and cancer screenings with PCP.   CBC, CMP, UA with every cytoxan infusion.     # Follow up in about 8 weeks (around 2/6/2023).      Past Medical History:   Diagnosis Date    Abnormal Pap smear of cervix     repeat was normal    Lupus (systemic lupus erythematosus)        Past Surgical History:   Procedure Laterality Date    ROBOT-ASSISTED CHOLECYSTECTOMY USING DA CRISTOPHER XI N/A 12/11/2020    Procedure: XI ROBOTIC CHOLECYSTECTOMY;  Surgeon: Taco Zimmer MD;  Location: [facility] OR;  Service: General;  Laterality: N/A;    ROBOT-ASSISTED LAPAROSCOPIC LYSIS OF ADHESIONS USING DA CRISTOPHER XI  12/11/2020    Procedure: XI ROBOTIC LYSIS, ADHESIONS;  Surgeon: Taco Zimmer MD;  Location: [facility] OR;  Service: General;;    ROBOT-ASSISTED LAPAROSCOPIC SALPINGO-OOPHORECTOMY USING DA CRISTOPHER XI Left 12/11/2020    Procedure: XI ROBOTIC SALPINGO-OOPHORECTOMY;  Surgeon: SHERLEY Wakefield MD;  Location: [facility] OR;  Service: OB/GYN;  Laterality: Left;    TUBAL LIGATION  2005        Social History     Tobacco Use    Smoking status: Never    Smokeless tobacco: Never   Substance Use Topics    Alcohol use: No    Drug use: No       Family History   Problem Relation Age of Onset    Liver cancer Maternal Aunt     Breast cancer Maternal Aunt     Breast cancer Paternal Grandmother     Colon cancer Neg Hx     Ovarian cancer Neg Hx     Thrombosis Neg Hx        Review of patient's allergies indicates:   Allergen Reactions    Elavil [amitriptyline]        Medication List with Changes/Refills   Current  Medications    BUMETANIDE (BUMEX) 1 MG TABLET    Take 1 tablet (1 mg total) by mouth once daily.    CLOBETASOL (TEMOVATE) 0.05 % CREAM    Apply topically 2 (two) times daily.    CLOBETASOL (TEMOVATE) 0.05 % EXTERNAL SOLUTION    Apply topically 2 (two) times daily.    COLCHICINE (COLCRYS) 0.6 MG TABLET    Take 1 tablet (0.6 mg total) by mouth 2 (two) times daily.    GABAPENTIN (NEURONTIN) 300 MG CAPSULE    Take 1 capsule (300 mg total) by mouth 3 (three) times daily.    MOMETASONE 0.1% (ELOCON) 0.1 % CREAM    Apply topically once daily.    NITROFURANTOIN, MACROCRYSTAL-MONOHYDRATE, (MACROBID) 100 MG CAPSULE    Take 1 capsule (100 mg total) by mouth 2 (two) times daily.    ONDANSETRON (ZOFRAN-ODT) 4 MG TBDL    Take 1 tablet (4 mg total) by mouth every 8 (eight) hours as needed (nausea).    PREDNISONE (DELTASONE) 10 MG TABLET    Take 1 tablet (10 mg total) by mouth once daily.    TACROLIMUS (PROTOPIC) 0.1 % OINTMENT    Apply topically 2 (two) times daily.    TRAZODONE (DESYREL) 50 MG TABLET    Take 1 tablet (50 mg total) by mouth every evening.   Changed and/or Refilled Medications    Modified Medication Previous Medication    TRAMADOL (ULTRAM) 50 MG TABLET traMADoL (ULTRAM) 50 mg tablet       Take 1 tablet (50 mg total) by mouth every 12 (twelve) hours as needed for Pain.    Take 1 tablet (50 mg total) by mouth every 12 (twelve) hours as needed for Pain.       Disclaimer: This note was prepared using voice recognition system and is likely to have sound alike errors and is not proof read.  Please call me with any questions.

## 2022-12-13 RX ORDER — CLOBETASOL PROPIONATE 0.46 MG/ML
SOLUTION TOPICAL 2 TIMES DAILY
Qty: 50 ML | Refills: 1 | Status: SHIPPED | OUTPATIENT
Start: 2022-12-13

## 2022-12-13 RX ORDER — TACROLIMUS 1 MG/G
OINTMENT TOPICAL 2 TIMES DAILY
Qty: 100 G | Refills: 3 | Status: SHIPPED | OUTPATIENT
Start: 2022-12-13

## 2022-12-15 ENCOUNTER — PATIENT MESSAGE (OUTPATIENT)
Dept: DERMATOLOGY | Facility: CLINIC | Age: 37
End: 2022-12-15
Payer: COMMERCIAL

## 2022-12-15 ENCOUNTER — PATIENT MESSAGE (OUTPATIENT)
Dept: RHEUMATOLOGY | Facility: CLINIC | Age: 37
End: 2022-12-15
Payer: COMMERCIAL

## 2022-12-28 ENCOUNTER — LAB VISIT (OUTPATIENT)
Dept: LAB | Facility: HOSPITAL | Age: 37
End: 2022-12-28
Attending: INTERNAL MEDICINE
Payer: COMMERCIAL

## 2022-12-28 DIAGNOSIS — M06.09 RHEUMATOID ARTHRITIS OF MULTIPLE SITES WITH NEGATIVE RHEUMATOID FACTOR: ICD-10-CM

## 2022-12-28 DIAGNOSIS — L94.0 MORPHEA: ICD-10-CM

## 2022-12-28 DIAGNOSIS — M32.9 SYSTEMIC LUPUS ERYTHEMATOSUS ARTHRITIS: ICD-10-CM

## 2022-12-28 DIAGNOSIS — M35.1 MCTD (MIXED CONNECTIVE TISSUE DISEASE): ICD-10-CM

## 2022-12-28 DIAGNOSIS — Z51.81 ENCOUNTER FOR MEDICATION MONITORING: ICD-10-CM

## 2022-12-28 DIAGNOSIS — Z79.899 LONG-TERM CURRENT USE OF HIGH RISK MEDICATION OTHER THAN ANTICOAGULANT: ICD-10-CM

## 2022-12-28 DIAGNOSIS — M34.9 DIFFUSE SCLERODERMA: ICD-10-CM

## 2022-12-28 DIAGNOSIS — R77.8 LOW SERUM COMPLEMENT C4: ICD-10-CM

## 2022-12-28 DIAGNOSIS — D84.9 IMMUNOSUPPRESSED STATUS: ICD-10-CM

## 2022-12-28 LAB
ALBUMIN SERPL BCP-MCNC: 3.4 G/DL (ref 3.5–5.2)
ALP SERPL-CCNC: 55 U/L (ref 55–135)
ALT SERPL W/O P-5'-P-CCNC: 18 U/L (ref 10–44)
ANION GAP SERPL CALC-SCNC: 8 MMOL/L (ref 8–16)
AST SERPL-CCNC: 29 U/L (ref 10–40)
BASOPHILS # BLD AUTO: 0.04 K/UL (ref 0–0.2)
BASOPHILS NFR BLD: 0.6 % (ref 0–1.9)
BILIRUB SERPL-MCNC: 0.2 MG/DL (ref 0.1–1)
BUN SERPL-MCNC: 6 MG/DL (ref 6–20)
C3 SERPL-MCNC: 138 MG/DL (ref 50–180)
C4 SERPL-MCNC: 22 MG/DL (ref 11–44)
CALCIUM SERPL-MCNC: 9.6 MG/DL (ref 8.7–10.5)
CHLORIDE SERPL-SCNC: 104 MMOL/L (ref 95–110)
CK SERPL-CCNC: 177 U/L (ref 20–180)
CO2 SERPL-SCNC: 26 MMOL/L (ref 23–29)
CREAT SERPL-MCNC: 0.7 MG/DL (ref 0.5–1.4)
CRP SERPL-MCNC: 12.4 MG/L (ref 0–8.2)
DIFFERENTIAL METHOD: ABNORMAL
EOSINOPHIL # BLD AUTO: 1.2 K/UL (ref 0–0.5)
EOSINOPHIL NFR BLD: 19.4 % (ref 0–8)
ERYTHROCYTE [DISTWIDTH] IN BLOOD BY AUTOMATED COUNT: 13.2 % (ref 11.5–14.5)
ERYTHROCYTE [SEDIMENTATION RATE] IN BLOOD BY WESTERGREN METHOD: 84 MM/HR (ref 0–20)
EST. GFR  (NO RACE VARIABLE): >60 ML/MIN/1.73 M^2
GLUCOSE SERPL-MCNC: 79 MG/DL (ref 70–110)
HCT VFR BLD AUTO: 34.7 % (ref 37–48.5)
HGB BLD-MCNC: 11 G/DL (ref 12–16)
IGA SERPL-MCNC: 353 MG/DL (ref 40–350)
IGG SERPL-MCNC: 3990 MG/DL (ref 650–1600)
IGM SERPL-MCNC: 326 MG/DL (ref 50–300)
IMM GRANULOCYTES # BLD AUTO: 0.01 K/UL (ref 0–0.04)
IMM GRANULOCYTES NFR BLD AUTO: 0.2 % (ref 0–0.5)
LYMPHOCYTES # BLD AUTO: 1.1 K/UL (ref 1–4.8)
LYMPHOCYTES NFR BLD: 16.6 % (ref 18–48)
MCH RBC QN AUTO: 29 PG (ref 27–31)
MCHC RBC AUTO-ENTMCNC: 31.7 G/DL (ref 32–36)
MCV RBC AUTO: 92 FL (ref 82–98)
MONOCYTES # BLD AUTO: 0.3 K/UL (ref 0.3–1)
MONOCYTES NFR BLD: 4.9 % (ref 4–15)
NEUTROPHILS # BLD AUTO: 3.7 K/UL (ref 1.8–7.7)
NEUTROPHILS NFR BLD: 58.5 % (ref 38–73)
NRBC BLD-RTO: 0 /100 WBC
PLATELET # BLD AUTO: 600 K/UL (ref 150–450)
PMV BLD AUTO: 8.6 FL (ref 9.2–12.9)
POTASSIUM SERPL-SCNC: 3.6 MMOL/L (ref 3.5–5.1)
PROT SERPL-MCNC: 9.8 G/DL (ref 6–8.4)
RBC # BLD AUTO: 3.79 M/UL (ref 4–5.4)
SODIUM SERPL-SCNC: 138 MMOL/L (ref 136–145)
WBC # BLD AUTO: 6.39 K/UL (ref 3.9–12.7)

## 2022-12-28 PROCEDURE — 84165 PROTEIN E-PHORESIS SERUM: CPT | Mod: 26,,, | Performed by: PATHOLOGY

## 2022-12-28 PROCEDURE — 86334 PATHOLOGIST INTERPRETATION IFE: ICD-10-PCS | Mod: 26,,, | Performed by: PATHOLOGY

## 2022-12-28 PROCEDURE — 86160 COMPLEMENT ANTIGEN: CPT | Mod: 59 | Performed by: INTERNAL MEDICINE

## 2022-12-28 PROCEDURE — 80053 COMPREHEN METABOLIC PANEL: CPT | Performed by: PHYSICIAN ASSISTANT

## 2022-12-28 PROCEDURE — 86160 COMPLEMENT ANTIGEN: CPT | Performed by: INTERNAL MEDICINE

## 2022-12-28 PROCEDURE — 36415 COLL VENOUS BLD VENIPUNCTURE: CPT | Mod: PO | Performed by: INTERNAL MEDICINE

## 2022-12-28 PROCEDURE — 84165 PATHOLOGIST INTERPRETATION SPE: ICD-10-PCS | Mod: 26,,, | Performed by: PATHOLOGY

## 2022-12-28 PROCEDURE — 84165 PROTEIN E-PHORESIS SERUM: CPT | Performed by: INTERNAL MEDICINE

## 2022-12-28 PROCEDURE — 86140 C-REACTIVE PROTEIN: CPT | Performed by: PHYSICIAN ASSISTANT

## 2022-12-28 PROCEDURE — 85651 RBC SED RATE NONAUTOMATED: CPT | Mod: PO | Performed by: PHYSICIAN ASSISTANT

## 2022-12-28 PROCEDURE — 82550 ASSAY OF CK (CPK): CPT | Performed by: INTERNAL MEDICINE

## 2022-12-28 PROCEDURE — 86334 IMMUNOFIX E-PHORESIS SERUM: CPT | Mod: 26,,, | Performed by: PATHOLOGY

## 2022-12-28 PROCEDURE — 86334 IMMUNOFIX E-PHORESIS SERUM: CPT | Performed by: INTERNAL MEDICINE

## 2022-12-28 PROCEDURE — 85025 COMPLETE CBC W/AUTO DIFF WBC: CPT | Mod: PO | Performed by: PHYSICIAN ASSISTANT

## 2022-12-28 PROCEDURE — 82595 ASSAY OF CRYOGLOBULIN: CPT | Performed by: INTERNAL MEDICINE

## 2022-12-28 PROCEDURE — 82784 ASSAY IGA/IGD/IGG/IGM EACH: CPT | Performed by: INTERNAL MEDICINE

## 2022-12-29 ENCOUNTER — INFUSION (OUTPATIENT)
Dept: INFUSION THERAPY | Facility: HOSPITAL | Age: 37
End: 2022-12-29
Attending: PHYSICIAN ASSISTANT
Payer: COMMERCIAL

## 2022-12-29 ENCOUNTER — TELEPHONE (OUTPATIENT)
Dept: RHEUMATOLOGY | Facility: CLINIC | Age: 37
End: 2022-12-29
Payer: COMMERCIAL

## 2022-12-29 ENCOUNTER — PATIENT MESSAGE (OUTPATIENT)
Dept: RHEUMATOLOGY | Facility: CLINIC | Age: 37
End: 2022-12-29
Payer: COMMERCIAL

## 2022-12-29 VITALS
HEART RATE: 84 BPM | SYSTOLIC BLOOD PRESSURE: 121 MMHG | OXYGEN SATURATION: 99 % | WEIGHT: 218.5 LBS | TEMPERATURE: 98 F | DIASTOLIC BLOOD PRESSURE: 81 MMHG | BODY MASS INDEX: 33.22 KG/M2 | RESPIRATION RATE: 18 BRPM

## 2022-12-29 DIAGNOSIS — M35.1 MCTD (MIXED CONNECTIVE TISSUE DISEASE): ICD-10-CM

## 2022-12-29 DIAGNOSIS — M06.09 RHEUMATOID ARTHRITIS OF MULTIPLE SITES WITH NEGATIVE RHEUMATOID FACTOR: Primary | ICD-10-CM

## 2022-12-29 LAB
ALBUMIN SERPL ELPH-MCNC: 3.58 G/DL (ref 3.35–5.55)
ALPHA1 GLOB SERPL ELPH-MCNC: 0.31 G/DL (ref 0.17–0.41)
ALPHA2 GLOB SERPL ELPH-MCNC: 0.68 G/DL (ref 0.43–0.99)
B-GLOBULIN SERPL ELPH-MCNC: 0.98 G/DL (ref 0.5–1.1)
GAMMA GLOB SERPL ELPH-MCNC: 3.64 G/DL (ref 0.67–1.58)
INTERPRETATION SERPL IFE-IMP: NORMAL
PATHOLOGIST INTERPRETATION IFE: NORMAL
PATHOLOGIST INTERPRETATION SPE: NORMAL
PROT SERPL-MCNC: 9.2 G/DL (ref 6–8.4)

## 2022-12-29 PROCEDURE — 96375 TX/PRO/DX INJ NEW DRUG ADDON: CPT

## 2022-12-29 PROCEDURE — 25000003 PHARM REV CODE 250: Performed by: INTERNAL MEDICINE

## 2022-12-29 PROCEDURE — 63600175 PHARM REV CODE 636 W HCPCS: Performed by: INTERNAL MEDICINE

## 2022-12-29 PROCEDURE — 96413 CHEMO IV INFUSION 1 HR: CPT

## 2022-12-29 RX ORDER — DIPHENHYDRAMINE HYDROCHLORIDE 50 MG/ML
25 INJECTION INTRAMUSCULAR; INTRAVENOUS
Status: CANCELLED
Start: 2023-01-26

## 2022-12-29 RX ORDER — ACETAMINOPHEN 325 MG/1
650 TABLET ORAL
Status: COMPLETED | OUTPATIENT
Start: 2022-12-29 | End: 2022-12-29

## 2022-12-29 RX ORDER — ONDANSETRON 2 MG/ML
4 INJECTION INTRAMUSCULAR; INTRAVENOUS
Status: CANCELLED
Start: 2023-01-26

## 2022-12-29 RX ORDER — HEPARIN 100 UNIT/ML
500 SYRINGE INTRAVENOUS
Status: CANCELLED | OUTPATIENT
Start: 2023-01-26

## 2022-12-29 RX ORDER — DIPHENHYDRAMINE HYDROCHLORIDE 50 MG/ML
25 INJECTION INTRAMUSCULAR; INTRAVENOUS
Status: COMPLETED | OUTPATIENT
Start: 2022-12-29 | End: 2022-12-29

## 2022-12-29 RX ORDER — ACETAMINOPHEN 325 MG/1
650 TABLET ORAL EVERY 4 HOURS PRN
Status: CANCELLED | OUTPATIENT
Start: 2023-01-26

## 2022-12-29 RX ORDER — METHYLPREDNISOLONE SOD SUCC 125 MG
100 VIAL (EA) INJECTION
Status: CANCELLED
Start: 2023-01-26

## 2022-12-29 RX ORDER — METHYLPREDNISOLONE SOD SUCC 125 MG
100 VIAL (EA) INJECTION
Status: COMPLETED | OUTPATIENT
Start: 2022-12-29 | End: 2022-12-29

## 2022-12-29 RX ORDER — ONDANSETRON 2 MG/ML
4 INJECTION INTRAMUSCULAR; INTRAVENOUS
Status: COMPLETED | OUTPATIENT
Start: 2022-12-29 | End: 2022-12-29

## 2022-12-29 RX ORDER — SODIUM CHLORIDE 0.9 % (FLUSH) 0.9 %
10 SYRINGE (ML) INJECTION
Status: CANCELLED | OUTPATIENT
Start: 2023-01-26

## 2022-12-29 RX ORDER — ACETAMINOPHEN 325 MG/1
650 TABLET ORAL
Status: CANCELLED | OUTPATIENT
Start: 2023-01-26

## 2022-12-29 RX ADMIN — ACETAMINOPHEN 650 MG: 325 TABLET ORAL at 09:12

## 2022-12-29 RX ADMIN — SODIUM CHLORIDE 500 ML: 0.9 INJECTION, SOLUTION INTRAVENOUS at 12:12

## 2022-12-29 RX ADMIN — ONDANSETRON 4 MG: 2 INJECTION INTRAMUSCULAR; INTRAVENOUS at 09:12

## 2022-12-29 RX ADMIN — SODIUM CHLORIDE 500 ML: 0.9 INJECTION, SOLUTION INTRAVENOUS at 09:12

## 2022-12-29 RX ADMIN — DIPHENHYDRAMINE HYDROCHLORIDE 25 MG: 50 INJECTION, SOLUTION INTRAMUSCULAR; INTRAVENOUS at 09:12

## 2022-12-29 RX ADMIN — METHYLPREDNISOLONE SODIUM SUCCINATE 100 MG: 125 INJECTION, POWDER, FOR SOLUTION INTRAMUSCULAR; INTRAVENOUS at 09:12

## 2022-12-29 RX ADMIN — CYCLOPHOSPHAMIDE 1100 MG: 200 INJECTION, SOLUTION INTRAVENOUS at 11:12

## 2022-12-29 NOTE — TELEPHONE ENCOUNTER
----- Message from Myles Mccauley MD sent at 12/29/2022 11:47 AM CST -----  Recommend f/u with primary care physician if symptoms of active urinary tract infection.

## 2022-12-29 NOTE — PLAN OF CARE
Discussed plan of care with pt. Addressed any and ongoing concerns. Pt denies    Problem: Adult Inpatient Plan of Care  Goal: Plan of Care Review  Outcome: Ongoing, Progressing  Flowsheets (Taken 12/29/2022 1306)  Plan of Care Reviewed With: patient  Goal: Patient-Specific Goal (Individualized)  Outcome: Ongoing, Progressing  Goal: Absence of Hospital-Acquired Illness or Injury  Outcome: Ongoing, Progressing  Intervention: Identify and Manage Fall Risk  Flowsheets (Taken 12/29/2022 1306)  Safety Promotion/Fall Prevention:   in recliner, wheels locked   room near unit station  Intervention: Prevent Infection  Flowsheets (Taken 12/29/2022 1306)  Infection Prevention:   hand hygiene promoted   equipment surfaces disinfected  Goal: Optimal Comfort and Wellbeing  Outcome: Ongoing, Progressing  Intervention: Monitor Pain and Promote Comfort  Flowsheets (Taken 12/29/2022 1306)  Pain Management Interventions:   quiet environment facilitated   warm blanket provided  Intervention: Provide Person-Centered Care  Flowsheets (Taken 12/29/2022 1306)  Trust Relationship/Rapport:   reassurance provided   care explained   choices provided   thoughts/feelings acknowledged   empathic listening provided   emotional support provided   questions answered   questions encouraged

## 2023-01-10 LAB — CRYOGLOB SER QL: NORMAL

## 2023-01-18 ENCOUNTER — LAB VISIT (OUTPATIENT)
Dept: LAB | Facility: HOSPITAL | Age: 38
End: 2023-01-18
Attending: PHYSICIAN ASSISTANT
Payer: COMMERCIAL

## 2023-01-18 DIAGNOSIS — R77.8 LOW SERUM COMPLEMENT C4: ICD-10-CM

## 2023-01-18 DIAGNOSIS — D84.9 IMMUNOSUPPRESSED STATUS: ICD-10-CM

## 2023-01-18 LAB
BASOPHILS # BLD AUTO: 0.04 K/UL (ref 0–0.2)
BASOPHILS NFR BLD: 0.7 % (ref 0–1.9)
DIFFERENTIAL METHOD: ABNORMAL
EOSINOPHIL # BLD AUTO: 1.4 K/UL (ref 0–0.5)
EOSINOPHIL NFR BLD: 24.7 % (ref 0–8)
ERYTHROCYTE [DISTWIDTH] IN BLOOD BY AUTOMATED COUNT: 13.2 % (ref 11.5–14.5)
ERYTHROCYTE [SEDIMENTATION RATE] IN BLOOD BY WESTERGREN METHOD: 95 MM/HR (ref 0–20)
HCT VFR BLD AUTO: 32.3 % (ref 37–48.5)
HGB BLD-MCNC: 10.4 G/DL (ref 12–16)
IMM GRANULOCYTES # BLD AUTO: 0.02 K/UL (ref 0–0.04)
IMM GRANULOCYTES NFR BLD AUTO: 0.4 % (ref 0–0.5)
LYMPHOCYTES # BLD AUTO: 0.9 K/UL (ref 1–4.8)
LYMPHOCYTES NFR BLD: 15.9 % (ref 18–48)
MCH RBC QN AUTO: 28.7 PG (ref 27–31)
MCHC RBC AUTO-ENTMCNC: 32.2 G/DL (ref 32–36)
MCV RBC AUTO: 89 FL (ref 82–98)
MONOCYTES # BLD AUTO: 0.4 K/UL (ref 0.3–1)
MONOCYTES NFR BLD: 6.8 % (ref 4–15)
NEUTROPHILS # BLD AUTO: 2.9 K/UL (ref 1.8–7.7)
NEUTROPHILS NFR BLD: 51.9 % (ref 38–73)
NRBC BLD-RTO: 0 /100 WBC
PLATELET # BLD AUTO: 454 K/UL (ref 150–450)
PMV BLD AUTO: 8.5 FL (ref 9.2–12.9)
RBC # BLD AUTO: 3.63 M/UL (ref 4–5.4)
WBC # BLD AUTO: 5.58 K/UL (ref 3.9–12.7)

## 2023-01-18 PROCEDURE — 85025 COMPLETE CBC W/AUTO DIFF WBC: CPT | Mod: PO | Performed by: PHYSICIAN ASSISTANT

## 2023-01-18 PROCEDURE — 85651 RBC SED RATE NONAUTOMATED: CPT | Mod: PO | Performed by: PHYSICIAN ASSISTANT

## 2023-01-18 PROCEDURE — 36415 COLL VENOUS BLD VENIPUNCTURE: CPT | Mod: PO | Performed by: PHYSICIAN ASSISTANT

## 2023-01-18 PROCEDURE — 80053 COMPREHEN METABOLIC PANEL: CPT | Performed by: PHYSICIAN ASSISTANT

## 2023-01-18 PROCEDURE — 82595 ASSAY OF CRYOGLOBULIN: CPT | Performed by: INTERNAL MEDICINE

## 2023-01-18 PROCEDURE — 86140 C-REACTIVE PROTEIN: CPT | Performed by: PHYSICIAN ASSISTANT

## 2023-01-19 LAB
ALBUMIN SERPL BCP-MCNC: 3.6 G/DL (ref 3.5–5.2)
ALP SERPL-CCNC: 58 U/L (ref 55–135)
ALT SERPL W/O P-5'-P-CCNC: 18 U/L (ref 10–44)
ANION GAP SERPL CALC-SCNC: 6 MMOL/L (ref 8–16)
AST SERPL-CCNC: 30 U/L (ref 10–40)
BILIRUB SERPL-MCNC: 0.2 MG/DL (ref 0.1–1)
BUN SERPL-MCNC: 9 MG/DL (ref 6–20)
CALCIUM SERPL-MCNC: 9.9 MG/DL (ref 8.7–10.5)
CHLORIDE SERPL-SCNC: 103 MMOL/L (ref 95–110)
CO2 SERPL-SCNC: 25 MMOL/L (ref 23–29)
CREAT SERPL-MCNC: 0.6 MG/DL (ref 0.5–1.4)
CRP SERPL-MCNC: 11.8 MG/L (ref 0–8.2)
EST. GFR  (NO RACE VARIABLE): >60 ML/MIN/1.73 M^2
GLUCOSE SERPL-MCNC: 83 MG/DL (ref 70–110)
POTASSIUM SERPL-SCNC: 4 MMOL/L (ref 3.5–5.1)
PROT SERPL-MCNC: 9.7 G/DL (ref 6–8.4)
SODIUM SERPL-SCNC: 134 MMOL/L (ref 136–145)

## 2023-01-20 ENCOUNTER — PATIENT MESSAGE (OUTPATIENT)
Dept: RHEUMATOLOGY | Facility: CLINIC | Age: 38
End: 2023-01-20
Payer: COMMERCIAL

## 2023-01-25 ENCOUNTER — LAB VISIT (OUTPATIENT)
Dept: LAB | Facility: HOSPITAL | Age: 38
End: 2023-01-25
Attending: INTERNAL MEDICINE
Payer: COMMERCIAL

## 2023-01-25 ENCOUNTER — PATIENT MESSAGE (OUTPATIENT)
Dept: RHEUMATOLOGY | Facility: CLINIC | Age: 38
End: 2023-01-25
Payer: COMMERCIAL

## 2023-01-25 DIAGNOSIS — M32.9 SYSTEMIC LUPUS ERYTHEMATOSUS ARTHRITIS: ICD-10-CM

## 2023-01-25 DIAGNOSIS — Z51.81 ENCOUNTER FOR MEDICATION MONITORING: ICD-10-CM

## 2023-01-25 DIAGNOSIS — M06.09 RHEUMATOID ARTHRITIS OF MULTIPLE SITES WITH NEGATIVE RHEUMATOID FACTOR: ICD-10-CM

## 2023-01-25 DIAGNOSIS — Z79.899 LONG-TERM CURRENT USE OF HIGH RISK MEDICATION OTHER THAN ANTICOAGULANT: ICD-10-CM

## 2023-01-25 LAB
BASOPHILS # BLD AUTO: 0.04 K/UL (ref 0–0.2)
BASOPHILS NFR BLD: 0.7 % (ref 0–1.9)
DIFFERENTIAL METHOD: ABNORMAL
EOSINOPHIL # BLD AUTO: 1.5 K/UL (ref 0–0.5)
EOSINOPHIL NFR BLD: 26 % (ref 0–8)
ERYTHROCYTE [DISTWIDTH] IN BLOOD BY AUTOMATED COUNT: 13.2 % (ref 11.5–14.5)
ERYTHROCYTE [SEDIMENTATION RATE] IN BLOOD BY WESTERGREN METHOD: 70 MM/HR (ref 0–20)
HCT VFR BLD AUTO: 30.6 % (ref 37–48.5)
HGB BLD-MCNC: 9.9 G/DL (ref 12–16)
IMM GRANULOCYTES # BLD AUTO: 0.01 K/UL (ref 0–0.04)
IMM GRANULOCYTES NFR BLD AUTO: 0.2 % (ref 0–0.5)
LYMPHOCYTES # BLD AUTO: 1.1 K/UL (ref 1–4.8)
LYMPHOCYTES NFR BLD: 18.9 % (ref 18–48)
MCH RBC QN AUTO: 28.9 PG (ref 27–31)
MCHC RBC AUTO-ENTMCNC: 32.4 G/DL (ref 32–36)
MCV RBC AUTO: 90 FL (ref 82–98)
MONOCYTES # BLD AUTO: 0.4 K/UL (ref 0.3–1)
MONOCYTES NFR BLD: 7.8 % (ref 4–15)
NEUTROPHILS # BLD AUTO: 2.6 K/UL (ref 1.8–7.7)
NEUTROPHILS NFR BLD: 46.6 % (ref 38–73)
NRBC BLD-RTO: 0 /100 WBC
PLATELET # BLD AUTO: 416 K/UL (ref 150–450)
PMV BLD AUTO: 8.3 FL (ref 9.2–12.9)
RBC # BLD AUTO: 3.42 M/UL (ref 4–5.4)
WBC # BLD AUTO: 5.62 K/UL (ref 3.9–12.7)

## 2023-01-25 PROCEDURE — 85651 RBC SED RATE NONAUTOMATED: CPT | Mod: PO | Performed by: INTERNAL MEDICINE

## 2023-01-25 PROCEDURE — 36415 COLL VENOUS BLD VENIPUNCTURE: CPT | Mod: PO | Performed by: INTERNAL MEDICINE

## 2023-01-25 PROCEDURE — 86140 C-REACTIVE PROTEIN: CPT | Performed by: INTERNAL MEDICINE

## 2023-01-25 PROCEDURE — 85025 COMPLETE CBC W/AUTO DIFF WBC: CPT | Mod: PO | Performed by: INTERNAL MEDICINE

## 2023-01-25 PROCEDURE — 80053 COMPREHEN METABOLIC PANEL: CPT | Performed by: INTERNAL MEDICINE

## 2023-01-26 ENCOUNTER — INFUSION (OUTPATIENT)
Dept: INFUSION THERAPY | Facility: HOSPITAL | Age: 38
End: 2023-01-26
Attending: PHYSICIAN ASSISTANT
Payer: COMMERCIAL

## 2023-01-26 VITALS
HEART RATE: 83 BPM | DIASTOLIC BLOOD PRESSURE: 63 MMHG | SYSTOLIC BLOOD PRESSURE: 101 MMHG | TEMPERATURE: 98 F | HEIGHT: 68 IN | RESPIRATION RATE: 16 BRPM | WEIGHT: 218.69 LBS | BODY MASS INDEX: 33.15 KG/M2

## 2023-01-26 DIAGNOSIS — M06.09 RHEUMATOID ARTHRITIS OF MULTIPLE SITES WITH NEGATIVE RHEUMATOID FACTOR: Primary | ICD-10-CM

## 2023-01-26 DIAGNOSIS — M35.1 MCTD (MIXED CONNECTIVE TISSUE DISEASE): ICD-10-CM

## 2023-01-26 LAB
ALBUMIN SERPL BCP-MCNC: 3.3 G/DL (ref 3.5–5.2)
ALP SERPL-CCNC: 54 U/L (ref 55–135)
ALT SERPL W/O P-5'-P-CCNC: 17 U/L (ref 10–44)
ANION GAP SERPL CALC-SCNC: 7 MMOL/L (ref 8–16)
AST SERPL-CCNC: 27 U/L (ref 10–40)
BILIRUB SERPL-MCNC: 0.2 MG/DL (ref 0.1–1)
BUN SERPL-MCNC: 7 MG/DL (ref 6–20)
CALCIUM SERPL-MCNC: 9.2 MG/DL (ref 8.7–10.5)
CHLORIDE SERPL-SCNC: 104 MMOL/L (ref 95–110)
CO2 SERPL-SCNC: 24 MMOL/L (ref 23–29)
CREAT SERPL-MCNC: 0.6 MG/DL (ref 0.5–1.4)
CRP SERPL-MCNC: 18.9 MG/L (ref 0–8.2)
EST. GFR  (NO RACE VARIABLE): >60 ML/MIN/1.73 M^2
GLUCOSE SERPL-MCNC: 82 MG/DL (ref 70–110)
POTASSIUM SERPL-SCNC: 3.9 MMOL/L (ref 3.5–5.1)
PROT SERPL-MCNC: 9 G/DL (ref 6–8.4)
SODIUM SERPL-SCNC: 135 MMOL/L (ref 136–145)

## 2023-01-26 PROCEDURE — 63600175 PHARM REV CODE 636 W HCPCS: Mod: JG | Performed by: INTERNAL MEDICINE

## 2023-01-26 PROCEDURE — 96360 HYDRATION IV INFUSION INIT: CPT

## 2023-01-26 PROCEDURE — 96375 TX/PRO/DX INJ NEW DRUG ADDON: CPT

## 2023-01-26 PROCEDURE — 96413 CHEMO IV INFUSION 1 HR: CPT

## 2023-01-26 PROCEDURE — 25000003 PHARM REV CODE 250: Performed by: INTERNAL MEDICINE

## 2023-01-26 PROCEDURE — 96361 HYDRATE IV INFUSION ADD-ON: CPT

## 2023-01-26 RX ORDER — SODIUM CHLORIDE 0.9 % (FLUSH) 0.9 %
10 SYRINGE (ML) INJECTION
Status: CANCELLED | OUTPATIENT
Start: 2023-02-23

## 2023-01-26 RX ORDER — ACETAMINOPHEN 325 MG/1
650 TABLET ORAL
Status: CANCELLED | OUTPATIENT
Start: 2023-02-23

## 2023-01-26 RX ORDER — HYDROCODONE BITARTRATE AND ACETAMINOPHEN 5; 325 MG/1; MG/1
1 TABLET ORAL EVERY 6 HOURS PRN
COMMUNITY
Start: 2022-12-13 | End: 2023-03-31

## 2023-01-26 RX ORDER — METHYLPREDNISOLONE SOD SUCC 125 MG
100 VIAL (EA) INJECTION
Status: COMPLETED | OUTPATIENT
Start: 2023-01-26 | End: 2023-01-26

## 2023-01-26 RX ORDER — DIPHENHYDRAMINE HYDROCHLORIDE 50 MG/ML
25 INJECTION INTRAMUSCULAR; INTRAVENOUS
Status: CANCELLED
Start: 2023-02-23

## 2023-01-26 RX ORDER — HEPARIN 100 UNIT/ML
500 SYRINGE INTRAVENOUS
Status: CANCELLED | OUTPATIENT
Start: 2023-02-23

## 2023-01-26 RX ORDER — DIPHENHYDRAMINE HYDROCHLORIDE 50 MG/ML
25 INJECTION INTRAMUSCULAR; INTRAVENOUS
Status: COMPLETED | OUTPATIENT
Start: 2023-01-26 | End: 2023-01-26

## 2023-01-26 RX ORDER — METHYLPREDNISOLONE SOD SUCC 125 MG
100 VIAL (EA) INJECTION
Status: CANCELLED
Start: 2023-02-23

## 2023-01-26 RX ORDER — SODIUM CHLORIDE 0.9 % (FLUSH) 0.9 %
10 SYRINGE (ML) INJECTION
Status: DISCONTINUED | OUTPATIENT
Start: 2023-01-26 | End: 2023-01-26 | Stop reason: HOSPADM

## 2023-01-26 RX ORDER — ONDANSETRON 2 MG/ML
4 INJECTION INTRAMUSCULAR; INTRAVENOUS
Status: COMPLETED | OUTPATIENT
Start: 2023-01-26 | End: 2023-01-26

## 2023-01-26 RX ORDER — ACETAMINOPHEN 325 MG/1
650 TABLET ORAL EVERY 4 HOURS PRN
Status: CANCELLED | OUTPATIENT
Start: 2023-02-23

## 2023-01-26 RX ORDER — ACETAMINOPHEN 325 MG/1
650 TABLET ORAL
Status: COMPLETED | OUTPATIENT
Start: 2023-01-26 | End: 2023-01-26

## 2023-01-26 RX ORDER — ONDANSETRON 2 MG/ML
4 INJECTION INTRAMUSCULAR; INTRAVENOUS
Status: CANCELLED
Start: 2023-02-23

## 2023-01-26 RX ADMIN — ONDANSETRON 4 MG: 2 INJECTION INTRAMUSCULAR; INTRAVENOUS at 10:01

## 2023-01-26 RX ADMIN — CYCLOPHOSPHAMIDE 1100 MG: 200 INJECTION, SOLUTION INTRAVENOUS at 11:01

## 2023-01-26 RX ADMIN — METHYLPREDNISOLONE SODIUM SUCCINATE 100 MG: 125 INJECTION, POWDER, FOR SOLUTION INTRAMUSCULAR; INTRAVENOUS at 10:01

## 2023-01-26 RX ADMIN — ACETAMINOPHEN 650 MG: 325 TABLET ORAL at 10:01

## 2023-01-26 RX ADMIN — DIPHENHYDRAMINE HYDROCHLORIDE 25 MG: 50 INJECTION INTRAMUSCULAR; INTRAVENOUS at 10:01

## 2023-01-26 RX ADMIN — SODIUM CHLORIDE 500 ML: 9 INJECTION, SOLUTION INTRAVENOUS at 09:01

## 2023-01-26 RX ADMIN — SODIUM CHLORIDE 500 ML: 9 INJECTION, SOLUTION INTRAVENOUS at 12:01

## 2023-01-26 NOTE — PLAN OF CARE
Plan of care reviewed with pt. All needs and concerns addressed.   Problem: Adult Inpatient Plan of Care  Goal: Plan of Care Review  Outcome: Ongoing, Progressing  Flowsheets (Taken 1/26/2023 1039)  Plan of Care Reviewed With: patient  Goal: Patient-Specific Goal (Individualized)  Outcome: Ongoing, Progressing  Flowsheets (Taken 1/26/2023 1039)  Anxieties, Fears or Concerns: Pt states her joints are hurting today  Individualized Care Needs: Feet elevated in recliner with multiple blankets, pillow. Pt gets nauseated if solumedrol is pushed to quickly.  Goal: Absence of Hospital-Acquired Illness or Injury  Outcome: Ongoing, Progressing  Intervention: Identify and Manage Fall Risk  Flowsheets (Taken 1/26/2023 1039)  Safety Promotion/Fall Prevention:   assistive device/personal item within reach   in recliner, wheels locked   room near unit station   instructed to call staff for mobility  Intervention: Prevent Infection  Flowsheets (Taken 1/26/2023 1039)  Infection Prevention:   cohorting utilized   environmental surveillance performed   equipment surfaces disinfected   hand hygiene promoted   visitors restricted/screened   single patient room provided   rest/sleep promoted   personal protective equipment utilized  Goal: Optimal Comfort and Wellbeing  Outcome: Ongoing, Progressing  Intervention: Provide Person-Centered Care  Flowsheets (Taken 1/26/2023 1039)  Trust Relationship/Rapport:   care explained   questions encouraged   choices provided   reassurance provided   thoughts/feelings acknowledged   empathic listening provided   questions answered     Problem: Infection  Goal: Absence of Infection Signs and Symptoms  Outcome: Ongoing, Progressing  Intervention: Prevent or Manage Infection  Flowsheets (Taken 1/26/2023 1039)  Infection Management: aseptic technique maintained

## 2023-01-26 NOTE — DISCHARGE INSTRUCTIONS
HOME CARE AFTER CHEMOTHERAPY   Meals   Many patients feel sick and lose their appetites during treatment. Eat small meals several times a day. Choose bland foods with little taste or smell if you have problems with nausea. Be sure to cook all food thoroughly. This kills bacteria and helps you avoid intestinal infection. Soft foods are easier to swallow and digest.   Activity   Exercise keeps you strong and keeps your heart and lungs active. Talk to your doctor about an appropriate exercise program for you.   Skin Care   To prevent a skin infection, bathe or shower once a day. Use a moisturizing soap and wash with warm water. Avoid very hot or cold water. Chemotherapy can make your skin dry . Apply moisturizing lotion to help relieve dry skin. Some drugs used in high doses can cause slight burns to appear (like sunburn). Ask for a special cream to help relieve the burn and protect your skin.   Prevent Mouth Sores   During chemotherapy, many people get mouth sores. Do the following to help prevent mouth sores or to ease discomfort.   Brush your teeth with a soft-bristle toothbrush after every meal.  Don't use dental floss if your platelet count is below 50,000. Your doctor or nurse will tell you if this is the case.  Use an oral swab or special soft toothbrush if your gums bleed during regular brushing.  Use mouthwash as directed. If you can't tolerate commercial mouthwash, use salt and baking soda to clean your mouth. Mix 1 teaspoon of salt and 1 teaspoon of baking soda into a glass of water. Swish and spit.  Call your doctor or return to this facility if you develop any of the following:   Sore throat   White patches in the mouth or throat   Fever of 100.4ºF (38ºC) or higher, or as directed by your healthcare provider  © 6332-1315 [employer], [address] [place] [address], [place] [address]. All rights reserved. This information is not intended as a substitute for professional medical care. Always follow your  healthcare professional's  WAYS TO HELP PREVENT INFECTION        WASH YOUR HANDS OFTEN DURING THE DAY, ESPECIALLY BEFORE YOU EAT, AFTER USING THE BATHROOM, AND AFTER TOUCHING ANIMALS    STAY AWAY FROM PEOPLE WHO HAVE ILLNESSES YOU CAN CATCH; SUCH AS COLDS, FLU, CHICKEN POX    TRY TO AVOID CROWDS    STAY AWAY FROM CHILDREN WHO RECENTLY HAVE RECEIVED LIVE VIRUS VACCINES    MAINTAIN GOOD MOUTH CARE    DO NOT SQUEEZE OR SCRATCH PIMPLES    CLEAN CUTS & SCRAPES RIGHT AWAY AND DAILY UNTIL HEALED WITH WARM WATER, SOAP & AN ANTISEPTIC    AVOID CONTACT WITH LITTER BOXES, BIRD CAGES, & FISH TANKS    AVOID STANDING WATER, IE., BIRD BATHS, FLOWER POTS/VASES, OR HUMIDIFIERS    WEAR GLOVES WHEN GARDENING OR CLEANING UP AFTER OTHERS, ESPECIALLY BABIES & SMALL CHILDREN    DO NOT EAT RAW FISH, SEAFOOD, MEAT, OR EGGS

## 2023-01-30 LAB — CRYOGLOB SER QL: NORMAL

## 2023-02-03 ENCOUNTER — PATIENT MESSAGE (OUTPATIENT)
Dept: RHEUMATOLOGY | Facility: CLINIC | Age: 38
End: 2023-02-03
Payer: COMMERCIAL

## 2023-02-08 ENCOUNTER — PATIENT MESSAGE (OUTPATIENT)
Dept: RHEUMATOLOGY | Facility: CLINIC | Age: 38
End: 2023-02-08
Payer: COMMERCIAL

## 2023-02-13 ENCOUNTER — PATIENT MESSAGE (OUTPATIENT)
Dept: RHEUMATOLOGY | Facility: CLINIC | Age: 38
End: 2023-02-13
Payer: COMMERCIAL

## 2023-02-13 DIAGNOSIS — T45.1X5A CHEMOTHERAPY-INDUCED NAUSEA: Primary | ICD-10-CM

## 2023-02-13 DIAGNOSIS — R11.0 CHEMOTHERAPY-INDUCED NAUSEA: Primary | ICD-10-CM

## 2023-02-13 RX ORDER — OLANZAPINE 10 MG/1
TABLET ORAL
Qty: 4 TABLET | Refills: 1 | Status: SHIPPED | OUTPATIENT
Start: 2023-02-13 | End: 2023-02-23 | Stop reason: SDUPTHER

## 2023-02-13 RX ORDER — DEXAMETHASONE 4 MG/1
8 TABLET ORAL DAILY
Qty: 8 TABLET | Refills: 1 | Status: SHIPPED | OUTPATIENT
Start: 2023-02-13 | End: 2023-02-17

## 2023-02-16 ENCOUNTER — PATIENT MESSAGE (OUTPATIENT)
Dept: RHEUMATOLOGY | Facility: CLINIC | Age: 38
End: 2023-02-16
Payer: COMMERCIAL

## 2023-02-22 ENCOUNTER — PATIENT MESSAGE (OUTPATIENT)
Dept: RHEUMATOLOGY | Facility: CLINIC | Age: 38
End: 2023-02-22
Payer: COMMERCIAL

## 2023-02-23 ENCOUNTER — OFFICE VISIT (OUTPATIENT)
Dept: RHEUMATOLOGY | Facility: CLINIC | Age: 38
End: 2023-02-23
Payer: COMMERCIAL

## 2023-02-23 ENCOUNTER — PATIENT MESSAGE (OUTPATIENT)
Dept: RHEUMATOLOGY | Facility: CLINIC | Age: 38
End: 2023-02-23

## 2023-02-23 ENCOUNTER — LAB VISIT (OUTPATIENT)
Dept: LAB | Facility: HOSPITAL | Age: 38
End: 2023-02-23
Payer: COMMERCIAL

## 2023-02-23 ENCOUNTER — INFUSION (OUTPATIENT)
Dept: INFUSION THERAPY | Facility: HOSPITAL | Age: 38
End: 2023-02-23
Attending: PHYSICIAN ASSISTANT
Payer: COMMERCIAL

## 2023-02-23 ENCOUNTER — LAB VISIT (OUTPATIENT)
Dept: LAB | Facility: HOSPITAL | Age: 38
End: 2023-02-23
Attending: PHYSICIAN ASSISTANT
Payer: COMMERCIAL

## 2023-02-23 VITALS
SYSTOLIC BLOOD PRESSURE: 112 MMHG | WEIGHT: 214.94 LBS | HEIGHT: 68 IN | HEART RATE: 83 BPM | DIASTOLIC BLOOD PRESSURE: 76 MMHG | BODY MASS INDEX: 32.58 KG/M2

## 2023-02-23 VITALS
DIASTOLIC BLOOD PRESSURE: 83 MMHG | TEMPERATURE: 98 F | HEART RATE: 90 BPM | BODY MASS INDEX: 32.58 KG/M2 | SYSTOLIC BLOOD PRESSURE: 129 MMHG | RESPIRATION RATE: 16 BRPM | WEIGHT: 214.94 LBS | HEIGHT: 68 IN | OXYGEN SATURATION: 98 %

## 2023-02-23 DIAGNOSIS — L94.0 MORPHEA: ICD-10-CM

## 2023-02-23 DIAGNOSIS — D84.9 IMMUNOSUPPRESSED STATUS: ICD-10-CM

## 2023-02-23 DIAGNOSIS — Z79.899 LONG-TERM CURRENT USE OF HIGH RISK MEDICATION OTHER THAN ANTICOAGULANT: ICD-10-CM

## 2023-02-23 DIAGNOSIS — T45.1X5A CHEMOTHERAPY-INDUCED NAUSEA: ICD-10-CM

## 2023-02-23 DIAGNOSIS — Z79.899 LONG-TERM CURRENT USE OF HIGH RISK MEDICATION OTHER THAN ANTICOAGULANT: Primary | ICD-10-CM

## 2023-02-23 DIAGNOSIS — M32.9 SYSTEMIC LUPUS ERYTHEMATOSUS ARTHRITIS: ICD-10-CM

## 2023-02-23 DIAGNOSIS — M06.09 RHEUMATOID ARTHRITIS OF MULTIPLE SITES WITH NEGATIVE RHEUMATOID FACTOR: ICD-10-CM

## 2023-02-23 DIAGNOSIS — R11.0 CHEMOTHERAPY-INDUCED NAUSEA: ICD-10-CM

## 2023-02-23 DIAGNOSIS — M34.9 DIFFUSE SCLERODERMA: Primary | ICD-10-CM

## 2023-02-23 DIAGNOSIS — M35.1 MCTD (MIXED CONNECTIVE TISSUE DISEASE): ICD-10-CM

## 2023-02-23 LAB
ALBUMIN SERPL BCP-MCNC: 3.3 G/DL (ref 3.5–5.2)
ALP SERPL-CCNC: 51 U/L (ref 55–135)
ALT SERPL W/O P-5'-P-CCNC: 19 U/L (ref 10–44)
ANION GAP SERPL CALC-SCNC: 7 MMOL/L (ref 8–16)
AST SERPL-CCNC: 26 U/L (ref 10–40)
B-HCG UR QL: NEGATIVE
BACTERIA #/AREA URNS HPF: ABNORMAL /HPF
BASOPHILS # BLD AUTO: 0.02 K/UL (ref 0–0.2)
BASOPHILS NFR BLD: 0.4 % (ref 0–1.9)
BILIRUB SERPL-MCNC: 0.3 MG/DL (ref 0.1–1)
BILIRUB UR QL STRIP: NEGATIVE
BUN SERPL-MCNC: 9 MG/DL (ref 6–20)
CALCIUM SERPL-MCNC: 9.3 MG/DL (ref 8.7–10.5)
CHLORIDE SERPL-SCNC: 103 MMOL/L (ref 95–110)
CLARITY UR: ABNORMAL
CO2 SERPL-SCNC: 27 MMOL/L (ref 23–29)
COLOR UR: YELLOW
CREAT SERPL-MCNC: 0.7 MG/DL (ref 0.5–1.4)
DIFFERENTIAL METHOD: ABNORMAL
EOSINOPHIL # BLD AUTO: 0.8 K/UL (ref 0–0.5)
EOSINOPHIL NFR BLD: 18.3 % (ref 0–8)
ERYTHROCYTE [DISTWIDTH] IN BLOOD BY AUTOMATED COUNT: 13 % (ref 11.5–14.5)
EST. GFR  (NO RACE VARIABLE): >60 ML/MIN/1.73 M^2
GLUCOSE SERPL-MCNC: 84 MG/DL (ref 70–110)
GLUCOSE UR QL STRIP: NEGATIVE
HCT VFR BLD AUTO: 30.4 % (ref 37–48.5)
HGB BLD-MCNC: 9.8 G/DL (ref 12–16)
HGB UR QL STRIP: NEGATIVE
IMM GRANULOCYTES # BLD AUTO: 0.02 K/UL (ref 0–0.04)
IMM GRANULOCYTES NFR BLD AUTO: 0.4 % (ref 0–0.5)
KETONES UR QL STRIP: NEGATIVE
LEUKOCYTE ESTERASE UR QL STRIP: ABNORMAL
LYMPHOCYTES # BLD AUTO: 1.1 K/UL (ref 1–4.8)
LYMPHOCYTES NFR BLD: 23.8 % (ref 18–48)
MCH RBC QN AUTO: 29.1 PG (ref 27–31)
MCHC RBC AUTO-ENTMCNC: 32.2 G/DL (ref 32–36)
MCV RBC AUTO: 90 FL (ref 82–98)
MICROSCOPIC COMMENT: ABNORMAL
MONOCYTES # BLD AUTO: 0.5 K/UL (ref 0.3–1)
MONOCYTES NFR BLD: 10.1 % (ref 4–15)
NEUTROPHILS # BLD AUTO: 2.1 K/UL (ref 1.8–7.7)
NEUTROPHILS NFR BLD: 47 % (ref 38–73)
NITRITE UR QL STRIP: NEGATIVE
NRBC BLD-RTO: 0 /100 WBC
PH UR STRIP: 6 [PH] (ref 5–8)
PLATELET # BLD AUTO: 480 K/UL (ref 150–450)
PMV BLD AUTO: 8 FL (ref 9.2–12.9)
POTASSIUM SERPL-SCNC: 3.8 MMOL/L (ref 3.5–5.1)
PROT SERPL-MCNC: 9.5 G/DL (ref 6–8.4)
PROT UR QL STRIP: NEGATIVE
RBC # BLD AUTO: 3.37 M/UL (ref 4–5.4)
SODIUM SERPL-SCNC: 137 MMOL/L (ref 136–145)
SP GR UR STRIP: 1.02 (ref 1–1.03)
SQUAMOUS #/AREA URNS HPF: 30 /HPF
URN SPEC COLLECT METH UR: ABNORMAL
WBC # BLD AUTO: 4.54 K/UL (ref 3.9–12.7)
WBC #/AREA URNS HPF: 10 /HPF (ref 0–5)

## 2023-02-23 PROCEDURE — 81025 URINE PREGNANCY TEST: CPT | Performed by: PHYSICIAN ASSISTANT

## 2023-02-23 PROCEDURE — 1159F MED LIST DOCD IN RCRD: CPT | Mod: CPTII,S$GLB,, | Performed by: INTERNAL MEDICINE

## 2023-02-23 PROCEDURE — 99999 PR PBB SHADOW E&M-EST. PATIENT-LVL III: CPT | Mod: PBBFAC,,, | Performed by: INTERNAL MEDICINE

## 2023-02-23 PROCEDURE — 99215 PR OFFICE/OUTPT VISIT, EST, LEVL V, 40-54 MIN: ICD-10-PCS | Mod: S$GLB,,, | Performed by: INTERNAL MEDICINE

## 2023-02-23 PROCEDURE — 85025 COMPLETE CBC W/AUTO DIFF WBC: CPT | Performed by: PHYSICIAN ASSISTANT

## 2023-02-23 PROCEDURE — 99215 OFFICE O/P EST HI 40 MIN: CPT | Mod: S$GLB,,, | Performed by: INTERNAL MEDICINE

## 2023-02-23 PROCEDURE — 3008F BODY MASS INDEX DOCD: CPT | Mod: CPTII,S$GLB,, | Performed by: INTERNAL MEDICINE

## 2023-02-23 PROCEDURE — 96375 TX/PRO/DX INJ NEW DRUG ADDON: CPT

## 2023-02-23 PROCEDURE — 3074F PR MOST RECENT SYSTOLIC BLOOD PRESSURE < 130 MM HG: ICD-10-PCS | Mod: CPTII,S$GLB,, | Performed by: INTERNAL MEDICINE

## 2023-02-23 PROCEDURE — 25000003 PHARM REV CODE 250: Performed by: INTERNAL MEDICINE

## 2023-02-23 PROCEDURE — 3078F DIAST BP <80 MM HG: CPT | Mod: CPTII,S$GLB,, | Performed by: INTERNAL MEDICINE

## 2023-02-23 PROCEDURE — 63600175 PHARM REV CODE 636 W HCPCS: Performed by: INTERNAL MEDICINE

## 2023-02-23 PROCEDURE — 3008F PR BODY MASS INDEX (BMI) DOCUMENTED: ICD-10-PCS | Mod: CPTII,S$GLB,, | Performed by: INTERNAL MEDICINE

## 2023-02-23 PROCEDURE — 96413 CHEMO IV INFUSION 1 HR: CPT

## 2023-02-23 PROCEDURE — 3074F SYST BP LT 130 MM HG: CPT | Mod: CPTII,S$GLB,, | Performed by: INTERNAL MEDICINE

## 2023-02-23 PROCEDURE — 96361 HYDRATE IV INFUSION ADD-ON: CPT

## 2023-02-23 PROCEDURE — 36415 COLL VENOUS BLD VENIPUNCTURE: CPT | Performed by: PHYSICIAN ASSISTANT

## 2023-02-23 PROCEDURE — 99999 PR PBB SHADOW E&M-EST. PATIENT-LVL III: ICD-10-PCS | Mod: PBBFAC,,, | Performed by: INTERNAL MEDICINE

## 2023-02-23 PROCEDURE — 96360 HYDRATION IV INFUSION INIT: CPT

## 2023-02-23 PROCEDURE — 1160F RVW MEDS BY RX/DR IN RCRD: CPT | Mod: CPTII,S$GLB,, | Performed by: INTERNAL MEDICINE

## 2023-02-23 PROCEDURE — 1159F PR MEDICATION LIST DOCUMENTED IN MEDICAL RECORD: ICD-10-PCS | Mod: CPTII,S$GLB,, | Performed by: INTERNAL MEDICINE

## 2023-02-23 PROCEDURE — 1160F PR REVIEW ALL MEDS BY PRESCRIBER/CLIN PHARMACIST DOCUMENTED: ICD-10-PCS | Mod: CPTII,S$GLB,, | Performed by: INTERNAL MEDICINE

## 2023-02-23 PROCEDURE — 81000 URINALYSIS NONAUTO W/SCOPE: CPT | Performed by: PHYSICIAN ASSISTANT

## 2023-02-23 PROCEDURE — 80053 COMPREHEN METABOLIC PANEL: CPT | Performed by: PHYSICIAN ASSISTANT

## 2023-02-23 PROCEDURE — 3078F PR MOST RECENT DIASTOLIC BLOOD PRESSURE < 80 MM HG: ICD-10-PCS | Mod: CPTII,S$GLB,, | Performed by: INTERNAL MEDICINE

## 2023-02-23 RX ORDER — METHYLPREDNISOLONE SOD SUCC 125 MG
100 VIAL (EA) INJECTION
Status: CANCELLED
Start: 2023-03-23

## 2023-02-23 RX ORDER — DIPHENHYDRAMINE HYDROCHLORIDE 50 MG/ML
25 INJECTION INTRAMUSCULAR; INTRAVENOUS
Status: COMPLETED | OUTPATIENT
Start: 2023-02-23 | End: 2023-02-23

## 2023-02-23 RX ORDER — SODIUM CHLORIDE 0.9 % (FLUSH) 0.9 %
10 SYRINGE (ML) INJECTION
Status: CANCELLED | OUTPATIENT
Start: 2023-03-23

## 2023-02-23 RX ORDER — ACETAMINOPHEN 325 MG/1
650 TABLET ORAL EVERY 4 HOURS PRN
Status: CANCELLED | OUTPATIENT
Start: 2023-03-23

## 2023-02-23 RX ORDER — ACETAMINOPHEN 325 MG/1
650 TABLET ORAL
Status: CANCELLED | OUTPATIENT
Start: 2023-03-23

## 2023-02-23 RX ORDER — ONDANSETRON 2 MG/ML
4 INJECTION INTRAMUSCULAR; INTRAVENOUS
Status: CANCELLED
Start: 2023-03-23

## 2023-02-23 RX ORDER — OLANZAPINE 10 MG/1
TABLET ORAL
Qty: 4 TABLET | Refills: 1 | OUTPATIENT
Start: 2023-02-23

## 2023-02-23 RX ORDER — DIPHENHYDRAMINE HYDROCHLORIDE 50 MG/ML
25 INJECTION INTRAMUSCULAR; INTRAVENOUS
Status: CANCELLED
Start: 2023-03-23

## 2023-02-23 RX ORDER — HEPARIN 100 UNIT/ML
500 SYRINGE INTRAVENOUS
Status: CANCELLED | OUTPATIENT
Start: 2023-03-23

## 2023-02-23 RX ORDER — METHYLPREDNISOLONE SOD SUCC 125 MG
100 VIAL (EA) INJECTION
Status: COMPLETED | OUTPATIENT
Start: 2023-02-23 | End: 2023-02-23

## 2023-02-23 RX ORDER — ONDANSETRON 2 MG/ML
4 INJECTION INTRAMUSCULAR; INTRAVENOUS
Status: COMPLETED | OUTPATIENT
Start: 2023-02-23 | End: 2023-02-23

## 2023-02-23 RX ORDER — ACETAMINOPHEN 325 MG/1
650 TABLET ORAL
Status: COMPLETED | OUTPATIENT
Start: 2023-02-23 | End: 2023-02-23

## 2023-02-23 RX ADMIN — SODIUM CHLORIDE 500 ML: 9 INJECTION, SOLUTION INTRAVENOUS at 09:02

## 2023-02-23 RX ADMIN — SODIUM CHLORIDE 500 ML: 9 INJECTION, SOLUTION INTRAVENOUS at 12:02

## 2023-02-23 RX ADMIN — METHYLPREDNISOLONE SODIUM SUCCINATE 100 MG: 125 INJECTION, POWDER, FOR SOLUTION INTRAMUSCULAR; INTRAVENOUS at 10:02

## 2023-02-23 RX ADMIN — CYCLOPHOSPHAMIDE 1080 MG: 200 INJECTION, SOLUTION INTRAVENOUS at 11:02

## 2023-02-23 RX ADMIN — ACETAMINOPHEN 650 MG: 325 TABLET ORAL at 10:02

## 2023-02-23 RX ADMIN — DIPHENHYDRAMINE HYDROCHLORIDE 25 MG: 50 INJECTION INTRAMUSCULAR; INTRAVENOUS at 10:02

## 2023-02-23 RX ADMIN — ONDANSETRON 4 MG: 2 INJECTION INTRAMUSCULAR; INTRAVENOUS at 10:02

## 2023-02-24 RX ORDER — DIPHENHYDRAMINE HYDROCHLORIDE 50 MG/ML
25 INJECTION INTRAMUSCULAR; INTRAVENOUS
Status: CANCELLED
Start: 2023-02-24

## 2023-02-24 RX ORDER — ONDANSETRON 2 MG/ML
4 INJECTION INTRAMUSCULAR; INTRAVENOUS
Status: CANCELLED
Start: 2023-02-24

## 2023-02-24 RX ORDER — ACETAMINOPHEN 325 MG/1
650 TABLET ORAL
Status: CANCELLED | OUTPATIENT
Start: 2023-02-24

## 2023-02-24 RX ORDER — METHYLPREDNISOLONE SOD SUCC 125 MG
100 VIAL (EA) INJECTION
Status: CANCELLED
Start: 2023-02-24

## 2023-02-24 NOTE — PROGRESS NOTES
RHEUMATOLOGY FOLLOW UP VISIT   Chief complaints, HPI, ROS, EXAM, Assessment & Plans:-  Stefanie Felicia Kimbrough a 37 y.o. pleasant female seen today for follow-up.  Severe case of mixed connective tissue disease with worsening scleroderma of bilateral upper and lower extremities with morphea.  On Cytoxan infusion after failing multiple medications including CellCept, methotrexate, Imuran, tacrolimus, Plaquenil and rituximab.  Reports mild improvement in rash of lower extremities.  Except right forearm no significant pain or swelling in other regions.  Severe nausea after last chemotherapy.  No blood in urine.  No infections.  Worsening seborrheic dermatitis over scalp.  On colchicine for pleuritis-lupus pleuritis.  Takes tramadol p.r.n. for pain.     1. Diffuse scleroderma    2. MCTD (mixed connective tissue disease)    3. Chemotherapy-induced nausea    4. Morphea    5. Immunosuppressed status    6. Long-term current use of high risk medication -methotrexate        Problem List Items Addressed This Visit       Long-term current use of high risk medication -methotrexate    Immunosuppressed status    MCTD (mixed connective tissue disease)    Diffuse scleroderma - Primary    Morphea     Other Visit Diagnoses       Chemotherapy-induced nausea               Latest Reference Range & Units Most Recent   MELVA Screen Negative <1:80  Positive !  6/16/22 11:45   MELVA HEP-2 Titer  Positive >=1:2560 Speckled  8/30/17 14:50   MELVA Titer 1  >=1:2560  6/16/22 11:45   MELVA PATTERN 1  Speckled  6/16/22 11:45   ds DNA Ab Negative 1:10  Negative 1:10  6/16/22 11:45   Anti-SSA Antibody 0.00 - 0.99 Ratio 0.07  6/16/22 11:45   Anti-SSA Interpretation Negative  Negative  6/16/22 11:45   Anti-SSB Antibody 0.00 - 0.99 Ratio 0.04  6/16/22 11:45   Anti-SSB Interpretation Negative  Negative  6/16/22 11:45   Anti Sm Antibody 0.00 - 0.99 Ratio 0.25  6/16/22 11:45   Anti-Sm Interpretation Negative  Negative  6/16/22 11:45   Anti Sm/RNP Antibody 0.00 -  0.99 Ratio 5.45 (H)  6/16/22 11:45   Anti-Sm/RNP Interpretation Negative  Positive !  6/16/22 11:45   Scleroderma SCL- <20 UNITS 4  10/2/19 09:52   Complement (C-3) 50 - 180 mg/dL 122  6/16/22 11:45   Complement (C-4) 11 - 44 mg/dL 7 (L)  6/16/22 11:45   IgG 650 - 1600 mg/dL 3311 (H)  9/6/22 14:23   IgM 50 - 300 mg/dL 295  9/6/22 14:23   IgA 40 - 350 mg/dL 364 (H)  9/6/22 14:23   !: Data is abnormal  (H): Data is abnormally high  (L): Data is abnormally low       Latest Reference Range & Units Most Recent   Anti-Dilcia-1 Antibody <20 Units <20  12/1/17 16:30   Anti-PM/Scl Ab <20 Units <20  12/1/17 16:30   Anti-SS-A 52 kD Ab, IgG <20 Units <20  12/1/17 16:30   Anti-U1-RNP  Ab <20 Units 143 (H)  12/1/17 16:30   EJ Negative  Negative  12/1/17 16:30   Fibrillarin (U3 RNP) Negative  Negative  12/1/17 16:30   Ku Negative  Negative  12/1/17 16:30   MDA-5 (P140) (CADM-140) <20 Units <20  12/1/17 16:30   MI-2 Negative  Negative  12/1/17 16:30   NXP-2 (P140) <20 Units <20  12/1/17 16:30   OJ Negative  Negative  12/1/17 16:30   PL-12 Negative  Negative  12/1/17 16:30   PL-7 Negative  Negative  12/1/17 16:30   SRP Negative  Negative  12/1/17 16:30   TIF1 GAMMA (P155/140) <20 Units <20  12/1/17 16:30   U2 snRNP Negative  Moderate Positive !  12/1/17 16:30   (H): Data is abnormally high  !: Data is abnormal     Latest Reference Range & Units 02/23/23 08:56   WBC 3.90 - 12.70 K/uL 4.54   RBC 4.00 - 5.40 M/uL 3.37 (L)   Hemoglobin 12.0 - 16.0 g/dL 9.8 (L)   Hematocrit 37.0 - 48.5 % 30.4 (L)   MCV 82 - 98 fL 90   MCH 27.0 - 31.0 pg 29.1   MCHC 32.0 - 36.0 g/dL 32.2   RDW 11.5 - 14.5 % 13.0   Platelets 150 - 450 K/uL 480 (H)   MPV 9.2 - 12.9 fL 8.0 (L)   Gran % 38.0 - 73.0 % 47.0   Lymph % 18.0 - 48.0 % 23.8   Mono % 4.0 - 15.0 % 10.1   Eosinophil % 0.0 - 8.0 % 18.3 (H)   Basophil % 0.0 - 1.9 % 0.4   Immature Granulocytes 0.0 - 0.5 % 0.4   Gran # (ANC) 1.8 - 7.7 K/uL 2.1   Lymph # 1.0 - 4.8 K/uL 1.1   Mono # 0.3 - 1.0 K/uL 0.5   Eos #  0.0 - 0.5 K/uL 0.8 (H)   Baso # 0.00 - 0.20 K/uL 0.02   Immature Grans (Abs) 0.00 - 0.04 K/uL 0.02   nRBC 0 /100 WBC 0   Differential Method  Automated   Sodium 136 - 145 mmol/L 137   Potassium 3.5 - 5.1 mmol/L 3.8   Chloride 95 - 110 mmol/L 103   CO2 23 - 29 mmol/L 27   Anion Gap 8 - 16 mmol/L 7 (L)   BUN 6 - 20 mg/dL 9   Creatinine 0.5 - 1.4 mg/dL 0.7   eGFR >60 mL/min/1.73 m^2 >60   Glucose 70 - 110 mg/dL 84   Calcium 8.7 - 10.5 mg/dL 9.3   Alkaline Phosphatase 55 - 135 U/L 51 (L)   PROTEIN TOTAL 6.0 - 8.4 g/dL 9.5 (H)   Albumin 3.5 - 5.2 g/dL 3.3 (L)   BILIRUBIN TOTAL 0.1 - 1.0 mg/dL 0.3   AST 10 - 40 U/L 26   ALT 10 - 44 U/L 19   (L): Data is abnormally low  (H): Data is abnormally high            Severe case of scleroderma involving bilateral upper and lower extremities with inflammatory skin thickening.  Still has active disease over right upper extremity.  Significant improvement noted over bilateral lower extremity and left upper extremity.  Improving Rodnan skin score.   Since she still has active disease, administered 2 more doses of Cytoxan before switching back to lower tier immunosuppressive therapy.    No hematuria.  No leukopenia.  No infections.  Worsening rashes in her legs but stable rashes in her arms.  Compromised immune system secondary to autoimmune disease and use of immunosuppressive drugs. Monitor carefully for infections. Advised to get immediate medical care if any infection. Also advised strict adherence to age appropriate vaccinations and cancer screenings with PCP.   CBC, CMP, ESR, CRP, UA , urine pregnancy test with every cytoxan infusion.       Past Medical History:   Diagnosis Date    Abnormal Pap smear of cervix     repeat was normal    Lupus (systemic lupus erythematosus)        Past Surgical History:   Procedure Laterality Date    ROBOT-ASSISTED CHOLECYSTECTOMY USING DA CRISTOPHER XI N/A 12/11/2020    Procedure: XI ROBOTIC CHOLECYSTECTOMY;  Surgeon: Taco Zimmer MD;  Location:  [facility] OR;  Service: General;  Laterality: N/A;    ROBOT-ASSISTED LAPAROSCOPIC LYSIS OF ADHESIONS USING DA CRISTOPHER XI  12/11/2020    Procedure: XI ROBOTIC LYSIS, ADHESIONS;  Surgeon: Taco Zimmer MD;  Location: [facility] OR;  Service: General;;    ROBOT-ASSISTED LAPAROSCOPIC SALPINGO-OOPHORECTOMY USING DA CRISTOPHER XI Left 12/11/2020    Procedure: XI ROBOTIC SALPINGO-OOPHORECTOMY;  Surgeon: SHERLEY Wakefield MD;  Location: [facility] OR;  Service: OB/GYN;  Laterality: Left;    TUBAL LIGATION  2005        Social History     Tobacco Use    Smoking status: Never    Smokeless tobacco: Never   Substance Use Topics    Alcohol use: No    Drug use: No       Family History   Problem Relation Age of Onset    Liver cancer Maternal Aunt     Breast cancer Maternal Aunt     Breast cancer Paternal Grandmother     Colon cancer Neg Hx     Ovarian cancer Neg Hx     Thrombosis Neg Hx        Review of patient's allergies indicates:   Allergen Reactions    Elavil [amitriptyline]        Medication List with Changes/Refills   Current Medications    BUMETANIDE (BUMEX) 1 MG TABLET    Take 1 tablet (1 mg total) by mouth once daily.    CLOBETASOL (TEMOVATE) 0.05 % CREAM    Apply topically 2 (two) times daily.    CLOBETASOL (TEMOVATE) 0.05 % EXTERNAL SOLUTION    Apply topically 2 (two) times daily.    COLCHICINE (COLCRYS) 0.6 MG TABLET    Take 1 tablet (0.6 mg total) by mouth 2 (two) times daily.    GABAPENTIN (NEURONTIN) 300 MG CAPSULE    Take 1 capsule (300 mg total) by mouth 3 (three) times daily.    HYDROCODONE-ACETAMINOPHEN (NORCO) 5-325 MG PER TABLET    Take 1 tablet by mouth every 6 (six) hours as needed.    MOMETASONE 0.1% (ELOCON) 0.1 % CREAM    Apply topically once daily.    NITROFURANTOIN, MACROCRYSTAL-MONOHYDRATE, (MACROBID) 100 MG CAPSULE    Take 1 capsule (100 mg total) by mouth 2 (two) times daily.    OLANZAPINE (ZYPREXA) 10 MG TABLET    One po qd x 4 days starting the day of your infusion for nausea and vomiting    ONDANSETRON (ZOFRAN-ODT)  4 MG TBDL    Take 1 tablet (4 mg total) by mouth every 8 (eight) hours as needed (nausea).    PREDNISONE (DELTASONE) 10 MG TABLET    Take 1 tablet (10 mg total) by mouth once daily.    TACROLIMUS (PROTOPIC) 0.1 % OINTMENT    Apply topically 2 (two) times daily.    TRAMADOL (ULTRAM) 50 MG TABLET    Take 1 tablet (50 mg total) by mouth every 12 (twelve) hours as needed for Pain.    TRAZODONE (DESYREL) 50 MG TABLET    Take 1 tablet (50 mg total) by mouth every evening.       Disclaimer: This note was prepared using voice recognition system and is likely to have sound alike errors and is not proof read.  Please call me with any questions.

## 2023-03-06 NOTE — PROGRESS NOTES
Patient presents for suture removal. The wound is well healed without signs of infection.  The sutures are removed. Wound care and activity instructions given. Return prn.     LR

## 2023-03-13 PROBLEM — Z09 CHEMOTHERAPY FOLLOW-UP EXAMINATION: Status: RESOLVED | Noted: 2022-12-12 | Resolved: 2023-03-13

## 2023-03-22 ENCOUNTER — LAB VISIT (OUTPATIENT)
Dept: LAB | Facility: HOSPITAL | Age: 38
End: 2023-03-22
Attending: PHYSICIAN ASSISTANT
Payer: COMMERCIAL

## 2023-03-22 DIAGNOSIS — Z79.899 LONG-TERM CURRENT USE OF HIGH RISK MEDICATION OTHER THAN ANTICOAGULANT: ICD-10-CM

## 2023-03-22 DIAGNOSIS — M32.9 SYSTEMIC LUPUS ERYTHEMATOSUS ARTHRITIS: ICD-10-CM

## 2023-03-22 LAB
B-HCG UR QL: NEGATIVE
BACTERIA #/AREA URNS HPF: ABNORMAL /HPF
BILIRUB UR QL STRIP: NEGATIVE
CLARITY UR: CLEAR
COLOR UR: YELLOW
GLUCOSE UR QL STRIP: NEGATIVE
HGB UR QL STRIP: NEGATIVE
KETONES UR QL STRIP: NEGATIVE
LEUKOCYTE ESTERASE UR QL STRIP: ABNORMAL
MICROSCOPIC COMMENT: ABNORMAL
NITRITE UR QL STRIP: NEGATIVE
PH UR STRIP: 6 [PH] (ref 5–8)
PROT UR QL STRIP: NEGATIVE
RBC #/AREA URNS HPF: 0 /HPF (ref 0–4)
SP GR UR STRIP: 1.01 (ref 1–1.03)
SQUAMOUS #/AREA URNS HPF: 6 /HPF
URN SPEC COLLECT METH UR: ABNORMAL
WBC #/AREA URNS HPF: 3 /HPF (ref 0–5)

## 2023-03-22 PROCEDURE — 81000 URINALYSIS NONAUTO W/SCOPE: CPT | Mod: PO | Performed by: PHYSICIAN ASSISTANT

## 2023-03-22 PROCEDURE — 81025 URINE PREGNANCY TEST: CPT | Mod: PO | Performed by: PHYSICIAN ASSISTANT

## 2023-03-23 ENCOUNTER — INFUSION (OUTPATIENT)
Dept: INFUSION THERAPY | Facility: HOSPITAL | Age: 38
End: 2023-03-23
Attending: INTERNAL MEDICINE
Payer: COMMERCIAL

## 2023-03-23 VITALS
HEART RATE: 91 BPM | TEMPERATURE: 98 F | BODY MASS INDEX: 32.31 KG/M2 | DIASTOLIC BLOOD PRESSURE: 72 MMHG | OXYGEN SATURATION: 98 % | RESPIRATION RATE: 18 BRPM | SYSTOLIC BLOOD PRESSURE: 117 MMHG | HEIGHT: 68 IN | WEIGHT: 213.19 LBS

## 2023-03-23 DIAGNOSIS — M35.1 MCTD (MIXED CONNECTIVE TISSUE DISEASE): ICD-10-CM

## 2023-03-23 DIAGNOSIS — M06.09 RHEUMATOID ARTHRITIS OF MULTIPLE SITES WITH NEGATIVE RHEUMATOID FACTOR: Primary | ICD-10-CM

## 2023-03-23 PROCEDURE — 96375 TX/PRO/DX INJ NEW DRUG ADDON: CPT

## 2023-03-23 PROCEDURE — 96413 CHEMO IV INFUSION 1 HR: CPT

## 2023-03-23 PROCEDURE — 63600175 PHARM REV CODE 636 W HCPCS: Performed by: INTERNAL MEDICINE

## 2023-03-23 PROCEDURE — 25000003 PHARM REV CODE 250: Performed by: INTERNAL MEDICINE

## 2023-03-23 RX ORDER — DIPHENHYDRAMINE HYDROCHLORIDE 50 MG/ML
25 INJECTION INTRAMUSCULAR; INTRAVENOUS
Status: COMPLETED | OUTPATIENT
Start: 2023-03-23 | End: 2023-03-23

## 2023-03-23 RX ORDER — ONDANSETRON 2 MG/ML
4 INJECTION INTRAMUSCULAR; INTRAVENOUS
Status: CANCELLED
Start: 2023-04-20

## 2023-03-23 RX ORDER — SODIUM CHLORIDE 0.9 % (FLUSH) 0.9 %
10 SYRINGE (ML) INJECTION
Status: DISCONTINUED | OUTPATIENT
Start: 2023-03-23 | End: 2023-03-23 | Stop reason: HOSPADM

## 2023-03-23 RX ORDER — DIPHENHYDRAMINE HYDROCHLORIDE 50 MG/ML
25 INJECTION INTRAMUSCULAR; INTRAVENOUS
Status: CANCELLED
Start: 2023-04-20

## 2023-03-23 RX ORDER — HEPARIN 100 UNIT/ML
500 SYRINGE INTRAVENOUS
Status: CANCELLED | OUTPATIENT
Start: 2023-04-20

## 2023-03-23 RX ORDER — ACETAMINOPHEN 325 MG/1
650 TABLET ORAL
Status: CANCELLED | OUTPATIENT
Start: 2023-04-20

## 2023-03-23 RX ORDER — METHYLPREDNISOLONE SOD SUCC 125 MG
100 VIAL (EA) INJECTION
Status: COMPLETED | OUTPATIENT
Start: 2023-03-23 | End: 2023-03-23

## 2023-03-23 RX ORDER — ONDANSETRON 2 MG/ML
4 INJECTION INTRAMUSCULAR; INTRAVENOUS
Status: COMPLETED | OUTPATIENT
Start: 2023-03-23 | End: 2023-03-23

## 2023-03-23 RX ORDER — ACETAMINOPHEN 325 MG/1
650 TABLET ORAL EVERY 4 HOURS PRN
Status: CANCELLED | OUTPATIENT
Start: 2023-04-20

## 2023-03-23 RX ORDER — SODIUM CHLORIDE 0.9 % (FLUSH) 0.9 %
10 SYRINGE (ML) INJECTION
Status: CANCELLED | OUTPATIENT
Start: 2023-04-20

## 2023-03-23 RX ORDER — ACETAMINOPHEN 325 MG/1
650 TABLET ORAL
Status: COMPLETED | OUTPATIENT
Start: 2023-03-23 | End: 2023-03-23

## 2023-03-23 RX ORDER — METHYLPREDNISOLONE SOD SUCC 125 MG
100 VIAL (EA) INJECTION
Status: CANCELLED
Start: 2023-04-20

## 2023-03-23 RX ADMIN — SODIUM CHLORIDE 500 ML: 9 INJECTION, SOLUTION INTRAVENOUS at 01:03

## 2023-03-23 RX ADMIN — ONDANSETRON 4 MG: 2 INJECTION INTRAMUSCULAR; INTRAVENOUS at 10:03

## 2023-03-23 RX ADMIN — DIPHENHYDRAMINE HYDROCHLORIDE 25 MG: 50 INJECTION, SOLUTION INTRAMUSCULAR; INTRAVENOUS at 10:03

## 2023-03-23 RX ADMIN — METHYLPREDNISOLONE SODIUM SUCCINATE 100 MG: 125 INJECTION, POWDER, FOR SOLUTION INTRAMUSCULAR; INTRAVENOUS at 10:03

## 2023-03-23 RX ADMIN — ACETAMINOPHEN 650 MG: 325 TABLET ORAL at 10:03

## 2023-03-23 RX ADMIN — SODIUM CHLORIDE 500 ML: 9 INJECTION, SOLUTION INTRAVENOUS at 09:03

## 2023-03-23 RX ADMIN — CYCLOPHOSPHAMIDE 1080 MG: 200 INJECTION, SOLUTION INTRAVENOUS at 12:03

## 2023-03-23 NOTE — NURSING
Infusion# 6 Cytoxan    Recent labs? Reviewed    Premeds? Tylenol 650 mg PO  Solumedrol 100 mg IVP  Zofran 4 mg IVP  Benedryl 25 mg IVP   Pre-Hydration NS 0.9%  500cc over 2 hours IV    S/S of current or recent infections in the past 14 days? None/Denies    Recent Surgery/invasive procedures? None/Denies     Any recent vaccines ? None/Denies    Cytoxan  1000 mg administered IV at a 60 minute rate per orders; see MAR and vitals for more  Details.  Post Hydration 500 cc NS 0.9%  over 2 hours IV.

## 2023-03-28 ENCOUNTER — PATIENT MESSAGE (OUTPATIENT)
Dept: INTERNAL MEDICINE | Facility: CLINIC | Age: 38
End: 2023-03-28
Payer: COMMERCIAL

## 2023-03-28 ENCOUNTER — PATIENT MESSAGE (OUTPATIENT)
Dept: RHEUMATOLOGY | Facility: CLINIC | Age: 38
End: 2023-03-28
Payer: COMMERCIAL

## 2023-03-31 ENCOUNTER — OFFICE VISIT (OUTPATIENT)
Dept: INTERNAL MEDICINE | Facility: CLINIC | Age: 38
End: 2023-03-31
Payer: COMMERCIAL

## 2023-03-31 VITALS
TEMPERATURE: 98 F | SYSTOLIC BLOOD PRESSURE: 128 MMHG | HEART RATE: 100 BPM | DIASTOLIC BLOOD PRESSURE: 86 MMHG | HEIGHT: 68 IN | OXYGEN SATURATION: 99 % | WEIGHT: 217.13 LBS | BODY MASS INDEX: 32.91 KG/M2

## 2023-03-31 DIAGNOSIS — R23.4 BREAST SKIN CHANGES: Primary | ICD-10-CM

## 2023-03-31 DIAGNOSIS — M34.9 DIFFUSE SCLERODERMA: ICD-10-CM

## 2023-03-31 DIAGNOSIS — N64.4 BREAST PAIN: ICD-10-CM

## 2023-03-31 DIAGNOSIS — M35.1 MCTD (MIXED CONNECTIVE TISSUE DISEASE): ICD-10-CM

## 2023-03-31 DIAGNOSIS — Z79.899 LONG-TERM CURRENT USE OF HIGH RISK MEDICATION OTHER THAN ANTICOAGULANT: ICD-10-CM

## 2023-03-31 DIAGNOSIS — E66.9 OBESITY (BMI 30-39.9): ICD-10-CM

## 2023-03-31 PROCEDURE — 1160F PR REVIEW ALL MEDS BY PRESCRIBER/CLIN PHARMACIST DOCUMENTED: ICD-10-PCS | Mod: CPTII,S$GLB,,

## 2023-03-31 PROCEDURE — 1159F PR MEDICATION LIST DOCUMENTED IN MEDICAL RECORD: ICD-10-PCS | Mod: CPTII,S$GLB,,

## 2023-03-31 PROCEDURE — 99999 PR PBB SHADOW E&M-EST. PATIENT-LVL IV: ICD-10-PCS | Mod: PBBFAC,,,

## 2023-03-31 PROCEDURE — 3008F PR BODY MASS INDEX (BMI) DOCUMENTED: ICD-10-PCS | Mod: CPTII,S$GLB,,

## 2023-03-31 PROCEDURE — 1160F RVW MEDS BY RX/DR IN RCRD: CPT | Mod: CPTII,S$GLB,,

## 2023-03-31 PROCEDURE — 3074F SYST BP LT 130 MM HG: CPT | Mod: CPTII,S$GLB,,

## 2023-03-31 PROCEDURE — 99999 PR PBB SHADOW E&M-EST. PATIENT-LVL IV: CPT | Mod: PBBFAC,,,

## 2023-03-31 PROCEDURE — 1159F MED LIST DOCD IN RCRD: CPT | Mod: CPTII,S$GLB,,

## 2023-03-31 PROCEDURE — 3008F BODY MASS INDEX DOCD: CPT | Mod: CPTII,S$GLB,,

## 2023-03-31 PROCEDURE — 3074F PR MOST RECENT SYSTOLIC BLOOD PRESSURE < 130 MM HG: ICD-10-PCS | Mod: CPTII,S$GLB,,

## 2023-03-31 PROCEDURE — 3079F DIAST BP 80-89 MM HG: CPT | Mod: CPTII,S$GLB,,

## 2023-03-31 PROCEDURE — 99214 OFFICE O/P EST MOD 30 MIN: CPT | Mod: S$GLB,,,

## 2023-03-31 PROCEDURE — 99214 PR OFFICE/OUTPT VISIT, EST, LEVL IV, 30-39 MIN: ICD-10-PCS | Mod: S$GLB,,,

## 2023-03-31 PROCEDURE — 3079F PR MOST RECENT DIASTOLIC BLOOD PRESSURE 80-89 MM HG: ICD-10-PCS | Mod: CPTII,S$GLB,,

## 2023-03-31 NOTE — PROGRESS NOTES
Stefanie Duarte Taco  03/31/2023  [id number]    Sofia Martinez MD  Patient Care Team:  Sofia Martinez MD as PCP - General (Family Medicine)  Tangela Espinoza LPN as Care Coordinator (Internal Medicine)          Visit Type:an urgent visit for a new problem    Chief Complaint:  Chief Complaint   Patient presents with    Breast Problem     bilateral       History of Present Illness:    She is having skin changes/bilateral breast pain   Started 2 weeks ago  Denies drinking excessive amounts of caffeine  Describes area as tender       History:  Past Medical History:   Diagnosis Date    Abnormal Pap smear of cervix     repeat was normal    Lupus (systemic lupus erythematosus)      Past Surgical History:   Procedure Laterality Date    ROBOT-ASSISTED CHOLECYSTECTOMY USING DA CRISTOPHER XI N/A 12/11/2020    Procedure: XI ROBOTIC CHOLECYSTECTOMY;  Surgeon: Taco Zimmer MD;  Location: [facility] OR;  Service: General;  Laterality: N/A;    ROBOT-ASSISTED LAPAROSCOPIC LYSIS OF ADHESIONS USING DA CRISTOPHER XI  12/11/2020    Procedure: XI ROBOTIC LYSIS, ADHESIONS;  Surgeon: Taco Zimmer MD;  Location: [facility] OR;  Service: General;;    ROBOT-ASSISTED LAPAROSCOPIC SALPINGO-OOPHORECTOMY USING DA CRISTOPHER XI Left 12/11/2020    Procedure: XI ROBOTIC SALPINGO-OOPHORECTOMY;  Surgeon: SHERLEY Wakefield MD;  Location: [facility];  Service: OB/GYN;  Laterality: Left;    TUBAL LIGATION  2005     Family History   Problem Relation Age of Onset    Liver cancer Maternal Aunt     Breast cancer Maternal Aunt     Breast cancer Paternal Grandmother     Colon cancer Neg Hx     Ovarian cancer Neg Hx     Thrombosis Neg Hx      Social History     Socioeconomic History    Marital status:    Occupational History    Occupation: [employer]   Tobacco Use    Smoking status: Never    Smokeless tobacco: Never   Substance and Sexual Activity    Alcohol use: No    Drug use: No    Sexual activity: Yes     Partners: Male     Birth control/protection: Surgical      Comment: BTL   Other Topics Concern    Are you pregnant or think you may be? No    Breast-feeding No     Patient Active Problem List   Diagnosis    Exacerbation of systemic lupus    Long-term current use of high risk medication -methotrexate    Systemic lupus erythematosus arthritis    Immunosuppressed status    Rash    Obesity, Class II, BMI 35-39.9    MCTD (mixed connective tissue disease)    Positive sm/RNP antibody    Secondary Raynaud's phenomenon    Mild episode of recurrent major depressive disorder    Chronic pain of both knees    Bilateral leg weakness    Gastroesophageal reflux disease    Pelvic mass    Fibromyalgia    Rheumatoid arthritis of multiple sites with negative rheumatoid factor    Swelling of right upper extremity    Diffuse scleroderma    Morphea     Review of patient's allergies indicates:   Allergen Reactions    Elavil [amitriptyline]        The following were reviewed at this visit: active problem list, medication list, allergies, family history, social history, and health maintenance.    Medications:  Current Outpatient Medications on File Prior to Visit   Medication Sig Dispense Refill    bumetanide (BUMEX) 1 MG tablet Take 1 tablet (1 mg total) by mouth once daily. 10 tablet 0    clobetasoL (TEMOVATE) 0.05 % cream Apply topically 2 (two) times daily. 60 g 2    clobetasoL (TEMOVATE) 0.05 % external solution Apply topically 2 (two) times daily. 50 mL 1    colchicine (COLCRYS) 0.6 mg tablet Take 1 tablet (0.6 mg total) by mouth 2 (two) times daily. 60 tablet 11    gabapentin (NEURONTIN) 300 MG capsule Take 1 capsule (300 mg total) by mouth 3 (three) times daily. 90 capsule 11    mometasone 0.1% (ELOCON) 0.1 % cream Apply topically once daily. 50 g 1    OLANZapine (ZYPREXA) 10 MG tablet One po qd x 4 days starting the day of your infusion for nausea and vomiting 4 tablet 1    ondansetron (ZOFRAN-ODT) 4 MG TbDL Take 1 tablet (4 mg total) by mouth every 8 (eight) hours as needed (nausea). 30  tablet 0    tacrolimus (PROTOPIC) 0.1 % ointment Apply topically 2 (two) times daily. 100 g 3    traMADoL (ULTRAM) 50 mg tablet Take 1 tablet (50 mg total) by mouth every 12 (twelve) hours as needed for Pain. 60 tablet 3    HYDROcodone-acetaminophen (NORCO) 5-325 mg per tablet Take 1 tablet by mouth every 6 (six) hours as needed.      nitrofurantoin, macrocrystal-monohydrate, (MACROBID) 100 MG capsule Take 1 capsule (100 mg total) by mouth 2 (two) times daily. (Patient not taking: Reported on 3/31/2023) 14 capsule 0    predniSONE (DELTASONE) 10 MG tablet Take 1 tablet (10 mg total) by mouth once daily. (Patient not taking: Reported on 3/31/2023) 30 tablet 1    traZODone (DESYREL) 50 MG tablet Take 1 tablet (50 mg total) by mouth every evening. 30 tablet 11     No current facility-administered medications on file prior to visit.       Medications have been reviewed and reconciled with patient at this visit.  Barriers to medications reviewed with patient.    Adverse reactions to current medications reviewed with patient..    Over the counter medications reviewed and reconciled with patient.    Exam:  Wt Readings from Last 3 Encounters:   03/31/23 98.5 kg (217 lb 2.5 oz)   03/23/23 96.7 kg (213 lb 3 oz)   02/23/23 97.5 kg (214 lb 15.2 oz)     Temp Readings from Last 3 Encounters:   03/31/23 98.1 °F (36.7 °C) (Tympanic)   03/23/23 97.8 °F (36.6 °C)   02/23/23 98.2 °F (36.8 °C)     BP Readings from Last 3 Encounters:   03/31/23 128/86   03/23/23 117/72   02/23/23 112/76     Pulse Readings from Last 3 Encounters:   03/31/23 100   03/23/23 91   02/23/23 83     Body mass index is 33.02 kg/m².      Review of Systems   Respiratory:  Negative for shortness of breath.    Cardiovascular:  Negative for chest pain and palpitations.   Skin:         Skin changes on bilateral breast    Physical Exam  Vitals and nursing note reviewed.   Constitutional:       General: She is not in acute distress.     Appearance: She is well-developed.  She is obese. She is not diaphoretic.   HENT:      Head: Normocephalic and atraumatic.      Right Ear: External ear normal.      Left Ear: External ear normal.   Eyes:      General:         Right eye: No discharge.         Left eye: No discharge.      Conjunctiva/sclera: Conjunctivae normal.      Pupils: Pupils are equal, round, and reactive to light.   Cardiovascular:      Rate and Rhythm: Normal rate and regular rhythm.      Heart sounds: Normal heart sounds. No murmur heard.  Pulmonary:      Effort: Pulmonary effort is normal. No respiratory distress.      Breath sounds: Normal breath sounds. No wheezing.   Chest:   Breasts:     Right: Skin change and tenderness present. No swelling, inverted nipple, mass or nipple discharge.      Left: Skin change and tenderness present. No swelling, inverted nipple, mass or nipple discharge.      Comments: Skin changes/tenderness at 6 oclock position   Abdominal:      General: Bowel sounds are normal.   Neurological:      Mental Status: She is alert and oriented to person, place, and time.   Psychiatric:         Behavior: Behavior normal.         Thought Content: Thought content normal.         Judgment: Judgment normal.       Laboratory Reviewed ({Yes)  Lab Results   Component Value Date    WBC 8.54 03/22/2023    HGB 10.5 (L) 03/22/2023    HCT 32.6 (L) 03/22/2023     (H) 03/22/2023    CHOL 169 08/24/2017    TRIG 228 (H) 08/24/2017    HDL 37 (L) 08/24/2017    ALT 19 03/22/2023    AST 27 03/22/2023     03/22/2023    K 4.0 03/22/2023     03/22/2023    CREATININE 0.6 03/22/2023    BUN 8 03/22/2023    CO2 24 03/22/2023    TSH 1.243 08/30/2017         Stefanie was seen today for breast problem.    Diagnoses and all orders for this visit:    Breast skin changes  -     Mammo Digital Diagnostic Bilat with Guy; Future  -     US Breast Bilateral Limited; Future    Breast pain  -     Mammo Digital Diagnostic Bilat with Guy; Future  -     US Breast Bilateral Limited;  Future    MCTD (mixed connective tissue disease)    Long-term current use of high risk medication -methotrexate    Diffuse scleroderma    Obesity (BMI 30-39.9)  Encouraged healthy diet and exercise as tolerated to help bring BMI into normal range.          Order diagnostic MMG   Encouraged to follow up with derm as well    RA  Being followed by our rheum department       Care Plan/Goals: Reviewed    Goals    None         Follow up: No follow-ups on file.    After visit summary was printed and given to patient upon discharge today.  Patient goals and care plan are included in After Visit Summary.

## 2023-04-11 ENCOUNTER — PATIENT MESSAGE (OUTPATIENT)
Dept: INTERNAL MEDICINE | Facility: CLINIC | Age: 38
End: 2023-04-11
Payer: COMMERCIAL

## 2023-04-11 ENCOUNTER — OFFICE VISIT (OUTPATIENT)
Dept: INTERNAL MEDICINE | Facility: CLINIC | Age: 38
End: 2023-04-11
Payer: COMMERCIAL

## 2023-04-11 DIAGNOSIS — J06.9 URI WITH COUGH AND CONGESTION: Primary | ICD-10-CM

## 2023-04-11 DIAGNOSIS — K13.79 MOUTH SORE: ICD-10-CM

## 2023-04-11 PROCEDURE — 1159F MED LIST DOCD IN RCRD: CPT | Mod: CPTII,95,,

## 2023-04-11 PROCEDURE — 1160F PR REVIEW ALL MEDS BY PRESCRIBER/CLIN PHARMACIST DOCUMENTED: ICD-10-PCS | Mod: CPTII,95,,

## 2023-04-11 PROCEDURE — 99213 PR OFFICE/OUTPT VISIT, EST, LEVL III, 20-29 MIN: ICD-10-PCS | Mod: 95,,,

## 2023-04-11 PROCEDURE — 1159F PR MEDICATION LIST DOCUMENTED IN MEDICAL RECORD: ICD-10-PCS | Mod: CPTII,95,,

## 2023-04-11 PROCEDURE — 99213 OFFICE O/P EST LOW 20 MIN: CPT | Mod: 95,,,

## 2023-04-11 PROCEDURE — 1160F RVW MEDS BY RX/DR IN RCRD: CPT | Mod: CPTII,95,,

## 2023-04-11 RX ORDER — MONTELUKAST SODIUM 10 MG/1
10 TABLET ORAL NIGHTLY
Qty: 30 TABLET | Refills: 0 | Status: SHIPPED | OUTPATIENT
Start: 2023-04-11 | End: 2023-05-11

## 2023-04-11 RX ORDER — PROMETHAZINE HYDROCHLORIDE AND DEXTROMETHORPHAN HYDROBROMIDE 6.25; 15 MG/5ML; MG/5ML
5 SYRUP ORAL EVERY 6 HOURS PRN
Qty: 118 ML | Refills: 0 | Status: SHIPPED | OUTPATIENT
Start: 2023-04-11 | End: 2023-08-07 | Stop reason: SDUPTHER

## 2023-04-11 NOTE — PROGRESS NOTES
Stefanie España  04/11/2023  [id number]    Sofia Martinez MD  Patient Care Team:  Sofia Martinez MD as PCP - General (Family Medicine)  Tangela Espinoza LPN as Care Coordinator (Internal Medicine)          Visit Type:an urgent visit for a new problem  The patient location is: [place]  The chief complaint leading to consultation is: URI/mouth sore    Visit type: audiovisual    Face to Face time with patient: 12   minutes of total time spent on the encounter, which includes face to face time and non-face to face time preparing to see the patient (eg, review of tests), Obtaining and/or reviewing separately obtained history, Documenting clinical information in the electronic or other health record, Independently interpreting results (not separately reported) and communicating results to the patient/family/caregiver, or Care coordination (not separately reported).         Each patient to whom he or she provides medical services by telemedicine is:  (1) informed of the relationship between the physician and patient and the respective role of any other health care provider with respect to management of the patient; and (2) notified that he or she may decline to receive medical services by telemedicine and may withdraw from such care at any time.    Notes:      She ate something hot 2 days ago  Caused sores in her mouth  No recent dental work  Denies s/s of thrush  It hurts when she eats   Denies SOB, fever or pain with swallowing    Has been coughing started 3 days ago    History:  Past Medical History:   Diagnosis Date    Abnormal Pap smear of cervix     repeat was normal    Lupus (systemic lupus erythematosus)      Past Surgical History:   Procedure Laterality Date    ROBOT-ASSISTED CHOLECYSTECTOMY USING DA CRISTOPHER XI N/A 12/11/2020    Procedure: XI ROBOTIC CHOLECYSTECTOMY;  Surgeon: Taco Zimmer MD;  Location: [facility];  Service: General;  Laterality: N/A;    ROBOT-ASSISTED LAPAROSCOPIC LYSIS OF  ADHESIONS USING DA CRISTOPHER XI  12/11/2020    Procedure: XI ROBOTIC LYSIS, ADHESIONS;  Surgeon: Taco Zimmer MD;  Location: [facility] OR;  Service: General;;    ROBOT-ASSISTED LAPAROSCOPIC SALPINGO-OOPHORECTOMY USING DA CRISTOPHER XI Left 12/11/2020    Procedure: XI ROBOTIC SALPINGO-OOPHORECTOMY;  Surgeon: SHERLEY Wakefield MD;  Location: [facility] OR;  Service: OB/GYN;  Laterality: Left;    TUBAL LIGATION  2005     Family History   Problem Relation Age of Onset    Liver cancer Maternal Aunt     Breast cancer Maternal Aunt     Breast cancer Paternal Grandmother     Colon cancer Neg Hx     Ovarian cancer Neg Hx     Thrombosis Neg Hx      Social History     Socioeconomic History    Marital status:    Occupational History    Occupation: [employer]   Tobacco Use    Smoking status: Never    Smokeless tobacco: Never   Substance and Sexual Activity    Alcohol use: No    Drug use: No    Sexual activity: Yes     Partners: Male     Birth control/protection: Surgical     Comment: BTL   Other Topics Concern    Are you pregnant or think you may be? No    Breast-feeding No     Patient Active Problem List   Diagnosis    Exacerbation of systemic lupus    Long-term current use of high risk medication -methotrexate    Systemic lupus erythematosus arthritis    Immunosuppressed status    Rash    Obesity, Class II, BMI 35-39.9    MCTD (mixed connective tissue disease)    Positive sm/RNP antibody    Secondary Raynaud's phenomenon    Mild episode of recurrent major depressive disorder    Chronic pain of both knees    Bilateral leg weakness    Gastroesophageal reflux disease    Pelvic mass    Fibromyalgia    Rheumatoid arthritis of multiple sites with negative rheumatoid factor    Swelling of right upper extremity    Diffuse scleroderma    Morphea     Review of patient's allergies indicates:   Allergen Reactions    Elavil [amitriptyline]        The following were reviewed at this visit: active problem list, medication list, allergies, family  history, social history, and health maintenance.    Medications:  Current Outpatient Medications on File Prior to Visit   Medication Sig Dispense Refill    bumetanide (BUMEX) 1 MG tablet Take 1 tablet (1 mg total) by mouth once daily. 10 tablet 0    clobetasoL (TEMOVATE) 0.05 % cream Apply topically 2 (two) times daily. 60 g 2    clobetasoL (TEMOVATE) 0.05 % external solution Apply topically 2 (two) times daily. 50 mL 1    colchicine (COLCRYS) 0.6 mg tablet Take 1 tablet (0.6 mg total) by mouth 2 (two) times daily. 60 tablet 11    gabapentin (NEURONTIN) 300 MG capsule Take 1 capsule (300 mg total) by mouth 3 (three) times daily. 90 capsule 11    mometasone 0.1% (ELOCON) 0.1 % cream Apply topically once daily. 50 g 1    OLANZapine (ZYPREXA) 10 MG tablet One po qd x 4 days starting the day of your infusion for nausea and vomiting 4 tablet 1    ondansetron (ZOFRAN-ODT) 4 MG TbDL Take 1 tablet (4 mg total) by mouth every 8 (eight) hours as needed (nausea). 30 tablet 0    predniSONE (DELTASONE) 10 MG tablet Take 1 tablet (10 mg total) by mouth once daily. (Patient not taking: Reported on 3/31/2023) 30 tablet 1    tacrolimus (PROTOPIC) 0.1 % ointment Apply topically 2 (two) times daily. 100 g 3    traMADoL (ULTRAM) 50 mg tablet Take 1 tablet (50 mg total) by mouth every 12 (twelve) hours as needed for Pain. 60 tablet 3    traZODone (DESYREL) 50 MG tablet Take 1 tablet (50 mg total) by mouth every evening. 30 tablet 11     No current facility-administered medications on file prior to visit.       Medications have been reviewed and reconciled with patient at this visit.  Barriers to medications reviewed with patient.    Adverse reactions to current medications reviewed with patient..    Over the counter medications reviewed and reconciled with patient.    Exam:  Wt Readings from Last 3 Encounters:   03/31/23 98.5 kg (217 lb 2.5 oz)   03/23/23 96.7 kg (213 lb 3 oz)   02/23/23 97.5 kg (214 lb 15.2 oz)     Temp Readings from  Last 3 Encounters:   03/31/23 98.1 °F (36.7 °C) (Tympanic)   03/23/23 97.8 °F (36.6 °C)   02/23/23 98.2 °F (36.8 °C)     BP Readings from Last 3 Encounters:   03/31/23 128/86   03/23/23 117/72   02/23/23 112/76     Pulse Readings from Last 3 Encounters:   03/31/23 100   03/23/23 91   02/23/23 83     There is no height or weight on file to calculate BMI.      Review of Systems   Constitutional:  Negative for malaise/fatigue.   HENT:  Positive for congestion.    Respiratory:  Positive for cough. Negative for shortness of breath.    Cardiovascular:  Negative for chest pain and palpitations.   Physical Exam  Nursing note reviewed.   Pulmonary:      Effort: Pulmonary effort is normal. No respiratory distress.   Neurological:      Mental Status: She is alert and oriented to person, place, and time.   Psychiatric:         Mood and Affect: Mood normal.         Behavior: Behavior normal.         Thought Content: Thought content normal.         Judgment: Judgment normal.       Laboratory Reviewed ({Yes)  Lab Results   Component Value Date    WBC 8.54 03/22/2023    HGB 10.5 (L) 03/22/2023    HCT 32.6 (L) 03/22/2023     (H) 03/22/2023    CHOL 169 08/24/2017    TRIG 228 (H) 08/24/2017    HDL 37 (L) 08/24/2017    ALT 19 03/22/2023    AST 27 03/22/2023     03/22/2023    K 4.0 03/22/2023     03/22/2023    CREATININE 0.6 03/22/2023    BUN 8 03/22/2023    CO2 24 03/22/2023    TSH 1.243 08/30/2017     Diagnoses and all orders for this visit:    URI with cough and congestion  -     montelukast (SINGULAIR) 10 mg tablet; Take 1 tablet (10 mg total) by mouth every evening.  -     promethazine-dextromethorphan (PROMETHAZINE-DM) 6.25-15 mg/5 mL Syrp; Take 5 mLs by mouth every 6 (six) hours as needed (cough).    Mouth sore  -     diphenhydrAMINE-aluminum-magnesium hydroxide-simethicone-LIDOcaine HCl 2%; Swish and spit 15 mLs every 6 (six) hours as needed (pain).    Gargle with warm water and salt and use warm liquids to  help with throat pain      Care Plan/Goals: Reviewed    Goals    None         Follow up: No follow-ups on file.    After visit summary was printed and given to patient upon discharge today.  Patient goals and care plan are included in After Visit Summary.    Answers submitted by the patient for this visit:  Review of Systems Questionnaire (Submitted on 4/11/2023)  activity change: Yes  unexpected weight change: No  rhinorrhea: No  trouble swallowing: Yes  visual disturbance: No  chest tightness: No  polyuria: No  difficulty urinating: No  menstrual problem: No  joint swelling: No  arthralgias: Yes  confusion: No  dysphoric mood: No

## 2023-04-14 ENCOUNTER — HOSPITAL ENCOUNTER (OUTPATIENT)
Dept: RADIOLOGY | Facility: HOSPITAL | Age: 38
Discharge: HOME | End: 2023-04-14
Payer: COMMERCIAL

## 2023-04-14 ENCOUNTER — PATIENT MESSAGE (OUTPATIENT)
Dept: INTERNAL MEDICINE | Facility: CLINIC | Age: 38
End: 2023-04-14
Payer: COMMERCIAL

## 2023-04-14 VITALS — HEIGHT: 68 IN | BODY MASS INDEX: 32.89 KG/M2 | WEIGHT: 217 LBS

## 2023-04-14 DIAGNOSIS — R23.4 BREAST SKIN CHANGES: ICD-10-CM

## 2023-04-14 DIAGNOSIS — N64.4 BREAST PAIN: ICD-10-CM

## 2023-04-14 PROCEDURE — 77066 DX MAMMO INCL CAD BI: CPT | Mod: 26,,, | Performed by: STUDENT IN AN ORGANIZED HEALTH CARE EDUCATION/TRAINING PROGRAM

## 2023-04-14 PROCEDURE — 76642 ULTRASOUND BREAST LIMITED: CPT | Mod: 26,,, | Performed by: STUDENT IN AN ORGANIZED HEALTH CARE EDUCATION/TRAINING PROGRAM

## 2023-04-14 PROCEDURE — 77062 BREAST TOMOSYNTHESIS BI: CPT | Mod: 26,,, | Performed by: STUDENT IN AN ORGANIZED HEALTH CARE EDUCATION/TRAINING PROGRAM

## 2023-04-14 PROCEDURE — 77066 DX MAMMO INCL CAD BI: CPT | Mod: TC,PO

## 2023-04-14 PROCEDURE — 76642 US BREAST BILATERAL LIMITED: ICD-10-PCS | Mod: 26,,, | Performed by: STUDENT IN AN ORGANIZED HEALTH CARE EDUCATION/TRAINING PROGRAM

## 2023-04-14 PROCEDURE — 76642 ULTRASOUND BREAST LIMITED: CPT | Mod: TC,50,PO

## 2023-04-14 PROCEDURE — 77062 MAMMO DIGITAL DIAGNOSTIC BILAT WITH TOMO: ICD-10-PCS | Mod: 26,,, | Performed by: STUDENT IN AN ORGANIZED HEALTH CARE EDUCATION/TRAINING PROGRAM

## 2023-04-14 PROCEDURE — 77066 MAMMO DIGITAL DIAGNOSTIC BILAT WITH TOMO: ICD-10-PCS | Mod: 26,,, | Performed by: STUDENT IN AN ORGANIZED HEALTH CARE EDUCATION/TRAINING PROGRAM

## 2023-04-18 ENCOUNTER — PATIENT MESSAGE (OUTPATIENT)
Dept: RHEUMATOLOGY | Facility: CLINIC | Age: 38
End: 2023-04-18
Payer: COMMERCIAL

## 2023-04-18 DIAGNOSIS — Z79.899 HIGH RISK MEDICATION USE: Primary | ICD-10-CM

## 2023-04-19 ENCOUNTER — LAB VISIT (OUTPATIENT)
Dept: LAB | Facility: HOSPITAL | Age: 38
End: 2023-04-19
Attending: INTERNAL MEDICINE
Payer: COMMERCIAL

## 2023-04-19 DIAGNOSIS — Z79.899 HIGH RISK MEDICATION USE: ICD-10-CM

## 2023-04-19 DIAGNOSIS — Z79.899 LONG-TERM CURRENT USE OF HIGH RISK MEDICATION OTHER THAN ANTICOAGULANT: ICD-10-CM

## 2023-04-19 DIAGNOSIS — M32.19 OTHER SYSTEMIC LUPUS ERYTHEMATOSUS WITH OTHER ORGAN INVOLVEMENT: ICD-10-CM

## 2023-04-19 DIAGNOSIS — M32.9 SYSTEMIC LUPUS ERYTHEMATOSUS ARTHRITIS: ICD-10-CM

## 2023-04-19 LAB
ALBUMIN SERPL BCP-MCNC: 3 G/DL (ref 3.5–5.2)
ALP SERPL-CCNC: 47 U/L (ref 55–135)
ALT SERPL W/O P-5'-P-CCNC: 11 U/L (ref 10–44)
ANION GAP SERPL CALC-SCNC: 10 MMOL/L (ref 8–16)
AST SERPL-CCNC: 20 U/L (ref 10–40)
B-HCG UR QL: NEGATIVE
BACTERIA #/AREA URNS HPF: NORMAL /HPF
BASOPHILS # BLD AUTO: 0.03 K/UL (ref 0–0.2)
BASOPHILS NFR BLD: 0.4 % (ref 0–1.9)
BILIRUB SERPL-MCNC: 0.2 MG/DL (ref 0.1–1)
BILIRUB UR QL STRIP: NEGATIVE
BUN SERPL-MCNC: 6 MG/DL (ref 6–20)
CALCIUM SERPL-MCNC: 9.1 MG/DL (ref 8.7–10.5)
CHLORIDE SERPL-SCNC: 101 MMOL/L (ref 95–110)
CLARITY UR: CLEAR
CO2 SERPL-SCNC: 26 MMOL/L (ref 23–29)
COLOR UR: YELLOW
CREAT SERPL-MCNC: 0.7 MG/DL (ref 0.5–1.4)
CRP SERPL-MCNC: 23.5 MG/L (ref 0–8.2)
DIFFERENTIAL METHOD: ABNORMAL
EOSINOPHIL # BLD AUTO: 1.1 K/UL (ref 0–0.5)
EOSINOPHIL NFR BLD: 16.1 % (ref 0–8)
ERYTHROCYTE [DISTWIDTH] IN BLOOD BY AUTOMATED COUNT: 13.4 % (ref 11.5–14.5)
ERYTHROCYTE [SEDIMENTATION RATE] IN BLOOD BY WESTERGREN METHOD: 66 MM/HR (ref 0–20)
EST. GFR  (NO RACE VARIABLE): >60 ML/MIN/1.73 M^2
GLUCOSE SERPL-MCNC: 72 MG/DL (ref 70–110)
GLUCOSE UR QL STRIP: NEGATIVE
HCT VFR BLD AUTO: 30.4 % (ref 37–48.5)
HGB BLD-MCNC: 9.7 G/DL (ref 12–16)
HGB UR QL STRIP: NEGATIVE
IMM GRANULOCYTES # BLD AUTO: 0.02 K/UL (ref 0–0.04)
IMM GRANULOCYTES NFR BLD AUTO: 0.3 % (ref 0–0.5)
KETONES UR QL STRIP: NEGATIVE
LEUKOCYTE ESTERASE UR QL STRIP: ABNORMAL
LYMPHOCYTES # BLD AUTO: 0.9 K/UL (ref 1–4.8)
LYMPHOCYTES NFR BLD: 12.2 % (ref 18–48)
MCH RBC QN AUTO: 29.5 PG (ref 27–31)
MCHC RBC AUTO-ENTMCNC: 31.9 G/DL (ref 32–36)
MCV RBC AUTO: 92 FL (ref 82–98)
MICROSCOPIC COMMENT: NORMAL
MONOCYTES # BLD AUTO: 0.5 K/UL (ref 0.3–1)
MONOCYTES NFR BLD: 6.7 % (ref 4–15)
NEUTROPHILS # BLD AUTO: 4.5 K/UL (ref 1.8–7.7)
NEUTROPHILS NFR BLD: 64.6 % (ref 38–73)
NITRITE UR QL STRIP: NEGATIVE
NRBC BLD-RTO: 0 /100 WBC
PH UR STRIP: 6 [PH] (ref 5–8)
PLATELET # BLD AUTO: 486 K/UL (ref 150–450)
PMV BLD AUTO: 8.4 FL (ref 9.2–12.9)
POTASSIUM SERPL-SCNC: 3.6 MMOL/L (ref 3.5–5.1)
PROT SERPL-MCNC: 8.4 G/DL (ref 6–8.4)
PROT UR QL STRIP: NEGATIVE
RBC # BLD AUTO: 3.29 M/UL (ref 4–5.4)
RBC #/AREA URNS HPF: 0 /HPF (ref 0–4)
SODIUM SERPL-SCNC: 137 MMOL/L (ref 136–145)
SP GR UR STRIP: 1.01 (ref 1–1.03)
SQUAMOUS #/AREA URNS HPF: 4 /HPF
URN SPEC COLLECT METH UR: ABNORMAL
WBC # BLD AUTO: 6.97 K/UL (ref 3.9–12.7)
WBC #/AREA URNS HPF: 2 /HPF (ref 0–5)

## 2023-04-19 PROCEDURE — 81000 URINALYSIS NONAUTO W/SCOPE: CPT | Mod: PO | Performed by: INTERNAL MEDICINE

## 2023-04-19 PROCEDURE — 85025 COMPLETE CBC W/AUTO DIFF WBC: CPT | Mod: PO | Performed by: INTERNAL MEDICINE

## 2023-04-19 PROCEDURE — 81025 URINE PREGNANCY TEST: CPT | Mod: PO | Performed by: INTERNAL MEDICINE

## 2023-04-19 PROCEDURE — 36415 COLL VENOUS BLD VENIPUNCTURE: CPT | Mod: PO | Performed by: INTERNAL MEDICINE

## 2023-04-19 PROCEDURE — 80053 COMPREHEN METABOLIC PANEL: CPT | Performed by: INTERNAL MEDICINE

## 2023-04-19 PROCEDURE — 85651 RBC SED RATE NONAUTOMATED: CPT | Mod: PO | Performed by: INTERNAL MEDICINE

## 2023-04-19 PROCEDURE — 86140 C-REACTIVE PROTEIN: CPT | Performed by: INTERNAL MEDICINE

## 2023-04-20 ENCOUNTER — INFUSION (OUTPATIENT)
Dept: INFUSION THERAPY | Facility: HOSPITAL | Age: 38
End: 2023-04-20
Attending: PHYSICIAN ASSISTANT
Payer: COMMERCIAL

## 2023-04-20 VITALS
BODY MASS INDEX: 33.09 KG/M2 | OXYGEN SATURATION: 99 % | RESPIRATION RATE: 16 BRPM | DIASTOLIC BLOOD PRESSURE: 75 MMHG | SYSTOLIC BLOOD PRESSURE: 112 MMHG | TEMPERATURE: 99 F | WEIGHT: 217.63 LBS | HEART RATE: 86 BPM

## 2023-04-20 DIAGNOSIS — M06.09 RHEUMATOID ARTHRITIS OF MULTIPLE SITES WITH NEGATIVE RHEUMATOID FACTOR: Primary | ICD-10-CM

## 2023-04-20 DIAGNOSIS — M35.1 MCTD (MIXED CONNECTIVE TISSUE DISEASE): ICD-10-CM

## 2023-04-20 PROCEDURE — 96375 TX/PRO/DX INJ NEW DRUG ADDON: CPT

## 2023-04-20 PROCEDURE — 63600175 PHARM REV CODE 636 W HCPCS: Performed by: INTERNAL MEDICINE

## 2023-04-20 PROCEDURE — 96413 CHEMO IV INFUSION 1 HR: CPT

## 2023-04-20 PROCEDURE — 25000003 PHARM REV CODE 250: Performed by: INTERNAL MEDICINE

## 2023-04-20 RX ORDER — METHYLPREDNISOLONE SOD SUCC 125 MG
100 VIAL (EA) INJECTION
Status: CANCELLED
Start: 2023-05-18

## 2023-04-20 RX ORDER — ONDANSETRON 2 MG/ML
4 INJECTION INTRAMUSCULAR; INTRAVENOUS
Status: COMPLETED | OUTPATIENT
Start: 2023-04-20 | End: 2023-04-20

## 2023-04-20 RX ORDER — DIPHENHYDRAMINE HYDROCHLORIDE 50 MG/ML
25 INJECTION INTRAMUSCULAR; INTRAVENOUS
Status: COMPLETED | OUTPATIENT
Start: 2023-04-20 | End: 2023-04-20

## 2023-04-20 RX ORDER — ACETAMINOPHEN 325 MG/1
650 TABLET ORAL EVERY 4 HOURS PRN
Status: CANCELLED | OUTPATIENT
Start: 2023-05-18

## 2023-04-20 RX ORDER — METHYLPREDNISOLONE SOD SUCC 125 MG
100 VIAL (EA) INJECTION
Status: COMPLETED | OUTPATIENT
Start: 2023-04-20 | End: 2023-04-20

## 2023-04-20 RX ORDER — ACETAMINOPHEN 325 MG/1
650 TABLET ORAL
Status: CANCELLED | OUTPATIENT
Start: 2023-05-18

## 2023-04-20 RX ORDER — ONDANSETRON 2 MG/ML
4 INJECTION INTRAMUSCULAR; INTRAVENOUS
Status: CANCELLED
Start: 2023-05-18

## 2023-04-20 RX ORDER — HEPARIN 100 UNIT/ML
500 SYRINGE INTRAVENOUS
Status: CANCELLED | OUTPATIENT
Start: 2023-05-18

## 2023-04-20 RX ORDER — SODIUM CHLORIDE 0.9 % (FLUSH) 0.9 %
10 SYRINGE (ML) INJECTION
Status: CANCELLED | OUTPATIENT
Start: 2023-05-18

## 2023-04-20 RX ORDER — DIPHENHYDRAMINE HYDROCHLORIDE 50 MG/ML
25 INJECTION INTRAMUSCULAR; INTRAVENOUS
Status: CANCELLED
Start: 2023-05-18

## 2023-04-20 RX ORDER — ACETAMINOPHEN 325 MG/1
650 TABLET ORAL
Status: COMPLETED | OUTPATIENT
Start: 2023-04-20 | End: 2023-04-20

## 2023-04-20 RX ADMIN — METHYLPREDNISOLONE SODIUM SUCCINATE 100 MG: 125 INJECTION, POWDER, FOR SOLUTION INTRAMUSCULAR; INTRAVENOUS at 10:04

## 2023-04-20 RX ADMIN — CYCLOPHOSPHAMIDE 1100 MG: 200 INJECTION, SOLUTION INTRAVENOUS at 11:04

## 2023-04-20 RX ADMIN — SODIUM CHLORIDE 500 ML: 0.9 INJECTION, SOLUTION INTRAVENOUS at 09:04

## 2023-04-20 RX ADMIN — ACETAMINOPHEN 650 MG: 325 TABLET ORAL at 10:04

## 2023-04-20 RX ADMIN — ONDANSETRON 4 MG: 2 INJECTION INTRAMUSCULAR; INTRAVENOUS at 10:04

## 2023-04-20 RX ADMIN — DIPHENHYDRAMINE HYDROCHLORIDE 25 MG: 50 INJECTION, SOLUTION INTRAMUSCULAR; INTRAVENOUS at 10:04

## 2023-04-20 RX ADMIN — SODIUM CHLORIDE 500 ML: 0.9 INJECTION, SOLUTION INTRAVENOUS at 12:04

## 2023-04-20 NOTE — PLAN OF CARE
Discussed plan of care with pt. Addressed any and ongoing concerns. Pt denies    Problem: Adult Inpatient Plan of Care  Goal: Plan of Care Review  Outcome: Ongoing, Progressing  Flowsheets (Taken 4/20/2023 1704)  Plan of Care Reviewed With: patient  Goal: Patient-Specific Goal (Individualized)  Outcome: Ongoing, Progressing  Goal: Absence of Hospital-Acquired Illness or Injury  Outcome: Ongoing, Progressing  Intervention: Identify and Manage Fall Risk  Flowsheets (Taken 4/20/2023 1704)  Safety Promotion/Fall Prevention:   in recliner, wheels locked   instructed to call staff for mobility  Intervention: Prevent Infection  Flowsheets (Taken 4/20/2023 1704)  Infection Prevention:   hand hygiene promoted   equipment surfaces disinfected  Goal: Optimal Comfort and Wellbeing  Outcome: Ongoing, Progressing  Intervention: Provide Person-Centered Care  Flowsheets (Taken 4/20/2023 1704)  Trust Relationship/Rapport:   care explained   reassurance provided   choices provided   thoughts/feelings acknowledged   emotional support provided   empathic listening provided   questions answered   questions encouraged

## 2023-04-23 ENCOUNTER — PATIENT MESSAGE (OUTPATIENT)
Dept: RHEUMATOLOGY | Facility: CLINIC | Age: 38
End: 2023-04-23
Payer: COMMERCIAL

## 2023-04-24 ENCOUNTER — PATIENT MESSAGE (OUTPATIENT)
Dept: RHEUMATOLOGY | Facility: CLINIC | Age: 38
End: 2023-04-24

## 2023-04-24 ENCOUNTER — OFFICE VISIT (OUTPATIENT)
Dept: RHEUMATOLOGY | Facility: CLINIC | Age: 38
End: 2023-04-24
Payer: COMMERCIAL

## 2023-04-24 ENCOUNTER — PATIENT MESSAGE (OUTPATIENT)
Dept: RHEUMATOLOGY | Facility: CLINIC | Age: 38
End: 2023-04-24
Payer: COMMERCIAL

## 2023-04-24 ENCOUNTER — TELEPHONE (OUTPATIENT)
Dept: RHEUMATOLOGY | Facility: CLINIC | Age: 38
End: 2023-04-24
Payer: COMMERCIAL

## 2023-04-24 DIAGNOSIS — M34.9 DIFFUSE SCLERODERMA: ICD-10-CM

## 2023-04-24 DIAGNOSIS — D84.9 IMMUNOSUPPRESSED STATUS: ICD-10-CM

## 2023-04-24 DIAGNOSIS — L94.0 MORPHEA: ICD-10-CM

## 2023-04-24 DIAGNOSIS — Z78.9 DIFFICULT INTRAVENOUS ACCESS: Primary | ICD-10-CM

## 2023-04-24 DIAGNOSIS — M06.09 RHEUMATOID ARTHRITIS OF MULTIPLE SITES WITH NEGATIVE RHEUMATOID FACTOR: Primary | ICD-10-CM

## 2023-04-24 DIAGNOSIS — M35.1 MCTD (MIXED CONNECTIVE TISSUE DISEASE): ICD-10-CM

## 2023-04-24 DIAGNOSIS — R11.0 CHEMOTHERAPY-INDUCED NAUSEA: ICD-10-CM

## 2023-04-24 DIAGNOSIS — T45.1X5A CHEMOTHERAPY-INDUCED NAUSEA: ICD-10-CM

## 2023-04-24 PROCEDURE — 1159F MED LIST DOCD IN RCRD: CPT | Mod: CPTII,95,, | Performed by: INTERNAL MEDICINE

## 2023-04-24 PROCEDURE — 99215 OFFICE O/P EST HI 40 MIN: CPT | Mod: 95,,, | Performed by: INTERNAL MEDICINE

## 2023-04-24 PROCEDURE — 1160F RVW MEDS BY RX/DR IN RCRD: CPT | Mod: CPTII,95,, | Performed by: INTERNAL MEDICINE

## 2023-04-24 PROCEDURE — 1159F PR MEDICATION LIST DOCUMENTED IN MEDICAL RECORD: ICD-10-PCS | Mod: CPTII,95,, | Performed by: INTERNAL MEDICINE

## 2023-04-24 PROCEDURE — 1160F PR REVIEW ALL MEDS BY PRESCRIBER/CLIN PHARMACIST DOCUMENTED: ICD-10-PCS | Mod: CPTII,95,, | Performed by: INTERNAL MEDICINE

## 2023-04-24 PROCEDURE — 99215 PR OFFICE/OUTPT VISIT, EST, LEVL V, 40-54 MIN: ICD-10-PCS | Mod: 95,,, | Performed by: INTERNAL MEDICINE

## 2023-04-24 RX ORDER — SODIUM CHLORIDE 0.9 % (FLUSH) 0.9 %
10 SYRINGE (ML) INJECTION
Status: CANCELLED | OUTPATIENT
Start: 2023-04-24

## 2023-04-24 RX ORDER — EPINEPHRINE 0.3 MG/.3ML
0.3 INJECTION SUBCUTANEOUS ONCE AS NEEDED
Status: CANCELLED | OUTPATIENT
Start: 2023-04-24

## 2023-04-24 RX ORDER — METHYLPREDNISOLONE SOD SUCC 125 MG
100 VIAL (EA) INJECTION
Status: CANCELLED | OUTPATIENT
Start: 2023-04-24

## 2023-04-24 RX ORDER — DIPHENHYDRAMINE HYDROCHLORIDE 50 MG/ML
25 INJECTION INTRAMUSCULAR; INTRAVENOUS
Status: CANCELLED | OUTPATIENT
Start: 2023-04-24

## 2023-04-24 RX ORDER — ACETAMINOPHEN 325 MG/1
650 TABLET ORAL
Status: CANCELLED | OUTPATIENT
Start: 2023-04-24

## 2023-04-24 RX ORDER — COLCHICINE 0.6 MG/1
0.6 TABLET ORAL 2 TIMES DAILY
Qty: 60 TABLET | Refills: 11 | Status: SHIPPED | OUTPATIENT
Start: 2023-04-24 | End: 2024-04-23

## 2023-04-24 RX ORDER — HEPARIN 100 UNIT/ML
500 SYRINGE INTRAVENOUS
Status: CANCELLED | OUTPATIENT
Start: 2023-04-24

## 2023-04-24 RX ORDER — DIPHENHYDRAMINE HYDROCHLORIDE 50 MG/ML
50 INJECTION INTRAMUSCULAR; INTRAVENOUS ONCE AS NEEDED
Status: CANCELLED | OUTPATIENT
Start: 2023-04-24

## 2023-04-24 NOTE — Clinical Note
Please discontinue Cytoxan and start Orencia infusion.  Hepatitis and TB labs negative in 2022.  New labs ordered if needed by insurance company and [facility] pharmacist. Check CBC, CMP, ESR, CRP, C3, C4 every 3 months.  Follow-up in 6 months.

## 2023-04-24 NOTE — TELEPHONE ENCOUNTER
----- Message from Prashanth Whalen MD sent at 4/24/2023  3:40 PM CDT -----  Please discontinue Cytoxan and start Orencia infusion.  Hepatitis and TB labs negative in 2022.  New labs ordered if needed by insurance company and [facility] pharmacist.  Check CBC, CMP, ESR, CRP, C3, C4 every 3 months.  Follow-up in 6 months.

## 2023-04-24 NOTE — PROGRESS NOTES
RHEUMATOLOGY FOLLOW UP VISIT   Chief complaints, HPI, ROS, EXAM, Assessment & Plans:-  Stefanie Kimbrough a 37 y.o. pleasant female seen today for follow-up.  She complains of worsening pain in her foot especially around her ankles around 11:00 a.m. at work for which she takes prednisone 20 mg daily.  Worsening erythema-darkening of skin around her ankles.  Improvement noted over her bilateral upper extremity rash and skin thickening.  Severe case of mixed connective tissue disease with worsening scleroderma of bilateral upper and lower extremities with morphea.  On Cytoxan infusion after failing multiple medications including CellCept, methotrexate, Imuran, tacrolimus, Plaquenil and rituximab.  Takes tramadol p.r.n. for pain.  Rheumatological review of system negative otherwise.  Physical examination shows improvement of rash over upper extremities but worsening erythema around bilateral ankles and skin thickening of lower extremities.     1. Rheumatoid arthritis of multiple sites with negative rheumatoid factor    2. Diffuse scleroderma    3. MCTD (mixed connective tissue disease)    4. Chemotherapy-induced nausea    5. Morphea    6. Immunosuppressed status        Problem List Items Addressed This Visit       Immunosuppressed status    MCTD (mixed connective tissue disease)    Relevant Medications    colchicine (COLCRYS) 0.6 mg tablet    Other Relevant Orders    Hepatitis B Core Antibody, Total    Hepatitis B Surface Ab, Qualitative    Hepatitis B Surface Antigen    Hepatitis C Antibody    Quantiferon Gold TB    Rheumatoid arthritis of multiple sites with negative rheumatoid factor - Primary    Relevant Orders    Hepatitis B Core Antibody, Total    Hepatitis B Surface Ab, Qualitative    Hepatitis B Surface Antigen    Hepatitis C Antibody    Quantiferon Gold TB    Diffuse scleroderma    Relevant Orders    Hepatitis B Core Antibody, Total    Hepatitis B Surface Ab, Qualitative    Hepatitis B Surface Antigen     Hepatitis C Antibody    Quantiferon Gold TB    Morphea    Chemotherapy-induced nausea      Latest Reference Range & Units Most Recent   MELVA Screen Negative <1:80  Positive !  6/16/22 11:45   MELVA HEP-2 Titer  Positive >=1:2560 Speckled  8/30/17 14:50   MELVA Titer 1  >=1:2560  6/16/22 11:45   MELVA PATTERN 1  Speckled  6/16/22 11:45   ds DNA Ab Negative 1:10  Negative 1:10  6/16/22 11:45   Anti-SSA Antibody 0.00 - 0.99 Ratio 0.07  6/16/22 11:45   Anti-SSA Interpretation Negative  Negative  6/16/22 11:45   Anti-SSB Antibody 0.00 - 0.99 Ratio 0.04  6/16/22 11:45   Anti-SSB Interpretation Negative  Negative  6/16/22 11:45   Anti Sm Antibody 0.00 - 0.99 Ratio 0.25  6/16/22 11:45   Anti-Sm Interpretation Negative  Negative  6/16/22 11:45   Anti Sm/RNP Antibody 0.00 - 0.99 Ratio 5.45 (H)  6/16/22 11:45   Anti-Sm/RNP Interpretation Negative  Positive !  6/16/22 11:45   Scleroderma SCL- <20 UNITS 4  10/2/19 09:52   Complement (C-3) 50 - 180 mg/dL 122  6/16/22 11:45   Complement (C-4) 11 - 44 mg/dL 7 (L)  6/16/22 11:45   IgG 650 - 1600 mg/dL 3311 (H)  9/6/22 14:23   IgM 50 - 300 mg/dL 295  9/6/22 14:23   IgA 40 - 350 mg/dL 364 (H)  9/6/22 14:23   !: Data is abnormal  (H): Data is abnormally high  (L): Data is abnormally low       Latest Reference Range & Units Most Recent   Anti-Dilcia-1 Antibody <20 Units <20  12/1/17 16:30   Anti-PM/Scl Ab <20 Units <20  12/1/17 16:30   Anti-SS-A 52 kD Ab, IgG <20 Units <20  12/1/17 16:30   Anti-U1-RNP  Ab <20 Units 143 (H)  12/1/17 16:30   EJ Negative  Negative  12/1/17 16:30   Fibrillarin (U3 RNP) Negative  Negative  12/1/17 16:30   Ku Negative  Negative  12/1/17 16:30   MDA-5 (P140) (CADM-140) <20 Units <20  12/1/17 16:30   MI-2 Negative  Negative  12/1/17 16:30   NXP-2 (P140) <20 Units <20  12/1/17 16:30   OJ Negative  Negative  12/1/17 16:30   PL-12 Negative  Negative  12/1/17 16:30   PL-7 Negative  Negative  12/1/17 16:30   SRP Negative  Negative  12/1/17 16:30   TIF1 GAMMA (P155/140) <20  Units <20  12/1/17 16:30   U2 snRNP Negative  Moderate Positive !  12/1/17 16:30   (H): Data is abnormally high  !: Data is abnormal     Latest Reference Range & Units 02/23/23 08:56   WBC 3.90 - 12.70 K/uL 4.54   RBC 4.00 - 5.40 M/uL 3.37 (L)   Hemoglobin 12.0 - 16.0 g/dL 9.8 (L)   Hematocrit 37.0 - 48.5 % 30.4 (L)   MCV 82 - 98 fL 90   MCH 27.0 - 31.0 pg 29.1   MCHC 32.0 - 36.0 g/dL 32.2   RDW 11.5 - 14.5 % 13.0   Platelets 150 - 450 K/uL 480 (H)   MPV 9.2 - 12.9 fL 8.0 (L)   Gran % 38.0 - 73.0 % 47.0   Lymph % 18.0 - 48.0 % 23.8   Mono % 4.0 - 15.0 % 10.1   Eosinophil % 0.0 - 8.0 % 18.3 (H)   Basophil % 0.0 - 1.9 % 0.4   Immature Granulocytes 0.0 - 0.5 % 0.4   Gran # (ANC) 1.8 - 7.7 K/uL 2.1   Lymph # 1.0 - 4.8 K/uL 1.1   Mono # 0.3 - 1.0 K/uL 0.5   Eos # 0.0 - 0.5 K/uL 0.8 (H)   Baso # 0.00 - 0.20 K/uL 0.02   Immature Grans (Abs) 0.00 - 0.04 K/uL 0.02   nRBC 0 /100 WBC 0   Differential Method  Automated   Sodium 136 - 145 mmol/L 137   Potassium 3.5 - 5.1 mmol/L 3.8   Chloride 95 - 110 mmol/L 103   CO2 23 - 29 mmol/L 27   Anion Gap 8 - 16 mmol/L 7 (L)   BUN 6 - 20 mg/dL 9   Creatinine 0.5 - 1.4 mg/dL 0.7   eGFR >60 mL/min/1.73 m^2 >60   Glucose 70 - 110 mg/dL 84   Calcium 8.7 - 10.5 mg/dL 9.3   Alkaline Phosphatase 55 - 135 U/L 51 (L)   PROTEIN TOTAL 6.0 - 8.4 g/dL 9.5 (H)   Albumin 3.5 - 5.2 g/dL 3.3 (L)   BILIRUBIN TOTAL 0.1 - 1.0 mg/dL 0.3   AST 10 - 40 U/L 26   ALT 10 - 44 U/L 19   (L): Data is abnormally low  (H): Data is abnormally high            Severe worsening seronegative rheumatoid in the context of RNP antibody positive mixed connective tissue disease.  Failed multiple medications and now failing Cytoxan.  Completed a doses so far.  Still on prednisone 10-20 mg daily.  Discontinue Cytoxan and try Orencia.  Restart colchicine.  Severe case of scleroderma involving bilateral upper and lower extremities with inflammatory skin thickening.  Still has active disease over right upper extremity.      Past  Medical History:   Diagnosis Date    Abnormal Pap smear of cervix     repeat was normal    Lupus (systemic lupus erythematosus)        Past Surgical History:   Procedure Laterality Date    ROBOT-ASSISTED CHOLECYSTECTOMY USING DA CRISTOPHER XI N/A 12/11/2020    Procedure: XI ROBOTIC CHOLECYSTECTOMY;  Surgeon: Taco Zimmer MD;  Location: [facility] OR;  Service: General;  Laterality: N/A;    ROBOT-ASSISTED LAPAROSCOPIC LYSIS OF ADHESIONS USING DA CRISTOPHER XI  12/11/2020    Procedure: XI ROBOTIC LYSIS, ADHESIONS;  Surgeon: Taco Zimmer MD;  Location: [facility] OR;  Service: General;;    ROBOT-ASSISTED LAPAROSCOPIC SALPINGO-OOPHORECTOMY USING DA CRISTOPHER XI Left 12/11/2020    Procedure: XI ROBOTIC SALPINGO-OOPHORECTOMY;  Surgeon: SHERLEY Wakefield MD;  Location: [facility] OR;  Service: OB/GYN;  Laterality: Left;    TUBAL LIGATION  2005        Social History     Tobacco Use    Smoking status: Never    Smokeless tobacco: Never   Substance Use Topics    Alcohol use: No    Drug use: No       Family History   Problem Relation Age of Onset    Liver cancer Maternal Aunt     Breast cancer Maternal Aunt     Breast cancer Paternal Grandmother     Colon cancer Neg Hx     Ovarian cancer Neg Hx     Thrombosis Neg Hx        Review of patient's allergies indicates:   Allergen Reactions    Elavil [amitriptyline]        Medication List with Changes/Refills   Current Medications    BUMETANIDE (BUMEX) 1 MG TABLET    Take 1 tablet (1 mg total) by mouth once daily.    CLOBETASOL (TEMOVATE) 0.05 % CREAM    Apply topically 2 (two) times daily.    CLOBETASOL (TEMOVATE) 0.05 % EXTERNAL SOLUTION    Apply topically 2 (two) times daily.    GABAPENTIN (NEURONTIN) 300 MG CAPSULE    Take 1 capsule (300 mg total) by mouth 3 (three) times daily.    MOMETASONE 0.1% (ELOCON) 0.1 % CREAM    Apply topically once daily.    MONTELUKAST (SINGULAIR) 10 MG TABLET    Take 1 tablet (10 mg total) by mouth every evening.    OLANZAPINE (ZYPREXA) 10 MG TABLET    One po qd x 4 days  starting the day of your infusion for nausea and vomiting    ONDANSETRON (ZOFRAN-ODT) 4 MG TBDL    Take 1 tablet (4 mg total) by mouth every 8 (eight) hours as needed (nausea).    PREDNISONE (DELTASONE) 10 MG TABLET    Take 1 tablet (10 mg total) by mouth once daily.    TACROLIMUS (PROTOPIC) 0.1 % OINTMENT    Apply topically 2 (two) times daily.    TRAMADOL (ULTRAM) 50 MG TABLET    Take 1 tablet (50 mg total) by mouth every 12 (twelve) hours as needed for Pain.    TRAZODONE (DESYREL) 50 MG TABLET    Take 1 tablet (50 mg total) by mouth every evening.   Changed and/or Refilled Medications    Modified Medication Previous Medication    COLCHICINE (COLCRYS) 0.6 MG TABLET colchicine (COLCRYS) 0.6 mg tablet       Take 1 tablet (0.6 mg total) by mouth 2 (two) times daily.    Take 1 tablet (0.6 mg total) by mouth 2 (two) times daily.       Disclaimer: This note was prepared using voice recognition system and is likely to have sound alike errors and is not proof read.  Please call me with any questions.          Answers submitted by the patient for this visit:  Rheumatology Questionnaire (Submitted on 4/24/2023)  fever: No  eye redness: No  mouth sores: No  headaches: No  shortness of breath: No  chest pain: No  trouble swallowing: No  diarrhea: No  constipation: Yes  unexpected weight change: No  genital sore: No  dysuria: No  During the last 3 days, have you had a skin rash?: No  Bruises or bleeds easily: No  cough: Yes

## 2023-04-25 ENCOUNTER — PATIENT MESSAGE (OUTPATIENT)
Dept: RHEUMATOLOGY | Facility: CLINIC | Age: 38
End: 2023-04-25
Payer: COMMERCIAL

## 2023-04-25 ENCOUNTER — LAB VISIT (OUTPATIENT)
Dept: LAB | Facility: HOSPITAL | Age: 38
End: 2023-04-25
Attending: INTERNAL MEDICINE
Payer: COMMERCIAL

## 2023-04-25 ENCOUNTER — TELEPHONE (OUTPATIENT)
Dept: RHEUMATOLOGY | Facility: CLINIC | Age: 38
End: 2023-04-25
Payer: COMMERCIAL

## 2023-04-25 DIAGNOSIS — M35.1 MCTD (MIXED CONNECTIVE TISSUE DISEASE): ICD-10-CM

## 2023-04-25 DIAGNOSIS — M06.09 RHEUMATOID ARTHRITIS OF MULTIPLE SITES WITH NEGATIVE RHEUMATOID FACTOR: ICD-10-CM

## 2023-04-25 DIAGNOSIS — M34.9 DIFFUSE SCLERODERMA: ICD-10-CM

## 2023-04-25 LAB
HBV CORE AB SERPL QL IA: NORMAL
HBV SURFACE AG SERPL QL IA: NORMAL
HCV AB SERPL QL IA: NORMAL

## 2023-04-25 PROCEDURE — 86706 HEP B SURFACE ANTIBODY: CPT | Mod: 91 | Performed by: INTERNAL MEDICINE

## 2023-04-25 PROCEDURE — 36415 COLL VENOUS BLD VENIPUNCTURE: CPT | Mod: PO | Performed by: INTERNAL MEDICINE

## 2023-04-25 PROCEDURE — 86704 HEP B CORE ANTIBODY TOTAL: CPT | Performed by: INTERNAL MEDICINE

## 2023-04-25 PROCEDURE — 86480 TB TEST CELL IMMUN MEASURE: CPT | Performed by: INTERNAL MEDICINE

## 2023-04-25 PROCEDURE — 86803 HEPATITIS C AB TEST: CPT | Performed by: INTERNAL MEDICINE

## 2023-04-25 PROCEDURE — 87340 HEPATITIS B SURFACE AG IA: CPT | Performed by: INTERNAL MEDICINE

## 2023-04-25 NOTE — TELEPHONE ENCOUNTER
Discussed orencia infusion protocol with pt. She will have TB and Hepatitis screening labs done today in [place]. Pt scheduled for first infusion on 5/12 pending insurance approval.

## 2023-04-26 ENCOUNTER — PATIENT MESSAGE (OUTPATIENT)
Dept: RHEUMATOLOGY | Facility: CLINIC | Age: 38
End: 2023-04-26
Payer: COMMERCIAL

## 2023-04-26 ENCOUNTER — TELEPHONE (OUTPATIENT)
Dept: SURGERY | Facility: CLINIC | Age: 38
End: 2023-04-26
Payer: COMMERCIAL

## 2023-04-26 LAB
GAMMA INTERFERON BACKGROUND BLD IA-ACNC: 0.06 IU/ML
HBV SURFACE AB SER-ACNC: <3 MIU/ML
HBV SURFACE AB SER-ACNC: NORMAL M[IU]/ML
M TB IFN-G CD4+ BCKGRND COR BLD-ACNC: -0.01 IU/ML
MITOGEN IGNF BCKGRD COR BLD-ACNC: 0.61 IU/ML
TB GOLD PLUS: NEGATIVE
TB2 - NIL: -0.01 IU/ML

## 2023-04-26 NOTE — TELEPHONE ENCOUNTER
Returned call to patient, she is aware of her appointment date and time with Dr. Zimmer for port insertion, the patient verbalized understanding.

## 2023-04-26 NOTE — TELEPHONE ENCOUNTER
----- Message from Sandhya Stephens sent at 4/26/2023 11:42 AM CDT -----  Pt would like to schedule an appt from the order that is in. Call the pt back at .[phone]. Thx EL

## 2023-04-27 ENCOUNTER — PATIENT MESSAGE (OUTPATIENT)
Dept: RHEUMATOLOGY | Facility: CLINIC | Age: 38
End: 2023-04-27
Payer: COMMERCIAL

## 2023-05-04 ENCOUNTER — OFFICE VISIT (OUTPATIENT)
Dept: SURGERY | Facility: CLINIC | Age: 38
End: 2023-05-04
Payer: COMMERCIAL

## 2023-05-04 VITALS — SYSTOLIC BLOOD PRESSURE: 146 MMHG | HEART RATE: 94 BPM | DIASTOLIC BLOOD PRESSURE: 94 MMHG

## 2023-05-04 DIAGNOSIS — Z78.9 DIFFICULT INTRAVENOUS ACCESS: ICD-10-CM

## 2023-05-04 PROCEDURE — 3080F DIAST BP >= 90 MM HG: CPT | Mod: CPTII,S$GLB,, | Performed by: SURGERY

## 2023-05-04 PROCEDURE — 1159F MED LIST DOCD IN RCRD: CPT | Mod: CPTII,S$GLB,, | Performed by: SURGERY

## 2023-05-04 PROCEDURE — 99214 OFFICE O/P EST MOD 30 MIN: CPT | Mod: S$GLB,,, | Performed by: SURGERY

## 2023-05-04 PROCEDURE — 99999 PR PBB SHADOW E&M-EST. PATIENT-LVL V: ICD-10-PCS | Mod: PBBFAC,,, | Performed by: SURGERY

## 2023-05-04 PROCEDURE — 1159F PR MEDICATION LIST DOCUMENTED IN MEDICAL RECORD: ICD-10-PCS | Mod: CPTII,S$GLB,, | Performed by: SURGERY

## 2023-05-04 PROCEDURE — 99999 PR PBB SHADOW E&M-EST. PATIENT-LVL V: CPT | Mod: PBBFAC,,, | Performed by: SURGERY

## 2023-05-04 PROCEDURE — 3077F PR MOST RECENT SYSTOLIC BLOOD PRESSURE >= 140 MM HG: ICD-10-PCS | Mod: CPTII,S$GLB,, | Performed by: SURGERY

## 2023-05-04 PROCEDURE — 3080F PR MOST RECENT DIASTOLIC BLOOD PRESSURE >= 90 MM HG: ICD-10-PCS | Mod: CPTII,S$GLB,, | Performed by: SURGERY

## 2023-05-04 PROCEDURE — 3077F SYST BP >= 140 MM HG: CPT | Mod: CPTII,S$GLB,, | Performed by: SURGERY

## 2023-05-04 PROCEDURE — 99214 PR OFFICE/OUTPT VISIT, EST, LEVL IV, 30-39 MIN: ICD-10-PCS | Mod: S$GLB,,, | Performed by: SURGERY

## 2023-05-04 RX ORDER — LIDOCAINE HYDROCHLORIDE 10 MG/ML
1 INJECTION, SOLUTION EPIDURAL; INFILTRATION; INTRACAUDAL; PERINEURAL ONCE
Status: ACTIVE | OUTPATIENT
Start: 2023-05-04

## 2023-05-04 RX ORDER — ONDANSETRON 4 MG/1
8 TABLET, ORALLY DISINTEGRATING ORAL EVERY 8 HOURS PRN
Status: CANCELLED | OUTPATIENT
Start: 2023-05-04

## 2023-05-04 NOTE — PATIENT INSTRUCTIONS
Your MediPort surgery scheduled for May 9th at 10:30 a.m. in the morning.  The hospital call you the night before with a time of arrival.      No solid food after midnight but you may have clear liquids up to 3 hours before your arrival time.  There are no medications you have to stop.      If you have any questions or concerns please call the office    Our office phone numbers are  [phone] and

## 2023-05-04 NOTE — PROGRESS NOTES
Patient ID: Stefanie España is a 37 y.o. female.    MediPort placed    Chief Complaint: Consult (Mediport placement )      HPI:  Patient has progressively difficult IV access.  She requires infusions for lupus and rheumatoid arthritis.  Rheumatology has requested a MediPort.  The patient has agreed to proceed    Review of Systems   Constitutional:  Negative for activity change and unexpected weight change.   HENT:  Negative for hearing loss, rhinorrhea and trouble swallowing.    Eyes:  Negative for discharge and visual disturbance.   Respiratory:  Negative for chest tightness and wheezing.    Cardiovascular:  Negative for chest pain and palpitations.   Gastrointestinal:  Negative for blood in stool, constipation, diarrhea and vomiting.   Endocrine: Negative for polydipsia and polyuria.   Genitourinary:  Negative for difficulty urinating, dysuria, hematuria and menstrual problem.   Musculoskeletal:  Positive for arthralgias and joint swelling. Negative for neck pain.   Neurological:  Negative for weakness and headaches.   Psychiatric/Behavioral:  Negative for confusion and dysphoric mood.      Current Outpatient Medications   Medication Sig Dispense Refill    bumetanide (BUMEX) 1 MG tablet Take 1 tablet (1 mg total) by mouth once daily. 10 tablet 0    clobetasoL (TEMOVATE) 0.05 % cream Apply topically 2 (two) times daily. 60 g 2    clobetasoL (TEMOVATE) 0.05 % external solution Apply topically 2 (two) times daily. 50 mL 1    colchicine (COLCRYS) 0.6 mg tablet Take 1 tablet (0.6 mg total) by mouth 2 (two) times daily. 60 tablet 11    gabapentin (NEURONTIN) 300 MG capsule Take 1 capsule (300 mg total) by mouth 3 (three) times daily. 90 capsule 11    mometasone 0.1% (ELOCON) 0.1 % cream Apply topically once daily. 50 g 1    montelukast (SINGULAIR) 10 mg tablet Take 1 tablet (10 mg total) by mouth every evening. 30 tablet 0    OLANZapine (ZYPREXA) 10 MG tablet One po qd x 4 days starting the day of your infusion for  nausea and vomiting 4 tablet 1    ondansetron (ZOFRAN-ODT) 4 MG TbDL Take 1 tablet (4 mg total) by mouth every 8 (eight) hours as needed (nausea). 30 tablet 0    predniSONE (DELTASONE) 10 MG tablet Take 1 tablet (10 mg total) by mouth once daily. 30 tablet 1    tacrolimus (PROTOPIC) 0.1 % ointment Apply topically 2 (two) times daily. 100 g 3    traMADoL (ULTRAM) 50 mg tablet Take 1 tablet (50 mg total) by mouth every 12 (twelve) hours as needed for Pain. 60 tablet 3    traZODone (DESYREL) 50 MG tablet Take 1 tablet (50 mg total) by mouth every evening. 30 tablet 11     Current Facility-Administered Medications   Medication Dose Route Frequency Provider Last Rate Last Admin    LIDOcaine (PF) 10 mg/ml (1%) injection 10 mg  1 mL Intradermal Once Taco Zimmer MD           Review of patient's allergies indicates:   Allergen Reactions    Elavil [amitriptyline]        Past Medical History:   Diagnosis Date    Abnormal Pap smear of cervix     repeat was normal    Lupus (systemic lupus erythematosus)        Past Surgical History:   Procedure Laterality Date    ROBOT-ASSISTED CHOLECYSTECTOMY USING DA CRISTOPHER XI N/A 12/11/2020    Procedure: XI ROBOTIC CHOLECYSTECTOMY;  Surgeon: Taco Zimmer MD;  Location: [facility] OR;  Service: General;  Laterality: N/A;    ROBOT-ASSISTED LAPAROSCOPIC LYSIS OF ADHESIONS USING DA CRISTOPHER XI  12/11/2020    Procedure: XI ROBOTIC LYSIS, ADHESIONS;  Surgeon: Taco Zimmer MD;  Location: [facility] OR;  Service: General;;    ROBOT-ASSISTED LAPAROSCOPIC SALPINGO-OOPHORECTOMY USING DA CRISTOPHER XI Left 12/11/2020    Procedure: XI ROBOTIC SALPINGO-OOPHORECTOMY;  Surgeon: SHERLEY Wakefield MD;  Location: [facility] OR;  Service: OB/GYN;  Laterality: Left;    TUBAL LIGATION  2005       Family History   Problem Relation Age of Onset    Liver cancer Maternal Aunt     Breast cancer Maternal Aunt     Breast cancer Paternal Grandmother     Colon cancer Neg Hx     Ovarian cancer Neg Hx     Thrombosis Neg Hx         Social History     Socioeconomic History    Marital status:    Occupational History    Occupation: [employer]   Tobacco Use    Smoking status: Never    Smokeless tobacco: Never   Substance and Sexual Activity    Alcohol use: No    Drug use: No    Sexual activity: Yes     Partners: Male     Birth control/protection: Surgical     Comment: BTL   Other Topics Concern    Are you pregnant or think you may be? No    Breast-feeding No       Vitals:    05/04/23 1558   BP: (!) 146/94   Pulse: 94       Physical Exam  Vitals reviewed.   Constitutional:       Appearance: She is well-developed.   HENT:      Head: Normocephalic.   Eyes:      Pupils: Pupils are equal, round, and reactive to light.   Neck:      Thyroid: No thyromegaly.      Vascular: No JVD.      Trachea: No tracheal deviation.   Cardiovascular:      Rate and Rhythm: Normal rate and regular rhythm.      Heart sounds: Normal heart sounds.   Pulmonary:      Breath sounds: Normal breath sounds. No wheezing.   Abdominal:      General: Bowel sounds are normal. There is no distension.      Palpations: Abdomen is soft. Abdomen is not rigid. There is no mass.      Tenderness: There is no abdominal tenderness. There is no guarding or rebound.   Musculoskeletal:         General: Normal range of motion.   Lymphadenopathy:      Cervical: No cervical adenopathy.   Skin:     General: Skin is warm and dry.      Findings: No erythema or rash.      Comments: Multiple scars of the arms from IV access, some thickening of the arm skin   Neurological:      Mental Status: She is alert and oriented to person, place, and time.   Psychiatric:         Mood and Affect: Mood normal.         Behavior: Behavior normal.         Thought Content: Thought content normal.         Judgment: Judgment normal.     Recent labs were reviewed    Assessment & Plan:    Poor IV access in need of effusions for lupus and rheumatoid arthritis.      MediPort placement.    The need for MediPort placement  was discussed. The risks,benefits, and potential complications were discussed.  This includes infection, bleeding, Pneumothorax, hemothorax, injury to the great vessels, loss of the Guidewire or catheter.  I also discussed embolism of air or fat, pericardial tamponade, narrowing or stenosis of the vessels and infection of the port site.    Complete list of complications were reviewed and are listed on the consent form.  Informed consent was obtained.  Surgery  was scheduled.  Preoperative instructions were given

## 2023-05-04 NOTE — H&P (VIEW-ONLY)
Patient ID: Stefanie España is a 37 y.o. female.    MediPort placed    Chief Complaint: Consult (Mediport placement )      HPI:  Patient has progressively difficult IV access.  She requires infusions for lupus and rheumatoid arthritis.  Rheumatology has requested a MediPort.  The patient has agreed to proceed    Review of Systems   Constitutional:  Negative for activity change and unexpected weight change.   HENT:  Negative for hearing loss, rhinorrhea and trouble swallowing.    Eyes:  Negative for discharge and visual disturbance.   Respiratory:  Negative for chest tightness and wheezing.    Cardiovascular:  Negative for chest pain and palpitations.   Gastrointestinal:  Negative for blood in stool, constipation, diarrhea and vomiting.   Endocrine: Negative for polydipsia and polyuria.   Genitourinary:  Negative for difficulty urinating, dysuria, hematuria and menstrual problem.   Musculoskeletal:  Positive for arthralgias and joint swelling. Negative for neck pain.   Neurological:  Negative for weakness and headaches.   Psychiatric/Behavioral:  Negative for confusion and dysphoric mood.      Current Outpatient Medications   Medication Sig Dispense Refill    bumetanide (BUMEX) 1 MG tablet Take 1 tablet (1 mg total) by mouth once daily. 10 tablet 0    clobetasoL (TEMOVATE) 0.05 % cream Apply topically 2 (two) times daily. 60 g 2    clobetasoL (TEMOVATE) 0.05 % external solution Apply topically 2 (two) times daily. 50 mL 1    colchicine (COLCRYS) 0.6 mg tablet Take 1 tablet (0.6 mg total) by mouth 2 (two) times daily. 60 tablet 11    gabapentin (NEURONTIN) 300 MG capsule Take 1 capsule (300 mg total) by mouth 3 (three) times daily. 90 capsule 11    mometasone 0.1% (ELOCON) 0.1 % cream Apply topically once daily. 50 g 1    montelukast (SINGULAIR) 10 mg tablet Take 1 tablet (10 mg total) by mouth every evening. 30 tablet 0    OLANZapine (ZYPREXA) 10 MG tablet One po qd x 4 days starting the day of your infusion for  nausea and vomiting 4 tablet 1    ondansetron (ZOFRAN-ODT) 4 MG TbDL Take 1 tablet (4 mg total) by mouth every 8 (eight) hours as needed (nausea). 30 tablet 0    predniSONE (DELTASONE) 10 MG tablet Take 1 tablet (10 mg total) by mouth once daily. 30 tablet 1    tacrolimus (PROTOPIC) 0.1 % ointment Apply topically 2 (two) times daily. 100 g 3    traMADoL (ULTRAM) 50 mg tablet Take 1 tablet (50 mg total) by mouth every 12 (twelve) hours as needed for Pain. 60 tablet 3    traZODone (DESYREL) 50 MG tablet Take 1 tablet (50 mg total) by mouth every evening. 30 tablet 11     Current Facility-Administered Medications   Medication Dose Route Frequency Provider Last Rate Last Admin    LIDOcaine (PF) 10 mg/ml (1%) injection 10 mg  1 mL Intradermal Once Taco Zimmer MD           Review of patient's allergies indicates:   Allergen Reactions    Elavil [amitriptyline]        Past Medical History:   Diagnosis Date    Abnormal Pap smear of cervix     repeat was normal    Lupus (systemic lupus erythematosus)        Past Surgical History:   Procedure Laterality Date    ROBOT-ASSISTED CHOLECYSTECTOMY USING DA CRISTOPHER XI N/A 12/11/2020    Procedure: XI ROBOTIC CHOLECYSTECTOMY;  Surgeon: Taco Zimmer MD;  Location: [facility] OR;  Service: General;  Laterality: N/A;    ROBOT-ASSISTED LAPAROSCOPIC LYSIS OF ADHESIONS USING DA CRISTOPHER XI  12/11/2020    Procedure: XI ROBOTIC LYSIS, ADHESIONS;  Surgeon: Taco Zimmer MD;  Location: [facility] OR;  Service: General;;    ROBOT-ASSISTED LAPAROSCOPIC SALPINGO-OOPHORECTOMY USING DA CRISTOPHER XI Left 12/11/2020    Procedure: XI ROBOTIC SALPINGO-OOPHORECTOMY;  Surgeon: SHRELEY Wakefield MD;  Location: [facility] OR;  Service: OB/GYN;  Laterality: Left;    TUBAL LIGATION  2005       Family History   Problem Relation Age of Onset    Liver cancer Maternal Aunt     Breast cancer Maternal Aunt     Breast cancer Paternal Grandmother     Colon cancer Neg Hx     Ovarian cancer Neg Hx     Thrombosis Neg Hx         Social History     Socioeconomic History    Marital status:    Occupational History    Occupation: [employer]   Tobacco Use    Smoking status: Never    Smokeless tobacco: Never   Substance and Sexual Activity    Alcohol use: No    Drug use: No    Sexual activity: Yes     Partners: Male     Birth control/protection: Surgical     Comment: BTL   Other Topics Concern    Are you pregnant or think you may be? No    Breast-feeding No       Vitals:    05/04/23 1558   BP: (!) 146/94   Pulse: 94       Physical Exam  Vitals reviewed.   Constitutional:       Appearance: She is well-developed.   HENT:      Head: Normocephalic.   Eyes:      Pupils: Pupils are equal, round, and reactive to light.   Neck:      Thyroid: No thyromegaly.      Vascular: No JVD.      Trachea: No tracheal deviation.   Cardiovascular:      Rate and Rhythm: Normal rate and regular rhythm.      Heart sounds: Normal heart sounds.   Pulmonary:      Breath sounds: Normal breath sounds. No wheezing.   Abdominal:      General: Bowel sounds are normal. There is no distension.      Palpations: Abdomen is soft. Abdomen is not rigid. There is no mass.      Tenderness: There is no abdominal tenderness. There is no guarding or rebound.   Musculoskeletal:         General: Normal range of motion.   Lymphadenopathy:      Cervical: No cervical adenopathy.   Skin:     General: Skin is warm and dry.      Findings: No erythema or rash.      Comments: Multiple scars of the arms from IV access, some thickening of the arm skin   Neurological:      Mental Status: She is alert and oriented to person, place, and time.   Psychiatric:         Mood and Affect: Mood normal.         Behavior: Behavior normal.         Thought Content: Thought content normal.         Judgment: Judgment normal.     Recent labs were reviewed    Assessment & Plan:    Poor IV access in need of effusions for lupus and rheumatoid arthritis.      MediPort placement.    The need for MediPort placement  was discussed. The risks,benefits, and potential complications were discussed.  This includes infection, bleeding, Pneumothorax, hemothorax, injury to the great vessels, loss of the Guidewire or catheter.  I also discussed embolism of air or fat, pericardial tamponade, narrowing or stenosis of the vessels and infection of the port site.    Complete list of complications were reviewed and are listed on the consent form.  Informed consent was obtained.  Surgery  was scheduled.  Preoperative instructions were given

## 2023-05-05 ENCOUNTER — DOCUMENTATION ONLY (OUTPATIENT)
Dept: PREADMISSION TESTING | Facility: HOSPITAL | Age: 38
End: 2023-05-05
Payer: COMMERCIAL

## 2023-05-05 ENCOUNTER — PATIENT MESSAGE (OUTPATIENT)
Dept: RHEUMATOLOGY | Facility: CLINIC | Age: 38
End: 2023-05-05
Payer: COMMERCIAL

## 2023-05-05 ENCOUNTER — TELEPHONE (OUTPATIENT)
Dept: SURGERY | Facility: CLINIC | Age: 38
End: 2023-05-05
Payer: COMMERCIAL

## 2023-05-05 NOTE — TELEPHONE ENCOUNTER
----- Message from Divina Yang RN sent at 5/5/2023  9:33 AM CDT -----  Regarding: reschedule sx  Good morning, this pt would like to reschedule her sx as it is conflicting with other plans. She states the entire month of May is busy for her. Thanks

## 2023-05-05 NOTE — TELEPHONE ENCOUNTER
Contacted the patient, she is aware of her new surgery date for 05/24/2023, the patient voiced understanding.

## 2023-05-12 ENCOUNTER — INFUSION (OUTPATIENT)
Dept: INFUSION THERAPY | Facility: HOSPITAL | Age: 38
End: 2023-05-12
Attending: PHYSICIAN ASSISTANT
Payer: COMMERCIAL

## 2023-05-12 VITALS
RESPIRATION RATE: 16 BRPM | TEMPERATURE: 98 F | DIASTOLIC BLOOD PRESSURE: 80 MMHG | OXYGEN SATURATION: 98 % | WEIGHT: 219.56 LBS | BODY MASS INDEX: 33.39 KG/M2 | SYSTOLIC BLOOD PRESSURE: 118 MMHG | HEART RATE: 87 BPM

## 2023-05-12 DIAGNOSIS — M06.09 RHEUMATOID ARTHRITIS OF MULTIPLE SITES WITH NEGATIVE RHEUMATOID FACTOR: Primary | ICD-10-CM

## 2023-05-12 PROCEDURE — 96365 THER/PROPH/DIAG IV INF INIT: CPT

## 2023-05-12 PROCEDURE — 63600175 PHARM REV CODE 636 W HCPCS: Mod: JZ,JA,JG | Performed by: INTERNAL MEDICINE

## 2023-05-12 PROCEDURE — 25000003 PHARM REV CODE 250: Performed by: INTERNAL MEDICINE

## 2023-05-12 RX ORDER — EPINEPHRINE 0.3 MG/.3ML
0.3 INJECTION SUBCUTANEOUS ONCE AS NEEDED
Status: CANCELLED | OUTPATIENT
Start: 2023-05-26

## 2023-05-12 RX ORDER — SODIUM CHLORIDE 0.9 % (FLUSH) 0.9 %
10 SYRINGE (ML) INJECTION
Status: CANCELLED | OUTPATIENT
Start: 2023-05-26

## 2023-05-12 RX ORDER — METHYLPREDNISOLONE SOD SUCC 125 MG
100 VIAL (EA) INJECTION
Status: CANCELLED | OUTPATIENT
Start: 2023-05-26

## 2023-05-12 RX ORDER — ACETAMINOPHEN 325 MG/1
650 TABLET ORAL
Status: CANCELLED | OUTPATIENT
Start: 2023-05-26

## 2023-05-12 RX ORDER — HEPARIN 100 UNIT/ML
500 SYRINGE INTRAVENOUS
Status: CANCELLED | OUTPATIENT
Start: 2023-05-26

## 2023-05-12 RX ORDER — DIPHENHYDRAMINE HYDROCHLORIDE 50 MG/ML
25 INJECTION INTRAMUSCULAR; INTRAVENOUS
Status: CANCELLED | OUTPATIENT
Start: 2023-05-26

## 2023-05-12 RX ORDER — DIPHENHYDRAMINE HYDROCHLORIDE 50 MG/ML
50 INJECTION INTRAMUSCULAR; INTRAVENOUS ONCE AS NEEDED
Status: CANCELLED | OUTPATIENT
Start: 2023-05-26

## 2023-05-12 RX ADMIN — SODIUM CHLORIDE 1000 MG: 9 INJECTION, SOLUTION INTRAVENOUS at 11:05

## 2023-05-12 NOTE — TELEPHONE ENCOUNTER
No ma'am they just need proof she has these diagnoses. I'm handling it before I leave    Quality 226: Preventive Care And Screening: Tobacco Use: Screening And Cessation Intervention: Patient screened for tobacco use, is a smoker AND received Cessation Counseling within measurement period or in the six months prior to the measurement period Quality 431: Preventive Care And Screening: Unhealthy Alcohol Use - Screening: Patient not identified as an unhealthy alcohol user when screened for unhealthy alcohol use using a systematic screening method Quality 130: Documentation Of Current Medications In The Medical Record: Current Medications Documented Detail Level: Detailed

## 2023-05-12 NOTE — PLAN OF CARE
Plan of care reviewed with patient. Discussed if there are any new or ongoing concerns. Denies.    Problem: Adult Inpatient Plan of Care  Goal: Plan of Care Review  Outcome: Ongoing, Progressing  Goal: Patient-Specific Goal (Individualized)  Outcome: Ongoing, Progressing  Goal: Absence of Hospital-Acquired Illness or Injury  Outcome: Ongoing, Progressing  Intervention: Identify and Manage Fall Risk  Flowsheets (Taken 5/12/2023 1219)  Safety Promotion/Fall Prevention: in recliner, wheels locked  Goal: Optimal Comfort and Wellbeing  Outcome: Ongoing, Progressing  Intervention: Provide Person-Centered Care  Flowsheets (Taken 5/12/2023 1219)  Trust Relationship/Rapport:   care explained   reassurance provided   choices provided   thoughts/feelings acknowledged   emotional support provided   empathic listening provided   questions answered   questions encouraged

## 2023-05-18 NOTE — PRE-PROCEDURE INSTRUCTIONS
Pre op instructions reviewed with patient per phone on 5/18/23: Spoke about pre op process and surgery instructions, verbalized understanding.    Surgery is scheduled on 5/24/23.  We will call you the business day prior to surgery to confirm arrival time (after 2:30 pm), as it is subject to change due to cancellations & emergencies.    Please report to the [facility] (1st Floor) at [facility] located off of [facility] access [facility] (2nd building on the left, in front of the [facility]),address: [address] [place] [address] [place] [address]    INSTRUCTIONS IMPORTANT!!!  Do Not Eat, Drink, or Smoke after 12 midnight! NO WATER after midnight! OK to brush teeth, no gum, candy or mints!      Take only these medicines with a small swallow of water-morning of surgery:  none    ____  NO Acrylic/fake nails or nail polish worn day of surgery (specifically hand/arm & foot surgeries).  ____  NO powder, lotions, deodorants, oils or creams on body.  ____  Please Remove All jewelry & piercings prior to surgery.  ____  Please Remove Dentures, Hearing Aids & Contact Lens prior to the start of surgery.  ____  Please bring photo ID and insurance information to hospital (Leave Valuables at Home).  ____  If going home the same day, arrange for a ride home. You will not be able to drive 24 hrs if Anesthesia was used.   ____  Females (ages 11-60) may need to give a urine sample the morning of surgery; please see Pre op Nurse prior to voiding.  ____  Wear clean, loose fitting clothing. Allow for dressings, bandages.  ____  Stop all Aspirin products, Ibuprofen, Advil, Motrin & Aleve at least 5-7 days before surgery, unless otherwise instructed by your doctor, or the nurse.   ____  Blood Thinners are stopped based on your Provider's recommendation; Call Surgeon's Office to inquire when to stop/hold.  ____  Stop taking any Fish Oil supplements or Vitamins at least 5 days prior to surgery, unless instructed otherwise by your Doctor.            Diabetic  Patients: If you take diabetic medication, do NOT take morning of surgery unless instructed by             Doctor. Metformin to be stopped 24 hrs prior to surgery time. DO NOT take long-acting insulin the evening before surgery. Blood sugars will be checked in pre-op morning of.    Bathing Instructions:    -Do not shave your face or body the day before or the day of surgery.              -Shower & Rinse your body as usual with anti-bacterial Soap (Dial), on the evening prior to surgery and the morning of surgery.               -Rinse your body thoroughly.                -Pat yourself day with a clean, soft towel.               -Do not use lotion, cream, powder or deodorant               -Dress in clean clothes     [facility] Visitor/Ride Policy:  Only 2 adults allowed (over the age of 18) to accompany you to the Hospital. You Must have a ride home from a responsible adult that you know and trust. Medical Transport, Uber or Lyft can only be used if patient has a responsible adult to accompany them during ride home.    Post-Op Instructions: You will receive Post-op/Discharge instructions by your Discharge Nurse prior to going home. Please call your Surgeon's office with any post-surgery questions/concerns.    *Call [facility] Pre-Admissions Department with surgery instruction questions @ [phone]-Divina   or [phone]  (Mon-Fri 8 am to 4 pm)    *If you are running late or have questions the morning of surgery, please call the Surgery Dept @ [phone]  *Insurance/ Financial Questions, please call [phone].

## 2023-05-23 ENCOUNTER — ANESTHESIA EVENT (OUTPATIENT)
Dept: SURGERY | Facility: HOSPITAL | Age: 38
End: 2023-05-23
Payer: COMMERCIAL

## 2023-05-23 ENCOUNTER — TELEPHONE (OUTPATIENT)
Dept: SURGERY | Facility: CLINIC | Age: 38
End: 2023-05-23
Payer: COMMERCIAL

## 2023-05-23 ENCOUNTER — TELEPHONE (OUTPATIENT)
Dept: PREADMISSION TESTING | Facility: HOSPITAL | Age: 38
End: 2023-05-23
Payer: COMMERCIAL

## 2023-05-23 NOTE — ANESTHESIA PREPROCEDURE EVALUATION
05/23/2023  Stefanie España is a 37 y.o., female     Patient Active Problem List   Diagnosis    Exacerbation of systemic lupus    Long-term current use of high risk medication -methotrexate    Systemic lupus erythematosus arthritis    Immunosuppressed status    Rash    Obesity, Class II, BMI 35-39.9    MCTD (mixed connective tissue disease)    Positive sm/RNP antibody    Secondary Raynaud's phenomenon    Mild episode of recurrent major depressive disorder    Chronic pain of both knees    Bilateral leg weakness    Gastroesophageal reflux disease    Pelvic mass    Fibromyalgia    Rheumatoid arthritis of multiple sites with negative rheumatoid factor    Swelling of right upper extremity    Diffuse scleroderma    Morphea    Chemotherapy-induced nausea     Past Medical History:   Diagnosis Date    Abnormal Pap smear of cervix     repeat was normal    Lupus (systemic lupus erythematosus)      Past Surgical History:   Procedure Laterality Date    ROBOT-ASSISTED CHOLECYSTECTOMY USING DA CRISTOPHER XI N/A 12/11/2020    Procedure: XI ROBOTIC CHOLECYSTECTOMY;  Surgeon: Taco Zimmer MD;  Location: [facility] OR;  Service: General;  Laterality: N/A;    ROBOT-ASSISTED LAPAROSCOPIC LYSIS OF ADHESIONS USING DA CRISTOPHER XI  12/11/2020    Procedure: XI ROBOTIC LYSIS, ADHESIONS;  Surgeon: Taco Zimmer MD;  Location: [facility] OR;  Service: General;;    ROBOT-ASSISTED LAPAROSCOPIC SALPINGO-OOPHORECTOMY USING DA CRISTOPHER XI Left 12/11/2020    Procedure: XI ROBOTIC SALPINGO-OOPHORECTOMY;  Surgeon: SHERLEY Wakefield MD;  Location: [facility] OR;  Service: OB/GYN;  Laterality: Left;    TUBAL LIGATION  2005     ECHO: (2019)  CONCLUSIONS     1 - Concentric hypertrophy.     2 - No wall motion abnormalities.     3 - Normal left ventricular systolic function (EF 60-65%).     4 - Normal left ventricular diastolic function.      5 - Normal right ventricular systolic function .     6 - The estimated PA systolic pressure is 26 mmHg.     Pre-op Assessment    I have reviewed the Patient Summary Reports.    I have reviewed the NPO Status.   I have reviewed the Medications.     Review of Systems  Anesthesia Hx:  No problems with previous Anesthesia  History of prior surgery of interest to airway management or planning: Previous anesthesia: General  Denies Personal Hx of Anesthesia complications.   Hematology/Oncology:     Oncology Normal    -- Anemia: Hematology Comments: 9.7/30.4    EENT/Dental:EENT/Dental Normal   Cardiovascular:  Cardiovascular Normal  ECG has been reviewed.    Pulmonary:  Pulmonary Normal    Renal/:  Renal/ Normal     Hepatic/GI:   GERD    Musculoskeletal:   Arthritis (RA and SLE)     Neurological:  Neurology Normal    Endocrine:   Chronic PREDNISONE RX (lupus)   Dermatological:  Skin Normal    Psych:   depression          Chemistry        Component Value Date/Time     04/19/2023 1440    K 3.6 04/19/2023 1440     04/19/2023 1440    CO2 26 04/19/2023 1440    BUN 6 04/19/2023 1440    CREATININE 0.7 04/19/2023 1440    GLU 72 04/19/2023 1440        Component Value Date/Time    CALCIUM 9.1 04/19/2023 1440    ALKPHOS 47 (L) 04/19/2023 1440    AST 20 04/19/2023 1440    ALT 11 04/19/2023 1440    BILITOT 0.2 04/19/2023 1440    ESTGFRAFRICA >60 06/16/2022 1145    EGFRNONAA >60 06/16/2022 1145        Lab Results   Component Value Date    WBC 6.97 04/19/2023    HGB 9.7 (L) 04/19/2023    HCT 30.4 (L) 04/19/2023    MCV 92 04/19/2023     (H) 04/19/2023           Physical Exam  General: Well nourished, Cooperative, Alert and Oriented    Airway:  Mallampati: III   Mouth Opening: Normal  TM Distance: Normal  Tongue: Normal  Neck ROM: Normal ROM    Dental:  Intact  Severe overbite; Patient denies any currently loose or chipped teeth; Patient denies any removable dental appliances      Anesthesia Plan  Type of  Anesthesia, risks & benefits discussed:    Anesthesia Type: Gen ETT, Gen Supraglottic Airway, MAC  Intra-op Monitoring Plan: Standard ASA Monitors  Post Op Pain Control Plan: multimodal analgesia and IV/PO Opioids PRN  Induction:  IV  Airway Plan: Video, Post-Induction  Informed Consent: Informed consent signed with the Patient and all parties understand the risks and agree with anesthesia plan.  All questions answered.   ASA Score: 2  Anesthesia Plan Notes: On daily prednisone - HYDROCORTISONE 100 MG INTRAOP    Possible difficult intubation (overbite) - recommend Video scope    Ready For Surgery From Anesthesia Perspective.     .

## 2023-05-23 NOTE — TELEPHONE ENCOUNTER
Called and spoke with Pt about the following:     Please arrive to [facility] ([facility]) at 5:30 am on 5/24/23 for your scheduled procedure.  Address: [address] [place] [address] [place] [address] (2nd Building on left, 1st Floor Lobby)  >>>NO eating or drinking after midnight unless instructed otherwise by your Surgeon<<<

## 2023-05-23 NOTE — TELEPHONE ENCOUNTER
Returned patient's callback. Patient is inquiring about arrival time for Dr. Zimmer's surgery tomorrow with her. Notified patient that the pre-admit department typically calls in the afternoon but if patient does not receive call to call clinic back. Patient verbalized understanding.

## 2023-05-23 NOTE — TELEPHONE ENCOUNTER
----- Message from Kimberley Pang sent at 5/23/2023 10:59 AM CDT -----  Contact: Stefanie Watts is calling in regards to her  arrived time for her procedure on 5/24. Please call her back at [phone]    Thanks  CF

## 2023-05-24 ENCOUNTER — ANESTHESIA (OUTPATIENT)
Dept: SURGERY | Facility: HOSPITAL | Age: 38
End: 2023-05-24
Payer: COMMERCIAL

## 2023-05-24 ENCOUNTER — HOSPITAL ENCOUNTER (OUTPATIENT)
Facility: HOSPITAL | Age: 38
Discharge: HOME | End: 2023-05-24
Attending: SURGERY | Admitting: SURGERY
Payer: COMMERCIAL

## 2023-05-24 DIAGNOSIS — M34.9 DIFFUSE SCLERODERMA: Primary | ICD-10-CM

## 2023-05-24 DIAGNOSIS — Z78.9 DIFFICULT INTRAVENOUS ACCESS: ICD-10-CM

## 2023-05-24 DIAGNOSIS — M32.9 SYSTEMIC LUPUS ERYTHEMATOSUS ARTHRITIS: ICD-10-CM

## 2023-05-24 PROCEDURE — 63600175 PHARM REV CODE 636 W HCPCS: Performed by: SURGERY

## 2023-05-24 PROCEDURE — 37000009 HC ANESTHESIA EA ADD 15 MINS: Performed by: SURGERY

## 2023-05-24 PROCEDURE — 63600175 PHARM REV CODE 636 W HCPCS: Performed by: NURSE ANESTHETIST, CERTIFIED REGISTERED

## 2023-05-24 PROCEDURE — 36000707: Performed by: SURGERY

## 2023-05-24 PROCEDURE — 71000015 HC POSTOP RECOV 1ST HR: Performed by: SURGERY

## 2023-05-24 PROCEDURE — 25000003 PHARM REV CODE 250: Performed by: SURGERY

## 2023-05-24 PROCEDURE — 25000003 PHARM REV CODE 250: Performed by: NURSE ANESTHETIST, CERTIFIED REGISTERED

## 2023-05-24 PROCEDURE — 36000706: Performed by: SURGERY

## 2023-05-24 PROCEDURE — 37000008 HC ANESTHESIA 1ST 15 MINUTES: Performed by: SURGERY

## 2023-05-24 PROCEDURE — 71000033 HC RECOVERY, INTIAL HOUR: Performed by: SURGERY

## 2023-05-24 PROCEDURE — C1788 PORT, INDWELLING, IMP: HCPCS | Performed by: SURGERY

## 2023-05-24 PROCEDURE — 36561 PR INSERT TUNNELED CV CATH WITH PORT: ICD-10-PCS | Mod: RT,,, | Performed by: SURGERY

## 2023-05-24 PROCEDURE — 36561 INSERT TUNNELED CV CATH: CPT | Mod: RT,,, | Performed by: SURGERY

## 2023-05-24 DEVICE — PORT POWER CLEAR VIEW: Type: IMPLANTABLE DEVICE | Site: CHEST | Status: FUNCTIONAL

## 2023-05-24 RX ORDER — HYDROCODONE BITARTRATE AND ACETAMINOPHEN 5; 325 MG/1; MG/1
1 TABLET ORAL EVERY 4 HOURS PRN
Status: DISCONTINUED | OUTPATIENT
Start: 2023-05-24 | End: 2023-05-24 | Stop reason: HOSPADM

## 2023-05-24 RX ORDER — ONDANSETRON 8 MG/1
8 TABLET, ORALLY DISINTEGRATING ORAL EVERY 8 HOURS PRN
Status: CANCELLED | OUTPATIENT
Start: 2023-05-24

## 2023-05-24 RX ORDER — MIDAZOLAM HYDROCHLORIDE 1 MG/ML
INJECTION, SOLUTION INTRAMUSCULAR; INTRAVENOUS
Status: DISCONTINUED | OUTPATIENT
Start: 2023-05-24 | End: 2023-05-24

## 2023-05-24 RX ORDER — HEPARIN 100 UNIT/ML
SYRINGE INTRAVENOUS
Status: DISCONTINUED | OUTPATIENT
Start: 2023-05-24 | End: 2023-05-24 | Stop reason: HOSPADM

## 2023-05-24 RX ORDER — ONDANSETRON 8 MG/1
8 TABLET, ORALLY DISINTEGRATING ORAL EVERY 8 HOURS PRN
Status: DISCONTINUED | OUTPATIENT
Start: 2023-05-24 | End: 2023-05-24 | Stop reason: HOSPADM

## 2023-05-24 RX ORDER — HYDROMORPHONE HYDROCHLORIDE 2 MG/ML
1 INJECTION, SOLUTION INTRAMUSCULAR; INTRAVENOUS; SUBCUTANEOUS EVERY 4 HOURS PRN
Status: CANCELLED | OUTPATIENT
Start: 2023-05-24

## 2023-05-24 RX ORDER — BUPIVACAINE HYDROCHLORIDE 2.5 MG/ML
INJECTION, SOLUTION EPIDURAL; INFILTRATION; INTRACAUDAL
Status: DISCONTINUED | OUTPATIENT
Start: 2023-05-24 | End: 2023-05-24 | Stop reason: HOSPADM

## 2023-05-24 RX ORDER — PROPOFOL 10 MG/ML
VIAL (ML) INTRAVENOUS
Status: DISCONTINUED | OUTPATIENT
Start: 2023-05-24 | End: 2023-05-24

## 2023-05-24 RX ORDER — CEFAZOLIN SODIUM 2 G/50ML
2 SOLUTION INTRAVENOUS
Status: COMPLETED | OUTPATIENT
Start: 2023-05-24 | End: 2023-05-24

## 2023-05-24 RX ORDER — LIDOCAINE HYDROCHLORIDE 20 MG/ML
INJECTION INTRAVENOUS
Status: DISCONTINUED | OUTPATIENT
Start: 2023-05-24 | End: 2023-05-24

## 2023-05-24 RX ORDER — HYDROMORPHONE HYDROCHLORIDE 2 MG/ML
0.2 INJECTION, SOLUTION INTRAMUSCULAR; INTRAVENOUS; SUBCUTANEOUS EVERY 5 MIN PRN
Status: DISCONTINUED | OUTPATIENT
Start: 2023-05-24 | End: 2023-05-24 | Stop reason: HOSPADM

## 2023-05-24 RX ORDER — HYDROCODONE BITARTRATE AND ACETAMINOPHEN 5; 325 MG/1; MG/1
1 TABLET ORAL EVERY 6 HOURS PRN
Qty: 15 TABLET | Refills: 0 | Status: SHIPPED | OUTPATIENT
Start: 2023-05-24 | End: 2023-08-05 | Stop reason: ALTCHOICE

## 2023-05-24 RX ORDER — PROPOFOL 10 MG/ML
VIAL (ML) INTRAVENOUS CONTINUOUS PRN
Status: DISCONTINUED | OUTPATIENT
Start: 2023-05-24 | End: 2023-05-24

## 2023-05-24 RX ORDER — ONDANSETRON 2 MG/ML
4 INJECTION INTRAMUSCULAR; INTRAVENOUS DAILY PRN
Status: DISCONTINUED | OUTPATIENT
Start: 2023-05-24 | End: 2023-05-24 | Stop reason: HOSPADM

## 2023-05-24 RX ORDER — FENTANYL CITRATE 50 UG/ML
INJECTION, SOLUTION INTRAMUSCULAR; INTRAVENOUS
Status: DISCONTINUED | OUTPATIENT
Start: 2023-05-24 | End: 2023-05-24

## 2023-05-24 RX ORDER — LIDOCAINE HYDROCHLORIDE 10 MG/ML
INJECTION, SOLUTION EPIDURAL; INFILTRATION; INTRACAUDAL; PERINEURAL
Status: DISCONTINUED | OUTPATIENT
Start: 2023-05-24 | End: 2023-05-24 | Stop reason: HOSPADM

## 2023-05-24 RX ORDER — DIPHENHYDRAMINE HYDROCHLORIDE 50 MG/ML
12.5 INJECTION INTRAMUSCULAR; INTRAVENOUS
Status: DISCONTINUED | OUTPATIENT
Start: 2023-05-24 | End: 2023-05-24 | Stop reason: HOSPADM

## 2023-05-24 RX ORDER — OXYCODONE AND ACETAMINOPHEN 5; 325 MG/1; MG/1
1 TABLET ORAL
Status: DISCONTINUED | OUTPATIENT
Start: 2023-05-24 | End: 2023-05-24 | Stop reason: HOSPADM

## 2023-05-24 RX ORDER — HYDROCODONE BITARTRATE AND ACETAMINOPHEN 7.5; 325 MG/1; MG/1
1 TABLET ORAL EVERY 6 HOURS PRN
Status: DISCONTINUED | OUTPATIENT
Start: 2023-05-24 | End: 2023-05-24 | Stop reason: HOSPADM

## 2023-05-24 RX ADMIN — CEFAZOLIN SODIUM 2 G: 2 SOLUTION INTRAVENOUS at 06:05

## 2023-05-24 RX ADMIN — LIDOCAINE HYDROCHLORIDE 100 MG: 20 INJECTION INTRAVENOUS at 07:05

## 2023-05-24 RX ADMIN — SODIUM CHLORIDE, SODIUM LACTATE, POTASSIUM CHLORIDE, AND CALCIUM CHLORIDE: .6; .31; .03; .02 INJECTION, SOLUTION INTRAVENOUS at 06:05

## 2023-05-24 RX ADMIN — FENTANYL CITRATE 100 MCG: 50 INJECTION, SOLUTION INTRAMUSCULAR; INTRAVENOUS at 06:05

## 2023-05-24 RX ADMIN — HYDROCODONE BITARTRATE AND ACETAMINOPHEN 1 TABLET: 7.5; 325 TABLET ORAL at 08:05

## 2023-05-24 RX ADMIN — PROPOFOL 60 MG: 10 INJECTION, EMULSION INTRAVENOUS at 07:05

## 2023-05-24 RX ADMIN — PROPOFOL 50 MCG/KG/MIN: 10 INJECTION, EMULSION INTRAVENOUS at 07:05

## 2023-05-24 RX ADMIN — MIDAZOLAM 2 MG: 1 INJECTION INTRAMUSCULAR; INTRAVENOUS at 06:05

## 2023-05-24 NOTE — TRANSFER OF CARE
"Anesthesia Transfer of Care Note    Patient: Stefanie España    Procedure(s) Performed: Procedure(s) (LRB):  [id number] (N/A)    Patient location: PACU    Anesthesia Type: MAC    Transport from OR: Transported from OR on room air with adequate spontaneous ventilation    Post pain: adequate analgesia    Post assessment: no apparent anesthetic complications    Post vital signs: stable    Level of consciousness: awake    Nausea/Vomiting: no nausea/vomiting    Complications: none    Transfer of care protocol was followed      Last vitals:   Visit Vitals  BP (!) 141/83   Pulse 86   Temp 36.5 °C (97.7 °F) (Temporal)   Resp 18   Ht 5' 8" (1.727 m)   Wt 98.5 kg (217 lb 2.5 oz)   SpO2 96%   Breastfeeding No   BMI 33.02 kg/m²     "

## 2023-05-24 NOTE — ANESTHESIA RELEASE NOTE
"Anesthesia Release from PACU Note    Patient: Stefanie España    Procedure(s) Performed: Procedure(s) (LRB):  [id number]-CATH (N/A)    Anesthesia type: MAC    Post pain: Adequate analgesia    Post assessment: no apparent anesthetic complications    Last Vitals:   Visit Vitals  BP (!) 141/83   Pulse 86   Temp 36.5 °C (97.7 °F) (Temporal)   Resp 18   Ht 5' 8" (1.727 m)   Wt 98.5 kg (217 lb 2.5 oz)   SpO2 96%   Breastfeeding No   BMI 33.02 kg/m²       Post vital signs: stable    Level of consciousness: awake    Nausea/Vomiting: no nausea/no vomiting    Complications: none    Airway Patency: patent    Respiratory: unassisted    Cardiovascular: stable and blood pressure at baseline    Hydration: euvolemic  "

## 2023-05-24 NOTE — ANESTHESIA POSTPROCEDURE EVALUATION
Anesthesia Post Evaluation    Patient: Stefanie España    Procedure(s) Performed: Procedure(s) (LRB):  [id number]-[id number] (N/A)    Final Anesthesia Type: MAC      Patient location during evaluation: PACU  Patient participation: Yes- Able to Participate  Level of consciousness: awake  Post-procedure vital signs: reviewed and stable  Pain management: adequate  Airway patency: patent    PONV status at discharge: No PONV  Anesthetic complications: no      Cardiovascular status: hemodynamically stable  Respiratory status: unassisted  Hydration status: euvolemic  Follow-up not needed.          Vitals Value Taken Time   /69 05/24/23 0847   Temp 36.1 °C (97 °F) 05/24/23 0847   Pulse 80 05/24/23 0853   Resp 26 05/24/23 0853   SpO2 100 % 05/24/23 0851   Vitals shown include unvalidated device data.      Event Time   Out of Recovery 08:50:57         Pain/Guerda Score: Pain Rating Prior to Med Admin: 5 (5/24/2023  8:47 AM)  Guerda Score: 10 (5/24/2023  8:45 AM)

## 2023-05-24 NOTE — PATIENT INSTRUCTIONS
Please call for any fever, increase in pain, nausea or vomiting or redness or drainage from incision(s).    There are no limits on your activity    No driving if taking the pain medicine    May shower     Removed the glue when it becomes loose    If you become constipated from the pain medication you can use over the counter laxatives,  Miralax or Glycolax, or milk of magnesia for severe constipation.    Our office phone numbers are  [phone] and

## 2023-05-24 NOTE — OP NOTE
Dictation #1  MRN:[id number]  [facility]:[id number] [facility] (H[facility])  Operative Note      Date of Procedure: 5/24/2023     Procedure: Procedure(s) (LRB):  [id number]-CATH (N/A)     Surgeon(s) and Role:     * Taco Zimmer MD - Primary    Assisting Surgeon: None    Pre-Operative Diagnosis: Difficult intravenous access [Z78.9]    Post-Operative Diagnosis: Post-Op Diagnosis Codes:     * Difficult intravenous access [Z78.9]    Anesthesia: Monitor Anesthesia Care    Operative Findings (including complications, if any):     Right subclavian mediport    Description of Technical Procedures:     The patient was placed supine . SCDs were placed for DVT prophylaxis and antibiotics were given  before the incision. SCD on the lower extremities.  The upper chest and neck were prepped and draped with sterile technique.       A time out was completed.    The central catheter was flushed with heparin to ensure function. Landmarks were identified and a skin entry site was chosen 1-2 cm inferior to the distal third of the clavicle. The right chest  wall skin and subcutaneous tissue were anesthetized with lidocaine. Local anesthesia was carried down to the periosteum of the clavicle.     The patient's head was turned in the contralateral direction and, as the ipsilateral arm was retracted caudad.  The bed was placed in slight Trendelenburg position.  The right subclavian vein was then located with the needle and a 10-mL syringe. The needle was inserted at the chosen site with the bevel down and directed toward the sternal notch. With continuous negative pressure in the syringe, the needle was advanced in a stepwise fashion down the clavicle. The vein was located as the needle passed beneath the bone. As soon as the syringe began to fill with venous blood, the needle was rotated 90 degrees so the bevel was pointed at the foot of the bed. The needle position was secured and the syringe removed. The hub was occluded to prevent  venous air embolus. The guide wire passed easily, and its position in the superior vena cava was confirmed by fluoroscopy. The needle was removed while the wire was held in place.     A small incision was made at the guidewire exit site. A 3-cm incision was made on the anterior chest wall, and electrocautery was used to create a subcutaneous pocket for the port.  The MediPort catheter was tunneled between the pocket in the guidewire exit site    The dilator/introducer was placed over the guidewire and advanced into the superior Vena cava.  The dilator and guidewire were removed.  The MediPort catheter was placed through the introducer and advanced into the distal superior vena cava/atrial.  The catheter was aspirated and there was brisk return of blood.  The catheter was then withdrawn under fluoroscopic visualization such that the tip was at the superior vena cava/atrial junction.  The catheter was cut and connected to the reservoir.    The port was placed in the subcutaneous pocket.  The port was secured in place in the subcutaneous pocket with 0-Vicryl sutures. The port was aspirated to ensure adequate blood flow and then flushed with heparinized saline. Both skin incisions were then closed in two layers with interrupted 3-0 Vicryl sutures and running 4-0 Monocryl sutures.     The patient tolerated the procedure well and was taken to the postanesthesia care unit in stable condition.  An upright chest x-ray was ordered to document location of the catheter tip and check for pneumothorax.      Significant Surgical Tasks Conducted by the Assistant(s), if Applicable:  None    Estimated Blood Loss (EBL):  10 mL   IV fluids 500 mL   Lidocaine 1% 6 mL   Marcaine 0.25% 10 mL *           Implants:   Implant Name Type Inv. Item Serial No.  Lot No. LRB No. Used Action   PORT POWER CLEAR VIEW - SN/A  PORT POWER CLEAR VIEW N/A [employer] [id number] Right 1 Implanted       Specimens:   Specimen (24h ago, onward)       None                    Condition: Stable    Disposition: PACU - hemodynamically stable.    Attestation: I performed the procedure.    Discharge Note    OUTCOME: Patient tolerated treatment/procedure well without complication and is now ready for discharge.    MediPort placement    DISPOSITION: Home or Self Care    FINAL DIAGNOSIS:  Lupus    FOLLOWUP: In clinic    DISCHARGE INSTRUCTIONS:    Discharge Procedure Orders   Diet general     Call MD for:  temperature >100.4     Call MD for:  persistent nausea and vomiting     Call MD for:  severe uncontrolled pain     Call MD for:  difficulty breathing, headache or visual disturbances     Call MD for:  redness, tenderness, or signs of infection (pain, swelling, redness, odor or green/yellow discharge around incision site)     No dressing needed     Activity as tolerated

## 2023-05-26 ENCOUNTER — INFUSION (OUTPATIENT)
Dept: INFUSION THERAPY | Facility: HOSPITAL | Age: 38
End: 2023-05-26
Attending: PHYSICIAN ASSISTANT
Payer: COMMERCIAL

## 2023-05-26 VITALS
HEIGHT: 68 IN | DIASTOLIC BLOOD PRESSURE: 80 MMHG | WEIGHT: 219.38 LBS | RESPIRATION RATE: 18 BRPM | TEMPERATURE: 98 F | HEART RATE: 94 BPM | BODY MASS INDEX: 33.25 KG/M2 | SYSTOLIC BLOOD PRESSURE: 121 MMHG

## 2023-05-26 VITALS
OXYGEN SATURATION: 100 % | TEMPERATURE: 97 F | SYSTOLIC BLOOD PRESSURE: 111 MMHG | BODY MASS INDEX: 32.91 KG/M2 | HEIGHT: 68 IN | HEART RATE: 78 BPM | WEIGHT: 217.13 LBS | RESPIRATION RATE: 15 BRPM | DIASTOLIC BLOOD PRESSURE: 69 MMHG

## 2023-05-26 DIAGNOSIS — M06.09 RHEUMATOID ARTHRITIS OF MULTIPLE SITES WITH NEGATIVE RHEUMATOID FACTOR: Primary | ICD-10-CM

## 2023-05-26 PROCEDURE — 25000003 PHARM REV CODE 250: Performed by: INTERNAL MEDICINE

## 2023-05-26 PROCEDURE — 63600175 PHARM REV CODE 636 W HCPCS: Mod: JZ,JA,JG | Performed by: INTERNAL MEDICINE

## 2023-05-26 PROCEDURE — 96365 THER/PROPH/DIAG IV INF INIT: CPT

## 2023-05-26 RX ORDER — EPINEPHRINE 0.3 MG/.3ML
0.3 INJECTION SUBCUTANEOUS ONCE AS NEEDED
Status: CANCELLED | OUTPATIENT
Start: 2023-06-09

## 2023-05-26 RX ORDER — ACETAMINOPHEN 325 MG/1
650 TABLET ORAL
Status: CANCELLED | OUTPATIENT
Start: 2023-06-09

## 2023-05-26 RX ORDER — METHYLPREDNISOLONE SOD SUCC 125 MG
100 VIAL (EA) INJECTION
Status: CANCELLED | OUTPATIENT
Start: 2023-06-09

## 2023-05-26 RX ORDER — DIPHENHYDRAMINE HYDROCHLORIDE 50 MG/ML
50 INJECTION INTRAMUSCULAR; INTRAVENOUS ONCE AS NEEDED
Status: CANCELLED | OUTPATIENT
Start: 2023-06-09

## 2023-05-26 RX ORDER — DIPHENHYDRAMINE HYDROCHLORIDE 50 MG/ML
25 INJECTION INTRAMUSCULAR; INTRAVENOUS
Status: CANCELLED | OUTPATIENT
Start: 2023-06-09

## 2023-05-26 RX ORDER — HEPARIN 100 UNIT/ML
500 SYRINGE INTRAVENOUS
Status: DISCONTINUED | OUTPATIENT
Start: 2023-05-26 | End: 2023-05-26 | Stop reason: HOSPADM

## 2023-05-26 RX ORDER — HEPARIN 100 UNIT/ML
500 SYRINGE INTRAVENOUS
Status: CANCELLED | OUTPATIENT
Start: 2023-06-09

## 2023-05-26 RX ORDER — SODIUM CHLORIDE 0.9 % (FLUSH) 0.9 %
10 SYRINGE (ML) INJECTION
Status: CANCELLED | OUTPATIENT
Start: 2023-06-09

## 2023-05-26 RX ADMIN — SODIUM CHLORIDE 1000 MG: 9 INJECTION, SOLUTION INTRAVENOUS at 11:05

## 2023-05-26 NOTE — PLAN OF CARE
Pt administered Orencia today and will follow up in two weeks.      Problem: Fall Injury Risk  Goal: Absence of Fall and Fall-Related Injury  Outcome: Ongoing, Progressing     Problem: Infection  Goal: Absence of Infection Signs and Symptoms  Outcome: Ongoing, Progressing     Problem: Adult Inpatient Plan of Care  Goal: Plan of Care Review  Outcome: Ongoing, Progressing  Goal: Patient-Specific Goal (Individualized)  Outcome: Ongoing, Progressing  Flowsheets (Taken 5/26/2023 1221)  Anxieties, Fears or Concerns: Pt has new port to right chest wall, tender to touch. Pt concerned about access. Pt chose to have peripheral IV.  Individualized Care Needs: Pt provided pillow, warm blanket and legs elevated in reclined position.

## 2023-05-28 ENCOUNTER — TELEPHONE (OUTPATIENT)
Dept: RHEUMATOLOGY | Facility: CLINIC | Age: 38
End: 2023-05-28
Payer: COMMERCIAL

## 2023-05-28 DIAGNOSIS — Z78.9 DIFFICULT INTRAVENOUS ACCESS: Primary | ICD-10-CM

## 2023-05-28 RX ORDER — LIDOCAINE AND PRILOCAINE 25; 25 MG/G; MG/G
CREAM TOPICAL
Qty: 5 G | Refills: 3 | Status: SHIPPED | OUTPATIENT
Start: 2023-05-28

## 2023-05-29 NOTE — TELEPHONE ENCOUNTER
----- Message from Jaelyn Davis, RN sent at 5/26/2023 12:28 PM CDT -----  Good afternoon, Dr. Whalen.     Would you be able to prescribe pt EMLA cream for her port access? [facility], [place] - [address]

## 2023-06-08 ENCOUNTER — PATIENT MESSAGE (OUTPATIENT)
Dept: RHEUMATOLOGY | Facility: CLINIC | Age: 38
End: 2023-06-08
Payer: COMMERCIAL

## 2023-06-09 ENCOUNTER — PATIENT MESSAGE (OUTPATIENT)
Dept: RHEUMATOLOGY | Facility: CLINIC | Age: 38
End: 2023-06-09
Payer: COMMERCIAL

## 2023-06-09 ENCOUNTER — INFUSION (OUTPATIENT)
Dept: INFUSION THERAPY | Facility: HOSPITAL | Age: 38
End: 2023-06-09
Attending: PHYSICIAN ASSISTANT
Payer: COMMERCIAL

## 2023-06-09 VITALS
RESPIRATION RATE: 18 BRPM | TEMPERATURE: 98 F | BODY MASS INDEX: 32.88 KG/M2 | WEIGHT: 216.94 LBS | HEIGHT: 68 IN | DIASTOLIC BLOOD PRESSURE: 78 MMHG | SYSTOLIC BLOOD PRESSURE: 121 MMHG | OXYGEN SATURATION: 98 % | HEART RATE: 90 BPM

## 2023-06-09 DIAGNOSIS — M06.09 RHEUMATOID ARTHRITIS OF MULTIPLE SITES WITH NEGATIVE RHEUMATOID FACTOR: Primary | ICD-10-CM

## 2023-06-09 PROCEDURE — 96365 THER/PROPH/DIAG IV INF INIT: CPT

## 2023-06-09 PROCEDURE — 63600175 PHARM REV CODE 636 W HCPCS: Performed by: INTERNAL MEDICINE

## 2023-06-09 PROCEDURE — A4216 STERILE WATER/SALINE, 10 ML: HCPCS | Performed by: INTERNAL MEDICINE

## 2023-06-09 PROCEDURE — 25000003 PHARM REV CODE 250: Performed by: INTERNAL MEDICINE

## 2023-06-09 RX ORDER — METHYLPREDNISOLONE SOD SUCC 125 MG
100 VIAL (EA) INJECTION
Status: CANCELLED | OUTPATIENT
Start: 2023-07-07

## 2023-06-09 RX ORDER — SODIUM CHLORIDE 0.9 % (FLUSH) 0.9 %
10 SYRINGE (ML) INJECTION
Status: CANCELLED | OUTPATIENT
Start: 2023-07-07

## 2023-06-09 RX ORDER — SODIUM CHLORIDE 0.9 % (FLUSH) 0.9 %
10 SYRINGE (ML) INJECTION
Status: DISCONTINUED | OUTPATIENT
Start: 2023-06-09 | End: 2023-06-09 | Stop reason: HOSPADM

## 2023-06-09 RX ORDER — DIPHENHYDRAMINE HYDROCHLORIDE 50 MG/ML
50 INJECTION INTRAMUSCULAR; INTRAVENOUS ONCE AS NEEDED
Status: CANCELLED | OUTPATIENT
Start: 2023-07-07

## 2023-06-09 RX ORDER — DIPHENHYDRAMINE HYDROCHLORIDE 50 MG/ML
25 INJECTION INTRAMUSCULAR; INTRAVENOUS
Status: CANCELLED | OUTPATIENT
Start: 2023-07-07

## 2023-06-09 RX ORDER — HEPARIN 100 UNIT/ML
500 SYRINGE INTRAVENOUS
Status: DISCONTINUED | OUTPATIENT
Start: 2023-06-09 | End: 2023-06-09 | Stop reason: HOSPADM

## 2023-06-09 RX ORDER — EPINEPHRINE 0.3 MG/.3ML
0.3 INJECTION SUBCUTANEOUS ONCE AS NEEDED
Status: CANCELLED | OUTPATIENT
Start: 2023-07-07

## 2023-06-09 RX ORDER — HEPARIN 100 UNIT/ML
500 SYRINGE INTRAVENOUS
Status: CANCELLED | OUTPATIENT
Start: 2023-07-07

## 2023-06-09 RX ORDER — ACETAMINOPHEN 325 MG/1
650 TABLET ORAL
Status: CANCELLED | OUTPATIENT
Start: 2023-07-07

## 2023-06-09 RX ADMIN — HEPARIN 500 UNITS: 100 SYRINGE at 02:06

## 2023-06-09 RX ADMIN — Medication 10 ML: at 02:06

## 2023-06-09 RX ADMIN — SODIUM CHLORIDE 1000 MG: 9 INJECTION, SOLUTION INTRAVENOUS at 01:06

## 2023-06-09 NOTE — NURSING
Infusion# 3    Recent labs? Reviewed    Premeds? None    S/S of current or recent infections in the past 14 days? None/Denies    Recent Surgery/invasive procedures? Had right upper chest implanted x 2 weeks ago.      Any recent vaccines ? None/Denies    Orencia 1000 mg administered IV at a 30 minute rate per orders; see MAR and vitals for more  Details.

## 2023-07-06 ENCOUNTER — INFUSION (OUTPATIENT)
Dept: INFUSION THERAPY | Facility: HOSPITAL | Age: 38
End: 2023-07-06
Attending: PHYSICIAN ASSISTANT
Payer: COMMERCIAL

## 2023-07-06 VITALS
RESPIRATION RATE: 18 BRPM | HEART RATE: 86 BPM | WEIGHT: 218.25 LBS | TEMPERATURE: 98 F | BODY MASS INDEX: 33.19 KG/M2 | SYSTOLIC BLOOD PRESSURE: 121 MMHG | DIASTOLIC BLOOD PRESSURE: 77 MMHG

## 2023-07-06 DIAGNOSIS — M06.09 RHEUMATOID ARTHRITIS OF MULTIPLE SITES WITH NEGATIVE RHEUMATOID FACTOR: Primary | ICD-10-CM

## 2023-07-06 PROCEDURE — 25000003 PHARM REV CODE 250: Performed by: INTERNAL MEDICINE

## 2023-07-06 PROCEDURE — 96365 THER/PROPH/DIAG IV INF INIT: CPT

## 2023-07-06 PROCEDURE — 63600175 PHARM REV CODE 636 W HCPCS: Mod: JZ,JA,JG | Performed by: INTERNAL MEDICINE

## 2023-07-06 RX ORDER — HEPARIN 100 UNIT/ML
500 SYRINGE INTRAVENOUS
Status: DISCONTINUED | OUTPATIENT
Start: 2023-07-06 | End: 2023-07-06 | Stop reason: HOSPADM

## 2023-07-06 RX ORDER — HEPARIN 100 UNIT/ML
500 SYRINGE INTRAVENOUS
Status: CANCELLED | OUTPATIENT
Start: 2023-07-07

## 2023-07-06 RX ORDER — DIPHENHYDRAMINE HYDROCHLORIDE 50 MG/ML
25 INJECTION INTRAMUSCULAR; INTRAVENOUS
Status: CANCELLED | OUTPATIENT
Start: 2023-07-07

## 2023-07-06 RX ORDER — METHYLPREDNISOLONE SOD SUCC 125 MG
100 VIAL (EA) INJECTION
Status: CANCELLED | OUTPATIENT
Start: 2023-07-07

## 2023-07-06 RX ORDER — ACETAMINOPHEN 325 MG/1
650 TABLET ORAL
Status: CANCELLED | OUTPATIENT
Start: 2023-07-07

## 2023-07-06 RX ORDER — DIPHENHYDRAMINE HYDROCHLORIDE 50 MG/ML
50 INJECTION INTRAMUSCULAR; INTRAVENOUS ONCE AS NEEDED
Status: CANCELLED | OUTPATIENT
Start: 2023-07-07

## 2023-07-06 RX ORDER — EPINEPHRINE 0.3 MG/.3ML
0.3 INJECTION SUBCUTANEOUS ONCE AS NEEDED
Status: CANCELLED | OUTPATIENT
Start: 2023-07-07

## 2023-07-06 RX ORDER — SODIUM CHLORIDE 0.9 % (FLUSH) 0.9 %
10 SYRINGE (ML) INJECTION
Status: CANCELLED | OUTPATIENT
Start: 2023-07-07

## 2023-07-06 RX ADMIN — HEPARIN 500 UNITS: 100 SYRINGE at 12:07

## 2023-07-06 RX ADMIN — SODIUM CHLORIDE 1000 MG: 9 INJECTION, SOLUTION INTRAVENOUS at 11:07

## 2023-07-06 NOTE — PLAN OF CARE
Discussed plan of care with pt. Addressed any and ongoing concerns. Pt denies    Problem: Adult Inpatient Plan of Care  Goal: Plan of Care Review  Outcome: Ongoing, Progressing  Flowsheets (Taken 7/6/2023 1143)  Plan of Care Reviewed With: patient  Goal: Patient-Specific Goal (Individualized)  Outcome: Ongoing, Progressing  Goal: Absence of Hospital-Acquired Illness or Injury  Outcome: Ongoing, Progressing  Intervention: Identify and Manage Fall Risk  Flowsheets (Taken 7/6/2023 1143)  Safety Promotion/Fall Prevention:   in recliner, wheels locked   nonskid shoes/socks when out of bed   room near unit station  Intervention: Prevent Infection  Flowsheets (Taken 7/6/2023 1143)  Infection Prevention:   hand hygiene promoted   equipment surfaces disinfected  Goal: Optimal Comfort and Wellbeing  Outcome: Ongoing, Progressing  Intervention: Monitor Pain and Promote Comfort  Flowsheets (Taken 7/6/2023 1143)  Pain Management Interventions:   quiet environment facilitated   warm blanket provided  Intervention: Provide Person-Centered Care  Flowsheets (Taken 7/6/2023 1143)  Trust Relationship/Rapport:   care explained   choices provided   reassurance provided   thoughts/feelings acknowledged   emotional support provided   empathic listening provided   questions answered   questions encouraged

## 2023-07-24 ENCOUNTER — LAB VISIT (OUTPATIENT)
Dept: LAB | Facility: HOSPITAL | Age: 38
End: 2023-07-24
Attending: INTERNAL MEDICINE
Payer: COMMERCIAL

## 2023-07-24 DIAGNOSIS — M34.9 DIFFUSE SCLERODERMA: ICD-10-CM

## 2023-07-24 DIAGNOSIS — L94.0 MORPHEA: ICD-10-CM

## 2023-07-24 DIAGNOSIS — Z79.899 LONG-TERM CURRENT USE OF HIGH RISK MEDICATION OTHER THAN ANTICOAGULANT: ICD-10-CM

## 2023-07-24 DIAGNOSIS — M35.1 MCTD (MIXED CONNECTIVE TISSUE DISEASE): ICD-10-CM

## 2023-07-24 LAB
BASOPHILS # BLD AUTO: 0.02 K/UL (ref 0–0.2)
BASOPHILS NFR BLD: 0.3 % (ref 0–1.9)
DIFFERENTIAL METHOD: ABNORMAL
EOSINOPHIL # BLD AUTO: 0 K/UL (ref 0–0.5)
EOSINOPHIL NFR BLD: 0.4 % (ref 0–8)
ERYTHROCYTE [DISTWIDTH] IN BLOOD BY AUTOMATED COUNT: 12.5 % (ref 11.5–14.5)
ERYTHROCYTE [SEDIMENTATION RATE] IN BLOOD BY WESTERGREN METHOD: 91 MM/HR (ref 0–20)
HCT VFR BLD AUTO: 31.1 % (ref 37–48.5)
HGB BLD-MCNC: 10.1 G/DL (ref 12–16)
IMM GRANULOCYTES # BLD AUTO: 0.03 K/UL (ref 0–0.04)
IMM GRANULOCYTES NFR BLD AUTO: 0.4 % (ref 0–0.5)
LYMPHOCYTES # BLD AUTO: 0.9 K/UL (ref 1–4.8)
LYMPHOCYTES NFR BLD: 11.7 % (ref 18–48)
MCH RBC QN AUTO: 29.5 PG (ref 27–31)
MCHC RBC AUTO-ENTMCNC: 32.5 G/DL (ref 32–36)
MCV RBC AUTO: 91 FL (ref 82–98)
MONOCYTES # BLD AUTO: 0.1 K/UL (ref 0.3–1)
MONOCYTES NFR BLD: 0.8 % (ref 4–15)
NEUTROPHILS # BLD AUTO: 6.4 K/UL (ref 1.8–7.7)
NEUTROPHILS NFR BLD: 86.8 % (ref 38–73)
NRBC BLD-RTO: 0 /100 WBC
PLATELET # BLD AUTO: 415 K/UL (ref 150–450)
PMV BLD AUTO: 8.4 FL (ref 9.2–12.9)
RBC # BLD AUTO: 3.42 M/UL (ref 4–5.4)
WBC # BLD AUTO: 7.37 K/UL (ref 3.9–12.7)

## 2023-07-24 PROCEDURE — 80053 COMPREHEN METABOLIC PANEL: CPT | Performed by: INTERNAL MEDICINE

## 2023-07-24 PROCEDURE — 86160 COMPLEMENT ANTIGEN: CPT | Mod: 59 | Performed by: INTERNAL MEDICINE

## 2023-07-24 PROCEDURE — 85651 RBC SED RATE NONAUTOMATED: CPT | Mod: PO | Performed by: INTERNAL MEDICINE

## 2023-07-24 PROCEDURE — 85025 COMPLETE CBC W/AUTO DIFF WBC: CPT | Mod: PO | Performed by: INTERNAL MEDICINE

## 2023-07-24 PROCEDURE — 36415 COLL VENOUS BLD VENIPUNCTURE: CPT | Mod: PO | Performed by: INTERNAL MEDICINE

## 2023-07-24 PROCEDURE — 86160 COMPLEMENT ANTIGEN: CPT | Performed by: INTERNAL MEDICINE

## 2023-07-24 PROCEDURE — 86140 C-REACTIVE PROTEIN: CPT | Performed by: INTERNAL MEDICINE

## 2023-07-25 ENCOUNTER — PATIENT MESSAGE (OUTPATIENT)
Dept: RHEUMATOLOGY | Facility: CLINIC | Age: 38
End: 2023-07-25
Payer: COMMERCIAL

## 2023-07-25 LAB
ALBUMIN SERPL BCP-MCNC: 3.3 G/DL (ref 3.5–5.2)
ALP SERPL-CCNC: 53 U/L (ref 55–135)
ALT SERPL W/O P-5'-P-CCNC: 20 U/L (ref 10–44)
ANION GAP SERPL CALC-SCNC: 11 MMOL/L (ref 8–16)
AST SERPL-CCNC: 34 U/L (ref 10–40)
BILIRUB SERPL-MCNC: 0.2 MG/DL (ref 0.1–1)
BUN SERPL-MCNC: 8 MG/DL (ref 6–20)
C3 SERPL-MCNC: 145 MG/DL (ref 50–180)
C4 SERPL-MCNC: 20 MG/DL (ref 11–44)
CALCIUM SERPL-MCNC: 9.2 MG/DL (ref 8.7–10.5)
CHLORIDE SERPL-SCNC: 104 MMOL/L (ref 95–110)
CO2 SERPL-SCNC: 22 MMOL/L (ref 23–29)
CREAT SERPL-MCNC: 0.7 MG/DL (ref 0.5–1.4)
CRP SERPL-MCNC: 23.8 MG/L (ref 0–8.2)
EST. GFR  (NO RACE VARIABLE): >60 ML/MIN/1.73 M^2
GLUCOSE SERPL-MCNC: 92 MG/DL (ref 70–110)
POTASSIUM SERPL-SCNC: 4.1 MMOL/L (ref 3.5–5.1)
PROT SERPL-MCNC: 9.4 G/DL (ref 6–8.4)
SODIUM SERPL-SCNC: 137 MMOL/L (ref 136–145)

## 2023-07-26 ENCOUNTER — PATIENT MESSAGE (OUTPATIENT)
Dept: RHEUMATOLOGY | Facility: CLINIC | Age: 38
End: 2023-07-26
Payer: COMMERCIAL

## 2023-07-27 ENCOUNTER — PATIENT MESSAGE (OUTPATIENT)
Dept: RHEUMATOLOGY | Facility: CLINIC | Age: 38
End: 2023-07-27
Payer: COMMERCIAL

## 2023-07-27 ENCOUNTER — PATIENT MESSAGE (OUTPATIENT)
Dept: INTERNAL MEDICINE | Facility: CLINIC | Age: 38
End: 2023-07-27
Payer: COMMERCIAL

## 2023-07-27 DIAGNOSIS — N39.0 URINARY TRACT INFECTION WITHOUT HEMATURIA, SITE UNSPECIFIED: ICD-10-CM

## 2023-07-27 DIAGNOSIS — R10.9 ABDOMINAL PAIN, UNSPECIFIED ABDOMINAL LOCATION: Primary | ICD-10-CM

## 2023-08-01 ENCOUNTER — PATIENT MESSAGE (OUTPATIENT)
Dept: RHEUMATOLOGY | Facility: CLINIC | Age: 38
End: 2023-08-01
Payer: COMMERCIAL

## 2023-08-01 DIAGNOSIS — R91.1 LUNG NODULE SEEN ON IMAGING STUDY: ICD-10-CM

## 2023-08-01 DIAGNOSIS — R05.3 CHRONIC COUGH: Primary | ICD-10-CM

## 2023-08-03 ENCOUNTER — HOSPITAL ENCOUNTER (OUTPATIENT)
Dept: RADIOLOGY | Facility: HOSPITAL | Age: 38
Discharge: HOME | End: 2023-08-03
Attending: FAMILY MEDICINE
Payer: COMMERCIAL

## 2023-08-03 ENCOUNTER — INFUSION (OUTPATIENT)
Dept: INFUSION THERAPY | Facility: HOSPITAL | Age: 38
End: 2023-08-03
Attending: PHYSICIAN ASSISTANT
Payer: COMMERCIAL

## 2023-08-03 VITALS
TEMPERATURE: 99 F | SYSTOLIC BLOOD PRESSURE: 118 MMHG | RESPIRATION RATE: 16 BRPM | HEART RATE: 95 BPM | WEIGHT: 219.13 LBS | BODY MASS INDEX: 33.32 KG/M2 | DIASTOLIC BLOOD PRESSURE: 67 MMHG

## 2023-08-03 DIAGNOSIS — R10.9 ABDOMINAL PAIN, UNSPECIFIED ABDOMINAL LOCATION: ICD-10-CM

## 2023-08-03 DIAGNOSIS — N39.0 URINARY TRACT INFECTION WITHOUT HEMATURIA, SITE UNSPECIFIED: ICD-10-CM

## 2023-08-03 DIAGNOSIS — M06.09 RHEUMATOID ARTHRITIS OF MULTIPLE SITES WITH NEGATIVE RHEUMATOID FACTOR: Primary | ICD-10-CM

## 2023-08-03 PROCEDURE — 63600175 PHARM REV CODE 636 W HCPCS: Performed by: INTERNAL MEDICINE

## 2023-08-03 PROCEDURE — 96365 THER/PROPH/DIAG IV INF INIT: CPT

## 2023-08-03 PROCEDURE — 74176 CT ABD & PELVIS W/O CONTRAST: CPT | Mod: TC

## 2023-08-03 PROCEDURE — 74176 CT RENAL STONE STUDY ABD PELVIS WO: ICD-10-PCS | Mod: 26,,, | Performed by: RADIOLOGY

## 2023-08-03 PROCEDURE — 25000003 PHARM REV CODE 250: Performed by: INTERNAL MEDICINE

## 2023-08-03 PROCEDURE — 74176 CT ABD & PELVIS W/O CONTRAST: CPT | Mod: 26,,, | Performed by: RADIOLOGY

## 2023-08-03 RX ORDER — METHYLPREDNISOLONE SOD SUCC 125 MG
100 VIAL (EA) INJECTION
Status: CANCELLED | OUTPATIENT
Start: 2023-08-31

## 2023-08-03 RX ORDER — DIPHENHYDRAMINE HYDROCHLORIDE 50 MG/ML
50 INJECTION INTRAMUSCULAR; INTRAVENOUS ONCE AS NEEDED
Status: CANCELLED | OUTPATIENT
Start: 2023-08-31

## 2023-08-03 RX ORDER — HEPARIN 100 UNIT/ML
500 SYRINGE INTRAVENOUS
Status: CANCELLED | OUTPATIENT
Start: 2023-08-31

## 2023-08-03 RX ORDER — ACETAMINOPHEN 325 MG/1
650 TABLET ORAL
Status: CANCELLED | OUTPATIENT
Start: 2023-08-31

## 2023-08-03 RX ORDER — DIPHENHYDRAMINE HYDROCHLORIDE 50 MG/ML
25 INJECTION INTRAMUSCULAR; INTRAVENOUS
Status: CANCELLED | OUTPATIENT
Start: 2023-08-31

## 2023-08-03 RX ORDER — HEPARIN 100 UNIT/ML
500 SYRINGE INTRAVENOUS
Status: DISCONTINUED | OUTPATIENT
Start: 2023-08-03 | End: 2023-08-03 | Stop reason: HOSPADM

## 2023-08-03 RX ORDER — SODIUM CHLORIDE 0.9 % (FLUSH) 0.9 %
10 SYRINGE (ML) INJECTION
Status: CANCELLED | OUTPATIENT
Start: 2023-08-31

## 2023-08-03 RX ORDER — EPINEPHRINE 0.3 MG/.3ML
0.3 INJECTION SUBCUTANEOUS ONCE AS NEEDED
Status: CANCELLED | OUTPATIENT
Start: 2023-08-31

## 2023-08-03 RX ADMIN — SODIUM CHLORIDE 1000 MG: 9 INJECTION, SOLUTION INTRAVENOUS at 11:08

## 2023-08-03 RX ADMIN — HEPARIN 500 UNITS: 100 SYRINGE at 12:08

## 2023-08-03 NOTE — PLAN OF CARE
Pt tolerated Orencia infusion without difficulties today. POC reviewed with pt. Denies having questions/concerns at this time. NAD noted at discharge.      Problem: Adult Inpatient Plan of Care  Goal: Plan of Care Review  Outcome: Ongoing, Progressing  Flowsheets (Taken 8/3/2023 1254)  Plan of Care Reviewed With: patient  Goal: Patient-Specific Goal (Individualized)  Outcome: Ongoing, Progressing  Flowsheets (Taken 8/3/2023 1254)  Anxieties, Fears or Concerns: Reports bilateral knee pain.  Individualized Care Needs: legs elevated, warm blanket, snack/drink, lights dimmed  Goal: Absence of Hospital-Acquired Illness or Injury  Outcome: Ongoing, Progressing  Intervention: Identify and Manage Fall Risk  Flowsheets (Taken 8/3/2023 1254)  Safety Promotion/Fall Prevention:   in recliner, wheels locked   lighting adjusted   medications reviewed   assistive device/personal item within reach  Intervention: Prevent Skin Injury  Flowsheets (Taken 8/3/2023 1254)  Body Position: position changed independently  Skin Protection:   adhesive use limited   tubing/devices free from skin contact  Intervention: Prevent and Manage VTE (Venous Thromboembolism) Risk  Flowsheets (Taken 8/3/2023 1254)  Activity Management: Ambulated -L4  VTE Prevention/Management:   ambulation promoted   fluids promoted  Intervention: Prevent Infection  Flowsheets (Taken 8/3/2023 1254)  Infection Prevention:   hand hygiene promoted   equipment surfaces disinfected   environmental surveillance performed   personal protective equipment utilized  Goal: Optimal Comfort and Wellbeing  Outcome: Ongoing, Progressing  Intervention: Monitor Pain and Promote Comfort  Flowsheets (Taken 8/3/2023 1254)  Pain Management Interventions:   relaxation techniques promoted   quiet environment facilitated   warm blanket provided  Intervention: Provide Person-Centered Care  Flowsheets (Taken 8/3/2023 1254)  Trust Relationship/Rapport:   care explained   questions encouraged    choices provided   reassurance provided   emotional support provided   thoughts/feelings acknowledged   empathic listening provided   questions answered

## 2023-08-04 ENCOUNTER — PATIENT MESSAGE (OUTPATIENT)
Dept: INTERNAL MEDICINE | Facility: CLINIC | Age: 38
End: 2023-08-04
Payer: COMMERCIAL

## 2023-08-04 DIAGNOSIS — M06.09 RHEUMATOID ARTHRITIS OF MULTIPLE SITES WITH NEGATIVE RHEUMATOID FACTOR: ICD-10-CM

## 2023-08-04 DIAGNOSIS — M32.9 SYSTEMIC LUPUS ERYTHEMATOSUS ARTHRITIS: ICD-10-CM

## 2023-08-05 RX ORDER — TRAMADOL HYDROCHLORIDE 50 MG/1
TABLET ORAL
Qty: 60 TABLET | Refills: 0 | Status: SHIPPED | OUTPATIENT
Start: 2023-08-05 | End: 2023-10-11

## 2023-08-07 ENCOUNTER — OFFICE VISIT (OUTPATIENT)
Dept: INTERNAL MEDICINE | Facility: CLINIC | Age: 38
End: 2023-08-07
Payer: COMMERCIAL

## 2023-08-07 VITALS
OXYGEN SATURATION: 97 % | HEART RATE: 106 BPM | DIASTOLIC BLOOD PRESSURE: 64 MMHG | SYSTOLIC BLOOD PRESSURE: 112 MMHG | TEMPERATURE: 100 F | WEIGHT: 219.38 LBS | BODY MASS INDEX: 33.35 KG/M2 | RESPIRATION RATE: 18 BRPM

## 2023-08-07 DIAGNOSIS — J06.9 URI WITH COUGH AND CONGESTION: ICD-10-CM

## 2023-08-07 DIAGNOSIS — R05.3 CHRONIC COUGH: ICD-10-CM

## 2023-08-07 DIAGNOSIS — M06.09 RHEUMATOID ARTHRITIS OF MULTIPLE SITES WITH NEGATIVE RHEUMATOID FACTOR: ICD-10-CM

## 2023-08-07 DIAGNOSIS — M32.9 SYSTEMIC LUPUS ERYTHEMATOSUS ARTHRITIS: ICD-10-CM

## 2023-08-07 DIAGNOSIS — Z79.899 LONG-TERM CURRENT USE OF HIGH RISK MEDICATION OTHER THAN ANTICOAGULANT: ICD-10-CM

## 2023-08-07 DIAGNOSIS — R91.8 ABNORMAL FINDING ON LUNG IMAGING: Primary | ICD-10-CM

## 2023-08-07 DIAGNOSIS — D84.9 IMMUNOSUPPRESSED STATUS: ICD-10-CM

## 2023-08-07 DIAGNOSIS — R91.1 SOLITARY PULMONARY NODULE: ICD-10-CM

## 2023-08-07 DIAGNOSIS — M35.1 MCTD (MIXED CONNECTIVE TISSUE DISEASE): ICD-10-CM

## 2023-08-07 PROCEDURE — 3008F BODY MASS INDEX DOCD: CPT | Mod: CPTII,S$GLB,, | Performed by: FAMILY MEDICINE

## 2023-08-07 PROCEDURE — 99999 PR PBB SHADOW E&M-EST. PATIENT-LVL IV: CPT | Mod: PBBFAC,,, | Performed by: FAMILY MEDICINE

## 2023-08-07 PROCEDURE — 3074F SYST BP LT 130 MM HG: CPT | Mod: CPTII,S$GLB,, | Performed by: FAMILY MEDICINE

## 2023-08-07 PROCEDURE — 3078F PR MOST RECENT DIASTOLIC BLOOD PRESSURE < 80 MM HG: ICD-10-PCS | Mod: CPTII,S$GLB,, | Performed by: FAMILY MEDICINE

## 2023-08-07 PROCEDURE — 99214 OFFICE O/P EST MOD 30 MIN: CPT | Mod: S$GLB,,, | Performed by: FAMILY MEDICINE

## 2023-08-07 PROCEDURE — 3008F PR BODY MASS INDEX (BMI) DOCUMENTED: ICD-10-PCS | Mod: CPTII,S$GLB,, | Performed by: FAMILY MEDICINE

## 2023-08-07 PROCEDURE — 1159F MED LIST DOCD IN RCRD: CPT | Mod: CPTII,S$GLB,, | Performed by: FAMILY MEDICINE

## 2023-08-07 PROCEDURE — 3078F DIAST BP <80 MM HG: CPT | Mod: CPTII,S$GLB,, | Performed by: FAMILY MEDICINE

## 2023-08-07 PROCEDURE — 99999 PR PBB SHADOW E&M-EST. PATIENT-LVL IV: ICD-10-PCS | Mod: PBBFAC,,, | Performed by: FAMILY MEDICINE

## 2023-08-07 PROCEDURE — 99214 PR OFFICE/OUTPT VISIT, EST, LEVL IV, 30-39 MIN: ICD-10-PCS | Mod: S$GLB,,, | Performed by: FAMILY MEDICINE

## 2023-08-07 PROCEDURE — 1159F PR MEDICATION LIST DOCUMENTED IN MEDICAL RECORD: ICD-10-PCS | Mod: CPTII,S$GLB,, | Performed by: FAMILY MEDICINE

## 2023-08-07 PROCEDURE — 3074F PR MOST RECENT SYSTOLIC BLOOD PRESSURE < 130 MM HG: ICD-10-PCS | Mod: CPTII,S$GLB,, | Performed by: FAMILY MEDICINE

## 2023-08-07 RX ORDER — PROMETHAZINE HYDROCHLORIDE AND DEXTROMETHORPHAN HYDROBROMIDE 6.25; 15 MG/5ML; MG/5ML
5 SYRUP ORAL EVERY 6 HOURS PRN
Qty: 118 ML | Refills: 0 | Status: SHIPPED | OUTPATIENT
Start: 2023-08-07 | End: 2023-08-17

## 2023-08-07 NOTE — Clinical Note
I ordered PET. From the [facility] CT of the renal.  She has appt with Dr. Raines this week. [place].

## 2023-08-07 NOTE — PROGRESS NOTES
Stefanie España  08/07/2023  [id number]    Sofia Martinez MD  Patient Care Team:  Sofia Martinez MD as PCP - General (Family Medicine)  Tangela Espinoza LPN as Care Coordinator (Internal Medicine)          Visit Type:a scheduled routine follow-up visit    Chief Complaint:  Chief Complaint   Patient presents with    Follow-up     CT scan done and here for results.        History of Present Illness:  Follow up   CT scan done to look for stones       FINDINGS:  Heart: Normal in size. No pericardial effusion.     Lung Bases: Multiple bibasilar nodular densities, new compared to prior chest CT.  Largest lesion in the left lower lobe measures 2.4 cm and exhibits subtle spiculation.     Liver: Normal in size and attenuation, with no focal hepatic lesions.     Gallbladder: Not visualized and either surgically absent or contracted.     Bile Ducts: No evidence of dilated ducts.     Pancreas: No mass or peripancreatic fat stranding.     Spleen: Unremarkable.     Adrenals: Unremarkable.     Kidneys/Ureters: No visible renal, ureteral, or bladder calculi.  No hydroureteronephrosis.  No gross renal mass.     Bladder: No evidence of wall thickening.     Reproductive organs: Grossly unremarkable.     GI Tract/Mesentery: No evidence of bowel obstruction or inflammation.     Peritoneal Space: No ascites. No free air.     Retroperitoneum: No significant adenopathy.     Abdominal wall: Postsurgical changes.     Vasculature: No significant atherosclerosis or aneurysm.     Bones: No acute fracture.     Impression:     1. Multiple newly developed bibasilar lung nodules, concerning for malignancy though disseminated infection/inflammation also possible.  Further evaluation with PET-CT and/or tissue sampling recommended.  2. No evidence of urinary tract calculi or obstruction.  3. Remainder as above.    She has history of nodules, seen on CT by Rheum  She now has Sclerodema.  MCTD and Lupus  On infusion.      History:  Past  Medical History:   Diagnosis Date    Abnormal Pap smear of cervix     repeat was normal    Lupus (systemic lupus erythematosus)      Past Surgical History:   Procedure Laterality Date    INSERTION OF TUNNELED CENTRAL VENOUS CATHETER (CVC) WITH SUBCUTANEOUS PORT N/A 05/24/2023    Procedure: [id number]-CATH;  Surgeon: Taco Zimmer MD;  Location: [facility] OR;  Service: General;  Laterality: N/A;    ROBOT-ASSISTED CHOLECYSTECTOMY USING DA CRISTOPHER XI N/A 12/11/2020    Procedure: XI ROBOTIC CHOLECYSTECTOMY;  Surgeon: Taco Zimmer MD;  Location: [facility] OR;  Service: General;  Laterality: N/A;    ROBOT-ASSISTED LAPAROSCOPIC LYSIS OF ADHESIONS USING DA CRISTOPHER XI  12/11/2020    Procedure: XI ROBOTIC LYSIS, ADHESIONS;  Surgeon: Taco Zimmer MD;  Location: [facility] OR;  Service: General;;    ROBOT-ASSISTED LAPAROSCOPIC SALPINGO-OOPHORECTOMY USING DA CRISTOPHER XI Left 12/11/2020    Procedure: XI ROBOTIC SALPINGO-OOPHORECTOMY;  Surgeon: SHERLEY Wakefield MD;  Location: [facility] OR;  Service: OB/GYN;  Laterality: Left;    TUBAL LIGATION  2005     Family History   Problem Relation Age of Onset    Liver cancer Maternal Aunt     Breast cancer Maternal Aunt     Breast cancer Paternal Grandmother     Colon cancer Neg Hx     Ovarian cancer Neg Hx     Thrombosis Neg Hx      Social History     Socioeconomic History    Marital status:    Occupational History    Occupation: [employer]   Tobacco Use    Smoking status: Never     Passive exposure: Never    Smokeless tobacco: Never   Substance and Sexual Activity    Alcohol use: No    Drug use: No    Sexual activity: Yes     Partners: Male     Birth control/protection: Surgical     Comment: BTL   Other Topics Concern    Are you pregnant or think you may be? No    Breast-feeding No     Patient Active Problem List   Diagnosis    Exacerbation of systemic lupus    Long-term current use of high risk medication -methotrexate    Systemic lupus erythematosus arthritis    Immunosuppressed status     Rash    Obesity, Class II, BMI 35-39.9    MCTD (mixed connective tissue disease)    Positive sm/RNP antibody    Secondary Raynaud's phenomenon    Mild episode of recurrent major depressive disorder    Chronic pain of both knees    Bilateral leg weakness    Gastroesophageal reflux disease    Pelvic mass    Fibromyalgia    Rheumatoid arthritis of multiple sites with negative rheumatoid factor    Swelling of right upper extremity    Diffuse scleroderma    Morphea    Chemotherapy-induced nausea    Chronic cough     Review of patient's allergies indicates:   Allergen Reactions    Elavil [amitriptyline]        The following were reviewed at this visit: active problem list, medication list, allergies, family history, social history, and health maintenance.    Medications:  Current Outpatient Medications on File Prior to Visit   Medication Sig Dispense Refill    bumetanide (BUMEX) 1 MG tablet Take 1 tablet (1 mg total) by mouth once daily. (Patient taking differently: Take 1 mg by mouth once daily.) 10 tablet 0    clobetasoL (TEMOVATE) 0.05 % cream Apply topically 2 (two) times daily. 60 g 2    clobetasoL (TEMOVATE) 0.05 % external solution Apply topically 2 (two) times daily. 50 mL 1    colchicine (COLCRYS) 0.6 mg tablet Take 1 tablet (0.6 mg total) by mouth 2 (two) times daily. 60 tablet 11    gabapentin (NEURONTIN) 300 MG capsule Take 1 capsule (300 mg total) by mouth 3 (three) times daily. 90 capsule 11    LIDOcaine-prilocaine (EMLA) cream Apply topically as needed (port access). 5 g 3    mometasone 0.1% (ELOCON) 0.1 % cream Apply topically once daily. 50 g 1    ondansetron (ZOFRAN-ODT) 4 MG TbDL Take 1 tablet (4 mg total) by mouth every 8 (eight) hours as needed (nausea). 30 tablet 0    predniSONE (DELTASONE) 10 MG tablet Take 1 tablet (10 mg total) by mouth once daily. 30 tablet 1    tacrolimus (PROTOPIC) 0.1 % ointment Apply topically 2 (two) times daily. 100 g 3    traMADoL (ULTRAM) 50 mg tablet TAKE 1 TABLET BY  MOUTH EVERY 12 HOURS AS NEEDED FOR PAIN 60 tablet 0    traZODone (DESYREL) 50 MG tablet Take 1 tablet (50 mg total) by mouth every evening. (Patient taking differently: Take 50 mg by mouth nightly as needed.) 30 tablet 11    [DISCONTINUED] promethazine-dextromethorphan (PROMETHAZINE-DM) 6.25-15 mg/5 mL Syrp Take 5 mLs by mouth every 6 (six) hours as needed (cough). 118 mL 0     Current Facility-Administered Medications on File Prior to Visit   Medication Dose Route Frequency Provider Last Rate Last Admin    LIDOcaine (PF) 10 mg/ml (1%) injection 10 mg  1 mL Intradermal Once Taco Zimmer MD           Medications have been reviewed and reconciled with patient at this visit.  Barriers to medications reviewed with patient.    Adverse reactions to current medications reviewed with patient..    Over the counter medications reviewed and reconciled with patient.    Exam:  Wt Readings from Last 3 Encounters:   08/07/23 99.5 kg (219 lb 5.7 oz)   08/03/23 99.4 kg (219 lb 2.2 oz)   07/06/23 99 kg (218 lb 4.1 oz)     Temp Readings from Last 3 Encounters:   08/07/23 99.6 °F (37.6 °C)   08/03/23 98.5 °F (36.9 °C)   07/06/23 97.8 °F (36.6 °C)     BP Readings from Last 3 Encounters:   08/07/23 112/64   08/03/23 118/67   07/06/23 121/77     Pulse Readings from Last 3 Encounters:   08/07/23 106   08/03/23 95   07/06/23 86     Body mass index is 33.35 kg/m².      Review of Systems   Constitutional: Negative.  Negative for chills and fever.   HENT: Negative.  Negative for congestion, sinus pain and sore throat.    Eyes:  Negative for blurred vision and double vision.   Respiratory:  Negative for cough, sputum production, shortness of breath and wheezing.    Cardiovascular:  Negative for chest pain, palpitations and leg swelling.   Gastrointestinal:  Negative for abdominal pain, constipation, diarrhea, heartburn, nausea and vomiting.   Genitourinary: Negative.    Musculoskeletal: Negative.    Skin: Negative.  Negative for rash.    Neurological: Negative.    Endo/Heme/Allergies: Negative.  Negative for polydipsia. Does not bruise/bleed easily.   Psychiatric/Behavioral:  Negative for depression and substance abuse.      Physical Exam  Nursing note reviewed.   Pulmonary:      Effort: Pulmonary effort is normal. No respiratory distress.   Neurological:      Mental Status: She is alert and oriented to person, place, and time.   Psychiatric:         Mood and Affect: Mood normal.         Behavior: Behavior normal.         Thought Content: Thought content normal.         Judgment: Judgment normal.         Laboratory Reviewed ({Yes)  Lab Results   Component Value Date    WBC 7.37 07/24/2023    HGB 10.1 (L) 07/24/2023    HCT 31.1 (L) 07/24/2023     07/24/2023    CHOL 169 08/24/2017    TRIG 228 (H) 08/24/2017    HDL 37 (L) 08/24/2017    ALT 20 07/24/2023    AST 34 07/24/2023     07/24/2023    K 4.1 07/24/2023     07/24/2023    CREATININE 0.7 07/24/2023    BUN 8 07/24/2023    CO2 22 (L) 07/24/2023    TSH 1.243 08/30/2017       Stefanie was seen today for follow-up.    Diagnoses and all orders for this visit:    Abnormal finding on lung imaging    Long-term current use of high risk medication -methotrexate  -     NM PET CT Whole Body; Future    Systemic lupus erythematosus arthritis  -     NM PET CT Whole Body; Future    Immunosuppressed status    MCTD (mixed connective tissue disease)    Solitary pulmonary nodule  -     NM PET CT Whole Body; Future    Rheumatoid arthritis of multiple sites with negative rheumatoid factor    Chronic cough    URI with cough and congestion  -     promethazine-dextromethorphan (PROMETHAZINE-DM) 6.25-15 mg/5 mL Syrp; Take 5 mLs by mouth every 6 (six) hours as needed (cough).      TB April Negative.  Cough chronic  Need PUlm follow up  PET ordered now            Care Plan/Goals: Reviewed    Goals    None         Follow up: No follow-ups on file.    After visit summary was printed and given to patient upon  discharge today.  Patient goals and care plan are included in After Visit Summary.

## 2023-08-10 ENCOUNTER — OFFICE VISIT (OUTPATIENT)
Dept: PULMONOLOGY | Facility: CLINIC | Age: 38
End: 2023-08-10
Payer: COMMERCIAL

## 2023-08-10 VITALS
BODY MASS INDEX: 33.15 KG/M2 | WEIGHT: 218.69 LBS | RESPIRATION RATE: 18 BRPM | SYSTOLIC BLOOD PRESSURE: 116 MMHG | DIASTOLIC BLOOD PRESSURE: 74 MMHG | HEART RATE: 78 BPM | HEIGHT: 68 IN

## 2023-08-10 DIAGNOSIS — M06.09 RHEUMATOID ARTHRITIS OF MULTIPLE SITES WITH NEGATIVE RHEUMATOID FACTOR: ICD-10-CM

## 2023-08-10 DIAGNOSIS — R05.3 CHRONIC COUGH: ICD-10-CM

## 2023-08-10 DIAGNOSIS — K21.9 GERD WITHOUT ESOPHAGITIS: Primary | ICD-10-CM

## 2023-08-10 DIAGNOSIS — M32.9 SYSTEMIC LUPUS ERYTHEMATOSUS ARTHRITIS: ICD-10-CM

## 2023-08-10 DIAGNOSIS — R91.1 LUNG NODULE SEEN ON IMAGING STUDY: ICD-10-CM

## 2023-08-10 DIAGNOSIS — M35.1 MCTD (MIXED CONNECTIVE TISSUE DISEASE): ICD-10-CM

## 2023-08-10 DIAGNOSIS — R76.8 POSITIVE SM/RNP ANTIBODY: ICD-10-CM

## 2023-08-10 PROCEDURE — 3008F BODY MASS INDEX DOCD: CPT | Mod: CPTII,S$GLB,, | Performed by: INTERNAL MEDICINE

## 2023-08-10 PROCEDURE — 1159F PR MEDICATION LIST DOCUMENTED IN MEDICAL RECORD: ICD-10-PCS | Mod: CPTII,S$GLB,, | Performed by: INTERNAL MEDICINE

## 2023-08-10 PROCEDURE — 99999 PR PBB SHADOW E&M-EST. PATIENT-LVL V: ICD-10-PCS | Mod: PBBFAC,,, | Performed by: INTERNAL MEDICINE

## 2023-08-10 PROCEDURE — 3074F PR MOST RECENT SYSTOLIC BLOOD PRESSURE < 130 MM HG: ICD-10-PCS | Mod: CPTII,S$GLB,, | Performed by: INTERNAL MEDICINE

## 2023-08-10 PROCEDURE — 1159F MED LIST DOCD IN RCRD: CPT | Mod: CPTII,S$GLB,, | Performed by: INTERNAL MEDICINE

## 2023-08-10 PROCEDURE — 99204 OFFICE O/P NEW MOD 45 MIN: CPT | Mod: S$GLB,,, | Performed by: INTERNAL MEDICINE

## 2023-08-10 PROCEDURE — 3008F PR BODY MASS INDEX (BMI) DOCUMENTED: ICD-10-PCS | Mod: CPTII,S$GLB,, | Performed by: INTERNAL MEDICINE

## 2023-08-10 PROCEDURE — 3078F PR MOST RECENT DIASTOLIC BLOOD PRESSURE < 80 MM HG: ICD-10-PCS | Mod: CPTII,S$GLB,, | Performed by: INTERNAL MEDICINE

## 2023-08-10 PROCEDURE — 1160F RVW MEDS BY RX/DR IN RCRD: CPT | Mod: CPTII,S$GLB,, | Performed by: INTERNAL MEDICINE

## 2023-08-10 PROCEDURE — 99999 PR PBB SHADOW E&M-EST. PATIENT-LVL V: CPT | Mod: PBBFAC,,, | Performed by: INTERNAL MEDICINE

## 2023-08-10 PROCEDURE — 99204 PR OFFICE/OUTPT VISIT, NEW, LEVL IV, 45-59 MIN: ICD-10-PCS | Mod: S$GLB,,, | Performed by: INTERNAL MEDICINE

## 2023-08-10 PROCEDURE — 3078F DIAST BP <80 MM HG: CPT | Mod: CPTII,S$GLB,, | Performed by: INTERNAL MEDICINE

## 2023-08-10 PROCEDURE — 1160F PR REVIEW ALL MEDS BY PRESCRIBER/CLIN PHARMACIST DOCUMENTED: ICD-10-PCS | Mod: CPTII,S$GLB,, | Performed by: INTERNAL MEDICINE

## 2023-08-10 PROCEDURE — 3074F SYST BP LT 130 MM HG: CPT | Mod: CPTII,S$GLB,, | Performed by: INTERNAL MEDICINE

## 2023-08-10 RX ORDER — PANTOPRAZOLE SODIUM 40 MG/1
40 TABLET, DELAYED RELEASE ORAL DAILY
Qty: 90 TABLET | Refills: 3 | Status: SHIPPED | OUTPATIENT
Start: 2023-08-10 | End: 2024-02-22

## 2023-08-10 NOTE — PROGRESS NOTES
Subjective:      Patient ID: Stefanie España is a 37 y.o. female.    Chief Complaint: Pulmonary Nodules    Pulmonary Nodules        37-year-old female with overlapping mixed connective tissue disease with features of scleroderma, lupus and rheumatoid arthritis with a positive RNP followed by Rheumatology on multiple treatments including prior treatment with Cytoxan.  She is on prednisone at 10 mg currently.  She was seen recently for some abdominal complaints and urinary complaints and had a CT evaluation for possible renal stones.  This did not show any nephrolithiasis but did show some pulmonary nodules in the bases with the largest being about 2 cm in the left lower lobe near the diaphragm.  She is had previously noted small pulmonary nodules but none this large.  Other pertinent review of systems include some swollen submandibular lymph nodes recently which have resolved.  No complaints of fever or chills.  She has a chronic cough for years which is worse at night and after meals.  GERD review of systems is generally positive.  Positive review of systems for dysphonia, throat clearing and strained voice.  She has a PET-CT scheduled for Monday next week    Past Medical History:   Diagnosis Date    Abnormal Pap smear of cervix     repeat was normal    Lupus (systemic lupus erythematosus)      Past Surgical History:   Procedure Laterality Date    INSERTION OF TUNNELED CENTRAL VENOUS CATHETER (CVC) WITH SUBCUTANEOUS PORT N/A 05/24/2023    Procedure: [id number];  Surgeon: Taco Zimmer MD;  Location: [facility] OR;  Service: General;  Laterality: N/A;    ROBOT-ASSISTED CHOLECYSTECTOMY USING DA CRISTOPHER XI N/A 12/11/2020    Procedure: XI ROBOTIC CHOLECYSTECTOMY;  Surgeon: Taco Zimmer MD;  Location: [facility] OR;  Service: General;  Laterality: N/A;    ROBOT-ASSISTED LAPAROSCOPIC LYSIS OF ADHESIONS USING DA CRISTOPHER XI  12/11/2020    Procedure: XI ROBOTIC LYSIS, ADHESIONS;  Surgeon: Taco Zimmer MD;   Location: [facility] OR;  Service: General;;    ROBOT-ASSISTED LAPAROSCOPIC SALPINGO-OOPHORECTOMY USING DA CRISTOPHER XI Left 12/11/2020    Procedure: XI ROBOTIC SALPINGO-OOPHORECTOMY;  Surgeon: SHERLEY Wakefield MD;  Location: [facility] OR;  Service: OB/GYN;  Laterality: Left;    TUBAL LIGATION  2005     Social History     Tobacco Use    Smoking status: Never     Passive exposure: Never    Smokeless tobacco: Never   Substance Use Topics    Alcohol use: No    Drug use: No     Family History   Problem Relation Age of Onset    Liver cancer Maternal Aunt     Breast cancer Maternal Aunt     Breast cancer Paternal Grandmother     Colon cancer Neg Hx     Ovarian cancer Neg Hx     Thrombosis Neg Hx        Review of Systems as per HPI otherwise negative    Objective:     Physical Exam   Constitutional: She is oriented to person, place, and time. No distress. She is obese.   HENT:   Head: Normocephalic.   Perioral sclerosis noted   Cardiovascular: Normal rate, regular rhythm and normal heart sounds.   Pulmonary/Chest: Normal expansion, symmetric chest wall expansion and effort normal.   Musculoskeletal:      Cervical back: Neck supple.   Neurological: She is alert and oriented to person, place, and time.   Psychiatric: She has a normal mood and affect.   Nursing note and vitals reviewed.         Assessment:     1. GERD without esophagitis    2. Chronic cough    3. Lung nodule seen on imaging study    4. Systemic lupus erythematosus arthritis    5. MCTD (mixed connective tissue disease)    6. Positive sm/RNP antibody    7. Rheumatoid arthritis of multiple sites with negative rheumatoid factor      I personally reviewed CT of the abdomen obtain recently and compared this to a CT of the chest from the 2022.  I agree with the radiologist's interpretation as below    FINDINGS:  Heart: Normal in size. No pericardial effusion.     Lung Bases: Multiple bibasilar nodular densities, new compared to prior chest CT.  Largest lesion in the left lower  lobe measures 2.4 cm and exhibits subtle spiculation.     Liver: Normal in size and attenuation, with no focal hepatic lesions.     Gallbladder: Not visualized and either surgically absent or contracted.     Bile Ducts: No evidence of dilated ducts.     Pancreas: No mass or peripancreatic fat stranding.     Spleen: Unremarkable.     Adrenals: Unremarkable.     Kidneys/Ureters: No visible renal, ureteral, or bladder calculi.  No hydroureteronephrosis.  No gross renal mass.     Bladder: No evidence of wall thickening.     Reproductive organs: Grossly unremarkable.     GI Tract/Mesentery: No evidence of bowel obstruction or inflammation.     Peritoneal Space: No ascites. No free air.     Retroperitoneum: No significant adenopathy.     Abdominal wall: Postsurgical changes.     Vasculature: No significant atherosclerosis or aneurysm.     Bones: No acute fracture.     Impression:     1. Multiple newly developed bibasilar lung nodules, concerning for malignancy though disseminated infection/inflammation also possible.  Further evaluation with PET-CT and/or tissue sampling recommended.  2. No evidence of urinary tract calculi or obstruction.  3. Remainder as above.      Plan:     Lung nodules are most consistent with rheumatoid nodules.  Although it is more common in patients with positive rheumatoid factor, it is possible in patients that are rheumatoid factor negative  Agree with PET imaging, we will follow up on results Monday  Decision will be biopsy versus serial radiologic imaging  Unlikely to represent malignancy given age, lack of risk factors and fairly new onset  Her cough is most likely due to some esophageal dysmotility and reflux.  To that end I will start her on Protonix once daily in the morning and Pepcid 20 mg at bedtime  Discussed dietary and lifestyle modifications which we will also assist with her reflux  We will schedule a three-month follow up appointment but certainly we will know based on the  results of her PET imaging if biopsy is necessary  I discussed the above in detail with the patient who voiced understanding and agreement with this plan

## 2023-08-14 ENCOUNTER — HOSPITAL ENCOUNTER (OUTPATIENT)
Dept: RADIOLOGY | Facility: HOSPITAL | Age: 38
Discharge: HOME | End: 2023-08-14
Attending: FAMILY MEDICINE
Payer: COMMERCIAL

## 2023-08-14 DIAGNOSIS — R91.8 ABNORMAL FINDING ON LUNG IMAGING: ICD-10-CM

## 2023-08-14 PROCEDURE — A9552 F18 FDG: HCPCS

## 2023-08-14 PROCEDURE — 78815 PET IMAGE W/CT SKULL-THIGH: CPT | Mod: TC

## 2023-08-14 PROCEDURE — 78815 PET IMAGE W/CT SKULL-THIGH: CPT | Mod: 26,PI,, | Performed by: RADIOLOGY

## 2023-08-14 PROCEDURE — 78815 NM PET CT ROUTINE: ICD-10-PCS | Mod: 26,PI,, | Performed by: RADIOLOGY

## 2023-08-15 DIAGNOSIS — R91.1 SOLITARY PULMONARY NODULE: Primary | ICD-10-CM

## 2023-08-15 NOTE — PROGRESS NOTES
Called patient with PET results. Very much consistent with rheumatoid nodules. Ordered repeat CT in 3 months to ensure stability. No biopsy indicated at this time. Pt voiced understanding

## 2023-08-29 ENCOUNTER — PATIENT MESSAGE (OUTPATIENT)
Dept: DERMATOLOGY | Facility: CLINIC | Age: 38
End: 2023-08-29

## 2023-08-29 ENCOUNTER — OFFICE VISIT (OUTPATIENT)
Dept: DERMATOLOGY | Facility: CLINIC | Age: 38
End: 2023-08-29
Payer: COMMERCIAL

## 2023-08-29 DIAGNOSIS — M34.9 SCLERODERMA: Primary | ICD-10-CM

## 2023-08-29 PROCEDURE — 1160F RVW MEDS BY RX/DR IN RCRD: CPT | Mod: CPTII,95,, | Performed by: STUDENT IN AN ORGANIZED HEALTH CARE EDUCATION/TRAINING PROGRAM

## 2023-08-29 PROCEDURE — 1159F MED LIST DOCD IN RCRD: CPT | Mod: CPTII,95,, | Performed by: STUDENT IN AN ORGANIZED HEALTH CARE EDUCATION/TRAINING PROGRAM

## 2023-08-29 PROCEDURE — 1159F PR MEDICATION LIST DOCUMENTED IN MEDICAL RECORD: ICD-10-PCS | Mod: CPTII,95,, | Performed by: STUDENT IN AN ORGANIZED HEALTH CARE EDUCATION/TRAINING PROGRAM

## 2023-08-29 PROCEDURE — 99214 PR OFFICE/OUTPT VISIT, EST, LEVL IV, 30-39 MIN: ICD-10-PCS | Mod: 95,,, | Performed by: STUDENT IN AN ORGANIZED HEALTH CARE EDUCATION/TRAINING PROGRAM

## 2023-08-29 PROCEDURE — 99214 OFFICE O/P EST MOD 30 MIN: CPT | Mod: 95,,, | Performed by: STUDENT IN AN ORGANIZED HEALTH CARE EDUCATION/TRAINING PROGRAM

## 2023-08-29 PROCEDURE — 1160F PR REVIEW ALL MEDS BY PRESCRIBER/CLIN PHARMACIST DOCUMENTED: ICD-10-PCS | Mod: CPTII,95,, | Performed by: STUDENT IN AN ORGANIZED HEALTH CARE EDUCATION/TRAINING PROGRAM

## 2023-08-29 RX ORDER — PREDNISONE 10 MG/1
TABLET ORAL
Qty: 42 TABLET | Refills: 0 | Status: SHIPPED | OUTPATIENT
Start: 2023-08-29 | End: 2023-11-09

## 2023-08-29 NOTE — PROGRESS NOTES
The patient location is: home/work  The chief complaint leading to consultation is: worsening rash/dryness/tightness     Visit type: audiovisual    Face to Face time with patient: 10mins  10 minutes of total time spent on the encounter, which includes face to face time and non-face to face time preparing to see the patient (eg, review of tests), Obtaining and/or reviewing separately obtained history, Documenting clinical information in the electronic or other health record, Independently interpreting results (not separately reported) and communicating results to the patient/family/caregiver, or Care coordination (not separately reported).         Each patient to whom he or she provides medical services by telemedicine is:  (1) informed of the relationship between the physician and patient and the respective role of any other health care provider with respect to management of the patient; and (2) notified that he or she may decline to receive medical services by telemedicine and may withdraw from such care at any time.    Notes: Patient with a history of morphea, last seen on 9/15/22. Patient reports over the past several weeks noticing increasing dryness, tightness and discoloration over the skin of the legs, abdomen and chest area. Very itchy. Patient is currently being managed by Rheumatology, on Orencia     Previous tx: Cellcept, methotrexate, Imuran, Plaquenil, Rituximab, Cytoxan.     ROS: Otherwise negative    PE: const/neuro - AAOx3, NAD          Skin -               A/P: Morphea/scleroderma - Resistant symptoms with progression. Patient being managed systemically by Rheumatology, currently on Orencia. Will increase prednisone temporarily for now and discuss with Rheumatology other possible treatment options.

## 2023-08-31 ENCOUNTER — INFUSION (OUTPATIENT)
Dept: INFUSION THERAPY | Facility: HOSPITAL | Age: 38
End: 2023-08-31
Attending: PHYSICIAN ASSISTANT
Payer: COMMERCIAL

## 2023-08-31 VITALS
SYSTOLIC BLOOD PRESSURE: 119 MMHG | BODY MASS INDEX: 33.12 KG/M2 | WEIGHT: 218.5 LBS | HEIGHT: 68 IN | DIASTOLIC BLOOD PRESSURE: 86 MMHG | HEART RATE: 90 BPM | TEMPERATURE: 98 F | RESPIRATION RATE: 18 BRPM | OXYGEN SATURATION: 98 %

## 2023-08-31 DIAGNOSIS — M06.09 RHEUMATOID ARTHRITIS OF MULTIPLE SITES WITH NEGATIVE RHEUMATOID FACTOR: Primary | ICD-10-CM

## 2023-08-31 DIAGNOSIS — M35.1 MCTD (MIXED CONNECTIVE TISSUE DISEASE): ICD-10-CM

## 2023-08-31 PROCEDURE — 96365 THER/PROPH/DIAG IV INF INIT: CPT

## 2023-08-31 PROCEDURE — 25000003 PHARM REV CODE 250: Performed by: INTERNAL MEDICINE

## 2023-08-31 PROCEDURE — 36593 DECLOT VASCULAR DEVICE: CPT

## 2023-08-31 PROCEDURE — 63600175 PHARM REV CODE 636 W HCPCS: Mod: JZ,JA,JG | Performed by: INTERNAL MEDICINE

## 2023-08-31 RX ORDER — EPINEPHRINE 0.3 MG/.3ML
0.3 INJECTION SUBCUTANEOUS ONCE AS NEEDED
Status: CANCELLED | OUTPATIENT
Start: 2023-09-28

## 2023-08-31 RX ORDER — DIPHENHYDRAMINE HYDROCHLORIDE 50 MG/ML
25 INJECTION INTRAMUSCULAR; INTRAVENOUS
Status: CANCELLED | OUTPATIENT
Start: 2023-09-28

## 2023-08-31 RX ORDER — SODIUM CHLORIDE 0.9 % (FLUSH) 0.9 %
10 SYRINGE (ML) INJECTION
Status: CANCELLED | OUTPATIENT
Start: 2023-09-28

## 2023-08-31 RX ORDER — HEPARIN 100 UNIT/ML
500 SYRINGE INTRAVENOUS
Status: DISCONTINUED | OUTPATIENT
Start: 2023-08-31 | End: 2023-08-31 | Stop reason: HOSPADM

## 2023-08-31 RX ORDER — METHYLPREDNISOLONE SOD SUCC 125 MG
100 VIAL (EA) INJECTION
Status: CANCELLED | OUTPATIENT
Start: 2023-09-28

## 2023-08-31 RX ORDER — ACETAMINOPHEN 325 MG/1
650 TABLET ORAL
Status: CANCELLED | OUTPATIENT
Start: 2023-09-28

## 2023-08-31 RX ORDER — DIPHENHYDRAMINE HYDROCHLORIDE 50 MG/ML
50 INJECTION INTRAMUSCULAR; INTRAVENOUS ONCE AS NEEDED
Status: CANCELLED | OUTPATIENT
Start: 2023-09-28

## 2023-08-31 RX ORDER — HEPARIN 100 UNIT/ML
500 SYRINGE INTRAVENOUS
Status: CANCELLED | OUTPATIENT
Start: 2023-09-28

## 2023-08-31 RX ADMIN — ALTEPLASE 2 MG: 2.2 INJECTION, POWDER, LYOPHILIZED, FOR SOLUTION INTRAVENOUS at 12:08

## 2023-08-31 RX ADMIN — SODIUM CHLORIDE 1000 MG: 9 INJECTION, SOLUTION INTRAVENOUS at 12:08

## 2023-08-31 RX ADMIN — ALTEPLASE 2 MG: 2.2 INJECTION, POWDER, LYOPHILIZED, FOR SOLUTION INTRAVENOUS at 11:08

## 2023-08-31 RX ADMIN — HEPARIN 500 UNITS: 100 SYRINGE at 01:08

## 2023-08-31 NOTE — NURSING
Infusion # 6 - Orencia 1000 mg q 4 weeks  Last dose- 8/3/2023    Any:  -recent illness, infection, or antibiotic use in past week- denied  -open wounds or mouth sores- denied  -invasive procedures or surgeries in past 4 weeks or in upcoming 4 weeks- denied  -vaccinations in past week- denied  -any new symptoms/change in symptoms-currently flared-up; saw derm recently.  Dr Kevin to get with Dr SHORE about tx options  -chance you may be pregnant- denied      Recent labs? 7/24/2023  Last Rheumatology provider visit- Seen by Dr SHORE on 4/24/2023     Premeds-none needed     Orencia 1000 mg administered IV at a 30 minute rate per orders; see MAR and vitals for more details.    Patient called wondering how to go about getting a Right knee brace that Dr Barros had ordered for her?

## 2023-09-18 ENCOUNTER — PATIENT MESSAGE (OUTPATIENT)
Dept: RHEUMATOLOGY | Facility: CLINIC | Age: 38
End: 2023-09-18
Payer: COMMERCIAL

## 2023-09-20 ENCOUNTER — OFFICE VISIT (OUTPATIENT)
Dept: RHEUMATOLOGY | Facility: CLINIC | Age: 38
End: 2023-09-20
Payer: COMMERCIAL

## 2023-09-20 ENCOUNTER — PATIENT MESSAGE (OUTPATIENT)
Dept: INFUSION THERAPY | Facility: HOSPITAL | Age: 38
End: 2023-09-20
Payer: COMMERCIAL

## 2023-09-20 DIAGNOSIS — M35.1 MCTD (MIXED CONNECTIVE TISSUE DISEASE): ICD-10-CM

## 2023-09-20 DIAGNOSIS — Z79.899 HIGH RISK MEDICATION USE: Primary | ICD-10-CM

## 2023-09-20 DIAGNOSIS — M06.09 RHEUMATOID ARTHRITIS OF MULTIPLE SITES WITH NEGATIVE RHEUMATOID FACTOR: ICD-10-CM

## 2023-09-20 DIAGNOSIS — M34.9 SCLERODERMA, DIFFUSE: Primary | ICD-10-CM

## 2023-09-20 PROCEDURE — 1160F RVW MEDS BY RX/DR IN RCRD: CPT | Mod: CPTII,95,, | Performed by: INTERNAL MEDICINE

## 2023-09-20 PROCEDURE — 1159F MED LIST DOCD IN RCRD: CPT | Mod: CPTII,95,, | Performed by: INTERNAL MEDICINE

## 2023-09-20 PROCEDURE — 1160F PR REVIEW ALL MEDS BY PRESCRIBER/CLIN PHARMACIST DOCUMENTED: ICD-10-PCS | Mod: CPTII,95,, | Performed by: INTERNAL MEDICINE

## 2023-09-20 PROCEDURE — 99214 PR OFFICE/OUTPT VISIT, EST, LEVL IV, 30-39 MIN: ICD-10-PCS | Mod: 95,,, | Performed by: INTERNAL MEDICINE

## 2023-09-20 PROCEDURE — 1159F PR MEDICATION LIST DOCUMENTED IN MEDICAL RECORD: ICD-10-PCS | Mod: CPTII,95,, | Performed by: INTERNAL MEDICINE

## 2023-09-20 PROCEDURE — 99214 OFFICE O/P EST MOD 30 MIN: CPT | Mod: 95,,, | Performed by: INTERNAL MEDICINE

## 2023-09-20 RX ORDER — ACETAMINOPHEN 325 MG/1
650 TABLET ORAL
Status: CANCELLED | OUTPATIENT
Start: 2023-09-20

## 2023-09-20 RX ORDER — HEPARIN 100 UNIT/ML
500 SYRINGE INTRAVENOUS
Status: CANCELLED | OUTPATIENT
Start: 2023-09-20

## 2023-09-20 RX ORDER — EPINEPHRINE 0.3 MG/.3ML
0.3 INJECTION SUBCUTANEOUS ONCE AS NEEDED
Status: CANCELLED | OUTPATIENT
Start: 2023-09-20

## 2023-09-20 RX ORDER — SODIUM CHLORIDE 0.9 % (FLUSH) 0.9 %
10 SYRINGE (ML) INJECTION
Status: CANCELLED | OUTPATIENT
Start: 2023-09-20

## 2023-09-20 RX ORDER — FOLIC ACID 1 MG/1
1 TABLET ORAL DAILY
Qty: 90 TABLET | Refills: 0 | Status: SHIPPED | OUTPATIENT
Start: 2023-09-20

## 2023-09-20 RX ORDER — MEPERIDINE HYDROCHLORIDE 50 MG/ML
25 INJECTION INTRAMUSCULAR; INTRAVENOUS; SUBCUTANEOUS
Status: CANCELLED | OUTPATIENT
Start: 2023-09-20 | End: 2023-09-21

## 2023-09-20 RX ORDER — METHOTREXATE 25 MG/ML
15 INJECTION, SOLUTION INTRA-ARTERIAL; INTRAMUSCULAR; INTRAVENOUS
Qty: 2 ML | Refills: 3 | Status: SHIPPED | OUTPATIENT
Start: 2023-09-20 | End: 2024-01-31

## 2023-09-20 RX ORDER — FAMOTIDINE 10 MG/ML
20 INJECTION INTRAVENOUS
Status: CANCELLED | OUTPATIENT
Start: 2023-09-20

## 2023-09-20 RX ORDER — METHYLPREDNISOLONE SOD SUCC 125 MG
125 VIAL (EA) INJECTION
Status: CANCELLED | OUTPATIENT
Start: 2023-09-20

## 2023-09-20 RX ORDER — ACETAMINOPHEN 325 MG/1
650 TABLET ORAL EVERY 4 HOURS PRN
Status: CANCELLED | OUTPATIENT
Start: 2023-09-20

## 2023-09-20 RX ORDER — DIPHENHYDRAMINE HYDROCHLORIDE 50 MG/ML
50 INJECTION INTRAMUSCULAR; INTRAVENOUS ONCE AS NEEDED
Status: CANCELLED | OUTPATIENT
Start: 2023-09-20

## 2023-09-20 NOTE — PROGRESS NOTES
RHEUMATOLOGY FOLLOW UP - TELE VISIT     The patient location is: [place]  The chief complaint leading to consultation is:  Rapidly worsening skin fibrosis  Visit type: Virtual visit with synchronous audio and video  Total time spent with patient:  15 minutes  Each patient to whom he or she provides medical services by telemedicine is:  (1) informed of the relationship between the physician and patient and the respective role of any other health care provider with respect to management of the patient; and (2) notified that he or she may decline to receive medical services by telemedicine and may withdraw from such care at any time.    Chief complaints, HPI, ROS, EXAM, Assessment & Plans:-  Stefanie Felicia Kimbrough a 37 y.o. pleasant female seen today for worsening symptoms.  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  Completed 6 Cytoxan infusion back in April and started on Orencia.  Skin fibrosis stopped worsening until a month ago when it returned.  Rapidly worsening skin tightening in the past 4 weeks.  Failed multiple medications in the past including methotrexate, Arava, tacrolimus, sulfasalazine, DAMION inhibitors, Actemra, rituximab rheumatoid dosing.  No significant joint symptoms.  No pulmonary symptoms.  Rheumatological review of system negative otherwise.  Exam shows worsening skin tightening and fibrosis around lower extremities and under the breasts.    1. Scleroderma, diffuse    2. Rheumatoid arthritis of multiple sites with negative rheumatoid factor    3. MCTD (mixed connective tissue disease)      Problem List Items Addressed This Visit       MCTD (mixed connective tissue disease)    Relevant Medications    methotrexate 25 mg/mL injection    folic acid (FOLVITE) 1 MG tablet    Rheumatoid arthritis of multiple sites with negative rheumatoid factor    Relevant Medications    methotrexate 25 mg/mL  injection    folic acid (FOLVITE) 1 MG tablet    Scleroderma, diffuse - Primary    Relevant Medications    methotrexate 25 mg/mL injection    folic acid (FOLVITE) 1 MG tablet       Severe worsening mixed connective tissue disease with scleroderma skin tightening with fibrosis.  Start methotrexate injections with folic acid.  Start rituximab vasculitis dose 375 milligram/meter sq every 7 days for 4 doses since it showed better efficacy than Cytoxan any induction.  If it fails, restart Cytoxan.  Advised all risks including life-threatening infections and pregnancy morbidities.  Drug induced immunodeficiency due to use of immunosuppressive drugs. Monitor carefully for infections. Advised to get immediate medical care if any infection. Also advised strict adherence to age appropriate vaccinations and cancer screenings with PCP.  I have explained all of the above in detail and the patient understands all of the current recommendation(s). I have answered all questions to the best of my ability and to their complete satisfaction.     Labs:-  Check all 4 labs, urine pregnancy and hepatitis-B serologies for infusion protocol for rituximab.  Repeat all 4 labs in 4 weeks.  # Follow up in about 6 weeks (around 11/1/2023).      Past Medical History:   Diagnosis Date    Abnormal Pap smear of cervix     repeat was normal    Lupus (systemic lupus erythematosus)        Past Surgical History:   Procedure Laterality Date    INSERTION OF TUNNELED CENTRAL VENOUS CATHETER (CVC) WITH SUBCUTANEOUS PORT N/A 05/24/2023    Procedure: [id number];  Surgeon: Taco Zimmer MD;  Location: [facility] OR;  Service: General;  Laterality: N/A;    ROBOT-ASSISTED CHOLECYSTECTOMY USING DA CRISTOPHER XI N/A 12/11/2020    Procedure: XI ROBOTIC CHOLECYSTECTOMY;  Surgeon: Taco Zimmer MD;  Location: [facility] OR;  Service: General;  Laterality: N/A;    ROBOT-ASSISTED LAPAROSCOPIC LYSIS OF ADHESIONS USING DA CRISTOPHER XI  12/11/2020    Procedure: XI ROBOTIC  LYSIS, ADHESIONS;  Surgeon: Taco Zimmer MD;  Location: [facility] OR;  Service: General;;    ROBOT-ASSISTED LAPAROSCOPIC SALPINGO-OOPHORECTOMY USING DA CRISTOPHER XI Left 12/11/2020    Procedure: XI ROBOTIC SALPINGO-OOPHORECTOMY;  Surgeon: HSERLEY Wakefield MD;  Location: [facility] OR;  Service: OB/GYN;  Laterality: Left;    TUBAL LIGATION  2005        Social History     Tobacco Use    Smoking status: Never     Passive exposure: Never    Smokeless tobacco: Never   Substance Use Topics    Alcohol use: No    Drug use: No       Family History   Problem Relation Age of Onset    Liver cancer Maternal Aunt     Breast cancer Maternal Aunt     Breast cancer Paternal Grandmother     Colon cancer Neg Hx     Ovarian cancer Neg Hx     Thrombosis Neg Hx        Review of patient's allergies indicates:   Allergen Reactions    Elavil [amitriptyline]        Medication List with Changes/Refills   New Medications    FOLIC ACID (FOLVITE) 1 MG TABLET    Take 1 tablet (1 mg total) by mouth once daily.    METHOTREXATE 25 MG/ML INJECTION    Inject 0.6 mLs (15 mg total) into the muscle every 7 days.   Current Medications    BUMETANIDE (BUMEX) 1 MG TABLET    Take 1 tablet (1 mg total) by mouth once daily.    CLOBETASOL (TEMOVATE) 0.05 % CREAM    Apply topically 2 (two) times daily.    CLOBETASOL (TEMOVATE) 0.05 % EXTERNAL SOLUTION    Apply topically 2 (two) times daily.    COLCHICINE (COLCRYS) 0.6 MG TABLET    Take 1 tablet (0.6 mg total) by mouth 2 (two) times daily.    GABAPENTIN (NEURONTIN) 300 MG CAPSULE    Take 1 capsule (300 mg total) by mouth 3 (three) times daily.    LIDOCAINE-PRILOCAINE (EMLA) CREAM    Apply topically as needed (port access).    MOMETASONE 0.1% (ELOCON) 0.1 % CREAM    Apply topically once daily.    ONDANSETRON (ZOFRAN-ODT) 4 MG TBDL    Take 1 tablet (4 mg total) by mouth every 8 (eight) hours as needed (nausea).    PANTOPRAZOLE (PROTONIX) 40 MG TABLET    Take 1 tablet (40 mg total) by mouth once daily.    PREDNISONE  (DELTASONE) 10 MG TABLET    Take 1 tablet (10 mg total) by mouth once daily.    PREDNISONE (DELTASONE) 10 MG TABLET    Take 3 pills po qam with breakfast x 1 wk then take 2 po qam with breakfast x 1 wk then take 1 po qam with breakfast x 1 wk    TACROLIMUS (PROTOPIC) 0.1 % OINTMENT    Apply topically 2 (two) times daily.    TRAMADOL (ULTRAM) 50 MG TABLET    TAKE 1 TABLET BY MOUTH EVERY 12 HOURS AS NEEDED FOR PAIN    TRAZODONE (DESYREL) 50 MG TABLET    Take 1 tablet (50 mg total) by mouth every evening.       Disclaimer: This note was prepared using voice recognition system and is likely to have sound alike errors and is not proof read.  Please call me with any questions.

## 2023-09-20 NOTE — Clinical Note
Please start Rituxan vasculitis dose for rapidly worsening skin fibrosis from scleroderma at the earliest.  Thanks.

## 2023-09-26 NOTE — TELEPHONE ENCOUNTER
Spoke with pt and advised her of below, pt states that she is having a lot of joint pain and stiffness. Please Advise.    4 = No assist / stand by assistance

## 2023-10-02 ENCOUNTER — LAB VISIT (OUTPATIENT)
Dept: LAB | Facility: HOSPITAL | Age: 38
End: 2023-10-02
Attending: FAMILY MEDICINE
Payer: COMMERCIAL

## 2023-10-02 DIAGNOSIS — M32.9 SYSTEMIC LUPUS ERYTHEMATOSUS ARTHRITIS: ICD-10-CM

## 2023-10-02 DIAGNOSIS — M06.09 RHEUMATOID ARTHRITIS OF MULTIPLE SITES WITH NEGATIVE RHEUMATOID FACTOR: ICD-10-CM

## 2023-10-02 DIAGNOSIS — Z79.899 LONG-TERM CURRENT USE OF HIGH RISK MEDICATION OTHER THAN ANTICOAGULANT: ICD-10-CM

## 2023-10-02 DIAGNOSIS — Z51.81 ENCOUNTER FOR MEDICATION MONITORING: ICD-10-CM

## 2023-10-02 DIAGNOSIS — M35.1 MCTD (MIXED CONNECTIVE TISSUE DISEASE): ICD-10-CM

## 2023-10-02 DIAGNOSIS — M34.9 DIFFUSE SCLERODERMA: ICD-10-CM

## 2023-10-02 DIAGNOSIS — Z79.899 HIGH RISK MEDICATION USE: ICD-10-CM

## 2023-10-02 DIAGNOSIS — M32.19 OTHER SYSTEMIC LUPUS ERYTHEMATOSUS WITH OTHER ORGAN INVOLVEMENT: ICD-10-CM

## 2023-10-02 LAB
ALBUMIN SERPL BCP-MCNC: 3.1 G/DL (ref 3.5–5.2)
ALP SERPL-CCNC: 43 U/L (ref 55–135)
ALT SERPL W/O P-5'-P-CCNC: 21 U/L (ref 10–44)
ANION GAP SERPL CALC-SCNC: 8 MMOL/L (ref 8–16)
AST SERPL-CCNC: 34 U/L (ref 10–40)
B-HCG UR QL: NEGATIVE
BASOPHILS # BLD AUTO: 0.01 K/UL (ref 0–0.2)
BASOPHILS NFR BLD: 0.2 % (ref 0–1.9)
BILIRUB SERPL-MCNC: 0.2 MG/DL (ref 0.1–1)
BUN SERPL-MCNC: 8 MG/DL (ref 6–20)
C3 SERPL-MCNC: 130 MG/DL (ref 50–180)
C4 SERPL-MCNC: 16 MG/DL (ref 11–44)
CALCIUM SERPL-MCNC: 9.2 MG/DL (ref 8.7–10.5)
CHLORIDE SERPL-SCNC: 103 MMOL/L (ref 95–110)
CO2 SERPL-SCNC: 24 MMOL/L (ref 23–29)
CREAT SERPL-MCNC: 0.7 MG/DL (ref 0.5–1.4)
CRP SERPL-MCNC: 15.6 MG/L (ref 0–8.2)
DIFFERENTIAL METHOD: ABNORMAL
EOSINOPHIL # BLD AUTO: 0.9 K/UL (ref 0–0.5)
EOSINOPHIL NFR BLD: 16.5 % (ref 0–8)
ERYTHROCYTE [DISTWIDTH] IN BLOOD BY AUTOMATED COUNT: 12.9 % (ref 11.5–14.5)
ERYTHROCYTE [SEDIMENTATION RATE] IN BLOOD BY WESTERGREN METHOD: 110 MM/HR (ref 0–20)
EST. GFR  (NO RACE VARIABLE): >60 ML/MIN/1.73 M^2
GLUCOSE SERPL-MCNC: 68 MG/DL (ref 70–110)
HBV CORE AB SERPL QL IA: NORMAL
HBV SURFACE AB SER-ACNC: <3 MIU/ML
HBV SURFACE AB SER-ACNC: NORMAL M[IU]/ML
HBV SURFACE AG SERPL QL IA: NORMAL
HCT VFR BLD AUTO: 30.3 % (ref 37–48.5)
HCV AB SERPL QL IA: NORMAL
HGB BLD-MCNC: 9.6 G/DL (ref 12–16)
IMM GRANULOCYTES # BLD AUTO: 0.01 K/UL (ref 0–0.04)
IMM GRANULOCYTES NFR BLD AUTO: 0.2 % (ref 0–0.5)
LYMPHOCYTES # BLD AUTO: 1 K/UL (ref 1–4.8)
LYMPHOCYTES NFR BLD: 18 % (ref 18–48)
MCH RBC QN AUTO: 29 PG (ref 27–31)
MCHC RBC AUTO-ENTMCNC: 31.7 G/DL (ref 32–36)
MCV RBC AUTO: 92 FL (ref 82–98)
MONOCYTES # BLD AUTO: 0.4 K/UL (ref 0.3–1)
MONOCYTES NFR BLD: 6.3 % (ref 4–15)
NEUTROPHILS # BLD AUTO: 3.4 K/UL (ref 1.8–7.7)
NEUTROPHILS NFR BLD: 59 % (ref 38–73)
NRBC BLD-RTO: 0 /100 WBC
PLATELET # BLD AUTO: 367 K/UL (ref 150–450)
PMV BLD AUTO: 8.4 FL (ref 9.2–12.9)
POTASSIUM SERPL-SCNC: 3.8 MMOL/L (ref 3.5–5.1)
PROT SERPL-MCNC: 9.9 G/DL (ref 6–8.4)
RBC # BLD AUTO: 3.31 M/UL (ref 4–5.4)
SODIUM SERPL-SCNC: 135 MMOL/L (ref 136–145)
WBC # BLD AUTO: 5.68 K/UL (ref 3.9–12.7)

## 2023-10-02 PROCEDURE — 86140 C-REACTIVE PROTEIN: CPT | Performed by: INTERNAL MEDICINE

## 2023-10-02 PROCEDURE — 80053 COMPREHEN METABOLIC PANEL: CPT | Performed by: INTERNAL MEDICINE

## 2023-10-02 PROCEDURE — 81025 URINE PREGNANCY TEST: CPT | Mod: PO | Performed by: INTERNAL MEDICINE

## 2023-10-02 PROCEDURE — 86706 HEP B SURFACE ANTIBODY: CPT | Mod: 91 | Performed by: INTERNAL MEDICINE

## 2023-10-02 PROCEDURE — 86160 COMPLEMENT ANTIGEN: CPT | Mod: 59 | Performed by: INTERNAL MEDICINE

## 2023-10-02 PROCEDURE — 86803 HEPATITIS C AB TEST: CPT | Performed by: INTERNAL MEDICINE

## 2023-10-02 PROCEDURE — 87340 HEPATITIS B SURFACE AG IA: CPT | Performed by: INTERNAL MEDICINE

## 2023-10-02 PROCEDURE — 85651 RBC SED RATE NONAUTOMATED: CPT | Mod: PO | Performed by: INTERNAL MEDICINE

## 2023-10-02 PROCEDURE — 86704 HEP B CORE ANTIBODY TOTAL: CPT | Performed by: INTERNAL MEDICINE

## 2023-10-02 PROCEDURE — 85025 COMPLETE CBC W/AUTO DIFF WBC: CPT | Mod: PO | Performed by: INTERNAL MEDICINE

## 2023-10-02 PROCEDURE — 86480 TB TEST CELL IMMUN MEASURE: CPT | Performed by: INTERNAL MEDICINE

## 2023-10-02 PROCEDURE — 36415 COLL VENOUS BLD VENIPUNCTURE: CPT | Mod: PO | Performed by: INTERNAL MEDICINE

## 2023-10-02 PROCEDURE — 86160 COMPLEMENT ANTIGEN: CPT | Performed by: INTERNAL MEDICINE

## 2023-10-04 LAB
GAMMA INTERFERON BACKGROUND BLD IA-ACNC: 0.03 IU/ML
M TB IFN-G CD4+ BCKGRND COR BLD-ACNC: 0.01 IU/ML
M TB IFN-G CD4+ BCKGRND COR BLD-ACNC: 0.01 IU/ML
MITOGEN IGNF BCKGRD COR BLD-ACNC: 1.1 IU/ML
TB GOLD PLUS: NEGATIVE

## 2023-10-05 ENCOUNTER — INFUSION (OUTPATIENT)
Dept: INFUSION THERAPY | Facility: HOSPITAL | Age: 38
End: 2023-10-05
Attending: PHYSICIAN ASSISTANT
Payer: COMMERCIAL

## 2023-10-05 VITALS
BODY MASS INDEX: 31.83 KG/M2 | HEART RATE: 92 BPM | SYSTOLIC BLOOD PRESSURE: 121 MMHG | DIASTOLIC BLOOD PRESSURE: 80 MMHG | TEMPERATURE: 98 F | WEIGHT: 210 LBS | HEIGHT: 68 IN | RESPIRATION RATE: 16 BRPM | OXYGEN SATURATION: 99 %

## 2023-10-05 DIAGNOSIS — M06.09 RHEUMATOID ARTHRITIS OF MULTIPLE SITES WITH NEGATIVE RHEUMATOID FACTOR: ICD-10-CM

## 2023-10-05 DIAGNOSIS — M34.9 SCLERODERMA, DIFFUSE: Primary | ICD-10-CM

## 2023-10-05 DIAGNOSIS — M35.1 MCTD (MIXED CONNECTIVE TISSUE DISEASE): ICD-10-CM

## 2023-10-05 PROCEDURE — 63600175 PHARM REV CODE 636 W HCPCS: Performed by: INTERNAL MEDICINE

## 2023-10-05 PROCEDURE — 25000003 PHARM REV CODE 250: Performed by: INTERNAL MEDICINE

## 2023-10-05 PROCEDURE — A4216 STERILE WATER/SALINE, 10 ML: HCPCS | Performed by: INTERNAL MEDICINE

## 2023-10-05 PROCEDURE — 96366 THER/PROPH/DIAG IV INF ADDON: CPT

## 2023-10-05 PROCEDURE — 96367 TX/PROPH/DG ADDL SEQ IV INF: CPT

## 2023-10-05 PROCEDURE — 96375 TX/PRO/DX INJ NEW DRUG ADDON: CPT

## 2023-10-05 PROCEDURE — 96365 THER/PROPH/DIAG IV INF INIT: CPT

## 2023-10-05 RX ORDER — METHYLPREDNISOLONE SOD SUCC 125 MG
125 VIAL (EA) INJECTION
Status: CANCELLED | OUTPATIENT
Start: 2023-10-12

## 2023-10-05 RX ORDER — HEPARIN 100 UNIT/ML
500 SYRINGE INTRAVENOUS
Status: CANCELLED | OUTPATIENT
Start: 2023-10-12

## 2023-10-05 RX ORDER — SODIUM CHLORIDE 0.9 % (FLUSH) 0.9 %
10 SYRINGE (ML) INJECTION
Status: DISCONTINUED | OUTPATIENT
Start: 2023-10-05 | End: 2023-10-05 | Stop reason: HOSPADM

## 2023-10-05 RX ORDER — EPINEPHRINE 0.3 MG/.3ML
0.3 INJECTION SUBCUTANEOUS ONCE AS NEEDED
Status: CANCELLED | OUTPATIENT
Start: 2023-10-12

## 2023-10-05 RX ORDER — FAMOTIDINE 10 MG/ML
20 INJECTION INTRAVENOUS
Status: CANCELLED | OUTPATIENT
Start: 2023-10-12

## 2023-10-05 RX ORDER — SODIUM CHLORIDE 0.9 % (FLUSH) 0.9 %
10 SYRINGE (ML) INJECTION
Status: CANCELLED | OUTPATIENT
Start: 2023-10-12

## 2023-10-05 RX ORDER — FAMOTIDINE 10 MG/ML
20 INJECTION INTRAVENOUS
Status: COMPLETED | OUTPATIENT
Start: 2023-10-05 | End: 2023-10-05

## 2023-10-05 RX ORDER — METHYLPREDNISOLONE SOD SUCC 125 MG
125 VIAL (EA) INJECTION
Status: COMPLETED | OUTPATIENT
Start: 2023-10-05 | End: 2023-10-05

## 2023-10-05 RX ORDER — MEPERIDINE HYDROCHLORIDE 50 MG/ML
25 INJECTION INTRAMUSCULAR; INTRAVENOUS; SUBCUTANEOUS
Status: CANCELLED | OUTPATIENT
Start: 2023-10-12 | End: 2023-10-13

## 2023-10-05 RX ORDER — DIPHENHYDRAMINE HYDROCHLORIDE 50 MG/ML
50 INJECTION INTRAMUSCULAR; INTRAVENOUS ONCE AS NEEDED
Status: CANCELLED | OUTPATIENT
Start: 2023-10-12

## 2023-10-05 RX ORDER — ACETAMINOPHEN 325 MG/1
650 TABLET ORAL
Status: CANCELLED | OUTPATIENT
Start: 2023-10-12

## 2023-10-05 RX ORDER — HEPARIN 100 UNIT/ML
500 SYRINGE INTRAVENOUS
Status: DISCONTINUED | OUTPATIENT
Start: 2023-10-05 | End: 2023-10-05 | Stop reason: HOSPADM

## 2023-10-05 RX ORDER — ACETAMINOPHEN 325 MG/1
650 TABLET ORAL
Status: COMPLETED | OUTPATIENT
Start: 2023-10-05 | End: 2023-10-05

## 2023-10-05 RX ADMIN — RITUXIMAB 800 MG: 10 INJECTION, SOLUTION INTRAVENOUS at 10:10

## 2023-10-05 RX ADMIN — METHYLPREDNISOLONE SODIUM SUCCINATE 125 MG: 125 INJECTION, POWDER, FOR SOLUTION INTRAMUSCULAR; INTRAVENOUS at 09:10

## 2023-10-05 RX ADMIN — DIPHENHYDRAMINE HYDROCHLORIDE 50 MG: 50 INJECTION, SOLUTION INTRAMUSCULAR; INTRAVENOUS at 09:10

## 2023-10-05 RX ADMIN — ACETAMINOPHEN 650 MG: 325 TABLET ORAL at 09:10

## 2023-10-05 RX ADMIN — FAMOTIDINE 20 MG: 10 INJECTION, SOLUTION INTRAVENOUS at 09:10

## 2023-10-05 RX ADMIN — Medication 10 ML: at 02:10

## 2023-10-05 RX ADMIN — SODIUM CHLORIDE: 9 INJECTION, SOLUTION INTRAVENOUS at 09:10

## 2023-10-05 RX ADMIN — HEPARIN 500 UNITS: 100 SYRINGE at 02:10

## 2023-10-05 NOTE — PLAN OF CARE
Patient reports feeling tired today. Tolerated first dose of rituxan well with no adverse reactions. Patient to return in one week for next dose of rituxan.

## 2023-10-09 ENCOUNTER — HOSPITAL ENCOUNTER (OUTPATIENT)
Dept: RADIOLOGY | Facility: HOSPITAL | Age: 38
Discharge: HOME | End: 2023-10-09
Payer: COMMERCIAL

## 2023-10-09 ENCOUNTER — TELEPHONE (OUTPATIENT)
Dept: RADIOLOGY | Facility: HOSPITAL | Age: 38
End: 2023-10-09
Payer: COMMERCIAL

## 2023-10-09 DIAGNOSIS — R92.8 ABNORMAL MAMMOGRAM: ICD-10-CM

## 2023-10-09 DIAGNOSIS — R92.8 ABNORMAL MAMMOGRAM: Primary | ICD-10-CM

## 2023-10-09 PROCEDURE — 76642 ULTRASOUND BREAST LIMITED: CPT | Mod: 26,RT,, | Performed by: RADIOLOGY

## 2023-10-09 PROCEDURE — 77065 MAMMO DIGITAL DIAGNOSTIC RIGHT WITH TOMO: ICD-10-PCS | Mod: 26,RT,, | Performed by: RADIOLOGY

## 2023-10-09 PROCEDURE — 77065 DX MAMMO INCL CAD UNI: CPT | Mod: 26,RT,, | Performed by: RADIOLOGY

## 2023-10-09 PROCEDURE — 77061 MAMMO DIGITAL DIAGNOSTIC RIGHT WITH TOMO: ICD-10-PCS | Mod: 26,RT,, | Performed by: RADIOLOGY

## 2023-10-09 PROCEDURE — 77065 DX MAMMO INCL CAD UNI: CPT | Mod: TC,PO,RT

## 2023-10-09 PROCEDURE — 76642 US BREAST RIGHT LIMITED: ICD-10-PCS | Mod: 26,RT,, | Performed by: RADIOLOGY

## 2023-10-09 PROCEDURE — 77061 BREAST TOMOSYNTHESIS UNI: CPT | Mod: 26,RT,, | Performed by: RADIOLOGY

## 2023-10-09 PROCEDURE — 76642 ULTRASOUND BREAST LIMITED: CPT | Mod: TC,PO,RT

## 2023-10-09 NOTE — TELEPHONE ENCOUNTER
Ultrasound guided biopsy scheduled for 10/18/23 at 1pm, arrival time 12:30pm per patient request. Biopsy instructions given with understandings verbalized. Patient has my contact information.

## 2023-10-11 DIAGNOSIS — M32.9 SYSTEMIC LUPUS ERYTHEMATOSUS ARTHRITIS: ICD-10-CM

## 2023-10-11 DIAGNOSIS — M06.09 RHEUMATOID ARTHRITIS OF MULTIPLE SITES WITH NEGATIVE RHEUMATOID FACTOR: ICD-10-CM

## 2023-10-11 RX ORDER — TRAMADOL HYDROCHLORIDE 50 MG/1
TABLET ORAL
Qty: 60 TABLET | Refills: 3 | Status: SHIPPED | OUTPATIENT
Start: 2023-10-11

## 2023-10-12 ENCOUNTER — LAB VISIT (OUTPATIENT)
Dept: LAB | Facility: HOSPITAL | Age: 38
End: 2023-10-12
Payer: COMMERCIAL

## 2023-10-12 ENCOUNTER — INFUSION (OUTPATIENT)
Dept: INFUSION THERAPY | Facility: HOSPITAL | Age: 38
End: 2023-10-12
Attending: PHYSICIAN ASSISTANT
Payer: COMMERCIAL

## 2023-10-12 VITALS
TEMPERATURE: 98 F | WEIGHT: 210.75 LBS | OXYGEN SATURATION: 100 % | SYSTOLIC BLOOD PRESSURE: 119 MMHG | RESPIRATION RATE: 16 BRPM | BODY MASS INDEX: 32.05 KG/M2 | DIASTOLIC BLOOD PRESSURE: 81 MMHG | HEART RATE: 89 BPM

## 2023-10-12 DIAGNOSIS — M35.1 MCTD (MIXED CONNECTIVE TISSUE DISEASE): ICD-10-CM

## 2023-10-12 DIAGNOSIS — M06.09 RHEUMATOID ARTHRITIS OF MULTIPLE SITES WITH NEGATIVE RHEUMATOID FACTOR: ICD-10-CM

## 2023-10-12 DIAGNOSIS — Z79.899 HIGH RISK MEDICATION USE: ICD-10-CM

## 2023-10-12 DIAGNOSIS — M34.9 SCLERODERMA, DIFFUSE: Primary | ICD-10-CM

## 2023-10-12 LAB — B-HCG UR QL: NEGATIVE

## 2023-10-12 PROCEDURE — 81025 URINE PREGNANCY TEST: CPT | Performed by: INTERNAL MEDICINE

## 2023-10-12 PROCEDURE — 25000003 PHARM REV CODE 250: Performed by: INTERNAL MEDICINE

## 2023-10-12 PROCEDURE — 96365 THER/PROPH/DIAG IV INF INIT: CPT

## 2023-10-12 PROCEDURE — 96366 THER/PROPH/DIAG IV INF ADDON: CPT

## 2023-10-12 PROCEDURE — 63600175 PHARM REV CODE 636 W HCPCS: Performed by: INTERNAL MEDICINE

## 2023-10-12 PROCEDURE — 96375 TX/PRO/DX INJ NEW DRUG ADDON: CPT

## 2023-10-12 PROCEDURE — 96367 TX/PROPH/DG ADDL SEQ IV INF: CPT

## 2023-10-12 RX ORDER — METHYLPREDNISOLONE SOD SUCC 125 MG
125 VIAL (EA) INJECTION
Status: COMPLETED | OUTPATIENT
Start: 2023-10-12 | End: 2023-10-12

## 2023-10-12 RX ORDER — SODIUM CHLORIDE 0.9 % (FLUSH) 0.9 %
10 SYRINGE (ML) INJECTION
Status: CANCELLED | OUTPATIENT
Start: 2023-10-19

## 2023-10-12 RX ORDER — MEPERIDINE HYDROCHLORIDE 50 MG/ML
25 INJECTION INTRAMUSCULAR; INTRAVENOUS; SUBCUTANEOUS
Status: CANCELLED | OUTPATIENT
Start: 2023-10-19 | End: 2023-10-20

## 2023-10-12 RX ORDER — DIPHENHYDRAMINE HYDROCHLORIDE 50 MG/ML
50 INJECTION INTRAMUSCULAR; INTRAVENOUS ONCE AS NEEDED
Status: CANCELLED | OUTPATIENT
Start: 2023-10-19

## 2023-10-12 RX ORDER — ACETAMINOPHEN 325 MG/1
650 TABLET ORAL
Status: COMPLETED | OUTPATIENT
Start: 2023-10-12 | End: 2023-10-12

## 2023-10-12 RX ORDER — EPINEPHRINE 0.3 MG/.3ML
0.3 INJECTION SUBCUTANEOUS ONCE AS NEEDED
Status: CANCELLED | OUTPATIENT
Start: 2023-10-19

## 2023-10-12 RX ORDER — FAMOTIDINE 10 MG/ML
20 INJECTION INTRAVENOUS
Status: COMPLETED | OUTPATIENT
Start: 2023-10-12 | End: 2023-10-12

## 2023-10-12 RX ORDER — METHYLPREDNISOLONE SOD SUCC 125 MG
125 VIAL (EA) INJECTION
Status: CANCELLED | OUTPATIENT
Start: 2023-10-19

## 2023-10-12 RX ORDER — HEPARIN 100 UNIT/ML
500 SYRINGE INTRAVENOUS
Status: CANCELLED | OUTPATIENT
Start: 2023-10-19

## 2023-10-12 RX ORDER — HEPARIN 100 UNIT/ML
500 SYRINGE INTRAVENOUS
Status: DISCONTINUED | OUTPATIENT
Start: 2023-10-12 | End: 2023-10-12 | Stop reason: HOSPADM

## 2023-10-12 RX ORDER — ACETAMINOPHEN 325 MG/1
650 TABLET ORAL
Status: CANCELLED | OUTPATIENT
Start: 2023-10-19

## 2023-10-12 RX ORDER — FAMOTIDINE 10 MG/ML
20 INJECTION INTRAVENOUS
Status: CANCELLED | OUTPATIENT
Start: 2023-10-19

## 2023-10-12 RX ADMIN — ACETAMINOPHEN 650 MG: 325 TABLET ORAL at 08:10

## 2023-10-12 RX ADMIN — FAMOTIDINE 20 MG: 10 INJECTION INTRAVENOUS at 08:10

## 2023-10-12 RX ADMIN — METHYLPREDNISOLONE SODIUM SUCCINATE 125 MG: 125 INJECTION, POWDER, FOR SOLUTION INTRAMUSCULAR; INTRAVENOUS at 08:10

## 2023-10-12 RX ADMIN — HEPARIN 500 UNITS: 100 SYRINGE at 12:10

## 2023-10-12 RX ADMIN — DIPHENHYDRAMINE HYDROCHLORIDE 50 MG: 50 INJECTION, SOLUTION INTRAMUSCULAR; INTRAVENOUS at 08:10

## 2023-10-12 RX ADMIN — RITUXIMAB 800 MG: 10 INJECTION, SOLUTION INTRAVENOUS at 09:10

## 2023-10-12 RX ADMIN — SODIUM CHLORIDE: 9 INJECTION, SOLUTION INTRAVENOUS at 08:10

## 2023-10-12 NOTE — PLAN OF CARE
Problem: Adult Inpatient Plan of Care  Goal: Plan of Care Review  Description: Patient likes feet elevated, pillow, blanket, snack, and drink  Outcome: Ongoing, Progressing  Goal: Patient-Specific Goal (Individualized)  Description: Patient tolerated treatment well today.  Outcome: Ongoing, Progressing  Goal: Optimal Comfort and Wellbeing  Outcome: Ongoing, Progressing     Problem: Infection  Goal: Absence of Infection Signs and Symptoms  Outcome: Ongoing, Progressing

## 2023-10-12 NOTE — NURSING
Infusion # 2/4 - Rituxan 800 mg q wk for 4 doses  Last dose-10/5/23     Any:  -recent illness, infection, or antibiotic use in past week- no  -open wounds or mouth sores- no  -invasive procedures or surgeries in past 4 weeks or in upcoming 4 weeks- no  -vaccinations in past week- no  -chance you may be pregnant- no, neg pregnancy test     Recent labs- 10/2/23  Last provider visit- Seen by Dr. SHORE on 9/20/23     Premeds-  650 mg Tylenol PO, 125 mg Solumedrol IVP over 3 minutes, 20 mg Pepcid IVP over 3 minutes, and 50 mg Benadryl IVPB over 20 minutes.       Rituxan 800 mg administered IV over 2 hours and 44 minutes starting at 100 ml/hr and increasing rate by 100 ml/hr every 30 minutes to a max rate of 400 ml/hr per protocol; see MAR and vitals for more details. Tolerated well without adverse events, discharged and ambulatory out of clinic.

## 2023-10-18 ENCOUNTER — HOSPITAL ENCOUNTER (OUTPATIENT)
Dept: RADIOLOGY | Facility: HOSPITAL | Age: 38
Discharge: HOME | End: 2023-10-18
Payer: COMMERCIAL

## 2023-10-18 ENCOUNTER — PATIENT MESSAGE (OUTPATIENT)
Dept: RHEUMATOLOGY | Facility: CLINIC | Age: 38
End: 2023-10-18
Payer: COMMERCIAL

## 2023-10-18 DIAGNOSIS — R92.8 ABNORMAL MAMMOGRAM: ICD-10-CM

## 2023-10-18 PROCEDURE — 77065 DX MAMMO INCL CAD UNI: CPT | Mod: 26,RT,, | Performed by: RADIOLOGY

## 2023-10-18 PROCEDURE — 88342 CHG IMMUNOCYTOCHEMISTRY: ICD-10-PCS | Mod: 26,,, | Performed by: PATHOLOGY

## 2023-10-18 PROCEDURE — 88342 IMHCHEM/IMCYTCHM 1ST ANTB: CPT | Mod: 26,,, | Performed by: PATHOLOGY

## 2023-10-18 PROCEDURE — 77065 DX MAMMO INCL CAD UNI: CPT | Mod: TC,RT

## 2023-10-18 PROCEDURE — 77065 MAMMO DIGITAL DIAGNOSTIC RIGHT: ICD-10-PCS | Mod: 26,RT,, | Performed by: RADIOLOGY

## 2023-10-18 PROCEDURE — 19083 US BREAST BIOPSY WITH IMAGING 1ST SITE RIGHT: ICD-10-PCS | Mod: RT,,, | Performed by: RADIOLOGY

## 2023-10-18 PROCEDURE — 88305 TISSUE EXAM BY PATHOLOGIST: CPT | Performed by: PATHOLOGY

## 2023-10-18 PROCEDURE — A4648 IMPLANTABLE TISSUE MARKER: HCPCS

## 2023-10-18 PROCEDURE — 88305 TISSUE EXAM BY PATHOLOGIST: CPT | Mod: 26,,, | Performed by: PATHOLOGY

## 2023-10-18 PROCEDURE — 19083 BX BREAST 1ST LESION US IMAG: CPT | Mod: RT,,, | Performed by: RADIOLOGY

## 2023-10-18 PROCEDURE — 88305 TISSUE EXAM BY PATHOLOGIST: ICD-10-PCS | Mod: 26,,, | Performed by: PATHOLOGY

## 2023-10-18 PROCEDURE — 88342 IMHCHEM/IMCYTCHM 1ST ANTB: CPT | Performed by: PATHOLOGY

## 2023-10-18 NOTE — NURSING
Pressure held on right breast biopsy site for 10 mins, hemostasis was achieved, steri strips were applied, and wound was covered with 4x4 gauze and a tegaderm. Dressing clean, dry and intact with no drainage noted.  Discharge instructions given verbally and in writing, patient voiced understandings.  Patient discharged and accompanied by family member.

## 2023-10-19 ENCOUNTER — LAB VISIT (OUTPATIENT)
Dept: LAB | Facility: HOSPITAL | Age: 38
End: 2023-10-19
Payer: COMMERCIAL

## 2023-10-19 ENCOUNTER — INFUSION (OUTPATIENT)
Dept: INFUSION THERAPY | Facility: HOSPITAL | Age: 38
End: 2023-10-19
Attending: PHYSICIAN ASSISTANT
Payer: COMMERCIAL

## 2023-10-19 ENCOUNTER — OFFICE VISIT (OUTPATIENT)
Dept: RHEUMATOLOGY | Facility: CLINIC | Age: 38
End: 2023-10-19
Payer: COMMERCIAL

## 2023-10-19 VITALS
DIASTOLIC BLOOD PRESSURE: 72 MMHG | SYSTOLIC BLOOD PRESSURE: 113 MMHG | BODY MASS INDEX: 31.81 KG/M2 | RESPIRATION RATE: 16 BRPM | TEMPERATURE: 98 F | WEIGHT: 209.19 LBS | HEART RATE: 85 BPM

## 2023-10-19 DIAGNOSIS — M34.9 SCLERODERMA, DIFFUSE: Primary | ICD-10-CM

## 2023-10-19 DIAGNOSIS — M06.09 RHEUMATOID ARTHRITIS OF MULTIPLE SITES WITH NEGATIVE RHEUMATOID FACTOR: ICD-10-CM

## 2023-10-19 DIAGNOSIS — M35.1 MCTD (MIXED CONNECTIVE TISSUE DISEASE): ICD-10-CM

## 2023-10-19 DIAGNOSIS — Z79.899 HIGH RISK MEDICATION USE: ICD-10-CM

## 2023-10-19 DIAGNOSIS — R11.0 NAUSEA: ICD-10-CM

## 2023-10-19 LAB — B-HCG UR QL: NEGATIVE

## 2023-10-19 PROCEDURE — 99213 PR OFFICE/OUTPT VISIT, EST, LEVL III, 20-29 MIN: ICD-10-PCS | Mod: 95,,, | Performed by: INTERNAL MEDICINE

## 2023-10-19 PROCEDURE — 99213 OFFICE O/P EST LOW 20 MIN: CPT | Mod: 95,,, | Performed by: INTERNAL MEDICINE

## 2023-10-19 PROCEDURE — 96375 TX/PRO/DX INJ NEW DRUG ADDON: CPT

## 2023-10-19 PROCEDURE — 96367 TX/PROPH/DG ADDL SEQ IV INF: CPT

## 2023-10-19 PROCEDURE — 96413 CHEMO IV INFUSION 1 HR: CPT

## 2023-10-19 PROCEDURE — 25000003 PHARM REV CODE 250: Performed by: INTERNAL MEDICINE

## 2023-10-19 PROCEDURE — 63600175 PHARM REV CODE 636 W HCPCS: Performed by: INTERNAL MEDICINE

## 2023-10-19 PROCEDURE — 96365 THER/PROPH/DIAG IV INF INIT: CPT

## 2023-10-19 PROCEDURE — 96415 CHEMO IV INFUSION ADDL HR: CPT

## 2023-10-19 PROCEDURE — 81025 URINE PREGNANCY TEST: CPT | Performed by: INTERNAL MEDICINE

## 2023-10-19 PROCEDURE — 96366 THER/PROPH/DIAG IV INF ADDON: CPT

## 2023-10-19 RX ORDER — ACETAMINOPHEN 325 MG/1
650 TABLET ORAL
Status: CANCELLED | OUTPATIENT
Start: 2023-10-26

## 2023-10-19 RX ORDER — DIPHENHYDRAMINE HYDROCHLORIDE 50 MG/ML
50 INJECTION INTRAMUSCULAR; INTRAVENOUS ONCE AS NEEDED
Status: CANCELLED | OUTPATIENT
Start: 2023-10-26

## 2023-10-19 RX ORDER — SODIUM CHLORIDE 0.9 % (FLUSH) 0.9 %
10 SYRINGE (ML) INJECTION
Status: CANCELLED | OUTPATIENT
Start: 2023-10-26

## 2023-10-19 RX ORDER — HEPARIN 100 UNIT/ML
500 SYRINGE INTRAVENOUS
Status: DISCONTINUED | OUTPATIENT
Start: 2023-10-19 | End: 2023-10-19 | Stop reason: HOSPADM

## 2023-10-19 RX ORDER — METHYLPREDNISOLONE SOD SUCC 125 MG
125 VIAL (EA) INJECTION
Status: CANCELLED | OUTPATIENT
Start: 2023-10-26

## 2023-10-19 RX ORDER — METHYLPREDNISOLONE SOD SUCC 125 MG
125 VIAL (EA) INJECTION
Status: COMPLETED | OUTPATIENT
Start: 2023-10-19 | End: 2023-10-19

## 2023-10-19 RX ORDER — EPINEPHRINE 0.3 MG/.3ML
0.3 INJECTION SUBCUTANEOUS ONCE AS NEEDED
Status: CANCELLED | OUTPATIENT
Start: 2023-10-26

## 2023-10-19 RX ORDER — MEPERIDINE HYDROCHLORIDE 50 MG/ML
25 INJECTION INTRAMUSCULAR; INTRAVENOUS; SUBCUTANEOUS
Status: CANCELLED | OUTPATIENT
Start: 2023-10-26 | End: 2023-10-27

## 2023-10-19 RX ORDER — ACETAMINOPHEN 325 MG/1
650 TABLET ORAL
Status: COMPLETED | OUTPATIENT
Start: 2023-10-19 | End: 2023-10-19

## 2023-10-19 RX ORDER — NINTEDANIB 150 MG/1
150 CAPSULE ORAL 2 TIMES DAILY
Qty: 60 CAPSULE | Refills: 11 | Status: ACTIVE | OUTPATIENT
Start: 2023-10-19 | End: 2024-03-12

## 2023-10-19 RX ORDER — HEPARIN 100 UNIT/ML
500 SYRINGE INTRAVENOUS
Status: CANCELLED | OUTPATIENT
Start: 2023-10-26

## 2023-10-19 RX ORDER — FAMOTIDINE 10 MG/ML
20 INJECTION INTRAVENOUS
Status: CANCELLED | OUTPATIENT
Start: 2023-10-26

## 2023-10-19 RX ORDER — FAMOTIDINE 10 MG/ML
20 INJECTION INTRAVENOUS
Status: COMPLETED | OUTPATIENT
Start: 2023-10-19 | End: 2023-10-19

## 2023-10-19 RX ORDER — ONDANSETRON 4 MG/1
4 TABLET, ORALLY DISINTEGRATING ORAL EVERY 8 HOURS PRN
Qty: 30 TABLET | Refills: 0 | Status: SHIPPED | OUTPATIENT
Start: 2023-10-19

## 2023-10-19 RX ADMIN — RITUXIMAB 799 MG: 10 INJECTION, SOLUTION INTRAVENOUS at 09:10

## 2023-10-19 RX ADMIN — HEPARIN 500 UNITS: 100 SYRINGE at 12:10

## 2023-10-19 RX ADMIN — ACETAMINOPHEN 650 MG: 325 TABLET ORAL at 08:10

## 2023-10-19 RX ADMIN — METHYLPREDNISOLONE SODIUM SUCCINATE 125 MG: 125 INJECTION, POWDER, FOR SOLUTION INTRAMUSCULAR; INTRAVENOUS at 08:10

## 2023-10-19 RX ADMIN — DIPHENHYDRAMINE HYDROCHLORIDE 50 MG: 50 INJECTION, SOLUTION INTRAMUSCULAR; INTRAVENOUS at 08:10

## 2023-10-19 RX ADMIN — FAMOTIDINE 20 MG: 10 INJECTION INTRAVENOUS at 08:10

## 2023-10-19 NOTE — PROGRESS NOTES
RHEUMATOLOGY FOLLOW UP - TELE VISIT     The patient location is: [place]  The chief complaint leading to consultation is:  Rapidly worsening skin fibrosis  Visit type: Virtual visit with synchronous audio and video  Total time spent with patient:  15 minutes  Each patient to whom he or she provides medical services by telemedicine is:  (1) informed of the relationship between the physician and patient and the respective role of any other health care provider with respect to management of the patient; and (2) notified that he or she may decline to receive medical services by telemedicine and may withdraw from such care at any time.    Chief complaints, HPI, ROS, EXAM, Assessment & Plans:-  Stefanie Kimbrough a 37 y.o. pleasant female seen today for worsening symptoms.    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    Restarted rituximab after failing 7 Cytoxan infusions.  Failed Orencia.  Reports worsening skin fibrosis around the trunk.  Underwent biopsy for breast nodule.  Pathology pending.  Failed multiple medications in the past including methotrexate, Arava, tacrolimus, sulfasalazine, DAMION inhibitors, Actemra, rituximab rheumatoid dosing.  No significant joint symptoms.  No pulmonary symptoms.  Rheumatological review of system negative otherwise.  Exam shows worsening skin tightening and fibrosis around lower extremities and under the breasts.    1. Scleroderma, diffuse    2. Nausea    3. Rheumatoid arthritis of multiple sites with negative rheumatoid factor    4. MCTD (mixed connective tissue disease)      Problem List Items Addressed This Visit       MCTD (mixed connective tissue disease)    Rheumatoid arthritis of multiple sites with negative rheumatoid factor    Scleroderma, diffuse - Primary    Relevant Medications    nintedanib (OFEV) 150 mg Cap     Other Visit Diagnoses       Nausea         Relevant Medications    ondansetron (ZOFRAN-ODT) 4 MG TbDL           Latest Reference Range & Units Most Recent   MELVA Screen Negative <1:80  Positive !  6/16/22 11:45   MELVA HEP-2 Titer  Positive >=1:2560 Speckled  8/30/17 14:50   MELVA Titer 1  >=1:2560  6/16/22 11:45   MELVA PATTERN 1  Speckled  6/16/22 11:45   ds DNA Ab Negative 1:10  Negative 1:10  6/16/22 11:45   Anti-SSA Antibody 0.00 - 0.99 Ratio 0.07  6/16/22 11:45   Anti-SSA Interpretation Negative  Negative  6/16/22 11:45   Anti-SSB Antibody 0.00 - 0.99 Ratio 0.04  6/16/22 11:45   Anti-SSB Interpretation Negative  Negative  6/16/22 11:45   Anti Sm Antibody 0.00 - 0.99 Ratio 0.25  6/16/22 11:45   Anti-Sm Interpretation Negative  Negative  6/16/22 11:45   Anti Sm/RNP Antibody 0.00 - 0.99 Ratio 5.45 (H)  6/16/22 11:45   Anti-Sm/RNP Interpretation Negative  Positive !  6/16/22 11:45   Scleroderma SCL- <20 UNITS 4  10/2/19 09:52   Complement (C-3) 50 - 180 mg/dL 130  10/2/23 15:50   Complement (C-4) 11 - 44 mg/dL 16  10/2/23 15:50   IgG 650 - 1600 mg/dL 3990 (H)  12/28/22 13:46   IgM 50 - 300 mg/dL 326 (H)  12/28/22 13:46   IgA 40 - 350 mg/dL 353 (H)  12/28/22 13:46   Protein, Serum 6.0 - 8.4 g/dL 9.2 (H)  12/28/22 13:46   Albumin grams/dl 3.35 - 5.55 g/dL 3.58  12/28/22 13:46   Alpha-1 grams/dl 0.17 - 0.41 g/dL 0.31  12/28/22 13:46   Alpha-2 0.43 - 0.99 g/dL 0.68  12/28/22 13:46   Beta 0.50 - 1.10 g/dL 0.98  12/28/22 13:46   Gamma 0.67 - 1.58 g/dL 3.64 (H)  12/28/22 13:46   Pathologist Interpretation SPE  REVIEWED  12/28/22 13:46   Pathologist Interpretation ANALI  REVIEWED  12/28/22 13:46   Immunofix Interp.  SEE COMMENT  12/28/22 13:46   Cryoglobulin, Qual Absent  Absent  1/18/23 14:46   Anti-Dilcia-1 Antibody <20 Units <20  12/1/17 16:30   Anti-PM/Scl Ab <20 Units <20  12/1/17 16:30   Anti-SS-A 52 kD Ab, IgG <20 Units <20  12/1/17 16:30   Anti-U1-RNP  Ab <20 Units 143 (H)  12/1/17 16:30   EJ Negative  Negative  12/1/17 16:30   Fibrillarin (U3 RNP) Negative   Negative  12/1/17 16:30   Ku Negative  Negative  12/1/17 16:30   MDA-5 (P140) (CADM-140) <20 Units <20  12/1/17 16:30   MI-2 Negative  Negative  12/1/17 16:30   NXP-2 (P140) <20 Units <20  12/1/17 16:30   OJ Negative  Negative  12/1/17 16:30   PL-12 Negative  Negative  12/1/17 16:30   PL-7 Negative  Negative  12/1/17 16:30   SRP Negative  Negative  12/1/17 16:30   TIF1 GAMMA (P155/140) <20 Units <20  12/1/17 16:30   U2 snRNP Negative  Moderate Positive !  12/1/17 16:30   !: Data is abnormal  (H): Data is abnormally high          Rapidly worsening skin fibrosis from scleroderma.  Failed multiple medications including Cytoxan in the past.  Trying vasculitis dose of rituximab.  Check whether we could try her on Ofev for rapidly worsening skin fibrosis since she has failed multiple therapies including Cytoxan.  If she fails rituximab, restart Cytoxan at a higher dose.   Continue methotrexate with folic acid.  Advised all risks including life-threatening infections and pregnancy morbidities.  Drug induced immunodeficiency due to use of immunosuppressive drugs. Monitor carefully for infections. Advised to get immediate medical care if any infection. Also advised strict adherence to age appropriate vaccinations and cancer screenings with PCP.  I have explained all of the above in detail and the patient understands all of the current recommendation(s). I have answered all questions to the best of my ability and to their complete satisfaction.     Labs:-  Check all 4 labs, urine pregnancy and hepatitis-B serologies for infusion protocol for rituximab.  Repeat all 4 labs in 4 weeks.  # Follow up in about 4 weeks (around 11/16/2023).      Past Medical History:   Diagnosis Date    Abnormal Pap smear of cervix     repeat was normal    Lupus (systemic lupus erythematosus)        Past Surgical History:   Procedure Laterality Date    INSERTION OF TUNNELED CENTRAL VENOUS CATHETER (CVC) WITH SUBCUTANEOUS PORT N/A 05/24/2023     Procedure: [id number];  Surgeon: Taco Zimmer MD;  Location: [facility] OR;  Service: General;  Laterality: N/A;    ROBOT-ASSISTED CHOLECYSTECTOMY USING DA CRISTOPHER XI N/A 12/11/2020    Procedure: XI ROBOTIC CHOLECYSTECTOMY;  Surgeon: Taco Zimmer MD;  Location: [facility] OR;  Service: General;  Laterality: N/A;    ROBOT-ASSISTED LAPAROSCOPIC LYSIS OF ADHESIONS USING DA CRISTOPHER XI  12/11/2020    Procedure: XI ROBOTIC LYSIS, ADHESIONS;  Surgeon: Taco Zimmer MD;  Location: [facility] OR;  Service: General;;    ROBOT-ASSISTED LAPAROSCOPIC SALPINGO-OOPHORECTOMY USING DA CRISTOPHER XI Left 12/11/2020    Procedure: XI ROBOTIC SALPINGO-OOPHORECTOMY;  Surgeon: SHERLEY Wakefield MD;  Location: [facility] OR;  Service: OB/GYN;  Laterality: Left;    TUBAL LIGATION  2005        Social History     Tobacco Use    Smoking status: Never     Passive exposure: Never    Smokeless tobacco: Never   Substance Use Topics    Alcohol use: No    Drug use: No       Family History   Problem Relation Age of Onset    Liver cancer Maternal Aunt     Breast cancer Maternal Aunt     Breast cancer Paternal Grandmother     Colon cancer Neg Hx     Ovarian cancer Neg Hx     Thrombosis Neg Hx        Review of patient's allergies indicates:   Allergen Reactions    Elavil [amitriptyline]        Medication List with Changes/Refills   New Medications    NINTEDANIB (OFEV) 150 MG CAP    Take 1 capsule (150 mg total) by mouth 2 (two) times a day.   Current Medications    BUMETANIDE (BUMEX) 1 MG TABLET    Take 1 tablet (1 mg total) by mouth once daily.    CLOBETASOL (TEMOVATE) 0.05 % CREAM    Apply topically 2 (two) times daily.    CLOBETASOL (TEMOVATE) 0.05 % EXTERNAL SOLUTION    Apply topically 2 (two) times daily.    COLCHICINE (COLCRYS) 0.6 MG TABLET    Take 1 tablet (0.6 mg total) by mouth 2 (two) times daily.    FOLIC ACID (FOLVITE) 1 MG TABLET    Take 1 tablet (1 mg total) by mouth once daily.    GABAPENTIN (NEURONTIN) 300 MG CAPSULE    Take 1 capsule (300 mg  total) by mouth 3 (three) times daily.    LIDOCAINE-PRILOCAINE (EMLA) CREAM    Apply topically as needed (port access).    METHOTREXATE 25 MG/ML INJECTION    Inject 0.6 mLs (15 mg total) into the muscle every 7 days.    MOMETASONE 0.1% (ELOCON) 0.1 % CREAM    Apply topically once daily.    PANTOPRAZOLE (PROTONIX) 40 MG TABLET    Take 1 tablet (40 mg total) by mouth once daily.    PREDNISONE (DELTASONE) 10 MG TABLET    Take 1 tablet (10 mg total) by mouth once daily.    PREDNISONE (DELTASONE) 10 MG TABLET    Take 3 pills po qam with breakfast x 1 wk then take 2 po qam with breakfast x 1 wk then take 1 po qam with breakfast x 1 wk    TACROLIMUS (PROTOPIC) 0.1 % OINTMENT    Apply topically 2 (two) times daily.    TRAMADOL (ULTRAM) 50 MG TABLET    TAKE 1 TABLET BY MOUTH EVERY 12 HOURS AS NEEDED FOR PAIN    TRAZODONE (DESYREL) 50 MG TABLET    Take 1 tablet (50 mg total) by mouth every evening.   Changed and/or Refilled Medications    Modified Medication Previous Medication    ONDANSETRON (ZOFRAN-ODT) 4 MG TBDL ondansetron (ZOFRAN-ODT) 4 MG TbDL       Take 1 tablet (4 mg total) by mouth every 8 (eight) hours as needed (nausea).    Take 1 tablet (4 mg total) by mouth every 8 (eight) hours as needed (nausea).       Disclaimer: This note was prepared using voice recognition system and is likely to have sound alike errors and is not proof read.  Please call me with any questions.

## 2023-10-19 NOTE — PLAN OF CARE
Patient tolerated Rituxan well today; no adverse reaction noted.  POC reviewed with pt.  No questions or concerns voiced.  NAD noted upon discharge.  No significant new complaints voiced.   Has f/u appt(s) scheduled per MD request.    Problem: Adult Inpatient Plan of Care  Goal: Plan of Care Review  Description: Patient likes feet elevated, pillow, blanket, snack, and drink  Outcome: Ongoing, Progressing  Goal: Patient-Specific Goal (Individualized)  Description: Patient tolerated treatment well today.  Outcome: Ongoing, Progressing  Goal: Optimal Comfort and Wellbeing  Outcome: Ongoing, Progressing  Intervention: Provide Person-Centered Care  Flowsheets (Taken 10/19/2023 0843)  Trust Relationship/Rapport:   reassurance provided   care explained   choices provided   thoughts/feelings acknowledged   emotional support provided   empathic listening provided   questions answered   questions encouraged  Goal: Absence of Hospital-Acquired Illness or Injury  Outcome: Ongoing, Progressing  Intervention: Identify and Manage Fall Risk  Flowsheets (Taken 10/19/2023 0843)  Safety Promotion/Fall Prevention: in recliner, wheels locked

## 2023-10-24 LAB
COMMENT: NORMAL
FINAL PATHOLOGIC DIAGNOSIS: NORMAL
GROSS: NORMAL
Lab: NORMAL

## 2023-10-25 ENCOUNTER — TELEPHONE (OUTPATIENT)
Dept: SURGERY | Facility: CLINIC | Age: 38
End: 2023-10-25
Payer: COMMERCIAL

## 2023-10-25 NOTE — TELEPHONE ENCOUNTER
Called patient to discuss benign breast biopsy results- we reviewed the pathology report. Pt verbalized understanding of all information. Pt states everything is healing well at this time and denies any further needs.      ----- Message from Kemar Warren MD sent at 10/24/2023  2:30 PM CDT -----  Benign and concordant.      Thank you.

## 2023-10-26 ENCOUNTER — INFUSION (OUTPATIENT)
Dept: INFUSION THERAPY | Facility: HOSPITAL | Age: 38
End: 2023-10-26
Attending: PHYSICIAN ASSISTANT
Payer: COMMERCIAL

## 2023-10-26 ENCOUNTER — LAB VISIT (OUTPATIENT)
Dept: LAB | Facility: HOSPITAL | Age: 38
End: 2023-10-26
Payer: COMMERCIAL

## 2023-10-26 VITALS
DIASTOLIC BLOOD PRESSURE: 68 MMHG | HEART RATE: 90 BPM | WEIGHT: 204.38 LBS | OXYGEN SATURATION: 98 % | SYSTOLIC BLOOD PRESSURE: 110 MMHG | TEMPERATURE: 98 F | RESPIRATION RATE: 16 BRPM | BODY MASS INDEX: 31.07 KG/M2

## 2023-10-26 DIAGNOSIS — M34.9 SCLERODERMA, DIFFUSE: Primary | ICD-10-CM

## 2023-10-26 DIAGNOSIS — Z79.899 HIGH RISK MEDICATION USE: ICD-10-CM

## 2023-10-26 DIAGNOSIS — M35.1 MCTD (MIXED CONNECTIVE TISSUE DISEASE): ICD-10-CM

## 2023-10-26 DIAGNOSIS — M06.09 RHEUMATOID ARTHRITIS OF MULTIPLE SITES WITH NEGATIVE RHEUMATOID FACTOR: ICD-10-CM

## 2023-10-26 LAB — B-HCG UR QL: NEGATIVE

## 2023-10-26 PROCEDURE — 81025 URINE PREGNANCY TEST: CPT | Performed by: INTERNAL MEDICINE

## 2023-10-26 PROCEDURE — 96413 CHEMO IV INFUSION 1 HR: CPT

## 2023-10-26 PROCEDURE — 63600175 PHARM REV CODE 636 W HCPCS: Performed by: INTERNAL MEDICINE

## 2023-10-26 PROCEDURE — 25000003 PHARM REV CODE 250: Performed by: INTERNAL MEDICINE

## 2023-10-26 PROCEDURE — 96365 THER/PROPH/DIAG IV INF INIT: CPT

## 2023-10-26 PROCEDURE — A4216 STERILE WATER/SALINE, 10 ML: HCPCS | Performed by: INTERNAL MEDICINE

## 2023-10-26 PROCEDURE — 96415 CHEMO IV INFUSION ADDL HR: CPT

## 2023-10-26 PROCEDURE — 96375 TX/PRO/DX INJ NEW DRUG ADDON: CPT

## 2023-10-26 PROCEDURE — 96366 THER/PROPH/DIAG IV INF ADDON: CPT

## 2023-10-26 PROCEDURE — 96367 TX/PROPH/DG ADDL SEQ IV INF: CPT

## 2023-10-26 RX ORDER — HEPARIN 100 UNIT/ML
500 SYRINGE INTRAVENOUS
Status: CANCELLED | OUTPATIENT
Start: 2023-11-02

## 2023-10-26 RX ORDER — METHYLPREDNISOLONE SOD SUCC 125 MG
125 VIAL (EA) INJECTION
Status: COMPLETED | OUTPATIENT
Start: 2023-10-26 | End: 2023-10-26

## 2023-10-26 RX ORDER — EPINEPHRINE 0.3 MG/.3ML
0.3 INJECTION SUBCUTANEOUS ONCE AS NEEDED
Status: CANCELLED | OUTPATIENT
Start: 2023-11-02

## 2023-10-26 RX ORDER — SODIUM CHLORIDE 0.9 % (FLUSH) 0.9 %
10 SYRINGE (ML) INJECTION
Status: CANCELLED | OUTPATIENT
Start: 2023-11-02

## 2023-10-26 RX ORDER — ACETAMINOPHEN 325 MG/1
650 TABLET ORAL
Status: CANCELLED | OUTPATIENT
Start: 2023-11-02

## 2023-10-26 RX ORDER — METHYLPREDNISOLONE SOD SUCC 125 MG
125 VIAL (EA) INJECTION
Status: CANCELLED | OUTPATIENT
Start: 2023-11-02

## 2023-10-26 RX ORDER — SODIUM CHLORIDE 0.9 % (FLUSH) 0.9 %
10 SYRINGE (ML) INJECTION
Status: DISCONTINUED | OUTPATIENT
Start: 2023-10-26 | End: 2023-10-26 | Stop reason: HOSPADM

## 2023-10-26 RX ORDER — MEPERIDINE HYDROCHLORIDE 50 MG/ML
25 INJECTION INTRAMUSCULAR; INTRAVENOUS; SUBCUTANEOUS
Status: CANCELLED | OUTPATIENT
Start: 2023-11-02 | End: 2023-11-03

## 2023-10-26 RX ORDER — ACETAMINOPHEN 325 MG/1
650 TABLET ORAL
Status: COMPLETED | OUTPATIENT
Start: 2023-10-26 | End: 2023-10-26

## 2023-10-26 RX ORDER — HEPARIN 100 UNIT/ML
500 SYRINGE INTRAVENOUS
Status: DISCONTINUED | OUTPATIENT
Start: 2023-10-26 | End: 2023-10-26 | Stop reason: HOSPADM

## 2023-10-26 RX ORDER — FAMOTIDINE 10 MG/ML
20 INJECTION INTRAVENOUS
Status: CANCELLED | OUTPATIENT
Start: 2023-11-02

## 2023-10-26 RX ORDER — DIPHENHYDRAMINE HYDROCHLORIDE 50 MG/ML
50 INJECTION INTRAMUSCULAR; INTRAVENOUS ONCE AS NEEDED
Status: CANCELLED | OUTPATIENT
Start: 2023-11-02

## 2023-10-26 RX ORDER — FAMOTIDINE 10 MG/ML
20 INJECTION INTRAVENOUS
Status: COMPLETED | OUTPATIENT
Start: 2023-10-26 | End: 2023-10-26

## 2023-10-26 RX ADMIN — FAMOTIDINE 20 MG: 10 INJECTION INTRAVENOUS at 09:10

## 2023-10-26 RX ADMIN — RITUXIMAB 791 MG: 10 INJECTION, SOLUTION INTRAVENOUS at 09:10

## 2023-10-26 RX ADMIN — METHYLPREDNISOLONE SODIUM SUCCINATE 125 MG: 125 INJECTION, POWDER, FOR SOLUTION INTRAMUSCULAR; INTRAVENOUS at 09:10

## 2023-10-26 RX ADMIN — Medication 10 ML: at 12:10

## 2023-10-26 RX ADMIN — ACETAMINOPHEN 650 MG: 325 TABLET ORAL at 09:10

## 2023-10-26 RX ADMIN — DIPHENHYDRAMINE HYDROCHLORIDE 50 MG: 50 INJECTION, SOLUTION INTRAMUSCULAR; INTRAVENOUS at 08:10

## 2023-10-26 RX ADMIN — HEPARIN 500 UNITS: 100 SYRINGE at 12:10

## 2023-10-26 NOTE — PLAN OF CARE
Plan of care reviewed with pt. All needs and concerns addressed.   Problem: Adult Inpatient Plan of Care  Goal: Plan of Care Review  Description: Patient likes feet elevated, pillow, blanket, snack, and drink  Outcome: Ongoing, Progressing  Flowsheets (Taken 10/26/2023 1420)  Plan of Care Reviewed With: patient  Goal: Patient-Specific Goal (Individualized)  Description: Patient tolerated treatment well today.  Outcome: Ongoing, Progressing  Flowsheets (Taken 10/26/2023 1420)  Anxieties, Fears or Concerns: Reports pain in legs  Individualized Care Needs: In recliner with feet elevated, warm blankets and pillow. Snack and drink provided to pt and spouse.  Goal: Optimal Comfort and Wellbeing  Outcome: Ongoing, Progressing  Intervention: Provide Person-Centered Care  Flowsheets (Taken 10/26/2023 1420)  Trust Relationship/Rapport:   care explained   questions encouraged   choices provided   emotional support provided   reassurance provided   empathic listening provided   thoughts/feelings acknowledged   questions answered  Goal: Absence of Hospital-Acquired Illness or Injury  Outcome: Ongoing, Progressing  Intervention: Identify and Manage Fall Risk  Flowsheets (Taken 10/26/2023 1420)  Safety Promotion/Fall Prevention:   room near unit station   in recliner, wheels locked  Intervention: Prevent Infection  Flowsheets (Taken 10/26/2023 1420)  Infection Prevention:   environmental surveillance performed   equipment surfaces disinfected   hand hygiene promoted   personal protective equipment utilized     Problem: Infection  Goal: Absence of Infection Signs and Symptoms  Outcome: Ongoing, Progressing  Intervention: Prevent or Manage Infection  Flowsheets (Taken 10/26/2023 1420)  Infection Management: aseptic technique maintained

## 2023-11-09 DIAGNOSIS — M06.09 RHEUMATOID ARTHRITIS OF MULTIPLE SITES WITH NEGATIVE RHEUMATOID FACTOR: ICD-10-CM

## 2023-11-09 RX ORDER — PREDNISONE 10 MG/1
10 TABLET ORAL DAILY
Qty: 30 TABLET | Refills: 1 | Status: SHIPPED | OUTPATIENT
Start: 2023-11-09 | End: 2024-02-13 | Stop reason: ALTCHOICE

## 2023-11-29 ENCOUNTER — LAB VISIT (OUTPATIENT)
Dept: LAB | Facility: HOSPITAL | Age: 38
End: 2023-11-29
Attending: INTERNAL MEDICINE
Payer: COMMERCIAL

## 2023-11-29 DIAGNOSIS — Z79.899 LONG-TERM CURRENT USE OF HIGH RISK MEDICATION OTHER THAN ANTICOAGULANT: ICD-10-CM

## 2023-11-29 DIAGNOSIS — M79.671 PAIN IN BOTH FEET: ICD-10-CM

## 2023-11-29 DIAGNOSIS — Z51.81 ENCOUNTER FOR MEDICATION MONITORING: ICD-10-CM

## 2023-11-29 DIAGNOSIS — M32.9 SYSTEMIC LUPUS ERYTHEMATOSUS ARTHRITIS: ICD-10-CM

## 2023-11-29 DIAGNOSIS — M79.672 PAIN IN BOTH FEET: ICD-10-CM

## 2023-11-29 DIAGNOSIS — M06.09 RHEUMATOID ARTHRITIS OF MULTIPLE SITES WITH NEGATIVE RHEUMATOID FACTOR: ICD-10-CM

## 2023-11-29 DIAGNOSIS — M34.9 SCLERODERMA, DIFFUSE: ICD-10-CM

## 2023-11-29 LAB
ALBUMIN SERPL BCP-MCNC: 3.3 G/DL (ref 3.5–5.2)
ALP SERPL-CCNC: 61 U/L (ref 55–135)
ALT SERPL W/O P-5'-P-CCNC: 17 U/L (ref 10–44)
ANION GAP SERPL CALC-SCNC: 9 MMOL/L (ref 8–16)
AST SERPL-CCNC: 28 U/L (ref 10–40)
BASOPHILS # BLD AUTO: 0.04 K/UL (ref 0–0.2)
BASOPHILS NFR BLD: 0.6 % (ref 0–1.9)
BILIRUB SERPL-MCNC: 0.2 MG/DL (ref 0.1–1)
BUN SERPL-MCNC: 8 MG/DL (ref 6–20)
CALCIUM SERPL-MCNC: 9.4 MG/DL (ref 8.7–10.5)
CHLORIDE SERPL-SCNC: 101 MMOL/L (ref 95–110)
CO2 SERPL-SCNC: 26 MMOL/L (ref 23–29)
CREAT SERPL-MCNC: 0.7 MG/DL (ref 0.5–1.4)
CRP SERPL-MCNC: 16.9 MG/L (ref 0–8.2)
DIFFERENTIAL METHOD: ABNORMAL
EOSINOPHIL # BLD AUTO: 0.9 K/UL (ref 0–0.5)
EOSINOPHIL NFR BLD: 14.5 % (ref 0–8)
ERYTHROCYTE [DISTWIDTH] IN BLOOD BY AUTOMATED COUNT: 13.1 % (ref 11.5–14.5)
ERYTHROCYTE [SEDIMENTATION RATE] IN BLOOD BY PHOTOMETRIC METHOD: 78 MM/HR (ref 0–36)
EST. GFR  (NO RACE VARIABLE): >60 ML/MIN/1.73 M^2
GLUCOSE SERPL-MCNC: 75 MG/DL (ref 70–110)
HCT VFR BLD AUTO: 32.9 % (ref 37–48.5)
HGB BLD-MCNC: 10.4 G/DL (ref 12–16)
IGA SERPL-MCNC: 320 MG/DL (ref 40–350)
IGG SERPL-MCNC: 3736 MG/DL (ref 650–1600)
IGM SERPL-MCNC: 270 MG/DL (ref 50–300)
IMM GRANULOCYTES # BLD AUTO: 0.01 K/UL (ref 0–0.04)
IMM GRANULOCYTES NFR BLD AUTO: 0.2 % (ref 0–0.5)
LYMPHOCYTES # BLD AUTO: 1 K/UL (ref 1–4.8)
LYMPHOCYTES NFR BLD: 16.4 % (ref 18–48)
MCH RBC QN AUTO: 29.1 PG (ref 27–31)
MCHC RBC AUTO-ENTMCNC: 31.6 G/DL (ref 32–36)
MCV RBC AUTO: 92 FL (ref 82–98)
MONOCYTES # BLD AUTO: 0.5 K/UL (ref 0.3–1)
MONOCYTES NFR BLD: 7.2 % (ref 4–15)
NEUTROPHILS # BLD AUTO: 3.9 K/UL (ref 1.8–7.7)
NEUTROPHILS NFR BLD: 61.1 % (ref 38–73)
NRBC BLD-RTO: 0 /100 WBC
PLATELET # BLD AUTO: 484 K/UL (ref 150–450)
PMV BLD AUTO: 8.9 FL (ref 9.2–12.9)
POTASSIUM SERPL-SCNC: 3.9 MMOL/L (ref 3.5–5.1)
PROT SERPL-MCNC: 9.4 G/DL (ref 6–8.4)
RBC # BLD AUTO: 3.57 M/UL (ref 4–5.4)
SODIUM SERPL-SCNC: 136 MMOL/L (ref 136–145)
URATE SERPL-MCNC: 5.6 MG/DL (ref 2.4–5.7)
WBC # BLD AUTO: 6.35 K/UL (ref 3.9–12.7)

## 2023-11-29 PROCEDURE — 82784 ASSAY IGA/IGD/IGG/IGM EACH: CPT | Mod: 59 | Performed by: INTERNAL MEDICINE

## 2023-11-29 PROCEDURE — 84550 ASSAY OF BLOOD/URIC ACID: CPT | Performed by: INTERNAL MEDICINE

## 2023-11-29 PROCEDURE — 36415 COLL VENOUS BLD VENIPUNCTURE: CPT | Mod: PO | Performed by: INTERNAL MEDICINE

## 2023-11-29 PROCEDURE — 82785 ASSAY OF IGE: CPT | Performed by: INTERNAL MEDICINE

## 2023-11-29 PROCEDURE — 80053 COMPREHEN METABOLIC PANEL: CPT | Performed by: INTERNAL MEDICINE

## 2023-11-29 PROCEDURE — 85652 RBC SED RATE AUTOMATED: CPT | Performed by: INTERNAL MEDICINE

## 2023-11-29 PROCEDURE — 85025 COMPLETE CBC W/AUTO DIFF WBC: CPT | Performed by: INTERNAL MEDICINE

## 2023-11-29 PROCEDURE — 86140 C-REACTIVE PROTEIN: CPT | Performed by: INTERNAL MEDICINE

## 2023-11-30 LAB — IGE SERPL-ACNC: <35 IU/ML (ref 0–100)

## 2023-12-01 ENCOUNTER — PATIENT MESSAGE (OUTPATIENT)
Dept: RHEUMATOLOGY | Facility: CLINIC | Age: 38
End: 2023-12-01
Payer: COMMERCIAL

## 2023-12-01 RX ORDER — METHYLPREDNISOLONE SOD SUCC 125 MG
125 VIAL (EA) INJECTION
Status: CANCELLED | OUTPATIENT
Start: 2023-12-01

## 2023-12-01 RX ORDER — ACETAMINOPHEN 325 MG/1
650 TABLET ORAL
Status: CANCELLED | OUTPATIENT
Start: 2023-12-01

## 2023-12-01 RX ORDER — FAMOTIDINE 10 MG/ML
20 INJECTION INTRAVENOUS
Status: CANCELLED | OUTPATIENT
Start: 2023-12-01

## 2023-12-01 NOTE — TELEPHONE ENCOUNTER
Lab results shows mild improvement of inflammaiton and immunoglobulins. Repeat another course of 4 doses of rituximab every 4 weeks. Thanks.

## 2023-12-11 ENCOUNTER — OFFICE VISIT (OUTPATIENT)
Dept: RHEUMATOLOGY | Facility: CLINIC | Age: 38
End: 2023-12-11
Payer: COMMERCIAL

## 2023-12-11 DIAGNOSIS — Z51.81 ENCOUNTER FOR MEDICATION MONITORING: ICD-10-CM

## 2023-12-11 DIAGNOSIS — M06.09 RHEUMATOID ARTHRITIS OF MULTIPLE SITES WITH NEGATIVE RHEUMATOID FACTOR: ICD-10-CM

## 2023-12-11 DIAGNOSIS — Z79.899 DRUG-INDUCED IMMUNODEFICIENCY: ICD-10-CM

## 2023-12-11 DIAGNOSIS — D84.821 DRUG-INDUCED IMMUNODEFICIENCY: ICD-10-CM

## 2023-12-11 DIAGNOSIS — M34.9 SCLERODERMA, DIFFUSE: Primary | ICD-10-CM

## 2023-12-11 DIAGNOSIS — M35.1 MCTD (MIXED CONNECTIVE TISSUE DISEASE): ICD-10-CM

## 2023-12-11 PROCEDURE — 1160F RVW MEDS BY RX/DR IN RCRD: CPT | Mod: CPTII,95,, | Performed by: INTERNAL MEDICINE

## 2023-12-11 PROCEDURE — 1159F MED LIST DOCD IN RCRD: CPT | Mod: CPTII,95,, | Performed by: INTERNAL MEDICINE

## 2023-12-11 PROCEDURE — 99214 OFFICE O/P EST MOD 30 MIN: CPT | Mod: 95,,, | Performed by: INTERNAL MEDICINE

## 2023-12-11 PROCEDURE — 1159F PR MEDICATION LIST DOCUMENTED IN MEDICAL RECORD: ICD-10-PCS | Mod: CPTII,95,, | Performed by: INTERNAL MEDICINE

## 2023-12-11 PROCEDURE — 99214 PR OFFICE/OUTPT VISIT, EST, LEVL IV, 30-39 MIN: ICD-10-PCS | Mod: 95,,, | Performed by: INTERNAL MEDICINE

## 2023-12-11 PROCEDURE — 1160F PR REVIEW ALL MEDS BY PRESCRIBER/CLIN PHARMACIST DOCUMENTED: ICD-10-PCS | Mod: CPTII,95,, | Performed by: INTERNAL MEDICINE

## 2023-12-11 NOTE — PROGRESS NOTES
RHEUMATOLOGY FOLLOW UP - TELE VISIT     The patient location is: [place]  The chief complaint leading to consultation is:  Rapidly worsening skin fibrosis  Visit type: Virtual visit with synchronous audio and video  Total time spent with patient:  10 minutes  Each patient to whom he or she provides medical services by telemedicine is:  (1) informed of the relationship between the physician and patient and the respective role of any other health care provider with respect to management of the patient; and (2) notified that he or she may decline to receive medical services by telemedicine and may withdraw from such care at any time.    Chief complaints, HPI, ROS, EXAM, Assessment & Plans:-  Stefanienéstor Kimbrough a 37 y.o. pleasant female seen today for worsening symptoms.    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    Restarted rituximab after failing 7 Cytoxan infusions.  Failed Orencia.  Reports worsening skin fibrosis around the trunk.  Underwent biopsy for breast nodule.  Pathology pending.  Failed multiple medications in the past including methotrexate, Arava, tacrolimus, sulfasalazine, DAMION inhibitors, Actemra, rituximab rheumatoid dosing.  No significant joint symptoms.  No pulmonary symptoms.  Rheumatological review of system negative otherwise.  Exam shows worsening skin tightening and fibrosis around lower extremities and under the breasts.    1. Scleroderma, diffuse    2. Rheumatoid arthritis of multiple sites with negative rheumatoid factor    3. MCTD (mixed connective tissue disease)    4. Drug-induced immunodeficiency    5. Encounter for medication monitoring      Problem List Items Addressed This Visit       MCTD (mixed connective tissue disease)    Relevant Orders    CBC Auto Differential    Comprehensive Metabolic Panel    C-Reactive Protein    Sedimentation rate    Immunoglobulins (IgG,  IgA, IgM) Quantitative    Rheumatoid arthritis of multiple sites with negative rheumatoid factor    Relevant Orders    CBC Auto Differential    Comprehensive Metabolic Panel    C-Reactive Protein    Sedimentation rate    Immunoglobulins (IgG, IgA, IgM) Quantitative    Scleroderma, diffuse - Primary    Relevant Orders    CBC Auto Differential    Comprehensive Metabolic Panel    C-Reactive Protein    Sedimentation rate    Immunoglobulins (IgG, IgA, IgM) Quantitative     Other Visit Diagnoses       Drug-induced immunodeficiency        Relevant Orders    CBC Auto Differential    Comprehensive Metabolic Panel    C-Reactive Protein    Sedimentation rate    Immunoglobulins (IgG, IgA, IgM) Quantitative    Encounter for medication monitoring        Relevant Orders    CBC Auto Differential    Comprehensive Metabolic Panel    C-Reactive Protein    Sedimentation rate    Immunoglobulins (IgG, IgA, IgM) Quantitative           Latest Reference Range & Units Most Recent   MELVA Screen Negative <1:80  Positive !  6/16/22 11:45   MELAV HEP-2 Titer  Positive >=1:2560 Speckled  8/30/17 14:50   MELVA Titer 1  >=1:2560  6/16/22 11:45   MELVA PATTERN 1  Speckled  6/16/22 11:45   ds DNA Ab Negative 1:10  Negative 1:10  6/16/22 11:45   Anti-SSA Antibody 0.00 - 0.99 Ratio 0.07  6/16/22 11:45   Anti-SSA Interpretation Negative  Negative  6/16/22 11:45   Anti-SSB Antibody 0.00 - 0.99 Ratio 0.04  6/16/22 11:45   Anti-SSB Interpretation Negative  Negative  6/16/22 11:45   Anti Sm Antibody 0.00 - 0.99 Ratio 0.25  6/16/22 11:45   Anti-Sm Interpretation Negative  Negative  6/16/22 11:45   Anti Sm/RNP Antibody 0.00 - 0.99 Ratio 5.45 (H)  6/16/22 11:45   Anti-Sm/RNP Interpretation Negative  Positive !  6/16/22 11:45   Scleroderma SCL- <20 UNITS 4  10/2/19 09:52   Complement (C-3) 50 - 180 mg/dL 130  10/2/23 15:50   Complement (C-4) 11 - 44 mg/dL 16  10/2/23 15:50   IgG 650 - 1600 mg/dL 3990 (H)  12/28/22 13:46   IgM 50 - 300 mg/dL 326 (H)  12/28/22 13:46    IgA 40 - 350 mg/dL 353 (H)  12/28/22 13:46   Protein, Serum 6.0 - 8.4 g/dL 9.2 (H)  12/28/22 13:46   Albumin grams/dl 3.35 - 5.55 g/dL 3.58  12/28/22 13:46   Alpha-1 grams/dl 0.17 - 0.41 g/dL 0.31  12/28/22 13:46   Alpha-2 0.43 - 0.99 g/dL 0.68  12/28/22 13:46   Beta 0.50 - 1.10 g/dL 0.98  12/28/22 13:46   Gamma 0.67 - 1.58 g/dL 3.64 (H)  12/28/22 13:46   Pathologist Interpretation SPE  REVIEWED  12/28/22 13:46   Pathologist Interpretation ANALI  REVIEWED  12/28/22 13:46   Immunofix Interp.  SEE COMMENT  12/28/22 13:46   Cryoglobulin, Qual Absent  Absent  1/18/23 14:46   Anti-Dilcia-1 Antibody <20 Units <20  12/1/17 16:30   Anti-PM/Scl Ab <20 Units <20  12/1/17 16:30   Anti-SS-A 52 kD Ab, IgG <20 Units <20  12/1/17 16:30   Anti-U1-RNP  Ab <20 Units 143 (H)  12/1/17 16:30   EJ Negative  Negative  12/1/17 16:30   Fibrillarin (U3 RNP) Negative  Negative  12/1/17 16:30   Ku Negative  Negative  12/1/17 16:30   MDA-5 (P140) (CADM-140) <20 Units <20  12/1/17 16:30   MI-2 Negative  Negative  12/1/17 16:30   NXP-2 (P140) <20 Units <20  12/1/17 16:30   OJ Negative  Negative  12/1/17 16:30   PL-12 Negative  Negative  12/1/17 16:30   PL-7 Negative  Negative  12/1/17 16:30   SRP Negative  Negative  12/1/17 16:30   TIF1 GAMMA (P155/140) <20 Units <20  12/1/17 16:30   U2 snRNP Negative  Moderate Positive !  12/1/17 16:30   !: Data is abnormal  (H): Data is abnormally high          Rapidly worsening skin fibrosis from scleroderma.  Failed multiple medications including Cytoxan in the past.  Mild improvement after rituximab induction therapy and methotrexate.  Repeat rituximab in January if no significant improvement noted in the next 4 weeks.  Check whether we could try her on Ofev for rapidly worsening skin fibrosis since she has failed multiple therapies including Cytoxan.  If she fails rituximab, restart Cytoxan at a higher dose.   Continue methotrexate with folic acid.  Advised all risks including life-threatening infections and  pregnancy morbidities.  Drug induced immunodeficiency due to use of immunosuppressive drugs. Monitor carefully for infections. Advised to get immediate medical care if any infection. Also advised strict adherence to age appropriate vaccinations and cancer screenings with PCP.  I have explained all of the above in detail and the patient understands all of the current recommendation(s). I have answered all questions to the best of my ability and to their complete satisfaction.     Labs:-  Check all 4 labs, urine pregnancy and hepatitis-B serologies for infusion protocol for rituximab.  Repeat all 4 labs in 4 weeks.  # Follow up in about 3 months (around 3/11/2024).      Past Medical History:   Diagnosis Date    Abnormal Pap smear of cervix     repeat was normal    Lupus (systemic lupus erythematosus)        Past Surgical History:   Procedure Laterality Date    INSERTION OF TUNNELED CENTRAL VENOUS CATHETER (CVC) WITH SUBCUTANEOUS PORT N/A 05/24/2023    Procedure: [id number];  Surgeon: Taco Zimmer MD;  Location: [facility] OR;  Service: General;  Laterality: N/A;    ROBOT-ASSISTED CHOLECYSTECTOMY USING DA CRISTOPHER XI N/A 12/11/2020    Procedure: XI ROBOTIC CHOLECYSTECTOMY;  Surgeon: Taco Zimmer MD;  Location: [facility] OR;  Service: General;  Laterality: N/A;    ROBOT-ASSISTED LAPAROSCOPIC LYSIS OF ADHESIONS USING DA CRISTOPHER XI  12/11/2020    Procedure: XI ROBOTIC LYSIS, ADHESIONS;  Surgeon: Taco Zimmer MD;  Location: [facility] OR;  Service: General;;    ROBOT-ASSISTED LAPAROSCOPIC SALPINGO-OOPHORECTOMY USING DA CRISTOPHER XI Left 12/11/2020    Procedure: XI ROBOTIC SALPINGO-OOPHORECTOMY;  Surgeon: SHERLEY Wakefield MD;  Location: [facility] OR;  Service: OB/GYN;  Laterality: Left;    TUBAL LIGATION  2005        Social History     Tobacco Use    Smoking status: Never     Passive exposure: Never    Smokeless tobacco: Never   Substance Use Topics    Alcohol use: No    Drug use: No       Family History   Problem Relation  Age of Onset    Liver cancer Maternal Aunt     Breast cancer Maternal Aunt     Breast cancer Paternal Grandmother     Colon cancer Neg Hx     Ovarian cancer Neg Hx     Thrombosis Neg Hx        Review of patient's allergies indicates:   Allergen Reactions    Elavil [amitriptyline]        Medication List with Changes/Refills   Current Medications    BUMETANIDE (BUMEX) 1 MG TABLET    Take 1 tablet (1 mg total) by mouth once daily.    CLOBETASOL (TEMOVATE) 0.05 % CREAM    Apply topically 2 (two) times daily.    CLOBETASOL (TEMOVATE) 0.05 % EXTERNAL SOLUTION    Apply topically 2 (two) times daily.    COLCHICINE (COLCRYS) 0.6 MG TABLET    Take 1 tablet (0.6 mg total) by mouth 2 (two) times daily.    FOLIC ACID (FOLVITE) 1 MG TABLET    Take 1 tablet (1 mg total) by mouth once daily.    GABAPENTIN (NEURONTIN) 300 MG CAPSULE    Take 1 capsule (300 mg total) by mouth 3 (three) times daily.    LIDOCAINE-PRILOCAINE (EMLA) CREAM    Apply topically as needed (port access).    METHOTREXATE 25 MG/ML INJECTION    Inject 0.6 mLs (15 mg total) into the muscle every 7 days.    MOMETASONE 0.1% (ELOCON) 0.1 % CREAM    Apply topically once daily.    NINTEDANIB (OFEV) 150 MG CAP    Take 1 capsule (150 mg total) by mouth 2 (two) times a day.    ONDANSETRON (ZOFRAN-ODT) 4 MG TBDL    Take 1 tablet (4 mg total) by mouth every 8 (eight) hours as needed (nausea).    PANTOPRAZOLE (PROTONIX) 40 MG TABLET    Take 1 tablet (40 mg total) by mouth once daily.    PREDNISONE (DELTASONE) 10 MG TABLET    Take 1 tablet (10 mg total) by mouth once daily.    TACROLIMUS (PROTOPIC) 0.1 % OINTMENT    Apply topically 2 (two) times daily.    TRAMADOL (ULTRAM) 50 MG TABLET    TAKE 1 TABLET BY MOUTH EVERY 12 HOURS AS NEEDED FOR PAIN    TRAZODONE (DESYREL) 50 MG TABLET    Take 1 tablet (50 mg total) by mouth every evening.       Disclaimer: This note was prepared using voice recognition system and is likely to have sound alike errors and is not proof read.  Please  call me with any questions.      Answers submitted by the patient for this visit:  Rheumatology Questionnaire (Submitted on 12/11/2023)  fever: No  eye redness: No  mouth sores: No  headaches: No  shortness of breath: No  chest pain: No  trouble swallowing: No  diarrhea: No  constipation: Yes  unexpected weight change: No  genital sore: No  dysuria: No  During the last 3 days, have you had a skin rash?: No  Bruises or bleeds easily: No  cough: No

## 2024-01-04 ENCOUNTER — LAB VISIT (OUTPATIENT)
Dept: LAB | Facility: HOSPITAL | Age: 39
End: 2024-01-04
Attending: INTERNAL MEDICINE
Payer: COMMERCIAL

## 2024-01-04 ENCOUNTER — INFUSION (OUTPATIENT)
Dept: INFUSION THERAPY | Facility: HOSPITAL | Age: 39
End: 2024-01-04
Attending: PHYSICIAN ASSISTANT
Payer: COMMERCIAL

## 2024-01-04 VITALS
HEIGHT: 68 IN | TEMPERATURE: 98 F | DIASTOLIC BLOOD PRESSURE: 90 MMHG | OXYGEN SATURATION: 99 % | RESPIRATION RATE: 18 BRPM | WEIGHT: 203.25 LBS | HEART RATE: 90 BPM | SYSTOLIC BLOOD PRESSURE: 139 MMHG | BODY MASS INDEX: 30.8 KG/M2

## 2024-01-04 DIAGNOSIS — M32.19 OTHER SYSTEMIC LUPUS ERYTHEMATOSUS WITH OTHER ORGAN INVOLVEMENT: ICD-10-CM

## 2024-01-04 DIAGNOSIS — Z79.899 HIGH RISK MEDICATION USE: ICD-10-CM

## 2024-01-04 DIAGNOSIS — M32.9 SYSTEMIC LUPUS ERYTHEMATOSUS ARTHRITIS: ICD-10-CM

## 2024-01-04 DIAGNOSIS — M06.09 RHEUMATOID ARTHRITIS OF MULTIPLE SITES WITH NEGATIVE RHEUMATOID FACTOR: ICD-10-CM

## 2024-01-04 DIAGNOSIS — M35.1 MCTD (MIXED CONNECTIVE TISSUE DISEASE): Primary | ICD-10-CM

## 2024-01-04 DIAGNOSIS — Z79.899 LONG-TERM CURRENT USE OF HIGH RISK MEDICATION OTHER THAN ANTICOAGULANT: ICD-10-CM

## 2024-01-04 DIAGNOSIS — M34.9 SCLERODERMA, DIFFUSE: ICD-10-CM

## 2024-01-04 LAB
ALBUMIN SERPL BCP-MCNC: 3.4 G/DL (ref 3.5–5.2)
ALP SERPL-CCNC: 57 U/L (ref 55–135)
ALT SERPL W/O P-5'-P-CCNC: 26 U/L (ref 10–44)
ANION GAP SERPL CALC-SCNC: 7 MMOL/L (ref 8–16)
AST SERPL-CCNC: 24 U/L (ref 10–40)
B-HCG UR QL: NEGATIVE
BASOPHILS # BLD AUTO: 0.04 K/UL (ref 0–0.2)
BASOPHILS NFR BLD: 1.1 % (ref 0–1.9)
BILIRUB SERPL-MCNC: 0.3 MG/DL (ref 0.1–1)
BUN SERPL-MCNC: 10 MG/DL (ref 6–20)
CALCIUM SERPL-MCNC: 9.6 MG/DL (ref 8.7–10.5)
CHLORIDE SERPL-SCNC: 103 MMOL/L (ref 95–110)
CO2 SERPL-SCNC: 29 MMOL/L (ref 23–29)
CREAT SERPL-MCNC: 0.7 MG/DL (ref 0.5–1.4)
DIFFERENTIAL METHOD BLD: ABNORMAL
EOSINOPHIL # BLD AUTO: 0.5 K/UL (ref 0–0.5)
EOSINOPHIL NFR BLD: 13.7 % (ref 0–8)
ERYTHROCYTE [DISTWIDTH] IN BLOOD BY AUTOMATED COUNT: 13 % (ref 11.5–14.5)
EST. GFR  (NO RACE VARIABLE): >60 ML/MIN/1.73 M^2
GLUCOSE SERPL-MCNC: 82 MG/DL (ref 70–110)
HCT VFR BLD AUTO: 32.7 % (ref 37–48.5)
HGB BLD-MCNC: 10.6 G/DL (ref 12–16)
IMM GRANULOCYTES # BLD AUTO: 0.03 K/UL (ref 0–0.04)
IMM GRANULOCYTES NFR BLD AUTO: 0.8 % (ref 0–0.5)
LYMPHOCYTES # BLD AUTO: 0.9 K/UL (ref 1–4.8)
LYMPHOCYTES NFR BLD: 23.5 % (ref 18–48)
MCH RBC QN AUTO: 29.9 PG (ref 27–31)
MCHC RBC AUTO-ENTMCNC: 32.4 G/DL (ref 32–36)
MCV RBC AUTO: 92 FL (ref 82–98)
MONOCYTES # BLD AUTO: 0.3 K/UL (ref 0.3–1)
MONOCYTES NFR BLD: 7.9 % (ref 4–15)
NEUTROPHILS # BLD AUTO: 2 K/UL (ref 1.8–7.7)
NEUTROPHILS NFR BLD: 53 % (ref 38–73)
NRBC BLD-RTO: 0 /100 WBC
PLATELET # BLD AUTO: 464 K/UL (ref 150–450)
PMV BLD AUTO: 8.3 FL (ref 9.2–12.9)
POTASSIUM SERPL-SCNC: 4 MMOL/L (ref 3.5–5.1)
PROT SERPL-MCNC: 8.8 G/DL (ref 6–8.4)
RBC # BLD AUTO: 3.54 M/UL (ref 4–5.4)
SODIUM SERPL-SCNC: 139 MMOL/L (ref 136–145)
WBC # BLD AUTO: 3.79 K/UL (ref 3.9–12.7)

## 2024-01-04 PROCEDURE — 25000003 PHARM REV CODE 250: Performed by: STUDENT IN AN ORGANIZED HEALTH CARE EDUCATION/TRAINING PROGRAM

## 2024-01-04 PROCEDURE — 25000003 PHARM REV CODE 250: Performed by: INTERNAL MEDICINE

## 2024-01-04 PROCEDURE — 96415 CHEMO IV INFUSION ADDL HR: CPT

## 2024-01-04 PROCEDURE — 63600175 PHARM REV CODE 636 W HCPCS: Mod: JG | Performed by: INTERNAL MEDICINE

## 2024-01-04 PROCEDURE — 96413 CHEMO IV INFUSION 1 HR: CPT

## 2024-01-04 PROCEDURE — 36415 COLL VENOUS BLD VENIPUNCTURE: CPT | Performed by: INTERNAL MEDICINE

## 2024-01-04 PROCEDURE — 80053 COMPREHEN METABOLIC PANEL: CPT | Performed by: INTERNAL MEDICINE

## 2024-01-04 PROCEDURE — 81025 URINE PREGNANCY TEST: CPT | Performed by: INTERNAL MEDICINE

## 2024-01-04 PROCEDURE — 85025 COMPLETE CBC W/AUTO DIFF WBC: CPT | Performed by: INTERNAL MEDICINE

## 2024-01-04 PROCEDURE — 96367 TX/PROPH/DG ADDL SEQ IV INF: CPT

## 2024-01-04 PROCEDURE — 96375 TX/PRO/DX INJ NEW DRUG ADDON: CPT

## 2024-01-04 PROCEDURE — 63600175 PHARM REV CODE 636 W HCPCS: Performed by: STUDENT IN AN ORGANIZED HEALTH CARE EDUCATION/TRAINING PROGRAM

## 2024-01-04 PROCEDURE — 96365 THER/PROPH/DIAG IV INF INIT: CPT

## 2024-01-04 PROCEDURE — 96366 THER/PROPH/DIAG IV INF ADDON: CPT

## 2024-01-04 RX ORDER — EPINEPHRINE 0.3 MG/.3ML
0.3 INJECTION SUBCUTANEOUS ONCE AS NEEDED
Status: CANCELLED | OUTPATIENT
Start: 2024-01-05

## 2024-01-04 RX ORDER — ACETAMINOPHEN 325 MG/1
650 TABLET ORAL
Status: CANCELLED | OUTPATIENT
Start: 2024-01-05

## 2024-01-04 RX ORDER — MEPERIDINE HYDROCHLORIDE 50 MG/ML
25 INJECTION INTRAMUSCULAR; INTRAVENOUS; SUBCUTANEOUS
Status: CANCELLED | OUTPATIENT
Start: 2024-01-05 | End: 2024-01-06

## 2024-01-04 RX ORDER — HEPARIN 100 UNIT/ML
500 SYRINGE INTRAVENOUS
Status: CANCELLED | OUTPATIENT
Start: 2024-01-05

## 2024-01-04 RX ORDER — HEPARIN 100 UNIT/ML
500 SYRINGE INTRAVENOUS
Status: DISCONTINUED | OUTPATIENT
Start: 2024-01-04 | End: 2024-01-04 | Stop reason: HOSPADM

## 2024-01-04 RX ORDER — METHYLPREDNISOLONE SOD SUCC 125 MG
125 VIAL (EA) INJECTION
Status: COMPLETED | OUTPATIENT
Start: 2024-01-04 | End: 2024-01-04

## 2024-01-04 RX ORDER — SODIUM CHLORIDE 0.9 % (FLUSH) 0.9 %
10 SYRINGE (ML) INJECTION
Status: CANCELLED | OUTPATIENT
Start: 2024-01-05

## 2024-01-04 RX ORDER — FAMOTIDINE 10 MG/ML
20 INJECTION INTRAVENOUS
Status: CANCELLED | OUTPATIENT
Start: 2024-01-05

## 2024-01-04 RX ORDER — FAMOTIDINE 10 MG/ML
20 INJECTION INTRAVENOUS
Status: COMPLETED | OUTPATIENT
Start: 2024-01-04 | End: 2024-01-04

## 2024-01-04 RX ORDER — METHYLPREDNISOLONE SOD SUCC 125 MG
125 VIAL (EA) INJECTION
Status: CANCELLED | OUTPATIENT
Start: 2024-01-05

## 2024-01-04 RX ORDER — DIPHENHYDRAMINE HYDROCHLORIDE 50 MG/ML
50 INJECTION, SOLUTION INTRAMUSCULAR; INTRAVENOUS ONCE AS NEEDED
Status: CANCELLED | OUTPATIENT
Start: 2024-01-05

## 2024-01-04 RX ORDER — ACETAMINOPHEN 325 MG/1
650 TABLET ORAL
Status: COMPLETED | OUTPATIENT
Start: 2024-01-04 | End: 2024-01-04

## 2024-01-04 RX ADMIN — FAMOTIDINE 20 MG: 10 INJECTION INTRAVENOUS at 09:01

## 2024-01-04 RX ADMIN — HEPARIN 500 UNITS: 100 SYRINGE at 02:01

## 2024-01-04 RX ADMIN — RITUXIMAB 788 MG: 10 INJECTION, SOLUTION INTRAVENOUS at 10:01

## 2024-01-04 RX ADMIN — METHYLPREDNISOLONE SODIUM SUCCINATE 125 MG: 125 INJECTION, POWDER, FOR SOLUTION INTRAMUSCULAR; INTRAVENOUS at 09:01

## 2024-01-04 RX ADMIN — DIPHENHYDRAMINE HYDROCHLORIDE 50 MG: 50 INJECTION, SOLUTION INTRAMUSCULAR; INTRAVENOUS at 09:01

## 2024-01-04 RX ADMIN — ACETAMINOPHEN 650 MG: 325 TABLET ORAL at 09:01

## 2024-01-04 NOTE — NURSING
Infusion # 1 -Cycle 2 - Rituxan 788 mg q weekly for  4 doses  Last dose-10/26/23     Any:  -recent illness, infection, or antibiotic use in past week- denies  -open wounds or mouth sores- denies  -invasive procedures or surgeries in past 4 weeks or in upcoming 4 weeks- denies  -vaccinations in past week- denies  -chance you may be pregnant- denies     Recent labs- 1/4/24  Last provider visit- Seen by Dr. SHORE on 12/11/23     Premeds- 650 mg Tylenol PO, 125 mg Solumedrol IVP over 3 minutes, 20 mg Pepcid IVP over 3 minutes, and 50 mg Benadryl IVPB over 320 minutes.       Rituxan 788 mg administered IV over 4 hours starting at 50 ml/hr and increasing rate by 50 ml/hr every 30 minutes to a max rate of 400 ml/hr per protocol; see MAR and vitals for more details. Tolerated well without adverse events, discharged and ambulatory out of clinic.

## 2024-01-04 NOTE — PLAN OF CARE
Problem: Adult Inpatient Plan of Care  Goal: Plan of Care Review  Description: Patient likes feet elevated, pillow, blanket, snack, and drink  Outcome: Ongoing, Progressing  Goal: Patient-Specific Goal (Individualized)  Description: Patient tolerated treatment well today.  Outcome: Ongoing, Progressing  Goal: Optimal Comfort and Wellbeing  Outcome: Ongoing, Progressing  Goal: Absence of Hospital-Acquired Illness or Injury  Outcome: Ongoing, Progressing     Problem: Infection  Goal: Absence of Infection Signs and Symptoms  Outcome: Ongoing, Progressing

## 2024-01-11 ENCOUNTER — LAB VISIT (OUTPATIENT)
Dept: LAB | Facility: HOSPITAL | Age: 39
End: 2024-01-11
Payer: COMMERCIAL

## 2024-01-11 ENCOUNTER — INFUSION (OUTPATIENT)
Dept: INFUSION THERAPY | Facility: HOSPITAL | Age: 39
End: 2024-01-11
Attending: PHYSICIAN ASSISTANT
Payer: COMMERCIAL

## 2024-01-11 VITALS
DIASTOLIC BLOOD PRESSURE: 84 MMHG | RESPIRATION RATE: 16 BRPM | HEART RATE: 98 BPM | BODY MASS INDEX: 30.64 KG/M2 | SYSTOLIC BLOOD PRESSURE: 133 MMHG | HEIGHT: 68 IN | TEMPERATURE: 98 F | WEIGHT: 202.19 LBS

## 2024-01-11 DIAGNOSIS — M35.1 MCTD (MIXED CONNECTIVE TISSUE DISEASE): Primary | ICD-10-CM

## 2024-01-11 DIAGNOSIS — M06.09 RHEUMATOID ARTHRITIS OF MULTIPLE SITES WITH NEGATIVE RHEUMATOID FACTOR: ICD-10-CM

## 2024-01-11 DIAGNOSIS — M34.9 SCLERODERMA, DIFFUSE: ICD-10-CM

## 2024-01-11 DIAGNOSIS — Z79.899 HIGH RISK MEDICATION USE: ICD-10-CM

## 2024-01-11 LAB — B-HCG UR QL: NEGATIVE

## 2024-01-11 PROCEDURE — 96366 THER/PROPH/DIAG IV INF ADDON: CPT

## 2024-01-11 PROCEDURE — 25000003 PHARM REV CODE 250: Performed by: INTERNAL MEDICINE

## 2024-01-11 PROCEDURE — 96367 TX/PROPH/DG ADDL SEQ IV INF: CPT

## 2024-01-11 PROCEDURE — 96365 THER/PROPH/DIAG IV INF INIT: CPT

## 2024-01-11 PROCEDURE — 81025 URINE PREGNANCY TEST: CPT | Performed by: INTERNAL MEDICINE

## 2024-01-11 PROCEDURE — 96375 TX/PRO/DX INJ NEW DRUG ADDON: CPT

## 2024-01-11 PROCEDURE — 25000003 PHARM REV CODE 250: Performed by: STUDENT IN AN ORGANIZED HEALTH CARE EDUCATION/TRAINING PROGRAM

## 2024-01-11 PROCEDURE — 63600175 PHARM REV CODE 636 W HCPCS: Performed by: STUDENT IN AN ORGANIZED HEALTH CARE EDUCATION/TRAINING PROGRAM

## 2024-01-11 PROCEDURE — 63600175 PHARM REV CODE 636 W HCPCS: Performed by: INTERNAL MEDICINE

## 2024-01-11 RX ORDER — HEPARIN 100 UNIT/ML
500 SYRINGE INTRAVENOUS
Status: DISCONTINUED | OUTPATIENT
Start: 2024-01-11 | End: 2024-01-11 | Stop reason: HOSPADM

## 2024-01-11 RX ORDER — METHYLPREDNISOLONE SOD SUCC 125 MG
125 VIAL (EA) INJECTION
Status: CANCELLED | OUTPATIENT
Start: 2024-01-18

## 2024-01-11 RX ORDER — EPINEPHRINE 0.3 MG/.3ML
0.3 INJECTION SUBCUTANEOUS ONCE AS NEEDED
Status: CANCELLED | OUTPATIENT
Start: 2024-01-18

## 2024-01-11 RX ORDER — ACETAMINOPHEN 325 MG/1
650 TABLET ORAL
Status: COMPLETED | OUTPATIENT
Start: 2024-01-11 | End: 2024-01-11

## 2024-01-11 RX ORDER — METHYLPREDNISOLONE SOD SUCC 125 MG
125 VIAL (EA) INJECTION
Status: COMPLETED | OUTPATIENT
Start: 2024-01-11 | End: 2024-01-11

## 2024-01-11 RX ORDER — FAMOTIDINE 10 MG/ML
20 INJECTION INTRAVENOUS
Status: COMPLETED | OUTPATIENT
Start: 2024-01-11 | End: 2024-01-11

## 2024-01-11 RX ORDER — ACETAMINOPHEN 325 MG/1
650 TABLET ORAL
Status: CANCELLED | OUTPATIENT
Start: 2024-01-18

## 2024-01-11 RX ORDER — SODIUM CHLORIDE 0.9 % (FLUSH) 0.9 %
10 SYRINGE (ML) INJECTION
Status: CANCELLED | OUTPATIENT
Start: 2024-01-18

## 2024-01-11 RX ORDER — HEPARIN 100 UNIT/ML
500 SYRINGE INTRAVENOUS
Status: CANCELLED | OUTPATIENT
Start: 2024-01-18

## 2024-01-11 RX ORDER — DIPHENHYDRAMINE HYDROCHLORIDE 50 MG/ML
50 INJECTION, SOLUTION INTRAMUSCULAR; INTRAVENOUS ONCE AS NEEDED
Status: CANCELLED | OUTPATIENT
Start: 2024-01-18

## 2024-01-11 RX ORDER — FAMOTIDINE 10 MG/ML
20 INJECTION INTRAVENOUS
Status: CANCELLED | OUTPATIENT
Start: 2024-01-18

## 2024-01-11 RX ORDER — MEPERIDINE HYDROCHLORIDE 50 MG/ML
25 INJECTION INTRAMUSCULAR; INTRAVENOUS; SUBCUTANEOUS
Status: CANCELLED | OUTPATIENT
Start: 2024-01-18 | End: 2024-01-19

## 2024-01-11 RX ADMIN — HEPARIN 500 UNITS: 100 SYRINGE at 12:01

## 2024-01-11 RX ADMIN — FAMOTIDINE 20 MG: 10 INJECTION INTRAVENOUS at 09:01

## 2024-01-11 RX ADMIN — RITUXIMAB 788 MG: 10 INJECTION, SOLUTION INTRAVENOUS at 09:01

## 2024-01-11 RX ADMIN — ACETAMINOPHEN 650 MG: 325 TABLET ORAL at 09:01

## 2024-01-11 RX ADMIN — DIPHENHYDRAMINE HYDROCHLORIDE 50 MG: 50 INJECTION, SOLUTION INTRAMUSCULAR; INTRAVENOUS at 09:01

## 2024-01-11 RX ADMIN — METHYLPREDNISOLONE SODIUM SUCCINATE 125 MG: 125 INJECTION, POWDER, FOR SOLUTION INTRAMUSCULAR; INTRAVENOUS at 09:01

## 2024-01-11 NOTE — PLAN OF CARE
Patient tolerated Rituxan well today; no adverse reaction noted.  POC reviewed with pt.  No questions or concerns voiced.  NAD noted upon discharge.  No significant new complaints voiced.   Has f/u appt(s) scheduled per MD request.    Problem: Adult Inpatient Plan of Care  Goal: Plan of Care Review  Description: Patient likes feet elevated, pillow, blanket, snack, and drink  Outcome: Ongoing, Progressing  Goal: Patient-Specific Goal (Individualized)  Description: Patient tolerated treatment well today.  Outcome: Ongoing, Progressing  Goal: Optimal Comfort and Wellbeing  Outcome: Ongoing, Progressing  Intervention: Provide Person-Centered Care  Flowsheets (Taken 1/11/2024 0901)  Trust Relationship/Rapport:   questions encouraged   reassurance provided   choices provided   care explained   emotional support provided   thoughts/feelings acknowledged   empathic listening provided   questions answered  Goal: Absence of Hospital-Acquired Illness or Injury  Outcome: Ongoing, Progressing  Intervention: Identify and Manage Fall Risk  Flowsheets (Taken 1/11/2024 0901)  Safety Promotion/Fall Prevention: in recliner, wheels locked

## 2024-01-18 ENCOUNTER — INFUSION (OUTPATIENT)
Dept: INFUSION THERAPY | Facility: HOSPITAL | Age: 39
End: 2024-01-18
Attending: PHYSICIAN ASSISTANT
Payer: COMMERCIAL

## 2024-01-18 ENCOUNTER — LAB VISIT (OUTPATIENT)
Dept: LAB | Facility: HOSPITAL | Age: 39
End: 2024-01-18
Payer: COMMERCIAL

## 2024-01-18 VITALS
TEMPERATURE: 98 F | BODY MASS INDEX: 31.04 KG/M2 | SYSTOLIC BLOOD PRESSURE: 110 MMHG | HEART RATE: 89 BPM | WEIGHT: 204.81 LBS | RESPIRATION RATE: 16 BRPM | OXYGEN SATURATION: 98 % | HEIGHT: 68 IN | DIASTOLIC BLOOD PRESSURE: 72 MMHG

## 2024-01-18 DIAGNOSIS — M06.09 RHEUMATOID ARTHRITIS OF MULTIPLE SITES WITH NEGATIVE RHEUMATOID FACTOR: ICD-10-CM

## 2024-01-18 DIAGNOSIS — M34.9 SCLERODERMA, DIFFUSE: ICD-10-CM

## 2024-01-18 DIAGNOSIS — Z79.899 HIGH RISK MEDICATION USE: ICD-10-CM

## 2024-01-18 DIAGNOSIS — M35.1 MCTD (MIXED CONNECTIVE TISSUE DISEASE): Primary | ICD-10-CM

## 2024-01-18 LAB — B-HCG UR QL: NEGATIVE

## 2024-01-18 PROCEDURE — 96415 CHEMO IV INFUSION ADDL HR: CPT

## 2024-01-18 PROCEDURE — 96366 THER/PROPH/DIAG IV INF ADDON: CPT

## 2024-01-18 PROCEDURE — 96365 THER/PROPH/DIAG IV INF INIT: CPT

## 2024-01-18 PROCEDURE — 96375 TX/PRO/DX INJ NEW DRUG ADDON: CPT

## 2024-01-18 PROCEDURE — 25000003 PHARM REV CODE 250: Performed by: INTERNAL MEDICINE

## 2024-01-18 PROCEDURE — 25000003 PHARM REV CODE 250: Performed by: STUDENT IN AN ORGANIZED HEALTH CARE EDUCATION/TRAINING PROGRAM

## 2024-01-18 PROCEDURE — 63600175 PHARM REV CODE 636 W HCPCS: Performed by: INTERNAL MEDICINE

## 2024-01-18 PROCEDURE — 63600175 PHARM REV CODE 636 W HCPCS: Performed by: STUDENT IN AN ORGANIZED HEALTH CARE EDUCATION/TRAINING PROGRAM

## 2024-01-18 PROCEDURE — 96367 TX/PROPH/DG ADDL SEQ IV INF: CPT

## 2024-01-18 PROCEDURE — 81025 URINE PREGNANCY TEST: CPT | Performed by: INTERNAL MEDICINE

## 2024-01-18 PROCEDURE — 96413 CHEMO IV INFUSION 1 HR: CPT

## 2024-01-18 RX ORDER — ACETAMINOPHEN 325 MG/1
650 TABLET ORAL
Status: CANCELLED | OUTPATIENT
Start: 2024-01-25

## 2024-01-18 RX ORDER — ACETAMINOPHEN 325 MG/1
650 TABLET ORAL
Status: COMPLETED | OUTPATIENT
Start: 2024-01-18 | End: 2024-01-18

## 2024-01-18 RX ORDER — HEPARIN 100 UNIT/ML
500 SYRINGE INTRAVENOUS
Status: CANCELLED | OUTPATIENT
Start: 2024-01-25

## 2024-01-18 RX ORDER — EPINEPHRINE 0.3 MG/.3ML
0.3 INJECTION SUBCUTANEOUS ONCE AS NEEDED
Status: CANCELLED | OUTPATIENT
Start: 2024-01-25

## 2024-01-18 RX ORDER — METHYLPREDNISOLONE SOD SUCC 125 MG
125 VIAL (EA) INJECTION
Status: COMPLETED | OUTPATIENT
Start: 2024-01-18 | End: 2024-01-18

## 2024-01-18 RX ORDER — DIPHENHYDRAMINE HYDROCHLORIDE 50 MG/ML
50 INJECTION INTRAMUSCULAR; INTRAVENOUS ONCE AS NEEDED
Status: CANCELLED | OUTPATIENT
Start: 2024-01-25

## 2024-01-18 RX ORDER — FAMOTIDINE 10 MG/ML
20 INJECTION INTRAVENOUS
Status: CANCELLED | OUTPATIENT
Start: 2024-01-25

## 2024-01-18 RX ORDER — SODIUM CHLORIDE 0.9 % (FLUSH) 0.9 %
10 SYRINGE (ML) INJECTION
Status: CANCELLED | OUTPATIENT
Start: 2024-01-25

## 2024-01-18 RX ORDER — FAMOTIDINE 10 MG/ML
20 INJECTION INTRAVENOUS
Status: COMPLETED | OUTPATIENT
Start: 2024-01-18 | End: 2024-01-18

## 2024-01-18 RX ORDER — METHYLPREDNISOLONE SOD SUCC 125 MG
125 VIAL (EA) INJECTION
Status: CANCELLED | OUTPATIENT
Start: 2024-01-25

## 2024-01-18 RX ORDER — MEPERIDINE HYDROCHLORIDE 50 MG/ML
25 INJECTION INTRAMUSCULAR; INTRAVENOUS; SUBCUTANEOUS
Status: CANCELLED | OUTPATIENT
Start: 2024-01-25 | End: 2024-01-26

## 2024-01-18 RX ORDER — HEPARIN 100 UNIT/ML
500 SYRINGE INTRAVENOUS
Status: DISCONTINUED | OUTPATIENT
Start: 2024-01-18 | End: 2024-01-18 | Stop reason: HOSPADM

## 2024-01-18 RX ADMIN — ACETAMINOPHEN 650 MG: 325 TABLET ORAL at 09:01

## 2024-01-18 RX ADMIN — RITUXIMAB 791 MG: 10 INJECTION, SOLUTION INTRAVENOUS at 10:01

## 2024-01-18 RX ADMIN — DIPHENHYDRAMINE HYDROCHLORIDE 50 MG: 50 INJECTION, SOLUTION INTRAMUSCULAR; INTRAVENOUS at 09:01

## 2024-01-18 RX ADMIN — FAMOTIDINE 20 MG: 10 INJECTION INTRAVENOUS at 09:01

## 2024-01-18 RX ADMIN — HEPARIN 500 UNITS: 100 SYRINGE at 01:01

## 2024-01-18 RX ADMIN — METHYLPREDNISOLONE SODIUM SUCCINATE 125 MG: 125 INJECTION, POWDER, FOR SOLUTION INTRAMUSCULAR; INTRAVENOUS at 09:01

## 2024-01-18 NOTE — NURSING
Pt tolerated Rituxan well. No adverse reaction noted. Pt education reinforced on possible side effects, what to expect, and when to call . Pt verbalized understanding. I reviewed pt calendar w/ pt and understanding verbalized. PAC deaccessed and flushed w/ NS and heparin per protocol.

## 2024-01-18 NOTE — PLAN OF CARE
Pt states she feels well today, some fatigue.  UPT negative.  Rituxan to be given as ordered.  Infection precautions reviewed.  Pt states full understanding to call with any sign of infection, including temp >100.4.

## 2024-01-25 ENCOUNTER — PATIENT MESSAGE (OUTPATIENT)
Dept: RHEUMATOLOGY | Facility: CLINIC | Age: 39
End: 2024-01-25
Payer: COMMERCIAL

## 2024-01-25 ENCOUNTER — INFUSION (OUTPATIENT)
Dept: INFUSION THERAPY | Facility: HOSPITAL | Age: 39
End: 2024-01-25
Attending: PHYSICIAN ASSISTANT
Payer: COMMERCIAL

## 2024-01-25 ENCOUNTER — LAB VISIT (OUTPATIENT)
Dept: LAB | Facility: HOSPITAL | Age: 39
End: 2024-01-25
Payer: COMMERCIAL

## 2024-01-25 VITALS
DIASTOLIC BLOOD PRESSURE: 74 MMHG | TEMPERATURE: 98 F | HEART RATE: 92 BPM | OXYGEN SATURATION: 98 % | BODY MASS INDEX: 31.17 KG/M2 | WEIGHT: 205.69 LBS | RESPIRATION RATE: 16 BRPM | HEIGHT: 68 IN | SYSTOLIC BLOOD PRESSURE: 117 MMHG

## 2024-01-25 DIAGNOSIS — M06.09 RHEUMATOID ARTHRITIS OF MULTIPLE SITES WITH NEGATIVE RHEUMATOID FACTOR: ICD-10-CM

## 2024-01-25 DIAGNOSIS — M34.9 SCLERODERMA, DIFFUSE: ICD-10-CM

## 2024-01-25 DIAGNOSIS — Z79.899 HIGH RISK MEDICATION USE: ICD-10-CM

## 2024-01-25 DIAGNOSIS — M35.1 MCTD (MIXED CONNECTIVE TISSUE DISEASE): Primary | ICD-10-CM

## 2024-01-25 LAB — B-HCG UR QL: NEGATIVE

## 2024-01-25 PROCEDURE — 96375 TX/PRO/DX INJ NEW DRUG ADDON: CPT

## 2024-01-25 PROCEDURE — 25000003 PHARM REV CODE 250: Performed by: STUDENT IN AN ORGANIZED HEALTH CARE EDUCATION/TRAINING PROGRAM

## 2024-01-25 PROCEDURE — 63600175 PHARM REV CODE 636 W HCPCS: Performed by: STUDENT IN AN ORGANIZED HEALTH CARE EDUCATION/TRAINING PROGRAM

## 2024-01-25 PROCEDURE — 96366 THER/PROPH/DIAG IV INF ADDON: CPT

## 2024-01-25 PROCEDURE — 81025 URINE PREGNANCY TEST: CPT | Performed by: INTERNAL MEDICINE

## 2024-01-25 PROCEDURE — 96367 TX/PROPH/DG ADDL SEQ IV INF: CPT

## 2024-01-25 PROCEDURE — 25000003 PHARM REV CODE 250: Performed by: INTERNAL MEDICINE

## 2024-01-25 PROCEDURE — 63600175 PHARM REV CODE 636 W HCPCS: Mod: JZ,JG | Performed by: INTERNAL MEDICINE

## 2024-01-25 PROCEDURE — 96365 THER/PROPH/DIAG IV INF INIT: CPT

## 2024-01-25 RX ORDER — SODIUM CHLORIDE 0.9 % (FLUSH) 0.9 %
10 SYRINGE (ML) INJECTION
Status: CANCELLED | OUTPATIENT
Start: 2024-01-25

## 2024-01-25 RX ORDER — HEPARIN 100 UNIT/ML
500 SYRINGE INTRAVENOUS
Status: DISCONTINUED | OUTPATIENT
Start: 2024-01-25 | End: 2024-01-25 | Stop reason: HOSPADM

## 2024-01-25 RX ORDER — METHYLPREDNISOLONE SOD SUCC 125 MG
125 VIAL (EA) INJECTION
Status: CANCELLED | OUTPATIENT
Start: 2024-01-25

## 2024-01-25 RX ORDER — ACETAMINOPHEN 325 MG/1
650 TABLET ORAL
Status: CANCELLED | OUTPATIENT
Start: 2024-01-25

## 2024-01-25 RX ORDER — MEPERIDINE HYDROCHLORIDE 50 MG/ML
25 INJECTION INTRAMUSCULAR; INTRAVENOUS; SUBCUTANEOUS
Status: CANCELLED | OUTPATIENT
Start: 2024-01-25 | End: 2024-01-26

## 2024-01-25 RX ORDER — FAMOTIDINE 10 MG/ML
20 INJECTION INTRAVENOUS
Status: CANCELLED | OUTPATIENT
Start: 2024-01-25

## 2024-01-25 RX ORDER — METHYLPREDNISOLONE SOD SUCC 125 MG
125 VIAL (EA) INJECTION
Status: COMPLETED | OUTPATIENT
Start: 2024-01-25 | End: 2024-01-25

## 2024-01-25 RX ORDER — FAMOTIDINE 10 MG/ML
20 INJECTION INTRAVENOUS
Status: COMPLETED | OUTPATIENT
Start: 2024-01-25 | End: 2024-01-25

## 2024-01-25 RX ORDER — HEPARIN 100 UNIT/ML
500 SYRINGE INTRAVENOUS
Status: CANCELLED | OUTPATIENT
Start: 2024-01-25

## 2024-01-25 RX ORDER — DIPHENHYDRAMINE HYDROCHLORIDE 50 MG/ML
50 INJECTION INTRAMUSCULAR; INTRAVENOUS ONCE AS NEEDED
Status: CANCELLED | OUTPATIENT
Start: 2024-01-25

## 2024-01-25 RX ORDER — EPINEPHRINE 0.3 MG/.3ML
0.3 INJECTION SUBCUTANEOUS ONCE AS NEEDED
Status: CANCELLED | OUTPATIENT
Start: 2024-01-25

## 2024-01-25 RX ORDER — ACETAMINOPHEN 325 MG/1
650 TABLET ORAL
Status: COMPLETED | OUTPATIENT
Start: 2024-01-25 | End: 2024-01-25

## 2024-01-25 RX ADMIN — HEPARIN 500 UNITS: 100 SYRINGE at 12:01

## 2024-01-25 RX ADMIN — RITUXIMAB 795 MG: 10 INJECTION, SOLUTION INTRAVENOUS at 09:01

## 2024-01-25 RX ADMIN — SODIUM CHLORIDE: 9 INJECTION, SOLUTION INTRAVENOUS at 09:01

## 2024-01-25 RX ADMIN — FAMOTIDINE 20 MG: 10 INJECTION INTRAVENOUS at 09:01

## 2024-01-25 RX ADMIN — DIPHENHYDRAMINE HYDROCHLORIDE 50 MG: 50 INJECTION, SOLUTION INTRAMUSCULAR; INTRAVENOUS at 09:01

## 2024-01-25 RX ADMIN — METHYLPREDNISOLONE SODIUM SUCCINATE 125 MG: 125 INJECTION, POWDER, FOR SOLUTION INTRAMUSCULAR; INTRAVENOUS at 09:01

## 2024-01-25 RX ADMIN — ACETAMINOPHEN 650 MG: 325 TABLET ORAL at 09:01

## 2024-01-25 NOTE — NURSING
Infusion # 4/4 Rituxan 795 mg once weekly for 4 doses  Last dose-1/18/24     Any:  -recent illness, infection, or antibiotic use in past week- denies  -open wounds or mouth sores- denies  -invasive procedures or surgeries in past 4 weeks or in upcoming 4 weeks- denies  -vaccinations in past week- denies  -chance you may be pregnant- denies     Recent labs- 1/4/24  Last provider visit- Seen by Dr. SHORE on 10/19/23     Premeds- 650 mg Tylenol PO, 125 mg Solumedrol IVP over 3 minutes, 20 mg Pepcid IVP over 3 minutes, and 50 mg Benadryl IVP over 3 minutes.       Rituxan 795 mg administered IV over 2 hours and 42 minutes starting at 100 ml/hr and increasing rate by 100 ml/hr every 30 minutes to a max rate of 400 ml/hr per protocol; see MAR and vitals for more details. Tolerated well without adverse events, discharged and ambulatory out of clinic.

## 2024-01-30 ENCOUNTER — TELEPHONE (OUTPATIENT)
Dept: RHEUMATOLOGY | Facility: CLINIC | Age: 39
End: 2024-01-30
Payer: COMMERCIAL

## 2024-01-30 ENCOUNTER — E-VISIT (OUTPATIENT)
Dept: INTERNAL MEDICINE | Facility: CLINIC | Age: 39
End: 2024-01-30
Payer: COMMERCIAL

## 2024-01-30 ENCOUNTER — PATIENT MESSAGE (OUTPATIENT)
Dept: INTERNAL MEDICINE | Facility: CLINIC | Age: 39
End: 2024-01-30

## 2024-01-30 DIAGNOSIS — R05.9 COUGH, UNSPECIFIED TYPE: Primary | ICD-10-CM

## 2024-01-30 PROCEDURE — 99421 OL DIG E/M SVC 5-10 MIN: CPT | Mod: ,,, | Performed by: FAMILY MEDICINE

## 2024-01-30 RX ORDER — PROMETHAZINE HYDROCHLORIDE AND DEXTROMETHORPHAN HYDROBROMIDE 6.25; 15 MG/5ML; MG/5ML
5 SYRUP ORAL EVERY 4 HOURS PRN
Qty: 180 ML | Refills: 0 | Status: SHIPPED | OUTPATIENT
Start: 2024-01-30 | End: 2024-02-09

## 2024-01-30 NOTE — PROGRESS NOTES
Stefanie Duarte Taco  01/30/2024  [id number]    Sofia Martinez MD  Patient Care Team:  Sofia Martinez MD as PCP - General (Family Medicine)  Tangela Espinoza LPN as Care Coordinator (Internal Medicine)  Lizbeth Joe, PharmD as Pharmacist (Pharmacist)  Uzma Cano, RockyD as Pharmacist (Pharmacist)          Visit Type:{Misc; visit type:45299}    Chief Complaint:  Chief Complaint   Patient presents with    URI     Entered automatically based on patient selection in Patient Portal.       History of Present Illness:        History:  Past Medical History:   Diagnosis Date    Abnormal Pap smear of cervix     repeat was normal    Lupus (systemic lupus erythematosus)      Past Surgical History:   Procedure Laterality Date    INSERTION OF TUNNELED CENTRAL VENOUS CATHETER (CVC) WITH SUBCUTANEOUS PORT N/A 05/24/2023    Procedure: [id number];  Surgeon: Taco Zimmer MD;  Location: [facility] OR;  Service: General;  Laterality: N/A;    ROBOT-ASSISTED CHOLECYSTECTOMY USING DA CRISTOPHER XI N/A 12/11/2020    Procedure: XI ROBOTIC CHOLECYSTECTOMY;  Surgeon: Taco Zimmer MD;  Location: [facility] OR;  Service: General;  Laterality: N/A;    ROBOT-ASSISTED LAPAROSCOPIC LYSIS OF ADHESIONS USING DA CRISTOPHER XI  12/11/2020    Procedure: XI ROBOTIC LYSIS, ADHESIONS;  Surgeon: Taco Zimmer MD;  Location: [facility] OR;  Service: General;;    ROBOT-ASSISTED LAPAROSCOPIC SALPINGO-OOPHORECTOMY USING DA CRISTOPHER XI Left 12/11/2020    Procedure: XI ROBOTIC SALPINGO-OOPHORECTOMY;  Surgeon: SHERLEY Wakefeild MD;  Location: [facility] OR;  Service: OB/GYN;  Laterality: Left;    TUBAL LIGATION  2005     Family History   Problem Relation Age of Onset    Liver cancer Maternal Aunt     Breast cancer Maternal Aunt     Breast cancer Paternal Grandmother     Colon cancer Neg Hx     Ovarian cancer Neg Hx     Thrombosis Neg Hx      Social History     Socioeconomic History    Marital status:    Occupational History    Occupation: [employer]    Tobacco Use    Smoking status: Never     Passive exposure: Never    Smokeless tobacco: Never   Substance and Sexual Activity    Alcohol use: No    Drug use: No    Sexual activity: Yes     Partners: Male     Birth control/protection: Surgical     Comment: BTL   Other Topics Concern    Are you pregnant or think you may be? No    Breast-feeding No     Patient Active Problem List   Diagnosis    Exacerbation of systemic lupus    Long-term current use of high risk medication -methotrexate    Systemic lupus erythematosus arthritis    Immunosuppressed status    Rash    Obesity, Class II, BMI 35-39.9    MCTD (mixed connective tissue disease)    Positive sm/RNP antibody    Secondary Raynaud's phenomenon    Mild episode of recurrent major depressive disorder    Chronic pain of both knees    Bilateral leg weakness    Gastroesophageal reflux disease    Pelvic mass    Fibromyalgia    Rheumatoid arthritis of multiple sites with negative rheumatoid factor    Swelling of right upper extremity    Diffuse scleroderma    Morphea    Chemotherapy-induced nausea    Chronic cough    Scleroderma, diffuse     Review of patient's allergies indicates:   Allergen Reactions    Elavil [amitriptyline]        The following were reviewed at this visit: active problem list, medication list, allergies, family history, social history, and health maintenance.    Medications:  Current Outpatient Medications on File Prior to Visit   Medication Sig Dispense Refill    bumetanide (BUMEX) 1 MG tablet Take 1 tablet (1 mg total) by mouth once daily. (Patient taking differently: Take 1 mg by mouth once daily.) 10 tablet 0    clobetasoL (TEMOVATE) 0.05 % cream Apply topically 2 (two) times daily. 60 g 2    clobetasoL (TEMOVATE) 0.05 % external solution Apply topically 2 (two) times daily. 50 mL 1    colchicine (COLCRYS) 0.6 mg tablet Take 1 tablet (0.6 mg total) by mouth 2 (two) times daily. 60 tablet 11    folic acid (FOLVITE) 1 MG tablet Take 1 tablet (1 mg  total) by mouth once daily. 90 tablet 0    gabapentin (NEURONTIN) 300 MG capsule Take 1 capsule (300 mg total) by mouth 3 (three) times daily. 90 capsule 11    LIDOcaine-prilocaine (EMLA) cream Apply topically as needed (port access). 5 g 3    methotrexate 25 mg/mL injection Inject 0.6 mLs (15 mg total) into the muscle every 7 days. 2 mL 3    mometasone 0.1% (ELOCON) 0.1 % cream Apply topically once daily. 50 g 1    nintedanib (OFEV) 150 mg Cap Take 1 capsule (150 mg total) by mouth 2 (two) times a day. 60 capsule 11    ondansetron (ZOFRAN-ODT) 4 MG TbDL Take 1 tablet (4 mg total) by mouth every 8 (eight) hours as needed (nausea). 30 tablet 0    pantoprazole (PROTONIX) 40 MG tablet Take 1 tablet (40 mg total) by mouth once daily. 90 tablet 3    predniSONE (DELTASONE) 10 MG tablet Take 1 tablet (10 mg total) by mouth once daily. 30 tablet 1    tacrolimus (PROTOPIC) 0.1 % ointment Apply topically 2 (two) times daily. 100 g 3    traMADoL (ULTRAM) 50 mg tablet TAKE 1 TABLET BY MOUTH EVERY 12 HOURS AS NEEDED FOR PAIN 60 tablet 3    traZODone (DESYREL) 50 MG tablet Take 1 tablet (50 mg total) by mouth every evening. (Patient taking differently: Take 50 mg by mouth nightly as needed.) 30 tablet 11     Current Facility-Administered Medications on File Prior to Visit   Medication Dose Route Frequency Provider Last Rate Last Admin    LIDOcaine (PF) 10 mg/ml (1%) injection 10 mg  1 mL Intradermal Once Taco Zimmer MD           Medications have been reviewed and reconciled with patient at this visit.  Barriers to medications reviewed with patient.    Adverse reactions to current medications reviewed with patient..    Over the counter medications reviewed and reconciled with patient.    Exam:  Wt Readings from Last 3 Encounters:   01/25/24 93.3 kg (205 lb 11 oz)   01/18/24 92.9 kg (204 lb 12.9 oz)   01/11/24 91.7 kg (202 lb 2.6 oz)     Temp Readings from Last 3 Encounters:   01/25/24 98.2 °F (36.8 °C)   01/18/24 98.2 °F  (36.8 °C)   01/11/24 98.4 °F (36.9 °C)     BP Readings from Last 3 Encounters:   01/25/24 117/74   01/18/24 110/72   01/11/24 133/84     Pulse Readings from Last 3 Encounters:   01/25/24 92   01/18/24 89   01/11/24 98     There is no height or weight on file to calculate BMI.      ROS  Physical Exam    Laboratory Reviewed ({{YES NO:32765})  Lab Results   Component Value Date    WBC 3.79 (L) 01/04/2024    HGB 10.6 (L) 01/04/2024    HCT 32.7 (L) 01/04/2024     (H) 01/04/2024    CHOL 169 08/24/2017    TRIG 228 (H) 08/24/2017    HDL 37 (L) 08/24/2017    ALT 26 01/04/2024    AST 24 01/04/2024     01/04/2024    K 4.0 01/04/2024     01/04/2024    CREATININE 0.7 01/04/2024    BUN 10 01/04/2024    CO2 29 01/04/2024    TSH 1.243 08/30/2017       There are no diagnoses linked to this encounter.            Care Plan/Goals: Reviewed    Goals    None         Follow up: No follow-ups on file.    After visit summary was printed and given to patient upon discharge today.  Patient goals and care plan are included in After Visit Summary.

## 2024-01-30 NOTE — PROGRESS NOTES
Patient ID: Stefanie España is a 38 y.o. female.    Chief Complaint: URI (Entered automatically based on patient selection in Patient Portal.)    The patient initiated a request through Mobile Card on 1/30/2024 for evaluation and management with a chief complaint of URI (Entered automatically based on patient selection in Patient Portal.)     I evaluated the questionnaire submission on 1/30/2024.    [facility] Peq Evisit Upper Respitatory/Cough Questionnaire    1/30/2024  9:51 AM CST - Filed by Patient   Do you agree to participate in an E-Visit? Yes   If you have any of the following symptoms, please present to your local ER or call 911:  I acknowledge   What is the main issue that you would like for your doctor to address today? A bad cough   Are you able to take your vital signs? No   Are you currently pregnant, could you be pregnant, or are you breast feeding? None of the above   What symptoms do you currently have?  Cough   Describe your cough: Bothersome (interferes with daily activities)   Have you ever smoked? I have never smoked   Have you had a fever? No   When did your symptoms first appear? 1/20/2024   In the last two weeks, have you been in close contact with someone who has COVID-19 or the Flu? No   In the last two weeks, have you worked or volunteered in a healthcare facility or as a ? Healthcare facilities include a hospital, medical or dental clinic, long-term care facility, or nursing home No   Do you live in a long-term care facility, nursing home, group home, or homeless shelter? No   List what you have done or taken to help your symptoms. Taken over the counter medication   How severe are your symptoms? Moderate   Have your symptoms improved since they first appeared? No change   Have you taken an at home Covid test? Yes   What were the results? Negative   Have you taken a Flu test? Yes   What were the results? Negative   Have you been fully vaccinated for COVID? (2 Pfizer, 2 Moderna or  1 Sabino & Sabino vaccine injections) Yes   Have you received a booster? No   Have you recieved a Flu shot? No   Do you have transportation to get tested for COVID if it is indicated and ordered for you at an [facility] location? No   Provide any information you feel is important to your history not asked above I have lupus and scleroderma and  chest nodules   Please attach any relevant images or files          Recent Labs Obtained:  Lab Visit on 01/25/2024   Component Date Value Ref Range Status    Preg Test, Ur 01/25/2024 Negative   Final       Encounter Diagnosis   Name Primary?    Cough, unspecified type Yes        No orders of the defined types were placed in this encounter.     Medications Ordered This Encounter   Medications    promethazine-dextromethorphan (PROMETHAZINE-DM) 6.25-15 mg/5 mL Syrp     Sig: Take 5 mLs by mouth every 4 (four) hours as needed (cough).     Dispense:  180 mL     Refill:  0        No follow-ups on file.      E-Visit Time Tracking:

## 2024-01-31 DIAGNOSIS — M35.1 MCTD (MIXED CONNECTIVE TISSUE DISEASE): ICD-10-CM

## 2024-01-31 DIAGNOSIS — M34.9 SCLERODERMA, DIFFUSE: ICD-10-CM

## 2024-01-31 DIAGNOSIS — M06.09 RHEUMATOID ARTHRITIS OF MULTIPLE SITES WITH NEGATIVE RHEUMATOID FACTOR: ICD-10-CM

## 2024-01-31 RX ORDER — METHOTREXATE 25 MG/ML
INJECTION, SOLUTION INTRA-ARTERIAL; INTRAMUSCULAR; INTRAVENOUS
Qty: 2 ML | Refills: 0 | Status: SHIPPED | OUTPATIENT
Start: 2024-01-31 | End: 2024-02-13

## 2024-02-12 ENCOUNTER — LAB VISIT (OUTPATIENT)
Dept: LAB | Facility: HOSPITAL | Age: 39
End: 2024-02-12
Attending: INTERNAL MEDICINE
Payer: COMMERCIAL

## 2024-02-12 DIAGNOSIS — M32.9 SYSTEMIC LUPUS ERYTHEMATOSUS ARTHRITIS: ICD-10-CM

## 2024-02-12 DIAGNOSIS — Z51.81 ENCOUNTER FOR MEDICATION MONITORING: ICD-10-CM

## 2024-02-12 DIAGNOSIS — L94.0 MORPHEA: ICD-10-CM

## 2024-02-12 DIAGNOSIS — Z79.899 LONG-TERM CURRENT USE OF HIGH RISK MEDICATION OTHER THAN ANTICOAGULANT: ICD-10-CM

## 2024-02-12 DIAGNOSIS — M35.1 MCTD (MIXED CONNECTIVE TISSUE DISEASE): ICD-10-CM

## 2024-02-12 DIAGNOSIS — M79.671 PAIN IN BOTH FEET: ICD-10-CM

## 2024-02-12 DIAGNOSIS — M34.9 DIFFUSE SCLERODERMA: ICD-10-CM

## 2024-02-12 DIAGNOSIS — M79.672 PAIN IN BOTH FEET: ICD-10-CM

## 2024-02-12 DIAGNOSIS — M34.9 SCLERODERMA, DIFFUSE: ICD-10-CM

## 2024-02-12 DIAGNOSIS — M06.09 RHEUMATOID ARTHRITIS OF MULTIPLE SITES WITH NEGATIVE RHEUMATOID FACTOR: ICD-10-CM

## 2024-02-12 LAB
ALBUMIN SERPL BCP-MCNC: 3.6 G/DL (ref 3.5–5.2)
ALP SERPL-CCNC: 66 U/L (ref 55–135)
ALT SERPL W/O P-5'-P-CCNC: 15 U/L (ref 10–44)
ANION GAP SERPL CALC-SCNC: 9 MMOL/L (ref 8–16)
AST SERPL-CCNC: 21 U/L (ref 10–40)
BASOPHILS # BLD AUTO: 0.07 K/UL (ref 0–0.2)
BASOPHILS NFR BLD: 1 % (ref 0–1.9)
BILIRUB SERPL-MCNC: 0.2 MG/DL (ref 0.1–1)
BUN SERPL-MCNC: 10 MG/DL (ref 6–20)
CALCIUM SERPL-MCNC: 9.4 MG/DL (ref 8.7–10.5)
CHLORIDE SERPL-SCNC: 100 MMOL/L (ref 95–110)
CO2 SERPL-SCNC: 26 MMOL/L (ref 23–29)
CREAT SERPL-MCNC: 0.8 MG/DL (ref 0.5–1.4)
CRP SERPL-MCNC: 16.3 MG/L (ref 0–8.2)
DIFFERENTIAL METHOD BLD: ABNORMAL
EOSINOPHIL # BLD AUTO: 1.3 K/UL (ref 0–0.5)
EOSINOPHIL NFR BLD: 19.1 % (ref 0–8)
ERYTHROCYTE [DISTWIDTH] IN BLOOD BY AUTOMATED COUNT: 13.1 % (ref 11.5–14.5)
ERYTHROCYTE [SEDIMENTATION RATE] IN BLOOD BY WESTERGREN METHOD: 74 MM/HR (ref 0–20)
EST. GFR  (NO RACE VARIABLE): >60 ML/MIN/1.73 M^2
GLUCOSE SERPL-MCNC: 74 MG/DL (ref 70–110)
HCT VFR BLD AUTO: 33.8 % (ref 37–48.5)
HGB BLD-MCNC: 10.9 G/DL (ref 12–16)
IGA SERPL-MCNC: 281 MG/DL (ref 40–350)
IGG SERPL-MCNC: 2966 MG/DL (ref 650–1600)
IGM SERPL-MCNC: 207 MG/DL (ref 50–300)
IMM GRANULOCYTES # BLD AUTO: 0.05 K/UL (ref 0–0.04)
IMM GRANULOCYTES NFR BLD AUTO: 0.7 % (ref 0–0.5)
LYMPHOCYTES # BLD AUTO: 0.9 K/UL (ref 1–4.8)
LYMPHOCYTES NFR BLD: 12.8 % (ref 18–48)
MCH RBC QN AUTO: 29.5 PG (ref 27–31)
MCHC RBC AUTO-ENTMCNC: 32.2 G/DL (ref 32–36)
MCV RBC AUTO: 92 FL (ref 82–98)
MONOCYTES # BLD AUTO: 0.7 K/UL (ref 0.3–1)
MONOCYTES NFR BLD: 10.7 % (ref 4–15)
NEUTROPHILS # BLD AUTO: 3.9 K/UL (ref 1.8–7.7)
NEUTROPHILS NFR BLD: 56.4 % (ref 38–73)
NRBC BLD-RTO: 0 /100 WBC
PLATELET # BLD AUTO: 494 K/UL (ref 150–450)
PMV BLD AUTO: 8.4 FL (ref 9.2–12.9)
POTASSIUM SERPL-SCNC: 4 MMOL/L (ref 3.5–5.1)
PROT SERPL-MCNC: 8.4 G/DL (ref 6–8.4)
RBC # BLD AUTO: 3.69 M/UL (ref 4–5.4)
SODIUM SERPL-SCNC: 135 MMOL/L (ref 136–145)
WBC # BLD AUTO: 6.9 K/UL (ref 3.9–12.7)

## 2024-02-12 PROCEDURE — 86039 ANTINUCLEAR ANTIBODIES (ANA): CPT | Performed by: INTERNAL MEDICINE

## 2024-02-12 PROCEDURE — 85025 COMPLETE CBC W/AUTO DIFF WBC: CPT | Mod: PO | Performed by: INTERNAL MEDICINE

## 2024-02-12 PROCEDURE — 80053 COMPREHEN METABOLIC PANEL: CPT | Performed by: INTERNAL MEDICINE

## 2024-02-12 PROCEDURE — 36415 COLL VENOUS BLD VENIPUNCTURE: CPT | Mod: PO | Performed by: INTERNAL MEDICINE

## 2024-02-12 PROCEDURE — 85651 RBC SED RATE NONAUTOMATED: CPT | Mod: PO | Performed by: INTERNAL MEDICINE

## 2024-02-12 PROCEDURE — 86038 ANTINUCLEAR ANTIBODIES: CPT | Performed by: INTERNAL MEDICINE

## 2024-02-12 PROCEDURE — 86140 C-REACTIVE PROTEIN: CPT | Performed by: INTERNAL MEDICINE

## 2024-02-12 PROCEDURE — 86235 NUCLEAR ANTIGEN ANTIBODY: CPT | Mod: 59 | Performed by: INTERNAL MEDICINE

## 2024-02-12 PROCEDURE — 82784 ASSAY IGA/IGD/IGG/IGM EACH: CPT | Mod: 59 | Performed by: INTERNAL MEDICINE

## 2024-02-13 ENCOUNTER — OFFICE VISIT (OUTPATIENT)
Dept: RHEUMATOLOGY | Facility: CLINIC | Age: 39
End: 2024-02-13
Payer: COMMERCIAL

## 2024-02-13 ENCOUNTER — TELEPHONE (OUTPATIENT)
Dept: RHEUMATOLOGY | Facility: CLINIC | Age: 39
End: 2024-02-13
Payer: COMMERCIAL

## 2024-02-13 DIAGNOSIS — Z79.899 DRUG-INDUCED IMMUNODEFICIENCY: ICD-10-CM

## 2024-02-13 DIAGNOSIS — R05.3 CHRONIC COUGH: ICD-10-CM

## 2024-02-13 DIAGNOSIS — D84.821 DRUG-INDUCED IMMUNODEFICIENCY: ICD-10-CM

## 2024-02-13 DIAGNOSIS — K21.9 GASTROESOPHAGEAL REFLUX DISEASE, UNSPECIFIED WHETHER ESOPHAGITIS PRESENT: ICD-10-CM

## 2024-02-13 DIAGNOSIS — M06.09 RHEUMATOID ARTHRITIS OF MULTIPLE SITES WITH NEGATIVE RHEUMATOID FACTOR: ICD-10-CM

## 2024-02-13 DIAGNOSIS — D72.19 OTHER EOSINOPHILIA: ICD-10-CM

## 2024-02-13 DIAGNOSIS — M35.1 MCTD (MIXED CONNECTIVE TISSUE DISEASE): ICD-10-CM

## 2024-02-13 DIAGNOSIS — R13.19 ESOPHAGEAL DYSPHAGIA: ICD-10-CM

## 2024-02-13 DIAGNOSIS — Z51.81 ENCOUNTER FOR MEDICATION MONITORING: ICD-10-CM

## 2024-02-13 DIAGNOSIS — M34.9 SCLERODERMA, DIFFUSE: Primary | ICD-10-CM

## 2024-02-13 PROCEDURE — 1159F MED LIST DOCD IN RCRD: CPT | Mod: CPTII,95,, | Performed by: INTERNAL MEDICINE

## 2024-02-13 PROCEDURE — 1160F RVW MEDS BY RX/DR IN RCRD: CPT | Mod: CPTII,95,, | Performed by: INTERNAL MEDICINE

## 2024-02-13 PROCEDURE — 99215 OFFICE O/P EST HI 40 MIN: CPT | Mod: 95,,, | Performed by: INTERNAL MEDICINE

## 2024-02-13 RX ORDER — FAMOTIDINE 20 MG/1
20 TABLET, FILM COATED ORAL 2 TIMES DAILY
Qty: 60 TABLET | Refills: 11 | Status: SHIPPED | OUTPATIENT
Start: 2024-02-13 | End: 2025-02-12

## 2024-02-13 RX ORDER — METHOTREXATE 25 MG/ML
20 INJECTION, SOLUTION INTRA-ARTERIAL; INTRAMUSCULAR; INTRAVENOUS
Qty: 4 ML | Refills: 3 | Status: SHIPPED | OUTPATIENT
Start: 2024-02-13

## 2024-02-13 RX ORDER — SULFAMETHOXAZOLE AND TRIMETHOPRIM 800; 160 MG/1; MG/1
1 TABLET ORAL
Qty: 15 TABLET | Refills: 0 | Status: SHIPPED | OUTPATIENT
Start: 2024-02-14 | End: 2024-05-02 | Stop reason: ALTCHOICE

## 2024-02-13 NOTE — PROGRESS NOTES
RHEUMATOLOGY FOLLOW UP - TELE VISIT     The patient location is: [place]  The chief complaint leading to consultation is:  Follow up visit  Visit type: Virtual visit with synchronous audio and video  Total time spent with patient:  30 minutes  Each patient to whom he or she provides medical services by telemedicine is:  (1) informed of the relationship between the physician and patient and the respective role of any other health care provider with respect to management of the patient; and (2) notified that he or she may decline to receive medical services by telemedicine and may withdraw from such care at any time.    Chief complaints, HPI, ROS, EXAM, Assessment & Plans:-  Stefanie Duarte Luis E a 38 y.o. pleasant female seen today for follow up visit.   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 Complains of active hand swelling and morning stiffness.   Restarted rituximab after failing 7 Cytoxan infusions.  Failed Orencia.  Reports mild improvement of both skin thickening and finger swelling since receiving rituximab induction protocol- completed 2 weeks ago. On 15 mg weekly methotrexate. Tolerating well.   Worsening chronic nocturnal cough with GERD . Protonix was very expensive under her plan. Taking TUMS. Worsening esophageal dysphagia.   Failed multiple medications in the past including methotrexate, Arava, tacrolimus, sulfasalazine, DAMION inhibitors, Actemra, rituximab rheumatoid dosing.  No significant joint symptoms.  No pulmonary symptoms.  Rheumatological review of system negative otherwise.  Exam shows active synovitis of small joints of bilateral hands. Mild improvement of skin thickening over her forearm dn trunk.  .    1. Scleroderma, diffuse    2. Rheumatoid arthritis of multiple sites with negative rheumatoid factor    3. MCTD (mixed connective tissue disease)    4. Drug-induced immunodeficiency     5. Encounter for medication monitoring    6. Chronic cough    7. Other eosinophilia    8. Esophageal dysphagia    9. Gastroesophageal reflux disease, unspecified whether esophagitis present      Problem List Items Addressed This Visit       MCTD (mixed connective tissue disease)    Relevant Medications    sulfamethoxazole-trimethoprim 800-160mg (BACTRIM DS) 800-160 mg Tab (Start on 2/14/2024)    methotrexate 25 mg/mL injection    Gastroesophageal reflux disease    Relevant Medications    famotidine (PEPCID) 20 MG tablet    Rheumatoid arthritis of multiple sites with negative rheumatoid factor    Relevant Medications    sulfamethoxazole-trimethoprim 800-160mg (BACTRIM DS) 800-160 mg Tab (Start on 2/14/2024)    methotrexate 25 mg/mL injection    Chronic cough    Relevant Orders    CT Chest Without Contrast    Ambulatory referral/consult to Allergy    Ambulatory referral/consult to Gastroenterology    Scleroderma, diffuse - Primary    Relevant Medications    sulfamethoxazole-trimethoprim 800-160mg (BACTRIM DS) 800-160 mg Tab (Start on 2/14/2024)    methotrexate 25 mg/mL injection    Other Relevant Orders    CT Chest Without Contrast    Drug-induced immunodeficiency    Relevant Medications    sulfamethoxazole-trimethoprim 800-160mg (BACTRIM DS) 800-160 mg Tab (Start on 2/14/2024)    Esophageal dysphagia    Relevant Orders    Ambulatory referral/consult to Gastroenterology    Other eosinophilia    Relevant Orders    Ambulatory referral/consult to Allergy    Ambulatory referral/consult to Gastroenterology    Encounter for medication monitoring    Relevant Medications    sulfamethoxazole-trimethoprim 800-160mg (BACTRIM DS) 800-160 mg Tab (Start on 2/14/2024)      Latest Reference Range & Units Most Recent   MELVA Screen Negative <1:80  Positive !  6/16/22 11:45   MELVA HEP-2 Titer  Positive >=1:2560 Speckled  8/30/17 14:50   MELVA Titer 1  >=1:2560  6/16/22 11:45   MELVA PATTERN 1  Speckled  6/16/22 11:45   ds DNA Ab Negative  1:10  Negative 1:10  6/16/22 11:45   Anti-SSA Antibody 0.00 - 0.99 Ratio 0.07  6/16/22 11:45   Anti-SSA Interpretation Negative  Negative  6/16/22 11:45   Anti-SSB Antibody 0.00 - 0.99 Ratio 0.04  6/16/22 11:45   Anti-SSB Interpretation Negative  Negative  6/16/22 11:45   Anti Sm Antibody 0.00 - 0.99 Ratio 0.25  6/16/22 11:45   Anti-Sm Interpretation Negative  Negative  6/16/22 11:45   Anti Sm/RNP Antibody 0.00 - 0.99 Ratio 5.45 (H)  6/16/22 11:45   Anti-Sm/RNP Interpretation Negative  Positive !  6/16/22 11:45   Scleroderma SCL- <20 UNITS 4  10/2/19 09:52   Complement (C-3) 50 - 180 mg/dL 130  10/2/23 15:50   Complement (C-4) 11 - 44 mg/dL 16  10/2/23 15:50   IgG 650 - 1600 mg/dL 3990 (H)  12/28/22 13:46   IgM 50 - 300 mg/dL 326 (H)  12/28/22 13:46   IgA 40 - 350 mg/dL 353 (H)  12/28/22 13:46   Protein, Serum 6.0 - 8.4 g/dL 9.2 (H)  12/28/22 13:46   Albumin grams/dl 3.35 - 5.55 g/dL 3.58  12/28/22 13:46   Alpha-1 grams/dl 0.17 - 0.41 g/dL 0.31  12/28/22 13:46   Alpha-2 0.43 - 0.99 g/dL 0.68  12/28/22 13:46   Beta 0.50 - 1.10 g/dL 0.98  12/28/22 13:46   Gamma 0.67 - 1.58 g/dL 3.64 (H)  12/28/22 13:46   Pathologist Interpretation SPE  REVIEWED  12/28/22 13:46   Pathologist Interpretation ANLAI  REVIEWED  12/28/22 13:46   Immunofix Interp.  SEE COMMENT  12/28/22 13:46   Cryoglobulin, Qual Absent  Absent  1/18/23 14:46   Anti-Dilcia-1 Antibody <20 Units <20  12/1/17 16:30   Anti-PM/Scl Ab <20 Units <20  12/1/17 16:30   Anti-SS-A 52 kD Ab, IgG <20 Units <20  12/1/17 16:30   Anti-U1-RNP  Ab <20 Units 143 (H)  12/1/17 16:30   EJ Negative  Negative  12/1/17 16:30   Fibrillarin (U3 RNP) Negative  Negative  12/1/17 16:30   Ku Negative  Negative  12/1/17 16:30   MDA-5 (P140) (CADM-140) <20 Units <20  12/1/17 16:30   MI-2 Negative  Negative  12/1/17 16:30   NXP-2 (P140) <20 Units <20  12/1/17 16:30   OJ Negative  Negative  12/1/17 16:30   PL-12 Negative  Negative  12/1/17 16:30   PL-7 Negative  Negative  12/1/17 16:30   SRP Negative   Negative  12/1/17 16:30   TIF1 GAMMA (P155/140) <20 Units <20  12/1/17 16:30   U2 snRNP Negative  Moderate Positive !  12/1/17 16:30   !: Data is abnormal  (H): Data is abnormally high          Active RA. Increase methotrexate to 20 mg weekly IM injections.   Active scleroderma. IgG shows improvement along with skin . Repeat IgG and all 4 labs in 4 weeks and consider starting maintenance Rituximab 1000 mg * 2 doses * 2 weeks apart every 4 months.   Worsening GERD. Advised to create account at [facility] to send prescription since it would be cost effective .   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   CT chest to rule out methotrexate pneumonitis/scleroderma ILD due to chronic cough.   Start bactrim for PJP prophylaxis.   Daily folic acid for MTX therapy.   Failed multiple medications including Cytoxan in the past.  Mild improvement after rituximab induction therapy and methotrexate.  Check whether we could try her on Ofev for rapidly worsening skin fibrosis since she has failed multiple therapies including Cytoxan.  If she fails rituximab, restart Cytoxan at a higher dose.   Continue methotrexate with folic acid.  Advised all risks including life-threatening infections and pregnancy morbidities.  Drug induced immunodeficiency due to use of immunosuppressive drugs. Monitor carefully for infections. Advised to get immediate medical care if any infection. Also advised strict adherence to age appropriate vaccinations and cancer screenings with PCP.  I have explained all of the above in detail and the patient understands all of the current recommendation(s). I have answered all questions to the best of my ability and to their complete satisfaction.       # Follow up in about 8 weeks (around 4/9/2024).      Past Medical History:   Diagnosis Date    Abnormal Pap smear of cervix     repeat was normal    Lupus (systemic lupus erythematosus)        Past Surgical History:   Procedure Laterality Date     INSERTION OF TUNNELED CENTRAL VENOUS CATHETER (CVC) WITH SUBCUTANEOUS PORT N/A 05/24/2023    Procedure: [id number];  Surgeon: Taco Zimmer MD;  Location: [facility] OR;  Service: General;  Laterality: N/A;    ROBOT-ASSISTED CHOLECYSTECTOMY USING DA CRISTOPHER XI N/A 12/11/2020    Procedure: XI ROBOTIC CHOLECYSTECTOMY;  Surgeon: Taco Zimmer MD;  Location: [facility] OR;  Service: General;  Laterality: N/A;    ROBOT-ASSISTED LAPAROSCOPIC LYSIS OF ADHESIONS USING DA CRISTOPHER XI  12/11/2020    Procedure: XI ROBOTIC LYSIS, ADHESIONS;  Surgeon: Taco Zimmer MD;  Location: [facility] OR;  Service: General;;    ROBOT-ASSISTED LAPAROSCOPIC SALPINGO-OOPHORECTOMY USING DA CRISTOPHER XI Left 12/11/2020    Procedure: XI ROBOTIC SALPINGO-OOPHORECTOMY;  Surgeon: SHERLEY Wakefield MD;  Location: [facility] OR;  Service: OB/GYN;  Laterality: Left;    TUBAL LIGATION  2005        Social History     Tobacco Use    Smoking status: Never     Passive exposure: Never    Smokeless tobacco: Never   Substance Use Topics    Alcohol use: No    Drug use: No       Family History   Problem Relation Age of Onset    Liver cancer Maternal Aunt     Breast cancer Maternal Aunt     Breast cancer Paternal Grandmother     Colon cancer Neg Hx     Ovarian cancer Neg Hx     Thrombosis Neg Hx        Review of patient's allergies indicates:   Allergen Reactions    Elavil [amitriptyline]        Medication List with Changes/Refills   New Medications    FAMOTIDINE (PEPCID) 20 MG TABLET    Take 1 tablet (20 mg total) by mouth 2 (two) times daily.    SULFAMETHOXAZOLE-TRIMETHOPRIM 800-160MG (BACTRIM DS) 800-160 MG TAB    Take 1 tablet by mouth 3 (three) times a week.   Current Medications    BUMETANIDE (BUMEX) 1 MG TABLET    Take 1 tablet (1 mg total) by mouth once daily.    CLOBETASOL (TEMOVATE) 0.05 % CREAM    Apply topically 2 (two) times daily.    CLOBETASOL (TEMOVATE) 0.05 % EXTERNAL SOLUTION    Apply topically 2 (two) times daily.    COLCHICINE (COLCRYS) 0.6 MG  TABLET    Take 1 tablet (0.6 mg total) by mouth 2 (two) times daily.    FOLIC ACID (FOLVITE) 1 MG TABLET    Take 1 tablet (1 mg total) by mouth once daily.    GABAPENTIN (NEURONTIN) 300 MG CAPSULE    Take 1 capsule (300 mg total) by mouth 3 (three) times daily.    LIDOCAINE-PRILOCAINE (EMLA) CREAM    Apply topically as needed (port access).    MOMETASONE 0.1% (ELOCON) 0.1 % CREAM    Apply topically once daily.    NINTEDANIB (OFEV) 150 MG CAP    Take 1 capsule (150 mg total) by mouth 2 (two) times a day.    ONDANSETRON (ZOFRAN-ODT) 4 MG TBDL    Take 1 tablet (4 mg total) by mouth every 8 (eight) hours as needed (nausea).    PANTOPRAZOLE (PROTONIX) 40 MG TABLET    Take 1 tablet (40 mg total) by mouth once daily.    TACROLIMUS (PROTOPIC) 0.1 % OINTMENT    Apply topically 2 (two) times daily.    TRAMADOL (ULTRAM) 50 MG TABLET    TAKE 1 TABLET BY MOUTH EVERY 12 HOURS AS NEEDED FOR PAIN    TRAZODONE (DESYREL) 50 MG TABLET    Take 1 tablet (50 mg total) by mouth every evening.   Changed and/or Refilled Medications    Modified Medication Previous Medication    METHOTREXATE 25 MG/ML INJECTION methotrexate 25 mg/mL injection       Inject 0.8 mLs (20 mg total) into the muscle every 7 days.    INJECT 0.6 ML (15 MG TOTAL) INTRAMUSCULARLY ONCE EVERY 7 DAYS   Discontinued Medications    PREDNISONE (DELTASONE) 10 MG TABLET    Take 1 tablet (10 mg total) by mouth once daily.       Disclaimer: This note was prepared using voice recognition system and is likely to have sound alike errors and is not proof read.  Please call me with any questions.

## 2024-02-13 NOTE — TELEPHONE ENCOUNTER
----- Message from Prashanth Whalen MD sent at 2/13/2024  3:24 AM CST -----  Please schedule appointment within a week to discuss lab results and further treatments. Thanks.

## 2024-02-14 ENCOUNTER — PATIENT MESSAGE (OUTPATIENT)
Dept: HEPATOLOGY | Facility: CLINIC | Age: 39
End: 2024-02-14
Payer: COMMERCIAL

## 2024-02-14 LAB
ANA PATTERN 1: NORMAL
ANA SER QL IF: POSITIVE
ANA TITR SER IF: 5120 {TITER}

## 2024-02-15 LAB
ANTI SM ANTIBODY: 0.15 RATIO (ref 0–0.99)
ANTI SM/RNP ANTIBODY: 6.39 RATIO (ref 0–0.99)
ANTI-SM INTERPRETATION: NEGATIVE
ANTI-SM/RNP INTERPRETATION: POSITIVE
ANTI-SSA ANTIBODY: 0.06 RATIO (ref 0–0.99)
ANTI-SSA INTERPRETATION: NEGATIVE
ANTI-SSB ANTIBODY: 0.05 RATIO (ref 0–0.99)
ANTI-SSB INTERPRETATION: NEGATIVE
DSDNA AB SER-ACNC: ABNORMAL [IU]/ML

## 2024-02-20 ENCOUNTER — TELEPHONE (OUTPATIENT)
Dept: ALLERGY | Facility: CLINIC | Age: 39
End: 2024-02-20
Payer: COMMERCIAL

## 2024-02-20 NOTE — TELEPHONE ENCOUNTER
----- Message from Ryann Granados MA sent at 2/20/2024 11:07 AM CST -----  Regarding: FW: self  Patient returning call to schedule appt from referral      ----- Message -----  From: Prince Stuart  Sent: 2/20/2024  11:03 AM CST  To: Marlee Alford Staff  Subject: self                                             Type:  Patient Returning Call         Who Called: self       Who Left Message for Patient: pt hung up so wasn't sure       Does the patient know what this is regarding?: yes        Would the patient rather a call back or a response via [facility]? Call [facility]     Best Call Back Number:[phone]        Additional Information:

## 2024-02-22 ENCOUNTER — HOSPITAL ENCOUNTER (OUTPATIENT)
Dept: RADIOLOGY | Facility: HOSPITAL | Age: 39
Discharge: HOME | End: 2024-02-22
Attending: INTERNAL MEDICINE
Payer: COMMERCIAL

## 2024-02-22 ENCOUNTER — OFFICE VISIT (OUTPATIENT)
Dept: GASTROENTEROLOGY | Facility: CLINIC | Age: 39
End: 2024-02-22
Payer: COMMERCIAL

## 2024-02-22 VITALS
HEIGHT: 68 IN | DIASTOLIC BLOOD PRESSURE: 77 MMHG | WEIGHT: 211.19 LBS | SYSTOLIC BLOOD PRESSURE: 116 MMHG | BODY MASS INDEX: 32.01 KG/M2 | HEART RATE: 87 BPM

## 2024-02-22 DIAGNOSIS — K21.9 GASTROESOPHAGEAL REFLUX DISEASE, UNSPECIFIED WHETHER ESOPHAGITIS PRESENT: Primary | ICD-10-CM

## 2024-02-22 DIAGNOSIS — R05.3 CHRONIC COUGH: ICD-10-CM

## 2024-02-22 DIAGNOSIS — D72.19 OTHER EOSINOPHILIA: ICD-10-CM

## 2024-02-22 DIAGNOSIS — M34.9 SCLERODERMA, DIFFUSE: ICD-10-CM

## 2024-02-22 DIAGNOSIS — R13.19 ESOPHAGEAL DYSPHAGIA: ICD-10-CM

## 2024-02-22 PROCEDURE — 99999 PR PBB SHADOW E&M-EST. PATIENT-LVL V: CPT | Mod: PBBFAC,,, | Performed by: NURSE PRACTITIONER

## 2024-02-22 PROCEDURE — 99204 OFFICE O/P NEW MOD 45 MIN: CPT | Mod: S$GLB,,, | Performed by: NURSE PRACTITIONER

## 2024-02-22 PROCEDURE — 71250 CT THORAX DX C-: CPT | Mod: TC

## 2024-02-22 PROCEDURE — 71250 CT THORAX DX C-: CPT | Mod: 26,,, | Performed by: STUDENT IN AN ORGANIZED HEALTH CARE EDUCATION/TRAINING PROGRAM

## 2024-02-22 PROCEDURE — 1160F RVW MEDS BY RX/DR IN RCRD: CPT | Mod: CPTII,S$GLB,, | Performed by: NURSE PRACTITIONER

## 2024-02-22 PROCEDURE — 1159F MED LIST DOCD IN RCRD: CPT | Mod: CPTII,S$GLB,, | Performed by: NURSE PRACTITIONER

## 2024-02-22 PROCEDURE — 3074F SYST BP LT 130 MM HG: CPT | Mod: CPTII,S$GLB,, | Performed by: NURSE PRACTITIONER

## 2024-02-22 PROCEDURE — 3078F DIAST BP <80 MM HG: CPT | Mod: CPTII,S$GLB,, | Performed by: NURSE PRACTITIONER

## 2024-02-22 PROCEDURE — 3008F BODY MASS INDEX DOCD: CPT | Mod: CPTII,S$GLB,, | Performed by: NURSE PRACTITIONER

## 2024-02-22 RX ORDER — HYDROCODONE BITARTRATE AND ACETAMINOPHEN 5; 325 MG/1; MG/1
1 TABLET ORAL EVERY 8 HOURS PRN
COMMUNITY

## 2024-02-22 RX ORDER — OMEPRAZOLE 40 MG/1
40 CAPSULE, DELAYED RELEASE ORAL DAILY
Qty: 30 CAPSULE | Refills: 11 | Status: SHIPPED | OUTPATIENT
Start: 2024-02-22 | End: 2025-02-21

## 2024-02-22 NOTE — PROGRESS NOTES
Clinic Consult:  [facility] Consultation Note    Reason for Consult:  The primary encounter diagnosis was Gastroesophageal reflux disease, unspecified whether esophagitis present. Diagnoses of Chronic cough, Other eosinophilia, and Esophageal dysphagia were also pertinent to this visit.    PCP: Sofia Martinez   No address on file    HPI:  This is a 38 y.o. female here for evaluation of the above.   Onset: chronic   Symptoms: regurgitation, burning chest pain , cough, and hoarseness  Nausea or vomiting: no  Dysphagia: yes-- Location: throat and upper chest-- Substances with issues: solids only. Does not really having any issues with liquids. Cold beverages cause her to cough.   Melena or hematochezia: no  NSAID use: no  Smoker: no  Family history of UGI cancer: no  Prior workup: none  Treatments tried: insurance did not cover pantoprazole.  Taking Pepcid 20 mg BID-- started 1 week ago.     Review of Systems   Constitutional:  Negative for fever and weight loss.   HENT:  Negative for sore throat.    Respiratory:  Positive for cough. Negative for shortness of breath and wheezing.    Cardiovascular:  Positive for chest pain. Negative for palpitations.   Gastrointestinal:  Positive for heartburn. Negative for abdominal pain, nausea and vomiting.        Hoarseness, dysphagia.    Genitourinary:  Negative for dysuria and frequency.   Skin:  Negative for itching and rash.       Medical History:  has a past medical history of Abnormal Pap smear of cervix and Lupus (systemic lupus erythematosus).    Surgical History:  has a past surgical history that includes Tubal ligation (2005); Robot-assisted laparoscopic salpingo-oophorectomy using da Stevo Xi (Left, 12/11/2020); Robot-assisted cholecystectomy using da Stevo Xi (N/A, 12/11/2020); Robot-assisted laparoscopic lysis of adhesions using da Stevo Xi (12/11/2020); and Insertion of tunneled central venous catheter (CVC) with subcutaneous port (N/A,  05/24/2023).    Family History: family history includes Breast cancer in her maternal aunt and paternal grandmother; Liver cancer in her maternal aunt..     Social History:  reports that she has never smoked. She has never been exposed to tobacco smoke. She has never used smokeless tobacco. She reports that she does not drink alcohol and does not use drugs.    Allergies: Reviewed    Home Medications:   Current Outpatient Medications on File Prior to Visit   Medication Sig Dispense Refill    bumetanide (BUMEX) 1 MG tablet Take 1 tablet (1 mg total) by mouth once daily. (Patient taking differently: Take 1 mg by mouth once daily.) 10 tablet 0    colchicine (COLCRYS) 0.6 mg tablet Take 1 tablet (0.6 mg total) by mouth 2 (two) times daily. 60 tablet 11    famotidine (PEPCID) 20 MG tablet Take 1 tablet (20 mg total) by mouth 2 (two) times daily. 60 tablet 11    folic acid (FOLVITE) 1 MG tablet Take 1 tablet (1 mg total) by mouth once daily. 90 tablet 0    gabapentin (NEURONTIN) 300 MG capsule Take 1 capsule (300 mg total) by mouth 3 (three) times daily. 90 capsule 11    HYDROcodone-acetaminophen (NORCO) 5-325 mg per tablet Take 1 tablet by mouth every 8 (eight) hours as needed.      methotrexate 25 mg/mL injection Inject 0.8 mLs (20 mg total) into the muscle every 7 days. 4 mL 3    mometasone 0.1% (ELOCON) 0.1 % cream Apply topically once daily. 50 g 1    nintedanib (OFEV) 150 mg Cap Take 1 capsule (150 mg total) by mouth 2 (two) times a day. 60 capsule 11    ondansetron (ZOFRAN-ODT) 4 MG TbDL Take 1 tablet (4 mg total) by mouth every 8 (eight) hours as needed (nausea). 30 tablet 0    tacrolimus (PROTOPIC) 0.1 % ointment Apply topically 2 (two) times daily. 100 g 3    [DISCONTINUED] pantoprazole (PROTONIX) 40 MG tablet Take 1 tablet (40 mg total) by mouth once daily. 90 tablet 3    clobetasoL (TEMOVATE) 0.05 % cream Apply topically 2 (two) times daily. (Patient not taking: Reported on 2/22/2024) 60 g 2    clobetasoL  "(TEMOVATE) 0.05 % external solution Apply topically 2 (two) times daily. (Patient not taking: Reported on 2/22/2024) 50 mL 1    LIDOcaine-prilocaine (EMLA) cream Apply topically as needed (port access). (Patient not taking: Reported on 2/22/2024) 5 g 3    sulfamethoxazole-trimethoprim 800-160mg (BACTRIM DS) 800-160 mg Tab Take 1 tablet by mouth 3 (three) times a week. 15 tablet 0    traMADoL (ULTRAM) 50 mg tablet TAKE 1 TABLET BY MOUTH EVERY 12 HOURS AS NEEDED FOR PAIN 60 tablet 3    traZODone (DESYREL) 50 MG tablet Take 1 tablet (50 mg total) by mouth every evening. (Patient taking differently: Take 50 mg by mouth nightly as needed.) 30 tablet 11     Current Facility-Administered Medications on File Prior to Visit   Medication Dose Route Frequency Provider Last Rate Last Admin    LIDOcaine (PF) 10 mg/ml (1%) injection 10 mg  1 mL Intradermal Once Taco Zimmer MD           Physical Exam:  /77   Pulse 87   Ht 5' 8" (1.727 m)   Wt 95.8 kg (211 lb 3.2 oz)   BMI 32.11 kg/m²   Body mass index is 32.11 kg/m².  Physical Exam  Constitutional:       General: She is not in acute distress.  HENT:      Head: Normocephalic.   Neurological:      General: No focal deficit present.      Mental Status: She is alert.   Psychiatric:         Mood and Affect: Mood normal.         Judgment: Judgment normal.         Labs: Pertinent labs reviewed.  CRC Screening: NA    Assessment:  1. Gastroesophageal reflux disease, unspecified whether esophagitis present    2. Chronic cough    3. Other eosinophilia    4. Esophageal dysphagia    She has scleroderma which puts her at risk for esophageal dysmotility. Her dysphagia is more significant with solids so other DDX include esophageal stenosis. Also with eosinophilia, will evaluate for EoE as cause as well. Not on PPI. Started taking Pepcid 1 week ago.     Recommendations:   - EGD with biopsies.   - if unrevealing for cause of dysphagia, will need manometry.   - start omeprazole   - " A referral has been placed for endoscopy procedure scheduling and phone/audio appointment has been scheduled.      Gastroesophageal reflux disease, unspecified whether esophagitis present  -     omeprazole (PRILOSEC) 40 MG capsule; Take 1 capsule (40 mg total) by mouth once daily.  Dispense: 30 capsule; Refill: 11  -     Ambulatory referral/consult to Endo Procedure     Chronic cough  -     Ambulatory referral/consult to Gastroenterology    Other eosinophilia  -     Ambulatory referral/consult to Gastroenterology  -     Ambulatory referral/consult to Endo Procedure     Esophageal dysphagia  -     Ambulatory referral/consult to Gastroenterology  -     Ambulatory referral/consult to Endo Procedure     Follow up to be determined after results/ procedure(s).    Thank you so much for allowing me to participate in the care of ROBYN Mota

## 2024-02-23 ENCOUNTER — OFFICE VISIT (OUTPATIENT)
Dept: ALLERGY | Facility: CLINIC | Age: 39
End: 2024-02-23
Payer: COMMERCIAL

## 2024-02-23 ENCOUNTER — HOSPITAL ENCOUNTER (OUTPATIENT)
Dept: PREADMISSION TESTING | Facility: HOSPITAL | Age: 39
Discharge: HOME | End: 2024-02-23
Attending: FAMILY MEDICINE
Payer: COMMERCIAL

## 2024-02-23 VITALS
SYSTOLIC BLOOD PRESSURE: 113 MMHG | BODY MASS INDEX: 32.27 KG/M2 | WEIGHT: 212.94 LBS | HEIGHT: 68 IN | HEART RATE: 89 BPM | DIASTOLIC BLOOD PRESSURE: 76 MMHG | TEMPERATURE: 98 F

## 2024-02-23 DIAGNOSIS — M34.9 SCLERODERMA, DIFFUSE: ICD-10-CM

## 2024-02-23 DIAGNOSIS — J31.0 RHINITIS, UNSPECIFIED TYPE: ICD-10-CM

## 2024-02-23 DIAGNOSIS — J31.0 GUSTATORY RHINITIS: ICD-10-CM

## 2024-02-23 DIAGNOSIS — B36.0 TINEA VERSICOLOR: ICD-10-CM

## 2024-02-23 DIAGNOSIS — K21.9 GASTROESOPHAGEAL REFLUX DISEASE, UNSPECIFIED WHETHER ESOPHAGITIS PRESENT: ICD-10-CM

## 2024-02-23 DIAGNOSIS — R13.19 ESOPHAGEAL DYSPHAGIA: ICD-10-CM

## 2024-02-23 DIAGNOSIS — D72.19 OTHER EOSINOPHILIA: Primary | ICD-10-CM

## 2024-02-23 DIAGNOSIS — D72.19 OTHER EOSINOPHILIA: ICD-10-CM

## 2024-02-23 DIAGNOSIS — M32.9 SYSTEMIC LUPUS ERYTHEMATOSUS ARTHRITIS: ICD-10-CM

## 2024-02-23 DIAGNOSIS — R05.3 CHRONIC COUGH: Primary | ICD-10-CM

## 2024-02-23 PROCEDURE — 1159F MED LIST DOCD IN RCRD: CPT | Mod: CPTII,S$GLB,, | Performed by: ALLERGY & IMMUNOLOGY

## 2024-02-23 PROCEDURE — 99999 PR PBB SHADOW E&M-EST. PATIENT-LVL IV: CPT | Mod: PBBFAC,,, | Performed by: ALLERGY & IMMUNOLOGY

## 2024-02-23 PROCEDURE — 3074F SYST BP LT 130 MM HG: CPT | Mod: CPTII,S$GLB,, | Performed by: ALLERGY & IMMUNOLOGY

## 2024-02-23 PROCEDURE — 3078F DIAST BP <80 MM HG: CPT | Mod: CPTII,S$GLB,, | Performed by: ALLERGY & IMMUNOLOGY

## 2024-02-23 PROCEDURE — 99204 OFFICE O/P NEW MOD 45 MIN: CPT | Mod: S$GLB,,, | Performed by: ALLERGY & IMMUNOLOGY

## 2024-02-23 PROCEDURE — 3008F BODY MASS INDEX DOCD: CPT | Mod: CPTII,S$GLB,, | Performed by: ALLERGY & IMMUNOLOGY

## 2024-02-23 RX ORDER — KETOCONAZOLE 20 MG/ML
SHAMPOO, SUSPENSION TOPICAL
Qty: 200 ML | Refills: 1 | Status: SHIPPED | OUTPATIENT
Start: 2024-02-26

## 2024-02-23 RX ORDER — IPRATROPIUM BROMIDE 21 UG/1
2 SPRAY, METERED NASAL 3 TIMES DAILY
Qty: 20 ML | Refills: 5 | Status: SHIPPED | OUTPATIENT
Start: 2024-02-23

## 2024-02-23 NOTE — PROGRESS NOTES
Subjective:      Patient ID: Stefanie España is a 38 y.o. female.    Chief Complaint:  Other (Cough and elevated igE)      HPI:  38 year old female referred by Dr. Whalen for cough and eosinophilia. She has a diagnosis of lupus and scleroderma.  [place] in December  Cough for one year- non-productive  She reports trying antihistamines and cold preparations do not alleviate the cough  Perfumes/odors can trigger  She reports that her  feels her cough is worse at night.  She reports heartburn that is being worked up presently.  She reports mild runny nose and it is exacerbate by certain foods.  She denies itchy or watery eyes.    She denies asthma, or wheezing. No shortness of breath.    No history of food allergies.    She is from [place], moved here 20 years ago.    She denies taking herbal medications.    Theraflu last night.    Past Medical History:   Diagnosis Date    Abnormal Pap smear of cervix     repeat was normal    Lupus (systemic lupus erythematosus)         Family History   Problem Relation Age of Onset    Liver cancer Maternal Aunt     Breast cancer Maternal Aunt     Breast cancer Paternal Grandmother     Colon cancer Neg Hx     Ovarian cancer Neg Hx     Thrombosis Neg Hx         Current Outpatient Medications on File Prior to Visit   Medication Sig Dispense Refill    bumetanide (BUMEX) 1 MG tablet Take 1 tablet (1 mg total) by mouth once daily. (Patient taking differently: Take 1 mg by mouth once daily.) 10 tablet 0    clobetasoL (TEMOVATE) 0.05 % cream Apply topically 2 (two) times daily. 60 g 2    clobetasoL (TEMOVATE) 0.05 % external solution Apply topically 2 (two) times daily. 50 mL 1    colchicine (COLCRYS) 0.6 mg tablet Take 1 tablet (0.6 mg total) by mouth 2 (two) times daily. 60 tablet 11    famotidine (PEPCID) 20 MG tablet Take 1 tablet (20 mg total) by mouth 2 (two) times daily. 60 tablet 11    folic acid (FOLVITE) 1 MG tablet Take 1 tablet (1 mg total) by mouth once daily.  90 tablet 0    gabapentin (NEURONTIN) 300 MG capsule Take 1 capsule (300 mg total) by mouth 3 (three) times daily. 90 capsule 11    HYDROcodone-acetaminophen (NORCO) 5-325 mg per tablet Take 1 tablet by mouth every 8 (eight) hours as needed.      LIDOcaine-prilocaine (EMLA) cream Apply topically as needed (port access). 5 g 3    methotrexate 25 mg/mL injection Inject 0.8 mLs (20 mg total) into the muscle every 7 days. 4 mL 3    mometasone 0.1% (ELOCON) 0.1 % cream Apply topically once daily. 50 g 1    nintedanib (OFEV) 150 mg Cap Take 1 capsule (150 mg total) by mouth 2 (two) times a day. 60 capsule 11    omeprazole (PRILOSEC) 40 MG capsule Take 1 capsule (40 mg total) by mouth once daily. 30 capsule 11    ondansetron (ZOFRAN-ODT) 4 MG TbDL Take 1 tablet (4 mg total) by mouth every 8 (eight) hours as needed (nausea). 30 tablet 0    sulfamethoxazole-trimethoprim 800-160mg (BACTRIM DS) 800-160 mg Tab Take 1 tablet by mouth 3 (three) times a week. 15 tablet 0    tacrolimus (PROTOPIC) 0.1 % ointment Apply topically 2 (two) times daily. 100 g 3    traMADoL (ULTRAM) 50 mg tablet TAKE 1 TABLET BY MOUTH EVERY 12 HOURS AS NEEDED FOR PAIN 60 tablet 3    traZODone (DESYREL) 50 MG tablet Take 1 tablet (50 mg total) by mouth every evening. (Patient taking differently: Take 50 mg by mouth nightly as needed.) 30 tablet 11     Current Facility-Administered Medications on File Prior to Visit   Medication Dose Route Frequency Provider Last Rate Last Admin    LIDOcaine (PF) 10 mg/ml (1%) injection 10 mg  1 mL Intradermal Once Taco Zimmer MD            Review of patient's allergies indicates:   Allergen Reactions    Elavil [amitriptyline]         Environmental History: Pets in the home: none.  Tobacco Smoke in Home: no  Review of Systems   Constitutional:  Negative for chills and fever.   HENT:  Negative for congestion and rhinorrhea.    Eyes:  Negative for discharge and itching.   Respiratory:  Positive for cough. Negative for  shortness of breath and wheezing.    Allergic/Immunologic: Negative for environmental allergies and food allergies.   Neurological:  Negative for facial asymmetry and speech difficulty.       Objective:   Physical Exam  Vitals reviewed.   Constitutional:       General: She is not in acute distress.     Appearance: Normal appearance. She is well-developed. She is not ill-appearing, toxic-appearing or diaphoretic.   HENT:      Head: Normocephalic and atraumatic.      Right Ear: Tympanic membrane, ear canal and external ear normal. There is no impacted cerumen.      Left Ear: Tympanic membrane, ear canal and external ear normal. There is no impacted cerumen.      Nose: Nose normal. No congestion or rhinorrhea.      Mouth/Throat:      Pharynx: No oropharyngeal exudate or posterior oropharyngeal erythema.   Eyes:      General: No scleral icterus.        Right eye: No discharge.         Left eye: No discharge.      Pupils: Pupils are equal, round, and reactive to light.   Neck:      Thyroid: No thyromegaly.   Cardiovascular:      Rate and Rhythm: Normal rate and regular rhythm.      Heart sounds: Normal heart sounds. No murmur heard.     No friction rub. No gallop.   Pulmonary:      Effort: Pulmonary effort is normal. No respiratory distress.      Breath sounds: Normal breath sounds. No stridor. No wheezing, rhonchi or rales.   Chest:      Chest wall: No tenderness.   Musculoskeletal:         General: No swelling, tenderness, deformity or signs of injury. Normal range of motion.      Cervical back: Normal range of motion and neck supple. No rigidity or tenderness. No muscular tenderness.      Right lower leg: No edema.      Left lower leg: No edema.   Lymphadenopathy:      Cervical: No cervical adenopathy.   Skin:     General: Skin is warm.      Coloration: Skin is not jaundiced or pale.      Findings: Rash present. No bruising or erythema.      Comments: Hypopigmented lesions on face and neck   Neurological:       General: No focal deficit present.      Mental Status: She is alert and oriented to person, place, and time.      Gait: Gait normal.   Psychiatric:         Mood and Affect: Mood normal.         Behavior: Behavior normal.         Thought Content: Thought content normal.         Judgment: Judgment normal.       Unable to skin prick test, as she is taking antihistamines.     Eosinophil 1,300 (2/12/2024)  Assessment:      1. Chronic cough    2. Other eosinophilia    3. Gustatory rhinitis    4. Scleroderma, diffuse    5. Systemic lupus erythematosus arthritis    6. Tinea versicolor    7. Rhinitis, unspecified type        Plan:     Chronic cough  -     Ambulatory referral/consult to Allergy  -     Allergen Pecan Tree IgE; Future; Expected date: 02/23/2024  -     [place], black IgE; Future; Expected date: 02/23/2024  -     [place], bald IgE; Future; Expected date: 02/23/2024  -     Allergen Oak IgE; Future; Expected date: 02/23/2024  -     Allergen, Cocklebur; Future; Expected date: 02/23/2024  -     Cat epithelium IgE; Future; Expected date: 02/23/2024  -     RAST Allergen for [place]; Future; Expected date: 02/23/2024  -     RAST Allergen Maple ([place]); Future; Expected date: 02/23/2024  -     Allergen, Meadow Grass ([facility]); Future; Expected date: 02/23/2024  -     Allergen-Silver Birch; Future; Expected date: 02/23/2024  -     RAST Allergen [place]; Future; Expected date: 02/23/2024  -     RAST Allergen, Sheep [place]); Future; Expected date: 02/23/2024  -     Allergen, Hackberry Celtis; Future; Expected date: 02/23/2024  -     Allergen, Elm Cedar; Future; Expected date: 02/23/2024  -     Allergen-[place]; Future; Expected date: 02/23/2024  -     Allergen Alternaria Alternata IgE; Future; Expected date: 02/23/2024  -     Allergen-[place]; Future; Expected date: 02/23/2024  -     Allergen, White Eliezer; Future; Expected date: 02/23/2024  -     IgE; Future; Expected date:  02/23/2024  -     Allergen [place] Grass IgE; Future; Expected date: 02/23/2024  -     Allergen Aspergillus Fumagatus IgE; Future; Expected date: 02/23/2024  -     Chaetomium globosum IgE; Future; Expected date: 02/23/2024  -     Cockroach, American IgE; Future; Expected date: 02/23/2024  -     Cladosporium IgE; Future; Expected date: 02/23/2024  -     Allergen Curvularia Lunata IgE; Future; Expected date: 02/23/2024  -     Allergen D Farinae (Dust Mite) IgE; Future; Expected date: 02/23/2024  -     Allergen D Pteronyssinus (Dust Mite) IgE; Future; Expected date: 02/23/2024  -     Allergen Dog Dander IgE; Future; Expected date: 02/23/2024  -     Allergen English Plantain IgE; Future; Expected date: 02/23/2024  -     Eucalyptus IgE; Future; Expected date: 02/23/2024  -     Ryan elder, rough IgE; Future; Expected date: 02/23/2024  -     Mugwort IgE; Future; Expected date: 02/23/2024  -     Nettle IgE; Future; Expected date: 02/23/2024  -     Orchard grass IgE; Future; Expected date: 02/23/2024  -     Allergen White [place] IgE; Future; Expected date: 02/23/2024  -     Allergen Privet IgE; Future; Expected date: 02/23/2024  -     Ragweed, short, common IgE; Future; Expected date: 02/23/2024  -     Red top grass IgE; Future; Expected date: 02/23/2024  -     Rye grass, cultivated IgE; Future; Expected date: 02/23/2024  -     Thistle, Russian IgE; Future; Expected date: 02/23/2024  -     Stemphyllium IgE; Future; Expected date: 02/23/2024  -     Janusz IgE; Future; Expected date: 02/23/2024  -     Allergen Sabino Grass IgE; Future; Expected date: 02/23/2024    Other eosinophilia  -     Ambulatory referral/consult to Allergy  -     CU (Chronic Urticaria) Index Panel; Future; Expected date: 02/23/2024  -     CBC Auto Differential; Future; Expected date: 02/23/2024  -     Protein Electrophoresis, Serum; Future; Expected date: 02/23/2024  -     Stool Exam-Ova,Cysts,Parasites; Future; Expected date: 02/23/2024  -     Ascaris  IgE; Future; Expected date: 02/23/2024  -     Comprehensive Metabolic Panel; Future; Expected date: 02/23/2024  -     Strongyloides IgG Antibodies; Future; Expected date: 02/23/2024  -     Toxocara antibody; Future; Expected date: 02/23/2024  -     Stool Exam-Ova,Cysts,Parasites; Future; Expected date: 02/23/2024  -     Tryptase; Future; Expected date: 02/23/2024  -     Stool Exam-Ova,Cysts,Parasites; Future; Expected date: 02/23/2024  -     Allergen Pecan Tree IgE; Future; Expected date: 02/23/2024  -     [place], black IgE; Future; Expected date: 02/23/2024  -     [place], bald IgE; Future; Expected date: 02/23/2024  -     Allergen Oak IgE; Future; Expected date: 02/23/2024  -     Allergen, Cocklebur; Future; Expected date: 02/23/2024  -     Cat epithelium IgE; Future; Expected date: 02/23/2024  -     RAST Allergen for [place]; Future; Expected date: 02/23/2024  -     RAST Allergen Maple ([place]); Future; Expected date: 02/23/2024  -     Allergen, Meadow Grass ([place] Blue); Future; Expected date: 02/23/2024  -     Allergen-Silver Birch; Future; Expected date: 02/23/2024  -     RAST Allergen [place]; Future; Expected date: 02/23/2024  -     RAST Allergen, [place]); Future; Expected date: 02/23/2024  -     Allergen, Hackberry Celtis; Future; Expected date: 02/23/2024  -     Allergen, Elm Cedar; Future; Expected date: 02/23/2024  -     Allergen-[place]; Future; Expected date: 02/23/2024  -     Allergen Alternaria Alternata IgE; Future; Expected date: 02/23/2024  -     Allergen-[place]; Future; Expected date: 02/23/2024  -     Allergen, White Eliezer; Future; Expected date: 02/23/2024  -     IgE; Future; Expected date: 02/23/2024  -     Allergen [place] Grass IgE; Future; Expected date: 02/23/2024  -     Allergen Aspergillus Fumagatus IgE; Future; Expected date: 02/23/2024  -     Chaetomium globosum IgE; Future; Expected date: 02/23/2024  -     Cockroach, American IgE; Future;  Expected date: 02/23/2024  -     Cladosporium IgE; Future; Expected date: 02/23/2024  -     Allergen Curvularia Lunata IgE; Future; Expected date: 02/23/2024  -     Allergen D Farinae (Dust Mite) IgE; Future; Expected date: 02/23/2024  -     Allergen D Pteronyssinus (Dust Mite) IgE; Future; Expected date: 02/23/2024  -     Allergen Dog Dander IgE; Future; Expected date: 02/23/2024  -     Allergen English Plantain IgE; Future; Expected date: 02/23/2024  -     Eucalyptus IgE; Future; Expected date: 02/23/2024  -     Ryan elder, rough IgE; Future; Expected date: 02/23/2024  -     Mugwort IgE; Future; Expected date: 02/23/2024  -     Nettle IgE; Future; Expected date: 02/23/2024  -     Orchard grass IgE; Future; Expected date: 02/23/2024  -     Allergen White Pine IgE; Future; Expected date: 02/23/2024  -     Allergen Privet IgE; Future; Expected date: 02/23/2024  -     Ragweed, short, common IgE; Future; Expected date: 02/23/2024  -     Red top grass IgE; Future; Expected date: 02/23/2024  -     Rye grass, cultivated IgE; Future; Expected date: 02/23/2024  -     Thistle, Russian IgE; Future; Expected date: 02/23/2024  -     Stemphyllium IgE; Future; Expected date: 02/23/2024  -     Janusz IgE; Future; Expected date: 02/23/2024  -     Allergen Sabino Grass IgE; Future; Expected date: 02/23/2024    Gustatory rhinitis  -     ipratropium (ATROVENT) 21 mcg (0.03 %) nasal spray; 2 sprays by Each Nostril route 3 (three) times daily.  Dispense: 20 mL; Refill: 5    Scleroderma, diffuse    Systemic lupus erythematosus arthritis    Tinea versicolor  -     ketoconazole (NIZORAL) 2 % shampoo; Apply topically twice a week.  Dispense: 200 mL; Refill: 1    Rhinitis, unspecified type  -     Allergen Pecan Tree IgE; Future; Expected date: 02/23/2024  -     [place], black IgE; Future; Expected date: 02/23/2024  -     [place], bald IgE; Future; Expected date: 02/23/2024  -     Allergen Oak IgE; Future; Expected date: 02/23/2024  -      Allergen, Cocklebur; Future; Expected date: 02/23/2024  -     Cat epithelium IgE; Future; Expected date: 02/23/2024  -     RAST Allergen for [place]; Future; Expected date: 02/23/2024  -     RAST Allergen Maple ([place]); Future; Expected date: 02/23/2024  -     Allergen, Meadow Grass ([place] Blue); Future; Expected date: 02/23/2024  -     Allergen-Silver Birch; Future; Expected date: 02/23/2024  -     RAST Allergen [place]; Future; Expected date: 02/23/2024  -     RAST Allergen, Sheep [place]); Future; Expected date: 02/23/2024  -     Allergen, Hackberry Celtis; Future; Expected date: 02/23/2024  -     Allergen, Elm Cedar; Future; Expected date: 02/23/2024  -     Allergen-[place]; Future; Expected date: 02/23/2024  -     Allergen Alternaria Alternata IgE; Future; Expected date: 02/23/2024  -     Allergen-[place]; Future; Expected date: 02/23/2024  -     Allergen, White Eliezer; Future; Expected date: 02/23/2024  -     IgE; Future; Expected date: 02/23/2024  -     Allergen [place] Grass IgE; Future; Expected date: 02/23/2024  -     Allergen Aspergillus Fumagatus IgE; Future; Expected date: 02/23/2024  -     Chaetomium globosum IgE; Future; Expected date: 02/23/2024  -     Cockroach, American IgE; Future; Expected date: 02/23/2024  -     Cladosporium IgE; Future; Expected date: 02/23/2024  -     Allergen Curvularia Lunata IgE; Future; Expected date: 02/23/2024  -     Allergen D Farinae (Dust Mite) IgE; Future; Expected date: 02/23/2024  -     Allergen D Pteronyssinus (Dust Mite) IgE; Future; Expected date: 02/23/2024  -     Allergen Dog Dander IgE; Future; Expected date: 02/23/2024  -     Allergen English Plantain IgE; Future; Expected date: 02/23/2024  -     Eucalyptus IgE; Future; Expected date: 02/23/2024  -     Ryan elder, rough IgE; Future; Expected date: 02/23/2024  -     Mugwort IgE; Future; Expected date: 02/23/2024  -     [place] IgE; Future; Expected date: 02/23/2024  -      Orchard grass IgE; Future; Expected date: 02/23/2024  -     Allergen White Pine IgE; Future; Expected date: 02/23/2024  -     Allergen Privet IgE; Future; Expected date: 02/23/2024  -     Ragweed, short, common IgE; Future; Expected date: 02/23/2024  -     Red top grass IgE; Future; Expected date: 02/23/2024  -     Rye grass, cultivated IgE; Future; Expected date: 02/23/2024  -     Thistle, Russian IgE; Future; Expected date: 02/23/2024  -     Stemphyllium IgE; Future; Expected date: 02/23/2024  -     Janusz IgE; Future; Expected date: 02/23/2024  -     Allergen Sabino Grass IgE; Future; Expected date: 02/23/2024        Labs ordered.    RTC 4-5 weeks or sooner, if needed     CANDE ALONSO spent a total of 47 minutes on the day of the visit.This includes face to face time and non-face to face time preparing to see the patient (eg, review of tests), obtaining and/or reviewing separately obtained history, documenting clinical information in the electronic or other health record, independently interpreting results and communicating results to the patient/family/caregiver, or care coordinator.

## 2024-02-27 ENCOUNTER — ANESTHESIA EVENT (OUTPATIENT)
Dept: ENDOSCOPY | Facility: HOSPITAL | Age: 39
End: 2024-02-27
Payer: COMMERCIAL

## 2024-02-27 NOTE — ANESTHESIA PREPROCEDURE EVALUATION
02/27/2024  Stefanie España is a 38 y.o., female.  Past Surgical History:   Procedure Laterality Date    INSERTION OF TUNNELED CENTRAL VENOUS CATHETER (CVC) WITH SUBCUTANEOUS PORT N/A 05/24/2023    Procedure: [id number];  Surgeon: Taco Zimmer MD;  Location: [facility];  Service: General;  Laterality: N/A;    ROBOT-ASSISTED CHOLECYSTECTOMY USING DA CRISTOPHER XI N/A 12/11/2020    Procedure: XI ROBOTIC CHOLECYSTECTOMY;  Surgeon: Taco Zimmer MD;  Location: [facility] OR;  Service: General;  Laterality: N/A;    ROBOT-ASSISTED LAPAROSCOPIC LYSIS OF ADHESIONS USING DA CRISTOPHER XI  12/11/2020    Procedure: XI ROBOTIC LYSIS, ADHESIONS;  Surgeon: Taco Zimmer MD;  Location: [facility] OR;  Service: General;;    ROBOT-ASSISTED LAPAROSCOPIC SALPINGO-OOPHORECTOMY USING DA CRISTOPHER XI Left 12/11/2020    Procedure: XI ROBOTIC SALPINGO-OOPHORECTOMY;  Surgeon: SHERLEY Wakefield MD;  Location: [facility] OR;  Service: OB/GYN;  Laterality: Left;    TUBAL LIGATION  2005     Past Medical History:   Diagnosis Date    Abnormal Pap smear of cervix     repeat was normal    Lupus (systemic lupus erythematosus)            Pre-op Assessment    I have reviewed the Patient Summary Reports.    I have reviewed the NPO Status.   I have reviewed the Medications.     Review of Systems  Anesthesia Hx:  No problems with previous Anesthesia   History of prior surgery of interest to airway management or planning:          Denies Family Hx of Anesthesia complications.    Denies Personal Hx of Anesthesia complications.                    Social:  Non-Smoker, No Alcohol Use       Hematology/Oncology:  Hematology Normal   Oncology Normal                                   EENT/Dental:  EENT/Dental Normal           Cardiovascular:  Cardiovascular Normal                                            Pulmonary:  Pulmonary Normal                        Renal/:  Renal/ Normal                 Hepatic/GI:     GERD             Musculoskeletal:  Arthritis   Rheumatoid arthritis and SLE arthritis  scleroderma            Neurological:    Neuromuscular Disease,       fibromyalgia                            Endocrine:  Endocrine Normal          Obesity / BMI > 30  Dermatological:  Skin Normal    Psych:  Psychiatric History  depression                Physical Exam  General: Alert and Oriented    Airway:  Mallampati: IV / IV  Mouth Opening: Normal  TM Distance: Normal  Tongue: Normal  Neck ROM: Normal ROM    Dental:  Intact    Chest/Lungs:  Clear to auscultation, Normal Respiratory Rate    Heart:  Rate: Normal  Rhythm: Regular Rhythm        Anesthesia Plan  Type of Anesthesia, risks & benefits discussed:    Anesthesia Type: Gen Natural Airway  Intra-op Monitoring Plan: Standard ASA Monitors  Post Op Pain Control Plan: multimodal analgesia  Induction:  IV  Informed Consent: Informed consent signed with the Patient and all parties understand the risks and agree with anesthesia plan.  All questions answered. Patient consented to blood products? No  ASA Score: 3  Day of Surgery Review of History & Physical: H&P Update referred to the surgeon/provider.    Ready For Surgery From Anesthesia Perspective.     .

## 2024-02-29 ENCOUNTER — ANESTHESIA (OUTPATIENT)
Dept: ENDOSCOPY | Facility: HOSPITAL | Age: 39
End: 2024-02-29
Payer: COMMERCIAL

## 2024-02-29 ENCOUNTER — HOSPITAL ENCOUNTER (OUTPATIENT)
Facility: HOSPITAL | Age: 39
Discharge: HOME | End: 2024-02-29
Attending: INTERNAL MEDICINE | Admitting: INTERNAL MEDICINE
Payer: COMMERCIAL

## 2024-02-29 DIAGNOSIS — R05.3 CHRONIC COUGH: Primary | ICD-10-CM

## 2024-02-29 DIAGNOSIS — K21.9 GERD (GASTROESOPHAGEAL REFLUX DISEASE): ICD-10-CM

## 2024-02-29 PROCEDURE — 43239 EGD BIOPSY SINGLE/MULTIPLE: CPT | Mod: ,,, | Performed by: INTERNAL MEDICINE

## 2024-02-29 PROCEDURE — 63600175 PHARM REV CODE 636 W HCPCS: Performed by: INTERNAL MEDICINE

## 2024-02-29 PROCEDURE — 88305 TISSUE EXAM BY PATHOLOGIST: CPT | Mod: 26,,, | Performed by: PATHOLOGY

## 2024-02-29 PROCEDURE — D9220A PRA ANESTHESIA: Mod: ,,, | Performed by: NURSE ANESTHETIST, CERTIFIED REGISTERED

## 2024-02-29 PROCEDURE — 25000003 PHARM REV CODE 250: Performed by: NURSE ANESTHETIST, CERTIFIED REGISTERED

## 2024-02-29 PROCEDURE — 37000008 HC ANESTHESIA 1ST 15 MINUTES: Performed by: INTERNAL MEDICINE

## 2024-02-29 PROCEDURE — 63600175 PHARM REV CODE 636 W HCPCS: Performed by: NURSE ANESTHETIST, CERTIFIED REGISTERED

## 2024-02-29 PROCEDURE — 43239 EGD BIOPSY SINGLE/MULTIPLE: CPT | Performed by: INTERNAL MEDICINE

## 2024-02-29 PROCEDURE — 88305 TISSUE EXAM BY PATHOLOGIST: CPT | Performed by: PATHOLOGY

## 2024-02-29 PROCEDURE — 27201012 HC FORCEPS, HOT/COLD, DISP: Performed by: INTERNAL MEDICINE

## 2024-02-29 PROCEDURE — 37000009 HC ANESTHESIA EA ADD 15 MINS: Performed by: INTERNAL MEDICINE

## 2024-02-29 RX ORDER — LIDOCAINE HYDROCHLORIDE 20 MG/ML
INJECTION INTRAVENOUS
Status: DISCONTINUED | OUTPATIENT
Start: 2024-02-29 | End: 2024-02-29

## 2024-02-29 RX ORDER — PROPOFOL 10 MG/ML
INJECTION, EMULSION INTRAVENOUS
Status: DISCONTINUED | OUTPATIENT
Start: 2024-02-29 | End: 2024-02-29

## 2024-02-29 RX ORDER — PANTOPRAZOLE SODIUM 40 MG/1
40 TABLET, DELAYED RELEASE ORAL 2 TIMES DAILY
Qty: 60 TABLET | Refills: 11 | Status: SHIPPED | OUTPATIENT
Start: 2024-02-29 | End: 2025-02-28

## 2024-02-29 RX ORDER — SODIUM CHLORIDE, SODIUM LACTATE, POTASSIUM CHLORIDE, CALCIUM CHLORIDE 600; 310; 30; 20 MG/100ML; MG/100ML; MG/100ML; MG/100ML
INJECTION, SOLUTION INTRAVENOUS CONTINUOUS
Status: DISCONTINUED | OUTPATIENT
Start: 2024-02-29 | End: 2024-02-29 | Stop reason: HOSPADM

## 2024-02-29 RX ORDER — DEXMEDETOMIDINE HYDROCHLORIDE 100 UG/ML
INJECTION, SOLUTION INTRAVENOUS
Status: DISCONTINUED | OUTPATIENT
Start: 2024-02-29 | End: 2024-02-29

## 2024-02-29 RX ADMIN — PROPOFOL 120 MG: 10 INJECTION, EMULSION INTRAVENOUS at 01:02

## 2024-02-29 RX ADMIN — SODIUM CHLORIDE, POTASSIUM CHLORIDE, SODIUM LACTATE AND CALCIUM CHLORIDE: 600; 310; 30; 20 INJECTION, SOLUTION INTRAVENOUS at 12:02

## 2024-02-29 RX ADMIN — LIDOCAINE HYDROCHLORIDE 50 MG: 20 INJECTION INTRAVENOUS at 01:02

## 2024-02-29 RX ADMIN — DEXMEDETOMIDINE HYDROCHLORIDE 4 MCG: 100 INJECTION, SOLUTION INTRAVENOUS at 01:02

## 2024-02-29 NOTE — [employer] PATIENT INSTRUCTIONS
Discharge Summary/Instructions after an Endoscopic Procedure  Patient Name: Stefanie España  Patient MRN: [id number]  Patient YOB: 1985 Thursday, February 29, 2024  Gregg Reyez MD  Dear patient,  As a result of recent federal legislation (The Federal Cures Act), you may   receive lab or pathology results from your procedure in your [facility]   account before your physician is able to contact you. Your physician or   their representative will relay the results to you with their   recommendations at their soonest availability.  Thank you,  RESTRICTIONS:  During your procedure today, you received medications for sedation.  These   medications may affect your judgment, balance and coordination.  Therefore,   for 24 hours, you have the following restrictions:   - DO NOT drive a car, operate machinery, make legal/financial decisions,   sign important papers or drink alcohol.    ACTIVITY:  Today: no heavy lifting, straining or running due to procedural   sedation/anesthesia.  The following day: return to full activity including work.  DIET:  Eat and drink normally unless instructed otherwise.     TREATMENT FOR COMMON SIDE EFFECTS:  - Mild abdominal pain, nausea, belching, bloating or excessive gas:  rest,   eat lightly and use a heating pad.  - Sore Throat: treat with throat lozenges and/or gargle with warm salt   water.  - Because air was used during the procedure, expelling large amounts of air   from your rectum or belching is normal.  - If a bowel prep was taken, you may not have a bowel movement for 1-3 days.    This is normal.  SYMPTOMS TO WATCH FOR AND REPORT TO YOUR PHYSICIAN:  1. Abdominal pain or bloating, other than gas cramps.  2. Chest pain.  3. Back pain.  4. Signs of infection such as: chills or fever occurring within 24 hours   after the procedure.  5. Rectal bleeding, which would show as bright red, maroon, or black stools.   (A tablespoon of blood from the rectum is not serious,  especially if   hemorrhoids are present.)  6. Vomiting.  7. Weakness or dizziness.  GO DIRECTLY TO THE NEAREST EMERGENCY ROOM IF YOU HAVE ANY OF THE FOLLOWING:      Difficulty breathing              Chills and/or fever over 101 F   Persistent vomiting and/or vomiting blood   Severe abdominal pain   Severe chest pain   Black, tarry stools   Bleeding- more than one tablespoon   Any other symptom or condition that you feel may need urgent attention  Your doctor recommends these additional instructions:  If any biopsies were taken, your doctors clinic will contact you in 1 to 2   weeks with any results.  - Discharge patient to home. Continue Acid suppression  - Resume previous diet.   - Continue present medications.   - Await pathology results.   - Return to referring physician as previously scheduled.  For questions, problems or results please call your physician Gregg Reyez MD at Work:  [phone]  If you have any questions about the above instructions, call the GI   department at [phone] or call the endoscopy unit at [phone]   from 7am until 3 pm.  [facility] [place], EMERGENCY ROOM PHONE NUMBER:   [phone]  IF A COMPLICATION OR EMERGENCY SITUATION ARISES AND YOU ARE UNABLE TO REACH   YOUR PHYSICIAN - GO DIRECTLY TO THE EMERGENCY ROOM.  I have read or have had read to me these discharge instructions for my   procedure and have received a written copy.  I understand these   instructions and will follow-up with my physician if I have any questions.     __________________________________       _____________________________________  Nurse Signature                                          Patient/Designated   Responsible Party Signature  MD Gregg Mayorga MD  2/29/2024 1:11:04 PM  This report has been verified and signed electronically.  Dear patient,  As a result of recent federal legislation (The Federal Cures Act), you may   receive lab or  pathology results from your procedure in your [facility]   account before your physician is able to contact you. Your physician or   their representative will relay the results to you with their   recommendations at their soonest availability.  Thank you,  [employer]

## 2024-02-29 NOTE — ANESTHESIA POSTPROCEDURE EVALUATION
Anesthesia Post Evaluation    Patient: Stefanie España    Procedure(s) Performed: Procedure(s) (LRB):  EGD (ESOPHAGOGASTRODUODENOSCOPY) (N/A)    Final Anesthesia Type: general      Patient location during evaluation: PACU  Patient participation: Yes- Able to Participate  Level of consciousness: awake and alert and oriented  Post-procedure vital signs: reviewed and stable  Pain management: adequate  Airway patency: patent    PONV status at discharge: No PONV  Anesthetic complications: no      Cardiovascular status: blood pressure returned to baseline, stable and hemodynamically stable  Respiratory status: unassisted  Hydration status: euvolemic  Follow-up not needed.              Vitals Value Taken Time   /72 02/29/24 1327   Temp 36.6 °C (97.8 °F) 02/29/24 1317   Pulse 78 02/29/24 1331   Resp 18 02/29/24 1331   SpO2 100 % 02/29/24 1331   Vitals shown include unvalidated device data.      No case tracking events are documented in the log.      Pain/Guerda Score: Guerda Score: 9 (2/29/2024  1:17 PM)

## 2024-02-29 NOTE — DISCHARGE SUMMARY
[facility]  Discharge Note  Short Stay    Procedure(s) (LRB):  EGD (ESOPHAGOGASTRODUODENOSCOPY) (N/A)      OUTCOME: Patient tolerated treatment/procedure well without complication and is now ready for discharge.    DISPOSITION: Home or Self Care    FINAL DIAGNOSIS:  <principal problem not specified>    FOLLOWUP: With primary care provider    DISCHARGE INSTRUCTIONS:  No discharge procedures on file.     TIME SPENT ON DISCHARGE: 20 minutes

## 2024-02-29 NOTE — H&P
Endoscopy History and Physical    PCP - Sofia Martinez MD  [place] - [place]  [address] [facility] [place]  [place] [facility]  [place] [address]      ASA - per anesthesia  Mallampati - per anesthesia  History of Anesthesia problems - no  Family history Anesthesia problems -  no   Plan of anesthesia - General    HPI  38 y.o. female    Planned Procedure: EGD  Diagnosis: GERD  Chief Complaint: Same as above          ROS:  Constitutional: No fevers, chills, No weight loss  CV: No chest pain  Pulm: No cough, No shortness of breath  GI: see HPI    Medical History:  has a past medical history of Abnormal Pap smear of cervix and Lupus (systemic lupus erythematosus).    Surgical History:  has a past surgical history that includes Tubal ligation (2005); Robot-assisted laparoscopic salpingo-oophorectomy using da Stevo Xi (Left, 12/11/2020); Robot-assisted cholecystectomy using da Stevo Xi (N/A, 12/11/2020); Robot-assisted laparoscopic lysis of adhesions using da Stevo Xi (12/11/2020); and Insertion of tunneled central venous catheter (CVC) with subcutaneous port (N/A, 05/24/2023).    Family History: family history includes Breast cancer in her maternal aunt and paternal grandmother; Liver cancer in her maternal aunt..    Social History:  reports that she has never smoked. She has never been exposed to tobacco smoke. She has never used smokeless tobacco. She reports that she does not drink alcohol and does not use drugs.    Review of patient's allergies indicates:   Allergen Reactions    Elavil [amitriptyline]        Medications:   Facility-Administered Medications Prior to Admission   Medication Dose Route Frequency Provider Last Rate Last Admin    LIDOcaine (PF) 10 mg/ml (1%) injection 10 mg  1 mL Intradermal Once Taco Zimmer MD         Medications Prior to Admission   Medication Sig Dispense Refill Last Dose    bumetanide (BUMEX) 1 MG tablet Take 1 tablet (1 mg total) by mouth once daily. (Patient taking  differently: Take 1 mg by mouth once daily.) 10 tablet 0 2/28/2024    famotidine (PEPCID) 20 MG tablet Take 1 tablet (20 mg total) by mouth 2 (two) times daily. 60 tablet 11 Past Month    gabapentin (NEURONTIN) 300 MG capsule Take 1 capsule (300 mg total) by mouth 3 (three) times daily. 90 capsule 11 Past Week    methotrexate 25 mg/mL injection Inject 0.8 mLs (20 mg total) into the muscle every 7 days. 4 mL 3 Past Week    omeprazole (PRILOSEC) 40 MG capsule Take 1 capsule (40 mg total) by mouth once daily. 30 capsule 11 Past Week    clobetasoL (TEMOVATE) 0.05 % cream Apply topically 2 (two) times daily. 60 g 2     clobetasoL (TEMOVATE) 0.05 % external solution Apply topically 2 (two) times daily. 50 mL 1     colchicine (COLCRYS) 0.6 mg tablet Take 1 tablet (0.6 mg total) by mouth 2 (two) times daily. 60 tablet 11     folic acid (FOLVITE) 1 MG tablet Take 1 tablet (1 mg total) by mouth once daily. 90 tablet 0     HYDROcodone-acetaminophen (NORCO) 5-325 mg per tablet Take 1 tablet by mouth every 8 (eight) hours as needed.       ipratropium (ATROVENT) 21 mcg (0.03 %) nasal spray 2 sprays by Each Nostril route 3 (three) times daily. 20 mL 5     ketoconazole (NIZORAL) 2 % shampoo Apply topically twice a week. 200 mL 1     LIDOcaine-prilocaine (EMLA) cream Apply topically as needed (port access). 5 g 3     mometasone 0.1% (ELOCON) 0.1 % cream Apply topically once daily. 50 g 1     nintedanib (OFEV) 150 mg Cap Take 1 capsule (150 mg total) by mouth 2 (two) times a day. 60 capsule 11     ondansetron (ZOFRAN-ODT) 4 MG TbDL Take 1 tablet (4 mg total) by mouth every 8 (eight) hours as needed (nausea). 30 tablet 0     sulfamethoxazole-trimethoprim 800-160mg (BACTRIM DS) 800-160 mg Tab Take 1 tablet by mouth 3 (three) times a week. 15 tablet 0     tacrolimus (PROTOPIC) 0.1 % ointment Apply topically 2 (two) times daily. 100 g 3     traMADoL (ULTRAM) 50 mg tablet TAKE 1 TABLET BY MOUTH EVERY 12 HOURS AS NEEDED FOR PAIN 60 tablet  3     traZODone (DESYREL) 50 MG tablet Take 1 tablet (50 mg total) by mouth every evening. (Patient taking differently: Take 50 mg by mouth nightly as needed.) 30 tablet 11        Physical Exam:    Vital Signs:   Vitals:    02/29/24 1239   BP: 122/71   Pulse: 95   Resp: 17   Temp: 98.2 °F (36.8 °C)       General Appearance: Well appearing in no acute distress  Abdomen: Soft, non tender, non distended with normal bowel sounds, no masses    Labs:  Lab Results   Component Value Date    WBC 9.36 02/23/2024    HGB 11.0 (L) 02/23/2024    HCT 33.6 (L) 02/23/2024     (H) 02/23/2024    CHOL 169 08/24/2017    TRIG 228 (H) 08/24/2017    HDL 37 (L) 08/24/2017    ALT 13 02/23/2024    AST 19 02/23/2024     (L) 02/23/2024    K 4.1 02/23/2024     02/23/2024    CREATININE 0.7 02/23/2024    BUN 8 02/23/2024    CO2 25 02/23/2024    TSH 1.243 08/30/2017       I have explained the risks and benefits of this endoscopic procedure to the patient including but not limited to bleeding, inflammation, infection, perforation, and death.    SEDATION PLAN: per anesthesia       History reviewed, vital signs satisfactory, cardiopulmonary status satisfactory, sedation options, risks and plans have been discussed with the patient  All their questions were answered and the patient agrees to the sedation procedures as planned and the patient is deemed an appropriate candidate for the sedation as planned.     The risks, benefits and alternatives of the procedure were discussed with the patient in detail. This discussion was had in the presence of endoscopy staff. The risks include, risks of adverse reaction to sedation requiring the use of reversal agents, bleeding requiring blood transfusion, perforation requiring surgical intervention and technical failure. Other risks include aspiration leading to respiratory distress and respiratory failure resulting in endotracheal intubation and mechanical ventilation including death. If  anesthesia is being utilized for this procedure, it is up to the anesthesiologist to determine airway safety including elective endotracheal intubation. Questions were answered, they agree to proceed. There was no language barriers.       Procedure explained to patient, informed consent obtained and placed in chart.       Gregg Reyez MD

## 2024-02-29 NOTE — TRANSFER OF CARE
"Anesthesia Transfer of Care Note    Patient: Stefanie España    Procedure(s) Performed: Procedure(s) (LRB):  EGD (ESOPHAGOGASTRODUODENOSCOPY) (N/A)    Patient location: PACU    Anesthesia Type: general    Transport from OR: Transported from OR on room air with adequate spontaneous ventilation    Post pain: adequate analgesia    Post assessment: no apparent anesthetic complications and tolerated procedure well    Post vital signs: stable    Level of consciousness: awake    Nausea/Vomiting: no nausea/vomiting    Complications: none    Transfer of care protocol was followed      Last vitals: Visit Vitals  /71 (BP Location: Right arm, Patient Position: Sitting)   Pulse 95   Temp 36.8 °C (98.2 °F) (Temporal)   Resp 17   Ht 5' 8" (1.727 m)   Wt 95.8 kg (211 lb 3.2 oz)   SpO2 100%   Breastfeeding No   BMI 32.11 kg/m²     "

## 2024-02-29 NOTE — PLAN OF CARE
Discharge instructions reviewed with patient and visitor. Handouts given & verbalized understanding with no further questions at this time. Dr Reyez spoke to pt at bedside, reviewed procedure and findings, answered questions. Made aware they are awaiting biopsy results with MD telephone number provided per AVS sheet. VSS on RA, no pain or nausea noted, tolerating po fluids, no complaints noted. Fall precautions reviewed, consents in chart, PIV removed at this time.

## 2024-03-01 VITALS
BODY MASS INDEX: 32.01 KG/M2 | RESPIRATION RATE: 16 BRPM | OXYGEN SATURATION: 100 % | HEART RATE: 82 BPM | HEIGHT: 68 IN | DIASTOLIC BLOOD PRESSURE: 80 MMHG | SYSTOLIC BLOOD PRESSURE: 117 MMHG | WEIGHT: 211.19 LBS | TEMPERATURE: 98 F

## 2024-03-04 LAB
FINAL PATHOLOGIC DIAGNOSIS: NORMAL
GROSS: NORMAL
Lab: NORMAL

## 2024-03-12 ENCOUNTER — LAB VISIT (OUTPATIENT)
Dept: LAB | Facility: HOSPITAL | Age: 39
End: 2024-03-12
Attending: INTERNAL MEDICINE
Payer: COMMERCIAL

## 2024-03-12 DIAGNOSIS — Z79.899 DRUG-INDUCED IMMUNODEFICIENCY: ICD-10-CM

## 2024-03-12 DIAGNOSIS — Z51.81 ENCOUNTER FOR MEDICATION MONITORING: ICD-10-CM

## 2024-03-12 DIAGNOSIS — M34.9 SCLERODERMA, DIFFUSE: ICD-10-CM

## 2024-03-12 DIAGNOSIS — D84.821 DRUG-INDUCED IMMUNODEFICIENCY: ICD-10-CM

## 2024-03-12 DIAGNOSIS — M06.09 RHEUMATOID ARTHRITIS OF MULTIPLE SITES WITH NEGATIVE RHEUMATOID FACTOR: ICD-10-CM

## 2024-03-12 DIAGNOSIS — M35.1 MCTD (MIXED CONNECTIVE TISSUE DISEASE): ICD-10-CM

## 2024-03-12 LAB
ALBUMIN SERPL BCP-MCNC: 3.6 G/DL (ref 3.5–5.2)
ALP SERPL-CCNC: 59 U/L (ref 55–135)
ALT SERPL W/O P-5'-P-CCNC: 16 U/L (ref 10–44)
ANION GAP SERPL CALC-SCNC: 11 MMOL/L (ref 8–16)
AST SERPL-CCNC: 25 U/L (ref 10–40)
BASOPHILS # BLD AUTO: 0.06 K/UL (ref 0–0.2)
BASOPHILS NFR BLD: 0.9 % (ref 0–1.9)
BILIRUB SERPL-MCNC: 0.1 MG/DL (ref 0.1–1)
BUN SERPL-MCNC: 9 MG/DL (ref 6–20)
CALCIUM SERPL-MCNC: 9.7 MG/DL (ref 8.7–10.5)
CHLORIDE SERPL-SCNC: 101 MMOL/L (ref 95–110)
CO2 SERPL-SCNC: 26 MMOL/L (ref 23–29)
CREAT SERPL-MCNC: 0.7 MG/DL (ref 0.5–1.4)
CRP SERPL-MCNC: 7.8 MG/L (ref 0–8.2)
DIFFERENTIAL METHOD BLD: ABNORMAL
EOSINOPHIL # BLD AUTO: 1.9 K/UL (ref 0–0.5)
EOSINOPHIL NFR BLD: 28.9 % (ref 0–8)
ERYTHROCYTE [DISTWIDTH] IN BLOOD BY AUTOMATED COUNT: 13 % (ref 11.5–14.5)
ERYTHROCYTE [SEDIMENTATION RATE] IN BLOOD BY WESTERGREN METHOD: 60 MM/HR (ref 0–20)
EST. GFR  (NO RACE VARIABLE): >60 ML/MIN/1.73 M^2
GLUCOSE SERPL-MCNC: 56 MG/DL (ref 70–110)
HCT VFR BLD AUTO: 34 % (ref 37–48.5)
HGB BLD-MCNC: 11 G/DL (ref 12–16)
IGA SERPL-MCNC: 266 MG/DL (ref 40–350)
IGG SERPL-MCNC: 3089 MG/DL (ref 650–1600)
IGM SERPL-MCNC: 194 MG/DL (ref 50–300)
IMM GRANULOCYTES # BLD AUTO: 0.01 K/UL (ref 0–0.04)
IMM GRANULOCYTES NFR BLD AUTO: 0.1 % (ref 0–0.5)
LYMPHOCYTES # BLD AUTO: 1 K/UL (ref 1–4.8)
LYMPHOCYTES NFR BLD: 14.6 % (ref 18–48)
MCH RBC QN AUTO: 29.5 PG (ref 27–31)
MCHC RBC AUTO-ENTMCNC: 32.4 G/DL (ref 32–36)
MCV RBC AUTO: 91 FL (ref 82–98)
MONOCYTES # BLD AUTO: 0.7 K/UL (ref 0.3–1)
MONOCYTES NFR BLD: 10.1 % (ref 4–15)
NEUTROPHILS # BLD AUTO: 3.1 K/UL (ref 1.8–7.7)
NEUTROPHILS NFR BLD: 45.5 % (ref 38–73)
NRBC BLD-RTO: 0 /100 WBC
PLATELET # BLD AUTO: 515 K/UL (ref 150–450)
PMV BLD AUTO: 8.2 FL (ref 9.2–12.9)
POTASSIUM SERPL-SCNC: 4.1 MMOL/L (ref 3.5–5.1)
PROT SERPL-MCNC: 8.8 G/DL (ref 6–8.4)
RBC # BLD AUTO: 3.73 M/UL (ref 4–5.4)
SODIUM SERPL-SCNC: 138 MMOL/L (ref 136–145)
WBC # BLD AUTO: 6.72 K/UL (ref 3.9–12.7)

## 2024-03-12 PROCEDURE — 80053 COMPREHEN METABOLIC PANEL: CPT | Performed by: INTERNAL MEDICINE

## 2024-03-12 PROCEDURE — 86140 C-REACTIVE PROTEIN: CPT | Performed by: INTERNAL MEDICINE

## 2024-03-12 PROCEDURE — 36415 COLL VENOUS BLD VENIPUNCTURE: CPT | Mod: PO | Performed by: INTERNAL MEDICINE

## 2024-03-12 PROCEDURE — 85025 COMPLETE CBC W/AUTO DIFF WBC: CPT | Mod: PO | Performed by: INTERNAL MEDICINE

## 2024-03-12 PROCEDURE — 85651 RBC SED RATE NONAUTOMATED: CPT | Mod: PO | Performed by: INTERNAL MEDICINE

## 2024-03-12 PROCEDURE — 82784 ASSAY IGA/IGD/IGG/IGM EACH: CPT | Mod: 59 | Performed by: INTERNAL MEDICINE

## 2024-03-17 ENCOUNTER — PATIENT MESSAGE (OUTPATIENT)
Dept: RHEUMATOLOGY | Facility: CLINIC | Age: 39
End: 2024-03-17
Payer: COMMERCIAL

## 2024-03-17 RX ORDER — FAMOTIDINE 10 MG/ML
20 INJECTION INTRAVENOUS
Status: CANCELLED | OUTPATIENT
Start: 2024-03-18

## 2024-03-17 RX ORDER — MEPERIDINE HYDROCHLORIDE 50 MG/ML
25 INJECTION INTRAMUSCULAR; INTRAVENOUS; SUBCUTANEOUS
Status: CANCELLED | OUTPATIENT
Start: 2024-03-18 | End: 2024-03-19

## 2024-03-17 RX ORDER — METHYLPREDNISOLONE SOD SUCC 125 MG
100 VIAL (EA) INJECTION
Status: CANCELLED | OUTPATIENT
Start: 2024-03-18

## 2024-03-17 RX ORDER — DIPHENHYDRAMINE HYDROCHLORIDE 50 MG/ML
50 INJECTION INTRAMUSCULAR; INTRAVENOUS ONCE AS NEEDED
Status: CANCELLED | OUTPATIENT
Start: 2024-03-18

## 2024-03-17 RX ORDER — HEPARIN 100 UNIT/ML
500 SYRINGE INTRAVENOUS
Status: CANCELLED | OUTPATIENT
Start: 2024-03-18

## 2024-03-17 RX ORDER — ACETAMINOPHEN 325 MG/1
650 TABLET ORAL
Status: CANCELLED | OUTPATIENT
Start: 2024-03-18

## 2024-03-17 RX ORDER — SODIUM CHLORIDE 0.9 % (FLUSH) 0.9 %
10 SYRINGE (ML) INJECTION
Status: CANCELLED | OUTPATIENT
Start: 2024-03-18

## 2024-03-17 RX ORDER — EPINEPHRINE 0.3 MG/.3ML
0.3 INJECTION SUBCUTANEOUS ONCE AS NEEDED
Status: CANCELLED | OUTPATIENT
Start: 2024-03-18

## 2024-03-18 ENCOUNTER — TELEPHONE (OUTPATIENT)
Dept: RHEUMATOLOGY | Facility: CLINIC | Age: 39
End: 2024-03-18
Payer: COMMERCIAL

## 2024-03-18 NOTE — TELEPHONE ENCOUNTER
----- Message from Prashanth Whalen MD sent at 3/17/2024  9:26 PM CDT -----  Regarding: RE: rituxan orders  Yes. Orders placed. Please check all 4 labs and IgG before rituxan in 2 weeks.   ----- Message -----  From: Ondina Kaiser RN  Sent: 3/14/2024   9:21 AM CDT  To: Prashanth Whalen MD  Subject: rituxan orders                                   Hi Dr SHORE,   We spoke briefly in the clinic about this pt. Are you ready for her to have rituxan again. Please update orders and let me know when you want her to start and any labs that you want drawn. Thanks, Ondina

## 2024-03-18 NOTE — TELEPHONE ENCOUNTER
Discussed rituxan infusion protocol 1000 mg x 2 doses 14 days apart. Scheduled for 4/11 & 4/25 with labs on 4/2 and f/u virtual visit with Dr SILVIANO Mallory On 4/9. Verbalized understanding.

## 2024-03-19 ENCOUNTER — PATIENT MESSAGE (OUTPATIENT)
Dept: RHEUMATOLOGY | Facility: CLINIC | Age: 39
End: 2024-03-19
Payer: COMMERCIAL

## 2024-03-20 NOTE — PLAN OF CARE
Patient states that she is experiencing swelling and discomfort in her hands and joints.    Spoke with patient about recommendations for lab work. Patient agrees to have this done.  She reports that she will go in on March 22nd. Patient aware she will be called once labs have resulted.

## 2024-04-03 ENCOUNTER — PATIENT MESSAGE (OUTPATIENT)
Dept: RHEUMATOLOGY | Facility: CLINIC | Age: 39
End: 2024-04-03
Payer: COMMERCIAL

## 2024-04-04 ENCOUNTER — LAB VISIT (OUTPATIENT)
Dept: LAB | Facility: HOSPITAL | Age: 39
End: 2024-04-04
Attending: INTERNAL MEDICINE
Payer: COMMERCIAL

## 2024-04-04 DIAGNOSIS — Z79.899 DRUG-INDUCED IMMUNODEFICIENCY: ICD-10-CM

## 2024-04-04 DIAGNOSIS — M34.9 SCLERODERMA, DIFFUSE: ICD-10-CM

## 2024-04-04 DIAGNOSIS — M34.9 DIFFUSE SCLERODERMA: ICD-10-CM

## 2024-04-04 DIAGNOSIS — M06.09 RHEUMATOID ARTHRITIS OF MULTIPLE SITES WITH NEGATIVE RHEUMATOID FACTOR: ICD-10-CM

## 2024-04-04 DIAGNOSIS — M35.1 MCTD (MIXED CONNECTIVE TISSUE DISEASE): ICD-10-CM

## 2024-04-04 DIAGNOSIS — Z51.81 ENCOUNTER FOR MEDICATION MONITORING: ICD-10-CM

## 2024-04-04 DIAGNOSIS — D84.821 DRUG-INDUCED IMMUNODEFICIENCY: ICD-10-CM

## 2024-04-04 LAB
ALBUMIN SERPL BCP-MCNC: 3.4 G/DL (ref 3.5–5.2)
ALP SERPL-CCNC: 52 U/L (ref 55–135)
ALT SERPL W/O P-5'-P-CCNC: 11 U/L (ref 10–44)
ANION GAP SERPL CALC-SCNC: 6 MMOL/L (ref 8–16)
AST SERPL-CCNC: 18 U/L (ref 10–40)
BASOPHILS # BLD AUTO: 0.04 K/UL (ref 0–0.2)
BASOPHILS NFR BLD: 0.9 % (ref 0–1.9)
BILIRUB SERPL-MCNC: 0.2 MG/DL (ref 0.1–1)
BUN SERPL-MCNC: 7 MG/DL (ref 6–20)
CALCIUM SERPL-MCNC: 9.2 MG/DL (ref 8.7–10.5)
CHLORIDE SERPL-SCNC: 105 MMOL/L (ref 95–110)
CO2 SERPL-SCNC: 24 MMOL/L (ref 23–29)
CREAT SERPL-MCNC: 0.6 MG/DL (ref 0.5–1.4)
CRP SERPL-MCNC: 11.9 MG/L (ref 0–8.2)
DIFFERENTIAL METHOD BLD: ABNORMAL
EOSINOPHIL # BLD AUTO: 0.7 K/UL (ref 0–0.5)
EOSINOPHIL NFR BLD: 16.2 % (ref 0–8)
ERYTHROCYTE [DISTWIDTH] IN BLOOD BY AUTOMATED COUNT: 12.7 % (ref 11.5–14.5)
ERYTHROCYTE [SEDIMENTATION RATE] IN BLOOD BY WESTERGREN METHOD: 55 MM/HR (ref 0–20)
EST. GFR  (NO RACE VARIABLE): >60 ML/MIN/1.73 M^2
GLUCOSE SERPL-MCNC: 90 MG/DL (ref 70–110)
HBV CORE AB SERPL QL IA: NORMAL
HBV SURFACE AB SER-ACNC: <3 MIU/ML
HBV SURFACE AB SER-ACNC: NORMAL M[IU]/ML
HBV SURFACE AG SERPL QL IA: NORMAL
HCT VFR BLD AUTO: 34 % (ref 37–48.5)
HGB BLD-MCNC: 11.1 G/DL (ref 12–16)
IGA SERPL-MCNC: 256 MG/DL (ref 40–350)
IGG SERPL-MCNC: 2649 MG/DL (ref 650–1600)
IGM SERPL-MCNC: 187 MG/DL (ref 50–300)
IMM GRANULOCYTES # BLD AUTO: 0.01 K/UL (ref 0–0.04)
IMM GRANULOCYTES NFR BLD AUTO: 0.2 % (ref 0–0.5)
LYMPHOCYTES # BLD AUTO: 0.8 K/UL (ref 1–4.8)
LYMPHOCYTES NFR BLD: 18.8 % (ref 18–48)
MCH RBC QN AUTO: 30.1 PG (ref 27–31)
MCHC RBC AUTO-ENTMCNC: 32.6 G/DL (ref 32–36)
MCV RBC AUTO: 92 FL (ref 82–98)
MONOCYTES # BLD AUTO: 0.6 K/UL (ref 0.3–1)
MONOCYTES NFR BLD: 14.6 % (ref 4–15)
NEUTROPHILS # BLD AUTO: 2.1 K/UL (ref 1.8–7.7)
NEUTROPHILS NFR BLD: 49.5 % (ref 38–73)
NRBC BLD-RTO: 0 /100 WBC
PLATELET # BLD AUTO: 458 K/UL (ref 150–450)
PMV BLD AUTO: 8.5 FL (ref 9.2–12.9)
POTASSIUM SERPL-SCNC: 4.1 MMOL/L (ref 3.5–5.1)
PROT SERPL-MCNC: 8 G/DL (ref 6–8.4)
RBC # BLD AUTO: 3.69 M/UL (ref 4–5.4)
SODIUM SERPL-SCNC: 135 MMOL/L (ref 136–145)
WBC # BLD AUTO: 4.31 K/UL (ref 3.9–12.7)

## 2024-04-04 PROCEDURE — 86704 HEP B CORE ANTIBODY TOTAL: CPT | Performed by: INTERNAL MEDICINE

## 2024-04-04 PROCEDURE — 86706 HEP B SURFACE ANTIBODY: CPT | Mod: 91 | Performed by: INTERNAL MEDICINE

## 2024-04-04 PROCEDURE — 85025 COMPLETE CBC W/AUTO DIFF WBC: CPT | Mod: PO | Performed by: INTERNAL MEDICINE

## 2024-04-04 PROCEDURE — 80053 COMPREHEN METABOLIC PANEL: CPT | Performed by: INTERNAL MEDICINE

## 2024-04-04 PROCEDURE — 85651 RBC SED RATE NONAUTOMATED: CPT | Mod: PO | Performed by: INTERNAL MEDICINE

## 2024-04-04 PROCEDURE — 36415 COLL VENOUS BLD VENIPUNCTURE: CPT | Mod: PO | Performed by: INTERNAL MEDICINE

## 2024-04-04 PROCEDURE — 86140 C-REACTIVE PROTEIN: CPT | Performed by: INTERNAL MEDICINE

## 2024-04-04 PROCEDURE — 82784 ASSAY IGA/IGD/IGG/IGM EACH: CPT | Mod: 59 | Performed by: INTERNAL MEDICINE

## 2024-04-04 PROCEDURE — 87340 HEPATITIS B SURFACE AG IA: CPT | Performed by: INTERNAL MEDICINE

## 2024-04-09 ENCOUNTER — PATIENT MESSAGE (OUTPATIENT)
Dept: RHEUMATOLOGY | Facility: CLINIC | Age: 39
End: 2024-04-09

## 2024-04-09 ENCOUNTER — OFFICE VISIT (OUTPATIENT)
Dept: RHEUMATOLOGY | Facility: CLINIC | Age: 39
End: 2024-04-09
Payer: COMMERCIAL

## 2024-04-09 DIAGNOSIS — M35.1 MCTD (MIXED CONNECTIVE TISSUE DISEASE): ICD-10-CM

## 2024-04-09 DIAGNOSIS — M06.09 RHEUMATOID ARTHRITIS OF MULTIPLE SITES WITH NEGATIVE RHEUMATOID FACTOR: ICD-10-CM

## 2024-04-09 DIAGNOSIS — Z51.81 ENCOUNTER FOR MEDICATION MONITORING: ICD-10-CM

## 2024-04-09 DIAGNOSIS — M34.9 SCLERODERMA, DIFFUSE: Primary | ICD-10-CM

## 2024-04-09 DIAGNOSIS — Z79.899 DRUG-INDUCED IMMUNODEFICIENCY: ICD-10-CM

## 2024-04-09 DIAGNOSIS — D84.821 DRUG-INDUCED IMMUNODEFICIENCY: ICD-10-CM

## 2024-04-09 PROCEDURE — 1160F RVW MEDS BY RX/DR IN RCRD: CPT | Mod: CPTII,95,, | Performed by: INTERNAL MEDICINE

## 2024-04-09 PROCEDURE — 1159F MED LIST DOCD IN RCRD: CPT | Mod: CPTII,95,, | Performed by: INTERNAL MEDICINE

## 2024-04-09 PROCEDURE — 99215 OFFICE O/P EST HI 40 MIN: CPT | Mod: 95,,, | Performed by: INTERNAL MEDICINE

## 2024-04-09 RX ORDER — PREDNISONE 5 MG/1
5 TABLET ORAL DAILY
Qty: 90 TABLET | Refills: 3 | Status: SHIPPED | OUTPATIENT
Start: 2024-04-09

## 2024-04-09 NOTE — PROGRESS NOTES
RHEUMATOLOGY FOLLOW UP - TELE VISIT     The patient location is: [place]  The chief complaint leading to consultation is:  Follow up visit  Visit type: Virtual visit with synchronous audio and video  Total time spent with patient:  30 minutes  Each patient to whom he or she provides medical services by telemedicine is:  (1) informed of the relationship between the physician and patient and the respective role of any other health care provider with respect to management of the patient; and (2) notified that he or she may decline to receive medical services by telemedicine and may withdraw from such care at any time.    Chief complaints, HPI, ROS, EXAM, Assessment & Plans:-  Stefanie Felicia Kimbrough a 38 y.o. pleasant female seen today for follow up visit.   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  Reports doing stable since last visit.  Tolerating rituximab well.    Restarted rituximab after failing 7 Cytoxan infusions.  Failed Orencia.  Reports mild improvement of both skin thickening and finger swelling since receiving rituximab and increasing dose of methotrexate to 20 mg weekly.  No adverse effects.  Takes prednisone 5-10 mg daily p.r.n..    Failed multiple medications in the past including methotrexate, Arava, tacrolimus, sulfasalazine, DAMION inhibitors, Actemra, rituximab rheumatoid dosing.  No significant joint symptoms.  No pulmonary symptoms.  Rheumatological review of system negative otherwise.  Exam shows New onset salt and pepper rash over bilateral upper extremities.  Stable skin thickening. .    1. Scleroderma, diffuse    2. Rheumatoid arthritis of multiple sites with negative rheumatoid factor    3. MCTD (mixed connective tissue disease)    4. Drug-induced immunodeficiency    5. Encounter for medication monitoring      Problem List Items Addressed This Visit       MCTD (mixed connective tissue  disease)    Relevant Medications    predniSONE (DELTASONE) 5 MG tablet    Rheumatoid arthritis of multiple sites with negative rheumatoid factor    Relevant Medications    predniSONE (DELTASONE) 5 MG tablet    Scleroderma, diffuse - Primary    Relevant Medications    predniSONE (DELTASONE) 5 MG tablet    Drug-induced immunodeficiency    Encounter for medication monitoring      Latest Reference Range & Units Most Recent   MELVA Screen Negative <1:80  Positive !  6/16/22 11:45   MELVA HEP-2 Titer  Positive >=1:2560 Speckled  8/30/17 14:50   MELVA Titer 1  >=1:2560  6/16/22 11:45   MELVA PATTERN 1  Speckled  6/16/22 11:45   ds DNA Ab Negative 1:10  Negative 1:10  6/16/22 11:45   Anti-SSA Antibody 0.00 - 0.99 Ratio 0.07  6/16/22 11:45   Anti-SSA Interpretation Negative  Negative  6/16/22 11:45   Anti-SSB Antibody 0.00 - 0.99 Ratio 0.04  6/16/22 11:45   Anti-SSB Interpretation Negative  Negative  6/16/22 11:45   Anti Sm Antibody 0.00 - 0.99 Ratio 0.25  6/16/22 11:45   Anti-Sm Interpretation Negative  Negative  6/16/22 11:45   Anti Sm/RNP Antibody 0.00 - 0.99 Ratio 5.45 (H)  6/16/22 11:45   Anti-Sm/RNP Interpretation Negative  Positive !  6/16/22 11:45   Scleroderma SCL- <20 UNITS 4  10/2/19 09:52   Complement (C-3) 50 - 180 mg/dL 130  10/2/23 15:50   Complement (C-4) 11 - 44 mg/dL 16  10/2/23 15:50   IgG 650 - 1600 mg/dL 3990 (H)  12/28/22 13:46   IgM 50 - 300 mg/dL 326 (H)  12/28/22 13:46   IgA 40 - 350 mg/dL 353 (H)  12/28/22 13:46   Protein, Serum 6.0 - 8.4 g/dL 9.2 (H)  12/28/22 13:46   Albumin grams/dl 3.35 - 5.55 g/dL 3.58  12/28/22 13:46   Alpha-1 grams/dl 0.17 - 0.41 g/dL 0.31  12/28/22 13:46   Alpha-2 0.43 - 0.99 g/dL 0.68  12/28/22 13:46   Beta 0.50 - 1.10 g/dL 0.98  12/28/22 13:46   Gamma 0.67 - 1.58 g/dL 3.64 (H)  12/28/22 13:46   Pathologist Interpretation SPE  REVIEWED  12/28/22 13:46   Pathologist Interpretation ANALI  REVIEWED  12/28/22 13:46   Immunofix Interp.  SEE COMMENT  12/28/22 13:46   Cryoglobulin, Qual  Absent  Absent  1/18/23 14:46   Anti-Dilcia-1 Antibody <20 Units <20  12/1/17 16:30   Anti-PM/Scl Ab <20 Units <20  12/1/17 16:30   Anti-SS-A 52 kD Ab, IgG <20 Units <20  12/1/17 16:30   Anti-U1-RNP  Ab <20 Units 143 (H)  12/1/17 16:30   EJ Negative  Negative  12/1/17 16:30   Fibrillarin (U3 RNP) Negative  Negative  12/1/17 16:30   Ku Negative  Negative  12/1/17 16:30   MDA-5 (P140) (CADM-140) <20 Units <20  12/1/17 16:30   MI-2 Negative  Negative  12/1/17 16:30   NXP-2 (P140) <20 Units <20  12/1/17 16:30   OJ Negative  Negative  12/1/17 16:30   PL-12 Negative  Negative  12/1/17 16:30   PL-7 Negative  Negative  12/1/17 16:30   SRP Negative  Negative  12/1/17 16:30   TIF1 GAMMA (P155/140) <20 Units <20  12/1/17 16:30   U2 snRNP Negative  Moderate Positive !  12/1/17 16:30   !: Data is abnormal  (H): Data is abnormally high       Latest Reference Range & Units 04/04/24 14:02   WBC 3.90 - 12.70 K/uL 4.31   RBC 4.00 - 5.40 M/uL 3.69 (L)   Hemoglobin 12.0 - 16.0 g/dL 11.1 (L)   Hematocrit 37.0 - 48.5 % 34.0 (L)   MCV 82 - 98 fL 92   MCH 27.0 - 31.0 pg 30.1   MCHC 32.0 - 36.0 g/dL 32.6   RDW 11.5 - 14.5 % 12.7   Platelet Count 150 - 450 K/uL 458 (H)   MPV 9.2 - 12.9 fL 8.5 (L)   Gran % 38.0 - 73.0 % 49.5   Lymph % 18.0 - 48.0 % 18.8   Mono % 4.0 - 15.0 % 14.6   Eos % 0.0 - 8.0 % 16.2 (H)   Basophil % 0.0 - 1.9 % 0.9   Immature Granulocytes 0.0 - 0.5 % 0.2   Gran # (ANC) 1.8 - 7.7 K/uL 2.1   Lymph # 1.0 - 4.8 K/uL 0.8 (L)   Mono # 0.3 - 1.0 K/uL 0.6   Eos # 0.0 - 0.5 K/uL 0.7 (H)   Baso # 0.00 - 0.20 K/uL 0.04   Immature Grans (Abs) 0.00 - 0.04 K/uL 0.01   nRBC 0 /100 WBC 0   Differential Method  Automated   Sed Rate 0 - 20 mm/Hr 55 (H)   Sodium 136 - 145 mmol/L 135 (L)   Potassium 3.5 - 5.1 mmol/L 4.1   Chloride 95 - 110 mmol/L 105   CO2 23 - 29 mmol/L 24   Anion Gap 8 - 16 mmol/L 6 (L)   BUN 6 - 20 mg/dL 7   Creatinine 0.5 - 1.4 mg/dL 0.6   eGFR >60 mL/min/1.73 m^2 >60.0   Glucose 70 - 110 mg/dL 90   Calcium 8.7 - 10.5 mg/dL  9.2   ALP 55 - 135 U/L 52 (L)   PROTEIN TOTAL 6.0 - 8.4 g/dL 8.0   Albumin 3.5 - 5.2 g/dL 3.4 (L)   BILIRUBIN TOTAL 0.1 - 1.0 mg/dL 0.2   AST 10 - 40 U/L 18   ALT 10 - 44 U/L 11   CRP 0.0 - 8.2 mg/L 11.9 (H)   IgG 650 - 1600 mg/dL 2649 (H)   IgM 50 - 300 mg/dL 187   IgA 40 - 350 mg/dL 256   Hep B Core Total Ab Non-reactive  Non-reactive   Hep B S Ab mIU/mL  -  <3.00  Non-reactive   Hepatitis B Surface Ag Non-reactive  Non-reactive   (L): Data is abnormally low  (H): Data is abnormally high    Active RA.On rituxan and methotrexate  20 mg weekly IM injections.   Active scleroderma. IgG shows improvement along with skin . Continue Rituximab 1000 mg * 2 doses * 2 weeks apart every 4 months.   Continue bactrim for PJP prophylaxis.   Daily folic acid for MTX therapy.   Failed multiple medications including Cytoxan in the past.  Mild improvement after rituximab induction therapy and high-dose methotrexate.  Check whether we could try her on Ofev for rapidly worsening skin fibrosis since she has failed multiple therapies including Cytoxan.  If she fails rituximab, restart Cytoxan at a higher dose.   Continue methotrexate with folic acid.  Advised all risks including life-threatening infections and pregnancy morbidities.  Drug induced immunodeficiency due to use of immunosuppressive drugs. Monitor carefully for infections. Advised to get immediate medical care if any infection. Also advised strict adherence to age appropriate vaccinations and cancer screenings with PCP.  I have explained all of the above in detail and the patient understands all of the current recommendation(s). I have answered all questions to the best of my ability and to their complete satisfaction.       # Follow up in about 2 months (around 6/9/2024).      Past Medical History:   Diagnosis Date    Abnormal Pap smear of cervix     repeat was normal    Lupus (systemic lupus erythematosus)        Past Surgical History:   Procedure Laterality Date     ESOPHAGOGASTRODUODENOSCOPY N/A 2/29/2024    Procedure: EGD (ESOPHAGOGASTRODUODENOSCOPY);  Surgeon: Gregg Reyez MD;  Location: [facility] ENDO;  Service: Endoscopy;  Laterality: N/A;    INSERTION OF TUNNELED CENTRAL VENOUS CATHETER (CVC) WITH SUBCUTANEOUS PORT N/A 05/24/2023    Procedure: [id number];  Surgeon: Taco Zimmer MD;  Location: [facility] OR;  Service: General;  Laterality: N/A;    ROBOT-ASSISTED CHOLECYSTECTOMY USING DA CRISTOPHER XI N/A 12/11/2020    Procedure: XI ROBOTIC CHOLECYSTECTOMY;  Surgeon: Taco Zimmer MD;  Location: [facility] OR;  Service: General;  Laterality: N/A;    ROBOT-ASSISTED LAPAROSCOPIC LYSIS OF ADHESIONS USING DA CRISTOPHER XI  12/11/2020    Procedure: XI ROBOTIC LYSIS, ADHESIONS;  Surgeon: Taco Zimmer MD;  Location: [facility] OR;  Service: General;;    ROBOT-ASSISTED LAPAROSCOPIC SALPINGO-OOPHORECTOMY USING DA CRISTOPHER XI Left 12/11/2020    Procedure: XI ROBOTIC SALPINGO-OOPHORECTOMY;  Surgeon: SHERLEY Wakefield MD;  Location: [facility] OR;  Service: OB/GYN;  Laterality: Left;    TUBAL LIGATION  2005        Social History     Tobacco Use    Smoking status: Never     Passive exposure: Never    Smokeless tobacco: Never   Substance Use Topics    Alcohol use: No    Drug use: No       Family History   Problem Relation Age of Onset    Liver cancer Maternal Aunt     Breast cancer Maternal Aunt     Breast cancer Paternal Grandmother     Colon cancer Neg Hx     Ovarian cancer Neg Hx     Thrombosis Neg Hx        Review of patient's allergies indicates:   Allergen Reactions    Elavil [amitriptyline]        Medication List with Changes/Refills   New Medications    PREDNISONE (DELTASONE) 5 MG TABLET    Take 1 tablet (5 mg total) by mouth once daily.   Current Medications    BUMETANIDE (BUMEX) 1 MG TABLET    Take 1 tablet (1 mg total) by mouth once daily.    CLOBETASOL (TEMOVATE) 0.05 % CREAM    Apply topically 2 (two) times daily.    CLOBETASOL (TEMOVATE) 0.05 % EXTERNAL SOLUTION    Apply  topically 2 (two) times daily.    COLCHICINE (COLCRYS) 0.6 MG TABLET    Take 1 tablet (0.6 mg total) by mouth 2 (two) times daily.    FAMOTIDINE (PEPCID) 20 MG TABLET    Take 1 tablet (20 mg total) by mouth 2 (two) times daily.    FOLIC ACID (FOLVITE) 1 MG TABLET    Take 1 tablet (1 mg total) by mouth once daily.    GABAPENTIN (NEURONTIN) 300 MG CAPSULE    Take 1 capsule (300 mg total) by mouth 3 (three) times daily.    HYDROCODONE-ACETAMINOPHEN (NORCO) 5-325 MG PER TABLET    Take 1 tablet by mouth every 8 (eight) hours as needed.    IPRATROPIUM (ATROVENT) 21 MCG (0.03 %) NASAL SPRAY    2 sprays by Each Nostril route 3 (three) times daily.    KETOCONAZOLE (NIZORAL) 2 % SHAMPOO    Apply topically twice a week.    LIDOCAINE-PRILOCAINE (EMLA) CREAM    Apply topically as needed (port access).    METHOTREXATE 25 MG/ML INJECTION    Inject 0.8 mLs (20 mg total) into the muscle every 7 days.    MOMETASONE 0.1% (ELOCON) 0.1 % CREAM    Apply topically once daily.    OMEPRAZOLE (PRILOSEC) 40 MG CAPSULE    Take 1 capsule (40 mg total) by mouth once daily.    ONDANSETRON (ZOFRAN-ODT) 4 MG TBDL    Take 1 tablet (4 mg total) by mouth every 8 (eight) hours as needed (nausea).    PANTOPRAZOLE (PROTONIX) 40 MG TABLET    Take 1 tablet (40 mg total) by mouth 2 (two) times daily.    SULFAMETHOXAZOLE-TRIMETHOPRIM 800-160MG (BACTRIM DS) 800-160 MG TAB    Take 1 tablet by mouth 3 (three) times a week.    TACROLIMUS (PROTOPIC) 0.1 % OINTMENT    Apply topically 2 (two) times daily.    TRAMADOL (ULTRAM) 50 MG TABLET    TAKE 1 TABLET BY MOUTH EVERY 12 HOURS AS NEEDED FOR PAIN    TRAZODONE (DESYREL) 50 MG TABLET    Take 1 tablet (50 mg total) by mouth every evening.       Disclaimer: This note was prepared using voice recognition system and is likely to have sound alike errors and is not proof read.  Please call me with any questions.

## 2024-04-10 ENCOUNTER — PATIENT MESSAGE (OUTPATIENT)
Dept: RHEUMATOLOGY | Facility: CLINIC | Age: 39
End: 2024-04-10
Payer: COMMERCIAL

## 2024-04-10 ENCOUNTER — TELEPHONE (OUTPATIENT)
Dept: RHEUMATOLOGY | Facility: CLINIC | Age: 39
End: 2024-04-10
Payer: COMMERCIAL

## 2024-04-10 DIAGNOSIS — Z79.899 DRUG-INDUCED IMMUNODEFICIENCY: Primary | ICD-10-CM

## 2024-04-10 DIAGNOSIS — D84.821 DRUG-INDUCED IMMUNODEFICIENCY: Primary | ICD-10-CM

## 2024-04-11 ENCOUNTER — INFUSION (OUTPATIENT)
Dept: INFUSION THERAPY | Facility: HOSPITAL | Age: 39
End: 2024-04-11
Attending: PHYSICIAN ASSISTANT
Payer: COMMERCIAL

## 2024-04-11 ENCOUNTER — LAB VISIT (OUTPATIENT)
Dept: LAB | Facility: HOSPITAL | Age: 39
End: 2024-04-11
Payer: COMMERCIAL

## 2024-04-11 VITALS
HEART RATE: 90 BPM | BODY MASS INDEX: 32.14 KG/M2 | SYSTOLIC BLOOD PRESSURE: 117 MMHG | WEIGHT: 212.06 LBS | HEIGHT: 68 IN | OXYGEN SATURATION: 98 % | DIASTOLIC BLOOD PRESSURE: 79 MMHG | TEMPERATURE: 98 F | RESPIRATION RATE: 16 BRPM

## 2024-04-11 DIAGNOSIS — M35.1 MCTD (MIXED CONNECTIVE TISSUE DISEASE): Primary | ICD-10-CM

## 2024-04-11 DIAGNOSIS — Z79.899 HIGH RISK MEDICATION USE: ICD-10-CM

## 2024-04-11 DIAGNOSIS — M34.9 SCLERODERMA, DIFFUSE: ICD-10-CM

## 2024-04-11 DIAGNOSIS — M06.09 RHEUMATOID ARTHRITIS OF MULTIPLE SITES WITH NEGATIVE RHEUMATOID FACTOR: ICD-10-CM

## 2024-04-11 LAB — B-HCG UR QL: NEGATIVE

## 2024-04-11 PROCEDURE — 81025 URINE PREGNANCY TEST: CPT | Performed by: INTERNAL MEDICINE

## 2024-04-11 PROCEDURE — A4216 STERILE WATER/SALINE, 10 ML: HCPCS | Performed by: INTERNAL MEDICINE

## 2024-04-11 PROCEDURE — 96366 THER/PROPH/DIAG IV INF ADDON: CPT

## 2024-04-11 PROCEDURE — 96375 TX/PRO/DX INJ NEW DRUG ADDON: CPT

## 2024-04-11 PROCEDURE — 63600175 PHARM REV CODE 636 W HCPCS: Performed by: INTERNAL MEDICINE

## 2024-04-11 PROCEDURE — 96367 TX/PROPH/DG ADDL SEQ IV INF: CPT

## 2024-04-11 PROCEDURE — 25000003 PHARM REV CODE 250: Performed by: INTERNAL MEDICINE

## 2024-04-11 PROCEDURE — 96365 THER/PROPH/DIAG IV INF INIT: CPT

## 2024-04-11 RX ORDER — DIPHENHYDRAMINE HYDROCHLORIDE 50 MG/ML
50 INJECTION INTRAMUSCULAR; INTRAVENOUS ONCE AS NEEDED
Status: CANCELLED | OUTPATIENT
Start: 2024-04-25

## 2024-04-11 RX ORDER — MEPERIDINE HYDROCHLORIDE 50 MG/ML
25 INJECTION INTRAMUSCULAR; INTRAVENOUS; SUBCUTANEOUS
Status: CANCELLED | OUTPATIENT
Start: 2024-04-25 | End: 2024-04-26

## 2024-04-11 RX ORDER — FAMOTIDINE 10 MG/ML
20 INJECTION INTRAVENOUS
Status: COMPLETED | OUTPATIENT
Start: 2024-04-11 | End: 2024-04-11

## 2024-04-11 RX ORDER — SODIUM CHLORIDE 0.9 % (FLUSH) 0.9 %
10 SYRINGE (ML) INJECTION
Status: CANCELLED | OUTPATIENT
Start: 2024-04-25

## 2024-04-11 RX ORDER — METHYLPREDNISOLONE SOD SUCC 125 MG
100 VIAL (EA) INJECTION
Status: CANCELLED | OUTPATIENT
Start: 2024-04-25

## 2024-04-11 RX ORDER — SODIUM CHLORIDE 0.9 % (FLUSH) 0.9 %
10 SYRINGE (ML) INJECTION
Status: DISCONTINUED | OUTPATIENT
Start: 2024-04-11 | End: 2024-04-11 | Stop reason: HOSPADM

## 2024-04-11 RX ORDER — ACETAMINOPHEN 325 MG/1
650 TABLET ORAL
Status: CANCELLED | OUTPATIENT
Start: 2024-04-25

## 2024-04-11 RX ORDER — HEPARIN 100 UNIT/ML
500 SYRINGE INTRAVENOUS
Status: CANCELLED | OUTPATIENT
Start: 2024-04-25

## 2024-04-11 RX ORDER — EPINEPHRINE 0.3 MG/.3ML
0.3 INJECTION SUBCUTANEOUS ONCE AS NEEDED
Status: CANCELLED | OUTPATIENT
Start: 2024-04-25

## 2024-04-11 RX ORDER — HEPARIN 100 UNIT/ML
500 SYRINGE INTRAVENOUS
Status: DISCONTINUED | OUTPATIENT
Start: 2024-04-11 | End: 2024-04-11 | Stop reason: HOSPADM

## 2024-04-11 RX ORDER — FAMOTIDINE 10 MG/ML
20 INJECTION INTRAVENOUS
Status: CANCELLED | OUTPATIENT
Start: 2024-04-25

## 2024-04-11 RX ORDER — METHYLPREDNISOLONE SOD SUCC 125 MG
100 VIAL (EA) INJECTION
Status: COMPLETED | OUTPATIENT
Start: 2024-04-11 | End: 2024-04-11

## 2024-04-11 RX ORDER — ACETAMINOPHEN 325 MG/1
650 TABLET ORAL
Status: COMPLETED | OUTPATIENT
Start: 2024-04-11 | End: 2024-04-11

## 2024-04-11 RX ADMIN — METHYLPREDNISOLONE SODIUM SUCCINATE 100 MG: 125 INJECTION, POWDER, FOR SOLUTION INTRAMUSCULAR; INTRAVENOUS at 09:04

## 2024-04-11 RX ADMIN — SODIUM CHLORIDE 1000 MG: 9 INJECTION, SOLUTION INTRAVENOUS at 10:04

## 2024-04-11 RX ADMIN — HEPARIN 500 UNITS: 100 SYRINGE at 03:04

## 2024-04-11 RX ADMIN — DIPHENHYDRAMINE HYDROCHLORIDE 50 MG: 50 INJECTION, SOLUTION INTRAMUSCULAR; INTRAVENOUS at 10:04

## 2024-04-11 RX ADMIN — SODIUM CHLORIDE, PRESERVATIVE FREE 10 ML: 5 INJECTION INTRAVENOUS at 09:04

## 2024-04-11 RX ADMIN — SODIUM CHLORIDE, PRESERVATIVE FREE 10 ML: 5 INJECTION INTRAVENOUS at 02:04

## 2024-04-11 RX ADMIN — FAMOTIDINE 20 MG: 10 INJECTION INTRAVENOUS at 10:04

## 2024-04-11 RX ADMIN — ACETAMINOPHEN 650 MG: 325 TABLET ORAL at 09:04

## 2024-04-11 NOTE — PLAN OF CARE
Plan of care reviewed with patient. Discussed if there are any new or ongoing concerns. Denies.  Problem: Adult Inpatient Plan of Care  Goal: Plan of Care Review  Outcome: Ongoing, Progressing  Flowsheets (Taken 4/11/2024 0939)  Plan of Care Reviewed With: patient  Goal: Patient-Specific Goal (Individualized)  Outcome: Ongoing, Progressing  Goal: Absence of Hospital-Acquired Illness or Injury  Outcome: Ongoing, Progressing  Intervention: Identify and Manage Fall Risk  Flowsheets (Taken 4/11/2024 0939)  Safety Promotion/Fall Prevention: in recliner, wheels locked  Goal: Optimal Comfort and Wellbeing  Outcome: Ongoing, Progressing  Intervention: Provide Person-Centered Care  Flowsheets (Taken 4/11/2024 0939)  Trust Relationship/Rapport:   care explained   questions encouraged   choices provided   reassurance provided   emotional support provided   thoughts/feelings acknowledged   empathic listening provided   questions answered  Goal: Readiness for Transition of Care  Outcome: Ongoing, Progressing

## 2024-04-11 NOTE — NURSING
Infusion # 1 of 2 - Rituxan 1,000 mg  Last dose-1/25/24     Any:  -recent illness, infection, or antibiotic use in past week- denies  -open wounds or mouth sores- denies  -invasive procedures or surgeries in past 4 weeks or in upcoming 4 weeks- denies  -vaccinations in past week- denies  -chance you may be pregnant- denies     Recent labs- 4/4/24  Last provider visit- Seen by Dr. SHORE on 4/9/24     Premeds- 650 mg Tylenol PO, 100 mg Solumedrol IVP over 3 minutes, 20 mg Pepcid IVP over 3 minutes, and 50 mg Benadryl IVPB over 20 minutes.       Rituxan 1,000 mg administered IV over 4 hours starting at 50 ml/hr and increasing rate by 50 ml/hr every 30 minutes to a max rate of 400 ml/hr per protocol; see MAR and vitals for more details. Tolerated well without adverse events, discharged and ambulatory out of clinic.

## 2024-04-16 ENCOUNTER — PATIENT MESSAGE (OUTPATIENT)
Dept: RHEUMATOLOGY | Facility: CLINIC | Age: 39
End: 2024-04-16
Payer: COMMERCIAL

## 2024-04-16 NOTE — NURSING
Infusion# >10    Recent labs? 4/11/2019    S/S infection noted or voiced? Denies  Recent or planned surgeries/invasive procedures? Denies  Recent vaccines? Denies    Premeds? None    Benlysta 1,085 mg administered IV at a 60 minute rate per orders; see MAR & Flow Sheet for more  details. Tolerated well without adverse events  
rolling walker

## 2024-04-25 ENCOUNTER — INFUSION (OUTPATIENT)
Dept: INFUSION THERAPY | Facility: HOSPITAL | Age: 39
End: 2024-04-25
Attending: PHYSICIAN ASSISTANT
Payer: COMMERCIAL

## 2024-04-25 ENCOUNTER — LAB VISIT (OUTPATIENT)
Dept: LAB | Facility: HOSPITAL | Age: 39
End: 2024-04-25
Payer: COMMERCIAL

## 2024-04-25 VITALS
WEIGHT: 210.31 LBS | TEMPERATURE: 97 F | DIASTOLIC BLOOD PRESSURE: 71 MMHG | HEIGHT: 68 IN | HEART RATE: 95 BPM | BODY MASS INDEX: 31.87 KG/M2 | RESPIRATION RATE: 16 BRPM | SYSTOLIC BLOOD PRESSURE: 118 MMHG

## 2024-04-25 DIAGNOSIS — M35.1 MCTD (MIXED CONNECTIVE TISSUE DISEASE): Primary | ICD-10-CM

## 2024-04-25 DIAGNOSIS — M34.9 SCLERODERMA, DIFFUSE: ICD-10-CM

## 2024-04-25 DIAGNOSIS — M06.09 RHEUMATOID ARTHRITIS OF MULTIPLE SITES WITH NEGATIVE RHEUMATOID FACTOR: ICD-10-CM

## 2024-04-25 DIAGNOSIS — Z79.899 HIGH RISK MEDICATION USE: ICD-10-CM

## 2024-04-25 LAB — B-HCG UR QL: NEGATIVE

## 2024-04-25 PROCEDURE — 96375 TX/PRO/DX INJ NEW DRUG ADDON: CPT

## 2024-04-25 PROCEDURE — 96367 TX/PROPH/DG ADDL SEQ IV INF: CPT

## 2024-04-25 PROCEDURE — 96366 THER/PROPH/DIAG IV INF ADDON: CPT

## 2024-04-25 PROCEDURE — 81025 URINE PREGNANCY TEST: CPT | Performed by: INTERNAL MEDICINE

## 2024-04-25 PROCEDURE — 96365 THER/PROPH/DIAG IV INF INIT: CPT

## 2024-04-25 PROCEDURE — 25000003 PHARM REV CODE 250: Performed by: INTERNAL MEDICINE

## 2024-04-25 PROCEDURE — 63600175 PHARM REV CODE 636 W HCPCS: Performed by: INTERNAL MEDICINE

## 2024-04-25 RX ORDER — METHYLPREDNISOLONE SOD SUCC 125 MG
100 VIAL (EA) INJECTION
Status: CANCELLED | OUTPATIENT
Start: 2024-05-09

## 2024-04-25 RX ORDER — HEPARIN 100 UNIT/ML
500 SYRINGE INTRAVENOUS
OUTPATIENT
Start: 2024-05-09

## 2024-04-25 RX ORDER — ACETAMINOPHEN 325 MG/1
650 TABLET ORAL
Status: COMPLETED | OUTPATIENT
Start: 2024-04-25 | End: 2024-04-25

## 2024-04-25 RX ORDER — FAMOTIDINE 10 MG/ML
20 INJECTION INTRAVENOUS
Status: CANCELLED | OUTPATIENT
Start: 2024-05-09

## 2024-04-25 RX ORDER — MEPERIDINE HYDROCHLORIDE 50 MG/ML
25 INJECTION INTRAMUSCULAR; INTRAVENOUS; SUBCUTANEOUS
OUTPATIENT
Start: 2024-05-09 | End: 2024-05-10

## 2024-04-25 RX ORDER — EPINEPHRINE 0.3 MG/.3ML
0.3 INJECTION SUBCUTANEOUS ONCE AS NEEDED
OUTPATIENT
Start: 2024-05-09

## 2024-04-25 RX ORDER — DIPHENHYDRAMINE HYDROCHLORIDE 50 MG/ML
50 INJECTION INTRAMUSCULAR; INTRAVENOUS ONCE AS NEEDED
OUTPATIENT
Start: 2024-05-09

## 2024-04-25 RX ORDER — HEPARIN 100 UNIT/ML
500 SYRINGE INTRAVENOUS
Status: DISCONTINUED | OUTPATIENT
Start: 2024-04-25 | End: 2024-04-25 | Stop reason: HOSPADM

## 2024-04-25 RX ORDER — METHYLPREDNISOLONE SOD SUCC 125 MG
100 VIAL (EA) INJECTION
Status: COMPLETED | OUTPATIENT
Start: 2024-04-25 | End: 2024-04-25

## 2024-04-25 RX ORDER — FAMOTIDINE 10 MG/ML
20 INJECTION INTRAVENOUS
Status: COMPLETED | OUTPATIENT
Start: 2024-04-25 | End: 2024-04-25

## 2024-04-25 RX ORDER — SODIUM CHLORIDE 0.9 % (FLUSH) 0.9 %
10 SYRINGE (ML) INJECTION
OUTPATIENT
Start: 2024-05-09

## 2024-04-25 RX ORDER — ACETAMINOPHEN 325 MG/1
650 TABLET ORAL
Status: CANCELLED | OUTPATIENT
Start: 2024-05-09

## 2024-04-25 RX ADMIN — HEPARIN 500 UNITS: 100 SYRINGE at 02:04

## 2024-04-25 RX ADMIN — DIPHENHYDRAMINE HYDROCHLORIDE 50 MG: 50 INJECTION INTRAMUSCULAR; INTRAVENOUS at 11:04

## 2024-04-25 RX ADMIN — SODIUM CHLORIDE 1000 MG: 9 INJECTION, SOLUTION INTRAVENOUS at 11:04

## 2024-04-25 RX ADMIN — ACETAMINOPHEN 650 MG: 325 TABLET ORAL at 11:04

## 2024-04-25 RX ADMIN — METHYLPREDNISOLONE SODIUM SUCCINATE 100 MG: 125 INJECTION, POWDER, FOR SOLUTION INTRAMUSCULAR; INTRAVENOUS at 11:04

## 2024-04-25 RX ADMIN — FAMOTIDINE 20 MG: 10 INJECTION INTRAVENOUS at 11:04

## 2024-04-25 NOTE — PLAN OF CARE
Patient tolerated Ruxience well today; no adverse reaction noted.  POC reviewed with pt.  NAD noted upon discharge.   Has f/u appt(s) scheduled per MD request.    Problem: Adult Inpatient Plan of Care  Goal: Plan of Care Review  Outcome: Progressing  Flowsheets (Taken 4/25/2024 1039)  Plan of Care Reviewed With: patient  Goal: Patient-Specific Goal (Individualized)  Outcome: Progressing  Flowsheets (Taken 4/25/2024 1039)  Individualized Care Needs: legs elevated, reclined, blanket, ice for mediport. refreshments provided.  Anxieties, Fears or Concerns: pt c/o pain 6/10 in feet, fatigue, and constipation  Goal: Optimal Comfort and Wellbeing  Outcome: Progressing  Intervention: Provide Person-Centered Care  Flowsheets (Taken 4/25/2024 1039)  Trust Relationship/Rapport:   care explained   questions answered   thoughts/feelings acknowledged   choices provided   questions encouraged   emotional support provided   reassurance provided   empathic listening provided

## 2024-05-01 ENCOUNTER — PATIENT MESSAGE (OUTPATIENT)
Dept: INTERNAL MEDICINE | Facility: CLINIC | Age: 39
End: 2024-05-01
Payer: COMMERCIAL

## 2024-05-02 ENCOUNTER — OFFICE VISIT (OUTPATIENT)
Dept: INTERNAL MEDICINE | Facility: CLINIC | Age: 39
End: 2024-05-02
Payer: COMMERCIAL

## 2024-05-02 ENCOUNTER — ANCILLARY ORDERS (OUTPATIENT)
Dept: INTERNAL MEDICINE | Facility: CLINIC | Age: 39
End: 2024-05-02

## 2024-05-02 ENCOUNTER — PATIENT MESSAGE (OUTPATIENT)
Dept: INTERNAL MEDICINE | Facility: CLINIC | Age: 39
End: 2024-05-02

## 2024-05-02 ENCOUNTER — HOSPITAL ENCOUNTER (OUTPATIENT)
Dept: RADIOLOGY | Facility: HOSPITAL | Age: 39
Discharge: HOME | End: 2024-05-02
Attending: FAMILY MEDICINE
Payer: COMMERCIAL

## 2024-05-02 VITALS
BODY MASS INDEX: 32.14 KG/M2 | SYSTOLIC BLOOD PRESSURE: 122 MMHG | HEIGHT: 68 IN | HEART RATE: 90 BPM | WEIGHT: 212.06 LBS | TEMPERATURE: 97 F | DIASTOLIC BLOOD PRESSURE: 60 MMHG | OXYGEN SATURATION: 96 %

## 2024-05-02 DIAGNOSIS — R10.30 LOWER ABDOMINAL PAIN: Primary | ICD-10-CM

## 2024-05-02 DIAGNOSIS — R10.30 LOWER ABDOMINAL PAIN: ICD-10-CM

## 2024-05-02 PROCEDURE — 74177 CT ABD & PELVIS W/CONTRAST: CPT | Mod: TC

## 2024-05-02 PROCEDURE — 3078F DIAST BP <80 MM HG: CPT | Mod: CPTII,S$GLB,, | Performed by: FAMILY MEDICINE

## 2024-05-02 PROCEDURE — 1159F MED LIST DOCD IN RCRD: CPT | Mod: CPTII,S$GLB,, | Performed by: FAMILY MEDICINE

## 2024-05-02 PROCEDURE — 99999 PR PBB SHADOW E&M-EST. PATIENT-LVL V: CPT | Mod: PBBFAC,,, | Performed by: FAMILY MEDICINE

## 2024-05-02 PROCEDURE — 25500020 PHARM REV CODE 255: Performed by: FAMILY MEDICINE

## 2024-05-02 PROCEDURE — 3008F BODY MASS INDEX DOCD: CPT | Mod: CPTII,S$GLB,, | Performed by: FAMILY MEDICINE

## 2024-05-02 PROCEDURE — 3074F SYST BP LT 130 MM HG: CPT | Mod: CPTII,S$GLB,, | Performed by: FAMILY MEDICINE

## 2024-05-02 PROCEDURE — G2211 COMPLEX E/M VISIT ADD ON: HCPCS | Mod: S$GLB,,, | Performed by: FAMILY MEDICINE

## 2024-05-02 PROCEDURE — 74018 RADEX ABDOMEN 1 VIEW: CPT | Mod: 26,,, | Performed by: RADIOLOGY

## 2024-05-02 PROCEDURE — 99214 OFFICE O/P EST MOD 30 MIN: CPT | Mod: S$GLB,,, | Performed by: FAMILY MEDICINE

## 2024-05-02 PROCEDURE — 74018 RADEX ABDOMEN 1 VIEW: CPT | Mod: TC

## 2024-05-02 PROCEDURE — 74177 CT ABD & PELVIS W/CONTRAST: CPT | Mod: 26,,, | Performed by: RADIOLOGY

## 2024-05-02 RX ORDER — CIPROFLOXACIN 500 MG/1
500 TABLET ORAL 2 TIMES DAILY
Qty: 6 TABLET | Refills: 0 | Status: SHIPPED | OUTPATIENT
Start: 2024-05-02 | End: 2024-05-05

## 2024-05-02 RX ADMIN — IOHEXOL 100 ML: 350 INJECTION, SOLUTION INTRAVENOUS at 10:05

## 2024-05-02 NOTE — LETTER
May 2, 2024      [facility]  [id number] [place] [id number]  Phone: [phone]  Fax: [phone]       Patient: Stefanie España   YOB: 1985  Date of Visit: 05/02/2024    To Whom It May Concern:    Susan España  was at [facility] on 05/02/2024. The patient may return to work/school on 5/6/2024 with no restrictions. If you have any questions or concerns, or if I can be of further assistance, please do not hesitate to contact me.    Sincerely,    Sofia Martinez MD

## 2024-05-02 NOTE — PROGRESS NOTES
Stefanie España  05/02/2024  [id number]    Sofia Martinez MD  Patient Care Team:  Sofia Martinez MD as PCP - General (Family Medicine)  Tangela Espinoza LPN as Care Coordinator (Internal Medicine)          Visit Type:an urgent visit for a new problem    Chief Complaint:  Chief Complaint   Patient presents with    Abdominal Pain       History of Present Illness:  38 year old  Here with abd pain-  She reports on Monday, having BM. She said she did strain with this, and then started with pain. She has been more constipation.  She reports pain is sharp, constant. Pain is directly over bladder in lower adb  Feels like a stabbing pain  Pain is with walking. More intense when bladder is full.      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  Restarted rituximab after failing 7 Cytoxan infusions.  Failed Orencia.  Reports mild improvement of both skin thickening and finger swelling since receiving rituximab and increasing dose of methotrexate to 20 mg weekly.  No adverse effects.  Takes prednisone 5-10 mg daily p.r.n..    Failed multiple medications in the past including methotrexate, Arava, tacrolimus, sulfasalazine, DAMION inhibitors, Actemra, rituximab rheumatoid dosing.  No significant joint symptoms.  No pulmonary symptoms.  Followed with [facility]Heumatology    History:  Past Medical History:   Diagnosis Date    Abnormal Pap smear of cervix     repeat was normal    Lupus (systemic lupus erythematosus)      Past Surgical History:   Procedure Laterality Date    ESOPHAGOGASTRODUODENOSCOPY N/A 2/29/2024    Procedure: EGD (ESOPHAGOGASTRODUODENOSCOPY);  Surgeon: Gregg Reyez MD;  Location: [facility];  Service: Endoscopy;  Laterality: N/A;    INSERTION OF TUNNELED CENTRAL VENOUS CATHETER (CVC) WITH SUBCUTANEOUS PORT N/A 05/24/2023    Procedure: [id number];  Surgeon: Taco Zimmer MD;  Location: [facility] OR;   Service: General;  Laterality: N/A;    ROBOT-ASSISTED CHOLECYSTECTOMY USING DA CRISTOPHER XI N/A 12/11/2020    Procedure: XI ROBOTIC CHOLECYSTECTOMY;  Surgeon: Taco Zimmer MD;  Location: [facility] OR;  Service: General;  Laterality: N/A;    ROBOT-ASSISTED LAPAROSCOPIC LYSIS OF ADHESIONS USING DA CRISTOPHER XI  12/11/2020    Procedure: XI ROBOTIC LYSIS, ADHESIONS;  Surgeon: Taco Zimmer MD;  Location: [facility] OR;  Service: General;;    ROBOT-ASSISTED LAPAROSCOPIC SALPINGO-OOPHORECTOMY USING DA CRISTOPHER XI Left 12/11/2020    Procedure: XI ROBOTIC SALPINGO-OOPHORECTOMY;  Surgeon: SHERLEY Wakefield MD;  Location: [facility] OR;  Service: OB/GYN;  Laterality: Left;    TUBAL LIGATION  2005     Family History   Problem Relation Name Age of Onset    Liver cancer Maternal Aunt      Breast cancer Maternal Aunt      Breast cancer Paternal Grandmother      Colon cancer Neg Hx      Ovarian cancer Neg Hx      Thrombosis Neg Hx       Social History     Socioeconomic History    Marital status:    Occupational History    Occupation: [employer]   Tobacco Use    Smoking status: Never     Passive exposure: Never    Smokeless tobacco: Never   Substance and Sexual Activity    Alcohol use: No    Drug use: No    Sexual activity: Yes     Partners: Male     Birth control/protection: Surgical     Comment: BTL   Other Topics Concern    Are you pregnant or think you may be? No    Breast-feeding No     Patient Active Problem List   Diagnosis    Exacerbation of systemic lupus    Long-term current use of high risk medication -methotrexate    Systemic lupus erythematosus arthritis    Immunosuppressed status    Rash    Obesity, Class II, BMI 35-39.9    MCTD (mixed connective tissue disease)    Positive sm/RNP antibody    Secondary Raynaud's phenomenon    Mild episode of recurrent major depressive disorder    Chronic pain of both knees    Bilateral leg weakness    Gastroesophageal reflux disease without esophagitis    Pelvic mass    Fibromyalgia    Rheumatoid  arthritis of multiple sites with negative rheumatoid factor    Swelling of right upper extremity    Diffuse scleroderma    Morphea    Chemotherapy-induced nausea    Chronic cough    Scleroderma, diffuse    Drug-induced immunodeficiency    Esophageal dysphagia    Other eosinophilia    Encounter for medication monitoring     Review of patient's allergies indicates:   Allergen Reactions    Elavil [amitriptyline]        The following were reviewed at this visit: active problem list, medication list, allergies, family history, social history, and health maintenance.    Medications:  Current Outpatient Medications on File Prior to Visit   Medication Sig Dispense Refill    bumetanide (BUMEX) 1 MG tablet Take 1 tablet (1 mg total) by mouth once daily. (Patient taking differently: Take 1 mg by mouth once daily.) 10 tablet 0    clobetasoL (TEMOVATE) 0.05 % cream Apply topically 2 (two) times daily. 60 g 2    clobetasoL (TEMOVATE) 0.05 % external solution Apply topically 2 (two) times daily. 50 mL 1    colchicine (COLCRYS) 0.6 mg tablet Take 1 tablet (0.6 mg total) by mouth 2 (two) times daily. 60 tablet 11    famotidine (PEPCID) 20 MG tablet Take 1 tablet (20 mg total) by mouth 2 (two) times daily. 60 tablet 11    folic acid (FOLVITE) 1 MG tablet Take 1 tablet (1 mg total) by mouth once daily. 90 tablet 0    gabapentin (NEURONTIN) 300 MG capsule Take 1 capsule (300 mg total) by mouth 3 (three) times daily. 90 capsule 11    HYDROcodone-acetaminophen (NORCO) 5-325 mg per tablet Take 1 tablet by mouth every 8 (eight) hours as needed.      ipratropium (ATROVENT) 21 mcg (0.03 %) nasal spray 2 sprays by Each Nostril route 3 (three) times daily. 20 mL 5    ketoconazole (NIZORAL) 2 % shampoo Apply topically twice a week. 200 mL 1    LIDOcaine-prilocaine (EMLA) cream Apply topically as needed (port access). 5 g 3    methotrexate 25 mg/mL injection Inject 0.8 mLs (20 mg total) into the muscle every 7 days. 4 mL 3    mometasone 0.1%  (ELOCON) 0.1 % cream Apply topically once daily. 50 g 1    omeprazole (PRILOSEC) 40 MG capsule Take 1 capsule (40 mg total) by mouth once daily. 30 capsule 11    ondansetron (ZOFRAN-ODT) 4 MG TbDL Take 1 tablet (4 mg total) by mouth every 8 (eight) hours as needed (nausea). 30 tablet 0    predniSONE (DELTASONE) 5 MG tablet Take 1 tablet (5 mg total) by mouth once daily. 90 tablet 3    sulfamethoxazole-trimethoprim 800-160mg (BACTRIM DS) 800-160 mg Tab Take 1 tablet by mouth 3 (three) times a week. 15 tablet 0    tacrolimus (PROTOPIC) 0.1 % ointment Apply topically 2 (two) times daily. 100 g 3    traMADoL (ULTRAM) 50 mg tablet TAKE 1 TABLET BY MOUTH EVERY 12 HOURS AS NEEDED FOR PAIN 60 tablet 3    traZODone (DESYREL) 50 MG tablet Take 1 tablet (50 mg total) by mouth every evening. (Patient taking differently: Take 50 mg by mouth nightly as needed.) 30 tablet 11    pantoprazole (PROTONIX) 40 MG tablet Take 1 tablet (40 mg total) by mouth 2 (two) times daily. 60 tablet 11     Current Facility-Administered Medications on File Prior to Visit   Medication Dose Route Frequency Provider Last Rate Last Admin    LIDOcaine (PF) 10 mg/ml (1%) injection 10 mg  1 mL Intradermal Once Taco Zimmer MD           Medications have been reviewed and reconciled with patient at this visit.  Barriers to medications reviewed with patient.    Adverse reactions to current medications reviewed with patient..    Over the counter medications reviewed and reconciled with patient.    Exam:  Wt Readings from Last 3 Encounters:   05/02/24 96.2 kg (212 lb 1.3 oz)   04/25/24 95.4 kg (210 lb 5.1 oz)   04/11/24 96.2 kg (212 lb 1.3 oz)     Temp Readings from Last 3 Encounters:   05/02/24 97 °F (36.1 °C)   04/25/24 97.2 °F (36.2 °C)   04/11/24 98.1 °F (36.7 °C)     BP Readings from Last 3 Encounters:   05/02/24 122/60   04/25/24 118/71   04/11/24 117/79     Pulse Readings from Last 3 Encounters:   05/02/24 90   04/25/24 95   04/11/24 90     Body  mass index is 32.25 kg/m².      Review of Systems   Gastrointestinal:  Positive for abdominal pain.     Physical Exam  Nursing note reviewed.   Pulmonary:      Effort: Pulmonary effort is normal. No respiratory distress.   Abdominal:      Tenderness: There is abdominal tenderness.          Comments: No mass.  Tender to palpation at lower adb.    No BM since Monday   Neurological:      Mental Status: She is alert and oriented to person, place, and time.   Psychiatric:         Mood and Affect: Mood normal.         Behavior: Behavior normal.         Thought Content: Thought content normal.         Judgment: Judgment normal.         Laboratory Reviewed ({Yes)  Lab Results   Component Value Date    WBC 4.31 04/04/2024    HGB 11.1 (L) 04/04/2024    HCT 34.0 (L) 04/04/2024     (H) 04/04/2024    CHOL 169 08/24/2017    TRIG 228 (H) 08/24/2017    HDL 37 (L) 08/24/2017    ALT 11 04/04/2024    AST 18 04/04/2024     (L) 04/04/2024    K 4.1 04/04/2024     04/04/2024    CREATININE 0.6 04/04/2024    BUN 7 04/04/2024    CO2 24 04/04/2024    TSH 1.243 08/30/2017       Stefanie was seen today for abdominal pain.    Diagnoses and all orders for this visit:    Lower abdominal pain  -     Urinalysis, Reflex to Urine Culture Urine, Clean Catch; Future  -     X-Ray Abdomen AP 1 View; Future  -     CT Pelvis W Wo  Contrast; Future  -     Pregnancy, urine rapid; Future    Stat CT  Concern with Stool burden and possible Hernia with acute pain after straining Monday  Acute tenderness lower pelvis            Visit today included increased complexity associated with the care of the episodic problem abd pain , which was addressed while instituting co-management of the longitudinal care of the patient due to the serious and/or complex managed problem(s) .    I have evaluated and discussed management associated with medical care services that serve as the continuing focal point for all needed health care services and/or with  medical care services that are part of ongoing care related to my patient's single, serious condition or a complex condition(s).    I am providing ongoing care and I am the primary care provider for this patient, and they are being managed, monitored, and/or observed for their chronic conditions over time.     I have addressed their ongoing health maintenance requirements and needs for all health care services and reviewed co-management plans provided by specialty providers when available.    Health Maintenance Due   Topic Date Due    Pneumococcal Vaccines (Age 0-64) (2 of 2 - PPSV23 or PCV20) 03/05/2019    Hemoglobin A1c (Diabetic Prevention Screening)  01/07/2020    COVID-19 Vaccine (3 - Mixed Product risk series) 08/09/2021      Care Plan/Goals: Reviewed    Goals    None         Follow up: No follow-ups on file.    After visit summary was printed and given to patient upon discharge today.  Patient goals and care plan are included in After Visit Summary.

## 2024-05-08 ENCOUNTER — TELEPHONE (OUTPATIENT)
Dept: RHEUMATOLOGY | Facility: CLINIC | Age: 39
End: 2024-05-08
Payer: COMMERCIAL

## 2024-05-08 RX ORDER — ACETAMINOPHEN 325 MG/1
650 TABLET ORAL
OUTPATIENT
Start: 2024-05-08

## 2024-05-08 RX ORDER — FAMOTIDINE 10 MG/ML
20 INJECTION INTRAVENOUS
OUTPATIENT
Start: 2024-05-08

## 2024-05-08 RX ORDER — METHYLPREDNISOLONE SOD SUCC 125 MG
100 VIAL (EA) INJECTION
OUTPATIENT
Start: 2024-05-08

## 2024-05-08 NOTE — TELEPHONE ENCOUNTER
----- Message from Ondina Kaiesr RN sent at 5/8/2024  2:12 PM CDT -----  Regarding: rituxan orders  Please review rituxan orders and sign, thanks

## 2024-06-04 ENCOUNTER — LAB VISIT (OUTPATIENT)
Dept: LAB | Facility: HOSPITAL | Age: 39
End: 2024-06-04
Attending: INTERNAL MEDICINE
Payer: COMMERCIAL

## 2024-06-04 DIAGNOSIS — M35.1 MCTD (MIXED CONNECTIVE TISSUE DISEASE): ICD-10-CM

## 2024-06-04 DIAGNOSIS — M06.09 RHEUMATOID ARTHRITIS OF MULTIPLE SITES WITH NEGATIVE RHEUMATOID FACTOR: ICD-10-CM

## 2024-06-04 DIAGNOSIS — Z79.899 DRUG-INDUCED IMMUNODEFICIENCY: ICD-10-CM

## 2024-06-04 DIAGNOSIS — Z51.81 ENCOUNTER FOR MEDICATION MONITORING: ICD-10-CM

## 2024-06-04 DIAGNOSIS — M34.9 SCLERODERMA, DIFFUSE: ICD-10-CM

## 2024-06-04 DIAGNOSIS — D84.821 DRUG-INDUCED IMMUNODEFICIENCY: ICD-10-CM

## 2024-06-04 LAB
ALBUMIN SERPL BCP-MCNC: 3.7 G/DL (ref 3.5–5.2)
ALP SERPL-CCNC: 55 U/L (ref 55–135)
ALT SERPL W/O P-5'-P-CCNC: 21 U/L (ref 10–44)
ANION GAP SERPL CALC-SCNC: 7 MMOL/L (ref 8–16)
AST SERPL-CCNC: 27 U/L (ref 10–40)
BASOPHILS # BLD AUTO: 0.04 K/UL (ref 0–0.2)
BASOPHILS NFR BLD: 0.4 % (ref 0–1.9)
BILIRUB SERPL-MCNC: 0.2 MG/DL (ref 0.1–1)
BUN SERPL-MCNC: 16 MG/DL (ref 6–20)
CALCIUM SERPL-MCNC: 9.9 MG/DL (ref 8.7–10.5)
CHLORIDE SERPL-SCNC: 101 MMOL/L (ref 95–110)
CO2 SERPL-SCNC: 26 MMOL/L (ref 23–29)
CREAT SERPL-MCNC: 0.7 MG/DL (ref 0.5–1.4)
CRP SERPL-MCNC: 13.2 MG/L (ref 0–8.2)
DIFFERENTIAL METHOD BLD: ABNORMAL
EOSINOPHIL # BLD AUTO: 0.4 K/UL (ref 0–0.5)
EOSINOPHIL NFR BLD: 4.2 % (ref 0–8)
ERYTHROCYTE [DISTWIDTH] IN BLOOD BY AUTOMATED COUNT: 13.1 % (ref 11.5–14.5)
ERYTHROCYTE [SEDIMENTATION RATE] IN BLOOD BY WESTERGREN METHOD: 45 MM/HR (ref 0–20)
EST. GFR  (NO RACE VARIABLE): >60 ML/MIN/1.73 M^2
GLUCOSE SERPL-MCNC: 75 MG/DL (ref 70–110)
HCT VFR BLD AUTO: 33.1 % (ref 37–48.5)
HGB BLD-MCNC: 10.9 G/DL (ref 12–16)
IGG SERPL-MCNC: 2681 MG/DL (ref 650–1600)
IMM GRANULOCYTES # BLD AUTO: 0.03 K/UL (ref 0–0.04)
IMM GRANULOCYTES NFR BLD AUTO: 0.3 % (ref 0–0.5)
LYMPHOCYTES # BLD AUTO: 1.1 K/UL (ref 1–4.8)
LYMPHOCYTES NFR BLD: 11.9 % (ref 18–48)
MCH RBC QN AUTO: 30.3 PG (ref 27–31)
MCHC RBC AUTO-ENTMCNC: 32.9 G/DL (ref 32–36)
MCV RBC AUTO: 92 FL (ref 82–98)
MONOCYTES # BLD AUTO: 1 K/UL (ref 0.3–1)
MONOCYTES NFR BLD: 10.8 % (ref 4–15)
NEUTROPHILS # BLD AUTO: 6.8 K/UL (ref 1.8–7.7)
NEUTROPHILS NFR BLD: 72.7 % (ref 38–73)
NRBC BLD-RTO: 0 /100 WBC
PLATELET # BLD AUTO: 448 K/UL (ref 150–450)
PMV BLD AUTO: 8.5 FL (ref 9.2–12.9)
POTASSIUM SERPL-SCNC: 4.1 MMOL/L (ref 3.5–5.1)
PROT SERPL-MCNC: 8.9 G/DL (ref 6–8.4)
RBC # BLD AUTO: 3.6 M/UL (ref 4–5.4)
SODIUM SERPL-SCNC: 134 MMOL/L (ref 136–145)
WBC # BLD AUTO: 9.33 K/UL (ref 3.9–12.7)

## 2024-06-04 PROCEDURE — 85651 RBC SED RATE NONAUTOMATED: CPT | Mod: PO | Performed by: INTERNAL MEDICINE

## 2024-06-04 PROCEDURE — 80053 COMPREHEN METABOLIC PANEL: CPT | Performed by: INTERNAL MEDICINE

## 2024-06-04 PROCEDURE — 86140 C-REACTIVE PROTEIN: CPT | Performed by: INTERNAL MEDICINE

## 2024-06-04 PROCEDURE — 82784 ASSAY IGA/IGD/IGG/IGM EACH: CPT | Performed by: INTERNAL MEDICINE

## 2024-06-04 PROCEDURE — 36415 COLL VENOUS BLD VENIPUNCTURE: CPT | Mod: PO | Performed by: INTERNAL MEDICINE

## 2024-06-04 PROCEDURE — 85025 COMPLETE CBC W/AUTO DIFF WBC: CPT | Mod: PO | Performed by: INTERNAL MEDICINE

## 2024-06-05 ENCOUNTER — OFFICE VISIT (OUTPATIENT)
Dept: INTERNAL MEDICINE | Facility: CLINIC | Age: 39
End: 2024-06-05
Payer: COMMERCIAL

## 2024-06-05 VITALS
TEMPERATURE: 98 F | HEIGHT: 68 IN | HEART RATE: 92 BPM | DIASTOLIC BLOOD PRESSURE: 82 MMHG | WEIGHT: 214.75 LBS | BODY MASS INDEX: 32.55 KG/M2 | OXYGEN SATURATION: 99 % | SYSTOLIC BLOOD PRESSURE: 118 MMHG

## 2024-06-05 DIAGNOSIS — B96.89 ACUTE BACTERIAL SINUSITIS: Primary | ICD-10-CM

## 2024-06-05 DIAGNOSIS — Z79.899 DRUG-INDUCED IMMUNODEFICIENCY: ICD-10-CM

## 2024-06-05 DIAGNOSIS — M06.09 RHEUMATOID ARTHRITIS OF MULTIPLE SITES WITH NEGATIVE RHEUMATOID FACTOR: ICD-10-CM

## 2024-06-05 DIAGNOSIS — D84.821 DRUG-INDUCED IMMUNODEFICIENCY: ICD-10-CM

## 2024-06-05 DIAGNOSIS — E66.9 OBESITY (BMI 30-39.9): ICD-10-CM

## 2024-06-05 DIAGNOSIS — J01.90 ACUTE BACTERIAL SINUSITIS: Primary | ICD-10-CM

## 2024-06-05 LAB
CTP QC/QA: YES
GROUP A STREP, MOLECULAR: NEGATIVE
SARS-COV-2 RDRP RESP QL NAA+PROBE: NEGATIVE

## 2024-06-05 PROCEDURE — 3074F SYST BP LT 130 MM HG: CPT | Mod: CPTII,S$GLB,,

## 2024-06-05 PROCEDURE — 3008F BODY MASS INDEX DOCD: CPT | Mod: CPTII,S$GLB,,

## 2024-06-05 PROCEDURE — 87635 SARS-COV-2 COVID-19 AMP PRB: CPT | Mod: QW,S$GLB,,

## 2024-06-05 PROCEDURE — 99214 OFFICE O/P EST MOD 30 MIN: CPT | Mod: S$GLB,,,

## 2024-06-05 PROCEDURE — 1160F RVW MEDS BY RX/DR IN RCRD: CPT | Mod: CPTII,S$GLB,,

## 2024-06-05 PROCEDURE — 99999 PR PBB SHADOW E&M-EST. PATIENT-LVL V: CPT | Mod: PBBFAC,,,

## 2024-06-05 PROCEDURE — 3079F DIAST BP 80-89 MM HG: CPT | Mod: CPTII,S$GLB,,

## 2024-06-05 PROCEDURE — 87651 STREP A DNA AMP PROBE: CPT

## 2024-06-05 PROCEDURE — 1159F MED LIST DOCD IN RCRD: CPT | Mod: CPTII,S$GLB,,

## 2024-06-05 RX ORDER — FLUTICASONE PROPIONATE 50 MCG
1 SPRAY, SUSPENSION (ML) NASAL DAILY
Qty: 18.2 ML | Refills: 1 | Status: SHIPPED | OUTPATIENT
Start: 2024-06-05

## 2024-06-05 RX ORDER — AMOXICILLIN AND CLAVULANATE POTASSIUM 875; 125 MG/1; MG/1
1 TABLET, FILM COATED ORAL EVERY 12 HOURS
Qty: 20 TABLET | Refills: 0 | Status: SHIPPED | OUTPATIENT
Start: 2024-06-05

## 2024-06-05 RX ORDER — MONTELUKAST SODIUM 10 MG/1
10 TABLET ORAL NIGHTLY
Qty: 30 TABLET | Refills: 0 | Status: SHIPPED | OUTPATIENT
Start: 2024-06-05 | End: 2024-07-05

## 2024-06-05 RX ORDER — PROMETHAZINE HYDROCHLORIDE AND DEXTROMETHORPHAN HYDROBROMIDE 6.25; 15 MG/5ML; MG/5ML
5 SYRUP ORAL EVERY 6 HOURS PRN
Qty: 180 ML | Refills: 0 | Status: SHIPPED | OUTPATIENT
Start: 2024-06-05 | End: 2024-06-15

## 2024-06-05 NOTE — LETTER
June 5, 2024      [facility]  [id number] [place] [id number]  Phone: [phone]  Fax: [phone]       Patient: Stefanie España   YOB: 1985  Date of Visit: 06/05/2024    To Whom It May Concern:    Susan España  was at [facility] on 06/05/2024. The patient may return to work/school on 06/10/2024 with no restrictions. If the patient is feeling better, she may return to work prior to 6/10/2024 If you have any questions or concerns, or if I can be of further assistance, please do not hesitate to contact me.    Sincerely,         Asia Estrella, NP

## 2024-06-05 NOTE — PROGRESS NOTES
Stefanie Duarte Taco  06/05/2024  [id number]    Sofia Martinez MD  Patient Care Team:  Sofia Martinez MD as PCP - General (Family Medicine)  Tangela Espinoza LPN as Care Coordinator (Internal Medicine)          Visit Type:an urgent visit for a new problem    Chief Complaint:  Chief Complaint   Patient presents with    Cough    Sore Throat    Nasal Congestion    Otitis Externa    Headache    Sinus Problem        History of Present Illness:    Symptoms started a few days ago  Low grade temp  PND Congestion and cough, runny nose       History:  Past Medical History:   Diagnosis Date    Abnormal Pap smear of cervix     repeat was normal    Lupus (systemic lupus erythematosus)      Past Surgical History:   Procedure Laterality Date    ESOPHAGOGASTRODUODENOSCOPY N/A 2/29/2024    Procedure: EGD (ESOPHAGOGASTRODUODENOSCOPY);  Surgeon: Gregg Reyez MD;  Location: [facility];  Service: Endoscopy;  Laterality: N/A;    INSERTION OF TUNNELED CENTRAL VENOUS CATHETER (CVC) WITH SUBCUTANEOUS PORT N/A 05/24/2023    Procedure: [id number];  Surgeon: Taco Zimmer MD;  Location: [facility] OR;  Service: General;  Laterality: N/A;    ROBOT-ASSISTED CHOLECYSTECTOMY USING DA CRISTOPHER XI N/A 12/11/2020    Procedure: XI ROBOTIC CHOLECYSTECTOMY;  Surgeon: Taco Zimmer MD;  Location: [facility] OR;  Service: General;  Laterality: N/A;    ROBOT-ASSISTED LAPAROSCOPIC LYSIS OF ADHESIONS USING DA CRISTOPHER XI  12/11/2020    Procedure: XI ROBOTIC LYSIS, ADHESIONS;  Surgeon: Taco Zimmer MD;  Location: [facility] OR;  Service: General;;    ROBOT-ASSISTED LAPAROSCOPIC SALPINGO-OOPHORECTOMY USING DA CRISTOPHER XI Left 12/11/2020    Procedure: XI ROBOTIC SALPINGO-OOPHORECTOMY;  Surgeon: SHERLEY Wakefield MD;  Location: [facility] OR;  Service: OB/GYN;  Laterality: Left;    TUBAL LIGATION  2005     Family History   Problem Relation Name Age of Onset    Liver cancer Maternal Aunt      Breast cancer Maternal Aunt      Breast cancer Paternal  Grandmother      Colon cancer Neg Hx      Ovarian cancer Neg Hx      Thrombosis Neg Hx       Social History     Socioeconomic History    Marital status:    Occupational History    Occupation: [employer]   Tobacco Use    Smoking status: Never     Passive exposure: Never    Smokeless tobacco: Never   Substance and Sexual Activity    Alcohol use: No    Drug use: No    Sexual activity: Yes     Partners: Male     Birth control/protection: Surgical     Comment: BTL   Other Topics Concern    Are you pregnant or think you may be? No    Breast-feeding No     Patient Active Problem List   Diagnosis    Exacerbation of systemic lupus    Long-term current use of high risk medication -methotrexate    Systemic lupus erythematosus arthritis    Immunosuppressed status    Rash    Obesity, Class II, BMI 35-39.9    MCTD (mixed connective tissue disease)    Positive sm/RNP antibody    Secondary Raynaud's phenomenon    Mild episode of recurrent major depressive disorder    Chronic pain of both knees    Bilateral leg weakness    Gastroesophageal reflux disease without esophagitis    Pelvic mass    Fibromyalgia    Rheumatoid arthritis of multiple sites with negative rheumatoid factor    Swelling of right upper extremity    Diffuse scleroderma    Morphea    Chemotherapy-induced nausea    Chronic cough    Scleroderma, diffuse    Drug-induced immunodeficiency    Esophageal dysphagia    Other eosinophilia    Encounter for medication monitoring     Review of patient's allergies indicates:   Allergen Reactions    Elavil [amitriptyline]        The following were reviewed at this visit: active problem list, medication list, allergies, family history, social history, and health maintenance.    Medications:  Current Outpatient Medications on File Prior to Visit   Medication Sig Dispense Refill    bumetanide (BUMEX) 1 MG tablet Take 1 tablet (1 mg total) by mouth once daily. (Patient taking differently: Take 1 mg by mouth once daily.) 10 tablet 0     clobetasoL (TEMOVATE) 0.05 % cream Apply topically 2 (two) times daily. 60 g 2    clobetasoL (TEMOVATE) 0.05 % external solution Apply topically 2 (two) times daily. 50 mL 1    colchicine (COLCRYS) 0.6 mg tablet Take 1 tablet (0.6 mg total) by mouth 2 (two) times daily. 60 tablet 11    famotidine (PEPCID) 20 MG tablet Take 1 tablet (20 mg total) by mouth 2 (two) times daily. 60 tablet 11    folic acid (FOLVITE) 1 MG tablet Take 1 tablet (1 mg total) by mouth once daily. 90 tablet 0    gabapentin (NEURONTIN) 300 MG capsule Take 1 capsule (300 mg total) by mouth 3 (three) times daily. 90 capsule 11    HYDROcodone-acetaminophen (NORCO) 5-325 mg per tablet Take 1 tablet by mouth every 8 (eight) hours as needed.      ipratropium (ATROVENT) 21 mcg (0.03 %) nasal spray 2 sprays by Each Nostril route 3 (three) times daily. 20 mL 5    ketoconazole (NIZORAL) 2 % shampoo Apply topically twice a week. 200 mL 1    LIDOcaine-prilocaine (EMLA) cream Apply topically as needed (port access). 5 g 3    methotrexate 25 mg/mL injection Inject 0.8 mLs (20 mg total) into the muscle every 7 days. 4 mL 3    mometasone 0.1% (ELOCON) 0.1 % cream Apply topically once daily. 50 g 1    omeprazole (PRILOSEC) 40 MG capsule Take 1 capsule (40 mg total) by mouth once daily. 30 capsule 11    ondansetron (ZOFRAN-ODT) 4 MG TbDL Take 1 tablet (4 mg total) by mouth every 8 (eight) hours as needed (nausea). 30 tablet 0    pantoprazole (PROTONIX) 40 MG tablet Take 1 tablet (40 mg total) by mouth 2 (two) times daily. 60 tablet 11    predniSONE (DELTASONE) 5 MG tablet Take 1 tablet (5 mg total) by mouth once daily. 90 tablet 3    tacrolimus (PROTOPIC) 0.1 % ointment Apply topically 2 (two) times daily. 100 g 3    traMADoL (ULTRAM) 50 mg tablet TAKE 1 TABLET BY MOUTH EVERY 12 HOURS AS NEEDED FOR PAIN 60 tablet 3    traZODone (DESYREL) 50 MG tablet Take 1 tablet (50 mg total) by mouth every evening. (Patient taking differently: Take 50 mg by mouth nightly  as needed.) 30 tablet 11     Current Facility-Administered Medications on File Prior to Visit   Medication Dose Route Frequency Provider Last Rate Last Admin    LIDOcaine (PF) 10 mg/ml (1%) injection 10 mg  1 mL Intradermal Once Taco Zimmer MD           Medications have been reviewed and reconciled with patient at this visit.  Barriers to medications reviewed with patient.    Adverse reactions to current medications reviewed with patient..    Over the counter medications reviewed and reconciled with patient.    Exam:  Wt Readings from Last 3 Encounters:   05/02/24 96.2 kg (212 lb 1.3 oz)   04/25/24 95.4 kg (210 lb 5.1 oz)   04/11/24 96.2 kg (212 lb 1.3 oz)     Temp Readings from Last 3 Encounters:   05/02/24 97 °F (36.1 °C)   04/25/24 97.2 °F (36.2 °C)   04/11/24 98.1 °F (36.7 °C)     BP Readings from Last 3 Encounters:   05/02/24 122/60   04/25/24 118/71   04/11/24 117/79     Pulse Readings from Last 3 Encounters:   05/02/24 90   04/25/24 95   04/11/24 90     There is no height or weight on file to calculate BMI.      Review of Systems   Constitutional:  Positive for chills and malaise/fatigue.   HENT:  Positive for congestion, ear pain, sinus pain and sore throat.    Respiratory:  Positive for cough.    Endo/Heme/Allergies:  Positive for environmental allergies.     Physical Exam  Vitals and nursing note reviewed.   Constitutional:       Appearance: She is obese. She is ill-appearing.   HENT:      Head: Normocephalic and atraumatic.      Salivary Glands: Right salivary gland is diffusely enlarged and tender. Left salivary gland is diffusely enlarged and tender.      Right Ear: Tympanic membrane normal.      Left Ear: Tympanic membrane normal.      Nose: Congestion and rhinorrhea present.      Right Turbinates: Enlarged.      Left Turbinates: Enlarged.      Right Sinus: Maxillary sinus tenderness and frontal sinus tenderness present.      Left Sinus: Maxillary sinus tenderness and frontal sinus tenderness  present.      Mouth/Throat:      Mouth: Mucous membranes are moist.      Pharynx: Uvula midline. Posterior oropharyngeal erythema present.   Cardiovascular:      Rate and Rhythm: Normal rate and regular rhythm.      Pulses: Normal pulses.      Heart sounds: Normal heart sounds.   Pulmonary:      Effort: Pulmonary effort is normal. No respiratory distress.      Breath sounds: Normal breath sounds. No wheezing or rales.   Abdominal:      General: Bowel sounds are normal.   Skin:     General: Skin is dry.   Neurological:      Mental Status: She is alert and oriented to person, place, and time.   Psychiatric:         Mood and Affect: Mood normal.         Behavior: Behavior normal.         Thought Content: Thought content normal.         Judgment: Judgment normal.         Laboratory Reviewed ({Yes)  Lab Results   Component Value Date    WBC 9.33 06/04/2024    HGB 10.9 (L) 06/04/2024    HCT 33.1 (L) 06/04/2024     06/04/2024    CHOL 169 08/24/2017    TRIG 228 (H) 08/24/2017    HDL 37 (L) 08/24/2017    ALT 21 06/04/2024    AST 27 06/04/2024     (L) 06/04/2024    K 4.1 06/04/2024     06/04/2024    CREATININE 0.7 06/04/2024    BUN 16 06/04/2024    CO2 26 06/04/2024    TSH 1.243 08/30/2017     Stefanie was seen today for cough, sore throat, nasal congestion, otitis externa, headache and sinus problem.    Diagnoses and all orders for this visit:    Acute bacterial sinusitis  -     POCT COVID-19 Rapid Screening  -     Group A Strep, Molecular  -     amoxicillin-clavulanate 875-125mg (AUGMENTIN) 875-125 mg per tablet; Take 1 tablet by mouth every 12 (twelve) hours.  -     promethazine-dextromethorphan (PROMETHAZINE-DM) 6.25-15 mg/5 mL Syrp; Take 5 mLs by mouth every 6 (six) hours as needed (cough).  -     fluticasone propionate (FLONASE) 50 mcg/actuation nasal spray; 1 spray (50 mcg total) by Each Nostril route once daily.  -     montelukast (SINGULAIR) 10 mg tablet; Take 1 tablet (10 mg total) by mouth every  evening.    Drug-induced immunodeficiency  -     POCT COVID-19 Rapid Screening  -     Group A Strep, Molecular  -     amoxicillin-clavulanate 875-125mg (AUGMENTIN) 875-125 mg per tablet; Take 1 tablet by mouth every 12 (twelve) hours.  -     promethazine-dextromethorphan (PROMETHAZINE-DM) 6.25-15 mg/5 mL Syrp; Take 5 mLs by mouth every 6 (six) hours as needed (cough).  -     fluticasone propionate (FLONASE) 50 mcg/actuation nasal spray; 1 spray (50 mcg total) by Each Nostril route once daily.  -     montelukast (SINGULAIR) 10 mg tablet; Take 1 tablet (10 mg total) by mouth every evening.    Rheumatoid arthritis of multiple sites with negative rheumatoid factor    Obesity (BMI 30-39.9)  Encouraged healthy diet and exercise as tolerated to help bring BMI into normal range.        Since patient is immunosuppressed will cover her with Augmentin  COVID negative  Waiting for strep results    Gargle with warm water and salt and use warm liquids to help with throat pain              Seek immediate medical care if you develop fever, chest pain, or shortness of breath.      Try these tips to keep yourself comfortable:                   -Get plenty of rest.                   -Drink plenty of fluids, at least 8 large glasses of fluid a day. Good fluid choices are water, fruit juices high in Vitamin C, tea, gelatin, or broths and soups. These help to keep mucus thin and ease congestion.                  -Use salt water gargle, cough drops or throat sprays to relieve throat pain. Mi ¼ to ½ teaspoon of salt in 1 cup of warm water for a salt water gargle  solution.                  -Use petroleum jelly or lip balm around lips and nose to prevent chapping.                  -Use saline nose drops or spray to help ease congestion.    Over the Counter (OTC) Medicines:  Take over the counter medicines as needed to ease your signs.  Read labels carefully.  Use a product that treats only the signs that you have. Ask your pharmacist  for  recommendations. Be sure to ask about possible interactions with other  medicines you are taking.  Common medicines used to treat signs of a cold include:     -Flonase daily.  -Claritin or Zyrtec daily.  -Mucinex every 12 hours -- Drink plenty of water while taking this medication.    - Cough suppressant, also called antitussive, such as dextromethorphan.  This medicine decreases your reflex and sensitivity to cough. This  medicine may be kept behind the pharmacy counter for purchase.    -Take OTC tylenol and or ibuprofen as directed on package for pain/fever. Do not take more than package suggests to take    -Get plenty of rest     Cold and cough medicines often contain more than one type of medicine.  Ask the pharmacist for help to confirm that you are not using more than one  product with the same or similar ingredient. For example, some cold and  cough medicines have acetaminophen or ibuprofen in them to help lower a  fever or ease muscle aches. Do not take extra acetaminophen (Tylenol) or  ibuprofen (Advil, Motrin) if the cold or cough medicine has it as an  ingredient. Too much medicine could be harmful.    Take the correct dose as listed on the package. Do not take more than  recommended.    Use a Humidifier:  A cool mist humidifier can make breathing easier by thinning mucus. Do not use  a steam humidifier as hot water can cause burns if spilled.  Place the humidifier a few feet from the bed. Drain and clean each day with  soap and water to prevent bacteria and mold from growing.  Indoor humidity should not be above 50%. Stop using the humidifier if you  notice moisture on windows, walls or pictures.  You do not need to add any medicine to the humidifier.  If you cannot get a humidifier, place a pan of water next to heating vents and  refill the water level daily. The water will evaporate and add moisture to the  Room.    How to prevent the spread of viruses   -Wash your hands with soap and water or use  alcohol based hand   often. Dry hands wet from washing with soap on a paper towel instead of cloth towel.  -Cough or sneeze into your elbow to avoid spreading germs.  -Wipe down common surfaces, such as door knobs and faucet handles, with a disinfectant spray.  -Do not share cups or utensils.                                    Care Plan/Goals: Reviewed    Goals    None         Follow up: No follow-ups on file.    After visit summary was printed and given to patient upon discharge today.  Patient goals and care plan are included in After Visit Summary.

## 2024-06-11 ENCOUNTER — OFFICE VISIT (OUTPATIENT)
Dept: RHEUMATOLOGY | Facility: CLINIC | Age: 39
End: 2024-06-11
Payer: COMMERCIAL

## 2024-06-11 DIAGNOSIS — M06.09 RHEUMATOID ARTHRITIS OF MULTIPLE SITES WITH NEGATIVE RHEUMATOID FACTOR: ICD-10-CM

## 2024-06-11 DIAGNOSIS — D84.821 DRUG-INDUCED IMMUNODEFICIENCY: ICD-10-CM

## 2024-06-11 DIAGNOSIS — Z79.899 DRUG-INDUCED IMMUNODEFICIENCY: ICD-10-CM

## 2024-06-11 DIAGNOSIS — M34.9 SCLERODERMA, DIFFUSE: Primary | ICD-10-CM

## 2024-06-11 DIAGNOSIS — M35.1 MCTD (MIXED CONNECTIVE TISSUE DISEASE): ICD-10-CM

## 2024-06-11 DIAGNOSIS — Z51.81 ENCOUNTER FOR MEDICATION MONITORING: ICD-10-CM

## 2024-06-11 DIAGNOSIS — J01.80 ACUTE NON-RECURRENT SINUSITIS OF OTHER SINUS: ICD-10-CM

## 2024-06-11 PROBLEM — J01.90 ACUTE NON-RECURRENT SINUSITIS: Status: ACTIVE | Noted: 2024-06-11

## 2024-06-11 PROCEDURE — 1159F MED LIST DOCD IN RCRD: CPT | Mod: CPTII,95,, | Performed by: INTERNAL MEDICINE

## 2024-06-11 PROCEDURE — 99214 OFFICE O/P EST MOD 30 MIN: CPT | Mod: 95,,, | Performed by: INTERNAL MEDICINE

## 2024-06-11 PROCEDURE — 1160F RVW MEDS BY RX/DR IN RCRD: CPT | Mod: CPTII,95,, | Performed by: INTERNAL MEDICINE

## 2024-06-11 RX ORDER — METHYLPREDNISOLONE 4 MG/1
TABLET ORAL
Qty: 21 TABLET | Refills: 0 | Status: SHIPPED | OUTPATIENT
Start: 2024-06-11

## 2024-06-11 NOTE — PROGRESS NOTES
RHEUMATOLOGY FOLLOW UP - TELE VISIT     The patient location is: [place]  The chief complaint leading to consultation is:  Follow up visit  Visit type: Virtual visit with synchronous audio and video  Total time spent with patient:  15 minutes  Each patient to whom he or she provides medical services by telemedicine is:  (1) informed of the relationship between the physician and patient and the respective role of any other health care provider with respect to management of the patient; and (2) notified that he or she may decline to receive medical services by telemedicine and may withdraw from such care at any time.    Chief complaints, HPI, ROS, EXAM, Assessment & Plans:-  Stefanie Felicia Kimbrough a 38 y.o. pleasant female seen today for follow up visit.   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  Complains of severe worsening bilateral hand and wrist swelling for the past week.  Also complains of loss of taste and smell sensations since having sinus infection for the past week.  On antibiotic therapy prescribed by primary care physician.  Took methotrexate earlier this week.  Forgotten the she needs to hold methotrexate with any infection.  No fever.  Rheumatological review of system negative otherwise.  Exam shows 100% fist formation bilateral hands    1. Scleroderma, diffuse    2. Rheumatoid arthritis of multiple sites with negative rheumatoid factor    3. Drug-induced immunodeficiency    4. MCTD (mixed connective tissue disease)    5. Encounter for medication monitoring    6. Acute non-recurrent sinusitis of other sinus      Problem List Items Addressed This Visit       Acute non-recurrent sinusitis    Relevant Medications    methylPREDNISolone (MEDROL DOSEPACK) 4 mg tablet    Drug-induced immunodeficiency    Encounter for medication monitoring    MCTD (mixed connective tissue disease)    Rheumatoid  arthritis of multiple sites with negative rheumatoid factor    Relevant Medications    methylPREDNISolone (MEDROL DOSEPACK) 4 mg tablet    Scleroderma, diffuse - Primary    Relevant Medications    methylPREDNISolone (MEDROL DOSEPACK) 4 mg tablet      Latest Reference Range & Units Most Recent   MELVA Screen Negative <1:80  Positive !  6/16/22 11:45   MELVA HEP-2 Titer  Positive >=1:2560 Speckled  8/30/17 14:50   MELVA Titer 1  >=1:2560  6/16/22 11:45   MELVA PATTERN 1  Speckled  6/16/22 11:45   ds DNA Ab Negative 1:10  Negative 1:10  6/16/22 11:45   Anti-SSA Antibody 0.00 - 0.99 Ratio 0.07  6/16/22 11:45   Anti-SSA Interpretation Negative  Negative  6/16/22 11:45   Anti-SSB Antibody 0.00 - 0.99 Ratio 0.04  6/16/22 11:45   Anti-SSB Interpretation Negative  Negative  6/16/22 11:45   Anti Sm Antibody 0.00 - 0.99 Ratio 0.25  6/16/22 11:45   Anti-Sm Interpretation Negative  Negative  6/16/22 11:45   Anti Sm/RNP Antibody 0.00 - 0.99 Ratio 5.45 (H)  6/16/22 11:45   Anti-Sm/RNP Interpretation Negative  Positive !  6/16/22 11:45   Scleroderma SCL- <20 UNITS 4  10/2/19 09:52   Complement (C-3) 50 - 180 mg/dL 130  10/2/23 15:50   Complement (C-4) 11 - 44 mg/dL 16  10/2/23 15:50   IgG 650 - 1600 mg/dL 3990 (H)  12/28/22 13:46   IgM 50 - 300 mg/dL 326 (H)  12/28/22 13:46   IgA 40 - 350 mg/dL 353 (H)  12/28/22 13:46   Protein, Serum 6.0 - 8.4 g/dL 9.2 (H)  12/28/22 13:46   Albumin grams/dl 3.35 - 5.55 g/dL 3.58  12/28/22 13:46   Alpha-1 grams/dl 0.17 - 0.41 g/dL 0.31  12/28/22 13:46   Alpha-2 0.43 - 0.99 g/dL 0.68  12/28/22 13:46   Beta 0.50 - 1.10 g/dL 0.98  12/28/22 13:46   Gamma 0.67 - 1.58 g/dL 3.64 (H)  12/28/22 13:46   Pathologist Interpretation SPE  REVIEWED  12/28/22 13:46   Pathologist Interpretation ANALI  REVIEWED  12/28/22 13:46   Immunofix Interp.  SEE COMMENT  12/28/22 13:46   Cryoglobulin, Qual Absent  Absent  1/18/23 14:46   Anti-Dilcia-1 Antibody <20 Units <20  12/1/17 16:30   Anti-PM/Scl Ab <20 Units <20  12/1/17 16:30    Anti-SS-A 52 kD Ab, IgG <20 Units <20  12/1/17 16:30   Anti-U1-RNP  Ab <20 Units 143 (H)  12/1/17 16:30   EJ Negative  Negative  12/1/17 16:30   Fibrillarin (U3 RNP) Negative  Negative  12/1/17 16:30   Ku Negative  Negative  12/1/17 16:30   MDA-5 (P140) (CADM-140) <20 Units <20  12/1/17 16:30   MI-2 Negative  Negative  12/1/17 16:30   NXP-2 (P140) <20 Units <20  12/1/17 16:30   OJ Negative  Negative  12/1/17 16:30   PL-12 Negative  Negative  12/1/17 16:30   PL-7 Negative  Negative  12/1/17 16:30   SRP Negative  Negative  12/1/17 16:30   TIF1 GAMMA (P155/140) <20 Units <20  12/1/17 16:30   U2 snRNP Negative  Moderate Positive !  12/1/17 16:30   !: Data is abnormal  (H): Data is abnormally high     Latest Reference Range & Units 06/04/24 15:06   WBC 3.90 - 12.70 K/uL 9.33   RBC 4.00 - 5.40 M/uL 3.60 (L)   Hemoglobin 12.0 - 16.0 g/dL 10.9 (L)   Hematocrit 37.0 - 48.5 % 33.1 (L)   MCV 82 - 98 fL 92   MCH 27.0 - 31.0 pg 30.3   MCHC 32.0 - 36.0 g/dL 32.9   RDW 11.5 - 14.5 % 13.1   Platelet Count 150 - 450 K/uL 448   MPV 9.2 - 12.9 fL 8.5 (L)   Gran % 38.0 - 73.0 % 72.7   Lymph % 18.0 - 48.0 % 11.9 (L)   Mono % 4.0 - 15.0 % 10.8   Eos % 0.0 - 8.0 % 4.2   Basophil % 0.0 - 1.9 % 0.4   Immature Granulocytes 0.0 - 0.5 % 0.3   Gran # (ANC) 1.8 - 7.7 K/uL 6.8   Lymph # 1.0 - 4.8 K/uL 1.1   Mono # 0.3 - 1.0 K/uL 1.0   Eos # 0.0 - 0.5 K/uL 0.4   Baso # 0.00 - 0.20 K/uL 0.04   Immature Grans (Abs) 0.00 - 0.04 K/uL 0.03   nRBC 0 /100 WBC 0   Differential Method  Automated   (L): Data is abnormally low      Persistent severe sinus infection on antibiotic therapy.  Add Medrol Dosepak.  Arthritis flare could be secondary to recent infection.  Monitor after resolution of infection and completion of Medrol Dosepak.  Active RA.On rituxan and methotrexate  20 mg weekly IM injections.   Active scleroderma. IgG shows improvement along with skin . Continue Rituximab 1000 mg * 2 doses * 2 weeks apart every 4 months.   Continue bactrim for  PJP prophylaxis.   Daily folic acid for MTX therapy.   Failed multiple medications including Cytoxan in the past.  If she fails rituximab, restart Cytoxan at a higher dose.   Continue methotrexate with folic acid.  Advised all risks including life-threatening infections and pregnancy morbidities.  Drug induced immunodeficiency due to use of immunosuppressive drugs. Monitor carefully for infections. Advised to get immediate medical care if any infection. Also advised strict adherence to age appropriate vaccinations and cancer screenings with PCP.  I have explained all of the above in detail and the patient understands all of the current recommendation(s). I have answered all questions to the best of my ability and to their complete satisfaction.           Past Medical History:   Diagnosis Date    Abnormal Pap smear of cervix     repeat was normal    Lupus (systemic lupus erythematosus)        Past Surgical History:   Procedure Laterality Date    ESOPHAGOGASTRODUODENOSCOPY N/A 2/29/2024    Procedure: EGD (ESOPHAGOGASTRODUODENOSCOPY);  Surgeon: Gregg Reyez MD;  Location: [facility];  Service: Endoscopy;  Laterality: N/A;    INSERTION OF TUNNELED CENTRAL VENOUS CATHETER (CVC) WITH SUBCUTANEOUS PORT N/A 05/24/2023    Procedure: [id number];  Surgeon: Taco Zimmer MD;  Location: [facility];  Service: General;  Laterality: N/A;    ROBOT-ASSISTED CHOLECYSTECTOMY USING DA CRISTOPHER XI N/A 12/11/2020    Procedure: XI ROBOTIC CHOLECYSTECTOMY;  Surgeon: Taco Zimmer MD;  Location: [facility] OR;  Service: General;  Laterality: N/A;    ROBOT-ASSISTED LAPAROSCOPIC LYSIS OF ADHESIONS USING DA CRISTOPHER XI  12/11/2020    Procedure: XI ROBOTIC LYSIS, ADHESIONS;  Surgeon: Taco Zimmer MD;  Location: [facility] OR;  Service: General;;    ROBOT-ASSISTED LAPAROSCOPIC SALPINGO-OOPHORECTOMY USING DA CRISTOPHER XI Left 12/11/2020    Procedure: XI ROBOTIC SALPINGO-OOPHORECTOMY;  Surgeon: SHERLEY Wakefield MD;  Location: [facility];   Service: OB/GYN;  Laterality: Left;    TUBAL LIGATION  2005        Social History     Tobacco Use    Smoking status: Never     Passive exposure: Never    Smokeless tobacco: Never   Substance Use Topics    Alcohol use: No    Drug use: No       Family History   Problem Relation Name Age of Onset    Liver cancer Maternal Aunt      Breast cancer Maternal Aunt      Breast cancer Paternal Grandmother      Colon cancer Neg Hx      Ovarian cancer Neg Hx      Thrombosis Neg Hx         Review of patient's allergies indicates:   Allergen Reactions    Elavil [amitriptyline]        Medication List with Changes/Refills   New Medications    METHYLPREDNISOLONE (MEDROL DOSEPACK) 4 MG TABLET    use as directed   Current Medications    AMOXICILLIN-CLAVULANATE 875-125MG (AUGMENTIN) 875-125 MG PER TABLET    Take 1 tablet by mouth every 12 (twelve) hours.    BUMETANIDE (BUMEX) 1 MG TABLET    Take 1 tablet (1 mg total) by mouth once daily.    CLOBETASOL (TEMOVATE) 0.05 % CREAM    Apply topically 2 (two) times daily.    CLOBETASOL (TEMOVATE) 0.05 % EXTERNAL SOLUTION    Apply topically 2 (two) times daily.    COLCHICINE (COLCRYS) 0.6 MG TABLET    Take 1 tablet (0.6 mg total) by mouth 2 (two) times daily.    FAMOTIDINE (PEPCID) 20 MG TABLET    Take 1 tablet (20 mg total) by mouth 2 (two) times daily.    FLUTICASONE PROPIONATE (FLONASE) 50 MCG/ACTUATION NASAL SPRAY    1 spray (50 mcg total) by Each Nostril route once daily.    FOLIC ACID (FOLVITE) 1 MG TABLET    Take 1 tablet (1 mg total) by mouth once daily.    GABAPENTIN (NEURONTIN) 300 MG CAPSULE    Take 1 capsule (300 mg total) by mouth 3 (three) times daily.    HYDROCODONE-ACETAMINOPHEN (NORCO) 5-325 MG PER TABLET    Take 1 tablet by mouth every 8 (eight) hours as needed.    IPRATROPIUM (ATROVENT) 21 MCG (0.03 %) NASAL SPRAY    2 sprays by Each Nostril route 3 (three) times daily.    KETOCONAZOLE (NIZORAL) 2 % SHAMPOO    Apply topically twice a week.    LIDOCAINE-PRILOCAINE (EMLA) CREAM     Apply topically as needed (port access).    METHOTREXATE 25 MG/ML INJECTION    Inject 0.8 mLs (20 mg total) into the muscle every 7 days.    MOMETASONE 0.1% (ELOCON) 0.1 % CREAM    Apply topically once daily.    MONTELUKAST (SINGULAIR) 10 MG TABLET    Take 1 tablet (10 mg total) by mouth every evening.    OMEPRAZOLE (PRILOSEC) 40 MG CAPSULE    Take 1 capsule (40 mg total) by mouth once daily.    ONDANSETRON (ZOFRAN-ODT) 4 MG TBDL    Take 1 tablet (4 mg total) by mouth every 8 (eight) hours as needed (nausea).    PANTOPRAZOLE (PROTONIX) 40 MG TABLET    Take 1 tablet (40 mg total) by mouth 2 (two) times daily.    PREDNISONE (DELTASONE) 5 MG TABLET    Take 1 tablet (5 mg total) by mouth once daily.    PROMETHAZINE-DEXTROMETHORPHAN (PROMETHAZINE-DM) 6.25-15 MG/5 ML SYRP    Take 5 mLs by mouth every 6 (six) hours as needed (cough).    TACROLIMUS (PROTOPIC) 0.1 % OINTMENT    Apply topically 2 (two) times daily.    TRAMADOL (ULTRAM) 50 MG TABLET    TAKE 1 TABLET BY MOUTH EVERY 12 HOURS AS NEEDED FOR PAIN    TRAZODONE (DESYREL) 50 MG TABLET    Take 1 tablet (50 mg total) by mouth every evening.       Disclaimer: This note was prepared using voice recognition system and is likely to have sound alike errors and is not proof read.  Please call me with any questions.

## 2024-07-18 DIAGNOSIS — R21 MALAR RASH: ICD-10-CM

## 2024-07-18 DIAGNOSIS — L94.0 MORPHEA: ICD-10-CM

## 2024-07-18 DIAGNOSIS — L81.9 DYSCHROMIA: ICD-10-CM

## 2024-07-18 RX ORDER — TACROLIMUS 1 MG/G
OINTMENT TOPICAL 2 TIMES DAILY
Qty: 100 G | Refills: 3 | Status: SHIPPED | OUTPATIENT
Start: 2024-07-18

## 2024-08-09 ENCOUNTER — LAB VISIT (OUTPATIENT)
Dept: LAB | Facility: HOSPITAL | Age: 39
End: 2024-08-09
Attending: ALLERGY & IMMUNOLOGY
Payer: COMMERCIAL

## 2024-08-09 ENCOUNTER — OFFICE VISIT (OUTPATIENT)
Dept: ALLERGY | Facility: CLINIC | Age: 39
End: 2024-08-09
Payer: COMMERCIAL

## 2024-08-09 VITALS
TEMPERATURE: 98 F | WEIGHT: 217.13 LBS | HEART RATE: 81 BPM | SYSTOLIC BLOOD PRESSURE: 111 MMHG | DIASTOLIC BLOOD PRESSURE: 74 MMHG | BODY MASS INDEX: 33.02 KG/M2

## 2024-08-09 DIAGNOSIS — Z86.2 HISTORY OF EOSINOPHILIA: ICD-10-CM

## 2024-08-09 DIAGNOSIS — J30.1 ALLERGIC RHINITIS DUE TO GRASS POLLEN: Primary | ICD-10-CM

## 2024-08-09 DIAGNOSIS — J31.0 GUSTATORY RHINITIS: ICD-10-CM

## 2024-08-09 DIAGNOSIS — L85.3 XEROSIS OF SKIN: ICD-10-CM

## 2024-08-09 LAB
BASOPHILS # BLD AUTO: 0.05 K/UL (ref 0–0.2)
BASOPHILS NFR BLD: 0.8 % (ref 0–1.9)
DIFFERENTIAL METHOD BLD: ABNORMAL
EOSINOPHIL # BLD AUTO: 0.8 K/UL (ref 0–0.5)
EOSINOPHIL NFR BLD: 11.7 % (ref 0–8)
ERYTHROCYTE [DISTWIDTH] IN BLOOD BY AUTOMATED COUNT: 12.9 % (ref 11.5–14.5)
HCT VFR BLD AUTO: 33.5 % (ref 37–48.5)
HGB BLD-MCNC: 10.4 G/DL (ref 12–16)
IMM GRANULOCYTES # BLD AUTO: 0.02 K/UL (ref 0–0.04)
IMM GRANULOCYTES NFR BLD AUTO: 0.3 % (ref 0–0.5)
LYMPHOCYTES # BLD AUTO: 0.9 K/UL (ref 1–4.8)
LYMPHOCYTES NFR BLD: 14.4 % (ref 18–48)
MCH RBC QN AUTO: 30.1 PG (ref 27–31)
MCHC RBC AUTO-ENTMCNC: 31 G/DL (ref 32–36)
MCV RBC AUTO: 97 FL (ref 82–98)
MONOCYTES # BLD AUTO: 1 K/UL (ref 0.3–1)
MONOCYTES NFR BLD: 15 % (ref 4–15)
NEUTROPHILS # BLD AUTO: 3.7 K/UL (ref 1.8–7.7)
NEUTROPHILS NFR BLD: 57.8 % (ref 38–73)
NRBC BLD-RTO: 0 /100 WBC
PLATELET # BLD AUTO: 450 K/UL (ref 150–450)
PMV BLD AUTO: 9.3 FL (ref 9.2–12.9)
RBC # BLD AUTO: 3.45 M/UL (ref 4–5.4)
WBC # BLD AUTO: 6.47 K/UL (ref 3.9–12.7)

## 2024-08-09 PROCEDURE — G2211 COMPLEX E/M VISIT ADD ON: HCPCS | Mod: S$GLB,,, | Performed by: ALLERGY & IMMUNOLOGY

## 2024-08-09 PROCEDURE — 85025 COMPLETE CBC W/AUTO DIFF WBC: CPT | Performed by: ALLERGY & IMMUNOLOGY

## 2024-08-09 PROCEDURE — 3008F BODY MASS INDEX DOCD: CPT | Mod: CPTII,S$GLB,, | Performed by: ALLERGY & IMMUNOLOGY

## 2024-08-09 PROCEDURE — 99999 PR PBB SHADOW E&M-EST. PATIENT-LVL III: CPT | Mod: PBBFAC,,, | Performed by: ALLERGY & IMMUNOLOGY

## 2024-08-09 PROCEDURE — 36415 COLL VENOUS BLD VENIPUNCTURE: CPT | Performed by: ALLERGY & IMMUNOLOGY

## 2024-08-09 PROCEDURE — 99215 OFFICE O/P EST HI 40 MIN: CPT | Mod: S$GLB,,, | Performed by: ALLERGY & IMMUNOLOGY

## 2024-08-09 PROCEDURE — 3078F DIAST BP <80 MM HG: CPT | Mod: CPTII,S$GLB,, | Performed by: ALLERGY & IMMUNOLOGY

## 2024-08-09 PROCEDURE — 3074F SYST BP LT 130 MM HG: CPT | Mod: CPTII,S$GLB,, | Performed by: ALLERGY & IMMUNOLOGY

## 2024-08-09 RX ORDER — IPRATROPIUM BROMIDE 21 UG/1
2 SPRAY, METERED NASAL 3 TIMES DAILY
Qty: 20 ML | Refills: 5 | Status: SHIPPED | OUTPATIENT
Start: 2024-08-09

## 2024-08-09 NOTE — PATIENT INSTRUCTIONS
Hepa air filter in your bedroom.  Pollen is the highest early morning. Avoid early morning activities, if possible.  Keep windows up and cars clean during pollen season.  Remove all clothing and shower at the end of the day to remove pollen.      vanicream

## 2024-08-09 NOTE — PROGRESS NOTES
Subjective:      Patient ID: Stefanie España is a 38 y.o. female.    Chief Complaint:  Follow-up      HPI:     8/9/2024:  38 year old female allergic rhinitis- grass, history of eosinophilia, previously seen for a chronic cough  Itchy legs and arms  Cough has improved  Scleroderma- prednisone as needed    Runny nose- with eating- Atrovent ordered at previous visit- At home she uses it, but forgets to take with her when eating out.    She is from [place].      2/23/2024: 38 year old female referred by Dr. Whalen for cough and eosinophilia. She has a diagnosis of lupus and scleroderma.  [facility]  Cough for one year- non-productive  She reports trying antihistamines and cold preparations do not alleviate the cough  Perfumes/odors can trigger  She reports that her  feels her cough is worse at night.  She reports heartburn that is being worked up presently.  She reports mild runny nose and it is exacerbate by certain foods.  She denies itchy or watery eyes.    She denies asthma, or wheezing. No shortness of breath.    No history of food allergies.    She is from [place], moved here 20 years ago.    She denies taking herbal medications.    Theraflu last night.    Past Medical History:   Diagnosis Date    Abnormal Pap smear of cervix     repeat was normal    Lupus (systemic lupus erythematosus)         Family History   Problem Relation Name Age of Onset    Liver cancer Maternal Aunt      Breast cancer Maternal Aunt      Breast cancer Paternal Grandmother      Colon cancer Neg Hx      Ovarian cancer Neg Hx      Thrombosis Neg Hx          Current Outpatient Medications on File Prior to Visit   Medication Sig Dispense Refill    amoxicillin-clavulanate 875-125mg (AUGMENTIN) 875-125 mg per tablet Take 1 tablet by mouth every 12 (twelve) hours. 20 tablet 0    bumetanide (BUMEX) 1 MG tablet Take 1 tablet (1 mg total) by mouth once daily. (Patient taking differently: Take 1 mg by mouth once daily.)  10 tablet 0    clobetasoL (TEMOVATE) 0.05 % cream Apply topically 2 (two) times daily. 60 g 2    clobetasoL (TEMOVATE) 0.05 % external solution Apply topically 2 (two) times daily. 50 mL 1    famotidine (PEPCID) 20 MG tablet Take 1 tablet (20 mg total) by mouth 2 (two) times daily. 60 tablet 11    fluticasone propionate (FLONASE) 50 mcg/actuation nasal spray 1 spray (50 mcg total) by Each Nostril route once daily. 18.2 mL 1    folic acid (FOLVITE) 1 MG tablet Take 1 tablet (1 mg total) by mouth once daily. 90 tablet 0    HYDROcodone-acetaminophen (NORCO) 5-325 mg per tablet Take 1 tablet by mouth every 8 (eight) hours as needed.      ketoconazole (NIZORAL) 2 % shampoo Apply topically twice a week. 200 mL 1    LIDOcaine-prilocaine (EMLA) cream Apply topically as needed (port access). 5 g 3    methotrexate 25 mg/mL injection Inject 0.8 mLs (20 mg total) into the muscle every 7 days. 4 mL 3    methylPREDNISolone (MEDROL DOSEPACK) 4 mg tablet use as directed 21 tablet 0    mometasone 0.1% (ELOCON) 0.1 % cream Apply topically once daily. 50 g 1    omeprazole (PRILOSEC) 40 MG capsule Take 1 capsule (40 mg total) by mouth once daily. 30 capsule 11    ondansetron (ZOFRAN-ODT) 4 MG TbDL Take 1 tablet (4 mg total) by mouth every 8 (eight) hours as needed (nausea). 30 tablet 0    pantoprazole (PROTONIX) 40 MG tablet Take 1 tablet (40 mg total) by mouth 2 (two) times daily. 60 tablet 11    predniSONE (DELTASONE) 5 MG tablet Take 1 tablet (5 mg total) by mouth once daily. 90 tablet 3    tacrolimus (PROTOPIC) 0.1 % ointment Apply topically 2 (two) times daily. 100 g 3    traMADoL (ULTRAM) 50 mg tablet TAKE 1 TABLET BY MOUTH EVERY 12 HOURS AS NEEDED FOR PAIN 60 tablet 3    colchicine (COLCRYS) 0.6 mg tablet Take 1 tablet (0.6 mg total) by mouth 2 (two) times daily. 60 tablet 11    gabapentin (NEURONTIN) 300 MG capsule Take 1 capsule (300 mg total) by mouth 3 (three) times daily. 90 capsule 11    traZODone (DESYREL) 50 MG tablet  Take 1 tablet (50 mg total) by mouth every evening. (Patient taking differently: Take 50 mg by mouth nightly as needed.) 30 tablet 11     Current Facility-Administered Medications on File Prior to Visit   Medication Dose Route Frequency Provider Last Rate Last Admin    LIDOcaine (PF) 10 mg/ml (1%) injection 10 mg  1 mL Intradermal Once Taco Zimmer MD            Review of patient's allergies indicates:   Allergen Reactions    Elavil [amitriptyline]         Environmental History: Pets in the home: none.  Tobacco Smoke in Home: no  Review of Systems   Constitutional:  Negative for chills and fever.   HENT:  Negative for congestion and rhinorrhea.    Eyes:  Negative for discharge and itching.   Respiratory:  Positive for cough. Negative for shortness of breath and wheezing.    Allergic/Immunologic: Negative for environmental allergies and food allergies.   Neurological:  Negative for facial asymmetry and speech difficulty.       Objective:   Physical Exam  Vitals reviewed.   Constitutional:       General: She is not in acute distress.     Appearance: Normal appearance. She is well-developed. She is not ill-appearing, toxic-appearing or diaphoretic.   HENT:      Head: Normocephalic and atraumatic.      Right Ear: Tympanic membrane, ear canal and external ear normal. There is no impacted cerumen.      Left Ear: Tympanic membrane, ear canal and external ear normal. There is no impacted cerumen.      Nose: Nose normal. No congestion or rhinorrhea.      Mouth/Throat:      Pharynx: No oropharyngeal exudate or posterior oropharyngeal erythema.   Eyes:      General: No scleral icterus.        Right eye: No discharge.         Left eye: No discharge.      Pupils: Pupils are equal, round, and reactive to light.   Neck:      Thyroid: No thyromegaly.   Cardiovascular:      Rate and Rhythm: Normal rate and regular rhythm.      Heart sounds: Normal heart sounds. No murmur heard.     No friction rub. No gallop.   Pulmonary:       Effort: Pulmonary effort is normal. No respiratory distress.      Breath sounds: Normal breath sounds. No stridor. No wheezing, rhonchi or rales.   Chest:      Chest wall: No tenderness.   Musculoskeletal:         General: No swelling, tenderness, deformity or signs of injury. Normal range of motion.      Cervical back: Normal range of motion and neck supple. No rigidity or tenderness. No muscular tenderness.      Right lower leg: No edema.      Left lower leg: No edema.   Lymphadenopathy:      Cervical: No cervical adenopathy.   Skin:     General: Skin is warm.      Coloration: Skin is not jaundiced or pale.      Findings: Rash present. No bruising or erythema.      Comments: Hypopigmented lesions on face and neck   Neurological:      General: No focal deficit present.      Mental Status: She is alert and oriented to person, place, and time.      Gait: Gait normal.   Psychiatric:         Mood and Affect: Mood normal.         Behavior: Behavior normal.         Thought Content: Thought content normal.         Judgment: Judgment normal.       6/4/2024: Eosinophil count 400  Assessment:      1. Allergic rhinitis due to grass pollen    2. History of eosinophilia    3. Gustatory rhinitis    4. Xerosis of skin          Plan:     Allergic rhinitis due to grass pollen    History of eosinophilia  -     CBC Auto Differential; Future; Expected date: 08/09/2024    Gustatory rhinitis  -     ipratropium (ATROVENT) 21 mcg (0.03 %) nasal spray; 2 sprays by Each Nostril route 3 (three) times daily.  Dispense: 20 mL; Refill: 5    Xerosis of skin      Recommend she take Atrovent  Xerosis- Zyrtec, placing creams on skin multiple times a day      Labs ordered.    Visit today included increased complexity associated with the care of the episodic problem Allergic Rhinitis and gustatory rhinitis addressed and managing the longitudinal care of the patient due to the serious and/or complex managed problem(s)  Allergic Rhinitis and Gustatory  Rhinitis.      RTC 4-5 weeks or sooner, if needed     CNADE ALONSO spent a total of 47 minutes on the day of the visit.This includes face to face time and non-face to face time preparing to see the patient (eg, review of tests), obtaining and/or reviewing separately obtained history, documenting clinical information in the electronic or other health record, independently interpreting results and communicating results to the patient/family/caregiver, or care coordinator.

## 2024-08-12 DIAGNOSIS — D72.10 EOSINOPHILIA, UNSPECIFIED TYPE: Primary | ICD-10-CM

## 2024-08-22 ENCOUNTER — LAB VISIT (OUTPATIENT)
Dept: LAB | Facility: HOSPITAL | Age: 39
End: 2024-08-22
Attending: INTERNAL MEDICINE
Payer: COMMERCIAL

## 2024-08-22 DIAGNOSIS — D84.821 DRUG-INDUCED IMMUNODEFICIENCY: ICD-10-CM

## 2024-08-22 DIAGNOSIS — Z79.899 LONG-TERM CURRENT USE OF HIGH RISK MEDICATION OTHER THAN ANTICOAGULANT: ICD-10-CM

## 2024-08-22 DIAGNOSIS — Z79.899 DRUG-INDUCED IMMUNODEFICIENCY: ICD-10-CM

## 2024-08-22 DIAGNOSIS — Z79.899 HIGH RISK MEDICATION USE: ICD-10-CM

## 2024-08-22 LAB
ALBUMIN SERPL BCP-MCNC: 3.5 G/DL (ref 3.5–5.2)
ALP SERPL-CCNC: 63 U/L (ref 55–135)
ALT SERPL W/O P-5'-P-CCNC: 30 U/L (ref 10–44)
ANION GAP SERPL CALC-SCNC: 11 MMOL/L (ref 8–16)
AST SERPL-CCNC: 31 U/L (ref 10–40)
B-HCG UR QL: NEGATIVE
BASOPHILS # BLD AUTO: 0.03 K/UL (ref 0–0.2)
BASOPHILS NFR BLD: 1 % (ref 0–1.9)
BILIRUB SERPL-MCNC: 0.2 MG/DL (ref 0.1–1)
BUN SERPL-MCNC: 9 MG/DL (ref 6–20)
CALCIUM SERPL-MCNC: 9.4 MG/DL (ref 8.7–10.5)
CHLORIDE SERPL-SCNC: 105 MMOL/L (ref 95–110)
CO2 SERPL-SCNC: 24 MMOL/L (ref 23–29)
CREAT SERPL-MCNC: 0.6 MG/DL (ref 0.5–1.4)
CRP SERPL-MCNC: 9.7 MG/L (ref 0–8.2)
DIFFERENTIAL METHOD BLD: ABNORMAL
EOSINOPHIL # BLD AUTO: 0.6 K/UL (ref 0–0.5)
EOSINOPHIL NFR BLD: 19.5 % (ref 0–8)
ERYTHROCYTE [DISTWIDTH] IN BLOOD BY AUTOMATED COUNT: 12.7 % (ref 11.5–14.5)
ERYTHROCYTE [SEDIMENTATION RATE] IN BLOOD BY WESTERGREN METHOD: 62 MM/HR (ref 0–20)
EST. GFR  (NO RACE VARIABLE): >60 ML/MIN/1.73 M^2
GLUCOSE SERPL-MCNC: 88 MG/DL (ref 70–110)
HCT VFR BLD AUTO: 34.8 % (ref 37–48.5)
HGB BLD-MCNC: 11.3 G/DL (ref 12–16)
IGG SERPL-MCNC: 2505 MG/DL (ref 650–1600)
IMM GRANULOCYTES # BLD AUTO: 0 K/UL (ref 0–0.04)
IMM GRANULOCYTES NFR BLD AUTO: 0 % (ref 0–0.5)
LYMPHOCYTES # BLD AUTO: 0.6 K/UL (ref 1–4.8)
LYMPHOCYTES NFR BLD: 21.1 % (ref 18–48)
MCH RBC QN AUTO: 30.5 PG (ref 27–31)
MCHC RBC AUTO-ENTMCNC: 32.5 G/DL (ref 32–36)
MCV RBC AUTO: 94 FL (ref 82–98)
MONOCYTES # BLD AUTO: 0.4 K/UL (ref 0.3–1)
MONOCYTES NFR BLD: 13.4 % (ref 4–15)
NEUTROPHILS # BLD AUTO: 1.3 K/UL (ref 1.8–7.7)
NEUTROPHILS NFR BLD: 45 % (ref 38–73)
NRBC BLD-RTO: 0 /100 WBC
PLATELET # BLD AUTO: 456 K/UL (ref 150–450)
PMV BLD AUTO: 8.4 FL (ref 9.2–12.9)
POTASSIUM SERPL-SCNC: 3.8 MMOL/L (ref 3.5–5.1)
PROT SERPL-MCNC: 8.4 G/DL (ref 6–8.4)
RBC # BLD AUTO: 3.71 M/UL (ref 4–5.4)
SODIUM SERPL-SCNC: 140 MMOL/L (ref 136–145)
WBC # BLD AUTO: 2.98 K/UL (ref 3.9–12.7)

## 2024-08-22 PROCEDURE — 36415 COLL VENOUS BLD VENIPUNCTURE: CPT | Mod: PO | Performed by: INTERNAL MEDICINE

## 2024-08-22 PROCEDURE — 81025 URINE PREGNANCY TEST: CPT | Mod: PO | Performed by: INTERNAL MEDICINE

## 2024-08-22 PROCEDURE — 85025 COMPLETE CBC W/AUTO DIFF WBC: CPT | Mod: PO | Performed by: INTERNAL MEDICINE

## 2024-08-22 PROCEDURE — 82784 ASSAY IGA/IGD/IGG/IGM EACH: CPT | Performed by: INTERNAL MEDICINE

## 2024-08-22 PROCEDURE — 85651 RBC SED RATE NONAUTOMATED: CPT | Mod: PO | Performed by: INTERNAL MEDICINE

## 2024-08-22 PROCEDURE — 86140 C-REACTIVE PROTEIN: CPT | Performed by: INTERNAL MEDICINE

## 2024-08-22 PROCEDURE — 80053 COMPREHEN METABOLIC PANEL: CPT | Performed by: INTERNAL MEDICINE

## 2024-08-29 ENCOUNTER — INFUSION (OUTPATIENT)
Dept: INFUSION THERAPY | Facility: HOSPITAL | Age: 39
End: 2024-08-29
Attending: PHYSICIAN ASSISTANT
Payer: COMMERCIAL

## 2024-08-29 VITALS
HEART RATE: 86 BPM | DIASTOLIC BLOOD PRESSURE: 92 MMHG | RESPIRATION RATE: 18 BRPM | HEIGHT: 68 IN | OXYGEN SATURATION: 100 % | WEIGHT: 218.69 LBS | SYSTOLIC BLOOD PRESSURE: 141 MMHG | TEMPERATURE: 99 F | BODY MASS INDEX: 33.15 KG/M2

## 2024-08-29 DIAGNOSIS — M06.09 RHEUMATOID ARTHRITIS OF MULTIPLE SITES WITH NEGATIVE RHEUMATOID FACTOR: ICD-10-CM

## 2024-08-29 DIAGNOSIS — M34.9 SCLERODERMA, DIFFUSE: ICD-10-CM

## 2024-08-29 DIAGNOSIS — M35.1 MCTD (MIXED CONNECTIVE TISSUE DISEASE): Primary | ICD-10-CM

## 2024-08-29 PROCEDURE — 96366 THER/PROPH/DIAG IV INF ADDON: CPT

## 2024-08-29 PROCEDURE — 63600175 PHARM REV CODE 636 W HCPCS: Performed by: INTERNAL MEDICINE

## 2024-08-29 PROCEDURE — 96375 TX/PRO/DX INJ NEW DRUG ADDON: CPT

## 2024-08-29 PROCEDURE — 25000003 PHARM REV CODE 250: Performed by: INTERNAL MEDICINE

## 2024-08-29 PROCEDURE — 96415 CHEMO IV INFUSION ADDL HR: CPT

## 2024-08-29 PROCEDURE — 96413 CHEMO IV INFUSION 1 HR: CPT

## 2024-08-29 PROCEDURE — 96365 THER/PROPH/DIAG IV INF INIT: CPT

## 2024-08-29 RX ORDER — SODIUM CHLORIDE 0.9 % (FLUSH) 0.9 %
10 SYRINGE (ML) INJECTION
OUTPATIENT
Start: 2024-09-12

## 2024-08-29 RX ORDER — DIPHENHYDRAMINE HYDROCHLORIDE 50 MG/ML
25 INJECTION INTRAMUSCULAR; INTRAVENOUS
Status: COMPLETED | OUTPATIENT
Start: 2024-08-29 | End: 2024-08-29

## 2024-08-29 RX ORDER — DIPHENHYDRAMINE HYDROCHLORIDE 50 MG/ML
50 INJECTION INTRAMUSCULAR; INTRAVENOUS ONCE AS NEEDED
OUTPATIENT
Start: 2024-09-12

## 2024-08-29 RX ORDER — ACETAMINOPHEN 325 MG/1
650 TABLET ORAL
Status: COMPLETED | OUTPATIENT
Start: 2024-08-29 | End: 2024-08-29

## 2024-08-29 RX ORDER — EPINEPHRINE 0.3 MG/.3ML
0.3 INJECTION SUBCUTANEOUS ONCE AS NEEDED
OUTPATIENT
Start: 2024-09-12

## 2024-08-29 RX ORDER — ACETAMINOPHEN 325 MG/1
650 TABLET ORAL
OUTPATIENT
Start: 2024-09-12

## 2024-08-29 RX ORDER — FAMOTIDINE 10 MG/ML
20 INJECTION INTRAVENOUS
OUTPATIENT
Start: 2024-09-12

## 2024-08-29 RX ORDER — MEPERIDINE HYDROCHLORIDE 50 MG/ML
25 INJECTION INTRAMUSCULAR; INTRAVENOUS; SUBCUTANEOUS
OUTPATIENT
Start: 2024-09-12 | End: 2024-09-13

## 2024-08-29 RX ORDER — FAMOTIDINE 10 MG/ML
20 INJECTION INTRAVENOUS
Status: COMPLETED | OUTPATIENT
Start: 2024-08-29 | End: 2024-08-29

## 2024-08-29 RX ORDER — HEPARIN 100 UNIT/ML
500 SYRINGE INTRAVENOUS
Status: DISCONTINUED | OUTPATIENT
Start: 2024-08-29 | End: 2024-08-29 | Stop reason: HOSPADM

## 2024-08-29 RX ORDER — METHYLPREDNISOLONE SOD SUCC 125 MG
100 VIAL (EA) INJECTION
OUTPATIENT
Start: 2024-09-12

## 2024-08-29 RX ORDER — HEPARIN 100 UNIT/ML
500 SYRINGE INTRAVENOUS
OUTPATIENT
Start: 2024-09-12

## 2024-08-29 RX ORDER — METHYLPREDNISOLONE SOD SUCC 125 MG
100 VIAL (EA) INJECTION
Status: COMPLETED | OUTPATIENT
Start: 2024-08-29 | End: 2024-08-29

## 2024-08-29 RX ADMIN — ACETAMINOPHEN 650 MG: 325 TABLET ORAL at 08:08

## 2024-08-29 RX ADMIN — METHYLPREDNISOLONE SODIUM SUCCINATE 100 MG: 125 INJECTION, POWDER, FOR SOLUTION INTRAMUSCULAR; INTRAVENOUS at 09:08

## 2024-08-29 RX ADMIN — DIPHENHYDRAMINE HYDROCHLORIDE 25 MG: 50 INJECTION INTRAMUSCULAR; INTRAVENOUS at 08:08

## 2024-08-29 RX ADMIN — FAMOTIDINE 20 MG: 10 INJECTION, SOLUTION INTRAVENOUS at 09:08

## 2024-08-29 RX ADMIN — HEPARIN 500 UNITS: 100 SYRINGE at 02:08

## 2024-08-29 RX ADMIN — SODIUM CHLORIDE 1000 MG: 9 INJECTION, SOLUTION INTRAVENOUS at 09:08

## 2024-08-29 NOTE — NURSING
Infusion # Rituxan 1000 mg  Last dose-4/25/24     Any:  -recent illness, infection, or antibiotic use in past week- denies  -open wounds or mouth sores- denies  -invasive procedures or surgeries in past 4 weeks or in upcoming 4 weeks- denies  -vaccinations in past week- denies  -chance you may be pregnant- UPT:neg     Recent labs- 8/22/24  Last provider visit- Seen by Dr. SHORE on 6/11/24     Premeds- 650 mg Tylenol PO, 100 mg Solumedrol IVP over 3 minutes, 20 mg Pepcid IVP over 3 minutes, and 25 mg Benadryl IVP over 3 minutes.       Rituxan 1000 mg administered IV over 4 hours and 19 minutes starting at 50 ml/hr and increasing rate by 50 ml/hr every 30 minutes to a max rate of 400 ml/hr per protocol; see MAR and vitals for more details. Tolerated well without adverse events, discharged and ambulatory out of clinic.

## 2024-08-29 NOTE — PLAN OF CARE
Problem: Adult Inpatient Plan of Care  Goal: Plan of Care Review  Outcome: Progressing  Goal: Patient-Specific Goal (Individualized)  Outcome: Progressing  Goal: Optimal Comfort and Wellbeing  Outcome: Progressing     Problem: Infection  Goal: Absence of Infection Signs and Symptoms  Outcome: Progressing

## 2024-09-12 ENCOUNTER — LAB VISIT (OUTPATIENT)
Dept: LAB | Facility: HOSPITAL | Age: 39
End: 2024-09-12
Payer: COMMERCIAL

## 2024-09-12 ENCOUNTER — INFUSION (OUTPATIENT)
Dept: INFUSION THERAPY | Facility: HOSPITAL | Age: 39
End: 2024-09-12
Attending: PHYSICIAN ASSISTANT
Payer: COMMERCIAL

## 2024-09-12 VITALS
RESPIRATION RATE: 15 BRPM | OXYGEN SATURATION: 99 % | DIASTOLIC BLOOD PRESSURE: 87 MMHG | SYSTOLIC BLOOD PRESSURE: 128 MMHG | BODY MASS INDEX: 33.12 KG/M2 | WEIGHT: 218.5 LBS | TEMPERATURE: 99 F | HEART RATE: 84 BPM | HEIGHT: 68 IN

## 2024-09-12 DIAGNOSIS — M35.1 MCTD (MIXED CONNECTIVE TISSUE DISEASE): Primary | ICD-10-CM

## 2024-09-12 DIAGNOSIS — M34.9 SCLERODERMA, DIFFUSE: ICD-10-CM

## 2024-09-12 DIAGNOSIS — M06.09 RHEUMATOID ARTHRITIS OF MULTIPLE SITES WITH NEGATIVE RHEUMATOID FACTOR: ICD-10-CM

## 2024-09-12 DIAGNOSIS — Z79.899 HIGH RISK MEDICATION USE: ICD-10-CM

## 2024-09-12 LAB — B-HCG UR QL: NEGATIVE

## 2024-09-12 PROCEDURE — 63600175 PHARM REV CODE 636 W HCPCS: Performed by: INTERNAL MEDICINE

## 2024-09-12 PROCEDURE — 25000003 PHARM REV CODE 250: Performed by: INTERNAL MEDICINE

## 2024-09-12 PROCEDURE — 96375 TX/PRO/DX INJ NEW DRUG ADDON: CPT

## 2024-09-12 PROCEDURE — 81025 URINE PREGNANCY TEST: CPT | Performed by: INTERNAL MEDICINE

## 2024-09-12 PROCEDURE — 96365 THER/PROPH/DIAG IV INF INIT: CPT

## 2024-09-12 PROCEDURE — 96366 THER/PROPH/DIAG IV INF ADDON: CPT

## 2024-09-12 RX ORDER — ACETAMINOPHEN 325 MG/1
650 TABLET ORAL
Status: COMPLETED | OUTPATIENT
Start: 2024-09-12 | End: 2024-09-12

## 2024-09-12 RX ORDER — HEPARIN 100 UNIT/ML
500 SYRINGE INTRAVENOUS
OUTPATIENT
Start: 2024-09-26

## 2024-09-12 RX ORDER — FAMOTIDINE 10 MG/ML
20 INJECTION INTRAVENOUS
Status: CANCELLED | OUTPATIENT
Start: 2024-09-26

## 2024-09-12 RX ORDER — FAMOTIDINE 10 MG/ML
20 INJECTION INTRAVENOUS
Status: COMPLETED | OUTPATIENT
Start: 2024-09-12 | End: 2024-09-12

## 2024-09-12 RX ORDER — DIPHENHYDRAMINE HYDROCHLORIDE 50 MG/ML
50 INJECTION INTRAMUSCULAR; INTRAVENOUS ONCE AS NEEDED
OUTPATIENT
Start: 2024-09-26

## 2024-09-12 RX ORDER — SODIUM CHLORIDE 0.9 % (FLUSH) 0.9 %
10 SYRINGE (ML) INJECTION
OUTPATIENT
Start: 2024-09-26

## 2024-09-12 RX ORDER — HEPARIN 100 UNIT/ML
500 SYRINGE INTRAVENOUS
Status: DISCONTINUED | OUTPATIENT
Start: 2024-09-12 | End: 2024-09-12 | Stop reason: HOSPADM

## 2024-09-12 RX ORDER — DIPHENHYDRAMINE HYDROCHLORIDE 50 MG/ML
25 INJECTION INTRAMUSCULAR; INTRAVENOUS
Status: COMPLETED | OUTPATIENT
Start: 2024-09-12 | End: 2024-09-12

## 2024-09-12 RX ORDER — MEPERIDINE HYDROCHLORIDE 50 MG/ML
25 INJECTION INTRAMUSCULAR; INTRAVENOUS; SUBCUTANEOUS
OUTPATIENT
Start: 2024-09-26 | End: 2024-09-27

## 2024-09-12 RX ORDER — EPINEPHRINE 0.3 MG/.3ML
0.3 INJECTION SUBCUTANEOUS ONCE AS NEEDED
OUTPATIENT
Start: 2024-09-26

## 2024-09-12 RX ORDER — METHYLPREDNISOLONE SOD SUCC 125 MG
100 VIAL (EA) INJECTION
Status: CANCELLED | OUTPATIENT
Start: 2024-09-26

## 2024-09-12 RX ORDER — METHYLPREDNISOLONE SOD SUCC 125 MG
100 VIAL (EA) INJECTION
Status: COMPLETED | OUTPATIENT
Start: 2024-09-12 | End: 2024-09-12

## 2024-09-12 RX ORDER — ACETAMINOPHEN 325 MG/1
650 TABLET ORAL
Status: CANCELLED | OUTPATIENT
Start: 2024-09-26

## 2024-09-12 RX ADMIN — HEPARIN 500 UNITS: 100 SYRINGE at 12:09

## 2024-09-12 RX ADMIN — DIPHENHYDRAMINE HYDROCHLORIDE 25 MG: 50 INJECTION INTRAMUSCULAR; INTRAVENOUS at 08:09

## 2024-09-12 RX ADMIN — METHYLPREDNISOLONE SODIUM SUCCINATE 100 MG: 125 INJECTION, POWDER, FOR SOLUTION INTRAMUSCULAR; INTRAVENOUS at 08:09

## 2024-09-12 RX ADMIN — ACETAMINOPHEN 650 MG: 325 TABLET ORAL at 08:09

## 2024-09-12 RX ADMIN — SODIUM CHLORIDE 1000 MG: 9 INJECTION, SOLUTION INTRAVENOUS at 09:09

## 2024-09-12 RX ADMIN — FAMOTIDINE 20 MG: 10 INJECTION, SOLUTION INTRAVENOUS at 08:09

## 2024-09-12 NOTE — DISCHARGE INSTRUCTIONS
WAYS TO HELP PREVENT INFECTION        WASH YOUR HANDS OFTEN DURING THE DAY, ESPECIALLY BEFORE YOU EAT, AFTER USING THE BATHROOM, AND AFTER TOUCHING ANIMALS    STAY AWAY FROM PEOPLE WHO HAVE ILLNESSES YOU CAN CATCH; SUCH AS COLDS, FLU, CHICKEN POX    TRY TO AVOID CROWDS    STAY AWAY FROM CHILDREN WHO RECENTLY HAVE RECEIVED LIVE VIRUS VACCINES    MAINTAIN GOOD MOUTH CARE    DO NOT SQUEEZE OR SCRATCH PIMPLES    CLEAN CUTS & SCRAPES RIGHT AWAY AND DAILY UNTIL HEALED WITH WARM WATER, SOAP & AN ANTISEPTIC    AVOID CONTACT WITH LITTER BOXES, BIRD CAGES, & FISH TANKS    AVOID STANDING WATER, IE., BIRD BATHS, FLOWER POTS/VASES, OR HUMIDIFIERS    WEAR GLOVES WHEN GARDENING OR CLEANING UP AFTER OTHERS, ESPECIALLY BABIES & SMALL CHILDREN    DO NOT EAT RAW FISH, SEAFOOD, MEAT, OR EGGS      FALL PREVENTION   Falls often occur due to slipping, tripping or losing your balance. Here are ways to reduce your risk of falling again.   Was there anything that caused your fall that can be fixed, removed or replaced?   Make your home safe by keeping walkways clear of objects you may trip over.   Use non-slip pads under rugs.   Do not walk in poorly lit areas.   Do not stand on chairs or wobbly ladders.   Use caution when reaching overhead or looking upward. This position can cause a loss of balance.   Be sure your shoes fit properly, have non-slip bottoms and are in good condition.   Be cautious when going up and down stairs, curbs, and when walking on uneven sidewalks.   If your balance is poor, consider using a cane or walker.   If your fall was related to alcohol use, stop or limit alcohol intake.   If your fall was related to use of sleeping medicines, talk to your doctor about this. You may need to reduce your dosage at bedtime if you awaken during the night to go to the bathroom.   To reduce the need for nighttime bathroom trips:   Avoid drinking fluids for several hours before going to bed   Empty your bladder before going to bed    Men can keep a urinal at the bedside   © 2279-9928 [employer], [address] [place] [address], [place] [address]. All rights reserved. This information is not intended as a substitute for professional medical care. Always follow your healthcare professional's instructions.

## 2024-09-12 NOTE — PLAN OF CARE
Patient tolerated Ruxience well today; no adverse reaction noted.   Has f/u appt(s) scheduled per MD request.  No questions or concerns voiced.  NAD noted upon discharge.

## 2024-09-12 NOTE — NURSING
Infusion # 2 of 2 - Ruxience 1000 mg q 4 months  Last dose- 8/29/2024    Any:  -recent illness, infection, or antibiotic use in past week- denied  -open wounds or mouth sores- denied  -invasive procedures or surgeries in past 4 weeks or in upcoming 4 weeks- denied  -vaccinations in past week- denied  -any new symptoms/change in symptoms-c/o leg and back pain  -chance you may be pregnant- UPT neg      Recent labs? 8/22/2024  Last Rheumatology provider visit- Seen by Dr SHORE on 6/11/2024     Premeds-Tylenol, Solu-medrol, Pepcid, and Benadryl per MD order     Ruxience 1000 mg administered IV at a titrated rate per orders; see MAR and vitals for more details.

## 2024-09-16 PROBLEM — J01.90 ACUTE NON-RECURRENT SINUSITIS: Status: RESOLVED | Noted: 2024-06-11 | Resolved: 2024-09-16

## 2024-09-20 ENCOUNTER — PATIENT MESSAGE (OUTPATIENT)
Dept: RHEUMATOLOGY | Facility: CLINIC | Age: 39
End: 2024-09-20
Payer: COMMERCIAL

## 2024-09-28 ENCOUNTER — TELEPHONE (OUTPATIENT)
Dept: RHEUMATOLOGY | Facility: CLINIC | Age: 39
End: 2024-09-28
Payer: COMMERCIAL

## 2024-09-28 RX ORDER — FAMOTIDINE 10 MG/ML
20 INJECTION INTRAVENOUS
OUTPATIENT
Start: 2024-09-28

## 2024-09-28 RX ORDER — ACETAMINOPHEN 325 MG/1
650 TABLET ORAL
OUTPATIENT
Start: 2024-09-28

## 2024-09-28 RX ORDER — METHYLPREDNISOLONE SOD SUCC 125 MG
100 VIAL (EA) INJECTION
OUTPATIENT
Start: 2024-09-28

## 2024-09-28 NOTE — TELEPHONE ENCOUNTER
----- Message from AALIYAH Nj sent at 9/27/2024  3:43 PM CDT -----  Regarding: ruxience orders  Please review RTX orders and sign if you agree. thanks

## 2024-10-03 ENCOUNTER — OFFICE VISIT (OUTPATIENT)
Dept: INTERNAL MEDICINE | Facility: CLINIC | Age: 39
End: 2024-10-03
Payer: COMMERCIAL

## 2024-10-03 ENCOUNTER — LAB VISIT (OUTPATIENT)
Dept: LAB | Facility: HOSPITAL | Age: 39
End: 2024-10-03
Attending: FAMILY MEDICINE
Payer: COMMERCIAL

## 2024-10-03 VITALS
OXYGEN SATURATION: 99 % | BODY MASS INDEX: 34.41 KG/M2 | SYSTOLIC BLOOD PRESSURE: 118 MMHG | WEIGHT: 227.06 LBS | DIASTOLIC BLOOD PRESSURE: 88 MMHG | HEIGHT: 68 IN | HEART RATE: 79 BPM

## 2024-10-03 DIAGNOSIS — R76.8 POSITIVE SM/RNP ANTIBODY: ICD-10-CM

## 2024-10-03 DIAGNOSIS — G44.84 PRIMARY EXERTIONAL HEADACHE: Primary | ICD-10-CM

## 2024-10-03 DIAGNOSIS — M06.09 RHEUMATOID ARTHRITIS OF MULTIPLE SITES WITH NEGATIVE RHEUMATOID FACTOR: ICD-10-CM

## 2024-10-03 DIAGNOSIS — G44.84 PRIMARY EXERTIONAL HEADACHE: ICD-10-CM

## 2024-10-03 DIAGNOSIS — M32.9 SYSTEMIC LUPUS ERYTHEMATOSUS ARTHRITIS: ICD-10-CM

## 2024-10-03 DIAGNOSIS — M34.9 DIFFUSE SCLERODERMA: ICD-10-CM

## 2024-10-03 DIAGNOSIS — M35.1 MCTD (MIXED CONNECTIVE TISSUE DISEASE): ICD-10-CM

## 2024-10-03 LAB
ANION GAP SERPL CALC-SCNC: 7 MMOL/L (ref 8–16)
BUN SERPL-MCNC: 7 MG/DL (ref 6–20)
CALCIUM SERPL-MCNC: 9.4 MG/DL (ref 8.7–10.5)
CHLORIDE SERPL-SCNC: 104 MMOL/L (ref 95–110)
CO2 SERPL-SCNC: 27 MMOL/L (ref 23–29)
CREAT SERPL-MCNC: 0.7 MG/DL (ref 0.5–1.4)
EST. GFR  (NO RACE VARIABLE): >60 ML/MIN/1.73 M^2
GLUCOSE SERPL-MCNC: 71 MG/DL (ref 70–110)
POTASSIUM SERPL-SCNC: 4.2 MMOL/L (ref 3.5–5.1)
SODIUM SERPL-SCNC: 138 MMOL/L (ref 136–145)

## 2024-10-03 PROCEDURE — 36415 COLL VENOUS BLD VENIPUNCTURE: CPT | Performed by: FAMILY MEDICINE

## 2024-10-03 PROCEDURE — 99214 OFFICE O/P EST MOD 30 MIN: CPT | Mod: S$GLB,,, | Performed by: FAMILY MEDICINE

## 2024-10-03 PROCEDURE — 3074F SYST BP LT 130 MM HG: CPT | Mod: CPTII,S$GLB,, | Performed by: FAMILY MEDICINE

## 2024-10-03 PROCEDURE — 99999 PR PBB SHADOW E&M-EST. PATIENT-LVL V: CPT | Mod: PBBFAC,,, | Performed by: FAMILY MEDICINE

## 2024-10-03 PROCEDURE — 3008F BODY MASS INDEX DOCD: CPT | Mod: CPTII,S$GLB,, | Performed by: FAMILY MEDICINE

## 2024-10-03 PROCEDURE — 80048 BASIC METABOLIC PNL TOTAL CA: CPT | Performed by: FAMILY MEDICINE

## 2024-10-03 PROCEDURE — 3079F DIAST BP 80-89 MM HG: CPT | Mod: CPTII,S$GLB,, | Performed by: FAMILY MEDICINE

## 2024-10-03 PROCEDURE — 1159F MED LIST DOCD IN RCRD: CPT | Mod: CPTII,S$GLB,, | Performed by: FAMILY MEDICINE

## 2024-10-03 RX ORDER — IBUPROFEN 800 MG/1
800 TABLET ORAL 3 TIMES DAILY
Qty: 40 TABLET | Refills: 1 | Status: SHIPPED | OUTPATIENT
Start: 2024-10-03

## 2024-10-03 RX ORDER — PROMETHAZINE HYDROCHLORIDE AND DEXTROMETHORPHAN HYDROBROMIDE 6.25; 15 MG/5ML; MG/5ML
5 SYRUP ORAL EVERY 4 HOURS PRN
Qty: 118 ML | Refills: 1 | Status: SHIPPED | OUTPATIENT
Start: 2024-10-03 | End: 2024-10-13

## 2024-10-03 NOTE — PROGRESS NOTES
Stefanie España  10/03/2024  [id number]    Sofia Martinez MD  Patient Care Team:  Sofia Martinez MD as PCP - General (Family Medicine)  Tangela Espinoza LPN as Care Coordinator (Internal Medicine)          Visit Type:an urgent visit for a new problem    Chief Complaint:  Chief Complaint   Patient presents with    Headache    Weight Gain    Cough       History of Present Illness:    History of Present Illness    CHIEF COMPLAINT:  Ms. España presents today for concern with increased headaches.    HEADACHES:  She reports experiencing headaches for a few weeks. The headaches start at the posterior back of the head and spread over approximately 20 minutes. She has noted possibly mildly elevated blood pressure in association with the headaches, but is unsure if this is the cause. She denies any other associated symptoms. She said the HA usually come after she exerts herself, she feels some increase in Heart Rate, then a sharp pain in the top of her head, and goes over her head. She said it resolves with being still and calming down. She said recently her BP is better.    RHEUMATOLOGICAL HISTORY:  She has a history of systemic lupus, positive rheumatoid factor, and mixed connective tissue disorder. She has been diagnosed with systemic scleroderma with severe skin fibrosis and seronegative rheumatoid arthritis with positive RNP antibody. She is followed by rheumatology for management of these conditions.    MEDICATIONS:  She is currently taking methotrexate and receiving RUXIENCE infusions for management of her systemic scleroderma, severe skin fibrosis, seronegative rheumatoid arthritis with RNP antibody positive, and mixed connective tissue disease.            The following were reviewed at this visit: active problem list, medication list, allergies, family history, social history, and health maintenance.  History:  Past Medical History:   Diagnosis Date    Abnormal Pap smear of cervix     repeat was  normal    Lupus (systemic lupus erythematosus)      Past Surgical History:   Procedure Laterality Date    ESOPHAGOGASTRODUODENOSCOPY N/A 2/29/2024    Procedure: EGD (ESOPHAGOGASTRODUODENOSCOPY);  Surgeon: Gregg Reyez MD;  Location: [facility];  Service: Endoscopy;  Laterality: N/A;    INSERTION OF TUNNELED CENTRAL VENOUS CATHETER (CVC) WITH SUBCUTANEOUS PORT N/A 05/24/2023    Procedure: [id number];  Surgeon: Taco Zimmer MD;  Location: [facility] OR;  Service: General;  Laterality: N/A;    ROBOT-ASSISTED CHOLECYSTECTOMY USING DA CRISTOPHER XI N/A 12/11/2020    Procedure: XI ROBOTIC CHOLECYSTECTOMY;  Surgeon: Taco Zimmer MD;  Location: [facility] OR;  Service: General;  Laterality: N/A;    ROBOT-ASSISTED LAPAROSCOPIC LYSIS OF ADHESIONS USING DA CRISTOPHER XI  12/11/2020    Procedure: XI ROBOTIC LYSIS, ADHESIONS;  Surgeon: Taco Zimmer MD;  Location: [facility] OR;  Service: General;;    ROBOT-ASSISTED LAPAROSCOPIC SALPINGO-OOPHORECTOMY USING DA CRISTOPHER XI Left 12/11/2020    Procedure: XI ROBOTIC SALPINGO-OOPHORECTOMY;  Surgeon: SHERLEY Wakefield MD;  Location: [facility] OR;  Service: OB/GYN;  Laterality: Left;    TUBAL LIGATION  2005     Patient Active Problem List   Diagnosis    Exacerbation of systemic lupus    Long-term current use of high risk medication -methotrexate    Systemic lupus erythematosus arthritis    Immunosuppressed status    Rash    Obesity, Class II, BMI 35-39.9    MCTD (mixed connective tissue disease)    Positive sm/RNP antibody    Secondary Raynaud's phenomenon    Mild episode of recurrent major depressive disorder    Chronic pain of both knees    Bilateral leg weakness    Gastroesophageal reflux disease without esophagitis    Pelvic mass    Fibromyalgia    Rheumatoid arthritis of multiple sites with negative rheumatoid factor    Swelling of right upper extremity    Diffuse scleroderma    Morphea    Chemotherapy-induced nausea    Chronic cough    Scleroderma, diffuse    Drug-induced  immunodeficiency    Esophageal dysphagia    Other eosinophilia    Encounter for medication monitoring       Medications:  Current Outpatient Medications on File Prior to Visit   Medication Sig Dispense Refill    bumetanide (BUMEX) 1 MG tablet Take 1 tablet (1 mg total) by mouth once daily. 10 tablet 0    clobetasoL (TEMOVATE) 0.05 % cream Apply topically 2 (two) times daily. 60 g 2    clobetasoL (TEMOVATE) 0.05 % external solution Apply topically 2 (two) times daily. 50 mL 1    famotidine (PEPCID) 20 MG tablet Take 1 tablet (20 mg total) by mouth 2 (two) times daily. 60 tablet 11    fluticasone propionate (FLONASE) 50 mcg/actuation nasal spray 1 spray (50 mcg total) by Each Nostril route once daily. 18.2 mL 1    folic acid (FOLVITE) 1 MG tablet Take 1 tablet (1 mg total) by mouth once daily. 90 tablet 0    ipratropium (ATROVENT) 21 mcg (0.03 %) nasal spray 2 sprays by Each Nostril route 3 (three) times daily. 20 mL 5    ketoconazole (NIZORAL) 2 % shampoo Apply topically twice a week. 200 mL 1    LIDOcaine-prilocaine (EMLA) cream Apply topically as needed (port access). 5 g 3    methotrexate 25 mg/mL injection Inject 0.8 mLs (20 mg total) into the muscle every 7 days. 4 mL 3    mometasone 0.1% (ELOCON) 0.1 % cream Apply topically once daily. 50 g 1    omeprazole (PRILOSEC) 40 MG capsule Take 1 capsule (40 mg total) by mouth once daily. 30 capsule 11    ondansetron (ZOFRAN-ODT) 4 MG TbDL Take 1 tablet (4 mg total) by mouth every 8 (eight) hours as needed (nausea). 30 tablet 0    pantoprazole (PROTONIX) 40 MG tablet Take 1 tablet (40 mg total) by mouth 2 (two) times daily. 60 tablet 11    predniSONE (DELTASONE) 5 MG tablet Take 1 tablet (5 mg total) by mouth once daily. 90 tablet 3    tacrolimus (PROTOPIC) 0.1 % ointment Apply topically 2 (two) times daily. 100 g 3    traMADoL (ULTRAM) 50 mg tablet TAKE 1 TABLET BY MOUTH EVERY 12 HOURS AS NEEDED FOR PAIN 60 tablet 3    traZODone (DESYREL) 50 MG tablet Take 1 tablet (50  mg total) by mouth every evening. 30 tablet 11    colchicine (COLCRYS) 0.6 mg tablet Take 1 tablet (0.6 mg total) by mouth 2 (two) times daily. (Patient not taking: Reported on 10/3/2024) 60 tablet 11    gabapentin (NEURONTIN) 300 MG capsule Take 1 capsule (300 mg total) by mouth 3 (three) times daily. (Patient not taking: Reported on 10/3/2024) 90 capsule 11    HYDROcodone-acetaminophen (NORCO) 5-325 mg per tablet Take 1 tablet by mouth every 8 (eight) hours as needed. (Patient not taking: Reported on 10/3/2024)      methylPREDNISolone (MEDROL DOSEPACK) 4 mg tablet use as directed (Patient not taking: Reported on 10/3/2024) 21 tablet 0    [DISCONTINUED] amoxicillin-clavulanate 875-125mg (AUGMENTIN) 875-125 mg per tablet Take 1 tablet by mouth every 12 (twelve) hours. (Patient not taking: Reported on 10/3/2024) 20 tablet 0     Current Facility-Administered Medications on File Prior to Visit   Medication Dose Route Frequency Provider Last Rate Last Admin    LIDOcaine (PF) 10 mg/ml (1%) injection 10 mg  1 mL Intradermal Once Taco Zimmer MD           Medications have been reviewed and reconciled with patient at this visit.    Exam:  Wt Readings from Last 3 Encounters:   10/03/24 103 kg (227 lb 1.2 oz)   09/12/24 99.1 kg (218 lb 7.6 oz)   08/29/24 99.2 kg (218 lb 11.1 oz)     Temp Readings from Last 3 Encounters:   09/12/24 98.7 °F (37.1 °C)   08/29/24 98.6 °F (37 °C)   08/09/24 98.4 °F (36.9 °C)     BP Readings from Last 3 Encounters:   10/03/24 118/88   09/12/24 128/87   08/29/24 (!) 141/92     Pulse Readings from Last 3 Encounters:   10/03/24 79   09/12/24 84   08/29/24 86     Body mass index is 34.53 kg/m².      Review of Systems   Constitutional:         Weight gain     Neurological:  Positive for headaches.     Physical Exam  Nursing note reviewed.   Cardiovascular:      Rate and Rhythm: Normal rate and regular rhythm.   Pulmonary:      Effort: Pulmonary effort is normal. No respiratory distress.   Skin:      Comments: Skin changes with Scleroderma   Neurological:      Mental Status: She is alert and oriented to person, place, and time.   Psychiatric:         Mood and Affect: Mood normal.         Behavior: Behavior normal.         Thought Content: Thought content normal.         Judgment: Judgment normal.       Physical Exam             Laboratory Reviewed ({Yes)  Lab Results   Component Value Date    WBC 2.98 (L) 08/22/2024    HGB 11.3 (L) 08/22/2024    HCT 34.8 (L) 08/22/2024     (H) 08/22/2024    CHOL 169 08/24/2017    TRIG 228 (H) 08/24/2017    HDL 37 (L) 08/24/2017    ALT 30 08/22/2024    AST 31 08/22/2024     08/22/2024    K 3.8 08/22/2024     08/22/2024    CREATININE 0.6 08/22/2024    BUN 9 08/22/2024    CO2 24 08/22/2024    TSH 1.243 08/30/2017       Stefanie was seen today for headache, weight gain and cough.    Diagnoses and all orders for this visit:    Primary exertional headache  -     Echo; Future  -     CV Ultrasound Bilateral Doppler Carotid; Future  -     CTA Head and Neck (xpd); Future    Systemic lupus erythematosus arthritis    Rheumatoid arthritis of multiple sites with negative rheumatoid factor    Positive sm/RNP antibody    MCTD (mixed connective tissue disease)    Diffuse scleroderma    Other orders/COUGH  -     promethazine-dextromethorphan (PROMETHAZINE-DM) 6.25-15 mg/5 mL Syrp; Take 5 mLs by mouth every 4 (four) hours as needed (cough).          Assessment & Plan    R51.9 Headache, unspecified    M32.9 Systemic lupus erythematosus, unspecified    M35.1 Other overlap syndromes    L90.5 Scar conditions and fibrosis of skin    M34.0 Progressive systemic sclerosis    M06.80 Other specified rheumatoid arthritis, unspecified site    Evaluated patient's increased headaches in context of systemic lupus, mixed connective tissue disorder, and scleroderma  Considered potential relationship between mildly elevated blood pressure and headaches  Noted current treatment regimen includes  methotrexate and RUXIENCE infusions for management of autoimmune conditions    AUTOIMMUNE CONDITIONS:  - Continued methotrexate for autoimmune conditions.  - Continued RUXIENCE infusions for autoimmune conditions.     Not sure if vasogenic HA in nature or correlation with connective tissue or autoimmune.  Reports coincides with exertion and increase demand/HA.   WIll check CArotid, echo and CTA head and neck.  Will need to discuss vascular risks with RHeum as part of autoimmune factors.    Discussed GLP-1 and non coverage with insurance for weight loss.  She has steroid induced weight gain.    Will forward to RHeum and coordinate work up.           This note was generated with the assistance of ambient listening technology. Verbal consent was obtained by the patient and accompanying visitor(s) for the recording of patient appointment to facilitate this note. I attest to having reviewed and edited the generated note for accuracy, though some syntax or spelling errors may persist. Please contact the author of this note for any clarification.     Care Plan/Goals: Reviewed     Follow up: No follow-ups on file.    After visit summary was printed and given to patient upon discharge today.  Patient goals and care plan are included in After Visit Summary.

## 2024-10-10 ENCOUNTER — HOSPITAL ENCOUNTER (OUTPATIENT)
Dept: RADIOLOGY | Facility: HOSPITAL | Age: 39
Discharge: HOME | End: 2024-10-10
Attending: FAMILY MEDICINE
Payer: COMMERCIAL

## 2024-10-10 DIAGNOSIS — G44.84 PRIMARY EXERTIONAL HEADACHE: ICD-10-CM

## 2024-10-10 PROCEDURE — 70496 CT ANGIOGRAPHY HEAD: CPT | Mod: TC

## 2024-10-10 PROCEDURE — 70498 CT ANGIOGRAPHY NECK: CPT | Mod: 26,,, | Performed by: RADIOLOGY

## 2024-10-10 PROCEDURE — 25500020 PHARM REV CODE 255: Performed by: FAMILY MEDICINE

## 2024-10-10 PROCEDURE — 70496 CT ANGIOGRAPHY HEAD: CPT | Mod: 26,,, | Performed by: RADIOLOGY

## 2024-10-10 RX ADMIN — IOHEXOL 100 ML: 350 INJECTION, SOLUTION INTRAVENOUS at 01:10

## 2024-10-17 ENCOUNTER — HOSPITAL ENCOUNTER (OUTPATIENT)
Dept: CARDIOLOGY | Facility: HOSPITAL | Age: 39
Discharge: HOME | End: 2024-10-17
Attending: FAMILY MEDICINE
Payer: COMMERCIAL

## 2024-10-17 VITALS
HEIGHT: 68 IN | SYSTOLIC BLOOD PRESSURE: 133 MMHG | DIASTOLIC BLOOD PRESSURE: 93 MMHG | WEIGHT: 227 LBS | BODY MASS INDEX: 34.4 KG/M2 | HEART RATE: 80 BPM

## 2024-10-17 VITALS
BODY MASS INDEX: 34.4 KG/M2 | WEIGHT: 227 LBS | HEIGHT: 68 IN | DIASTOLIC BLOOD PRESSURE: 93 MMHG | HEART RATE: 80 BPM | SYSTOLIC BLOOD PRESSURE: 133 MMHG

## 2024-10-17 DIAGNOSIS — M35.1 MCTD (MIXED CONNECTIVE TISSUE DISEASE): ICD-10-CM

## 2024-10-17 DIAGNOSIS — M34.9 SCLERODERMA, DIFFUSE: ICD-10-CM

## 2024-10-17 DIAGNOSIS — G44.84 PRIMARY EXERTIONAL HEADACHE: ICD-10-CM

## 2024-10-17 DIAGNOSIS — M06.09 RHEUMATOID ARTHRITIS OF MULTIPLE SITES WITH NEGATIVE RHEUMATOID FACTOR: ICD-10-CM

## 2024-10-17 LAB
AORTIC ROOT ANNULUS: 2.35 CM
AORTIC VALVE CUSP SEPERATION: 1.92 CM
ASCENDING AORTA: 2.44 CM
AV INDEX (PROSTH): 0.9
AV MEAN GRADIENT: 3.6 MMHG
AV PEAK GRADIENT: 4.8 MMHG
AV VALVE AREA BY VELOCITY RATIO: 2.9 CM²
AV VALVE AREA: 2.8 CM²
AV VELOCITY RATIO: 0.91
BSA FOR ECHO PROCEDURE: 2.22 M2
CV ECHO LV RWT: 0.51 CM
DOP CALC AO PEAK VEL: 1.1 M/S
DOP CALC AO VTI: 23.5 CM
DOP CALC LVOT AREA: 3.1 CM2
DOP CALC LVOT DIAMETER: 2 CM
DOP CALC LVOT PEAK VEL: 1 M/S
DOP CALC LVOT STROKE VOLUME: 66.3 CM3
DOP CALC MV VTI: 18.8 CM
DOP CALCLVOT PEAK VEL VTI: 21.1 CM
E WAVE DECELERATION TIME: 206.4 MSEC
E/A RATIO: 1.22
E/E' RATIO: 7.22 M/S
ECHO LV POSTERIOR WALL: 1.1 CM (ref 0.6–1.1)
FRACTIONAL SHORTENING: 46.5 % (ref 28–44)
INTERVENTRICULAR SEPTUM: 1.3 CM (ref 0.6–1.1)
IVC DIAMETER: 1.34 CM
LA MAJOR: 4.34 CM
LA MINOR: 4.5 CM
LA WIDTH: 4.4 CM
LEFT ARM DIASTOLIC BLOOD PRESSURE: 85 MMHG
LEFT ARM SYSTOLIC BLOOD PRESSURE: 125 MMHG
LEFT ATRIUM AREA SYSTOLIC (APICAL 2 CHAMBER): 15.39 CM2
LEFT ATRIUM AREA SYSTOLIC (APICAL 4 CHAMBER): 15.65 CM2
LEFT ATRIUM SIZE: 3.35 CM
LEFT ATRIUM VOLUME INDEX MOD: 20.2 ML/M2
LEFT ATRIUM VOLUME INDEX: 25.6 ML/M2
LEFT ATRIUM VOLUME MOD: 43.59 ML
LEFT ATRIUM VOLUME: 55.36 CM3
LEFT CBA DIAS: 22 CM/S
LEFT CBA SYS: 56 CM/S
LEFT CCA DIST DIAS: 29 CM/S
LEFT CCA DIST SYS: 75 CM/S
LEFT CCA MID DIAS: 33 CM/S
LEFT CCA MID SYS: 96 CM/S
LEFT CCA PROX DIAS: 35 CM/S
LEFT CCA PROX SYS: 119 CM/S
LEFT ECA DIAS: 29 CM/S
LEFT ECA SYS: 84 CM/S
LEFT ICA DIST DIAS: 54 CM/S
LEFT ICA DIST SYS: 113 CM/S
LEFT ICA MID DIAS: 41 CM/S
LEFT ICA MID SYS: 97 CM/S
LEFT ICA PROX DIAS: 32 CM/S
LEFT ICA PROX SYS: 73 CM/S
LEFT INTERNAL DIMENSION IN SYSTOLE: 2.3 CM (ref 2.1–4)
LEFT VENTRICLE DIASTOLIC VOLUME INDEX: 38.46 ML/M2
LEFT VENTRICLE DIASTOLIC VOLUME: 83.08 ML
LEFT VENTRICLE END SYSTOLIC VOLUME APICAL 2 CHAMBER: 40.99 ML
LEFT VENTRICLE END SYSTOLIC VOLUME APICAL 4 CHAMBER: 44.14 ML
LEFT VENTRICLE MASS INDEX: 85.5 G/M2
LEFT VENTRICLE SYSTOLIC VOLUME INDEX: 8.2 ML/M2
LEFT VENTRICLE SYSTOLIC VOLUME: 17.81 ML
LEFT VENTRICULAR INTERNAL DIMENSION IN DIASTOLE: 4.3 CM (ref 3.5–6)
LEFT VENTRICULAR MASS: 184.7 G
LEFT VERTEBRAL DIAS: 33 CM/S
LEFT VERTEBRAL SYS: 91 CM/S
LV LATERAL E/E' RATIO: 5.93 M/S
LV SEPTAL E/E' RATIO: 9.22 M/S
LVED V (TEICH): 83.08 ML
LVES V (TEICH): 17.81 ML
LVOT MG: 2.57 MMHG
LVOT MV: 0.76 CM/S
MV A" WAVE DURATION": 137.01 MSEC
MV MEAN GRADIENT: 2 MMHG
MV PEAK A VEL: 0.68 M/S
MV PEAK E VEL: 0.83 M/S
MV PEAK GRADIENT: 2 MMHG
MV STENOSIS PRESSURE HALF TIME: 59.86 MS
MV VALVE AREA BY CONTINUITY EQUATION: 3.52 CM2
MV VALVE AREA P 1/2 METHOD: 3.68 CM2
OHS CV CAROTID RIGHT ICA EDV HIGHEST: 46
OHS CV CAROTID ULTRASOUND LEFT ICA/CCA RATIO: 1.51
OHS CV CAROTID ULTRASOUND RIGHT ICA/CCA RATIO: 1.45
OHS CV PV CAROTID LEFT HIGHEST CCA: 119
OHS CV PV CAROTID LEFT HIGHEST ICA: 113
OHS CV PV CAROTID RIGHT HIGHEST CCA: 98
OHS CV PV CAROTID RIGHT HIGHEST ICA: 122
OHS CV RV/LV RATIO: 0.56 CM
OHS CV US CAROTID LEFT HIGHEST EDV: 54
PISA TR MAX VEL: 1.73 M/S
PULM VEIN S/D RATIO: 1
PV PEAK D VEL: 0.4 M/S
PV PEAK GRADIENT: 3 MMHG
PV PEAK S VEL: 0.4 M/S
PV PEAK VELOCITY: 0.81 M/S
RA MAJOR: 4.54 CM
RA PRESSURE ESTIMATED: 3 MMHG
RA WIDTH: 3.45 CM
RIGHT ARM DIASTOLIC BLOOD PRESSURE: 93 MMHG
RIGHT ARM SYSTOLIC BLOOD PRESSURE: 133 MMHG
RIGHT CBA DIAS: 19 CM/S
RIGHT CBA SYS: 52 CM/S
RIGHT CCA DIST DIAS: 32 CM/S
RIGHT CCA DIST SYS: 84 CM/S
RIGHT CCA MID DIAS: 30 CM/S
RIGHT CCA MID SYS: 84 CM/S
RIGHT CCA PROX DIAS: 24 CM/S
RIGHT CCA PROX SYS: 98 CM/S
RIGHT ECA DIAS: 2 CM/S
RIGHT ECA SYS: 89 CM/S
RIGHT ICA DIST DIAS: 46 CM/S
RIGHT ICA DIST SYS: 108 CM/S
RIGHT ICA MID DIAS: 46 CM/S
RIGHT ICA MID SYS: 122 CM/S
RIGHT ICA PROX DIAS: 46 CM/S
RIGHT ICA PROX SYS: 99 CM/S
RIGHT VENTRICLE DIASTOLIC BASEL DIMENSION: 2.4 CM
RIGHT VENTRICULAR END-DIASTOLIC DIMENSION: 2.28 CM
RIGHT VERTEBRAL DIAS: 29 CM/S
RIGHT VERTEBRAL SYS: 72 CM/S
RV TB RVSP: 5 MMHG
STJ: 2.65 CM
TDI LATERAL: 0.14 M/S
TDI SEPTAL: 0.09 M/S
TDI: 0.12 M/S
TR MAX PG: 12 MMHG
TRICUSPID ANNULAR PLANE SYSTOLIC EXCURSION: 2.08 CM
TV REST PULMONARY ARTERY PRESSURE: 15 MMHG
Z-SCORE OF LEFT VENTRICULAR DIMENSION IN END DIASTOLE: -4.98
Z-SCORE OF LEFT VENTRICULAR DIMENSION IN END SYSTOLE: -4.97

## 2024-10-17 PROCEDURE — 93880 EXTRACRANIAL BILAT STUDY: CPT | Mod: 26,,, | Performed by: INTERNAL MEDICINE

## 2024-10-17 PROCEDURE — 93306 TTE W/DOPPLER COMPLETE: CPT | Mod: 26,,, | Performed by: INTERNAL MEDICINE

## 2024-10-17 PROCEDURE — 93306 TTE W/DOPPLER COMPLETE: CPT | Mod: PO

## 2024-10-17 PROCEDURE — 93880 EXTRACRANIAL BILAT STUDY: CPT | Mod: PO

## 2024-10-17 RX ORDER — METHOTREXATE 25 MG/ML
INJECTION INTRA-ARTERIAL; INTRAMUSCULAR; INTRATHECAL; INTRAVENOUS
Qty: 4 ML | Refills: 0 | Status: SHIPPED | OUTPATIENT
Start: 2024-10-17

## 2024-10-21 ENCOUNTER — PATIENT MESSAGE (OUTPATIENT)
Dept: INTERNAL MEDICINE | Facility: CLINIC | Age: 39
End: 2024-10-21
Payer: COMMERCIAL

## 2024-11-16 DIAGNOSIS — M06.09 RHEUMATOID ARTHRITIS OF MULTIPLE SITES WITH NEGATIVE RHEUMATOID FACTOR: ICD-10-CM

## 2024-11-16 DIAGNOSIS — M35.1 MCTD (MIXED CONNECTIVE TISSUE DISEASE): ICD-10-CM

## 2024-11-16 DIAGNOSIS — M34.9 SCLERODERMA, DIFFUSE: ICD-10-CM

## 2024-11-18 RX ORDER — METHOTREXATE 25 MG/ML
INJECTION INTRA-ARTERIAL; INTRAMUSCULAR; INTRATHECAL; INTRAVENOUS
Qty: 4 ML | Refills: 0 | Status: SHIPPED | OUTPATIENT
Start: 2024-11-18

## 2024-12-27 NOTE — TELEPHONE ENCOUNTER
No care due was identified.  [facility] Embedded Care Due Messages. Reference number: [id number].   12/27/2024 11:38:40 AM CST

## 2024-12-28 NOTE — TELEPHONE ENCOUNTER
Refill Routing Note   Medication(s) are not appropriate for processing by [facility] for the following reason(s):        Outside of protocol    [facility] action(s):  Route             Appointments  past 12m or future 3m with PCP    Date Provider   Last Visit   10/3/2024 Sofia Martinez MD   Next Visit   Visit date not found Sofia Martinez MD   ED visits in past 90 days: 0        Note composed:6:46 PM 12/27/2024

## 2024-12-30 RX ORDER — IBUPROFEN 800 MG/1
800 TABLET ORAL 3 TIMES DAILY
Qty: 40 TABLET | Refills: 0 | Status: SHIPPED | OUTPATIENT
Start: 2024-12-30

## 2025-01-06 ENCOUNTER — LAB VISIT (OUTPATIENT)
Dept: LAB | Facility: HOSPITAL | Age: 40
End: 2025-01-06
Attending: INTERNAL MEDICINE
Payer: COMMERCIAL

## 2025-01-06 DIAGNOSIS — M06.09 RHEUMATOID ARTHRITIS OF MULTIPLE SITES WITH NEGATIVE RHEUMATOID FACTOR: ICD-10-CM

## 2025-01-06 DIAGNOSIS — M32.9 SYSTEMIC LUPUS ERYTHEMATOSUS ARTHRITIS: ICD-10-CM

## 2025-01-06 DIAGNOSIS — Z51.81 ENCOUNTER FOR MEDICATION MONITORING: ICD-10-CM

## 2025-01-06 DIAGNOSIS — Z79.899 DRUG-INDUCED IMMUNODEFICIENCY: ICD-10-CM

## 2025-01-06 DIAGNOSIS — Z79.899 HIGH RISK MEDICATION USE: ICD-10-CM

## 2025-01-06 DIAGNOSIS — M35.1 MCTD (MIXED CONNECTIVE TISSUE DISEASE): ICD-10-CM

## 2025-01-06 DIAGNOSIS — Z79.899 LONG-TERM CURRENT USE OF HIGH RISK MEDICATION OTHER THAN ANTICOAGULANT: ICD-10-CM

## 2025-01-06 DIAGNOSIS — D84.821 DRUG-INDUCED IMMUNODEFICIENCY: ICD-10-CM

## 2025-01-06 DIAGNOSIS — M34.9 DIFFUSE SCLERODERMA: ICD-10-CM

## 2025-01-06 LAB
B-HCG UR QL: NEGATIVE
BASOPHILS # BLD AUTO: 0.04 K/UL (ref 0–0.2)
BASOPHILS NFR BLD: 0.7 % (ref 0–1.9)
DIFFERENTIAL METHOD BLD: ABNORMAL
EOSINOPHIL # BLD AUTO: 0.5 K/UL (ref 0–0.5)
EOSINOPHIL NFR BLD: 9 % (ref 0–8)
ERYTHROCYTE [DISTWIDTH] IN BLOOD BY AUTOMATED COUNT: 12.1 % (ref 11.5–14.5)
ERYTHROCYTE [SEDIMENTATION RATE] IN BLOOD BY WESTERGREN METHOD: 53 MM/HR (ref 0–20)
HCT VFR BLD AUTO: 32.6 % (ref 37–48.5)
HGB BLD-MCNC: 10.6 G/DL (ref 12–16)
IMM GRANULOCYTES # BLD AUTO: 0.01 K/UL (ref 0–0.04)
IMM GRANULOCYTES NFR BLD AUTO: 0.2 % (ref 0–0.5)
LYMPHOCYTES # BLD AUTO: 1.4 K/UL (ref 1–4.8)
LYMPHOCYTES NFR BLD: 23.6 % (ref 18–48)
MCH RBC QN AUTO: 30.6 PG (ref 27–31)
MCHC RBC AUTO-ENTMCNC: 32.5 G/DL (ref 32–36)
MCV RBC AUTO: 94 FL (ref 82–98)
MONOCYTES # BLD AUTO: 0.8 K/UL (ref 0.3–1)
MONOCYTES NFR BLD: 13.1 % (ref 4–15)
NEUTROPHILS # BLD AUTO: 3.1 K/UL (ref 1.8–7.7)
NEUTROPHILS NFR BLD: 53.4 % (ref 38–73)
NRBC BLD-RTO: 0 /100 WBC
PLATELET # BLD AUTO: 418 K/UL (ref 150–450)
PMV BLD AUTO: 8.6 FL (ref 9.2–12.9)
RBC # BLD AUTO: 3.46 M/UL (ref 4–5.4)
WBC # BLD AUTO: 5.8 K/UL (ref 3.9–12.7)

## 2025-01-06 PROCEDURE — 86704 HEP B CORE ANTIBODY TOTAL: CPT | Performed by: INTERNAL MEDICINE

## 2025-01-06 PROCEDURE — 80053 COMPREHEN METABOLIC PANEL: CPT | Performed by: INTERNAL MEDICINE

## 2025-01-06 PROCEDURE — 85025 COMPLETE CBC W/AUTO DIFF WBC: CPT | Mod: PO | Performed by: INTERNAL MEDICINE

## 2025-01-06 PROCEDURE — 85651 RBC SED RATE NONAUTOMATED: CPT | Mod: PO | Performed by: INTERNAL MEDICINE

## 2025-01-06 PROCEDURE — 86140 C-REACTIVE PROTEIN: CPT | Performed by: INTERNAL MEDICINE

## 2025-01-06 PROCEDURE — 36415 COLL VENOUS BLD VENIPUNCTURE: CPT | Mod: PO | Performed by: INTERNAL MEDICINE

## 2025-01-06 PROCEDURE — 82784 ASSAY IGA/IGD/IGG/IGM EACH: CPT | Performed by: INTERNAL MEDICINE

## 2025-01-06 PROCEDURE — 86480 TB TEST CELL IMMUN MEASURE: CPT | Performed by: INTERNAL MEDICINE

## 2025-01-06 PROCEDURE — 81025 URINE PREGNANCY TEST: CPT | Mod: PO | Performed by: INTERNAL MEDICINE

## 2025-01-06 PROCEDURE — 86803 HEPATITIS C AB TEST: CPT | Performed by: INTERNAL MEDICINE

## 2025-01-06 PROCEDURE — 87340 HEPATITIS B SURFACE AG IA: CPT | Performed by: INTERNAL MEDICINE

## 2025-01-07 ENCOUNTER — OFFICE VISIT (OUTPATIENT)
Dept: RHEUMATOLOGY | Facility: CLINIC | Age: 40
End: 2025-01-07
Payer: COMMERCIAL

## 2025-01-07 DIAGNOSIS — M79.7 FIBROMYALGIA: ICD-10-CM

## 2025-01-07 DIAGNOSIS — M34.9 DIFFUSE SCLERODERMA: Primary | ICD-10-CM

## 2025-01-07 DIAGNOSIS — Z51.81 ENCOUNTER FOR MEDICATION MONITORING: ICD-10-CM

## 2025-01-07 DIAGNOSIS — D84.821 DRUG-INDUCED IMMUNODEFICIENCY: ICD-10-CM

## 2025-01-07 DIAGNOSIS — M35.1 MCTD (MIXED CONNECTIVE TISSUE DISEASE): ICD-10-CM

## 2025-01-07 DIAGNOSIS — Z79.899 DRUG-INDUCED IMMUNODEFICIENCY: ICD-10-CM

## 2025-01-07 DIAGNOSIS — M06.09 RHEUMATOID ARTHRITIS OF MULTIPLE SITES WITH NEGATIVE RHEUMATOID FACTOR: ICD-10-CM

## 2025-01-07 DIAGNOSIS — M32.9 SYSTEMIC LUPUS ERYTHEMATOSUS ARTHRITIS: ICD-10-CM

## 2025-01-07 LAB
ALBUMIN SERPL BCP-MCNC: 3.7 G/DL (ref 3.5–5.2)
ALP SERPL-CCNC: 52 U/L (ref 40–150)
ALT SERPL W/O P-5'-P-CCNC: 24 U/L (ref 10–44)
ANION GAP SERPL CALC-SCNC: 8 MMOL/L (ref 8–16)
AST SERPL-CCNC: 27 U/L (ref 10–40)
BILIRUB SERPL-MCNC: 0.2 MG/DL (ref 0.1–1)
BUN SERPL-MCNC: 11 MG/DL (ref 6–20)
CALCIUM SERPL-MCNC: 9.3 MG/DL (ref 8.7–10.5)
CHLORIDE SERPL-SCNC: 101 MMOL/L (ref 95–110)
CO2 SERPL-SCNC: 28 MMOL/L (ref 23–29)
CREAT SERPL-MCNC: 0.7 MG/DL (ref 0.5–1.4)
CRP SERPL-MCNC: 7.2 MG/L (ref 0–8.2)
EST. GFR  (NO RACE VARIABLE): >60 ML/MIN/1.73 M^2
GLUCOSE SERPL-MCNC: 67 MG/DL (ref 70–110)
HBV CORE AB SERPL QL IA: NORMAL
HBV SURFACE AG SERPL QL IA: NORMAL
HCV AB SERPL QL IA: NORMAL
IGG SERPL-MCNC: 2219 MG/DL (ref 650–1600)
POTASSIUM SERPL-SCNC: 4.2 MMOL/L (ref 3.5–5.1)
PROT SERPL-MCNC: 8.6 G/DL (ref 6–8.4)
SODIUM SERPL-SCNC: 137 MMOL/L (ref 136–145)

## 2025-01-07 RX ORDER — EPINEPHRINE 0.3 MG/.3ML
0.3 INJECTION SUBCUTANEOUS ONCE AS NEEDED
OUTPATIENT
Start: 2025-01-07

## 2025-01-07 RX ORDER — SODIUM CHLORIDE 0.9 % (FLUSH) 0.9 %
10 SYRINGE (ML) INJECTION
OUTPATIENT
Start: 2025-01-07

## 2025-01-07 RX ORDER — HEPARIN 100 UNIT/ML
500 SYRINGE INTRAVENOUS
OUTPATIENT
Start: 2025-01-07

## 2025-01-07 RX ORDER — TRAMADOL HYDROCHLORIDE 50 MG/1
50 TABLET ORAL EVERY 12 HOURS PRN
Qty: 180 TABLET | Refills: 1 | Status: SHIPPED | OUTPATIENT
Start: 2025-01-07

## 2025-01-07 RX ORDER — GABAPENTIN 300 MG/1
300 CAPSULE ORAL 3 TIMES DAILY
Qty: 90 CAPSULE | Refills: 11 | Status: SHIPPED | OUTPATIENT
Start: 2025-01-07 | End: 2026-01-07

## 2025-01-07 RX ORDER — COLCHICINE 0.6 MG/1
0.6 TABLET ORAL DAILY
Qty: 90 TABLET | Refills: 3 | Status: SHIPPED | OUTPATIENT
Start: 2025-01-07

## 2025-01-07 RX ORDER — DIPHENHYDRAMINE HYDROCHLORIDE 50 MG/ML
50 INJECTION INTRAMUSCULAR; INTRAVENOUS ONCE AS NEEDED
OUTPATIENT
Start: 2025-01-07

## 2025-01-07 NOTE — PROGRESS NOTES
RHEUMATOLOGY FOLLOW UP - TELE VISIT     The patient location is: [place]  The chief complaint leading to consultation is:  Follow up visit  Visit type: Virtual visit with synchronous audio and video  Total time spent with patient:  15 minutes  Each patient to whom he or she provides medical services by telemedicine is:  (1) informed of the relationship between the physician and patient and the respective role of any other health care provider with respect to management of the patient; and (2) notified that he or she may decline to receive medical services by telemedicine and may withdraw from such care at any time.    Chief complaints, HPI, ROS, EXAM, Assessment & Plans:-  Stefanie Felicia Kimbrough a 39 y.o. pleasant female seen today for follow up visit. 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  Complains of severe worsening bilateral hand and wrist swelling for the past week.  Rheumatological review of system negative otherwise.  Exam shows 100% fist formation bilateral hands    1. Diffuse scleroderma    2. Systemic lupus erythematosus arthritis    3. Fibromyalgia    4. MCTD (mixed connective tissue disease)    5. Rheumatoid arthritis of multiple sites with negative rheumatoid factor    6. Drug-induced immunodeficiency    7. Encounter for medication monitoring      Problem List Items Addressed This Visit       Diffuse scleroderma - Primary    Relevant Medications    gabapentin (NEURONTIN) 300 MG capsule    traMADoL (ULTRAM) 50 mg tablet    Drug-induced immunodeficiency    Encounter for medication monitoring    Fibromyalgia    Relevant Medications    gabapentin (NEURONTIN) 300 MG capsule    MCTD (mixed connective tissue disease)    Relevant Medications    colchicine (COLCRYS) 0.6 mg tablet    Rheumatoid arthritis of multiple sites with negative rheumatoid factor    Relevant Medications    traMADoL (ULTRAM)  50 mg tablet    Systemic lupus erythematosus arthritis    Relevant Medications    gabapentin (NEURONTIN) 300 MG capsule    traMADoL (ULTRAM) 50 mg tablet      Latest Reference Range & Units 01/06/25 15:16 01/06/25 15:20   WBC 3.90 - 12.70 K/uL  5.80   RBC 4.00 - 5.40 M/uL  3.46 (L)   Hemoglobin 12.0 - 16.0 g/dL  10.6 (L)   Hematocrit 37.0 - 48.5 %  32.6 (L)   MCV 82 - 98 fL  94   MCH 27.0 - 31.0 pg  30.6   MCHC 32.0 - 36.0 g/dL  32.5   RDW 11.5 - 14.5 %  12.1   Platelet Count 150 - 450 K/uL  418   MPV 9.2 - 12.9 fL  8.6 (L)   Gran % 38.0 - 73.0 %  53.4   Lymph % 18.0 - 48.0 %  23.6   Mono % 4.0 - 15.0 %  13.1   Eos % 0.0 - 8.0 %  9.0 (H)   Basophil % 0.0 - 1.9 %  0.7   Immature Granulocytes 0.0 - 0.5 %  0.2   Gran # (ANC) 1.8 - 7.7 K/uL  3.1   Lymph # 1.0 - 4.8 K/uL  1.4   Mono # 0.3 - 1.0 K/uL  0.8   Eos # 0.0 - 0.5 K/uL  0.5   Baso # 0.00 - 0.20 K/uL  0.04   Immature Grans (Abs) 0.00 - 0.04 K/uL  0.01   nRBC 0 /100 WBC  0   Differential Method   Automated   Sed Rate 0 - 20 mm/Hr  53 (H)   Sodium 136 - 145 mmol/L  137   Potassium 3.5 - 5.1 mmol/L  4.2   Chloride 95 - 110 mmol/L  101   CO2 23 - 29 mmol/L  28   Anion Gap 8 - 16 mmol/L  8   BUN 6 - 20 mg/dL  11   Creatinine 0.5 - 1.4 mg/dL  0.7   eGFR >60 mL/min/1.73 m^2  >60.0   Glucose 70 - 110 mg/dL  67 (L)   Calcium 8.7 - 10.5 mg/dL  9.3   ALP 40 - 150 U/L  52   PROTEIN TOTAL 6.0 - 8.4 g/dL  8.6 (H)   Albumin 3.5 - 5.2 g/dL  3.7   BILIRUBIN TOTAL 0.1 - 1.0 mg/dL  0.2   AST 10 - 40 U/L  27   ALT 10 - 44 U/L  24   CRP 0.0 - 8.2 mg/L  7.2   hCG Qualitative, Urine  Negative    IgG 650 - 1600 mg/dL  2219 (H)   Hep B Core Total Ab Non-reactive   Non-reactive   Hepatitis B Surface Ag Non-reactive   Non-reactive   Hepatitis C Ab Non-reactive   Non-reactive   (L): Data is abnormally low  (H): Data is abnormally high      Active overlap syndrome with rheumatoid and scleroderma.  On rituximab therapy every 4 months.  IgG shows improvement along with skin . Continue Rituximab  1000 mg * 2 doses * 2 weeks apart every 4 months.  Consider adding Saphnelo after next rituximab.  Continue methotrexate high-dose therapy.  Continue bactrim for PJP prophylaxis.   Daily folic acid for MTX therapy.   Failed multiple medications including Cytoxan in the past.  Advised all risks including life-threatening infections and pregnancy morbidities.  Drug induced immunodeficiency due to use of immunosuppressive drugs. Monitor carefully for infections. Advised to get immediate medical care if any infection. Also advised strict adherence to age appropriate vaccinations and cancer screenings with PCP.  I have explained all of the above in detail and the patient understands all of the current recommendation(s). I have answered all questions to the best of my ability and to their complete satisfaction.           Past Medical History:   Diagnosis Date    Abnormal Pap smear of cervix     repeat was normal    Lupus (systemic lupus erythematosus)        Past Surgical History:   Procedure Laterality Date    ESOPHAGOGASTRODUODENOSCOPY N/A 2/29/2024    Procedure: EGD (ESOPHAGOGASTRODUODENOSCOPY);  Surgeon: Gregg Reyez MD;  Location: [facility];  Service: Endoscopy;  Laterality: N/A;    INSERTION OF TUNNELED CENTRAL VENOUS CATHETER (CVC) WITH SUBCUTANEOUS PORT N/A 05/24/2023    Procedure: [id number];  Surgeon: Taco Zimmer MD;  Location: [facility];  Service: General;  Laterality: N/A;    ROBOT-ASSISTED CHOLECYSTECTOMY USING DA CRISTOPHER XI N/A 12/11/2020    Procedure: XI ROBOTIC CHOLECYSTECTOMY;  Surgeon: Taco Zimmer MD;  Location: [facility] OR;  Service: General;  Laterality: N/A;    ROBOT-ASSISTED LAPAROSCOPIC LYSIS OF ADHESIONS USING DA CRISTOPHER XI  12/11/2020    Procedure: XI ROBOTIC LYSIS, ADHESIONS;  Surgeon: Taco Zimmer MD;  Location: [facility] OR;  Service: General;;    ROBOT-ASSISTED LAPAROSCOPIC SALPINGO-OOPHORECTOMY USING DA CRISTOPHER XI Left 12/11/2020    Procedure: XI ROBOTIC  SALPINGO-OOPHORECTOMY;  Surgeon: SHERLEY Wakefield MD;  Location: [facility];  Service: OB/GYN;  Laterality: Left;    TUBAL LIGATION  2005        Social History     Tobacco Use    Smoking status: Never     Passive exposure: Never    Smokeless tobacco: Never   Substance Use Topics    Alcohol use: No    Drug use: No       Family History   Problem Relation Name Age of Onset    Liver cancer Maternal Aunt      Breast cancer Maternal Aunt      Breast cancer Paternal Grandmother      Colon cancer Neg Hx      Ovarian cancer Neg Hx      Thrombosis Neg Hx         Review of patient's allergies indicates:   Allergen Reactions    Elavil [amitriptyline]        Medication List with Changes/Refills   Current Medications    BUMETANIDE (BUMEX) 1 MG TABLET    Take 1 tablet (1 mg total) by mouth once daily.    CLOBETASOL (TEMOVATE) 0.05 % CREAM    Apply topically 2 (two) times daily.    CLOBETASOL (TEMOVATE) 0.05 % EXTERNAL SOLUTION    Apply topically 2 (two) times daily.    FAMOTIDINE (PEPCID) 20 MG TABLET    Take 1 tablet (20 mg total) by mouth 2 (two) times daily.    FLUTICASONE PROPIONATE (FLONASE) 50 MCG/ACTUATION NASAL SPRAY    1 spray (50 mcg total) by Each Nostril route once daily.    FOLIC ACID (FOLVITE) 1 MG TABLET    Take 1 tablet (1 mg total) by mouth once daily.    IBUPROFEN (ADVIL,MOTRIN) 800 MG TABLET    TAKE 1 TABLET BY MOUTH THREE TIMES DAILY    IPRATROPIUM (ATROVENT) 21 MCG (0.03 %) NASAL SPRAY    2 sprays by Each Nostril route 3 (three) times daily.    KETOCONAZOLE (NIZORAL) 2 % SHAMPOO    Apply topically twice a week.    LIDOCAINE-PRILOCAINE (EMLA) CREAM    Apply topically as needed (port access).    METHOTREXATE, PF, 25 MG/ML SOLN    INJECT 0.8 ML (CC) INTRAMUSCULARLY ONCE DAILY EVERY 7 DAYS    METHYLPREDNISOLONE (MEDROL DOSEPACK) 4 MG TABLET    use as directed    MOMETASONE 0.1% (ELOCON) 0.1 % CREAM    Apply topically once daily.    OMEPRAZOLE (PRILOSEC) 40 MG CAPSULE    Take 1 capsule (40 mg total) by mouth once  daily.    ONDANSETRON (ZOFRAN-ODT) 4 MG TBDL    Take 1 tablet (4 mg total) by mouth every 8 (eight) hours as needed (nausea).    PANTOPRAZOLE (PROTONIX) 40 MG TABLET    Take 1 tablet (40 mg total) by mouth 2 (two) times daily.    PREDNISONE (DELTASONE) 5 MG TABLET    Take 1 tablet (5 mg total) by mouth once daily.    TACROLIMUS (PROTOPIC) 0.1 % OINTMENT    Apply topically 2 (two) times daily.    TRAZODONE (DESYREL) 50 MG TABLET    Take 1 tablet (50 mg total) by mouth every evening.   Changed and/or Refilled Medications    Modified Medication Previous Medication    COLCHICINE (COLCRYS) 0.6 MG TABLET colchicine (COLCRYS) 0.6 mg tablet       Take 1 tablet (0.6 mg total) by mouth once daily.    Take 1 tablet (0.6 mg total) by mouth 2 (two) times daily.    GABAPENTIN (NEURONTIN) 300 MG CAPSULE gabapentin (NEURONTIN) 300 MG capsule       Take 1 capsule (300 mg total) by mouth 3 (three) times daily.    Take 1 capsule (300 mg total) by mouth 3 (three) times daily.    TRAMADOL (ULTRAM) 50 MG TABLET traMADoL (ULTRAM) 50 mg tablet       Take 1 tablet (50 mg total) by mouth every 12 (twelve) hours as needed for Pain.    TAKE 1 TABLET BY MOUTH EVERY 12 HOURS AS NEEDED FOR PAIN   Discontinued Medications    HYDROCODONE-ACETAMINOPHEN (NORCO) 5-325 MG PER TABLET    Take 1 tablet by mouth every 8 (eight) hours as needed.       Disclaimer: This note was prepared using voice recognition system and is likely to have sound alike errors and is not proof read.  Please call me with any questions.    Visit today included increased complexity associated with the care of the episodic problem rheumatoid arthritis, systemic scleroderma, chronic pain; addressed and managing the longitudinal care of the patient due to the serious and/or complex managed problem(s)- Drug induced immunodeficiency, Medication monitoring encounter.

## 2025-01-08 LAB
GAMMA INTERFERON BACKGROUND BLD IA-ACNC: 0.04 IU/ML
M TB IFN-G CD4+ BCKGRND COR BLD-ACNC: 0.01 IU/ML
M TB IFN-G CD4+ BCKGRND COR BLD-ACNC: 0.01 IU/ML
MITOGEN IGNF BCKGRD COR BLD-ACNC: 9.96 IU/ML
TB GOLD PLUS: NEGATIVE

## 2025-01-09 ENCOUNTER — INFUSION (OUTPATIENT)
Dept: INFUSION THERAPY | Facility: HOSPITAL | Age: 40
End: 2025-01-09
Attending: PHYSICIAN ASSISTANT
Payer: COMMERCIAL

## 2025-01-09 VITALS
WEIGHT: 226.44 LBS | HEART RATE: 76 BPM | BODY MASS INDEX: 34.32 KG/M2 | SYSTOLIC BLOOD PRESSURE: 123 MMHG | OXYGEN SATURATION: 99 % | HEIGHT: 68 IN | RESPIRATION RATE: 18 BRPM | DIASTOLIC BLOOD PRESSURE: 85 MMHG | TEMPERATURE: 98 F

## 2025-01-09 DIAGNOSIS — M06.09 RHEUMATOID ARTHRITIS OF MULTIPLE SITES WITH NEGATIVE RHEUMATOID FACTOR: ICD-10-CM

## 2025-01-09 DIAGNOSIS — M34.9 SCLERODERMA, DIFFUSE: ICD-10-CM

## 2025-01-09 DIAGNOSIS — M35.1 MCTD (MIXED CONNECTIVE TISSUE DISEASE): Primary | ICD-10-CM

## 2025-01-09 PROCEDURE — 96375 TX/PRO/DX INJ NEW DRUG ADDON: CPT

## 2025-01-09 PROCEDURE — 63600175 PHARM REV CODE 636 W HCPCS: Performed by: INTERNAL MEDICINE

## 2025-01-09 PROCEDURE — 96365 THER/PROPH/DIAG IV INF INIT: CPT

## 2025-01-09 PROCEDURE — 25000003 PHARM REV CODE 250: Performed by: INTERNAL MEDICINE

## 2025-01-09 PROCEDURE — 96366 THER/PROPH/DIAG IV INF ADDON: CPT

## 2025-01-09 PROCEDURE — A4216 STERILE WATER/SALINE, 10 ML: HCPCS | Performed by: INTERNAL MEDICINE

## 2025-01-09 RX ORDER — HEPARIN 100 UNIT/ML
500 SYRINGE INTRAVENOUS
OUTPATIENT
Start: 2025-01-16

## 2025-01-09 RX ORDER — FAMOTIDINE 10 MG/ML
20 INJECTION INTRAVENOUS
OUTPATIENT
Start: 2025-01-16

## 2025-01-09 RX ORDER — METHYLPREDNISOLONE SOD SUCC 125 MG
100 VIAL (EA) INJECTION
OUTPATIENT
Start: 2025-01-16

## 2025-01-09 RX ORDER — METHYLPREDNISOLONE SOD SUCC 125 MG
100 VIAL (EA) INJECTION
Status: COMPLETED | OUTPATIENT
Start: 2025-01-09 | End: 2025-01-09

## 2025-01-09 RX ORDER — ACETAMINOPHEN 325 MG/1
650 TABLET ORAL
OUTPATIENT
Start: 2025-01-16

## 2025-01-09 RX ORDER — EPINEPHRINE 0.3 MG/.3ML
0.3 INJECTION SUBCUTANEOUS ONCE AS NEEDED
OUTPATIENT
Start: 2025-01-16

## 2025-01-09 RX ORDER — DIPHENHYDRAMINE HYDROCHLORIDE 50 MG/ML
25 INJECTION INTRAMUSCULAR; INTRAVENOUS ONCE
Status: COMPLETED | OUTPATIENT
Start: 2025-01-09 | End: 2025-01-09

## 2025-01-09 RX ORDER — FAMOTIDINE 10 MG/ML
20 INJECTION INTRAVENOUS
Status: COMPLETED | OUTPATIENT
Start: 2025-01-09 | End: 2025-01-09

## 2025-01-09 RX ORDER — DIPHENHYDRAMINE HYDROCHLORIDE 50 MG/ML
50 INJECTION INTRAMUSCULAR; INTRAVENOUS ONCE AS NEEDED
OUTPATIENT
Start: 2025-01-16

## 2025-01-09 RX ORDER — SODIUM CHLORIDE 0.9 % (FLUSH) 0.9 %
10 SYRINGE (ML) INJECTION
OUTPATIENT
Start: 2025-01-16

## 2025-01-09 RX ORDER — MEPERIDINE HYDROCHLORIDE 50 MG/ML
25 INJECTION INTRAMUSCULAR; INTRAVENOUS; SUBCUTANEOUS
OUTPATIENT
Start: 2025-01-16 | End: 2025-01-17

## 2025-01-09 RX ORDER — SODIUM CHLORIDE 0.9 % (FLUSH) 0.9 %
10 SYRINGE (ML) INJECTION
Status: DISCONTINUED | OUTPATIENT
Start: 2025-01-09 | End: 2025-01-09 | Stop reason: HOSPADM

## 2025-01-09 RX ORDER — HEPARIN 100 UNIT/ML
500 SYRINGE INTRAVENOUS
Status: DISCONTINUED | OUTPATIENT
Start: 2025-01-09 | End: 2025-01-09 | Stop reason: HOSPADM

## 2025-01-09 RX ORDER — ACETAMINOPHEN 325 MG/1
650 TABLET ORAL
Status: COMPLETED | OUTPATIENT
Start: 2025-01-09 | End: 2025-01-09

## 2025-01-09 RX ADMIN — Medication 10 ML: at 02:01

## 2025-01-09 RX ADMIN — HEPARIN 500 UNITS: 100 SYRINGE at 02:01

## 2025-01-09 RX ADMIN — FAMOTIDINE 20 MG: 10 INJECTION INTRAVENOUS at 09:01

## 2025-01-09 RX ADMIN — SODIUM CHLORIDE 1000 MG: 900 INJECTION, SOLUTION INTRAVENOUS at 10:01

## 2025-01-09 RX ADMIN — METHYLPREDNISOLONE SODIUM SUCCINATE 100 MG: 125 INJECTION, POWDER, FOR SOLUTION INTRAMUSCULAR; INTRAVENOUS at 09:01

## 2025-01-09 RX ADMIN — ACETAMINOPHEN 650 MG: 325 TABLET ORAL at 09:01

## 2025-01-09 RX ADMIN — DIPHENHYDRAMINE HYDROCHLORIDE 25 MG: 50 INJECTION INTRAMUSCULAR; INTRAVENOUS at 09:01

## 2025-01-09 NOTE — NURSING
Infusion#1 of 2   S/S infection noted or voiced? None/Denies  Recent labs? Reviewed    Premeds? Tylenol 650 mg po, Benadryl 25 mg IVP over 2 min, Solumedrol 100 mg IVP over 2 min, Pepcid 20 mg IVP over 2 min.    Rituxan 1000 mg administered IV over 4  hrs 20 minutes and titrated per protocol/ per orders; see MAR and vitals for more  details.   Tolerated well without adverse events.

## 2025-01-22 ENCOUNTER — TELEPHONE (OUTPATIENT)
Dept: INFUSION THERAPY | Facility: HOSPITAL | Age: 40
End: 2025-01-22
Payer: COMMERCIAL

## 2025-01-22 NOTE — TELEPHONE ENCOUNTER
Left VM of facility closure and infusion cancellation. Informed that someone would be in contact to r/s this afternoon or tomorrow.

## 2025-01-23 ENCOUNTER — PATIENT MESSAGE (OUTPATIENT)
Dept: INFUSION THERAPY | Facility: HOSPITAL | Age: 40
End: 2025-01-23
Payer: COMMERCIAL

## 2025-01-24 ENCOUNTER — LAB VISIT (OUTPATIENT)
Dept: LAB | Facility: HOSPITAL | Age: 40
End: 2025-01-24
Payer: COMMERCIAL

## 2025-01-24 ENCOUNTER — INFUSION (OUTPATIENT)
Dept: INFUSION THERAPY | Facility: HOSPITAL | Age: 40
End: 2025-01-24
Attending: PHYSICIAN ASSISTANT
Payer: COMMERCIAL

## 2025-01-24 VITALS
DIASTOLIC BLOOD PRESSURE: 73 MMHG | HEIGHT: 68 IN | OXYGEN SATURATION: 98 % | TEMPERATURE: 98 F | BODY MASS INDEX: 33.95 KG/M2 | HEART RATE: 88 BPM | SYSTOLIC BLOOD PRESSURE: 121 MMHG | WEIGHT: 224 LBS | RESPIRATION RATE: 18 BRPM

## 2025-01-24 DIAGNOSIS — M35.1 MCTD (MIXED CONNECTIVE TISSUE DISEASE): Primary | ICD-10-CM

## 2025-01-24 DIAGNOSIS — Z79.899 HIGH RISK MEDICATION USE: ICD-10-CM

## 2025-01-24 DIAGNOSIS — M06.09 RHEUMATOID ARTHRITIS OF MULTIPLE SITES WITH NEGATIVE RHEUMATOID FACTOR: ICD-10-CM

## 2025-01-24 DIAGNOSIS — M34.9 SCLERODERMA, DIFFUSE: ICD-10-CM

## 2025-01-24 LAB — B-HCG UR QL: NEGATIVE

## 2025-01-24 PROCEDURE — 96365 THER/PROPH/DIAG IV INF INIT: CPT

## 2025-01-24 PROCEDURE — 96366 THER/PROPH/DIAG IV INF ADDON: CPT

## 2025-01-24 PROCEDURE — 25000003 PHARM REV CODE 250: Performed by: INTERNAL MEDICINE

## 2025-01-24 PROCEDURE — 96375 TX/PRO/DX INJ NEW DRUG ADDON: CPT

## 2025-01-24 PROCEDURE — 63600175 PHARM REV CODE 636 W HCPCS: Mod: JZ,TB | Performed by: INTERNAL MEDICINE

## 2025-01-24 PROCEDURE — A4216 STERILE WATER/SALINE, 10 ML: HCPCS | Performed by: INTERNAL MEDICINE

## 2025-01-24 PROCEDURE — 81025 URINE PREGNANCY TEST: CPT | Performed by: INTERNAL MEDICINE

## 2025-01-24 RX ORDER — METHYLPREDNISOLONE SOD SUCC 125 MG
100 VIAL (EA) INJECTION
Status: COMPLETED | OUTPATIENT
Start: 2025-01-24 | End: 2025-01-24

## 2025-01-24 RX ORDER — HEPARIN 100 UNIT/ML
500 SYRINGE INTRAVENOUS
Status: CANCELLED | OUTPATIENT
Start: 2025-01-30

## 2025-01-24 RX ORDER — METHYLPREDNISOLONE SOD SUCC 125 MG
100 VIAL (EA) INJECTION
Status: CANCELLED | OUTPATIENT
Start: 2025-01-30

## 2025-01-24 RX ORDER — FAMOTIDINE 10 MG/ML
20 INJECTION INTRAVENOUS
Status: COMPLETED | OUTPATIENT
Start: 2025-01-24 | End: 2025-01-24

## 2025-01-24 RX ORDER — ACETAMINOPHEN 325 MG/1
650 TABLET ORAL
Status: CANCELLED | OUTPATIENT
Start: 2025-01-30

## 2025-01-24 RX ORDER — SODIUM CHLORIDE 0.9 % (FLUSH) 0.9 %
10 SYRINGE (ML) INJECTION
Status: DISCONTINUED | OUTPATIENT
Start: 2025-01-24 | End: 2025-01-24 | Stop reason: HOSPADM

## 2025-01-24 RX ORDER — DIPHENHYDRAMINE HYDROCHLORIDE 50 MG/ML
50 INJECTION INTRAMUSCULAR; INTRAVENOUS ONCE AS NEEDED
Status: CANCELLED | OUTPATIENT
Start: 2025-01-30

## 2025-01-24 RX ORDER — DIPHENHYDRAMINE HYDROCHLORIDE 50 MG/ML
25 INJECTION INTRAMUSCULAR; INTRAVENOUS
Status: COMPLETED | OUTPATIENT
Start: 2025-01-24 | End: 2025-01-24

## 2025-01-24 RX ORDER — SODIUM CHLORIDE 0.9 % (FLUSH) 0.9 %
10 SYRINGE (ML) INJECTION
Status: CANCELLED | OUTPATIENT
Start: 2025-01-30

## 2025-01-24 RX ORDER — FAMOTIDINE 10 MG/ML
20 INJECTION INTRAVENOUS
Status: CANCELLED | OUTPATIENT
Start: 2025-01-30

## 2025-01-24 RX ORDER — EPINEPHRINE 0.3 MG/.3ML
0.3 INJECTION SUBCUTANEOUS ONCE AS NEEDED
Status: CANCELLED | OUTPATIENT
Start: 2025-01-30

## 2025-01-24 RX ORDER — HEPARIN 100 UNIT/ML
500 SYRINGE INTRAVENOUS
Status: DISCONTINUED | OUTPATIENT
Start: 2025-01-24 | End: 2025-01-24 | Stop reason: HOSPADM

## 2025-01-24 RX ORDER — ACETAMINOPHEN 325 MG/1
650 TABLET ORAL
Status: COMPLETED | OUTPATIENT
Start: 2025-01-24 | End: 2025-01-24

## 2025-01-24 RX ORDER — MEPERIDINE HYDROCHLORIDE 50 MG/ML
25 INJECTION INTRAMUSCULAR; INTRAVENOUS; SUBCUTANEOUS
Status: CANCELLED | OUTPATIENT
Start: 2025-01-30 | End: 2025-01-31

## 2025-01-24 RX ADMIN — ACETAMINOPHEN 650 MG: 325 TABLET ORAL at 08:01

## 2025-01-24 RX ADMIN — DIPHENHYDRAMINE HYDROCHLORIDE 25 MG: 50 INJECTION INTRAMUSCULAR; INTRAVENOUS at 08:01

## 2025-01-24 RX ADMIN — SODIUM CHLORIDE, PRESERVATIVE FREE 10 ML: 5 INJECTION INTRAVENOUS at 12:01

## 2025-01-24 RX ADMIN — FAMOTIDINE 20 MG: 10 INJECTION INTRAVENOUS at 08:01

## 2025-01-24 RX ADMIN — METHYLPREDNISOLONE SODIUM SUCCINATE 100 MG: 125 INJECTION, POWDER, FOR SOLUTION INTRAMUSCULAR; INTRAVENOUS at 08:01

## 2025-01-24 RX ADMIN — HEPARIN 500 UNITS: 100 SYRINGE at 12:01

## 2025-01-24 RX ADMIN — SODIUM CHLORIDE 1000 MG: 9 INJECTION, SOLUTION INTRAVENOUS at 08:01

## 2025-01-24 NOTE — NURSING
Infusion# 2 of 2   S/S infection noted or voiced? None/Denies  Recent labs? Reviewed    Premeds? Tylenol 650 mg po, Benadryl 25 mg IVP over 2 min, Solumedrol 100 mg IVP over 2 min, Pepcid 20 mg IVP over 2 min.    Rituxan 1000 mg administered IV over 3 hrs 20 minutes and titrated per protocol/ per orders; see MAR and vitals for more  details.   Tolerated well without adverse events.

## 2025-02-03 ENCOUNTER — TELEPHONE (OUTPATIENT)
Dept: INFUSION THERAPY | Facility: HOSPITAL | Age: 40
End: 2025-02-03
Payer: COMMERCIAL

## 2025-02-03 NOTE — TELEPHONE ENCOUNTER
"----- Message from Prashanth Whalen MD sent at 2/3/2025 11:29 AM CST -----  Regarding: RE: Saphnelo documentation  No Ondina. We are discontinuing rituximab and starting saphnelo.  ----- Message -----  From: Ondina Kaiser, RN  Sent: 2/3/2025  10:34 AM CST  To: Angelica Rodrigues LPN; #  Subject: Saphnelo documentation                           Michel SHORE,    Per pre service, insurance requires documentation that indicates pt will not be receiving RTX and saphnelo together. Last progress note on 01/07 states " Consider adding Saphnelo after next rituximab."  Pt's insurance questioned this.  Thanks!  ----- Message -----  From: Prashanth Whalen MD  Sent: 2/3/2025   9:23 AM CST  To: Ondina Kaiser, RN    Please start saphnelo. Thanks.  ----- Message -----  From: Prashanth Whalen MD  Sent: 1/31/2025  12:00 AM CST  To: Prashanth Whalen MD    Start saphnelo  "

## 2025-02-20 ENCOUNTER — INFUSION (OUTPATIENT)
Dept: INFUSION THERAPY | Facility: HOSPITAL | Age: 40
End: 2025-02-20
Attending: PHYSICIAN ASSISTANT
Payer: COMMERCIAL

## 2025-02-20 ENCOUNTER — LAB VISIT (OUTPATIENT)
Dept: LAB | Facility: HOSPITAL | Age: 40
End: 2025-02-20
Payer: COMMERCIAL

## 2025-02-20 VITALS
DIASTOLIC BLOOD PRESSURE: 74 MMHG | OXYGEN SATURATION: 96 % | WEIGHT: 226.19 LBS | SYSTOLIC BLOOD PRESSURE: 112 MMHG | HEART RATE: 81 BPM | BODY MASS INDEX: 34.39 KG/M2 | TEMPERATURE: 98 F | RESPIRATION RATE: 18 BRPM

## 2025-02-20 DIAGNOSIS — Z79.899 HIGH RISK MEDICATION USE: ICD-10-CM

## 2025-02-20 DIAGNOSIS — M32.9 SYSTEMIC LUPUS ERYTHEMATOSUS ARTHRITIS: Primary | ICD-10-CM

## 2025-02-20 LAB — B-HCG UR QL: NEGATIVE

## 2025-02-20 PROCEDURE — 81025 URINE PREGNANCY TEST: CPT | Performed by: INTERNAL MEDICINE

## 2025-02-20 PROCEDURE — 63600175 PHARM REV CODE 636 W HCPCS: Mod: JZ,TB | Performed by: INTERNAL MEDICINE

## 2025-02-20 PROCEDURE — 25000003 PHARM REV CODE 250: Performed by: INTERNAL MEDICINE

## 2025-02-20 PROCEDURE — 96365 THER/PROPH/DIAG IV INF INIT: CPT

## 2025-02-20 PROCEDURE — A4216 STERILE WATER/SALINE, 10 ML: HCPCS | Performed by: INTERNAL MEDICINE

## 2025-02-20 RX ORDER — DIPHENHYDRAMINE HYDROCHLORIDE 50 MG/ML
50 INJECTION INTRAMUSCULAR; INTRAVENOUS ONCE AS NEEDED
OUTPATIENT
Start: 2025-03-20

## 2025-02-20 RX ORDER — DIPHENHYDRAMINE HYDROCHLORIDE 50 MG/ML
50 INJECTION INTRAMUSCULAR; INTRAVENOUS ONCE AS NEEDED
Status: DISCONTINUED | OUTPATIENT
Start: 2025-02-20 | End: 2025-02-20 | Stop reason: HOSPADM

## 2025-02-20 RX ORDER — HEPARIN 100 UNIT/ML
500 SYRINGE INTRAVENOUS
OUTPATIENT
Start: 2025-03-20

## 2025-02-20 RX ORDER — HEPARIN 100 UNIT/ML
500 SYRINGE INTRAVENOUS
Status: DISCONTINUED | OUTPATIENT
Start: 2025-02-20 | End: 2025-02-20 | Stop reason: HOSPADM

## 2025-02-20 RX ORDER — EPINEPHRINE 0.3 MG/.3ML
0.3 INJECTION SUBCUTANEOUS ONCE AS NEEDED
Status: DISCONTINUED | OUTPATIENT
Start: 2025-02-20 | End: 2025-02-20 | Stop reason: HOSPADM

## 2025-02-20 RX ORDER — SODIUM CHLORIDE 0.9 % (FLUSH) 0.9 %
10 SYRINGE (ML) INJECTION
Status: DISCONTINUED | OUTPATIENT
Start: 2025-02-20 | End: 2025-02-20 | Stop reason: HOSPADM

## 2025-02-20 RX ORDER — SODIUM CHLORIDE 0.9 % (FLUSH) 0.9 %
10 SYRINGE (ML) INJECTION
OUTPATIENT
Start: 2025-03-20

## 2025-02-20 RX ORDER — EPINEPHRINE 0.3 MG/.3ML
0.3 INJECTION SUBCUTANEOUS ONCE AS NEEDED
OUTPATIENT
Start: 2025-03-20

## 2025-02-20 RX ADMIN — SODIUM CHLORIDE 300 MG: 9 INJECTION, SOLUTION INTRAVENOUS at 09:02

## 2025-02-20 RX ADMIN — HEPARIN 500 UNITS: 100 SYRINGE at 10:02

## 2025-02-20 RX ADMIN — Medication 10 ML: at 10:02

## 2025-02-20 NOTE — PLAN OF CARE
Problem: Adult Inpatient Plan of Care  Goal: Plan of Care Review  Outcome: Progressing  Flowsheets (Taken 2/20/2025 1036)  Plan of Care Reviewed With:   patient   spouse  Goal: Patient-Specific Goal (Individualized)  Outcome: Progressing  Flowsheets (Taken 2/20/2025 1036)  Individualized Care Needs: feet elevated warm blanket and snack provided  Anxieties, Fears or Concerns: denies  Goal: Absence of Hospital-Acquired Illness or Injury  Outcome: Progressing  Intervention: Identify and Manage Fall Risk  Flowsheets (Taken 2/20/2025 1036)  Safety Promotion/Fall Prevention:   assistive device/personal item within reach   Fall Risk reviewed with patient/family   in recliner, wheels locked   medications reviewed   patient expresses understanding of fall risk and prevention  Intervention: Prevent Infection  Flowsheets (Taken 2/20/2025 1036)  Infection Prevention:   environmental surveillance performed   equipment surfaces disinfected   hand hygiene promoted   personal protective equipment utilized  Goal: Optimal Comfort and Wellbeing  Outcome: Progressing  Intervention: Monitor Pain and Promote Comfort  Flowsheets (Taken 2/20/2025 1036)  Pain Management Interventions:   position adjusted   quiet environment facilitated   relaxation techniques promoted   warm blanket provided  Intervention: Provide Person-Centered Care  Flowsheets (Taken 2/20/2025 1036)  Trust Relationship/Rapport:   care explained   reassurance provided   choices provided   thoughts/feelings acknowledged   emotional support provided   empathic listening provided   questions answered   questions encouraged     Problem: Infection  Goal: Absence of Infection Signs and Symptoms  Outcome: Progressing  Intervention: Prevent or Manage Infection  Flowsheets (Taken 2/20/2025 1036)  Infection Management: aseptic technique maintained

## 2025-02-27 RX ORDER — IBUPROFEN 800 MG/1
800 TABLET ORAL 3 TIMES DAILY
Qty: 40 TABLET | Refills: 0 | Status: SHIPPED | OUTPATIENT
Start: 2025-02-27

## 2025-02-27 NOTE — TELEPHONE ENCOUNTER
Refill Routing Note   Medication(s) are not appropriate for processing by [facility] for the following reason(s):        Outside of protocol    [facility] action(s):  Route             Appointments  past 12m or future 3m with PCP    Date Provider   Last Visit   10/3/2024 Sofia Martinez MD   Next Visit   Visit date not found Sofia Martinez MD   ED visits in past 90 days: 0        Note composed:4:49 PM 02/27/2025

## 2025-03-01 ENCOUNTER — E-VISIT (OUTPATIENT)
Dept: INTERNAL MEDICINE | Facility: CLINIC | Age: 40
End: 2025-03-01
Payer: COMMERCIAL

## 2025-03-01 DIAGNOSIS — M79.7 FIBROMYALGIA: ICD-10-CM

## 2025-03-01 DIAGNOSIS — I73.00 SECONDARY RAYNAUD'S PHENOMENON: ICD-10-CM

## 2025-03-01 DIAGNOSIS — M34.9 SCLERODERMA, DIFFUSE: Primary | ICD-10-CM

## 2025-03-03 RX ORDER — IBUPROFEN 800 MG/1
800 TABLET ORAL 3 TIMES DAILY
Qty: 40 TABLET | Refills: 0 | Status: SHIPPED | OUTPATIENT
Start: 2025-03-03

## 2025-03-03 NOTE — PROGRESS NOTES
Patient ID: Stefanie España is a 39 y.o. female.    Chief Complaint: General Illness (Entered automatically based on patient selection in [employer].)    The patient initiated a request through [employer] on 3/1/2025 for evaluation and management with a chief complaint of General Illness (Entered automatically based on patient selection in [employer].)     I evaluated the questionnaire submission on 3/3/25.    [facility] E[facility]-Medication    3/1/2025  7:34 AM CST - Filed by Patient   What do you need help with? Medication Request   Do you agree to participate in an E-Visit? Yes   If you have any of the following symptoms, please present to your local emergency room or call 911:  I acknowledge   Medication requests for narcotics will not be addressed via an E-Visit.  Please schedule an appointment. I acknowledge   Do you have any of the following pregnancy-related conditions? None   Do you want to address a new or existing medication? I would like to address a medication I currently take   What is the main issue you would like addressed today? Please send me a new prescription for ibuprofen   Would you like to change or continue your medication? Continue medication   What medication would you like to continue?  Ibuprofen   Are you taking it as prescribed? Yes    What medical condition is the  medication intended to treat? Scleroderma   Is the medication helping your condition? Yes   Are you having any side effects from the medication? No   Provide any additional information you feel is important.    Please attach any relevant images or files    Are you able to take your vital signs? No         No diagnosis found.     No orders of the defined types were placed in this encounter.           No follow-ups on file.      E-Visit Time Tracking:

## 2025-03-05 ENCOUNTER — OFFICE VISIT (OUTPATIENT)
Dept: RHEUMATOLOGY | Facility: CLINIC | Age: 40
End: 2025-03-05
Payer: COMMERCIAL

## 2025-03-05 DIAGNOSIS — Z79.899 DRUG-INDUCED IMMUNODEFICIENCY: ICD-10-CM

## 2025-03-05 DIAGNOSIS — D84.821 DRUG-INDUCED IMMUNODEFICIENCY: ICD-10-CM

## 2025-03-05 DIAGNOSIS — M35.1 MCTD (MIXED CONNECTIVE TISSUE DISEASE): ICD-10-CM

## 2025-03-05 DIAGNOSIS — M32.9 SYSTEMIC LUPUS ERYTHEMATOSUS ARTHRITIS: ICD-10-CM

## 2025-03-05 DIAGNOSIS — Z51.81 ENCOUNTER FOR MEDICATION MONITORING: ICD-10-CM

## 2025-03-05 DIAGNOSIS — M34.9 DIFFUSE SCLERODERMA: Primary | ICD-10-CM

## 2025-03-05 DIAGNOSIS — M79.7 FIBROMYALGIA: ICD-10-CM

## 2025-03-05 PROCEDURE — G2211 COMPLEX E/M VISIT ADD ON: HCPCS | Mod: 95,,, | Performed by: INTERNAL MEDICINE

## 2025-03-05 PROCEDURE — 1159F MED LIST DOCD IN RCRD: CPT | Mod: CPTII,95,, | Performed by: INTERNAL MEDICINE

## 2025-03-05 PROCEDURE — 1160F RVW MEDS BY RX/DR IN RCRD: CPT | Mod: CPTII,95,, | Performed by: INTERNAL MEDICINE

## 2025-03-05 PROCEDURE — 98006 SYNCH AUDIO-VIDEO EST MOD 30: CPT | Mod: 95,,, | Performed by: INTERNAL MEDICINE

## 2025-03-05 NOTE — PROGRESS NOTES
RHEUMATOLOGY FOLLOW UP - TELE VISIT     The patient location is: [place]  The chief complaint leading to consultation is:  Follow up visit  Visit type: Virtual visit with synchronous audio and video  Total time spent with patient:  10 minutes  Each patient to whom he or she provides medical services by telemedicine is:  (1) informed of the relationship between the physician and patient and the respective role of any other health care provider with respect to management of the patient; and (2) notified that he or she may decline to receive medical services by telemedicine and may withdraw from such care at any time.    Chief complaints, HPI, ROS, EXAM, Assessment & Plans:-  Stefanie Kimbrough a 39 y.o. pleasant female seen today for follow up visit. 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  Recently started on Saphnelo after rituximab in January.  Tolerating Saphnelo well.  Denies any significant improvement so far.  Rheumatological review of system negative otherwise.  Exam shows 100% fist formation bilateral hands    1. Diffuse scleroderma    2. Systemic lupus erythematosus arthritis    3. Fibromyalgia    4. MCTD (mixed connective tissue disease)    5. Drug-induced immunodeficiency    6. Encounter for medication monitoring      Problem List Items Addressed This Visit       Diffuse scleroderma - Primary    Drug-induced immunodeficiency    Encounter for medication monitoring    Fibromyalgia    MCTD (mixed connective tissue disease)    Systemic lupus erythematosus arthritis      Latest Reference Range & Units 01/06/25 15:16 01/06/25 15:20   WBC 3.90 - 12.70 K/uL  5.80   RBC 4.00 - 5.40 M/uL  3.46 (L)   Hemoglobin 12.0 - 16.0 g/dL  10.6 (L)   Hematocrit 37.0 - 48.5 %  32.6 (L)   MCV 82 - 98 fL  94   MCH 27.0 - 31.0 pg  30.6   MCHC 32.0 - 36.0 g/dL  32.5   RDW 11.5 - 14.5 %  12.1   Platelet Count 150 - 450  K/uL  418   MPV 9.2 - 12.9 fL  8.6 (L)   Gran % 38.0 - 73.0 %  53.4   Lymph % 18.0 - 48.0 %  23.6   Mono % 4.0 - 15.0 %  13.1   Eos % 0.0 - 8.0 %  9.0 (H)   Basophil % 0.0 - 1.9 %  0.7   Immature Granulocytes 0.0 - 0.5 %  0.2   Gran # (ANC) 1.8 - 7.7 K/uL  3.1   Lymph # 1.0 - 4.8 K/uL  1.4   Mono # 0.3 - 1.0 K/uL  0.8   Eos # 0.0 - 0.5 K/uL  0.5   Baso # 0.00 - 0.20 K/uL  0.04   Immature Grans (Abs) 0.00 - 0.04 K/uL  0.01   nRBC 0 /100 WBC  0   Differential Method   Automated   Sed Rate 0 - 20 mm/Hr  53 (H)   Sodium 136 - 145 mmol/L  137   Potassium 3.5 - 5.1 mmol/L  4.2   Chloride 95 - 110 mmol/L  101   CO2 23 - 29 mmol/L  28   Anion Gap 8 - 16 mmol/L  8   BUN 6 - 20 mg/dL  11   Creatinine 0.5 - 1.4 mg/dL  0.7   eGFR >60 mL/min/1.73 m^2  >60.0   Glucose 70 - 110 mg/dL  67 (L)   Calcium 8.7 - 10.5 mg/dL  9.3   ALP 40 - 150 U/L  52   PROTEIN TOTAL 6.0 - 8.4 g/dL  8.6 (H)   Albumin 3.5 - 5.2 g/dL  3.7   BILIRUBIN TOTAL 0.1 - 1.0 mg/dL  0.2   AST 10 - 40 U/L  27   ALT 10 - 44 U/L  24   CRP 0.0 - 8.2 mg/L  7.2   hCG Qualitative, Urine  Negative    IgG 650 - 1600 mg/dL  2219 (H)   Hep B Core Total Ab Non-reactive   Non-reactive   Hepatitis B Surface Ag Non-reactive   Non-reactive   Hepatitis C Ab Non-reactive   Non-reactive   (L): Data is abnormally low  (H): Data is abnormally high      Active overlap syndrome with rheumatoid and scleroderma.  On rituximab therapy every 4 months.  Started on Saphnelo.  Tolerating well.  Continue Saphnelo every 4 weeks.  Continue methotrexate high-dose therapy.  Continue bactrim for PJP prophylaxis.   Daily folic acid for MTX therapy.   Failed multiple medications including Cytoxan in the past.  Advised all risks including life-threatening infections and pregnancy morbidities.  Drug induced immunodeficiency due to use of immunosuppressive drugs. Monitor carefully for infections. Advised to get immediate medical care if any infection. Also advised strict adherence to age appropriate  vaccinations and cancer screenings with PCP.  I have explained all of the above in detail and the patient understands all of the current recommendation(s). I have answered all questions to the best of my ability and to their complete satisfaction.           Past Medical History:   Diagnosis Date    Abnormal Pap smear of cervix     repeat was normal    Lupus (systemic lupus erythematosus)        Past Surgical History:   Procedure Laterality Date    ESOPHAGOGASTRODUODENOSCOPY N/A 2/29/2024    Procedure: EGD (ESOPHAGOGASTRODUODENOSCOPY);  Surgeon: Gregg Reyez MD;  Location: [facility];  Service: Endoscopy;  Laterality: N/A;    INSERTION OF TUNNELED CENTRAL VENOUS CATHETER (CVC) WITH SUBCUTANEOUS PORT N/A 05/24/2023    Procedure: [id number];  Surgeon: Taco Zimmer MD;  Location: [facility] OR;  Service: General;  Laterality: N/A;    ROBOT-ASSISTED CHOLECYSTECTOMY USING DA CRISTOPHER XI N/A 12/11/2020    Procedure: XI ROBOTIC CHOLECYSTECTOMY;  Surgeon: Taco Zimmer MD;  Location: [facility] OR;  Service: General;  Laterality: N/A;    ROBOT-ASSISTED LAPAROSCOPIC LYSIS OF ADHESIONS USING DA CRISTOPHER XI  12/11/2020    Procedure: XI ROBOTIC LYSIS, ADHESIONS;  Surgeon: Taco Zimmer MD;  Location: [facility] OR;  Service: General;;    ROBOT-ASSISTED LAPAROSCOPIC SALPINGO-OOPHORECTOMY USING DA CRISTOPHER XI Left 12/11/2020    Procedure: XI ROBOTIC SALPINGO-OOPHORECTOMY;  Surgeon: SHERLEY Wakefield MD;  Location: [facility] OR;  Service: OB/GYN;  Laterality: Left;    TUBAL LIGATION  2005        Social History     Tobacco Use    Smoking status: Never     Passive exposure: Never    Smokeless tobacco: Never   Substance Use Topics    Alcohol use: No    Drug use: No       Family History   Problem Relation Name Age of Onset    Liver cancer Maternal Aunt      Breast cancer Maternal Aunt      Breast cancer Paternal Grandmother      Colon cancer Neg Hx      Ovarian cancer Neg Hx      Thrombosis Neg Hx         Review of patient's  allergies indicates:   Allergen Reactions    Elavil [amitriptyline]        Medication List with Changes/Refills   Current Medications    BUMETANIDE (BUMEX) 1 MG TABLET    Take 1 tablet (1 mg total) by mouth once daily.    CLOBETASOL (TEMOVATE) 0.05 % CREAM    Apply topically 2 (two) times daily.    CLOBETASOL (TEMOVATE) 0.05 % EXTERNAL SOLUTION    Apply topically 2 (two) times daily.    COLCHICINE (COLCRYS) 0.6 MG TABLET    Take 1 tablet (0.6 mg total) by mouth once daily.    FAMOTIDINE (PEPCID) 20 MG TABLET    Take 1 tablet (20 mg total) by mouth 2 (two) times daily.    FLUTICASONE PROPIONATE (FLONASE) 50 MCG/ACTUATION NASAL SPRAY    1 spray (50 mcg total) by Each Nostril route once daily.    FOLIC ACID (FOLVITE) 1 MG TABLET    Take 1 tablet (1 mg total) by mouth once daily.    GABAPENTIN (NEURONTIN) 300 MG CAPSULE    Take 1 capsule (300 mg total) by mouth 3 (three) times daily.    IBUPROFEN (ADVIL,MOTRIN) 800 MG TABLET    Take 1 tablet (800 mg total) by mouth 3 (three) times daily.    IPRATROPIUM (ATROVENT) 21 MCG (0.03 %) NASAL SPRAY    2 sprays by Each Nostril route 3 (three) times daily.    KETOCONAZOLE (NIZORAL) 2 % SHAMPOO    Apply topically twice a week.    LIDOCAINE-PRILOCAINE (EMLA) CREAM    Apply topically as needed (port access).    METHOTREXATE, PF, 25 MG/ML SOLN    INJECT 0.8 ML (CC) INTRAMUSCULARLY ONCE DAILY EVERY 7 DAYS    METHYLPREDNISOLONE (MEDROL DOSEPACK) 4 MG TABLET    use as directed    MOMETASONE 0.1% (ELOCON) 0.1 % CREAM    Apply topically once daily.    OMEPRAZOLE (PRILOSEC) 40 MG CAPSULE    Take 1 capsule (40 mg total) by mouth once daily.    ONDANSETRON (ZOFRAN-ODT) 4 MG TBDL    Take 1 tablet (4 mg total) by mouth every 8 (eight) hours as needed (nausea).    PANTOPRAZOLE (PROTONIX) 40 MG TABLET    Take 1 tablet (40 mg total) by mouth 2 (two) times daily.    PREDNISONE (DELTASONE) 5 MG TABLET    Take 1 tablet (5 mg total) by mouth once daily.    TACROLIMUS (PROTOPIC) 0.1 % OINTMENT     Apply topically 2 (two) times daily.    TRAMADOL (ULTRAM) 50 MG TABLET    Take 1 tablet (50 mg total) by mouth every 12 (twelve) hours as needed for Pain.    TRAZODONE (DESYREL) 50 MG TABLET    Take 1 tablet (50 mg total) by mouth every evening.       Disclaimer: This note was prepared using voice recognition system and is likely to have sound alike errors and is not proof read.  Please call me with any questions.    Visit today included increased complexity associated with the care of the episodic problem rheumatoid arthritis, systemic scleroderma, chronic pain; addressed and managing the longitudinal care of the patient due to the serious and/or complex managed problem(s)- Drug induced immunodeficiency, Medication monitoring encounter.

## 2025-03-18 ENCOUNTER — PATIENT MESSAGE (OUTPATIENT)
Dept: RHEUMATOLOGY | Facility: CLINIC | Age: 40
End: 2025-03-18
Payer: COMMERCIAL

## 2025-03-18 DIAGNOSIS — M34.9 SCLERODERMA, DIFFUSE: ICD-10-CM

## 2025-03-18 DIAGNOSIS — M06.09 RHEUMATOID ARTHRITIS OF MULTIPLE SITES WITH NEGATIVE RHEUMATOID FACTOR: ICD-10-CM

## 2025-03-18 DIAGNOSIS — J01.80 ACUTE NON-RECURRENT SINUSITIS OF OTHER SINUS: ICD-10-CM

## 2025-03-19 RX ORDER — METHYLPREDNISOLONE 4 MG/1
TABLET ORAL
Qty: 21 TABLET | Refills: 0 | Status: SHIPPED | OUTPATIENT
Start: 2025-03-19

## 2025-03-20 ENCOUNTER — LAB VISIT (OUTPATIENT)
Dept: LAB | Facility: HOSPITAL | Age: 40
End: 2025-03-20
Attending: INTERNAL MEDICINE
Payer: COMMERCIAL

## 2025-03-20 ENCOUNTER — INFUSION (OUTPATIENT)
Dept: INFUSION THERAPY | Facility: HOSPITAL | Age: 40
End: 2025-03-20
Attending: PHYSICIAN ASSISTANT
Payer: COMMERCIAL

## 2025-03-20 VITALS
SYSTOLIC BLOOD PRESSURE: 119 MMHG | BODY MASS INDEX: 34.89 KG/M2 | WEIGHT: 230.19 LBS | RESPIRATION RATE: 16 BRPM | TEMPERATURE: 97 F | OXYGEN SATURATION: 99 % | HEART RATE: 65 BPM | DIASTOLIC BLOOD PRESSURE: 84 MMHG | HEIGHT: 68 IN

## 2025-03-20 DIAGNOSIS — M32.9 SYSTEMIC LUPUS ERYTHEMATOSUS ARTHRITIS: Primary | ICD-10-CM

## 2025-03-20 DIAGNOSIS — Z79.899 LONG-TERM CURRENT USE OF HIGH RISK MEDICATION OTHER THAN ANTICOAGULANT: ICD-10-CM

## 2025-03-20 LAB
ALBUMIN SERPL BCP-MCNC: 3.7 G/DL (ref 3.5–5.2)
ALP SERPL-CCNC: 49 U/L (ref 40–150)
ALT SERPL W/O P-5'-P-CCNC: 18 U/L (ref 10–44)
ANION GAP SERPL CALC-SCNC: 9 MMOL/L (ref 8–16)
AST SERPL-CCNC: 18 U/L (ref 10–40)
BASOPHILS # BLD AUTO: 0.05 K/UL (ref 0–0.2)
BASOPHILS NFR BLD: 0.7 % (ref 0–1.9)
BILIRUB SERPL-MCNC: 0.3 MG/DL (ref 0.1–1)
BUN SERPL-MCNC: 9 MG/DL (ref 6–20)
CALCIUM SERPL-MCNC: 9.5 MG/DL (ref 8.7–10.5)
CHLORIDE SERPL-SCNC: 102 MMOL/L (ref 95–110)
CO2 SERPL-SCNC: 27 MMOL/L (ref 23–29)
CREAT SERPL-MCNC: 0.7 MG/DL (ref 0.5–1.4)
CRP SERPL-MCNC: 17.4 MG/L (ref 0–8.2)
DIFFERENTIAL METHOD BLD: ABNORMAL
EOSINOPHIL # BLD AUTO: 1 K/UL (ref 0–0.5)
EOSINOPHIL NFR BLD: 14.9 % (ref 0–8)
ERYTHROCYTE [DISTWIDTH] IN BLOOD BY AUTOMATED COUNT: 11.9 % (ref 11.5–14.5)
ERYTHROCYTE [SEDIMENTATION RATE] IN BLOOD BY PHOTOMETRIC METHOD: 62 MM/HR (ref 0–36)
EST. GFR  (NO RACE VARIABLE): >60 ML/MIN/1.73 M^2
GLUCOSE SERPL-MCNC: 78 MG/DL (ref 70–110)
HCT VFR BLD AUTO: 37 % (ref 37–48.5)
HGB BLD-MCNC: 12.1 G/DL (ref 12–16)
IMM GRANULOCYTES # BLD AUTO: 0.01 K/UL (ref 0–0.04)
IMM GRANULOCYTES NFR BLD AUTO: 0.1 % (ref 0–0.5)
LYMPHOCYTES # BLD AUTO: 1 K/UL (ref 1–4.8)
LYMPHOCYTES NFR BLD: 15 % (ref 18–48)
MCH RBC QN AUTO: 30.2 PG (ref 27–31)
MCHC RBC AUTO-ENTMCNC: 32.7 G/DL (ref 32–36)
MCV RBC AUTO: 92 FL (ref 82–98)
MONOCYTES # BLD AUTO: 0.8 K/UL (ref 0.3–1)
MONOCYTES NFR BLD: 11.1 % (ref 4–15)
NEUTROPHILS # BLD AUTO: 4 K/UL (ref 1.8–7.7)
NEUTROPHILS NFR BLD: 58.2 % (ref 38–73)
NRBC BLD-RTO: 0 /100 WBC
PLATELET # BLD AUTO: 525 K/UL (ref 150–450)
PMV BLD AUTO: 8.3 FL (ref 9.2–12.9)
POTASSIUM SERPL-SCNC: 3.8 MMOL/L (ref 3.5–5.1)
PROT SERPL-MCNC: 8.5 G/DL (ref 6–8.4)
RBC # BLD AUTO: 4.01 M/UL (ref 4–5.4)
SODIUM SERPL-SCNC: 138 MMOL/L (ref 136–145)
WBC # BLD AUTO: 6.92 K/UL (ref 3.9–12.7)

## 2025-03-20 PROCEDURE — 96375 TX/PRO/DX INJ NEW DRUG ADDON: CPT

## 2025-03-20 PROCEDURE — 80053 COMPREHEN METABOLIC PANEL: CPT | Performed by: INTERNAL MEDICINE

## 2025-03-20 PROCEDURE — 86140 C-REACTIVE PROTEIN: CPT | Performed by: INTERNAL MEDICINE

## 2025-03-20 PROCEDURE — 96365 THER/PROPH/DIAG IV INF INIT: CPT

## 2025-03-20 PROCEDURE — 25000003 PHARM REV CODE 250: Performed by: INTERNAL MEDICINE

## 2025-03-20 PROCEDURE — 85025 COMPLETE CBC W/AUTO DIFF WBC: CPT | Performed by: INTERNAL MEDICINE

## 2025-03-20 PROCEDURE — 36593 DECLOT VASCULAR DEVICE: CPT

## 2025-03-20 PROCEDURE — 85652 RBC SED RATE AUTOMATED: CPT | Performed by: INTERNAL MEDICINE

## 2025-03-20 PROCEDURE — 63600175 PHARM REV CODE 636 W HCPCS: Mod: JZ,TB | Performed by: INTERNAL MEDICINE

## 2025-03-20 PROCEDURE — A4216 STERILE WATER/SALINE, 10 ML: HCPCS | Performed by: INTERNAL MEDICINE

## 2025-03-20 RX ORDER — EPINEPHRINE 0.3 MG/.3ML
0.3 INJECTION SUBCUTANEOUS ONCE AS NEEDED
OUTPATIENT
Start: 2025-04-17

## 2025-03-20 RX ORDER — DIPHENHYDRAMINE HYDROCHLORIDE 50 MG/ML
50 INJECTION, SOLUTION INTRAMUSCULAR; INTRAVENOUS ONCE AS NEEDED
Status: DISCONTINUED | OUTPATIENT
Start: 2025-03-20 | End: 2025-03-20 | Stop reason: HOSPADM

## 2025-03-20 RX ORDER — HEPARIN 100 UNIT/ML
500 SYRINGE INTRAVENOUS
OUTPATIENT
Start: 2025-04-17

## 2025-03-20 RX ORDER — HEPARIN 100 UNIT/ML
500 SYRINGE INTRAVENOUS
Status: DISCONTINUED | OUTPATIENT
Start: 2025-03-20 | End: 2025-03-20 | Stop reason: HOSPADM

## 2025-03-20 RX ORDER — SODIUM CHLORIDE 0.9 % (FLUSH) 0.9 %
10 SYRINGE (ML) INJECTION
Status: DISCONTINUED | OUTPATIENT
Start: 2025-03-20 | End: 2025-03-20 | Stop reason: HOSPADM

## 2025-03-20 RX ORDER — SODIUM CHLORIDE 0.9 % (FLUSH) 0.9 %
10 SYRINGE (ML) INJECTION
OUTPATIENT
Start: 2025-04-17

## 2025-03-20 RX ORDER — DIPHENHYDRAMINE HYDROCHLORIDE 50 MG/ML
50 INJECTION, SOLUTION INTRAMUSCULAR; INTRAVENOUS ONCE AS NEEDED
OUTPATIENT
Start: 2025-04-17

## 2025-03-20 RX ORDER — EPINEPHRINE 0.3 MG/.3ML
0.3 INJECTION SUBCUTANEOUS ONCE AS NEEDED
Status: DISCONTINUED | OUTPATIENT
Start: 2025-03-20 | End: 2025-03-20 | Stop reason: HOSPADM

## 2025-03-20 RX ADMIN — ALTEPLASE 2 MG: 2.2 INJECTION, POWDER, LYOPHILIZED, FOR SOLUTION INTRAVENOUS at 10:03

## 2025-03-20 RX ADMIN — HEPARIN 500 UNITS: 100 SYRINGE at 11:03

## 2025-03-20 RX ADMIN — SODIUM CHLORIDE, PRESERVATIVE FREE 10 ML: 5 INJECTION INTRAVENOUS at 11:03

## 2025-03-20 RX ADMIN — ANIFROLUMAB 300 MG: 300 INJECTION, SOLUTION INTRAVENOUS at 10:03

## 2025-03-20 NOTE — NURSING
Right chest mediport with negative blood return, positive flush. Denies pain/pressure at site.   Activase 2mg ivp instilled and left to dwell.  Will check for blood return q15 minutes.  Holding Saphnelo until blood return noted.

## 2025-03-20 NOTE — PLAN OF CARE
Problem: Adult Inpatient Plan of Care  Goal: Plan of Care Review  Outcome: Progressing  Flowsheets (Taken 3/20/2025 1019)  Plan of Care Reviewed With: patient  Goal: Patient-Specific Goal (Individualized)  Outcome: Progressing  Flowsheets (Taken 3/20/2025 1019)  Individualized Care Needs: recline with pillow and blanket  Anxieties, Fears or Concerns: none today  Goal: Absence of Hospital-Acquired Illness or Injury  Outcome: Progressing  Intervention: Prevent Infection  Flowsheets (Taken 3/20/2025 1019)  Infection Prevention:   equipment surfaces disinfected   hand hygiene promoted   personal protective equipment utilized   rest/sleep promoted  Goal: Optimal Comfort and Wellbeing  Outcome: Progressing  Intervention: Provide Person-Centered Care  Flowsheets (Taken 3/20/2025 1019)  Trust Relationship/Rapport:   care explained   thoughts/feelings acknowledged   choices provided   emotional support provided   empathic listening provided   questions answered   questions encouraged   reassurance provided

## 2025-03-20 NOTE — NURSING
Pt tolerated Saphnelo infusion well. No adverse reaction noted. Pt education reinforced on possible side effects, what to expect, and when to call . Pt verbalized understanding. I reviewed pt calendar w/ pt and understanding verbalized. PAC deaccessed and flushed w/ NS and heparin per protocol.

## 2025-03-22 ENCOUNTER — RESULTS FOLLOW-UP (OUTPATIENT)
Dept: RHEUMATOLOGY | Facility: CLINIC | Age: 40
End: 2025-03-22

## 2025-03-22 DIAGNOSIS — M34.9 SCLERODERMA, DIFFUSE: Primary | ICD-10-CM

## 2025-03-22 DIAGNOSIS — M32.9 SYSTEMIC LUPUS ERYTHEMATOSUS ARTHRITIS: ICD-10-CM

## 2025-03-22 RX ORDER — MYCOPHENOLATE MOFETIL 500 MG/1
500 TABLET ORAL 2 TIMES DAILY
Qty: 60 TABLET | Refills: 2 | Status: SHIPPED | OUTPATIENT
Start: 2025-03-22 | End: 2025-05-15

## 2025-03-22 NOTE — PROGRESS NOTES
Elevated inflammation. Add low dose cellcept ( mycophenolate ) sent to pharmacy. Repeat labs in 4 weeks.

## 2025-04-16 ENCOUNTER — PATIENT MESSAGE (OUTPATIENT)
Dept: RHEUMATOLOGY | Facility: CLINIC | Age: 40
End: 2025-04-16
Payer: COMMERCIAL

## 2025-04-17 ENCOUNTER — LAB VISIT (OUTPATIENT)
Dept: LAB | Facility: HOSPITAL | Age: 40
End: 2025-04-17
Payer: COMMERCIAL

## 2025-04-17 ENCOUNTER — INFUSION (OUTPATIENT)
Dept: INFUSION THERAPY | Facility: HOSPITAL | Age: 40
End: 2025-04-17
Attending: PHYSICIAN ASSISTANT
Payer: COMMERCIAL

## 2025-04-17 VITALS
HEIGHT: 68 IN | RESPIRATION RATE: 16 BRPM | TEMPERATURE: 98 F | BODY MASS INDEX: 35.61 KG/M2 | DIASTOLIC BLOOD PRESSURE: 76 MMHG | SYSTOLIC BLOOD PRESSURE: 116 MMHG | HEART RATE: 81 BPM | OXYGEN SATURATION: 99 % | WEIGHT: 235 LBS

## 2025-04-17 DIAGNOSIS — Z79.899 HIGH RISK MEDICATION USE: ICD-10-CM

## 2025-04-17 DIAGNOSIS — M32.9 SYSTEMIC LUPUS ERYTHEMATOSUS ARTHRITIS: Primary | ICD-10-CM

## 2025-04-17 LAB — B-HCG UR QL: NEGATIVE

## 2025-04-17 PROCEDURE — 25000003 PHARM REV CODE 250: Performed by: INTERNAL MEDICINE

## 2025-04-17 PROCEDURE — A4216 STERILE WATER/SALINE, 10 ML: HCPCS | Performed by: INTERNAL MEDICINE

## 2025-04-17 PROCEDURE — 96365 THER/PROPH/DIAG IV INF INIT: CPT

## 2025-04-17 PROCEDURE — 81025 URINE PREGNANCY TEST: CPT

## 2025-04-17 PROCEDURE — 96375 TX/PRO/DX INJ NEW DRUG ADDON: CPT

## 2025-04-17 PROCEDURE — 63600175 PHARM REV CODE 636 W HCPCS: Mod: JZ,TB | Performed by: INTERNAL MEDICINE

## 2025-04-17 RX ORDER — EPINEPHRINE 0.3 MG/.3ML
0.3 INJECTION SUBCUTANEOUS ONCE AS NEEDED
OUTPATIENT
Start: 2025-05-15

## 2025-04-17 RX ORDER — SODIUM CHLORIDE 0.9 % (FLUSH) 0.9 %
10 SYRINGE (ML) INJECTION
OUTPATIENT
Start: 2025-05-15

## 2025-04-17 RX ORDER — HEPARIN 100 UNIT/ML
500 SYRINGE INTRAVENOUS
OUTPATIENT
Start: 2025-05-15

## 2025-04-17 RX ORDER — DIPHENHYDRAMINE HYDROCHLORIDE 50 MG/ML
50 INJECTION, SOLUTION INTRAMUSCULAR; INTRAVENOUS ONCE AS NEEDED
OUTPATIENT
Start: 2025-05-15

## 2025-04-17 RX ORDER — HEPARIN 100 UNIT/ML
500 SYRINGE INTRAVENOUS
Status: DISCONTINUED | OUTPATIENT
Start: 2025-04-17 | End: 2025-04-17 | Stop reason: HOSPADM

## 2025-04-17 RX ORDER — SODIUM CHLORIDE 0.9 % (FLUSH) 0.9 %
10 SYRINGE (ML) INJECTION
Status: DISCONTINUED | OUTPATIENT
Start: 2025-04-17 | End: 2025-04-17 | Stop reason: HOSPADM

## 2025-04-17 RX ADMIN — ANIFROLUMAB 300 MG: 300 INJECTION, SOLUTION INTRAVENOUS at 10:04

## 2025-04-17 RX ADMIN — METHYLPREDNISOLONE SODIUM SUCCINATE 60 MG: 40 INJECTION, POWDER, FOR SOLUTION INTRAMUSCULAR; INTRAVENOUS at 10:04

## 2025-04-17 RX ADMIN — Medication 10 ML: at 11:04

## 2025-04-17 RX ADMIN — HEPARIN 500 UNITS: 100 SYRINGE at 11:04

## 2025-04-17 NOTE — PLAN OF CARE
POC reviewed with patient. All needs and concerns addressed.   Problem: Adult Inpatient Plan of Care  Goal: Plan of Care Review  Outcome: Progressing  Flowsheets (Taken 4/17/2025 1203)  Plan of Care Reviewed With: patient  Goal: Patient-Specific Goal (Individualized)  Outcome: Progressing  Flowsheets (Taken 4/17/2025 1203)  Individualized Care Needs: in recliner with warm blanket and pillow. Ice to port site.  Anxieties, Fears or Concerns: Pt having increased pain and swelling in right hand  Patient/Family-Specific Goals (Include Timeframe): Tolerate tx today  Goal: Absence of Hospital-Acquired Illness or Injury  Outcome: Progressing  Intervention: Prevent Infection  Flowsheets (Taken 4/17/2025 1203)  Infection Prevention:   equipment surfaces disinfected   hand hygiene promoted   personal protective equipment utilized  Goal: Optimal Comfort and Wellbeing  Outcome: Progressing  Intervention: Provide Person-Centered Care  Flowsheets (Taken 4/17/2025 1203)  Trust Relationship/Rapport:   care explained   choices provided   emotional support provided   empathic listening provided   questions answered   questions encouraged   reassurance provided   thoughts/feelings acknowledged     Problem: Infection  Goal: Absence of Infection Signs and Symptoms  Outcome: Progressing  Intervention: Prevent or Manage Infection  Flowsheets (Taken 4/17/2025 1203)  Infection Management: aseptic technique maintained

## 2025-05-01 ENCOUNTER — PATIENT MESSAGE (OUTPATIENT)
Dept: RHEUMATOLOGY | Facility: CLINIC | Age: 40
End: 2025-05-01
Payer: COMMERCIAL

## 2025-05-01 DIAGNOSIS — M06.09 RHEUMATOID ARTHRITIS OF MULTIPLE SITES WITH NEGATIVE RHEUMATOID FACTOR: ICD-10-CM

## 2025-05-01 DIAGNOSIS — M34.9 SCLERODERMA, DIFFUSE: Primary | ICD-10-CM

## 2025-05-01 DIAGNOSIS — J01.80 ACUTE NON-RECURRENT SINUSITIS OF OTHER SINUS: ICD-10-CM

## 2025-05-05 RX ORDER — METHYLPREDNISOLONE 4 MG/1
TABLET ORAL
Qty: 21 TABLET | Refills: 0 | Status: SHIPPED | OUTPATIENT
Start: 2025-05-05

## 2025-05-14 DIAGNOSIS — M34.9 SCLERODERMA, DIFFUSE: ICD-10-CM

## 2025-05-14 DIAGNOSIS — M35.1 MCTD (MIXED CONNECTIVE TISSUE DISEASE): ICD-10-CM

## 2025-05-14 DIAGNOSIS — M06.09 RHEUMATOID ARTHRITIS OF MULTIPLE SITES WITH NEGATIVE RHEUMATOID FACTOR: ICD-10-CM

## 2025-05-14 RX ORDER — PREDNISONE 5 MG/1
5 TABLET ORAL
Qty: 90 TABLET | Refills: 3 | Status: SHIPPED | OUTPATIENT
Start: 2025-05-14

## 2025-05-15 ENCOUNTER — HOSPITAL ENCOUNTER (OUTPATIENT)
Dept: RADIOLOGY | Facility: HOSPITAL | Age: 40
Discharge: HOME | End: 2025-05-15
Attending: INTERNAL MEDICINE
Payer: COMMERCIAL

## 2025-05-15 ENCOUNTER — RESULTS FOLLOW-UP (OUTPATIENT)
Dept: RHEUMATOLOGY | Facility: CLINIC | Age: 40
End: 2025-05-15

## 2025-05-15 ENCOUNTER — OFFICE VISIT (OUTPATIENT)
Dept: RHEUMATOLOGY | Facility: CLINIC | Age: 40
End: 2025-05-15
Payer: COMMERCIAL

## 2025-05-15 ENCOUNTER — INFUSION (OUTPATIENT)
Dept: INFUSION THERAPY | Facility: HOSPITAL | Age: 40
End: 2025-05-15
Attending: PHYSICIAN ASSISTANT
Payer: COMMERCIAL

## 2025-05-15 VITALS
DIASTOLIC BLOOD PRESSURE: 75 MMHG | HEART RATE: 92 BPM | SYSTOLIC BLOOD PRESSURE: 112 MMHG | BODY MASS INDEX: 35.42 KG/M2 | WEIGHT: 233.69 LBS | HEIGHT: 68 IN

## 2025-05-15 VITALS
OXYGEN SATURATION: 99 % | BODY MASS INDEX: 35.42 KG/M2 | TEMPERATURE: 97 F | HEIGHT: 68 IN | SYSTOLIC BLOOD PRESSURE: 134 MMHG | DIASTOLIC BLOOD PRESSURE: 80 MMHG | WEIGHT: 233.69 LBS | HEART RATE: 97 BPM | RESPIRATION RATE: 16 BRPM

## 2025-05-15 DIAGNOSIS — D84.821 DRUG-INDUCED IMMUNODEFICIENCY: ICD-10-CM

## 2025-05-15 DIAGNOSIS — M25.562 CHRONIC PAIN OF BOTH KNEES: ICD-10-CM

## 2025-05-15 DIAGNOSIS — M25.561 CHRONIC PAIN OF BOTH KNEES: ICD-10-CM

## 2025-05-15 DIAGNOSIS — M34.9 SCLERODERMA, DIFFUSE: Primary | ICD-10-CM

## 2025-05-15 DIAGNOSIS — Z51.81 ENCOUNTER FOR MEDICATION MONITORING: ICD-10-CM

## 2025-05-15 DIAGNOSIS — G89.29 CHRONIC PAIN OF BOTH KNEES: ICD-10-CM

## 2025-05-15 DIAGNOSIS — J01.80 ACUTE NON-RECURRENT SINUSITIS OF OTHER SINUS: ICD-10-CM

## 2025-05-15 DIAGNOSIS — M79.7 FIBROMYALGIA: ICD-10-CM

## 2025-05-15 DIAGNOSIS — M32.9 SYSTEMIC LUPUS ERYTHEMATOSUS ARTHRITIS: Primary | ICD-10-CM

## 2025-05-15 DIAGNOSIS — M06.09 RHEUMATOID ARTHRITIS OF MULTIPLE SITES WITH NEGATIVE RHEUMATOID FACTOR: ICD-10-CM

## 2025-05-15 DIAGNOSIS — Z79.899 DRUG-INDUCED IMMUNODEFICIENCY: ICD-10-CM

## 2025-05-15 DIAGNOSIS — M35.1 MCTD (MIXED CONNECTIVE TISSUE DISEASE): ICD-10-CM

## 2025-05-15 DIAGNOSIS — M32.9 SYSTEMIC LUPUS ERYTHEMATOSUS ARTHRITIS: ICD-10-CM

## 2025-05-15 PROCEDURE — G2211 COMPLEX E/M VISIT ADD ON: HCPCS | Mod: S$GLB,,, | Performed by: INTERNAL MEDICINE

## 2025-05-15 PROCEDURE — 96365 THER/PROPH/DIAG IV INF INIT: CPT

## 2025-05-15 PROCEDURE — 99999 PR PBB SHADOW E&M-EST. PATIENT-LVL V: CPT | Mod: PBBFAC,,, | Performed by: INTERNAL MEDICINE

## 2025-05-15 PROCEDURE — 99215 OFFICE O/P EST HI 40 MIN: CPT | Mod: S$GLB,,, | Performed by: INTERNAL MEDICINE

## 2025-05-15 PROCEDURE — 96375 TX/PRO/DX INJ NEW DRUG ADDON: CPT

## 2025-05-15 PROCEDURE — 63600175 PHARM REV CODE 636 W HCPCS: Mod: JZ,TB | Performed by: INTERNAL MEDICINE

## 2025-05-15 PROCEDURE — A4216 STERILE WATER/SALINE, 10 ML: HCPCS | Performed by: INTERNAL MEDICINE

## 2025-05-15 PROCEDURE — 3078F DIAST BP <80 MM HG: CPT | Mod: CPTII,S$GLB,, | Performed by: INTERNAL MEDICINE

## 2025-05-15 PROCEDURE — 25000003 PHARM REV CODE 250: Performed by: INTERNAL MEDICINE

## 2025-05-15 PROCEDURE — 73562 X-RAY EXAM OF KNEE 3: CPT | Mod: 26,,, | Performed by: RADIOLOGY

## 2025-05-15 PROCEDURE — 73562 X-RAY EXAM OF KNEE 3: CPT | Mod: TC,50

## 2025-05-15 PROCEDURE — 3074F SYST BP LT 130 MM HG: CPT | Mod: CPTII,S$GLB,, | Performed by: INTERNAL MEDICINE

## 2025-05-15 PROCEDURE — 3008F BODY MASS INDEX DOCD: CPT | Mod: CPTII,S$GLB,, | Performed by: INTERNAL MEDICINE

## 2025-05-15 RX ORDER — SODIUM CHLORIDE 0.9 % (FLUSH) 0.9 %
10 SYRINGE (ML) INJECTION
Status: DISCONTINUED | OUTPATIENT
Start: 2025-05-15 | End: 2025-05-15 | Stop reason: HOSPADM

## 2025-05-15 RX ORDER — SODIUM CHLORIDE 0.9 % (FLUSH) 0.9 %
10 SYRINGE (ML) INJECTION
OUTPATIENT
Start: 2025-06-12

## 2025-05-15 RX ORDER — PREGABALIN 75 MG/1
75 CAPSULE ORAL 3 TIMES DAILY
Qty: 90 CAPSULE | Refills: 3 | Status: SHIPPED | OUTPATIENT
Start: 2025-05-15

## 2025-05-15 RX ORDER — EPINEPHRINE 0.3 MG/.3ML
0.3 INJECTION SUBCUTANEOUS ONCE AS NEEDED
OUTPATIENT
Start: 2025-06-12

## 2025-05-15 RX ORDER — METHYLPREDNISOLONE 4 MG/1
TABLET ORAL
Qty: 21 TABLET | Refills: 0 | Status: SHIPPED | OUTPATIENT
Start: 2025-05-15

## 2025-05-15 RX ORDER — HEPARIN 100 UNIT/ML
500 SYRINGE INTRAVENOUS
Status: DISCONTINUED | OUTPATIENT
Start: 2025-05-15 | End: 2025-05-15 | Stop reason: HOSPADM

## 2025-05-15 RX ORDER — DIPHENHYDRAMINE HYDROCHLORIDE 50 MG/ML
50 INJECTION, SOLUTION INTRAMUSCULAR; INTRAVENOUS ONCE AS NEEDED
OUTPATIENT
Start: 2025-06-12

## 2025-05-15 RX ORDER — MYCOPHENOLATE MOFETIL 500 MG/1
1000 TABLET ORAL 2 TIMES DAILY
Qty: 120 TABLET | Refills: 3 | Status: SHIPPED | OUTPATIENT
Start: 2025-05-15

## 2025-05-15 RX ORDER — HEPARIN 100 UNIT/ML
500 SYRINGE INTRAVENOUS
OUTPATIENT
Start: 2025-06-12

## 2025-05-15 RX ADMIN — METHYLPREDNISOLONE SODIUM SUCCINATE 60 MG: 40 INJECTION, POWDER, FOR SOLUTION INTRAMUSCULAR; INTRAVENOUS at 01:05

## 2025-05-15 RX ADMIN — Medication 10 ML: at 02:05

## 2025-05-15 RX ADMIN — ANIFROLUMAB 300 MG: 300 INJECTION, SOLUTION INTRAVENOUS at 01:05

## 2025-05-15 RX ADMIN — HEPARIN 500 UNITS: 100 SYRINGE at 02:05

## 2025-05-15 NOTE — NURSING
1310: Pt c/o of pain during mediport access and discontinuation. Pt's mediport sets underneath sx scar. This nurse had Ryann LARES MA come assess. Ryann LARES MA educated pt she would have appointment scheduled to see Dr. Khan this week. Pt verbalized understanding.

## 2025-05-15 NOTE — PROGRESS NOTES
RHEUMATOLOGY FOLLOW UP VISIT     Chief complaints, HPI, ROS, EXAM, Assessment & Plans:-  Stefanie Kimbrough a 39 y.o. pleasant female seen today for follow up visit.   Systemic scleroderma with severe skin fibrosis, lupus arthritis with RNP antibody positive mixed connective tissue disease seen today for follow up.  Severe flare of arthritis the week before Saphnelo.    Tolerating CellCept.  Failed multiple medications in the past including methotrexate, Arava, tacrolimus, sulfasalazine, DAMION inhibitors, Actemra, rituximab rheumatoid dosing.  Complains of worsening bilateral knee pain with activities..  No dyspnea on exertion.  Rheumatological review of system negative otherwise.  Physical examination shows mild synovitis over few MCP joints.  Significant skin thickening and salt and pepper rash              .    1. Scleroderma, diffuse    2. Systemic lupus erythematosus arthritis    3. MCTD (mixed connective tissue disease)    4. Drug-induced immunodeficiency    5. Encounter for medication monitoring    6. Fibromyalgia    7. Rheumatoid arthritis of multiple sites with negative rheumatoid factor    8. Acute non-recurrent sinusitis of other sinus    9. Chronic pain of both knees      Problem List Items Addressed This Visit       Chronic pain of both knees    Drug-induced immunodeficiency    Encounter for medication monitoring    Fibromyalgia    Relevant Medications    pregabalin (LYRICA) 75 MG capsule    MCTD (mixed connective tissue disease)    Rheumatoid arthritis of multiple sites with negative rheumatoid factor    Relevant Medications    methylPREDNISolone (MEDROL DOSEPACK) 4 mg tablet    Scleroderma, diffuse - Primary    Relevant Medications    mycophenolate (CELLCEPT) 500 mg Tab    pregabalin (LYRICA) 75 MG capsule    methylPREDNISolone (MEDROL DOSEPACK) 4 mg tablet    Systemic lupus erythematosus arthritis    Relevant Medications    mycophenolate (CELLCEPT) 500 mg Tab     Other Visit Diagnoses          Acute non-recurrent sinusitis of other sinus        Relevant Medications    methylPREDNISolone (MEDROL DOSEPACK) 4 mg tablet           Latest Reference Range & Units Most Recent   MELVA Screen Negative <1:80  Positive !  6/16/22 11:45   MELVA HEP-2 Titer  Positive >=1:2560 Speckled  8/30/17 14:50   MELVA Titer 1  >=1:2560  6/16/22 11:45   MELVA PATTERN 1  Speckled  6/16/22 11:45   ds DNA Ab Negative 1:10  Negative 1:10  6/16/22 11:45   Anti-SSA Antibody 0.00 - 0.99 Ratio 0.07  6/16/22 11:45   Anti-SSA Interpretation Negative  Negative  6/16/22 11:45   Anti-SSB Antibody 0.00 - 0.99 Ratio 0.04  6/16/22 11:45   Anti-SSB Interpretation Negative  Negative  6/16/22 11:45   Anti Sm Antibody 0.00 - 0.99 Ratio 0.25  6/16/22 11:45   Anti-Sm Interpretation Negative  Negative  6/16/22 11:45   Anti Sm/RNP Antibody 0.00 - 0.99 Ratio 5.45 (H)  6/16/22 11:45   Anti-Sm/RNP Interpretation Negative  Positive !  6/16/22 11:45   Scleroderma SCL- <20 UNITS 4  10/2/19 09:52   Complement (C-3) 50 - 180 mg/dL 130  10/2/23 15:50   Complement (C-4) 11 - 44 mg/dL 16  10/2/23 15:50   IgG 650 - 1600 mg/dL 3990 (H)  12/28/22 13:46   IgM 50 - 300 mg/dL 326 (H)  12/28/22 13:46   IgA 40 - 350 mg/dL 353 (H)  12/28/22 13:46   Protein, Serum 6.0 - 8.4 g/dL 9.2 (H)  12/28/22 13:46   Albumin grams/dl 3.35 - 5.55 g/dL 3.58  12/28/22 13:46   Alpha-1 grams/dl 0.17 - 0.41 g/dL 0.31  12/28/22 13:46   Alpha-2 0.43 - 0.99 g/dL 0.68  12/28/22 13:46   Beta 0.50 - 1.10 g/dL 0.98  12/28/22 13:46   Gamma 0.67 - 1.58 g/dL 3.64 (H)  12/28/22 13:46   Pathologist Interpretation SPE  REVIEWED  12/28/22 13:46   Pathologist Interpretation ANALI  REVIEWED  12/28/22 13:46   Immunofix Interp.  SEE COMMENT  12/28/22 13:46   Cryoglobulin, Qual Absent  Absent  1/18/23 14:46   Anti-Dilcia-1 Antibody <20 Units <20  12/1/17 16:30   Anti-PM/Scl Ab <20 Units <20  12/1/17 16:30   Anti-SS-A 52 kD Ab, IgG <20 Units <20  12/1/17 16:30   Anti-U1-RNP  Ab <20 Units 143 (H)  12/1/17 16:30   EJ Negative   Negative  12/1/17 16:30   Fibrillarin (U3 RNP) Negative  Negative  12/1/17 16:30   Ku Negative  Negative  12/1/17 16:30   MDA-5 (P140) (CADM-140) <20 Units <20  12/1/17 16:30   MI-2 Negative  Negative  12/1/17 16:30   NXP-2 (P140) <20 Units <20  12/1/17 16:30   OJ Negative  Negative  12/1/17 16:30   PL-12 Negative  Negative  12/1/17 16:30   PL-7 Negative  Negative  12/1/17 16:30   SRP Negative  Negative  12/1/17 16:30   TIF1 GAMMA (P155/140) <20 Units <20  12/1/17 16:30   U2 snRNP Negative  Moderate Positive !  12/1/17 16:30   !: Data is abnormal  (H): Data is abnormally high       Latest Reference Range & Units 03/20/25 09:18   WBC 3.90 - 12.70 K/uL 6.92   RBC 4.00 - 5.40 M/uL 4.01   Hemoglobin 12.0 - 16.0 g/dL 12.1   Hematocrit 37.0 - 48.5 % 37.0   MCV 82 - 98 fL 92   MCH 27.0 - 31.0 pg 30.2   MCHC 32.0 - 36.0 g/dL 32.7   RDW 11.5 - 14.5 % 11.9   Platelet Count 150 - 450 K/uL 525 (H)   MPV 9.2 - 12.9 fL 8.3 (L)   Gran % 38.0 - 73.0 % 58.2   Lymph % 18.0 - 48.0 % 15.0 (L)   Mono % 4.0 - 15.0 % 11.1   Eos % 0.0 - 8.0 % 14.9 (H)   Basophil % 0.0 - 1.9 % 0.7   Immature Granulocytes 0.0 - 0.5 % 0.1   Gran # (ANC) 1.8 - 7.7 K/uL 4.0   Lymph # 1.0 - 4.8 K/uL 1.0   Mono # 0.3 - 1.0 K/uL 0.8   Eos # 0.0 - 0.5 K/uL 1.0 (H)   Baso # 0.00 - 0.20 K/uL 0.05   Immature Grans (Abs) 0.00 - 0.04 K/uL 0.01   nRBC 0 /100 WBC 0   Differential Method  Automated   Sed Rate 0 - 36 mm/Hr 62 (H)   Sodium 136 - 145 mmol/L 138   Potassium 3.5 - 5.1 mmol/L 3.8   Chloride 95 - 110 mmol/L 102   CO2 23 - 29 mmol/L 27   Anion Gap 8 - 16 mmol/L 9   BUN 6 - 20 mg/dL 9   Creatinine 0.5 - 1.4 mg/dL 0.7   eGFR >60 mL/min/1.73 m^2 >60   Glucose 70 - 110 mg/dL 78   Calcium 8.7 - 10.5 mg/dL 9.5   ALP 40 - 150 U/L 49   PROTEIN TOTAL 6.0 - 8.4 g/dL 8.5 (H)   Albumin 3.5 - 5.2 g/dL 3.7   BILIRUBIN TOTAL 0.1 - 1.0 mg/dL 0.3   AST 10 - 40 U/L 18   ALT 10 - 44 U/L 18   CRP 0.0 - 8.2 mg/L 17.4 (H)   (H): Data is abnormally high  (L): Data is abnormally  low    Active inflammatory arthritis.  Severe systemic scleroderma with RNP antibody positive mixed connective tissue disease associated with lupus arthritis on Saphnelo and CellCept.  Failed multiple medications in the past including Cytoxan, multiple oral disease modifying agents.  Recently she was on rituximab and switched to Saphnelo for worsening skin manifestations and inflammatory arthritis.  Mild improvement of inflammatory arthritis on Saphnelo and CellCept.  Still has active synovitis.  Increase dose of CellCept to 1000 mg twice daily.  Continue Saphnelo every 4 weeks    Failed multiple medications including Cytoxan in the past.  X-ray ordered for knee pain  Discontinue gabapentin and try Lyrica for pain control.  Nausea with tramadol.  Advised all risks including life-threatening infections and pregnancy morbidities.  Drug induced immunodeficiency due to use of immunosuppressive drugs. Monitor carefully for infections. Advised to get immediate medical care if any infection. Also advised strict adherence to age appropriate vaccinations and cancer screenings with PCP.  I have explained all of the above in detail and the patient understands all of the current recommendation(s). I have answered all questions to the best of my ability and to their complete satisfaction.       # Follow up in about 4 months (around 9/15/2025).      Past Medical History:   Diagnosis Date    Abnormal Pap smear of cervix     repeat was normal    Lupus (systemic lupus erythematosus)        Past Surgical History:   Procedure Laterality Date    ESOPHAGOGASTRODUODENOSCOPY N/A 2/29/2024    Procedure: EGD (ESOPHAGOGASTRODUODENOSCOPY);  Surgeon: Gregg Reyez MD;  Location: [facility];  Service: Endoscopy;  Laterality: N/A;    INSERTION OF TUNNELED CENTRAL VENOUS CATHETER (CVC) WITH SUBCUTANEOUS PORT N/A 05/24/2023    Procedure: [id number];  Surgeon: Taco Zimmer MD;  Location: [facility] ER;  Service: General;   Laterality: N/A;    ROBOT-ASSISTED CHOLECYSTECTOMY USING DA CRISTOPHER XI N/A 12/11/2020    Procedure: XI ROBOTIC CHOLECYSTECTOMY;  Surgeon: Taco Zimmer MD;  Location: [facility];  Service: General;  Laterality: N/A;    ROBOT-ASSISTED LAPAROSCOPIC LYSIS OF ADHESIONS USING DA CRISTOPHER XI  12/11/2020    Procedure: XI ROBOTIC LYSIS, ADHESIONS;  Surgeon: Taco Zimmer MD;  Location: [facility] OR;  Service: General;;    ROBOT-ASSISTED LAPAROSCOPIC SALPINGO-OOPHORECTOMY USING DA CRISTOPHER XI Left 12/11/2020    Procedure: XI ROBOTIC SALPINGO-OOPHORECTOMY;  Surgeon: SHERLEY Wakefield MD;  Location: [facility] OR;  Service: OB/GYN;  Laterality: Left;    TUBAL LIGATION  2005        Social History     Tobacco Use    Smoking status: Never     Passive exposure: Never    Smokeless tobacco: Never   Substance Use Topics    Alcohol use: No    Drug use: No       Family History   Problem Relation Name Age of Onset    Liver cancer Maternal Aunt      Breast cancer Maternal Aunt      Breast cancer Paternal Grandmother      Colon cancer Neg Hx      Ovarian cancer Neg Hx      Thrombosis Neg Hx         Review of patient's allergies indicates:   Allergen Reactions    Elavil [amitriptyline]        Medication List with Changes/Refills   New Medications    PREGABALIN (LYRICA) 75 MG CAPSULE    Take 1 capsule (75 mg total) by mouth 3 (three) times daily.   Current Medications    BUMETANIDE (BUMEX) 1 MG TABLET    Take 1 tablet (1 mg total) by mouth once daily.    CLOBETASOL (TEMOVATE) 0.05 % CREAM    Apply topically 2 (two) times daily.    CLOBETASOL (TEMOVATE) 0.05 % EXTERNAL SOLUTION    Apply topically 2 (two) times daily.    FAMOTIDINE (PEPCID) 20 MG TABLET    Take 1 tablet (20 mg total) by mouth 2 (two) times daily.    FLUTICASONE PROPIONATE (FLONASE) 50 MCG/ACTUATION NASAL SPRAY    1 spray (50 mcg total) by Each Nostril route once daily.    IBUPROFEN (ADVIL,MOTRIN) 800 MG TABLET    Take 1 tablet (800 mg total) by mouth 3 (three) times daily.    IPRATROPIUM  (ATROVENT) 21 MCG (0.03 %) NASAL SPRAY    2 sprays by Each Nostril route 3 (three) times daily.    KETOCONAZOLE (NIZORAL) 2 % SHAMPOO    Apply topically twice a week.    LIDOCAINE-PRILOCAINE (EMLA) CREAM    Apply topically as needed (port access).    MOMETASONE 0.1% (ELOCON) 0.1 % CREAM    Apply topically once daily.    OMEPRAZOLE (PRILOSEC) 40 MG CAPSULE    Take 1 capsule (40 mg total) by mouth once daily.    ONDANSETRON (ZOFRAN-ODT) 4 MG TBDL    Take 1 tablet (4 mg total) by mouth every 8 (eight) hours as needed (nausea).    PANTOPRAZOLE (PROTONIX) 40 MG TABLET    Take 1 tablet (40 mg total) by mouth 2 (two) times daily.    PREDNISONE (DELTASONE) 5 MG TABLET    Take 1 tablet by mouth once daily    TACROLIMUS (PROTOPIC) 0.1 % OINTMENT    Apply topically 2 (two) times daily.    TRAMADOL (ULTRAM) 50 MG TABLET    Take 1 tablet (50 mg total) by mouth every 12 (twelve) hours as needed for Pain.    TRAZODONE (DESYREL) 50 MG TABLET    Take 1 tablet (50 mg total) by mouth every evening.   Changed and/or Refilled Medications    Modified Medication Previous Medication    METHYLPREDNISOLONE (MEDROL DOSEPACK) 4 MG TABLET methylPREDNISolone (MEDROL DOSEPACK) 4 mg tablet       use as directed    use as directed    MYCOPHENOLATE (CELLCEPT) 500 MG TAB mycophenolate (CELLCEPT) 500 mg Tab       Take 2 tablets (1,000 mg total) by mouth 2 (two) times daily.    Take 1 tablet (500 mg total) by mouth 2 (two) times daily.   Discontinued Medications    COLCHICINE (COLCRYS) 0.6 MG TABLET    Take 1 tablet (0.6 mg total) by mouth once daily.    FOLIC ACID (FOLVITE) 1 MG TABLET    Take 1 tablet (1 mg total) by mouth once daily.    GABAPENTIN (NEURONTIN) 300 MG CAPSULE    Take 1 capsule (300 mg total) by mouth 3 (three) times daily.    METHOTREXATE, PF, 25 MG/ML SOLN    INJECT 0.8 ML (CC) INTRAMUSCULARLY ONCE DAILY EVERY 7 DAYS       Disclaimer: This note was prepared using voice recognition system and is likely to have sound alike errors and  is not proof read.  Please call me with any questions.

## 2025-05-15 NOTE — PLAN OF CARE
"Pt is stable, pt administered Saphnelo. Pt voiced she was doing "well." Pt will follow up in four weeks. Plan of care reviewed with patient. Discussed if there are any new or ongoing concerns.        Problem: Adult Inpatient Plan of Care  Goal: Plan of Care Review  Outcome: Progressing  Goal: Patient-Specific Goal (Individualized)  Outcome: Progressing  Flowsheets (Taken 5/15/2025 1355)  Individualized Care Needs: Pt provided pillow, warm blanket, legs elevated in reclined position. Refreshments provided.  Anxieties, Fears or Concerns: Pt c/o chronic pain, increased swelling to bilateral hands and knees.  Patient/Family-Specific Goals (Include Timeframe): Pt will tolerate Saphnelo today without adverse reaction.  Goal: Optimal Comfort and Wellbeing  Outcome: Progressing     Problem: Infection  Goal: Absence of Infection Signs and Symptoms  Outcome: Progressing     Problem: Fall Injury Risk  Goal: Absence of Fall and Fall-Related Injury  Outcome: Progressing     "

## 2025-05-15 NOTE — DISCHARGE INSTRUCTIONS
Thank you for allowing me to care for you today,  HERACLIO AlvarezN, RN    [facility]  [address] [address]  [phone] phone     [phone] fax  Hours of Operation: Monday- Friday 7:00am- 5:30pm  After hours phone  [phone]  Hematology / Oncology Physicians on call      LIDIA Rivera Dr., Dr., Dr., Dr., Dr., Dr., Dr., Dr., Dr., Dr., Dr., Dr., Dr., Dr., Dr., ZOYA Rangel, ZOYA Bridges, ZOYA Hidalgo, NP  Please call with any concerns regarding your appointment today.   FALL PREVENTION   Falls often occur due to slipping, tripping or losing your balance. Here are ways to reduce your risk of falling again.   Was there anything that caused your fall that can be fixed, removed or replaced?   Make your home safe by keeping walkways clear of objects you may trip over.   Use non-slip pads under rugs.   Do not walk in poorly lit areas.   Do not stand on chairs or wobbly ladders.   Use caution when reaching overhead or looking upward. This position can cause a loss of balance.   Be sure your shoes fit properly, have non-slip bottoms and are in good condition.   Be cautious when going up and down stairs, curbs, and when walking on uneven sidewalks.   If your balance is poor, consider using a cane or walker.   If your fall was related to alcohol use, stop or limit alcohol intake.   If your fall was related to use of sleeping medicines, talk to your doctor about this. You may need to reduce your dosage at bedtime if you awaken during the night to go to the bathroom.   To reduce the need for nighttime bathroom trips:   Avoid drinking fluids for several hours before going to bed   Empty your bladder before going to bed   Men can keep a urinal at the bedside   © 3379-8726 [employer], [address] [place] [address], [place] [address]. All rights reserved. This information is not intended as a  substitute for professional medical care. Always follow your healthcare professional's instructions.

## 2025-05-21 ENCOUNTER — HOSPITAL ENCOUNTER (OUTPATIENT)
Dept: CARDIOLOGY | Facility: HOSPITAL | Age: 40
Discharge: HOME | End: 2025-05-21
Attending: SURGERY
Payer: COMMERCIAL

## 2025-05-21 ENCOUNTER — OFFICE VISIT (OUTPATIENT)
Dept: SURGERY | Facility: CLINIC | Age: 40
End: 2025-05-21
Payer: COMMERCIAL

## 2025-05-21 VITALS
SYSTOLIC BLOOD PRESSURE: 121 MMHG | DIASTOLIC BLOOD PRESSURE: 81 MMHG | TEMPERATURE: 98 F | BODY MASS INDEX: 36.09 KG/M2 | HEART RATE: 85 BPM | HEIGHT: 68 IN | WEIGHT: 238.13 LBS

## 2025-05-21 DIAGNOSIS — Z95.828 PORT-A-CATH IN PLACE: ICD-10-CM

## 2025-05-21 DIAGNOSIS — I87.8 POOR VENOUS ACCESS: ICD-10-CM

## 2025-05-21 DIAGNOSIS — Z79.899 LONG-TERM CURRENT USE OF HIGH RISK MEDICATION OTHER THAN ANTICOAGULANT: ICD-10-CM

## 2025-05-21 DIAGNOSIS — Z79.899 LONG-TERM CURRENT USE OF HIGH RISK MEDICATION OTHER THAN ANTICOAGULANT: Primary | ICD-10-CM

## 2025-05-21 LAB
OHS QRS DURATION: 100 MS
OHS QTC CALCULATION: 448 MS

## 2025-05-21 PROCEDURE — 99999 PR PBB SHADOW E&M-EST. PATIENT-LVL V: CPT | Mod: PBBFAC,,, | Performed by: SURGERY

## 2025-05-21 PROCEDURE — 93005 ELECTROCARDIOGRAM TRACING: CPT

## 2025-05-21 RX ORDER — CEFAZOLIN SODIUM 2 G/50ML
2 SOLUTION INTRAVENOUS
OUTPATIENT
Start: 2025-05-21

## 2025-05-21 RX ORDER — LIDOCAINE HYDROCHLORIDE 10 MG/ML
1 INJECTION, SOLUTION EPIDURAL; INFILTRATION; INTRACAUDAL; PERINEURAL ONCE
OUTPATIENT
Start: 2025-05-21 | End: 2025-05-21

## 2025-05-21 RX ORDER — SODIUM CHLORIDE 9 MG/ML
INJECTION, SOLUTION INTRAVENOUS CONTINUOUS
OUTPATIENT
Start: 2025-05-21

## 2025-05-21 NOTE — PROGRESS NOTES
History & Physical    Subjective     History of Present Illness:  Patient is a 39 y.o. female referred for Port-A-Cath malposition.  Port originally placed 5/23 recently having issues with access due to scarring over the port.  She reports frequent difficulty with nurses access seeing port due to position and needing TPA for catheter obstruction.    Chief Complaint   Patient presents with    Follow-up     Concerns with Mediport (hard access)       Review of patient's allergies indicates:   Allergen Reactions    Elavil [amitriptyline]        Current Medications[1]    Past Medical History:   Diagnosis Date    Abnormal Pap smear of cervix     repeat was normal    Lupus (systemic lupus erythematosus)      Past Surgical History:   Procedure Laterality Date    ESOPHAGOGASTRODUODENOSCOPY N/A 2/29/2024    Procedure: EGD (ESOPHAGOGASTRODUODENOSCOPY);  Surgeon: Gregg Reyez MD;  Location: [facility];  Service: Endoscopy;  Laterality: N/A;    INSERTION OF TUNNELED CENTRAL VENOUS CATHETER (CVC) WITH SUBCUTANEOUS PORT N/A 05/24/2023    Procedure: [id number];  Surgeon: Taco Zimmer MD;  Location: [facility];  Service: General;  Laterality: N/A;    ROBOT-ASSISTED CHOLECYSTECTOMY USING DA CRISTOPHER XI N/A 12/11/2020    Procedure: XI ROBOTIC CHOLECYSTECTOMY;  Surgeon: Taco Zimmer MD;  Location: [facility] OR;  Service: General;  Laterality: N/A;    ROBOT-ASSISTED LAPAROSCOPIC LYSIS OF ADHESIONS USING DA CRISTOPHER XI  12/11/2020    Procedure: XI ROBOTIC LYSIS, ADHESIONS;  Surgeon: Taco Zimmer MD;  Location: [facility] OR;  Service: General;;    ROBOT-ASSISTED LAPAROSCOPIC SALPINGO-OOPHORECTOMY USING DA CRISTOPHER XI Left 12/11/2020    Procedure: XI ROBOTIC SALPINGO-OOPHORECTOMY;  Surgeon: SHERLEY Wakefield MD;  Location: [facility] OR;  Service: OB/GYN;  Laterality: Left;    TUBAL LIGATION  2005     Family History   Problem Relation Name Age of Onset    Liver cancer Maternal Aunt      Breast cancer Maternal Aunt      Breast  "cancer Paternal Grandmother      Colon cancer Neg Hx      Ovarian cancer Neg Hx      Thrombosis Neg Hx       Social History[2]     Review of Systems:  Review of Systems   Constitutional:  Negative for activity change, chills, fatigue, fever and unexpected weight change.   HENT:  Negative for hearing loss, rhinorrhea and trouble swallowing.    Eyes:  Negative for discharge and visual disturbance.   Respiratory:  Negative for cough, chest tightness, shortness of breath, wheezing and stridor.    Cardiovascular:  Negative for chest pain, palpitations and leg swelling.   Gastrointestinal:  Positive for constipation. Negative for abdominal distention, abdominal pain, blood in stool, diarrhea, nausea and vomiting.   Endocrine: Negative for polydipsia and polyuria.   Genitourinary:  Negative for difficulty urinating, dysuria, frequency, hematuria, menstrual problem and urgency.   Musculoskeletal:  Positive for arthralgias and joint swelling. Negative for neck pain.   Skin:  Negative for color change, pallor, rash and wound.   Neurological:  Positive for weakness. Negative for headaches.   Hematological:  Does not bruise/bleed easily.   Psychiatric/Behavioral:  Negative for confusion and dysphoric mood.           Objective     Vital Signs (Most Recent)  Temp: 98 °F (36.7 °C) (05/21/25 1451)  Pulse: 85 (05/21/25 1451)  BP: 121/81 (05/21/25 1451)  5' 8" (1.727 m)  108 kg (238 lb 1.6 oz)     Physical Exam:  Physical Exam  Vitals reviewed.   Constitutional:       Appearance: She is well-developed.   HENT:      Head: Normocephalic and atraumatic.   Cardiovascular:      Rate and Rhythm: Normal rate.   Pulmonary:      Effort: Pulmonary effort is normal.   Chest:       Abdominal:      General: There is no distension.      Palpations: Abdomen is soft.      Tenderness: There is no abdominal tenderness.   Musculoskeletal:      Cervical back: Neck supple.   Skin:     General: Skin is warm and dry.   Neurological:      Mental Status: She " is alert and oriented to person, place, and time.          Assessment and Plan     39-year-old female with Port-A-Cath malfunction    PLAN:    Port-A-Cath revision possible replacement  Preop: CBC, BMP; EKG  Risks and benefits discussed with patient including: Pain, bleeding, infection, injury to vessels, pneumothorax, need for further procedure              [1]   Current Outpatient Medications   Medication Sig Dispense Refill    bumetanide (BUMEX) 1 MG tablet Take 1 tablet (1 mg total) by mouth once daily. 10 tablet 0    clobetasoL (TEMOVATE) 0.05 % cream Apply topically 2 (two) times daily. 60 g 2    clobetasoL (TEMOVATE) 0.05 % external solution Apply topically 2 (two) times daily. 50 mL 1    fluticasone propionate (FLONASE) 50 mcg/actuation nasal spray 1 spray (50 mcg total) by Each Nostril route once daily. 18.2 mL 1    ibuprofen (ADVIL,MOTRIN) 800 MG tablet Take 1 tablet (800 mg total) by mouth 3 (three) times daily. 40 tablet 0    ipratropium (ATROVENT) 21 mcg (0.03 %) nasal spray 2 sprays by Each Nostril route 3 (three) times daily. 20 mL 5    ketoconazole (NIZORAL) 2 % shampoo Apply topically twice a week. 200 mL 1    LIDOcaine-prilocaine (EMLA) cream Apply topically as needed (port access). 5 g 3    methylPREDNISolone (MEDROL DOSEPACK) 4 mg tablet use as directed 21 tablet 0    mometasone 0.1% (ELOCON) 0.1 % cream Apply topically once daily. 50 g 1    mycophenolate (CELLCEPT) 500 mg Tab Take 2 tablets (1,000 mg total) by mouth 2 (two) times daily. 120 tablet 3    ondansetron (ZOFRAN-ODT) 4 MG TbDL Take 1 tablet (4 mg total) by mouth every 8 (eight) hours as needed (nausea). 30 tablet 0    predniSONE (DELTASONE) 5 MG tablet Take 1 tablet by mouth once daily 90 tablet 3    pregabalin (LYRICA) 75 MG capsule Take 1 capsule (75 mg total) by mouth 3 (three) times daily. 90 capsule 3    tacrolimus (PROTOPIC) 0.1 % ointment Apply topically 2 (two) times daily. 100 g 3    traMADoL (ULTRAM) 50 mg tablet Take 1  tablet (50 mg total) by mouth every 12 (twelve) hours as needed for Pain. 180 tablet 1    famotidine (PEPCID) 20 MG tablet Take 1 tablet (20 mg total) by mouth 2 (two) times daily. 60 tablet 11    omeprazole (PRILOSEC) 40 MG capsule Take 1 capsule (40 mg total) by mouth once daily. 30 capsule 11    pantoprazole (PROTONIX) 40 MG tablet Take 1 tablet (40 mg total) by mouth 2 (two) times daily. 60 tablet 11    traZODone (DESYREL) 50 MG tablet Take 1 tablet (50 mg total) by mouth every evening. 30 tablet 11     Current Facility-Administered Medications   Medication Dose Route Frequency Provider Last Rate Last Admin    LIDOcaine (PF) 10 mg/ml (1%) injection 10 mg  1 mL Intradermal Once Taco Zimmer MD       [2]   Social History  Tobacco Use    Smoking status: Never     Passive exposure: Never    Smokeless tobacco: Never   Substance Use Topics    Alcohol use: No    Drug use: No

## 2025-05-22 ENCOUNTER — PATIENT MESSAGE (OUTPATIENT)
Dept: PREADMISSION TESTING | Facility: HOSPITAL | Age: 40
End: 2025-05-22
Payer: COMMERCIAL

## 2025-05-22 ENCOUNTER — TELEPHONE (OUTPATIENT)
Dept: SURGERY | Facility: CLINIC | Age: 40
End: 2025-05-22
Payer: COMMERCIAL

## 2025-05-22 ENCOUNTER — PATIENT MESSAGE (OUTPATIENT)
Dept: RHEUMATOLOGY | Facility: CLINIC | Age: 40
End: 2025-05-22
Payer: COMMERCIAL

## 2025-05-22 ENCOUNTER — TELEPHONE (OUTPATIENT)
Dept: PREADMISSION TESTING | Facility: HOSPITAL | Age: 40
End: 2025-05-22
Payer: COMMERCIAL

## 2025-05-22 NOTE — TELEPHONE ENCOUNTER
----- Message from Prashanth Whalen MD sent at 5/22/2025  8:23 AM CDT -----  Regarding: RE: Holding Instructions  Please hold cellcept one week before procedure and restart one week after if no open wounds or complications.   ----- Message -----  From: Jennifer Alexis, AALIYAH  Sent: 5/22/2025   8:15 AM CDT  To: Prashanth Whalen MD; Winsome Cook RN#  Subject: Holding Instructions                             Good Morning, pt is scheduled for a Revision of Vascular Access port with Dr. Khan on 5/29/25. She is on medrol, cellcept, and prednisone. Are there any holding instructions or precautions regarding these meds in relation to her sx. Please advise Thank you, Pre-Admit Testing

## 2025-05-22 NOTE — TELEPHONE ENCOUNTER
----- Message from AALIYAH Schumacher sent at 5/22/2025  3:54 PM CDT -----  Regarding: Nails  Good afternoon,Patient is scheduled for surgery with Dr. Khan on 5/29. Patient stated that she has overlay on her fingernails. She is wondering if she can keep this on her nails or if she needs to have it removed.Thanks,Pre admit testing

## 2025-05-27 ENCOUNTER — ANESTHESIA EVENT (OUTPATIENT)
Dept: SURGERY | Facility: HOSPITAL | Age: 40
End: 2025-05-27
Payer: COMMERCIAL

## 2025-05-27 NOTE — ANESTHESIA PREPROCEDURE EVALUATION
05/27/2025  Stefanie España is a 39 y.o., female.  Past Surgical History:   Procedure Laterality Date    ESOPHAGOGASTRODUODENOSCOPY N/A 2/29/2024    Procedure: EGD (ESOPHAGOGASTRODUODENOSCOPY);  Surgeon: Gregg Reyez MD;  Location: [facility];  Service: Endoscopy;  Laterality: N/A;    INSERTION OF TUNNELED CENTRAL VENOUS CATHETER (CVC) WITH SUBCUTANEOUS PORT N/A 05/24/2023    Procedure: [id number]-CATH;  Surgeon: Taco Zimmer MD;  Location: [facility] OR;  Service: General;  Laterality: N/A;    ROBOT-ASSISTED CHOLECYSTECTOMY USING DA CRISTOPHER XI N/A 12/11/2020    Procedure: XI ROBOTIC CHOLECYSTECTOMY;  Surgeon: Taco Zimmer MD;  Location: [facility] OR;  Service: General;  Laterality: N/A;    ROBOT-ASSISTED LAPAROSCOPIC LYSIS OF ADHESIONS USING DA CRISTOPHER XI  12/11/2020    Procedure: XI ROBOTIC LYSIS, ADHESIONS;  Surgeon: Taco Zimmer MD;  Location: [facility] OR;  Service: General;;    ROBOT-ASSISTED LAPAROSCOPIC SALPINGO-OOPHORECTOMY USING DA CRISTOPHER XI Left 12/11/2020    Procedure: XI ROBOTIC SALPINGO-OOPHORECTOMY;  Surgeon: SHERLEY Wakefield MD;  Location: [facility];  Service: OB/GYN;  Laterality: Left;    TUBAL LIGATION  2005     Past Medical History:   Diagnosis Date    Abnormal Pap smear of cervix     repeat was normal    Lupus (systemic lupus erythematosus)            Pre-op Assessment    I have reviewed the Patient Summary Reports.    I have reviewed the NPO Status.   I have reviewed the Medications.     Review of Systems  Anesthesia Hx:  No problems with previous Anesthesia   History of prior surgery of interest to airway management or planning:  Previous anesthesia: General        Denies Family Hx of Anesthesia complications.    Denies Personal Hx of Anesthesia complications.                    Social:  Non-Smoker, No Alcohol Use       Hematology/Oncology:  Hematology Normal   Oncology  Normal                                   EENT/Dental:  EENT/Dental Normal           Cardiovascular:  Cardiovascular Normal                                              Pulmonary:  Pulmonary Normal                       Renal/:  Renal/ Normal                 Hepatic/GI:     GERD                Musculoskeletal:  Arthritis   Rheumatoid arthritis and SLE arthritis  scleroderma            Neurological:    Neuromuscular Disease,       fibromyalgia                            Endocrine:  Endocrine Normal          Obesity / BMI > 30  Dermatological:  Skin Normal    Psych:  Psychiatric History  depression                Physical Exam  General: Alert and Oriented    Airway:  Mallampati: IV / IV  Mouth Opening: Normal  TM Distance: Normal  Tongue: Normal  Neck ROM: Normal ROM    Dental:  Intact    Chest/Lungs:  Clear to auscultation, Normal Respiratory Rate    Heart:  Rate: Normal  Rhythm: Regular Rhythm        Anesthesia Plan  Type of Anesthesia, risks & benefits discussed:    Anesthesia Type: Gen Natural Airway  Intra-op Monitoring Plan: Standard ASA Monitors  Post Op Pain Control Plan: multimodal analgesia  Induction:  IV  Informed Consent: Informed consent signed with the Patient and all parties understand the risks and agree with anesthesia plan.  All questions answered. Patient consented to blood products? No  ASA Score: 3  Day of Surgery Review of History & Physical: H&P Update referred to the surgeon/provider.    Ready For Surgery From Anesthesia Perspective.     .

## 2025-05-28 ENCOUNTER — PATIENT MESSAGE (OUTPATIENT)
Dept: PREADMISSION TESTING | Facility: HOSPITAL | Age: 40
End: 2025-05-28
Payer: COMMERCIAL

## 2025-05-29 ENCOUNTER — HOSPITAL ENCOUNTER (OUTPATIENT)
Facility: HOSPITAL | Age: 40
Discharge: HOME | End: 2025-05-29
Attending: SURGERY | Admitting: SURGERY
Payer: COMMERCIAL

## 2025-05-29 ENCOUNTER — ANESTHESIA (OUTPATIENT)
Dept: SURGERY | Facility: HOSPITAL | Age: 40
End: 2025-05-29
Payer: COMMERCIAL

## 2025-05-29 ENCOUNTER — HOSPITAL ENCOUNTER (OUTPATIENT)
Dept: RADIOLOGY | Facility: HOSPITAL | Age: 40
Discharge: HOME | End: 2025-05-29
Attending: SURGERY | Admitting: SURGERY
Payer: COMMERCIAL

## 2025-05-29 VITALS
SYSTOLIC BLOOD PRESSURE: 120 MMHG | DIASTOLIC BLOOD PRESSURE: 75 MMHG | HEIGHT: 68 IN | HEART RATE: 89 BPM | RESPIRATION RATE: 14 BRPM | TEMPERATURE: 98 F | OXYGEN SATURATION: 100 % | WEIGHT: 235.25 LBS | BODY MASS INDEX: 35.65 KG/M2

## 2025-05-29 DIAGNOSIS — Z95.828 PORT-A-CATH IN PLACE: ICD-10-CM

## 2025-05-29 DIAGNOSIS — Z79.899 LONG-TERM CURRENT USE OF HIGH RISK MEDICATION OTHER THAN ANTICOAGULANT: ICD-10-CM

## 2025-05-29 DIAGNOSIS — I87.8 POOR VENOUS ACCESS: ICD-10-CM

## 2025-05-29 LAB
B-HCG UR QL: NEGATIVE
CTP QC/QA: YES

## 2025-05-29 PROCEDURE — 27200651 HC AIRWAY, LMA: Performed by: ANESTHESIOLOGY

## 2025-05-29 PROCEDURE — 63600175 PHARM REV CODE 636 W HCPCS: Performed by: NURSE ANESTHETIST, CERTIFIED REGISTERED

## 2025-05-29 PROCEDURE — 36576 REPAIR TUNNELED CV CATH: CPT | Mod: RT,,, | Performed by: SURGERY

## 2025-05-29 PROCEDURE — 63600175 PHARM REV CODE 636 W HCPCS: Performed by: SURGERY

## 2025-05-29 PROCEDURE — 37000008 HC ANESTHESIA 1ST 15 MINUTES: Performed by: SURGERY

## 2025-05-29 PROCEDURE — 36000707: Performed by: SURGERY

## 2025-05-29 PROCEDURE — 81025 URINE PREGNANCY TEST: CPT | Performed by: SURGERY

## 2025-05-29 PROCEDURE — 71000015 HC POSTOP RECOV 1ST HR: Performed by: SURGERY

## 2025-05-29 PROCEDURE — 71045 X-RAY EXAM CHEST 1 VIEW: CPT | Mod: TC

## 2025-05-29 PROCEDURE — 36000706: Performed by: SURGERY

## 2025-05-29 PROCEDURE — 71000033 HC RECOVERY, INTIAL HOUR: Performed by: SURGERY

## 2025-05-29 PROCEDURE — 71000039 HC RECOVERY, EACH ADD'L HOUR: Performed by: SURGERY

## 2025-05-29 PROCEDURE — 37000009 HC ANESTHESIA EA ADD 15 MINS: Performed by: SURGERY

## 2025-05-29 PROCEDURE — 71045 X-RAY EXAM CHEST 1 VIEW: CPT | Mod: 26,,, | Performed by: RADIOLOGY

## 2025-05-29 RX ORDER — ALBUTEROL SULFATE 0.83 MG/ML
2.5 SOLUTION RESPIRATORY (INHALATION) EVERY 4 HOURS PRN
Status: DISCONTINUED | OUTPATIENT
Start: 2025-05-29 | End: 2025-05-29 | Stop reason: HOSPADM

## 2025-05-29 RX ORDER — LIDOCAINE HYDROCHLORIDE 20 MG/ML
INJECTION, SOLUTION EPIDURAL; INFILTRATION; INTRACAUDAL; PERINEURAL
Status: DISCONTINUED | OUTPATIENT
Start: 2025-05-29 | End: 2025-05-29

## 2025-05-29 RX ORDER — LIDOCAINE HYDROCHLORIDE 10 MG/ML
1 INJECTION, SOLUTION EPIDURAL; INFILTRATION; INTRACAUDAL; PERINEURAL ONCE
Status: DISCONTINUED | OUTPATIENT
Start: 2025-05-29 | End: 2025-05-29 | Stop reason: HOSPADM

## 2025-05-29 RX ORDER — MIDAZOLAM HYDROCHLORIDE 1 MG/ML
INJECTION INTRAMUSCULAR; INTRAVENOUS
Status: DISCONTINUED | OUTPATIENT
Start: 2025-05-29 | End: 2025-05-29

## 2025-05-29 RX ORDER — SODIUM CHLORIDE, SODIUM LACTATE, POTASSIUM CHLORIDE, CALCIUM CHLORIDE 600; 310; 30; 20 MG/100ML; MG/100ML; MG/100ML; MG/100ML
INJECTION, SOLUTION INTRAVENOUS CONTINUOUS
Status: DISCONTINUED | OUTPATIENT
Start: 2025-05-29 | End: 2025-05-29 | Stop reason: HOSPADM

## 2025-05-29 RX ORDER — FENTANYL CITRATE 50 UG/ML
INJECTION, SOLUTION INTRAMUSCULAR; INTRAVENOUS
Status: DISCONTINUED | OUTPATIENT
Start: 2025-05-29 | End: 2025-05-29

## 2025-05-29 RX ORDER — HYDROCODONE BITARTRATE AND ACETAMINOPHEN 5; 325 MG/1; MG/1
1 TABLET ORAL
Status: DISCONTINUED | OUTPATIENT
Start: 2025-05-29 | End: 2025-05-29 | Stop reason: HOSPADM

## 2025-05-29 RX ORDER — BUPIVACAINE HYDROCHLORIDE 2.5 MG/ML
INJECTION, SOLUTION EPIDURAL; INFILTRATION; INTRACAUDAL; PERINEURAL
Status: DISCONTINUED | OUTPATIENT
Start: 2025-05-29 | End: 2025-05-29 | Stop reason: HOSPADM

## 2025-05-29 RX ORDER — LIDOCAINE HYDROCHLORIDE 10 MG/ML
INJECTION, SOLUTION EPIDURAL; INFILTRATION; INTRACAUDAL; PERINEURAL
Status: DISCONTINUED | OUTPATIENT
Start: 2025-05-29 | End: 2025-05-29 | Stop reason: HOSPADM

## 2025-05-29 RX ORDER — CEFAZOLIN 2 G/1
2 INJECTION, POWDER, FOR SOLUTION INTRAMUSCULAR; INTRAVENOUS
Status: COMPLETED | OUTPATIENT
Start: 2025-05-29 | End: 2025-05-29

## 2025-05-29 RX ORDER — DEXAMETHASONE SODIUM PHOSPHATE 4 MG/ML
INJECTION, SOLUTION INTRA-ARTICULAR; INTRALESIONAL; INTRAMUSCULAR; INTRAVENOUS; SOFT TISSUE
Status: DISCONTINUED | OUTPATIENT
Start: 2025-05-29 | End: 2025-05-29

## 2025-05-29 RX ORDER — ONDANSETRON HYDROCHLORIDE 2 MG/ML
4 INJECTION, SOLUTION INTRAVENOUS ONCE AS NEEDED
Status: DISCONTINUED | OUTPATIENT
Start: 2025-05-29 | End: 2025-05-29 | Stop reason: HOSPADM

## 2025-05-29 RX ORDER — PROPOFOL 10 MG/ML
VIAL (ML) INTRAVENOUS
Status: DISCONTINUED | OUTPATIENT
Start: 2025-05-29 | End: 2025-05-29

## 2025-05-29 RX ORDER — HEPARIN 100 UNIT/ML
SYRINGE INTRAVENOUS
Status: DISCONTINUED
Start: 2025-05-29 | End: 2025-05-29 | Stop reason: HOSPADM

## 2025-05-29 RX ORDER — HYDROMORPHONE HYDROCHLORIDE 2 MG/ML
0.2 INJECTION, SOLUTION INTRAMUSCULAR; INTRAVENOUS; SUBCUTANEOUS EVERY 5 MIN PRN
Status: DISCONTINUED | OUTPATIENT
Start: 2025-05-29 | End: 2025-05-29 | Stop reason: HOSPADM

## 2025-05-29 RX ORDER — BUPIVACAINE HYDROCHLORIDE 2.5 MG/ML
INJECTION, SOLUTION EPIDURAL; INFILTRATION; INTRACAUDAL; PERINEURAL
Status: DISCONTINUED
Start: 2025-05-29 | End: 2025-05-29 | Stop reason: HOSPADM

## 2025-05-29 RX ORDER — DIPHENHYDRAMINE HYDROCHLORIDE 50 MG/ML
25 INJECTION, SOLUTION INTRAMUSCULAR; INTRAVENOUS EVERY 6 HOURS PRN
Status: DISCONTINUED | OUTPATIENT
Start: 2025-05-29 | End: 2025-05-29 | Stop reason: HOSPADM

## 2025-05-29 RX ORDER — HEPARIN 100 UNIT/ML
SYRINGE INTRAVENOUS
Status: DISCONTINUED | OUTPATIENT
Start: 2025-05-29 | End: 2025-05-29 | Stop reason: HOSPADM

## 2025-05-29 RX ORDER — ONDANSETRON HYDROCHLORIDE 2 MG/ML
INJECTION, SOLUTION INTRAVENOUS
Status: DISCONTINUED | OUTPATIENT
Start: 2025-05-29 | End: 2025-05-29

## 2025-05-29 RX ORDER — LIDOCAINE HYDROCHLORIDE 10 MG/ML
INJECTION, SOLUTION EPIDURAL; INFILTRATION; INTRACAUDAL; PERINEURAL
Status: DISCONTINUED
Start: 2025-05-29 | End: 2025-05-29 | Stop reason: HOSPADM

## 2025-05-29 RX ORDER — FENTANYL CITRATE 50 UG/ML
25 INJECTION, SOLUTION INTRAMUSCULAR; INTRAVENOUS EVERY 5 MIN PRN
Status: DISCONTINUED | OUTPATIENT
Start: 2025-05-29 | End: 2025-05-29 | Stop reason: HOSPADM

## 2025-05-29 RX ORDER — SODIUM CHLORIDE 9 MG/ML
INJECTION, SOLUTION INTRAVENOUS CONTINUOUS
Status: DISCONTINUED | OUTPATIENT
Start: 2025-05-29 | End: 2025-05-29 | Stop reason: HOSPADM

## 2025-05-29 RX ADMIN — ONDANSETRON 4 MG: 2 INJECTION INTRAMUSCULAR; INTRAVENOUS at 09:05

## 2025-05-29 RX ADMIN — SODIUM CHLORIDE, POTASSIUM CHLORIDE, SODIUM LACTATE AND CALCIUM CHLORIDE: 600; 310; 30; 20 INJECTION, SOLUTION INTRAVENOUS at 08:05

## 2025-05-29 RX ADMIN — CEFAZOLIN 2 G: 2 INJECTION, POWDER, FOR SOLUTION INTRAMUSCULAR; INTRAVENOUS at 09:05

## 2025-05-29 RX ADMIN — LIDOCAINE HYDROCHLORIDE 80 MG: 20 INJECTION, SOLUTION EPIDURAL; INFILTRATION; INTRACAUDAL; PERINEURAL at 09:05

## 2025-05-29 RX ADMIN — MIDAZOLAM 2 MG: 1 INJECTION INTRAMUSCULAR; INTRAVENOUS at 08:05

## 2025-05-29 RX ADMIN — DEXAMETHASONE SODIUM PHOSPHATE 8 MG: 4 INJECTION, SOLUTION INTRA-ARTICULAR; INTRALESIONAL; INTRAMUSCULAR; INTRAVENOUS; SOFT TISSUE at 09:05

## 2025-05-29 RX ADMIN — FENTANYL CITRATE 25 MCG: 50 INJECTION, SOLUTION INTRAMUSCULAR; INTRAVENOUS at 09:05

## 2025-05-29 RX ADMIN — FENTANYL CITRATE 50 MCG: 50 INJECTION, SOLUTION INTRAMUSCULAR; INTRAVENOUS at 09:05

## 2025-05-29 RX ADMIN — PROPOFOL 200 MG: 10 INJECTION, EMULSION INTRAVENOUS at 09:05

## 2025-05-29 NOTE — ANESTHESIA POSTPROCEDURE EVALUATION
Anesthesia Post Evaluation    Patient: Stefanie España    Procedure(s) Performed: Procedure(s) (LRB):  REVISION, VASCULAR ACCESS PORT (N/A)    Final Anesthesia Type: general      Patient location during evaluation: PACU  Patient participation: Yes- Able to Participate  Level of consciousness: awake  Post-procedure vital signs: reviewed and stable  Pain management: adequate  Airway patency: patent    PONV status at discharge: No PONV  Anesthetic complications: no      Cardiovascular status: stable  Respiratory status: unassisted  Hydration status: euvolemic  Follow-up not needed.              Vitals Value Taken Time   /76 05/29/25 10:42     05/29/25 10:45   Pulse 77 05/29/25 10:45   Resp 13 05/29/25 10:45   SpO2 100 % 05/29/25 10:45   Vitals shown include unfiled device data.      No case tracking events are documented in the log.      Pain/Guerda Score: Guerda Score: 9 (5/29/2025 10:30 AM)

## 2025-05-29 NOTE — ANESTHESIA PROCEDURE NOTES
Intubation    Date/Time: 5/29/2025 9:04 AM    Performed by: Marisel Templeton CRNA  Authorized by: Yaz Calderon MD    Intubation:     Induction:  Intravenous    Intubated:  Postinduction    Mask Ventilation:  Not attempted    Attempts:  1    Attempted By:  CRNA    Difficult Airway Encountered?: No      Complications:  None    Airway Device:  Supraglottic airway/LMA    Airway Device Size:  4.0    Style/Cuff Inflation:  Cuffed    Inflation Amount (mL):  0    Secured at:  The lips    Placement Verified By:  Capnometry    Complicating Factors:  None    Findings Post-Intubation:  BS equal bilateral and atraumatic/condition of teeth unchanged

## 2025-05-29 NOTE — TRANSFER OF CARE
"Anesthesia Transfer of Care Note    Patient: Stefanie España    Procedure(s) Performed: Procedure(s) (LRB):  REVISION, VASCULAR ACCESS PORT (N/A)    Patient location: PACU    Anesthesia Type: general    Transport from OR: Transported from OR on room air with adequate spontaneous ventilation    Post pain: adequate analgesia    Post assessment: no apparent anesthetic complications and tolerated procedure well    Post vital signs: stable    Level of consciousness: awake    Nausea/Vomiting: no nausea/vomiting    Complications: none    Transfer of care protocol was followed      Last vitals: Visit Vitals  /82 (BP Location: Right arm, Patient Position: Sitting)   Pulse 82   Temp 36.4 °C (97.5 °F) (Temporal)   Resp 16   Ht 5' 8" (1.727 m)   Wt 106.7 kg (235 lb 3.7 oz)   LMP 05/08/2025 (Approximate)   SpO2 100%   Breastfeeding No   BMI 35.77 kg/m²     "

## 2025-06-02 ENCOUNTER — PATIENT MESSAGE (OUTPATIENT)
Dept: SURGERY | Facility: CLINIC | Age: 40
End: 2025-06-02
Payer: COMMERCIAL

## 2025-06-11 ENCOUNTER — PATIENT MESSAGE (OUTPATIENT)
Dept: RHEUMATOLOGY | Facility: CLINIC | Age: 40
End: 2025-06-11
Payer: COMMERCIAL

## 2025-06-11 ENCOUNTER — OFFICE VISIT (OUTPATIENT)
Dept: OBSTETRICS AND GYNECOLOGY | Facility: CLINIC | Age: 40
End: 2025-06-11
Payer: COMMERCIAL

## 2025-06-11 VITALS
SYSTOLIC BLOOD PRESSURE: 120 MMHG | BODY MASS INDEX: 36.32 KG/M2 | WEIGHT: 239.63 LBS | HEIGHT: 68 IN | DIASTOLIC BLOOD PRESSURE: 60 MMHG

## 2025-06-11 DIAGNOSIS — M32.9 HISTORY OF SYSTEMIC LUPUS ERYTHEMATOSUS: ICD-10-CM

## 2025-06-11 DIAGNOSIS — M35.1 MCTD (MIXED CONNECTIVE TISSUE DISEASE): ICD-10-CM

## 2025-06-11 DIAGNOSIS — N81.4 UTERINE PROLAPSE: ICD-10-CM

## 2025-06-11 DIAGNOSIS — Z01.419 ENCOUNTER FOR GYNECOLOGICAL EXAMINATION WITHOUT ABNORMAL FINDING: Primary | ICD-10-CM

## 2025-06-11 DIAGNOSIS — Z12.31 ENCOUNTER FOR SCREENING MAMMOGRAM FOR BREAST CANCER: ICD-10-CM

## 2025-06-11 PROCEDURE — 99999 PR PBB SHADOW E&M-EST. PATIENT-LVL IV: CPT | Mod: PBBFAC,,, | Performed by: NURSE PRACTITIONER

## 2025-06-11 PROCEDURE — 3078F DIAST BP <80 MM HG: CPT | Mod: CPTII,S$GLB,, | Performed by: NURSE PRACTITIONER

## 2025-06-11 PROCEDURE — 99386 PREV VISIT NEW AGE 40-64: CPT | Mod: S$GLB,,, | Performed by: NURSE PRACTITIONER

## 2025-06-11 PROCEDURE — 1159F MED LIST DOCD IN RCRD: CPT | Mod: CPTII,S$GLB,, | Performed by: NURSE PRACTITIONER

## 2025-06-11 PROCEDURE — 3008F BODY MASS INDEX DOCD: CPT | Mod: CPTII,S$GLB,, | Performed by: NURSE PRACTITIONER

## 2025-06-11 PROCEDURE — 1160F RVW MEDS BY RX/DR IN RCRD: CPT | Mod: CPTII,S$GLB,, | Performed by: NURSE PRACTITIONER

## 2025-06-11 PROCEDURE — 3074F SYST BP LT 130 MM HG: CPT | Mod: CPTII,S$GLB,, | Performed by: NURSE PRACTITIONER

## 2025-06-11 NOTE — PROGRESS NOTES
CC: Well woman exam    Stefanie España is a 39 y.o. female  presents for well woman exam.  LMP: Patient's last menstrual period was 2025 (approximate)..      Pt noted about 2 weeks ago feels like not emptying bladder and has to sit on toilet to empty  Has lupus and MCTD -     Health Maintenance Topics with due status: Not Due       Topic Last Completion Date    TETANUS VACCINE 2017    Influenza Vaccine 10/24/2018    Cervical Cancer Screening 2020    RSV Vaccine (Age 60+ and Pregnant patients) Not Due     Past Medical History:   Diagnosis Date    Abnormal Pap smear of cervix     repeat was normal    Lupus (systemic lupus erythematosus)      Past Surgical History:   Procedure Laterality Date    ESOPHAGOGASTRODUODENOSCOPY N/A 2024    Procedure: EGD (ESOPHAGOGASTRODUODENOSCOPY);  Surgeon: Gregg Reyez MD;  Location: [facility];  Service: Endoscopy;  Laterality: N/A;    INSERTION OF TUNNELED CENTRAL VENOUS CATHETER (CVC) WITH SUBCUTANEOUS PORT N/A 2023    Procedure: [id number]-CATH;  Surgeon: Taco Zimmer MD;  Location: [facility] OR;  Service: General;  Laterality: N/A;    REVISION OF VASCULAR ACCESS PORT Right 2025    Procedure: REVISION, VASCULAR ACCESS PORT;  Surgeon: August Khan MD;  Location: [facility] OR;  Service: General;  Laterality: Right;    ROBOT-ASSISTED CHOLECYSTECTOMY USING DA CRISTOPHER XI N/A 2020    Procedure: XI ROBOTIC CHOLECYSTECTOMY;  Surgeon: Taco Zimmer MD;  Location: [facility] OR;  Service: General;  Laterality: N/A;    ROBOT-ASSISTED LAPAROSCOPIC LYSIS OF ADHESIONS USING DA CRISTOPHER XI  2020    Procedure: XI ROBOTIC LYSIS, ADHESIONS;  Surgeon: Taco Zimmer MD;  Location: [facility] OR;  Service: General;;    ROBOT-ASSISTED LAPAROSCOPIC SALPINGO-OOPHORECTOMY USING DA CRISTOPHER XI Left 2020    Procedure: XI ROBOTIC SALPINGO-OOPHORECTOMY;  Surgeon: SHERLEY Wakefield MD;  Location: [facility] OR;  Service: OB/GYN;  Laterality: Left;     "TUBAL LIGATION       Social History[1]  Family History   Problem Relation Name Age of Onset    Liver cancer Maternal Aunt      Breast cancer Maternal Aunt      Breast cancer Paternal Grandmother      Colon cancer Neg Hx      Ovarian cancer Neg Hx      Thrombosis Neg Hx       OB History          5    Para   4    Term   3       1    AB   1    Living   3         SAB        IAB   1    Ectopic        Multiple        Live Births                     /60 (Patient Position: Sitting)   Ht 5' 8" (1.727 m)   Wt 108.7 kg (239 lb 10.2 oz)   LMP 2025 (Approximate)   BMI 36.44 kg/m²       ROS:  Per hpi    PHYSICAL EXAM:  APPEARANCE: Well nourished, well developed, in no acute distress.  AFFECT: WNL, alert and oriented x 3  SKIN: No acne or hirsutism, scleroderma changes noted  NECK: Neck symmetric without masses or thyromegaly  NODES: No inguinal, cervical, axillary, or femoral lymph node enlargement  CHEST: Good respiratory effect  ABDOMEN: Soft.  No tenderness or masses.  No hepatosplenomegaly.  No hernias.  BREASTS: Symmetrical, no skin changes other than skin thickening from her scleroderma  or visible lesions.  No palpable masses, nipple discharge bilaterally.  PELVIC: Normal external genitalia without lesions.  Normal hair distribution.  Adequate perineal body, normal urethral meatus.  Vagina moist and well rugated without lesions or discharge.  Cervix pink, without lesions, discharge or tenderness.  Grade 1 cystocele no rectocele.  Bimanual exam shows uterus to be 6-8 week size, regular, mobile and nontender with prolapse noted,   Adnexa without masses or tenderness.    EXTREMITIES: No edema.  Physical Exam    1. Encounter for gynecological examination without abnormal finding  Liquid-Based Pap Smear, Screening      2. Encounter for screening mammogram for breast cancer        3. Uterine prolapse        4. History of systemic lupus erythematosus        5. MCTD (mixed connective tissue " disease)         AND PLAN:    Stefanie was seen today for well woman.    Diagnoses and all orders for this visit:    Encounter for gynecological examination without abnormal finding  -     Liquid-Based Pap Smear, Screening    Encounter for screening mammogram for breast cancer    Uterine prolapse    History of systemic lupus erythematosus    MCTD (mixed connective tissue disease)       F/u with Dr. PAOLA Wakefield to assess prolapse         Patient was counseled today on A.C.S. Pap guidelines and recommendations for yearly pelvic exams, mammograms and monthly self breast exams; to see her PCP for other health maintenance.                          [1]   Social History  Socioeconomic History    Marital status:    Occupational History    Occupation: [employer]   Tobacco Use    Smoking status: Never     Passive exposure: Never    Smokeless tobacco: Never   Vaping Use    Vaping status: Never Used   Substance and Sexual Activity    Alcohol use: Yes     Comment: rare    Drug use: No    Sexual activity: Yes     Partners: Male     Birth control/protection: Surgical     Comment: BTL   Other Topics Concern    Are you pregnant or think you may be? No    Breast-feeding No

## 2025-06-12 ENCOUNTER — INFUSION (OUTPATIENT)
Dept: INFUSION THERAPY | Facility: HOSPITAL | Age: 40
End: 2025-06-12
Attending: PHYSICIAN ASSISTANT
Payer: COMMERCIAL

## 2025-06-12 VITALS
TEMPERATURE: 98 F | DIASTOLIC BLOOD PRESSURE: 76 MMHG | HEART RATE: 87 BPM | BODY MASS INDEX: 35.45 KG/M2 | WEIGHT: 233.94 LBS | RESPIRATION RATE: 16 BRPM | HEIGHT: 68 IN | SYSTOLIC BLOOD PRESSURE: 123 MMHG | OXYGEN SATURATION: 95 %

## 2025-06-12 DIAGNOSIS — M32.9 SYSTEMIC LUPUS ERYTHEMATOSUS ARTHRITIS: Primary | ICD-10-CM

## 2025-06-12 PROCEDURE — A4216 STERILE WATER/SALINE, 10 ML: HCPCS | Performed by: INTERNAL MEDICINE

## 2025-06-12 PROCEDURE — 96365 THER/PROPH/DIAG IV INF INIT: CPT

## 2025-06-12 PROCEDURE — 36593 DECLOT VASCULAR DEVICE: CPT

## 2025-06-12 PROCEDURE — 25000003 PHARM REV CODE 250: Performed by: INTERNAL MEDICINE

## 2025-06-12 PROCEDURE — 63600175 PHARM REV CODE 636 W HCPCS: Mod: JZ,TB | Performed by: INTERNAL MEDICINE

## 2025-06-12 PROCEDURE — 96375 TX/PRO/DX INJ NEW DRUG ADDON: CPT

## 2025-06-12 RX ORDER — HEPARIN 100 UNIT/ML
500 SYRINGE INTRAVENOUS
Status: DISCONTINUED | OUTPATIENT
Start: 2025-06-12 | End: 2025-06-12 | Stop reason: HOSPADM

## 2025-06-12 RX ORDER — SODIUM CHLORIDE 0.9 % (FLUSH) 0.9 %
10 SYRINGE (ML) INJECTION
OUTPATIENT
Start: 2025-07-10

## 2025-06-12 RX ORDER — EPINEPHRINE 0.3 MG/.3ML
0.3 INJECTION SUBCUTANEOUS ONCE AS NEEDED
OUTPATIENT
Start: 2025-07-10

## 2025-06-12 RX ORDER — SODIUM CHLORIDE 0.9 % (FLUSH) 0.9 %
10 SYRINGE (ML) INJECTION
Status: DISCONTINUED | OUTPATIENT
Start: 2025-06-12 | End: 2025-06-12 | Stop reason: HOSPADM

## 2025-06-12 RX ORDER — HEPARIN 100 UNIT/ML
500 SYRINGE INTRAVENOUS
OUTPATIENT
Start: 2025-07-10

## 2025-06-12 RX ORDER — DIPHENHYDRAMINE HYDROCHLORIDE 50 MG/ML
50 INJECTION, SOLUTION INTRAMUSCULAR; INTRAVENOUS ONCE AS NEEDED
OUTPATIENT
Start: 2025-07-10

## 2025-06-12 RX ADMIN — HEPARIN 500 UNITS: 100 SYRINGE at 11:06

## 2025-06-12 RX ADMIN — SODIUM CHLORIDE 300 MG: 9 INJECTION, SOLUTION INTRAVENOUS at 10:06

## 2025-06-12 RX ADMIN — Medication 10 ML: at 11:06

## 2025-06-12 RX ADMIN — ALTEPLASE 2 MG: 2.2 INJECTION, POWDER, LYOPHILIZED, FOR SOLUTION INTRAVENOUS at 10:06

## 2025-06-12 RX ADMIN — METHYLPREDNISOLONE SODIUM SUCCINATE 60 MG: 40 INJECTION, POWDER, FOR SOLUTION INTRAMUSCULAR; INTRAVENOUS at 10:06

## 2025-06-12 NOTE — DISCHARGE INSTRUCTIONS
Thank you for allowing me to care for you today,  HERACLIO AlvarezN, RN    [facility]  [address] [address]  [phone] phone     [phone] fax  Hours of Operation: Monday- Friday 7:00am- 5:30pm    Please call with any concerns regarding your appointment today.   FALL PREVENTION   Falls often occur due to slipping, tripping or losing your balance. Here are ways to reduce your risk of falling again.   Was there anything that caused your fall that can be fixed, removed or replaced?   Make your home safe by keeping walkways clear of objects you may trip over.   Use non-slip pads under rugs.   Do not walk in poorly lit areas.   Do not stand on chairs or wobbly ladders.   Use caution when reaching overhead or looking upward. This position can cause a loss of balance.   Be sure your shoes fit properly, have non-slip bottoms and are in good condition.   Be cautious when going up and down stairs, curbs, and when walking on uneven sidewalks.   If your balance is poor, consider using a cane or walker.   If your fall was related to alcohol use, stop or limit alcohol intake.   If your fall was related to use of sleeping medicines, talk to your doctor about this. You may need to reduce your dosage at bedtime if you awaken during the night to go to the bathroom.   To reduce the need for nighttime bathroom trips:   Avoid drinking fluids for several hours before going to bed   Empty your bladder before going to bed   Men can keep a urinal at the bedside   © 5278-4275 [facility], [address] [place] [address], [place] [address]. All rights reserved. This information is not intended as a substitute for professional medical care. Always follow your healthcare professional's instructions.

## 2025-06-12 NOTE — PLAN OF CARE
"Pt is stable, pt administered Saphnelo. Pt voiced she was doing "great." Pt will follow up in four weeks. Plan of care reviewed with patient. Discussed if there are any new or ongoing concerns.        Problem: Adult Inpatient Plan of Care  Goal: Plan of Care Review  Outcome: Progressing  Goal: Patient-Specific Goal (Individualized)  Outcome: Progressing  Flowsheets (Taken 6/12/2025 1019)  Individualized Care Needs: Pt provided pillow, warm blanket, legs elevated in reclined position. Refreshmetns provided.  Anxieties, Fears or Concerns: Pt denies.  Patient/Family-Specific Goals (Include Timeframe): Pt will tolerate infusion without adverse reaction.  Goal: Optimal Comfort and Wellbeing  Outcome: Progressing     Problem: Infection  Goal: Absence of Infection Signs and Symptoms  Outcome: Progressing     Problem: Fall Injury Risk  Goal: Absence of Fall and Fall-Related Injury  Outcome: Progressing     "

## 2025-06-13 NOTE — TELEPHONE ENCOUNTER
No care due was identified.  [facility] Embedded Care Due Messages. Reference number: [id number].   6/13/2025 6:15:16 PM CDT

## 2025-06-14 RX ORDER — IBUPROFEN 800 MG/1
800 TABLET, FILM COATED ORAL 3 TIMES DAILY
Qty: 40 TABLET | Refills: 0 | Status: SHIPPED | OUTPATIENT
Start: 2025-06-14

## 2025-06-14 NOTE — TELEPHONE ENCOUNTER
Refill Routing Note   Medication(s) are not appropriate for processing by [facility] for the following reason(s):        Outside of protocol    [facility] action(s):  Route               Appointments  past 12m or future 3m with PCP    Date Provider   Last Visit   3/1/2025 Sofia Martinez MD   Next Visit   Visit date not found Sofia Martinez MD   ED visits in past 90 days: 0        Note composed:9:13 AM 06/14/2025

## 2025-06-18 ENCOUNTER — RESULTS FOLLOW-UP (OUTPATIENT)
Dept: OBSTETRICS AND GYNECOLOGY | Facility: CLINIC | Age: 40
End: 2025-06-18

## 2025-06-18 ENCOUNTER — TELEPHONE (OUTPATIENT)
Dept: OBSTETRICS AND GYNECOLOGY | Facility: CLINIC | Age: 40
End: 2025-06-18
Payer: COMMERCIAL

## 2025-06-18 NOTE — TELEPHONE ENCOUNTER
Contacted pt, verified 2 pt identifiers, attempted to confirm scheduled appt today, pt requested to be rescheduled to next Wednesday same time, informed pt appointment will show in patient portal, pt demetrio.     Provider out of office 6/25/25, rescheduled pt to the following Wednesday 7/2/25 at [facility] location 3pm with provider, will send portal message.

## 2025-06-26 ENCOUNTER — HOSPITAL ENCOUNTER (OUTPATIENT)
Dept: RADIOLOGY | Facility: HOSPITAL | Age: 40
Discharge: HOME | End: 2025-06-26
Attending: FAMILY MEDICINE
Payer: COMMERCIAL

## 2025-06-26 DIAGNOSIS — Z12.31 ENCOUNTER FOR SCREENING MAMMOGRAM FOR BREAST CANCER: ICD-10-CM

## 2025-06-26 PROCEDURE — 77067 SCR MAMMO BI INCL CAD: CPT | Mod: TC,PO

## 2025-06-26 PROCEDURE — 77067 SCR MAMMO BI INCL CAD: CPT | Mod: 26,,, | Performed by: RADIOLOGY

## 2025-06-26 PROCEDURE — 77063 BREAST TOMOSYNTHESIS BI: CPT | Mod: 26,,, | Performed by: RADIOLOGY

## 2025-06-27 ENCOUNTER — RESULTS FOLLOW-UP (OUTPATIENT)
Dept: INTERNAL MEDICINE | Facility: CLINIC | Age: 40
End: 2025-06-27

## 2025-06-30 ENCOUNTER — PATIENT MESSAGE (OUTPATIENT)
Dept: RHEUMATOLOGY | Facility: CLINIC | Age: 40
End: 2025-06-30
Payer: COMMERCIAL

## 2025-06-30 DIAGNOSIS — M34.9 SCLERODERMA, DIFFUSE: ICD-10-CM

## 2025-06-30 DIAGNOSIS — M32.9 SYSTEMIC LUPUS ERYTHEMATOSUS ARTHRITIS: ICD-10-CM

## 2025-07-01 RX ORDER — MYCOPHENOLATE MOFETIL 500 MG/1
1500 TABLET ORAL 2 TIMES DAILY
Qty: 180 TABLET | Refills: 3 | Status: SHIPPED | OUTPATIENT
Start: 2025-07-01

## 2025-07-02 ENCOUNTER — OFFICE VISIT (OUTPATIENT)
Dept: OBSTETRICS AND GYNECOLOGY | Facility: CLINIC | Age: 40
End: 2025-07-02
Payer: COMMERCIAL

## 2025-07-02 VITALS
WEIGHT: 243.81 LBS | DIASTOLIC BLOOD PRESSURE: 82 MMHG | HEIGHT: 68 IN | SYSTOLIC BLOOD PRESSURE: 126 MMHG | BODY MASS INDEX: 36.95 KG/M2

## 2025-07-02 DIAGNOSIS — R30.0 DYSURIA: Primary | ICD-10-CM

## 2025-07-02 PROBLEM — R19.00 PELVIC MASS: Status: RESOLVED | Noted: 2020-12-02 | Resolved: 2025-07-02

## 2025-07-02 PROBLEM — M79.89 SWELLING OF RIGHT UPPER EXTREMITY: Status: RESOLVED | Noted: 2022-05-19 | Resolved: 2025-07-02

## 2025-07-02 PROBLEM — R05.3 CHRONIC COUGH: Status: RESOLVED | Noted: 2023-08-01 | Resolved: 2025-07-02

## 2025-07-02 PROBLEM — F33.0 MILD EPISODE OF RECURRENT MAJOR DEPRESSIVE DISORDER: Status: RESOLVED | Noted: 2019-10-02 | Resolved: 2025-07-02

## 2025-07-02 PROBLEM — Z51.81 ENCOUNTER FOR MEDICATION MONITORING: Status: RESOLVED | Noted: 2024-02-13 | Resolved: 2025-07-02

## 2025-07-02 PROBLEM — R21 RASH: Status: RESOLVED | Noted: 2019-03-25 | Resolved: 2025-07-02

## 2025-07-02 PROBLEM — Z79.899 LONG-TERM CURRENT USE OF HIGH RISK MEDICATION OTHER THAN ANTICOAGULANT: Status: RESOLVED | Noted: 2017-12-01 | Resolved: 2025-07-02

## 2025-07-02 PROBLEM — R29.898 BILATERAL LEG WEAKNESS: Status: RESOLVED | Noted: 2020-02-06 | Resolved: 2025-07-02

## 2025-07-02 PROCEDURE — 99999 PR PBB SHADOW E&M-EST. PATIENT-LVL IV: CPT | Mod: PBBFAC,,, | Performed by: OBSTETRICS & GYNECOLOGY

## 2025-07-02 PROCEDURE — 1159F MED LIST DOCD IN RCRD: CPT | Mod: CPTII,S$GLB,, | Performed by: OBSTETRICS & GYNECOLOGY

## 2025-07-02 PROCEDURE — 99213 OFFICE O/P EST LOW 20 MIN: CPT | Mod: S$GLB,,, | Performed by: OBSTETRICS & GYNECOLOGY

## 2025-07-02 PROCEDURE — 3008F BODY MASS INDEX DOCD: CPT | Mod: CPTII,S$GLB,, | Performed by: OBSTETRICS & GYNECOLOGY

## 2025-07-02 PROCEDURE — 3074F SYST BP LT 130 MM HG: CPT | Mod: CPTII,S$GLB,, | Performed by: OBSTETRICS & GYNECOLOGY

## 2025-07-02 PROCEDURE — 3079F DIAST BP 80-89 MM HG: CPT | Mod: CPTII,S$GLB,, | Performed by: OBSTETRICS & GYNECOLOGY

## 2025-07-02 PROCEDURE — 1160F RVW MEDS BY RX/DR IN RCRD: CPT | Mod: CPTII,S$GLB,, | Performed by: OBSTETRICS & GYNECOLOGY

## 2025-07-02 NOTE — PROGRESS NOTES
"  Subjective:       Patient ID: Stefanie España is a 39 y.o. female.    Chief Complaint:  Urinary Frequency      History of Present Illness  Urinary Frequency   Associated symptoms include frequency.     Referral from NP in gyn   Having sensation of urge to void immediately after voiding   No frequency and no burning   Urinalysis is clean in office today   Pelvic organs are intact,   NP suspects prolapse     Health Maintenance   Topic Date Due    Pneumococcal Vaccines (Age 0-49) (2 of 2 - PPSV23) 2019    Hemoglobin A1c (Diabetic Prevention Screening)  2020    COVID-19 Vaccine (5 -  season) 2024    Influenza Vaccine (1) 2025    Mammogram  2026    TETANUS VACCINE  2027    Cervical Cancer Screening  2030    RSV Vaccine (Age 60+ and Pregnant patients) (1 - 1-dose 75+ series) 2059    Hepatitis C Screening  Completed    HIV Screening  Completed    Lipid Panel  Completed     GYN & OB History  Patient's last menstrual period was 2025 (approximate).   Date of Last Pap: 2025    OB History    Para Term  AB Living   5 4 3 1 1 3   SAB IAB Ectopic Multiple Live Births    1         # Outcome Date GA Lbr Hossein/2nd Weight Sex Type Anes PTL Lv   5 Term            4 Term            3 Term            2          FD   1 IAB                Review of Systems  Review of Systems   Genitourinary:  Positive for frequency.           Objective:   /82 (Patient Position: Sitting)   Ht 5' 8" (1.727 m)   Wt 110.6 kg (243 lb 13.3 oz)   LMP 2025 (Approximate)   BMI 37.07 kg/m²    Physical Exam:               Genitourinary:    Uterus, right adnexa and left adnexa normal.   The external female genitalia was normal.     Labial bartholins normal.There is no lesion or injury on the right labia. There is no lesion or injury on the left labia. Cervix is normal. Vagina exhibits no lesion. There is cystocele (small, grade 1) in the vagina. No vaginal " discharge, tenderness, bleeding or rectocele in the vagina.    No foreign body in the vagina.      No signs of injury in the vagina.   Vagina was moist.Uerus contour normal  Uterus is not enlarged, not tender and no uterine prolapse. Normal urethral meatus.Bladder findings: no bladder tenderness                     Assessment:        1. Dysuria       No significant pelvic relaxation noted on my exam          Plan:            Stefanie was seen today for urinary frequency.    Diagnoses and all orders for this visit:    Dysuria  -     Ambulatory referral/consult to Urology; Future

## 2025-07-08 ENCOUNTER — PATIENT MESSAGE (OUTPATIENT)
Dept: RHEUMATOLOGY | Facility: CLINIC | Age: 40
End: 2025-07-08
Payer: COMMERCIAL

## 2025-07-10 ENCOUNTER — INFUSION (OUTPATIENT)
Dept: INFUSION THERAPY | Facility: HOSPITAL | Age: 40
End: 2025-07-10
Attending: PHYSICIAN ASSISTANT
Payer: COMMERCIAL

## 2025-07-10 ENCOUNTER — OFFICE VISIT (OUTPATIENT)
Dept: UROLOGY | Facility: CLINIC | Age: 40
End: 2025-07-10
Payer: COMMERCIAL

## 2025-07-10 VITALS
HEIGHT: 68 IN | DIASTOLIC BLOOD PRESSURE: 82 MMHG | DIASTOLIC BLOOD PRESSURE: 76 MMHG | SYSTOLIC BLOOD PRESSURE: 133 MMHG | HEART RATE: 94 BPM | OXYGEN SATURATION: 99 % | TEMPERATURE: 98 F | RESPIRATION RATE: 16 BRPM | RESPIRATION RATE: 14 BRPM | BODY MASS INDEX: 36.87 KG/M2 | WEIGHT: 242.5 LBS | BODY MASS INDEX: 36.75 KG/M2 | SYSTOLIC BLOOD PRESSURE: 123 MMHG | HEART RATE: 98 BPM | WEIGHT: 242.5 LBS

## 2025-07-10 DIAGNOSIS — N39.46 MIXED INCONTINENCE: Primary | ICD-10-CM

## 2025-07-10 DIAGNOSIS — N32.81 OAB (OVERACTIVE BLADDER): ICD-10-CM

## 2025-07-10 DIAGNOSIS — M32.9 SYSTEMIC LUPUS ERYTHEMATOSUS ARTHRITIS: Primary | ICD-10-CM

## 2025-07-10 PROBLEM — R30.0 DYSURIA: Status: ACTIVE | Noted: 2025-07-10

## 2025-07-10 PROBLEM — R30.0 DYSURIA: Status: RESOLVED | Noted: 2025-07-10 | Resolved: 2025-07-10

## 2025-07-10 LAB
BILIRUBIN, UA POC OHS: NEGATIVE
BLOOD, UA POC OHS: NEGATIVE
CLARITY, UA POC OHS: CLEAR
COLOR, UA POC OHS: YELLOW
GLUCOSE, UA POC OHS: NEGATIVE
KETONES, UA POC OHS: NEGATIVE
LEUKOCYTES, UA POC OHS: NEGATIVE
NITRITE, UA POC OHS: NEGATIVE
PH, UA POC OHS: 7
POC RESIDUAL URINE VOLUME: 4 ML (ref 0–100)
PROTEIN, UA POC OHS: NEGATIVE
SPECIFIC GRAVITY, UA POC OHS: 1.01
UROBILINOGEN, UA POC OHS: 0.2

## 2025-07-10 PROCEDURE — 96375 TX/PRO/DX INJ NEW DRUG ADDON: CPT

## 2025-07-10 PROCEDURE — 51798 US URINE CAPACITY MEASURE: CPT | Mod: S$GLB,,, | Performed by: NURSE PRACTITIONER

## 2025-07-10 PROCEDURE — 99999 PR PBB SHADOW E&M-EST. PATIENT-LVL V: CPT | Mod: PBBFAC,,, | Performed by: NURSE PRACTITIONER

## 2025-07-10 PROCEDURE — 3079F DIAST BP 80-89 MM HG: CPT | Mod: CPTII,S$GLB,, | Performed by: NURSE PRACTITIONER

## 2025-07-10 PROCEDURE — 1159F MED LIST DOCD IN RCRD: CPT | Mod: CPTII,S$GLB,, | Performed by: NURSE PRACTITIONER

## 2025-07-10 PROCEDURE — 96365 THER/PROPH/DIAG IV INF INIT: CPT

## 2025-07-10 PROCEDURE — 3008F BODY MASS INDEX DOCD: CPT | Mod: CPTII,S$GLB,, | Performed by: NURSE PRACTITIONER

## 2025-07-10 PROCEDURE — 81003 URINALYSIS AUTO W/O SCOPE: CPT | Mod: QW,S$GLB,, | Performed by: NURSE PRACTITIONER

## 2025-07-10 PROCEDURE — 99204 OFFICE O/P NEW MOD 45 MIN: CPT | Mod: S$GLB,,, | Performed by: NURSE PRACTITIONER

## 2025-07-10 PROCEDURE — 3075F SYST BP GE 130 - 139MM HG: CPT | Mod: CPTII,S$GLB,, | Performed by: NURSE PRACTITIONER

## 2025-07-10 PROCEDURE — A4216 STERILE WATER/SALINE, 10 ML: HCPCS | Performed by: INTERNAL MEDICINE

## 2025-07-10 PROCEDURE — 63600175 PHARM REV CODE 636 W HCPCS: Mod: JZ,TB | Performed by: INTERNAL MEDICINE

## 2025-07-10 PROCEDURE — 25000003 PHARM REV CODE 250: Performed by: INTERNAL MEDICINE

## 2025-07-10 RX ORDER — SODIUM CHLORIDE 0.9 % (FLUSH) 0.9 %
10 SYRINGE (ML) INJECTION
Status: DISCONTINUED | OUTPATIENT
Start: 2025-07-10 | End: 2025-07-10 | Stop reason: HOSPADM

## 2025-07-10 RX ORDER — HEPARIN 100 UNIT/ML
500 SYRINGE INTRAVENOUS
Status: DISCONTINUED | OUTPATIENT
Start: 2025-07-10 | End: 2025-07-10 | Stop reason: HOSPADM

## 2025-07-10 RX ORDER — DIPHENHYDRAMINE HYDROCHLORIDE 50 MG/ML
50 INJECTION, SOLUTION INTRAMUSCULAR; INTRAVENOUS ONCE AS NEEDED
OUTPATIENT
Start: 2025-08-07

## 2025-07-10 RX ORDER — SODIUM CHLORIDE 0.9 % (FLUSH) 0.9 %
10 SYRINGE (ML) INJECTION
OUTPATIENT
Start: 2025-08-07

## 2025-07-10 RX ORDER — EPINEPHRINE 0.3 MG/.3ML
0.3 INJECTION SUBCUTANEOUS ONCE AS NEEDED
OUTPATIENT
Start: 2025-08-07

## 2025-07-10 RX ORDER — HEPARIN 100 UNIT/ML
500 SYRINGE INTRAVENOUS
OUTPATIENT
Start: 2025-08-07

## 2025-07-10 RX ADMIN — Medication 10 ML: at 11:07

## 2025-07-10 RX ADMIN — Medication 10 ML: at 10:07

## 2025-07-10 RX ADMIN — METHYLPREDNISOLONE SODIUM SUCCINATE 60 MG: 40 INJECTION, POWDER, FOR SOLUTION INTRAMUSCULAR; INTRAVENOUS at 10:07

## 2025-07-10 RX ADMIN — HEPARIN 500 UNITS: 100 SYRINGE at 11:07

## 2025-07-10 RX ADMIN — SODIUM CHLORIDE 300 MG: 9 INJECTION, SOLUTION INTRAVENOUS at 10:07

## 2025-07-10 NOTE — PROGRESS NOTES
Chief Complaint:   Overactive bladder  Urge incontinence    HPI:   Patient is a 39-year-old female that is presenting with an increase of overactive bladder, nocturia and urge incontinence.  States that she is wearing a pad that she changes several times a day.  Nocturia is up to 2-3 times nightly.  No past overactive bladder treatment.  Drinks water, coffee and juices throughout the day.  No  cancers in her family.  Denies history of renal stones or gross hematuria.  PVR is 4 mL and urine in clinic indicates bilirubin, all other parameters are negative.  Allergies:  Elavil [amitriptyline]    Medications:  has a current medication list which includes the following prescription(s): bumetanide, clobetasol, clobetasol, fluticasone propionate, ibuprofen, ipratropium, ketoconazole, lidocaine-prilocaine, mometasone 0.1%, mycophenolate, ondansetron, prednisone, pregabalin, tacrolimus, tramadol, famotidine, omeprazole, pantoprazole, and trazodone, and the following Facility-Administered Medications: lidocaine (pf) 10 mg/ml (1%).    Review of Systems:  General: No fever, chills, fatigability, or weight loss.  Skin: No rashes, itching, or changes in color or texture of skin.  Chest: Denies ELLER, cyanosis, wheezing, cough, and sputum production.  Abdomen: Appetite fine. No weight loss. Denies diarrhea, abdominal pain, hematemesis, or blood in stool. + constipation  Musculoskeletal: No joint stiffness or swelling. Denies back pain.  : As above.  All other review of systems negative.    PMH:   has a past medical history of Abnormal Pap smear of cervix and Lupus (systemic lupus erythematosus).    PSH:   has a past surgical history that includes Tubal ligation (2005); Robot-assisted laparoscopic salpingo-oophorectomy using da Stevo Xi (Left, 12/11/2020); Robot-assisted cholecystectomy using da Stevo Xi (N/A, 12/11/2020); Robot-assisted laparoscopic lysis of adhesions using da Stevo Xi (12/11/2020); Insertion of tunneled central  venous catheter (CVC) with subcutaneous port (N/A, 05/24/2023); Esophagogastroduodenoscopy (N/A, 02/29/2024); Revision of vascular access port (Right, 05/29/2025); and Breast biopsy.    FamHx: family history includes Breast cancer in her maternal aunt and paternal grandmother; Liver cancer in her maternal aunt.    SocHx:  reports that she has never smoked. She has never been exposed to tobacco smoke. She has never used smokeless tobacco. She reports that she does not currently use alcohol. She reports that she does not use drugs.      Physical Exam:  Vitals:    07/10/25 1254   BP: 133/82   Pulse: 98   Resp: 14     General:  Obese female, A&Ox3, no apparent distress, no deformities  Neck: No masses, normal thyroid  Lungs: normal inspiration, no use of accessory muscles  Heart: normal pulse, no arrhythmias  Abdomen: Soft, NT, ND, no masses, no hernias, no hepatosplenomegaly  Lymphatic: Neck and groin nodes negative  Skin: The skin is warm and dry. No jaundice.  Ext: No c/c/e.      Labs/Studies:   See HPI  Impression/Plan:   Overactive bladder with urge incontinence  Patient was educated on behavior modifications needed to decrease overactive bladder symptoms.  Does not want to consider medication at this time.  PT pelvic floor training referral was placed and patient to return to clinic in 3 months for re-evaluation.

## 2025-07-10 NOTE — NURSING
Infusion # >10 - Saphnelo 300 mg q 4 weeks  Last dose- 6/12/25    Any:  -recent illness, infection, or antibiotic use in past week- denies  -open wounds or mouth sores- denies  -invasive procedures or surgeries in past 4 weeks or in upcoming 4 weeks- denies  -vaccinations in past week- denies  -any new symptoms/change in symptoms-denies  -chance you may be pregnant- n/a      Recent labs? 5/21/25  Last Rheumatology provider visit- Seen by Dr. SHORE on 5/15/25     Premeds-60 mg Solumedrol IVP over 3 minutes     Saphnelo 300 mg administered IV at a 30 minute rate per orders; see MAR and vitals for more details. Tolerated well without adverse events, discharged and ambulatory out of clinic.

## 2025-07-10 NOTE — PLAN OF CARE
Plan of care reviewed with patient. Discussed if there are any new or ongoing concerns. Denies.   Problem: Adult Inpatient Plan of Care  Goal: Plan of Care Review  Outcome: Progressing  Flowsheets (Taken 7/10/2025 1054)  Plan of Care Reviewed With: patient  Goal: Patient-Specific Goal (Individualized)  Outcome: Progressing  Goal: Optimal Comfort and Wellbeing  Outcome: Progressing  Intervention: Provide Person-Centered Care  Flowsheets (Taken 7/10/2025 1054)  Trust Relationship/Rapport:   care explained   reassurance provided   choices provided   thoughts/feelings acknowledged   emotional support provided   empathic listening provided   questions answered   questions encouraged  Goal: Absence of Hospital-Acquired Illness or Injury  Outcome: Progressing

## 2025-08-04 ENCOUNTER — PATIENT MESSAGE (OUTPATIENT)
Dept: RHEUMATOLOGY | Facility: CLINIC | Age: 40
End: 2025-08-04
Payer: COMMERCIAL

## 2025-08-04 RX ORDER — DIPHENHYDRAMINE HYDROCHLORIDE 50 MG/ML
50 INJECTION, SOLUTION INTRAMUSCULAR; INTRAVENOUS ONCE AS NEEDED
Status: CANCELLED | OUTPATIENT
Start: 2025-08-04

## 2025-08-04 RX ORDER — EPINEPHRINE 0.3 MG/.3ML
0.3 INJECTION SUBCUTANEOUS ONCE AS NEEDED
Status: CANCELLED | OUTPATIENT
Start: 2025-08-04

## 2025-08-04 RX ORDER — ACETAMINOPHEN 325 MG/1
650 TABLET ORAL
Status: CANCELLED | OUTPATIENT
Start: 2025-08-04

## 2025-08-04 RX ORDER — METHYLPREDNISOLONE SOD SUCC 125 MG
100 VIAL (EA) INJECTION
Status: CANCELLED | OUTPATIENT
Start: 2025-08-04

## 2025-08-04 RX ORDER — SODIUM CHLORIDE 0.9 % (FLUSH) 0.9 %
10 SYRINGE (ML) INJECTION
Status: CANCELLED | OUTPATIENT
Start: 2025-08-04

## 2025-08-04 RX ORDER — MEPERIDINE HYDROCHLORIDE 50 MG/ML
25 INJECTION INTRAMUSCULAR; INTRAVENOUS; SUBCUTANEOUS
Status: CANCELLED | OUTPATIENT
Start: 2025-08-04

## 2025-08-04 RX ORDER — FAMOTIDINE 10 MG/ML
20 INJECTION, SOLUTION INTRAVENOUS
Status: CANCELLED | OUTPATIENT
Start: 2025-08-04

## 2025-08-04 RX ORDER — HEPARIN 100 UNIT/ML
500 SYRINGE INTRAVENOUS
Status: CANCELLED | OUTPATIENT
Start: 2025-08-04

## 2025-08-07 ENCOUNTER — INFUSION (OUTPATIENT)
Dept: INFUSION THERAPY | Facility: HOSPITAL | Age: 40
End: 2025-08-07
Attending: PHYSICIAN ASSISTANT
Payer: COMMERCIAL

## 2025-08-07 ENCOUNTER — LAB VISIT (OUTPATIENT)
Dept: LAB | Facility: HOSPITAL | Age: 40
End: 2025-08-07
Attending: INTERNAL MEDICINE
Payer: COMMERCIAL

## 2025-08-07 VITALS
BODY MASS INDEX: 35.79 KG/M2 | HEIGHT: 68 IN | WEIGHT: 236.13 LBS | SYSTOLIC BLOOD PRESSURE: 117 MMHG | TEMPERATURE: 98 F | RESPIRATION RATE: 18 BRPM | HEART RATE: 80 BPM | DIASTOLIC BLOOD PRESSURE: 72 MMHG | OXYGEN SATURATION: 99 %

## 2025-08-07 DIAGNOSIS — M35.1 MCTD (MIXED CONNECTIVE TISSUE DISEASE): Primary | ICD-10-CM

## 2025-08-07 DIAGNOSIS — M34.9 SCLERODERMA, DIFFUSE: ICD-10-CM

## 2025-08-07 DIAGNOSIS — M32.9 SYSTEMIC LUPUS ERYTHEMATOSUS ARTHRITIS: ICD-10-CM

## 2025-08-07 DIAGNOSIS — Z79.899 LONG-TERM CURRENT USE OF HIGH RISK MEDICATION OTHER THAN ANTICOAGULANT: ICD-10-CM

## 2025-08-07 DIAGNOSIS — M06.09 RHEUMATOID ARTHRITIS OF MULTIPLE SITES WITH NEGATIVE RHEUMATOID FACTOR: ICD-10-CM

## 2025-08-07 DIAGNOSIS — Z51.81 ENCOUNTER FOR MEDICATION MONITORING: ICD-10-CM

## 2025-08-07 LAB
ABSOLUTE EOSINOPHIL (OHS): 1.34 K/UL
ABSOLUTE MONOCYTE (OHS): 0.42 K/UL (ref 0.3–1)
ABSOLUTE NEUTROPHIL COUNT (OHS): 3.16 K/UL (ref 1.8–7.7)
ALBUMIN SERPL BCP-MCNC: 3.3 G/DL (ref 3.5–5.2)
ALP SERPL-CCNC: 46 UNIT/L (ref 40–150)
ALT SERPL W/O P-5'-P-CCNC: 15 UNIT/L (ref 10–44)
ANION GAP (OHS): 8 MMOL/L (ref 8–16)
AST SERPL-CCNC: 29 UNIT/L (ref 11–45)
BASOPHILS # BLD AUTO: 0.05 K/UL
BASOPHILS NFR BLD AUTO: 0.8 %
BILIRUB SERPL-MCNC: 0.2 MG/DL (ref 0.1–1)
BUN SERPL-MCNC: 7 MG/DL (ref 6–20)
CALCIUM SERPL-MCNC: 8.6 MG/DL (ref 8.7–10.5)
CHLORIDE SERPL-SCNC: 106 MMOL/L (ref 95–110)
CO2 SERPL-SCNC: 26 MMOL/L (ref 23–29)
CREAT SERPL-MCNC: 0.6 MG/DL (ref 0.5–1.4)
ERYTHROCYTE [DISTWIDTH] IN BLOOD BY AUTOMATED COUNT: 12.5 % (ref 11.5–14.5)
GFR SERPLBLD CREATININE-BSD FMLA CKD-EPI: >60 ML/MIN/1.73/M2
GLUCOSE SERPL-MCNC: 88 MG/DL (ref 70–110)
HCT VFR BLD AUTO: 34.4 % (ref 37–48.5)
HGB BLD-MCNC: 11.4 GM/DL (ref 12–16)
IMM GRANULOCYTES # BLD AUTO: 0.02 K/UL (ref 0–0.04)
IMM GRANULOCYTES NFR BLD AUTO: 0.3 % (ref 0–0.5)
LYMPHOCYTES # BLD AUTO: 0.92 K/UL (ref 1–4.8)
MCH RBC QN AUTO: 30.7 PG (ref 27–31)
MCHC RBC AUTO-ENTMCNC: 33.1 G/DL (ref 32–36)
MCV RBC AUTO: 93 FL (ref 82–98)
NUCLEATED RBC (/100WBC) (OHS): 0 /100 WBC
PLATELET # BLD AUTO: 439 K/UL (ref 150–450)
PMV BLD AUTO: 8.1 FL (ref 9.2–12.9)
POTASSIUM SERPL-SCNC: 3.6 MMOL/L (ref 3.5–5.1)
PROT SERPL-MCNC: 7.9 GM/DL (ref 6–8.4)
RBC # BLD AUTO: 3.71 M/UL (ref 4–5.4)
RELATIVE EOSINOPHIL (OHS): 22.7 %
RELATIVE LYMPHOCYTE (OHS): 15.6 % (ref 18–48)
RELATIVE MONOCYTE (OHS): 7.1 % (ref 4–15)
RELATIVE NEUTROPHIL (OHS): 53.5 % (ref 38–73)
SODIUM SERPL-SCNC: 140 MMOL/L (ref 136–145)
WBC # BLD AUTO: 5.91 K/UL (ref 3.9–12.7)

## 2025-08-07 PROCEDURE — 85025 COMPLETE CBC W/AUTO DIFF WBC: CPT

## 2025-08-07 PROCEDURE — 96366 THER/PROPH/DIAG IV INF ADDON: CPT

## 2025-08-07 PROCEDURE — 25000003 PHARM REV CODE 250: Performed by: INTERNAL MEDICINE

## 2025-08-07 PROCEDURE — 96367 TX/PROPH/DG ADDL SEQ IV INF: CPT

## 2025-08-07 PROCEDURE — 96413 CHEMO IV INFUSION 1 HR: CPT

## 2025-08-07 PROCEDURE — A4216 STERILE WATER/SALINE, 10 ML: HCPCS | Performed by: INTERNAL MEDICINE

## 2025-08-07 PROCEDURE — 36415 COLL VENOUS BLD VENIPUNCTURE: CPT

## 2025-08-07 PROCEDURE — 96415 CHEMO IV INFUSION ADDL HR: CPT

## 2025-08-07 PROCEDURE — 96375 TX/PRO/DX INJ NEW DRUG ADDON: CPT

## 2025-08-07 PROCEDURE — 63600175 PHARM REV CODE 636 W HCPCS: Performed by: INTERNAL MEDICINE

## 2025-08-07 PROCEDURE — 84295 ASSAY OF SERUM SODIUM: CPT

## 2025-08-07 PROCEDURE — 96365 THER/PROPH/DIAG IV INF INIT: CPT

## 2025-08-07 RX ORDER — FAMOTIDINE 10 MG/ML
20 INJECTION, SOLUTION INTRAVENOUS
OUTPATIENT
Start: 2025-08-21

## 2025-08-07 RX ORDER — METHYLPREDNISOLONE SOD SUCC 125 MG
100 VIAL (EA) INJECTION
OUTPATIENT
Start: 2025-08-21

## 2025-08-07 RX ORDER — ACETAMINOPHEN 325 MG/1
650 TABLET ORAL
Status: COMPLETED | OUTPATIENT
Start: 2025-08-07 | End: 2025-08-07

## 2025-08-07 RX ORDER — SODIUM CHLORIDE 0.9 % (FLUSH) 0.9 %
10 SYRINGE (ML) INJECTION
Status: DISCONTINUED | OUTPATIENT
Start: 2025-08-07 | End: 2025-08-07 | Stop reason: HOSPADM

## 2025-08-07 RX ORDER — DIPHENHYDRAMINE HYDROCHLORIDE 50 MG/ML
50 INJECTION, SOLUTION INTRAMUSCULAR; INTRAVENOUS ONCE AS NEEDED
OUTPATIENT
Start: 2025-08-21

## 2025-08-07 RX ORDER — SODIUM CHLORIDE 0.9 % (FLUSH) 0.9 %
10 SYRINGE (ML) INJECTION
OUTPATIENT
Start: 2025-08-21

## 2025-08-07 RX ORDER — FAMOTIDINE 10 MG/ML
20 INJECTION, SOLUTION INTRAVENOUS
Status: COMPLETED | OUTPATIENT
Start: 2025-08-07 | End: 2025-08-07

## 2025-08-07 RX ORDER — MEPERIDINE HYDROCHLORIDE 50 MG/ML
25 INJECTION INTRAMUSCULAR; INTRAVENOUS; SUBCUTANEOUS
OUTPATIENT
Start: 2025-08-21

## 2025-08-07 RX ORDER — EPINEPHRINE 0.3 MG/.3ML
0.3 INJECTION SUBCUTANEOUS ONCE AS NEEDED
OUTPATIENT
Start: 2025-08-21

## 2025-08-07 RX ORDER — HEPARIN 100 UNIT/ML
500 SYRINGE INTRAVENOUS
OUTPATIENT
Start: 2025-08-21

## 2025-08-07 RX ORDER — HEPARIN 100 UNIT/ML
500 SYRINGE INTRAVENOUS
Status: DISCONTINUED | OUTPATIENT
Start: 2025-08-07 | End: 2025-08-07 | Stop reason: HOSPADM

## 2025-08-07 RX ORDER — ACETAMINOPHEN 325 MG/1
650 TABLET ORAL
OUTPATIENT
Start: 2025-08-21

## 2025-08-07 RX ORDER — METHYLPREDNISOLONE SOD SUCC 125 MG
100 VIAL (EA) INJECTION
Status: COMPLETED | OUTPATIENT
Start: 2025-08-07 | End: 2025-08-07

## 2025-08-07 RX ADMIN — SODIUM CHLORIDE, PRESERVATIVE FREE 10 ML: 5 INJECTION INTRAVENOUS at 03:08

## 2025-08-07 RX ADMIN — ACETAMINOPHEN 650 MG: 325 TABLET ORAL at 10:08

## 2025-08-07 RX ADMIN — METHYLPREDNISOLONE SODIUM SUCCINATE 100 MG: 125 INJECTION, POWDER, FOR SOLUTION INTRAMUSCULAR; INTRAVENOUS at 10:08

## 2025-08-07 RX ADMIN — HEPARIN 500 UNITS: 100 SYRINGE at 03:08

## 2025-08-07 RX ADMIN — DIPHENHYDRAMINE HYDROCHLORIDE 50 MG: 50 INJECTION INTRAMUSCULAR; INTRAVENOUS at 10:08

## 2025-08-07 RX ADMIN — SODIUM CHLORIDE 1000 MG: 9 INJECTION, SOLUTION INTRAVENOUS at 11:08

## 2025-08-07 RX ADMIN — FAMOTIDINE 20 MG: 10 INJECTION INTRAVENOUS at 10:08

## 2025-08-07 NOTE — DISCHARGE INSTRUCTIONS
Thank you for allowing me to care for you today,  HERACLIO AlvarezN, RN    [facility]  [address] [address]  [phone] phone     [phone] fax  Hours of Operation: Monday- Friday 7:00am- 5:30pm  After hours phone  [phone]  Hematology / Oncology Physicians on call      LIDIA Rivera Dr., Dr., Dr., Dr., Dr., Dr., Dr., Dr., Dr., Dr., Dr., Dr., Dr., Dr., Dr., ZOYA Rangel, ZOYA Bridges, ZOYA Hidalgo, NP  Please call with any concerns regarding your appointment today.   FALL PREVENTION   Falls often occur due to slipping, tripping or losing your balance. Here are ways to reduce your risk of falling again.   Was there anything that caused your fall that can be fixed, removed or replaced?   Make your home safe by keeping walkways clear of objects you may trip over.   Use non-slip pads under rugs.   Do not walk in poorly lit areas.   Do not stand on chairs or wobbly ladders.   Use caution when reaching overhead or looking upward. This position can cause a loss of balance.   Be sure your shoes fit properly, have non-slip bottoms and are in good condition.   Be cautious when going up and down stairs, curbs, and when walking on uneven sidewalks.   If your balance is poor, consider using a cane or walker.   If your fall was related to alcohol use, stop or limit alcohol intake.   If your fall was related to use of sleeping medicines, talk to your doctor about this. You may need to reduce your dosage at bedtime if you awaken during the night to go to the bathroom.   To reduce the need for nighttime bathroom trips:   Avoid drinking fluids for several hours before going to bed   Empty your bladder before going to bed   Men can keep a urinal at the bedside   © 4677-5682 [employer], [address] [place] [address], [place] [address]. All rights reserved. This information is not intended as a  substitute for professional medical care. Always follow your healthcare professional's instructions.

## 2025-08-07 NOTE — PLAN OF CARE
"Pt is stable, pt administered Rituxan. Pt voiced she was doing "great." Pt will follow up in two weeks. Plan of care reviewed with patient. Discussed if there are any new or ongoing concerns.        Problem: Adult Inpatient Plan of Care  Goal: Plan of Care Review  Outcome: Progressing  Goal: Patient-Specific Goal (Individualized)  Outcome: Progressing  Flowsheets (Taken 8/7/2025 3162)  Individualized Care Needs: Pt provided pillow, warm blanket, legs elevated in reclined position. Refreshments provided.  Anxieties, Fears or Concerns: Pt denies.  Patient/Family-Specific Goals (Include Timeframe): Pt will tolerat infusion without adverse reaction.  Goal: Optimal Comfort and Wellbeing  Outcome: Progressing     Problem: Infection  Goal: Absence of Infection Signs and Symptoms  Outcome: Progressing     Problem: Fall Injury Risk  Goal: Absence of Fall and Fall-Related Injury  Outcome: Progressing     Problem: Fatigue  Goal: Improved Activity Tolerance  Outcome: Progressing     "

## 2025-08-21 ENCOUNTER — INFUSION (OUTPATIENT)
Dept: INFUSION THERAPY | Facility: HOSPITAL | Age: 40
End: 2025-08-21
Attending: PHYSICIAN ASSISTANT
Payer: COMMERCIAL

## 2025-08-21 VITALS
HEART RATE: 81 BPM | WEIGHT: 234.56 LBS | DIASTOLIC BLOOD PRESSURE: 83 MMHG | SYSTOLIC BLOOD PRESSURE: 120 MMHG | BODY MASS INDEX: 35.55 KG/M2 | RESPIRATION RATE: 18 BRPM | TEMPERATURE: 98 F | OXYGEN SATURATION: 98 % | HEIGHT: 68 IN

## 2025-08-21 DIAGNOSIS — M06.09 RHEUMATOID ARTHRITIS OF MULTIPLE SITES WITH NEGATIVE RHEUMATOID FACTOR: ICD-10-CM

## 2025-08-21 DIAGNOSIS — M35.1 MCTD (MIXED CONNECTIVE TISSUE DISEASE): Primary | ICD-10-CM

## 2025-08-21 DIAGNOSIS — M34.9 SCLERODERMA, DIFFUSE: ICD-10-CM

## 2025-08-21 PROCEDURE — 25000003 PHARM REV CODE 250: Performed by: INTERNAL MEDICINE

## 2025-08-21 PROCEDURE — 96367 TX/PROPH/DG ADDL SEQ IV INF: CPT

## 2025-08-21 PROCEDURE — 96365 THER/PROPH/DIAG IV INF INIT: CPT

## 2025-08-21 PROCEDURE — A4216 STERILE WATER/SALINE, 10 ML: HCPCS | Performed by: INTERNAL MEDICINE

## 2025-08-21 PROCEDURE — 96366 THER/PROPH/DIAG IV INF ADDON: CPT

## 2025-08-21 PROCEDURE — 63600175 PHARM REV CODE 636 W HCPCS: Mod: TB | Performed by: INTERNAL MEDICINE

## 2025-08-21 PROCEDURE — 96375 TX/PRO/DX INJ NEW DRUG ADDON: CPT

## 2025-08-21 RX ORDER — MEPERIDINE HYDROCHLORIDE 50 MG/ML
25 INJECTION INTRAMUSCULAR; INTRAVENOUS; SUBCUTANEOUS
OUTPATIENT
Start: 2025-08-21

## 2025-08-21 RX ORDER — SODIUM CHLORIDE 0.9 % (FLUSH) 0.9 %
10 SYRINGE (ML) INJECTION
Status: DISCONTINUED | OUTPATIENT
Start: 2025-08-21 | End: 2025-08-21 | Stop reason: HOSPADM

## 2025-08-21 RX ORDER — DIPHENHYDRAMINE HYDROCHLORIDE 50 MG/ML
50 INJECTION, SOLUTION INTRAMUSCULAR; INTRAVENOUS ONCE AS NEEDED
Status: DISCONTINUED | OUTPATIENT
Start: 2025-08-21 | End: 2025-08-21 | Stop reason: HOSPADM

## 2025-08-21 RX ORDER — EPINEPHRINE 0.3 MG/.3ML
0.3 INJECTION SUBCUTANEOUS ONCE AS NEEDED
OUTPATIENT
Start: 2025-08-21 | End: 2037-01-17

## 2025-08-21 RX ORDER — DIPHENHYDRAMINE HYDROCHLORIDE 50 MG/ML
50 INJECTION, SOLUTION INTRAMUSCULAR; INTRAVENOUS ONCE AS NEEDED
OUTPATIENT
Start: 2025-08-21 | End: 2037-01-17

## 2025-08-21 RX ORDER — MEPERIDINE HYDROCHLORIDE 50 MG/ML
25 INJECTION INTRAMUSCULAR; INTRAVENOUS; SUBCUTANEOUS
Status: DISCONTINUED | OUTPATIENT
Start: 2025-08-21 | End: 2025-08-21 | Stop reason: HOSPADM

## 2025-08-21 RX ORDER — ACETAMINOPHEN 325 MG/1
650 TABLET ORAL
Status: COMPLETED | OUTPATIENT
Start: 2025-08-21 | End: 2025-08-21

## 2025-08-21 RX ORDER — METHYLPREDNISOLONE SOD SUCC 125 MG
100 VIAL (EA) INJECTION
Status: CANCELLED | OUTPATIENT
Start: 2025-08-21 | End: 2025-08-21

## 2025-08-21 RX ORDER — ACETAMINOPHEN 325 MG/1
650 TABLET ORAL
Status: CANCELLED | OUTPATIENT
Start: 2025-08-21 | End: 2025-08-21

## 2025-08-21 RX ORDER — METHYLPREDNISOLONE SOD SUCC 125 MG
100 VIAL (EA) INJECTION
Status: COMPLETED | OUTPATIENT
Start: 2025-08-21 | End: 2025-08-21

## 2025-08-21 RX ORDER — FAMOTIDINE 10 MG/ML
20 INJECTION, SOLUTION INTRAVENOUS
Status: CANCELLED | OUTPATIENT
Start: 2025-08-21 | End: 2025-08-21

## 2025-08-21 RX ORDER — EPINEPHRINE 0.3 MG/.3ML
0.3 INJECTION SUBCUTANEOUS ONCE AS NEEDED
Status: DISCONTINUED | OUTPATIENT
Start: 2025-08-21 | End: 2025-08-21 | Stop reason: HOSPADM

## 2025-08-21 RX ORDER — FAMOTIDINE 10 MG/ML
20 INJECTION, SOLUTION INTRAVENOUS
Status: COMPLETED | OUTPATIENT
Start: 2025-08-21 | End: 2025-08-21

## 2025-08-21 RX ORDER — HEPARIN 100 UNIT/ML
500 SYRINGE INTRAVENOUS
OUTPATIENT
Start: 2025-08-21

## 2025-08-21 RX ORDER — HEPARIN 100 UNIT/ML
500 SYRINGE INTRAVENOUS
Status: DISCONTINUED | OUTPATIENT
Start: 2025-08-21 | End: 2025-08-21 | Stop reason: HOSPADM

## 2025-08-21 RX ORDER — SODIUM CHLORIDE 0.9 % (FLUSH) 0.9 %
10 SYRINGE (ML) INJECTION
OUTPATIENT
Start: 2025-08-21

## 2025-08-21 RX ADMIN — ACETAMINOPHEN 650 MG: 325 TABLET ORAL at 09:08

## 2025-08-21 RX ADMIN — SODIUM CHLORIDE, PRESERVATIVE FREE 10 ML: 5 INJECTION INTRAVENOUS at 01:08

## 2025-08-21 RX ADMIN — FAMOTIDINE 20 MG: 10 INJECTION INTRAVENOUS at 09:08

## 2025-08-21 RX ADMIN — METHYLPREDNISOLONE SODIUM SUCCINATE 100 MG: 125 INJECTION, POWDER, FOR SOLUTION INTRAMUSCULAR; INTRAVENOUS at 09:08

## 2025-08-21 RX ADMIN — HEPARIN 500 UNITS: 100 SYRINGE at 01:08

## 2025-08-21 RX ADMIN — SODIUM CHLORIDE 1000 MG: 9 INJECTION, SOLUTION INTRAVENOUS at 10:08

## 2025-08-21 RX ADMIN — DIPHENHYDRAMINE HYDROCHLORIDE 50 MG: 50 INJECTION, SOLUTION INTRAMUSCULAR; INTRAVENOUS at 09:08

## 2025-08-26 RX ORDER — IBUPROFEN 800 MG/1
800 TABLET, FILM COATED ORAL 3 TIMES DAILY
Qty: 40 TABLET | Refills: 0 | Status: SHIPPED | OUTPATIENT
Start: 2025-08-26

## (undated) DEVICE — SHEARS HARMONIC 5CM 36CM

## (undated) DEVICE — TOWEL OR DISP STRL BLUE 4/PK

## (undated) DEVICE — NDL SAFETY 25G X 1.5 ECLIPSE

## (undated) DEVICE — DRAPE THYROID SOFT STERILE

## (undated) DEVICE — SUPPORT ULNA NERVE PROTECTOR

## (undated) DEVICE — POSITIONER HEAD DONUT 9IN FOAM

## (undated) DEVICE — DRESSING TRANS 4X4 TEGADERM

## (undated) DEVICE — GLOVE SURGICAL LATEX SZ 7

## (undated) DEVICE — SCISSOR 5MMX35CM DIRECT DRIVE

## (undated) DEVICE — DRAPE MOBILE C-ARM

## (undated) DEVICE — GLOVE SURG BIOGEL LATEX SZ 7.5

## (undated) DEVICE — COVER CAMERA OPERATING ROOM

## (undated) DEVICE — SPONGE COTTON TRAY 4X4IN

## (undated) DEVICE — ELECTRODE REM PLYHSV RETURN 9

## (undated) DEVICE — GOWN POLY REINF BRTH SLV XL

## (undated) DEVICE — COVER TIP CURVED SCISSORS XI

## (undated) DEVICE — MANIFOLD 4 PORT

## (undated) DEVICE — DECANTER 6 VIAL

## (undated) DEVICE — TROCAR ENDOPATH XCEL 11MM 10CM

## (undated) DEVICE — Device

## (undated) DEVICE — CLIP HEMO-LOK MLX LARGE LF

## (undated) DEVICE — DRAPE ARM DAVINCI XI

## (undated) DEVICE — DECANTER VIAL ASEPTIC TRANSFER

## (undated) DEVICE — SOL 9P NACL IRR PIC IL

## (undated) DEVICE — CHLORAPREP 10.5 ML APPLICATOR

## (undated) DEVICE — SET PNEUMOCLEAR HEAT HUM SE HF

## (undated) DEVICE — SUT VICRYL 4-0 ANTIBACT

## (undated) DEVICE — GAUZE SPONGE 4X4 12PLY

## (undated) DEVICE — APPLICATOR CHLORAPREP ORN 26ML

## (undated) DEVICE — SEAL UNIVERSAL 5MM-8MM XI

## (undated) DEVICE — PACK BASIC SETUP SC BR

## (undated) DEVICE — TUBING MEDI-VAC 20FT .25IN

## (undated) DEVICE — DRAPE COLUMN DAVINCI XI

## (undated) DEVICE — COVER LIGHT HANDLE 80/CA

## (undated) DEVICE — SOL STRL WATER INJ 1000ML BG

## (undated) DEVICE — SUT MONOCRYL 4.0 PS2 CP496G

## (undated) DEVICE — DRAPE ABDOMINAL TIBURON 14X11

## (undated) DEVICE — CONNECTOR TUBING STR 5 IN 1

## (undated) DEVICE — ADHESIVE MASTISOL VIAL 48/BX

## (undated) DEVICE — CLIP HEMO-LOK ML

## (undated) DEVICE — OBTURATOR BLADELESS 8MM XI CLR

## (undated) DEVICE — SEE MEDLINE ITEM 157027

## (undated) DEVICE — CLOSURE SKIN STERI STRIP 1/2X4

## (undated) DEVICE — CORD LAP 10 DISP

## (undated) DEVICE — SEE MEDLINE ITEM 157131

## (undated) DEVICE — SUT VICRYL 0 SH

## (undated) DEVICE — NDL PNEUMO INSUFFLATI 120MM

## (undated) DEVICE — SYR 10CC LUER LOCK

## (undated) DEVICE — SEE MEDLINE ITEM 157117

## (undated) DEVICE — CONTAINER SPECIMEN OR STER 4OZ

## (undated) DEVICE — SUT VICRYL 3-0 27 SH

## (undated) DEVICE — KIT TURNOVER

## (undated) DEVICE — SEE MEDLINE ITEM 152622

## (undated) DEVICE — UNDERGLOVES BIOGEL PI SZ 7 LF

## (undated) DEVICE — SUT VICRYL PLUS 3-0 SH 18IN

## (undated) DEVICE — STRIP MEDI WND CLSR 1/2X4IN

## (undated) DEVICE — APPLIER CLIP ENDO LIGAMAX 5MM

## (undated) DEVICE — SOL NS 1000CC

## (undated) DEVICE — KIT ANTIFOG